# Patient Record
Sex: FEMALE | Race: WHITE | NOT HISPANIC OR LATINO | Employment: OTHER | URBAN - METROPOLITAN AREA
[De-identification: names, ages, dates, MRNs, and addresses within clinical notes are randomized per-mention and may not be internally consistent; named-entity substitution may affect disease eponyms.]

---

## 2017-05-31 ENCOUNTER — HOSPITAL ENCOUNTER (OUTPATIENT)
Dept: FAMILY MEDICINE CLINIC | Facility: CLINIC | Age: 72
Setting detail: SPECIMEN
Discharge: HOME | End: 2017-05-31
Attending: NURSE PRACTITIONER | Admitting: NURSE PRACTITIONER

## 2017-05-31 LAB
ALBUMIN SERPL-MCNC: 3.7 G/DL (ref 3.5–4.8)
ALBUMIN/GLOB SERPL: 0.9 {RATIO} (ref 1–1.7)
ALP SERPL-CCNC: 122 IU/L (ref 32–91)
ALT SERPL-CCNC: 21 IU/L (ref 14–54)
ANION GAP SERPL CALC-SCNC: 16.7 MMOL/L (ref 10–20)
AST SERPL-CCNC: 23 IU/L (ref 15–41)
BASOPHILS # BLD AUTO: 0 10*3/UL (ref 0–0.2)
BASOPHILS NFR BLD AUTO: 1 % (ref 0–2)
BILIRUB SERPL-MCNC: 0.5 MG/DL (ref 0.3–1.2)
BUN SERPL-MCNC: 21 MG/DL (ref 8–20)
BUN/CREAT SERPL: 23.3 (ref 5.4–26.2)
CALCIUM SERPL-MCNC: 9.9 MG/DL (ref 8.9–10.3)
CHLORIDE SERPL-SCNC: 104 MMOL/L (ref 101–111)
CHOLEST SERPL-MCNC: 156 MG/DL
CHOLEST/HDLC SERPL: 1.9 {RATIO}
CONV CO2: 27 MMOL/L (ref 22–32)
CONV LDL CHOLESTEROL DIRECT: 59 MG/DL (ref 0–100)
CONV TOTAL PROTEIN: 7.7 G/DL (ref 6.1–7.9)
CREAT UR-MCNC: 0.9 MG/DL (ref 0.4–1)
DIFFERENTIAL METHOD BLD: (no result)
EOSINOPHIL # BLD AUTO: 0.1 10*3/UL (ref 0–0.3)
EOSINOPHIL # BLD AUTO: 1 % (ref 0–3)
ERYTHROCYTE [DISTWIDTH] IN BLOOD BY AUTOMATED COUNT: 14.4 % (ref 11.5–14.5)
GLOBULIN UR ELPH-MCNC: 4 G/DL (ref 2.5–3.8)
GLUCOSE SERPL-MCNC: 120 MG/DL (ref 65–99)
HCT VFR BLD AUTO: 44.5 % (ref 35–49)
HDLC SERPL-MCNC: 84 MG/DL
HGB BLD-MCNC: 14.6 G/DL (ref 12–15)
LDLC/HDLC SERPL: 0.7 {RATIO}
LIPID INTERPRETATION: NORMAL
LYMPHOCYTES # BLD AUTO: 1 10*3/UL (ref 0.8–4.8)
LYMPHOCYTES NFR BLD AUTO: 11 % (ref 18–42)
MCH RBC QN AUTO: 30.5 PG (ref 26–32)
MCHC RBC AUTO-ENTMCNC: 32.8 G/DL (ref 32–36)
MCV RBC AUTO: 93 FL (ref 80–94)
MONOCYTES # BLD AUTO: 0.4 10*3/UL (ref 0.1–1.3)
MONOCYTES NFR BLD AUTO: 5 % (ref 2–11)
NEUTROPHILS # BLD AUTO: 7.2 10*3/UL (ref 2.3–8.6)
NEUTROPHILS NFR BLD AUTO: 82 % (ref 50–75)
NRBC BLD AUTO-RTO: 0 /100{WBCS}
NRBC/RBC NFR BLD MANUAL: 0 10*3/UL
PLATELET # BLD AUTO: 231 10*3/UL (ref 150–450)
PMV BLD AUTO: 8.3 FL (ref 7.4–10.4)
POTASSIUM SERPL-SCNC: 4.7 MMOL/L (ref 3.6–5.1)
RBC # BLD AUTO: 4.78 10*6/UL (ref 4–5.4)
SODIUM SERPL-SCNC: 143 MMOL/L (ref 136–144)
TRIGL SERPL-MCNC: 40 MG/DL
VLDLC SERPL CALC-MCNC: 13.6 MG/DL
WBC # BLD AUTO: 8.7 10*3/UL (ref 4.5–11.5)

## 2017-06-09 ENCOUNTER — HOSPITAL ENCOUNTER (OUTPATIENT)
Dept: WOUND CARE | Facility: HOSPITAL | Age: 72
Discharge: HOME | End: 2017-06-09
Attending: INTERNAL MEDICINE | Admitting: INTERNAL MEDICINE

## 2017-06-12 ENCOUNTER — HOSPITAL ENCOUNTER (OUTPATIENT)
Dept: WOUND CARE | Facility: HOSPITAL | Age: 72
Discharge: HOME | End: 2017-06-12
Attending: INTERNAL MEDICINE | Admitting: INTERNAL MEDICINE

## 2017-06-16 ENCOUNTER — HOSPITAL ENCOUNTER (OUTPATIENT)
Dept: WOUND CARE | Facility: HOSPITAL | Age: 72
Discharge: HOME | End: 2017-06-16
Attending: NURSE PRACTITIONER | Admitting: NURSE PRACTITIONER

## 2017-06-16 LAB
AMPICILLIN SUSC ISLT: NORMAL
AMPICILLIN SUSC ISLT: NORMAL
AZTREONAM SUSC ISLT: NORMAL
AZTREONAM SUSC ISLT: NORMAL
BACTERIA ISLT: NORMAL
BACTERIA SPEC AEROBE CULT: NORMAL
CEFAZOLIN SUSC ISLT: NORMAL
CEFEPIME SUSC ISLT: NORMAL
CEFEPIME SUSC ISLT: NORMAL
CEFTAZIDIME SUSC ISLT: NORMAL
CEFTRIAXONE SUSC ISLT: NORMAL
CIPROFLOXACIN SUSC ISLT: NORMAL
CIPROFLOXACIN SUSC ISLT: NORMAL
GRAM STN SPEC: NORMAL
LEVOFLOXACIN SUSC ISLT: NORMAL
LEVOFLOXACIN SUSC ISLT: NORMAL
Lab: NORMAL
MEROPENEM SUSC ISLT: NORMAL
MEROPENEM SUSC ISLT: NORMAL
MICRO REPORT STATUS: NORMAL
PIP+TAZO SUSC ISLT: NORMAL
PIP+TAZO SUSC ISLT: NORMAL
SPECIMEN SOURCE: NORMAL
SUSC METH SPEC: NORMAL
TOBRAMYCIN SUSC ISLT: NORMAL
TOBRAMYCIN SUSC ISLT: NORMAL
TRIMETHOPRIM/SULFA: NORMAL
VANCOMYCIN SUSC ISLT: NORMAL

## 2017-06-19 ENCOUNTER — HOSPITAL ENCOUNTER (OUTPATIENT)
Dept: WOUND CARE | Facility: HOSPITAL | Age: 72
Discharge: HOME | End: 2017-06-19
Attending: NURSE PRACTITIONER | Admitting: NURSE PRACTITIONER

## 2017-06-23 ENCOUNTER — HOSPITAL ENCOUNTER (OUTPATIENT)
Dept: WOUND CARE | Facility: HOSPITAL | Age: 72
Discharge: HOME | End: 2017-06-23
Attending: NURSE PRACTITIONER | Admitting: NURSE PRACTITIONER

## 2017-06-26 ENCOUNTER — HOSPITAL ENCOUNTER (OUTPATIENT)
Dept: WOUND CARE | Facility: HOSPITAL | Age: 72
Discharge: HOME | End: 2017-06-26
Attending: NURSE PRACTITIONER | Admitting: NURSE PRACTITIONER

## 2017-06-30 ENCOUNTER — HOSPITAL ENCOUNTER (OUTPATIENT)
Dept: WOUND CARE | Facility: HOSPITAL | Age: 72
Discharge: HOME | End: 2017-06-30
Attending: NURSE PRACTITIONER | Admitting: NURSE PRACTITIONER

## 2017-07-03 ENCOUNTER — HOSPITAL ENCOUNTER (OUTPATIENT)
Dept: WOUND CARE | Facility: HOSPITAL | Age: 72
Discharge: HOME | End: 2017-07-03
Attending: NURSE PRACTITIONER | Admitting: NURSE PRACTITIONER

## 2017-07-07 ENCOUNTER — HOSPITAL ENCOUNTER (OUTPATIENT)
Dept: WOUND CARE | Facility: HOSPITAL | Age: 72
Discharge: HOME | End: 2017-07-07
Attending: NURSE PRACTITIONER | Admitting: NURSE PRACTITIONER

## 2017-07-13 ENCOUNTER — HOSPITAL ENCOUNTER (OUTPATIENT)
Dept: WOUND CARE | Facility: HOSPITAL | Age: 72
Discharge: HOME | End: 2017-07-13
Attending: NURSE PRACTITIONER | Admitting: NURSE PRACTITIONER

## 2017-07-18 ENCOUNTER — HOSPITAL ENCOUNTER (OUTPATIENT)
Dept: WOUND CARE | Facility: HOSPITAL | Age: 72
Discharge: HOME | End: 2017-07-18
Attending: PHYSICIAN ASSISTANT | Admitting: PHYSICIAN ASSISTANT

## 2017-07-21 ENCOUNTER — HOSPITAL ENCOUNTER (OUTPATIENT)
Dept: WOUND CARE | Facility: HOSPITAL | Age: 72
Discharge: HOME | End: 2017-07-21
Attending: NURSE PRACTITIONER | Admitting: NURSE PRACTITIONER

## 2017-07-28 ENCOUNTER — HOSPITAL ENCOUNTER (OUTPATIENT)
Dept: WOUND CARE | Facility: HOSPITAL | Age: 72
Discharge: HOME | End: 2017-07-28
Attending: NURSE PRACTITIONER | Admitting: NURSE PRACTITIONER

## 2017-08-04 ENCOUNTER — HOSPITAL ENCOUNTER (OUTPATIENT)
Dept: WOUND CARE | Facility: HOSPITAL | Age: 72
Discharge: HOME | End: 2017-08-04
Attending: NURSE PRACTITIONER | Admitting: NURSE PRACTITIONER

## 2017-08-11 ENCOUNTER — HOSPITAL ENCOUNTER (OUTPATIENT)
Dept: WOUND CARE | Facility: HOSPITAL | Age: 72
Discharge: HOME | End: 2017-08-11
Attending: NURSE PRACTITIONER | Admitting: NURSE PRACTITIONER

## 2017-08-18 ENCOUNTER — HOSPITAL ENCOUNTER (OUTPATIENT)
Dept: WOUND CARE | Facility: HOSPITAL | Age: 72
Discharge: HOME | End: 2017-08-18
Attending: NURSE PRACTITIONER | Admitting: NURSE PRACTITIONER

## 2017-08-24 ENCOUNTER — HOSPITAL ENCOUNTER (OUTPATIENT)
Dept: WOUND CARE | Facility: HOSPITAL | Age: 72
Discharge: HOME | End: 2017-08-24
Attending: NURSE PRACTITIONER | Admitting: NURSE PRACTITIONER

## 2017-09-01 ENCOUNTER — HOSPITAL ENCOUNTER (OUTPATIENT)
Dept: PHYSICAL THERAPY | Facility: HOSPITAL | Age: 72
Setting detail: RECURRING SERIES
Discharge: HOME | End: 2017-09-12
Attending: NURSE PRACTITIONER | Admitting: NURSE PRACTITIONER

## 2017-09-01 ENCOUNTER — HOSPITAL ENCOUNTER (OUTPATIENT)
Dept: WOUND CARE | Facility: HOSPITAL | Age: 72
Discharge: HOME | End: 2017-09-01
Attending: NURSE PRACTITIONER | Admitting: NURSE PRACTITIONER

## 2017-09-08 ENCOUNTER — HOSPITAL ENCOUNTER (OUTPATIENT)
Dept: WOUND CARE | Facility: HOSPITAL | Age: 72
Discharge: HOME | End: 2017-09-08
Attending: NURSE PRACTITIONER | Admitting: NURSE PRACTITIONER

## 2017-09-15 ENCOUNTER — HOSPITAL ENCOUNTER (OUTPATIENT)
Dept: WOUND CARE | Facility: HOSPITAL | Age: 72
Discharge: HOME | End: 2017-09-15
Attending: NURSE PRACTITIONER | Admitting: NURSE PRACTITIONER

## 2017-09-22 ENCOUNTER — HOSPITAL ENCOUNTER (OUTPATIENT)
Dept: WOUND CARE | Facility: HOSPITAL | Age: 72
Discharge: HOME | End: 2017-09-22
Attending: NURSE PRACTITIONER | Admitting: NURSE PRACTITIONER

## 2017-09-29 ENCOUNTER — HOSPITAL ENCOUNTER (OUTPATIENT)
Dept: WOUND CARE | Facility: HOSPITAL | Age: 72
Discharge: HOME | End: 2017-09-29
Attending: NURSE PRACTITIONER | Admitting: NURSE PRACTITIONER

## 2017-09-29 LAB
AZTREONAM SUSC ISLT: ABNORMAL
AZTREONAM SUSC ISLT: ABNORMAL
BACTERIA ISLT: ABNORMAL
BACTERIA SPEC AEROBE CULT: ABNORMAL
CEFEPIME SUSC ISLT: ABNORMAL
CEFEPIME SUSC ISLT: ABNORMAL
CEFTAZIDIME SUSC ISLT: ABNORMAL
CEFTRIAXONE SUSC ISLT: ABNORMAL
CIPROFLOXACIN SUSC ISLT: ABNORMAL
CIPROFLOXACIN SUSC ISLT: ABNORMAL
CLINDAMYCIN SUSC ISLT: ABNORMAL
ERYTHROMYCIN SUSC ISLT: ABNORMAL
GRAM STN SPEC: ABNORMAL
LEVOFLOXACIN SUSC ISLT: ABNORMAL
LEVOFLOXACIN SUSC ISLT: ABNORMAL
Lab: ABNORMAL
MEROPENEM SUSC ISLT: ABNORMAL
MEROPENEM SUSC ISLT: ABNORMAL
MICRO REPORT STATUS: ABNORMAL
OXACILLIN SUSC ISLT: ABNORMAL
PIP+TAZO SUSC ISLT: ABNORMAL
PIP+TAZO SUSC ISLT: ABNORMAL
SPECIMEN SOURCE: ABNORMAL
SUSC METH SPEC: ABNORMAL
TETRACYCLINE SUSC ISLT: ABNORMAL
TETRACYCLINE SUSC ISLT: ABNORMAL
TOBRAMYCIN SUSC ISLT: ABNORMAL
TOBRAMYCIN SUSC ISLT: ABNORMAL
TRIMETHOPRIM/SULFA: ABNORMAL
TRIMETHOPRIM/SULFA: ABNORMAL
VANCOMYCIN SUSC ISLT: ABNORMAL

## 2017-10-06 ENCOUNTER — HOSPITAL ENCOUNTER (OUTPATIENT)
Dept: WOUND CARE | Facility: HOSPITAL | Age: 72
Discharge: HOME | End: 2017-10-06
Attending: NURSE PRACTITIONER | Admitting: NURSE PRACTITIONER

## 2017-10-11 ENCOUNTER — HOSPITAL ENCOUNTER (OUTPATIENT)
Dept: WOUND CARE | Facility: HOSPITAL | Age: 72
Discharge: HOME | End: 2017-10-11
Attending: INTERNAL MEDICINE | Admitting: INTERNAL MEDICINE

## 2017-10-18 ENCOUNTER — HOSPITAL ENCOUNTER (OUTPATIENT)
Dept: WOUND CARE | Facility: HOSPITAL | Age: 72
Discharge: HOME | End: 2017-10-18
Attending: NURSE PRACTITIONER | Admitting: NURSE PRACTITIONER

## 2017-10-23 ENCOUNTER — HOSPITAL ENCOUNTER (OUTPATIENT)
Dept: WOUND CARE | Facility: HOSPITAL | Age: 72
Discharge: HOME | End: 2017-10-23
Attending: NURSE PRACTITIONER | Admitting: NURSE PRACTITIONER

## 2017-10-27 ENCOUNTER — HOSPITAL ENCOUNTER (OUTPATIENT)
Dept: WOUND CARE | Facility: HOSPITAL | Age: 72
Discharge: HOME | End: 2017-10-27
Attending: NURSE PRACTITIONER | Admitting: NURSE PRACTITIONER

## 2017-10-30 ENCOUNTER — HOSPITAL ENCOUNTER (OUTPATIENT)
Dept: WOUND CARE | Facility: HOSPITAL | Age: 72
Discharge: HOME | End: 2017-10-30
Attending: NURSE PRACTITIONER | Admitting: NURSE PRACTITIONER

## 2017-11-03 ENCOUNTER — HOSPITAL ENCOUNTER (OUTPATIENT)
Dept: WOUND CARE | Facility: HOSPITAL | Age: 72
Discharge: HOME | End: 2017-11-03
Attending: NURSE PRACTITIONER | Admitting: NURSE PRACTITIONER

## 2017-11-17 ENCOUNTER — HOSPITAL ENCOUNTER (OUTPATIENT)
Dept: WOUND CARE | Facility: HOSPITAL | Age: 72
Discharge: HOME | End: 2017-11-17
Attending: NURSE PRACTITIONER | Admitting: NURSE PRACTITIONER

## 2017-12-01 ENCOUNTER — HOSPITAL ENCOUNTER (OUTPATIENT)
Dept: WOUND CARE | Facility: HOSPITAL | Age: 72
Discharge: HOME | End: 2017-12-01
Attending: NURSE PRACTITIONER | Admitting: NURSE PRACTITIONER

## 2017-12-08 ENCOUNTER — HOSPITAL ENCOUNTER (OUTPATIENT)
Dept: WOUND CARE | Facility: HOSPITAL | Age: 72
Discharge: HOME | End: 2017-12-08
Attending: NURSE PRACTITIONER | Admitting: NURSE PRACTITIONER

## 2017-12-15 ENCOUNTER — HOSPITAL ENCOUNTER (OUTPATIENT)
Dept: WOUND CARE | Facility: HOSPITAL | Age: 72
Discharge: HOME | End: 2017-12-15
Attending: NURSE PRACTITIONER | Admitting: NURSE PRACTITIONER

## 2017-12-29 ENCOUNTER — HOSPITAL ENCOUNTER (OUTPATIENT)
Dept: WOUND CARE | Facility: HOSPITAL | Age: 72
Discharge: HOME | End: 2017-12-29
Attending: NURSE PRACTITIONER | Admitting: NURSE PRACTITIONER

## 2018-01-05 ENCOUNTER — HOSPITAL ENCOUNTER (OUTPATIENT)
Dept: WOUND CARE | Facility: HOSPITAL | Age: 73
Discharge: HOME | End: 2018-01-05
Attending: NURSE PRACTITIONER | Admitting: NURSE PRACTITIONER

## 2018-01-12 ENCOUNTER — HOSPITAL ENCOUNTER (OUTPATIENT)
Dept: WOUND CARE | Facility: HOSPITAL | Age: 73
Discharge: HOME | End: 2018-01-12
Attending: NURSE PRACTITIONER | Admitting: NURSE PRACTITIONER

## 2018-01-19 ENCOUNTER — HOSPITAL ENCOUNTER (OUTPATIENT)
Dept: WOUND CARE | Facility: HOSPITAL | Age: 73
Discharge: HOME | End: 2018-01-19
Attending: NURSE PRACTITIONER | Admitting: NURSE PRACTITIONER

## 2018-01-26 ENCOUNTER — HOSPITAL ENCOUNTER (OUTPATIENT)
Dept: PHYSICAL THERAPY | Facility: HOSPITAL | Age: 73
Setting detail: RECURRING SERIES
Discharge: HOME | End: 2018-04-30
Attending: NURSE PRACTITIONER | Admitting: NURSE PRACTITIONER

## 2018-01-26 ENCOUNTER — HOSPITAL ENCOUNTER (OUTPATIENT)
Dept: WOUND CARE | Facility: HOSPITAL | Age: 73
Discharge: HOME | End: 2018-01-26
Attending: NURSE PRACTITIONER | Admitting: NURSE PRACTITIONER

## 2018-02-02 ENCOUNTER — HOSPITAL ENCOUNTER (OUTPATIENT)
Dept: WOUND CARE | Facility: HOSPITAL | Age: 73
Discharge: HOME | End: 2018-02-02
Attending: NURSE PRACTITIONER | Admitting: NURSE PRACTITIONER

## 2018-02-09 ENCOUNTER — HOSPITAL ENCOUNTER (OUTPATIENT)
Dept: WOUND CARE | Facility: HOSPITAL | Age: 73
Discharge: HOME | End: 2018-02-09
Attending: NURSE PRACTITIONER | Admitting: NURSE PRACTITIONER

## 2018-02-16 ENCOUNTER — HOSPITAL ENCOUNTER (OUTPATIENT)
Dept: WOUND CARE | Facility: HOSPITAL | Age: 73
Discharge: HOME | End: 2018-02-16
Attending: NURSE PRACTITIONER | Admitting: NURSE PRACTITIONER

## 2018-02-28 ENCOUNTER — HOSPITAL ENCOUNTER (OUTPATIENT)
Dept: WOUND CARE | Facility: HOSPITAL | Age: 73
Discharge: HOME | End: 2018-02-28
Attending: NURSE PRACTITIONER | Admitting: NURSE PRACTITIONER

## 2018-04-04 ENCOUNTER — HOSPITAL ENCOUNTER (OUTPATIENT)
Dept: WOUND CARE | Facility: HOSPITAL | Age: 73
Discharge: HOME | End: 2018-04-04
Attending: NURSE PRACTITIONER | Admitting: NURSE PRACTITIONER

## 2018-04-09 ENCOUNTER — HOSPITAL ENCOUNTER (OUTPATIENT)
Dept: CARDIOLOGY | Facility: HOSPITAL | Age: 73
Discharge: HOME | End: 2018-04-09
Attending: NURSE PRACTITIONER | Admitting: NURSE PRACTITIONER

## 2018-04-11 ENCOUNTER — HOSPITAL ENCOUNTER (OUTPATIENT)
Dept: WOUND CARE | Facility: HOSPITAL | Age: 73
Discharge: HOME | End: 2018-04-11
Attending: NURSE PRACTITIONER | Admitting: NURSE PRACTITIONER

## 2018-04-18 ENCOUNTER — HOSPITAL ENCOUNTER (OUTPATIENT)
Dept: WOUND CARE | Facility: HOSPITAL | Age: 73
Discharge: HOME | End: 2018-04-18
Attending: NURSE PRACTITIONER | Admitting: NURSE PRACTITIONER

## 2018-04-27 ENCOUNTER — HOSPITAL ENCOUNTER (OUTPATIENT)
Dept: WOUND CARE | Facility: HOSPITAL | Age: 73
Discharge: HOME | End: 2018-04-27
Attending: NURSE PRACTITIONER | Admitting: NURSE PRACTITIONER

## 2018-04-30 ENCOUNTER — HOSPITAL ENCOUNTER (OUTPATIENT)
Dept: PHYSICAL THERAPY | Facility: HOSPITAL | Age: 73
Setting detail: RECURRING SERIES
Discharge: HOME | End: 2018-05-29
Attending: NURSE PRACTITIONER | Admitting: NURSE PRACTITIONER

## 2018-05-04 ENCOUNTER — HOSPITAL ENCOUNTER (OUTPATIENT)
Dept: WOUND CARE | Facility: HOSPITAL | Age: 73
Discharge: HOME | End: 2018-05-04
Attending: NURSE PRACTITIONER | Admitting: NURSE PRACTITIONER

## 2018-05-11 ENCOUNTER — HOSPITAL ENCOUNTER (OUTPATIENT)
Dept: WOUND CARE | Facility: HOSPITAL | Age: 73
Discharge: HOME | End: 2018-05-11
Attending: NURSE PRACTITIONER | Admitting: NURSE PRACTITIONER

## 2018-05-18 ENCOUNTER — HOSPITAL ENCOUNTER (OUTPATIENT)
Dept: WOUND CARE | Facility: HOSPITAL | Age: 73
Discharge: HOME | End: 2018-05-18
Attending: NURSE PRACTITIONER | Admitting: NURSE PRACTITIONER

## 2018-05-25 ENCOUNTER — HOSPITAL ENCOUNTER (OUTPATIENT)
Dept: WOUND CARE | Facility: HOSPITAL | Age: 73
Discharge: HOME | End: 2018-05-25
Attending: NURSE PRACTITIONER | Admitting: NURSE PRACTITIONER

## 2018-06-01 ENCOUNTER — HOSPITAL ENCOUNTER (OUTPATIENT)
Dept: WOUND CARE | Facility: HOSPITAL | Age: 73
Discharge: HOME | End: 2018-06-01
Attending: NURSE PRACTITIONER | Admitting: NURSE PRACTITIONER

## 2018-06-08 ENCOUNTER — HOSPITAL ENCOUNTER (OUTPATIENT)
Dept: WOUND CARE | Facility: HOSPITAL | Age: 73
Discharge: HOME | End: 2018-06-08
Attending: NURSE PRACTITIONER | Admitting: NURSE PRACTITIONER

## 2018-06-12 ENCOUNTER — HOSPITAL ENCOUNTER (OUTPATIENT)
Dept: WOUND CARE | Facility: HOSPITAL | Age: 73
Discharge: HOME | End: 2018-06-12
Attending: NURSE PRACTITIONER | Admitting: NURSE PRACTITIONER

## 2018-06-15 ENCOUNTER — HOSPITAL ENCOUNTER (OUTPATIENT)
Dept: WOUND CARE | Facility: HOSPITAL | Age: 73
Discharge: HOME | End: 2018-06-15
Attending: NURSE PRACTITIONER | Admitting: NURSE PRACTITIONER

## 2018-06-19 ENCOUNTER — HOSPITAL ENCOUNTER (OUTPATIENT)
Dept: WOUND CARE | Facility: HOSPITAL | Age: 73
Discharge: HOME | End: 2018-06-19
Attending: SURGERY | Admitting: SURGERY

## 2018-06-22 ENCOUNTER — HOSPITAL ENCOUNTER (OUTPATIENT)
Dept: WOUND CARE | Facility: HOSPITAL | Age: 73
Discharge: HOME | End: 2018-06-22
Attending: NURSE PRACTITIONER | Admitting: NURSE PRACTITIONER

## 2018-06-26 ENCOUNTER — HOSPITAL ENCOUNTER (OUTPATIENT)
Dept: WOUND CARE | Facility: HOSPITAL | Age: 73
Discharge: HOME | End: 2018-06-26
Attending: SURGERY | Admitting: SURGERY

## 2018-06-29 ENCOUNTER — HOSPITAL ENCOUNTER (OUTPATIENT)
Dept: WOUND CARE | Facility: HOSPITAL | Age: 73
Discharge: HOME | End: 2018-06-29
Attending: NURSE PRACTITIONER | Admitting: NURSE PRACTITIONER

## 2018-07-05 ENCOUNTER — HOSPITAL ENCOUNTER (OUTPATIENT)
Dept: WOUND CARE | Facility: HOSPITAL | Age: 73
Discharge: HOME | End: 2018-07-05
Attending: NURSE PRACTITIONER | Admitting: NURSE PRACTITIONER

## 2018-07-13 ENCOUNTER — HOSPITAL ENCOUNTER (OUTPATIENT)
Dept: WOUND CARE | Facility: HOSPITAL | Age: 73
Discharge: HOME | End: 2018-07-13
Attending: NURSE PRACTITIONER | Admitting: NURSE PRACTITIONER

## 2018-07-27 ENCOUNTER — HOSPITAL ENCOUNTER (OUTPATIENT)
Dept: WOUND CARE | Facility: HOSPITAL | Age: 73
Discharge: HOME | End: 2018-07-27
Attending: NURSE PRACTITIONER | Admitting: NURSE PRACTITIONER

## 2018-07-31 ENCOUNTER — HOSPITAL ENCOUNTER (OUTPATIENT)
Dept: FAMILY MEDICINE CLINIC | Facility: CLINIC | Age: 73
Setting detail: SPECIMEN
Discharge: HOME | End: 2018-07-31
Attending: NURSE PRACTITIONER | Admitting: NURSE PRACTITIONER

## 2018-07-31 LAB
ALBUMIN SERPL-MCNC: 4 G/DL (ref 3.5–4.8)
ALBUMIN/GLOB SERPL: 1.1 {RATIO} (ref 1–1.7)
ALP SERPL-CCNC: 116 IU/L (ref 32–91)
ALT SERPL-CCNC: 20 IU/L (ref 14–54)
ANION GAP SERPL CALC-SCNC: 13.7 MMOL/L (ref 10–20)
AST SERPL-CCNC: 25 IU/L (ref 15–41)
BILIRUB SERPL-MCNC: 0.5 MG/DL (ref 0.3–1.2)
BUN SERPL-MCNC: 25 MG/DL (ref 8–20)
BUN/CREAT SERPL: 27.8 (ref 5.4–26.2)
CALCIUM SERPL-MCNC: 9.8 MG/DL (ref 8.9–10.3)
CHLORIDE SERPL-SCNC: 102 MMOL/L (ref 101–111)
CHOLEST SERPL-MCNC: 169 MG/DL
CHOLEST/HDLC SERPL: 2.2 {RATIO}
CONV CO2: 28 MMOL/L (ref 22–32)
CONV LDL CHOLESTEROL DIRECT: 74 MG/DL (ref 0–100)
CONV TOTAL PROTEIN: 7.5 G/DL (ref 6.1–7.9)
CREAT UR-MCNC: 0.9 MG/DL (ref 0.4–1)
GLOBULIN UR ELPH-MCNC: 3.5 G/DL (ref 2.5–3.8)
GLUCOSE SERPL-MCNC: 115 MG/DL (ref 65–99)
HDLC SERPL-MCNC: 78 MG/DL
LDLC/HDLC SERPL: 1 {RATIO}
LIPID INTERPRETATION: NORMAL
POTASSIUM SERPL-SCNC: 4.7 MMOL/L (ref 3.6–5.1)
SODIUM SERPL-SCNC: 139 MMOL/L (ref 136–144)
TRIGL SERPL-MCNC: 63 MG/DL
VLDLC SERPL CALC-MCNC: 16.7 MG/DL

## 2018-08-03 ENCOUNTER — HOSPITAL ENCOUNTER (OUTPATIENT)
Dept: WOUND CARE | Facility: HOSPITAL | Age: 73
Discharge: HOME | End: 2018-08-03
Attending: NURSE PRACTITIONER | Admitting: NURSE PRACTITIONER

## 2018-10-05 ENCOUNTER — HOSPITAL ENCOUNTER (OUTPATIENT)
Dept: WOUND CARE | Facility: HOSPITAL | Age: 73
Discharge: HOME | End: 2018-10-05
Attending: NURSE PRACTITIONER | Admitting: NURSE PRACTITIONER

## 2018-10-09 ENCOUNTER — HOSPITAL ENCOUNTER (OUTPATIENT)
Dept: WOUND CARE | Facility: HOSPITAL | Age: 73
Discharge: HOME | End: 2018-10-09
Attending: SURGERY | Admitting: SURGERY

## 2018-10-12 ENCOUNTER — HOSPITAL ENCOUNTER (OUTPATIENT)
Dept: WOUND CARE | Facility: HOSPITAL | Age: 73
Discharge: HOME | End: 2018-10-12
Attending: NURSE PRACTITIONER | Admitting: NURSE PRACTITIONER

## 2018-10-16 ENCOUNTER — HOSPITAL ENCOUNTER (OUTPATIENT)
Dept: WOUND CARE | Facility: HOSPITAL | Age: 73
Discharge: HOME | End: 2018-10-16
Attending: SURGERY | Admitting: SURGERY

## 2018-10-19 ENCOUNTER — HOSPITAL ENCOUNTER (OUTPATIENT)
Dept: WOUND CARE | Facility: HOSPITAL | Age: 73
Discharge: HOME | End: 2018-10-19
Attending: SURGERY | Admitting: SURGERY

## 2018-10-26 ENCOUNTER — HOSPITAL ENCOUNTER (OUTPATIENT)
Dept: WOUND CARE | Facility: HOSPITAL | Age: 73
Discharge: HOME | End: 2018-10-26
Attending: NURSE PRACTITIONER | Admitting: NURSE PRACTITIONER

## 2019-01-04 ENCOUNTER — HOSPITAL ENCOUNTER (OUTPATIENT)
Dept: WOUND CARE | Facility: HOSPITAL | Age: 74
Discharge: HOME | End: 2019-01-04
Attending: NURSE PRACTITIONER | Admitting: NURSE PRACTITIONER

## 2019-01-04 LAB
GRAM STN SPEC: NORMAL
Lab: NORMAL
MICRO REPORT STATUS: NORMAL
SPECIMEN SOURCE: NORMAL
SPECIMEN SOURCE: NORMAL

## 2019-01-10 ENCOUNTER — HOSPITAL ENCOUNTER (OUTPATIENT)
Dept: CARDIOLOGY | Facility: HOSPITAL | Age: 74
Discharge: HOME | End: 2019-01-10
Attending: NURSE PRACTITIONER | Admitting: NURSE PRACTITIONER

## 2019-01-18 ENCOUNTER — HOSPITAL ENCOUNTER (OUTPATIENT)
Dept: WOUND CARE | Facility: HOSPITAL | Age: 74
Discharge: HOME | End: 2019-01-18
Attending: INTERNAL MEDICINE | Admitting: INTERNAL MEDICINE

## 2019-01-21 ENCOUNTER — HOSPITAL ENCOUNTER (OUTPATIENT)
Dept: RESPIRATORY THERAPY | Facility: HOSPITAL | Age: 74
Discharge: HOME | End: 2019-01-21
Attending: NURSE PRACTITIONER | Admitting: NURSE PRACTITIONER

## 2019-01-25 ENCOUNTER — HOSPITAL ENCOUNTER (OUTPATIENT)
Dept: WOUND CARE | Facility: HOSPITAL | Age: 74
Discharge: HOME | End: 2019-01-25
Attending: NURSE PRACTITIONER | Admitting: NURSE PRACTITIONER

## 2019-02-01 ENCOUNTER — HOSPITAL ENCOUNTER (OUTPATIENT)
Dept: WOUND CARE | Facility: HOSPITAL | Age: 74
Discharge: HOME | End: 2019-02-01
Attending: NURSE PRACTITIONER | Admitting: NURSE PRACTITIONER

## 2019-02-08 ENCOUNTER — HOSPITAL ENCOUNTER (OUTPATIENT)
Dept: WOUND CARE | Facility: HOSPITAL | Age: 74
Discharge: HOME | End: 2019-02-08
Attending: NURSE PRACTITIONER | Admitting: NURSE PRACTITIONER

## 2019-02-15 ENCOUNTER — HOSPITAL ENCOUNTER (OUTPATIENT)
Dept: WOUND CARE | Facility: HOSPITAL | Age: 74
Discharge: HOME | End: 2019-02-15
Attending: NURSE PRACTITIONER | Admitting: NURSE PRACTITIONER

## 2019-02-15 LAB
BACTERIA ISLT: NORMAL
BACTERIA SPEC AEROBE CULT: NORMAL
CEFEPIME SUSC ISLT: NORMAL
CIPROFLOXACIN SUSC ISLT: NORMAL
GRAM STN SPEC: NORMAL
LEVOFLOXACIN SUSC ISLT: NORMAL
Lab: NORMAL
MICRO REPORT STATUS: NORMAL
PIP+TAZO SUSC ISLT: NORMAL
SPECIMEN SOURCE: NORMAL
SUSC METH SPEC: NORMAL
TOBRAMYCIN SUSC ISLT: NORMAL

## 2019-02-16 ENCOUNTER — HOSPITAL ENCOUNTER (OUTPATIENT)
Dept: OTHER | Facility: HOSPITAL | Age: 74
Discharge: HOME | End: 2019-02-16
Attending: NURSE PRACTITIONER | Admitting: NURSE PRACTITIONER

## 2019-02-16 LAB
ALBUMIN SERPL-MCNC: 3.4 G/DL (ref 3.5–4.8)
ALBUMIN/GLOB SERPL: 1 {RATIO} (ref 1–1.7)
ALP SERPL-CCNC: 103 IU/L (ref 32–91)
ALT SERPL-CCNC: 18 IU/L (ref 14–54)
ANION GAP SERPL CALC-SCNC: 13.6 MMOL/L (ref 10–20)
AST SERPL-CCNC: 27 IU/L (ref 15–41)
BILIRUB SERPL-MCNC: 0.6 MG/DL (ref 0.3–1.2)
BUN SERPL-MCNC: 23 MG/DL (ref 8–20)
BUN/CREAT SERPL: 25.6 (ref 5.4–26.2)
CALCIUM SERPL-MCNC: 9.3 MG/DL (ref 8.9–10.3)
CHLORIDE SERPL-SCNC: 105 MMOL/L (ref 101–111)
CONV CO2: 24 MMOL/L (ref 22–32)
CONV TOTAL PROTEIN: 6.9 G/DL (ref 6.1–7.9)
CREAT UR-MCNC: 0.9 MG/DL (ref 0.4–1)
GLOBULIN UR ELPH-MCNC: 3.5 G/DL (ref 2.5–3.8)
GLUCOSE SERPL-MCNC: 113 MG/DL (ref 65–99)
POTASSIUM SERPL-SCNC: 4.6 MMOL/L (ref 3.6–5.1)
SODIUM SERPL-SCNC: 138 MMOL/L (ref 136–144)

## 2019-02-21 ENCOUNTER — HOSPITAL ENCOUNTER (OUTPATIENT)
Dept: WOUND CARE | Facility: HOSPITAL | Age: 74
Discharge: HOME | End: 2019-02-21
Attending: INTERNAL MEDICINE | Admitting: INTERNAL MEDICINE

## 2019-03-01 ENCOUNTER — HOSPITAL ENCOUNTER (OUTPATIENT)
Dept: WOUND CARE | Facility: HOSPITAL | Age: 74
Discharge: HOME | End: 2019-03-01
Attending: NURSE PRACTITIONER | Admitting: NURSE PRACTITIONER

## 2019-03-08 ENCOUNTER — HOSPITAL ENCOUNTER (OUTPATIENT)
Dept: WOUND CARE | Facility: HOSPITAL | Age: 74
Discharge: HOME | End: 2019-03-08
Attending: NURSE PRACTITIONER | Admitting: NURSE PRACTITIONER

## 2019-03-15 ENCOUNTER — HOSPITAL ENCOUNTER (OUTPATIENT)
Dept: WOUND CARE | Facility: HOSPITAL | Age: 74
Discharge: HOME | End: 2019-03-15
Attending: NURSE PRACTITIONER | Admitting: NURSE PRACTITIONER

## 2019-03-18 ENCOUNTER — HOSPITAL ENCOUNTER (OUTPATIENT)
Dept: FAMILY MEDICINE CLINIC | Facility: CLINIC | Age: 74
Setting detail: SPECIMEN
Discharge: HOME | End: 2019-03-18
Attending: FAMILY MEDICINE | Admitting: FAMILY MEDICINE

## 2019-03-18 LAB
BASOPHILS # BLD AUTO: 0 10*3/UL (ref 0–0.2)
BASOPHILS NFR BLD AUTO: 0 % (ref 0–2)
DIFFERENTIAL METHOD BLD: (no result)
EOSINOPHIL # BLD AUTO: 0.1 10*3/UL (ref 0–0.3)
EOSINOPHIL # BLD AUTO: 2 % (ref 0–3)
ERYTHROCYTE [DISTWIDTH] IN BLOOD BY AUTOMATED COUNT: 14.4 % (ref 11.5–14.5)
HCT VFR BLD AUTO: 43.9 % (ref 35–49)
HGB BLD-MCNC: 14.6 G/DL (ref 12–15)
LYMPHOCYTES # BLD AUTO: 1.2 10*3/UL (ref 0.8–4.8)
LYMPHOCYTES NFR BLD AUTO: 18 % (ref 18–42)
MCH RBC QN AUTO: 31.6 PG (ref 26–32)
MCHC RBC AUTO-ENTMCNC: 33.3 G/DL (ref 32–36)
MCV RBC AUTO: 94.9 FL (ref 80–94)
MONOCYTES # BLD AUTO: 0.6 10*3/UL (ref 0.1–1.3)
MONOCYTES NFR BLD AUTO: 8 % (ref 2–11)
NEUTROPHILS # BLD AUTO: 4.9 10*3/UL (ref 2.3–8.6)
NEUTROPHILS NFR BLD AUTO: 72 % (ref 50–75)
NRBC BLD AUTO-RTO: 0 /100{WBCS}
NRBC/RBC NFR BLD MANUAL: 0 10*3/UL
PLATELET # BLD AUTO: 255 10*3/UL (ref 150–450)
PMV BLD AUTO: 7.9 FL (ref 7.4–10.4)
RBC # BLD AUTO: 4.62 10*6/UL (ref 4–5.4)
WBC # BLD AUTO: 6.9 10*3/UL (ref 4.5–11.5)

## 2019-03-22 ENCOUNTER — HOSPITAL ENCOUNTER (OUTPATIENT)
Dept: WOUND CARE | Facility: HOSPITAL | Age: 74
Discharge: HOME | End: 2019-03-22
Attending: NURSE PRACTITIONER | Admitting: NURSE PRACTITIONER

## 2019-04-05 ENCOUNTER — HOSPITAL ENCOUNTER (OUTPATIENT)
Dept: WOUND CARE | Facility: HOSPITAL | Age: 74
Discharge: HOME | End: 2019-04-05
Attending: NURSE PRACTITIONER | Admitting: NURSE PRACTITIONER

## 2019-04-12 ENCOUNTER — HOSPITAL ENCOUNTER (OUTPATIENT)
Dept: WOUND CARE | Facility: HOSPITAL | Age: 74
Discharge: HOME | End: 2019-04-12
Attending: NURSE PRACTITIONER | Admitting: NURSE PRACTITIONER

## 2019-04-12 LAB
BACTERIA ISLT: ABNORMAL
BACTERIA ISLT: ABNORMAL
BACTERIA SPEC AEROBE CULT: ABNORMAL
CEFEPIME SUSC ISLT: ABNORMAL
CIPROFLOXACIN SUSC ISLT: ABNORMAL
CLINDAMYCIN SUSC ISLT: ABNORMAL
GRAM STN SPEC: ABNORMAL
LEVOFLOXACIN SUSC ISLT: ABNORMAL
Lab: ABNORMAL
MICRO REPORT STATUS: ABNORMAL
PIP+TAZO SUSC ISLT: ABNORMAL
SPECIMEN SOURCE: ABNORMAL
SUSC METH SPEC: ABNORMAL
SUSC METH SPEC: ABNORMAL
TETRACYCLINE SUSC ISLT: ABNORMAL
TOBRAMYCIN SUSC ISLT: ABNORMAL
TRIMETHOPRIM/SULFA: ABNORMAL
VANCOMYCIN SUSC ISLT: ABNORMAL

## 2019-04-19 ENCOUNTER — HOSPITAL ENCOUNTER (OUTPATIENT)
Dept: WOUND CARE | Facility: HOSPITAL | Age: 74
Discharge: HOME | End: 2019-04-19
Attending: NURSE PRACTITIONER | Admitting: NURSE PRACTITIONER

## 2019-05-03 ENCOUNTER — HOSPITAL ENCOUNTER (OUTPATIENT)
Dept: WOUND CARE | Facility: HOSPITAL | Age: 74
Discharge: HOME | End: 2019-05-03
Attending: NURSE PRACTITIONER | Admitting: NURSE PRACTITIONER

## 2019-05-17 ENCOUNTER — HOSPITAL ENCOUNTER (OUTPATIENT)
Dept: WOUND CARE | Facility: HOSPITAL | Age: 74
Discharge: HOME | End: 2019-05-17
Attending: NURSE PRACTITIONER | Admitting: NURSE PRACTITIONER

## 2019-05-29 ENCOUNTER — HOSPITAL ENCOUNTER (OUTPATIENT)
Dept: RESPIRATORY THERAPY | Facility: HOSPITAL | Age: 74
Discharge: HOME | End: 2019-05-29
Attending: INTERNAL MEDICINE | Admitting: INTERNAL MEDICINE

## 2019-05-29 ENCOUNTER — HOSPITAL ENCOUNTER (OUTPATIENT)
Dept: CARDIOLOGY | Facility: HOSPITAL | Age: 74
Discharge: HOME | End: 2019-05-29
Attending: INTERNAL MEDICINE | Admitting: INTERNAL MEDICINE

## 2019-06-07 ENCOUNTER — HOSPITAL ENCOUNTER (OUTPATIENT)
Dept: WOUND CARE | Facility: HOSPITAL | Age: 74
Discharge: HOME | End: 2019-06-07
Attending: NURSE PRACTITIONER | Admitting: NURSE PRACTITIONER

## 2019-06-21 ENCOUNTER — OFFICE VISIT (OUTPATIENT)
Dept: WOUND CARE | Facility: HOSPITAL | Age: 74
End: 2019-06-21

## 2019-06-28 ENCOUNTER — OFFICE VISIT (OUTPATIENT)
Dept: WOUND CARE | Facility: HOSPITAL | Age: 74
End: 2019-06-28

## 2019-06-28 DIAGNOSIS — E66.01 MORBIDLY OBESE (HCC): ICD-10-CM

## 2019-06-28 DIAGNOSIS — L97.511 RIGHT FOOT ULCER, LIMITED TO BREAKDOWN OF SKIN (HCC): ICD-10-CM

## 2019-06-28 PROCEDURE — 99212 OFFICE O/P EST SF 10 MIN: CPT | Performed by: NURSE PRACTITIONER

## 2019-07-01 PROBLEM — E66.01 MORBIDLY OBESE (HCC): Status: ACTIVE | Noted: 2019-07-01

## 2019-07-12 ENCOUNTER — OFFICE VISIT (OUTPATIENT)
Dept: WOUND CARE | Facility: HOSPITAL | Age: 74
End: 2019-07-12

## 2019-07-12 DIAGNOSIS — L97.522 ULCER OF LEFT FOOT, WITH FAT LAYER EXPOSED (HCC): ICD-10-CM

## 2019-07-12 DIAGNOSIS — L97.511 RIGHT FOOT ULCER, LIMITED TO BREAKDOWN OF SKIN (HCC): ICD-10-CM

## 2019-07-12 DIAGNOSIS — E66.01 MORBIDLY OBESE (HCC): ICD-10-CM

## 2019-07-12 DIAGNOSIS — I89.0 CHRONIC ACQUIRED LYMPHEDEMA: ICD-10-CM

## 2019-07-12 PROCEDURE — 99212 OFFICE O/P EST SF 10 MIN: CPT | Performed by: NURSE PRACTITIONER

## 2019-07-12 PROCEDURE — G0463 HOSPITAL OUTPT CLINIC VISIT: HCPCS

## 2019-07-24 ENCOUNTER — LAB REQUISITION (OUTPATIENT)
Dept: LAB | Facility: HOSPITAL | Age: 74
End: 2019-07-24

## 2019-07-24 ENCOUNTER — OFFICE VISIT (OUTPATIENT)
Dept: WOUND CARE | Facility: HOSPITAL | Age: 74
End: 2019-07-24

## 2019-07-24 DIAGNOSIS — S91.109A OPEN WOUND OF TOE WITHOUT DAMAGE TO NAIL: ICD-10-CM

## 2019-07-24 PROCEDURE — 87205 SMEAR GRAM STAIN: CPT | Performed by: SURGERY

## 2019-07-24 PROCEDURE — 87077 CULTURE AEROBIC IDENTIFY: CPT | Performed by: SURGERY

## 2019-07-24 PROCEDURE — 87070 CULTURE OTHR SPECIMN AEROBIC: CPT | Performed by: SURGERY

## 2019-07-24 PROCEDURE — 87186 SC STD MICRODIL/AGAR DIL: CPT | Performed by: SURGERY

## 2019-07-26 LAB
BACTERIA SPEC AEROBE CULT: ABNORMAL
GRAM STN SPEC: ABNORMAL
GRAM STN SPEC: ABNORMAL

## 2019-07-31 ENCOUNTER — APPOINTMENT (OUTPATIENT)
Dept: WOUND CARE | Facility: HOSPITAL | Age: 74
End: 2019-07-31

## 2019-08-02 ENCOUNTER — TRANSCRIBE ORDERS (OUTPATIENT)
Dept: ADMINISTRATIVE | Facility: HOSPITAL | Age: 74
End: 2019-08-02

## 2019-08-02 ENCOUNTER — HOSPITAL ENCOUNTER (OUTPATIENT)
Dept: GENERAL RADIOLOGY | Facility: HOSPITAL | Age: 74
Discharge: HOME | End: 2019-08-02
Admitting: SURGERY

## 2019-08-02 DIAGNOSIS — S81.809S NON-HEALING WOUND OF LOWER EXTREMITY, UNSPECIFIED LATERALITY, SEQUELA: ICD-10-CM

## 2019-08-02 DIAGNOSIS — S81.809S NON-HEALING WOUND OF LOWER EXTREMITY, UNSPECIFIED LATERALITY, SEQUELA: Primary | ICD-10-CM

## 2019-08-02 PROCEDURE — 73630 X-RAY EXAM OF FOOT: CPT

## 2019-08-07 RX ORDER — METOPROLOL SUCCINATE 100 MG/1
TABLET, EXTENDED RELEASE ORAL
Qty: 90 TABLET | Refills: 1 | Status: SHIPPED | OUTPATIENT
Start: 2019-08-07 | End: 2020-06-10 | Stop reason: DRUGHIGH

## 2019-08-09 ENCOUNTER — OFFICE VISIT (OUTPATIENT)
Dept: WOUND CARE | Facility: HOSPITAL | Age: 74
End: 2019-08-09

## 2019-08-09 DIAGNOSIS — L97.522 ULCER OF LEFT FOOT, WITH FAT LAYER EXPOSED (HCC): ICD-10-CM

## 2019-08-09 DIAGNOSIS — E66.01 MORBIDLY OBESE (HCC): ICD-10-CM

## 2019-08-09 DIAGNOSIS — I89.0 OBLITERATION OF LYMPHATIC VESSEL: ICD-10-CM

## 2019-08-09 DIAGNOSIS — L97.211 NON-PRESSURE CHRONIC ULCER OF RIGHT CALF, LIMITED TO BREAKDOWN OF SKIN (HCC): ICD-10-CM

## 2019-08-09 PROCEDURE — 99212 OFFICE O/P EST SF 10 MIN: CPT | Performed by: NURSE PRACTITIONER

## 2019-08-23 ENCOUNTER — OFFICE VISIT (OUTPATIENT)
Dept: WOUND CARE | Facility: HOSPITAL | Age: 74
End: 2019-08-23

## 2019-08-23 DIAGNOSIS — E66.01 MORBID OBESITY (HCC): ICD-10-CM

## 2019-08-23 DIAGNOSIS — I89.0 LYMPHEDEMA OF LIMB: ICD-10-CM

## 2019-08-23 DIAGNOSIS — L97.511 RIGHT FOOT ULCER, LIMITED TO BREAKDOWN OF SKIN (HCC): ICD-10-CM

## 2019-08-23 DIAGNOSIS — L97.522 ULCER OF LEFT FOOT, WITH FAT LAYER EXPOSED (HCC): ICD-10-CM

## 2019-08-23 PROCEDURE — 99212 OFFICE O/P EST SF 10 MIN: CPT | Performed by: NURSE PRACTITIONER

## 2019-09-13 ENCOUNTER — OFFICE VISIT (OUTPATIENT)
Dept: WOUND CARE | Facility: HOSPITAL | Age: 74
End: 2019-09-13

## 2019-09-13 DIAGNOSIS — E66.01 MORBIDLY OBESE (HCC): ICD-10-CM

## 2019-09-13 DIAGNOSIS — L97.211 NON-PRESSURE CHRONIC ULCER OF RIGHT CALF, LIMITED TO BREAKDOWN OF SKIN (HCC): ICD-10-CM

## 2019-09-13 DIAGNOSIS — I89.0 LYMPHEDEMA: ICD-10-CM

## 2019-09-13 DIAGNOSIS — L97.522 ULCER OF LEFT FOOT, WITH FAT LAYER EXPOSED (HCC): ICD-10-CM

## 2019-09-13 DIAGNOSIS — L97.511 RIGHT FOOT ULCER, LIMITED TO BREAKDOWN OF SKIN (HCC): ICD-10-CM

## 2019-09-13 PROCEDURE — G0463 HOSPITAL OUTPT CLINIC VISIT: HCPCS

## 2019-09-13 PROCEDURE — 99212 OFFICE O/P EST SF 10 MIN: CPT | Performed by: NURSE PRACTITIONER

## 2019-09-20 ENCOUNTER — OFFICE VISIT (OUTPATIENT)
Dept: WOUND CARE | Facility: HOSPITAL | Age: 74
End: 2019-09-20

## 2019-09-20 DIAGNOSIS — E66.01 MORBID OBESITY (HCC): ICD-10-CM

## 2019-09-20 DIAGNOSIS — I89.0 LYMPHEDEMA: ICD-10-CM

## 2019-09-20 DIAGNOSIS — L97.919 IDIOPATHIC CHRONIC VENOUS HYPERTENSION OF RIGHT LOWER EXTREMITY WITH ULCER (HCC): ICD-10-CM

## 2019-09-20 DIAGNOSIS — L97.511 RIGHT FOOT ULCER, LIMITED TO BREAKDOWN OF SKIN (HCC): ICD-10-CM

## 2019-09-20 DIAGNOSIS — I87.311 IDIOPATHIC CHRONIC VENOUS HYPERTENSION OF RIGHT LOWER EXTREMITY WITH ULCER (HCC): ICD-10-CM

## 2019-09-20 PROCEDURE — 99212 OFFICE O/P EST SF 10 MIN: CPT | Performed by: NURSE PRACTITIONER

## 2019-09-27 ENCOUNTER — APPOINTMENT (OUTPATIENT)
Dept: WOUND CARE | Facility: HOSPITAL | Age: 74
End: 2019-09-27

## 2019-09-30 ENCOUNTER — OFFICE VISIT (OUTPATIENT)
Dept: WOUND CARE | Facility: HOSPITAL | Age: 74
End: 2019-09-30

## 2019-10-07 ENCOUNTER — OFFICE VISIT (OUTPATIENT)
Dept: WOUND CARE | Facility: HOSPITAL | Age: 74
End: 2019-10-07

## 2019-10-08 RX ORDER — BENAZEPRIL HYDROCHLORIDE 40 MG/1
TABLET, FILM COATED ORAL
Qty: 90 TABLET | Refills: 1 | Status: SHIPPED | OUTPATIENT
Start: 2019-10-08 | End: 2019-12-03

## 2019-10-11 ENCOUNTER — TELEPHONE (OUTPATIENT)
Dept: MEDSURG UNIT | Facility: HOSPITAL | Age: 74
End: 2019-10-11

## 2019-10-11 DIAGNOSIS — M16.12 PRIMARY OSTEOARTHRITIS OF LEFT HIP: Primary | ICD-10-CM

## 2019-10-13 ENCOUNTER — APPOINTMENT (OUTPATIENT)
Dept: CT IMAGING | Facility: HOSPITAL | Age: 74
End: 2019-10-13

## 2019-10-13 ENCOUNTER — APPOINTMENT (OUTPATIENT)
Dept: GENERAL RADIOLOGY | Facility: HOSPITAL | Age: 74
End: 2019-10-13

## 2019-10-13 ENCOUNTER — HOSPITAL ENCOUNTER (INPATIENT)
Facility: HOSPITAL | Age: 74
LOS: 3 days | Discharge: NURSING, REHAB OR LONG-TERM CARE | End: 2019-10-18
Attending: EMERGENCY MEDICINE | Admitting: INTERNAL MEDICINE

## 2019-10-13 DIAGNOSIS — E03.8 HYPOTHYROIDISM, SECONDARY: ICD-10-CM

## 2019-10-13 DIAGNOSIS — R53.1 WEAKNESS: ICD-10-CM

## 2019-10-13 DIAGNOSIS — R15.9 FREQUENT FECAL INCONTINENCE: ICD-10-CM

## 2019-10-13 DIAGNOSIS — N30.01 ACUTE CYSTITIS WITH HEMATURIA: Primary | ICD-10-CM

## 2019-10-13 PROBLEM — I82.409 DEEP VEIN THROMBOSIS (DVT): Status: ACTIVE | Noted: 2018-12-07

## 2019-10-13 PROBLEM — L03.115 CELLULITIS OF BOTH FEET: Status: ACTIVE | Noted: 2019-10-13

## 2019-10-13 PROBLEM — M16.10 COXITIS: Status: ACTIVE | Noted: 2019-06-04

## 2019-10-13 PROBLEM — M16.10 COXITIS: Status: RESOLVED | Noted: 2019-06-04 | Resolved: 2019-10-13

## 2019-10-13 PROBLEM — R60.0 EDEMA OF LOWER EXTREMITY: Status: ACTIVE | Noted: 2017-05-31

## 2019-10-13 PROBLEM — M16.12 ARTHRITIS OF LEFT HIP: Status: ACTIVE | Noted: 2019-06-04

## 2019-10-13 PROBLEM — R53.81 OTHER MALAISE: Status: ACTIVE | Noted: 2017-05-31

## 2019-10-13 PROBLEM — R53.81 OTHER MALAISE: Status: RESOLVED | Noted: 2017-05-31 | Resolved: 2019-10-13

## 2019-10-13 PROBLEM — M62.3 IMMOBILITY SYNDROME: Status: ACTIVE | Noted: 2019-10-13

## 2019-10-13 PROBLEM — I26.99 PULMONARY EMBOLISM (HCC): Status: ACTIVE | Noted: 2018-12-07

## 2019-10-13 PROBLEM — L03.116 CELLULITIS OF BOTH FEET: Status: ACTIVE | Noted: 2019-10-13

## 2019-10-13 LAB
ALBUMIN SERPL-MCNC: 3.2 G/DL (ref 3.5–4.8)
ALBUMIN/GLOB SERPL: 1 G/DL (ref 1–1.7)
ALP SERPL-CCNC: 94 U/L (ref 32–91)
ALT SERPL W P-5'-P-CCNC: 17 U/L (ref 14–54)
AMORPH URATE CRY URNS QL MICRO: ABNORMAL /HPF
ANION GAP SERPL CALCULATED.3IONS-SCNC: 12.1 MMOL/L (ref 5–15)
APTT PPP: 27.6 SECONDS (ref 24–31)
AST SERPL-CCNC: 23 U/L (ref 15–41)
BACTERIA UR QL AUTO: ABNORMAL /HPF
BASOPHILS # BLD AUTO: 0 10*3/MM3 (ref 0–0.2)
BASOPHILS NFR BLD AUTO: 0.6 % (ref 0–1.5)
BILIRUB SERPL-MCNC: 0.3 MG/DL (ref 0.3–1.2)
BILIRUB UR QL STRIP: NEGATIVE
BUN BLD-MCNC: 25 MG/DL (ref 8–20)
BUN/CREAT SERPL: 31.3 (ref 5.4–26.2)
CALCIUM SPEC-SCNC: 9.2 MG/DL (ref 8.9–10.3)
CHLORIDE SERPL-SCNC: 108 MMOL/L (ref 101–111)
CK SERPL-CCNC: 35 U/L (ref 38–234)
CLARITY UR: ABNORMAL
CO2 SERPL-SCNC: 26 MMOL/L (ref 22–32)
COLOR UR: YELLOW
CREAT BLD-MCNC: 0.8 MG/DL (ref 0.4–1)
DEPRECATED RDW RBC AUTO: 45.5 FL (ref 37–54)
EOSINOPHIL # BLD AUTO: 0.2 10*3/MM3 (ref 0–0.4)
EOSINOPHIL NFR BLD AUTO: 2.5 % (ref 0.3–6.2)
ERYTHROCYTE [DISTWIDTH] IN BLOOD BY AUTOMATED COUNT: 14 % (ref 12.3–15.4)
GFR SERPL CREATININE-BSD FRML MDRD: 70 ML/MIN/1.73
GLOBULIN UR ELPH-MCNC: 3.2 GM/DL (ref 2.5–3.8)
GLUCOSE BLD-MCNC: 103 MG/DL (ref 65–99)
GLUCOSE UR STRIP-MCNC: NEGATIVE MG/DL
GRAN CASTS URNS QL MICRO: ABNORMAL /LPF
HCT VFR BLD AUTO: 38 % (ref 34–46.6)
HGB BLD-MCNC: 13 G/DL (ref 12–15.9)
HGB UR QL STRIP.AUTO: NEGATIVE
HYALINE CASTS UR QL AUTO: ABNORMAL /LPF
INR PPP: 1.03 (ref 0.9–1.1)
KETONES UR QL STRIP: NEGATIVE
LEUKOCYTE ESTERASE UR QL STRIP.AUTO: ABNORMAL
LYMPHOCYTES # BLD AUTO: 0.9 10*3/MM3 (ref 0.7–3.1)
LYMPHOCYTES NFR BLD AUTO: 14.1 % (ref 19.6–45.3)
MAGNESIUM SERPL-MCNC: 2 MG/DL (ref 1.8–2.5)
MCH RBC QN AUTO: 31.9 PG (ref 26.6–33)
MCHC RBC AUTO-ENTMCNC: 34.3 G/DL (ref 31.5–35.7)
MCV RBC AUTO: 92.9 FL (ref 79–97)
MONOCYTES # BLD AUTO: 0.6 10*3/MM3 (ref 0.1–0.9)
MONOCYTES NFR BLD AUTO: 8.9 % (ref 5–12)
MUCOUS THREADS URNS QL MICRO: ABNORMAL /HPF
NEUTROPHILS # BLD AUTO: 4.6 10*3/MM3 (ref 1.7–7)
NEUTROPHILS NFR BLD AUTO: 73.9 % (ref 42.7–76)
NITRITE UR QL STRIP: NEGATIVE
NRBC BLD AUTO-RTO: 0 /100 WBC (ref 0–0.2)
PH UR STRIP.AUTO: 5.5 [PH] (ref 5–8)
PHOSPHATE SERPL-MCNC: 3.5 MG/DL (ref 2.4–4.7)
PLATELET # BLD AUTO: 206 10*3/MM3 (ref 140–450)
PMV BLD AUTO: 6.8 FL (ref 6–12)
POTASSIUM BLD-SCNC: 4.1 MMOL/L (ref 3.6–5.1)
PROT SERPL-MCNC: 6.4 G/DL (ref 6.1–7.9)
PROT UR QL STRIP: NEGATIVE
PROTHROMBIN TIME: 10.7 SECONDS (ref 9.6–11.7)
RBC # BLD AUTO: 4.08 10*6/MM3 (ref 3.77–5.28)
RBC # UR: ABNORMAL /HPF
REF LAB TEST METHOD: ABNORMAL
SODIUM BLD-SCNC: 142 MMOL/L (ref 136–144)
SP GR UR STRIP: 1.03 (ref 1–1.03)
SQUAMOUS #/AREA URNS HPF: ABNORMAL /HPF
T4 FREE SERPL-MCNC: 0.84 NG/DL (ref 0.58–1.64)
TRANS CELLS #/AREA URNS HPF: ABNORMAL /HPF
TROPONIN I SERPL-MCNC: <0.03 NG/ML (ref 0–0.03)
TSH SERPL DL<=0.05 MIU/L-ACNC: 9.17 UIU/ML (ref 0.34–5.6)
UROBILINOGEN UR QL STRIP: ABNORMAL
WBC NRBC COR # BLD: 6.2 10*3/MM3 (ref 3.4–10.8)
WBC UR QL AUTO: ABNORMAL /HPF

## 2019-10-13 PROCEDURE — 83735 ASSAY OF MAGNESIUM: CPT | Performed by: EMERGENCY MEDICINE

## 2019-10-13 PROCEDURE — 82550 ASSAY OF CK (CPK): CPT | Performed by: EMERGENCY MEDICINE

## 2019-10-13 PROCEDURE — 84100 ASSAY OF PHOSPHORUS: CPT | Performed by: EMERGENCY MEDICINE

## 2019-10-13 PROCEDURE — 84443 ASSAY THYROID STIM HORMONE: CPT | Performed by: EMERGENCY MEDICINE

## 2019-10-13 PROCEDURE — 87040 BLOOD CULTURE FOR BACTERIA: CPT | Performed by: EMERGENCY MEDICINE

## 2019-10-13 PROCEDURE — 25010000002 CEFTRIAXONE PER 250 MG: Performed by: EMERGENCY MEDICINE

## 2019-10-13 PROCEDURE — 99284 EMERGENCY DEPT VISIT MOD MDM: CPT

## 2019-10-13 PROCEDURE — 84439 ASSAY OF FREE THYROXINE: CPT | Performed by: EMERGENCY MEDICINE

## 2019-10-13 PROCEDURE — G0378 HOSPITAL OBSERVATION PER HR: HCPCS

## 2019-10-13 PROCEDURE — 81001 URINALYSIS AUTO W/SCOPE: CPT | Performed by: EMERGENCY MEDICINE

## 2019-10-13 PROCEDURE — 99222 1ST HOSP IP/OBS MODERATE 55: CPT | Performed by: FAMILY MEDICINE

## 2019-10-13 PROCEDURE — 87086 URINE CULTURE/COLONY COUNT: CPT | Performed by: EMERGENCY MEDICINE

## 2019-10-13 PROCEDURE — 87088 URINE BACTERIA CULTURE: CPT | Performed by: EMERGENCY MEDICINE

## 2019-10-13 PROCEDURE — 84484 ASSAY OF TROPONIN QUANT: CPT | Performed by: EMERGENCY MEDICINE

## 2019-10-13 PROCEDURE — 72192 CT PELVIS W/O DYE: CPT

## 2019-10-13 PROCEDURE — 71045 X-RAY EXAM CHEST 1 VIEW: CPT

## 2019-10-13 PROCEDURE — 87186 SC STD MICRODIL/AGAR DIL: CPT | Performed by: EMERGENCY MEDICINE

## 2019-10-13 PROCEDURE — 85610 PROTHROMBIN TIME: CPT | Performed by: EMERGENCY MEDICINE

## 2019-10-13 PROCEDURE — 80053 COMPREHEN METABOLIC PANEL: CPT | Performed by: EMERGENCY MEDICINE

## 2019-10-13 PROCEDURE — 85730 THROMBOPLASTIN TIME PARTIAL: CPT | Performed by: EMERGENCY MEDICINE

## 2019-10-13 PROCEDURE — 85025 COMPLETE CBC W/AUTO DIFF WBC: CPT | Performed by: EMERGENCY MEDICINE

## 2019-10-13 PROCEDURE — P9612 CATHETERIZE FOR URINE SPEC: HCPCS

## 2019-10-13 RX ORDER — ACETAMINOPHEN 325 MG/1
650 TABLET ORAL EVERY 4 HOURS PRN
Status: DISCONTINUED | OUTPATIENT
Start: 2019-10-13 | End: 2019-10-18 | Stop reason: HOSPADM

## 2019-10-13 RX ORDER — CHOLECALCIFEROL (VITAMIN D3) 125 MCG
5 CAPSULE ORAL NIGHTLY PRN
Status: DISCONTINUED | OUTPATIENT
Start: 2019-10-13 | End: 2019-10-18 | Stop reason: HOSPADM

## 2019-10-13 RX ORDER — AMMONIUM LACTATE 12 G/100G
CREAM TOPICAL 2 TIMES DAILY
Status: DISCONTINUED | OUTPATIENT
Start: 2019-10-13 | End: 2019-10-18 | Stop reason: HOSPADM

## 2019-10-13 RX ORDER — AMMONIUM LACTATE 120 MG/G
CREAM TOPICAL EVERY 12 HOURS PRN
Status: DISCONTINUED | OUTPATIENT
Start: 2019-10-13 | End: 2019-10-13

## 2019-10-13 RX ORDER — ACETAMINOPHEN 650 MG/1
650 SUPPOSITORY RECTAL EVERY 4 HOURS PRN
Status: DISCONTINUED | OUTPATIENT
Start: 2019-10-13 | End: 2019-10-18 | Stop reason: HOSPADM

## 2019-10-13 RX ORDER — CALCIUM CARBONATE 200(500)MG
2 TABLET,CHEWABLE ORAL 2 TIMES DAILY PRN
Status: DISCONTINUED | OUTPATIENT
Start: 2019-10-13 | End: 2019-10-18 | Stop reason: HOSPADM

## 2019-10-13 RX ORDER — ONDANSETRON 2 MG/ML
4 INJECTION INTRAMUSCULAR; INTRAVENOUS EVERY 6 HOURS PRN
Status: DISCONTINUED | OUTPATIENT
Start: 2019-10-13 | End: 2019-10-18 | Stop reason: HOSPADM

## 2019-10-13 RX ORDER — ACETAMINOPHEN 160 MG/5ML
650 SOLUTION ORAL EVERY 4 HOURS PRN
Status: DISCONTINUED | OUTPATIENT
Start: 2019-10-13 | End: 2019-10-18 | Stop reason: HOSPADM

## 2019-10-13 RX ORDER — METOPROLOL SUCCINATE 50 MG/1
100 TABLET, EXTENDED RELEASE ORAL DAILY
Status: DISCONTINUED | OUTPATIENT
Start: 2019-10-14 | End: 2019-10-18 | Stop reason: HOSPADM

## 2019-10-13 RX ORDER — SODIUM CHLORIDE 0.9 % (FLUSH) 0.9 %
10 SYRINGE (ML) INJECTION EVERY 12 HOURS SCHEDULED
Status: DISCONTINUED | OUTPATIENT
Start: 2019-10-13 | End: 2019-10-18 | Stop reason: HOSPADM

## 2019-10-13 RX ORDER — SODIUM CHLORIDE 0.9 % (FLUSH) 0.9 %
10 SYRINGE (ML) INJECTION AS NEEDED
Status: DISCONTINUED | OUTPATIENT
Start: 2019-10-13 | End: 2019-10-18 | Stop reason: HOSPADM

## 2019-10-13 RX ORDER — ACETAMINOPHEN 325 MG/1
650 TABLET ORAL EVERY 6 HOURS PRN
Status: DISCONTINUED | OUTPATIENT
Start: 2019-10-13 | End: 2019-10-18 | Stop reason: HOSPADM

## 2019-10-13 RX ORDER — LISINOPRIL 20 MG/1
40 TABLET ORAL
Status: DISCONTINUED | OUTPATIENT
Start: 2019-10-14 | End: 2019-10-18 | Stop reason: HOSPADM

## 2019-10-13 RX ORDER — FAMOTIDINE 20 MG/1
40 TABLET, FILM COATED ORAL DAILY
Status: DISCONTINUED | OUTPATIENT
Start: 2019-10-14 | End: 2019-10-18 | Stop reason: HOSPADM

## 2019-10-13 RX ADMIN — ACETAMINOPHEN 650 MG: 325 TABLET ORAL at 20:34

## 2019-10-13 RX ADMIN — Medication 10 ML: at 23:32

## 2019-10-13 RX ADMIN — CEFTRIAXONE SODIUM 1 G: 10 INJECTION, POWDER, FOR SOLUTION INTRAVENOUS at 17:06

## 2019-10-13 NOTE — PROGRESS NOTES
Discharge Planning Assessment  [facility]     Patient Name: Camelia Gray  MRN: [id number]  Today's Date: 10/13/2019    Admit Date: 10/13/2019    Discharge Needs Assessment     Row Name 10/13/19 1626       Living Environment    Lives With  other relative(s) adult nephew    Current Living Arrangements  home/apartment/condo    Primary Care Provided by  self;other (see comments) nephew    Provides Primary Care For  no one, unable/limited ability to care for self    Caregiving Concerns  Pt is unable to get to a shower/ bathtub. There is a half bath on the first floor of the house but the pt is unable to climb stairs for bathing.     Family Caregiver if Needed  other (see comments) nephew    Quality of Family Relationships  involved;supportive;helpful    Able to Return to Prior Arrangements  other (see comments) pt on waitlist for AL at [facility].        Resource/Environmental Concerns    Resource/Environmental Concerns  home accessibility    Home Accessibility Concerns  stairs to access bedroom or bathroom;bathroom not accessible    Transportation Concerns  car, none       Transition Planning    Patient/Family Anticipates Transition to  home with help/services    Patient/Family Anticipated Services at Transition  -- provided paid caregiver list. Pt current with [facility] for wound care and attends wound clinic weekly.    Transportation Anticipated  family or friend will provide       Discharge Needs Assessment    Concerns to be Addressed  discharge planning    Equipment Currently Used at Home  wheelchair;walker, rolling;cane, straight    Equipment Needed After Discharge  -- possible need for BSC. Offered to arrange. Pt wants to think about it.     Outpatient/Agency/Support Group Needs  assisted living facility Pt states that she has paid deposit for AL at [facility]. Facility called twice to offer a room and she has declined both times. Pt contacted facility on Friday to check bed status but has not receieved return  call. Acknowledges need now.    Discharge Facility/Level of Care Needs  home with home health current with [facility] and waitlist for [facility]. Pt has not fallen and does not feel unsafe at home.     Offered/Gave Vendor List  yes Paid Caregiver list provided.     Patient's Choice of Community Agency(s)  DC plan: Current with [facility] for wound care and weekly outpt visitis to [facility] wound clinic. Currently on waitlist for [facility]. Provided list of private paid caregivers, [facility] and [facility]. Pt agreeable.            Toshia Petit RN

## 2019-10-13 NOTE — ED NOTES
Pt reports left hip pain, reports she was seen by Dr. Vickers on Friday and is supposed to call surgeon on Monday but reports the pain has increased so she called EMS to come here.      Rosa Hernandez, DELIAN  10/13/19 1506

## 2019-10-13 NOTE — ED NOTES
Tech transport requested to CT 2     Yenni Del Valle  10/13/19 8506       Yenni Del Valle  10/13/19 1171

## 2019-10-13 NOTE — ED PROVIDER NOTES
Subjective   74-year-old female complaining of decreased mobility and increased incontinence over the last several days.  The patient is generally chair bound and walks up the roth to the bathroom.  She is had increasing incontinence of stool and urine over the last several days.  The patient had subjective fever as well as chills.  She is complained of global weakness without focal neurologic deficit.  She is had no headache or neck stiffness.  The patient has had frequency and burning with urination.  There is been no reports of chest pain or shortness of breath.  Patient has not had a syncopal episode.  The patient's family state that they are unable to continue to care for her in the current environment.  The patient has apparently been set up for assisted living in April and July and has canceled placement at the last moment.  The patient is oriented person place situation day and date            Review of Systems   Constitutional: Positive for chills, fatigue and fever. Negative for diaphoresis and unexpected weight change.   HENT: Negative for sore throat.    Eyes: Negative for visual disturbance.   Respiratory: Negative for chest tightness and shortness of breath.    Cardiovascular: Positive for leg swelling. Negative for chest pain and palpitations.        Has history of lymphedema and is followed by wound management.  She is able to make her outpatient appointments at wound management   Gastrointestinal: Negative for abdominal pain and nausea.   Genitourinary: Positive for dysuria, frequency, hematuria and urgency.   Musculoskeletal: Positive for back pain, gait problem and joint swelling.   Hematological: Does not bruise/bleed easily.   All other systems reviewed and are negative.      History reviewed. No pertinent past medical history.    No Known Allergies    History reviewed. No pertinent surgical history.    History reviewed. No pertinent family history.    Social History     Socioeconomic History    • Marital status: Single     Spouse name: Not on file   • Number of children: Not on file   • Years of education: Not on file   • Highest education level: Not on file   Tobacco Use   • Smoking status: Never Smoker   • Smokeless tobacco: Never Used   Substance and Sexual Activity   • Alcohol use: No     Frequency: Never   • Drug use: No   • Sexual activity: Defer           Objective   Physical Exam  Alert Jose Coma Scale 15 morbidly obese oriented to person place and situation 3 of 3 objects at 3 minutes   HEENT: Pupils equal and reactive to light. Conjunctivae are not injected. normal tympanic membranes. Oropharynx and nares are normal.   Neck: Supple. Midline trachea. No JVD. No goiter.   Chest: Clear and equal breath sounds bilaterally regular rate and rhythm without murmur or rub.   Abdomen: Positive bowel sounds the patient has suprapubic discomfort in her lower pannus.  The abdomen is morbidly obese but nondistended. No rebound or peritoneal signs. No CVA tenderness.   Extremities no clubbing cyanosis or edema motor sensory exam is normal the full range of motion is intact no focal neurologic extremity deficits   skin: Warm and dry, no rashes or petechia.   Lymphatic: No regional lymphadenopathy. No calf pain, swelling or Denisa's sign    Procedures           ED Course  ED Course as of Oct 13 1722   Sun Oct 13, 2019   1650 Patient nephew who assist in her care reports that she has difficulty ambulating at home he states that she frequently is incontinent of urine and stool while attempting to get to a half bath on the full level that she lives on.  She states she cannot climb the stairs to a full bathroom and has not bathed in several months  [TH]      ED Course User Index  [TH] Isaias Bynum MD                  [facility]  Number of Diagnoses or Management Options     Amount and/or Complexity of Data Reviewed  Clinical lab tests: reviewed  Tests in the radiology section of CPT®: reviewed  Obtain history from  someone other than the patient: yes  Review and summarize past medical records: yes  Discuss the patient with other providers: yes  Independent visualization of images, tracings, or specimens: yes    Risk of Complications, Morbidity, and/or Mortality  Presenting problems: high  Diagnostic procedures: high  General comments: The case was discussed with case management.  The patient is not housebound and is able to make outpatient appointments.  She already has home health assistance once per week.  The patient states that she has not been able to climb the stairs to bathe or attend to other activities of daily living due to the immobility and swelling.  The patient will be admitted for treatment of urinary tract infection and hypothyroidism.  The patient and family were agreeable this plan of treatment will involve  and case management for follow-up care    Patient Progress  Patient progress: improved      Final diagnoses:   Acute cystitis with hematuria   Hypothyroidism, secondary   Frequent fecal incontinence   Weakness              Isaias Bynum MD  10/13/19 4578

## 2019-10-13 NOTE — NURSING NOTE
Report given to Edel LOBO, call placed to DR Vickers for orders, recommended to LPN a specialty bed/yazan bed, need to get photo of wounds for doc.

## 2019-10-14 ENCOUNTER — APPOINTMENT (OUTPATIENT)
Dept: GENERAL RADIOLOGY | Facility: HOSPITAL | Age: 74
End: 2019-10-14

## 2019-10-14 ENCOUNTER — APPOINTMENT (OUTPATIENT)
Dept: WOUND CARE | Facility: HOSPITAL | Age: 74
End: 2019-10-14

## 2019-10-14 PROBLEM — L89.302 DECUBITUS ULCER OF BUTTOCK, STAGE 2 (HCC): Status: ACTIVE | Noted: 2019-10-14

## 2019-10-14 PROBLEM — L97.512 NON-PRESSURE CHRONIC ULCER OF OTHER PART OF RIGHT FOOT WITH FAT LAYER EXPOSED (HCC): Status: ACTIVE | Noted: 2019-10-14

## 2019-10-14 PROBLEM — L97.522 NON-PRESSURE CHRONIC ULCER OF OTHER PART OF LEFT FOOT WITH FAT LAYER EXPOSED: Status: ACTIVE | Noted: 2019-10-14

## 2019-10-14 PROBLEM — L30.8 DERMATITIS ASSOCIATED WITH MOISTURE: Status: ACTIVE | Noted: 2019-10-14

## 2019-10-14 PROBLEM — I87.8 VENOUS STASIS: Status: ACTIVE | Noted: 2019-10-14

## 2019-10-14 PROBLEM — I89.0 LYMPHEDEMA: Status: ACTIVE | Noted: 2019-10-14

## 2019-10-14 LAB
ALBUMIN SERPL-MCNC: 3 G/DL (ref 3.5–4.8)
ALBUMIN/GLOB SERPL: 1 G/DL (ref 1–1.7)
ALP SERPL-CCNC: 90 U/L (ref 32–91)
ALT SERPL W P-5'-P-CCNC: 14 U/L (ref 14–54)
ANION GAP SERPL CALCULATED.3IONS-SCNC: 12.1 MMOL/L (ref 5–15)
AST SERPL-CCNC: 16 U/L (ref 15–41)
BASOPHILS # BLD AUTO: 0 10*3/MM3 (ref 0–0.2)
BASOPHILS NFR BLD AUTO: 0.6 % (ref 0–1.5)
BILIRUB SERPL-MCNC: 0.6 MG/DL (ref 0.3–1.2)
BUN BLD-MCNC: 22 MG/DL (ref 8–20)
BUN/CREAT SERPL: 27.5 (ref 5.4–26.2)
CALCIUM SPEC-SCNC: 8.7 MG/DL (ref 8.9–10.3)
CHLORIDE SERPL-SCNC: 107 MMOL/L (ref 101–111)
CO2 SERPL-SCNC: 24 MMOL/L (ref 22–32)
CREAT BLD-MCNC: 0.8 MG/DL (ref 0.4–1)
DEPRECATED RDW RBC AUTO: 47.3 FL (ref 37–54)
EOSINOPHIL # BLD AUTO: 0.1 10*3/MM3 (ref 0–0.4)
EOSINOPHIL NFR BLD AUTO: 1.9 % (ref 0.3–6.2)
ERYTHROCYTE [DISTWIDTH] IN BLOOD BY AUTOMATED COUNT: 14.3 % (ref 12.3–15.4)
GFR SERPL CREATININE-BSD FRML MDRD: 70 ML/MIN/1.73
GLOBULIN UR ELPH-MCNC: 2.9 GM/DL (ref 2.5–3.8)
GLUCOSE BLD-MCNC: 102 MG/DL (ref 65–99)
HCT VFR BLD AUTO: 37.3 % (ref 34–46.6)
HGB BLD-MCNC: 12.3 G/DL (ref 12–15.9)
LYMPHOCYTES # BLD AUTO: 1 10*3/MM3 (ref 0.7–3.1)
LYMPHOCYTES NFR BLD AUTO: 13.1 % (ref 19.6–45.3)
MCH RBC QN AUTO: 30.8 PG (ref 26.6–33)
MCHC RBC AUTO-ENTMCNC: 33 G/DL (ref 31.5–35.7)
MCV RBC AUTO: 93.4 FL (ref 79–97)
MONOCYTES # BLD AUTO: 0.7 10*3/MM3 (ref 0.1–0.9)
MONOCYTES NFR BLD AUTO: 8.8 % (ref 5–12)
NEUTROPHILS # BLD AUTO: 5.9 10*3/MM3 (ref 1.7–7)
NEUTROPHILS NFR BLD AUTO: 75.6 % (ref 42.7–76)
NRBC BLD AUTO-RTO: 0 /100 WBC (ref 0–0.2)
PLATELET # BLD AUTO: 197 10*3/MM3 (ref 140–450)
PMV BLD AUTO: 7.7 FL (ref 6–12)
POTASSIUM BLD-SCNC: 4.1 MMOL/L (ref 3.6–5.1)
PROT SERPL-MCNC: 5.9 G/DL (ref 6.1–7.9)
RBC # BLD AUTO: 4 10*6/MM3 (ref 3.77–5.28)
SODIUM BLD-SCNC: 139 MMOL/L (ref 136–144)
WBC NRBC COR # BLD: 7.8 10*3/MM3 (ref 3.4–10.8)

## 2019-10-14 PROCEDURE — 85732 THROMBOPLASTIN TIME PARTIAL: CPT | Performed by: INTERNAL MEDICINE

## 2019-10-14 PROCEDURE — 83520 IMMUNOASSAY QUANT NOS NONAB: CPT | Performed by: INTERNAL MEDICINE

## 2019-10-14 PROCEDURE — 85613 RUSSELL VIPER VENOM DILUTED: CPT | Performed by: INTERNAL MEDICINE

## 2019-10-14 PROCEDURE — 97166 OT EVAL MOD COMPLEX 45 MIN: CPT

## 2019-10-14 PROCEDURE — 97162 PT EVAL MOD COMPLEX 30 MIN: CPT

## 2019-10-14 PROCEDURE — 85303 CLOT INHIBIT PROT C ACTIVITY: CPT | Performed by: INTERNAL MEDICINE

## 2019-10-14 PROCEDURE — 99232 SBSQ HOSP IP/OBS MODERATE 35: CPT | Performed by: NURSE PRACTITIONER

## 2019-10-14 PROCEDURE — 85705 THROMBOPLASTIN INHIBITION: CPT | Performed by: INTERNAL MEDICINE

## 2019-10-14 PROCEDURE — 85306 CLOT INHIBIT PROT S FREE: CPT | Performed by: INTERNAL MEDICINE

## 2019-10-14 PROCEDURE — 81240 F2 GENE: CPT | Performed by: INTERNAL MEDICINE

## 2019-10-14 PROCEDURE — 80053 COMPREHEN METABOLIC PANEL: CPT | Performed by: FAMILY MEDICINE

## 2019-10-14 PROCEDURE — 25010000002 CEFTRIAXONE PER 250 MG: Performed by: FAMILY MEDICINE

## 2019-10-14 PROCEDURE — 97530 THERAPEUTIC ACTIVITIES: CPT

## 2019-10-14 PROCEDURE — 72110 X-RAY EXAM L-2 SPINE 4/>VWS: CPT

## 2019-10-14 PROCEDURE — 99232 SBSQ HOSP IP/OBS MODERATE 35: CPT | Performed by: FAMILY MEDICINE

## 2019-10-14 PROCEDURE — 85025 COMPLETE CBC W/AUTO DIFF WBC: CPT | Performed by: FAMILY MEDICINE

## 2019-10-14 PROCEDURE — 85220 BLOOC CLOT FACTOR V TEST: CPT | Performed by: INTERNAL MEDICINE

## 2019-10-14 PROCEDURE — 86038 ANTINUCLEAR ANTIBODIES: CPT | Performed by: INTERNAL MEDICINE

## 2019-10-14 PROCEDURE — 85670 THROMBIN TIME PLASMA: CPT | Performed by: INTERNAL MEDICINE

## 2019-10-14 PROCEDURE — G0378 HOSPITAL OBSERVATION PER HR: HCPCS

## 2019-10-14 PROCEDURE — 81241 F5 GENE: CPT | Performed by: INTERNAL MEDICINE

## 2019-10-14 PROCEDURE — 85300 ANTITHROMBIN III ACTIVITY: CPT | Performed by: INTERNAL MEDICINE

## 2019-10-14 RX ORDER — ACETAMINOPHEN 650 MG
TABLET, EXTENDED RELEASE ORAL DAILY
Status: DISCONTINUED | OUTPATIENT
Start: 2019-10-14 | End: 2019-10-18 | Stop reason: HOSPADM

## 2019-10-14 RX ADMIN — MICONAZOLE NITRATE: 20 POWDER TOPICAL at 20:18

## 2019-10-14 RX ADMIN — ACETAMINOPHEN 650 MG: 325 TABLET ORAL at 02:53

## 2019-10-14 RX ADMIN — METOPROLOL SUCCINATE 100 MG: 50 TABLET, EXTENDED RELEASE ORAL at 10:15

## 2019-10-14 RX ADMIN — Medication: at 20:18

## 2019-10-14 RX ADMIN — CEFTRIAXONE SODIUM 1 G: 1 INJECTION, POWDER, FOR SOLUTION INTRAMUSCULAR; INTRAVENOUS at 17:25

## 2019-10-14 RX ADMIN — ACETAMINOPHEN 650 MG: 325 TABLET ORAL at 15:02

## 2019-10-14 RX ADMIN — ZINC OXIDE: 200 OINTMENT TOPICAL at 20:18

## 2019-10-14 RX ADMIN — APIXABAN 2.5 MG: 2.5 TABLET, FILM COATED ORAL at 10:15

## 2019-10-14 RX ADMIN — Medication 10 ML: at 20:18

## 2019-10-14 RX ADMIN — Medication 10 ML: at 10:16

## 2019-10-14 RX ADMIN — POVIDONE-IODINE: 10 SOLUTION TOPICAL at 15:16

## 2019-10-14 RX ADMIN — LISINOPRIL 40 MG: 20 TABLET ORAL at 10:15

## 2019-10-14 RX ADMIN — APIXABAN 2.5 MG: 2.5 TABLET, FILM COATED ORAL at 20:18

## 2019-10-14 RX ADMIN — ACETAMINOPHEN 650 MG: 325 TABLET ORAL at 10:15

## 2019-10-14 NOTE — PLAN OF CARE
Problem: Patient Care Overview  Goal: Plan of Care Review   10/14/19 0355   Coping/Psychosocial   Plan of Care Reviewed With patient   OTHER   Outcome Summary Patient has been up most of the night, uses call light frequently. She appears anxious and hard to please. She requires assist of 2. Santos cath placed per Dr. Vickers order.

## 2019-10-14 NOTE — CONSULTS
Group: [facility]         CONSULT NOTE    Patient Identification:  Camelia Gray  74 y.o.  female  1945  [id number]            Requesting physician: Dr. Vickers    Reason for Consultation: Follow post PE in January        History of Present Illness:  74-year-old white female was admitted to hospital with hip pain urinary incontinence and cellulitis of both feet.    Hx of large PE in 11/2018,   Echo in  5/29/19 , no PAH     Patient also has severe arthritis involving left hip.  Patient complains of severe pain in the left hip whenever she walks or moves the hip in any direction.  Patient had x-rays of the hip several weeks ago which revealed arthritis.  CT scan of the pelvis today revealed severe arthritis in the hip. urinary incontinence.    Assessment:  Hx of large PE in 11/2018, currently on Eliquis  Echo in  5/29/19 , no PAH  Features of VIKRAM      Recommendations:  Hypercoaguble work  to determine duration of anticoagulation  doppler of bilateral lower extremities  Sleep study as outpt        Review of Sytems:  Review of Systems   Constitutional: Positive for activity change and fatigue.   Cardiovascular: Positive for leg swelling.       Past Medical History:  Past Medical History:   Diagnosis Date   • Arthritis    • DVT, lower extremity (CMS/HCC)    • Hypertension    • Obesity    • Pulmonary embolism (CMS/HCC)        Past Surgical History:  Past Surgical History:   Procedure Laterality Date   • JOINT REPLACEMENT      Bilateral knees        Home Meds:  Medications Prior to Admission   Medication Sig Dispense Refill Last Dose   • apixaban (ELIQUIS) 5 MG tablet tablet Take 2.5 mg by mouth 2 (Two) Times a Day.      • benazepril (LOTENSIN) 40 MG tablet TAKE 1 TABLET BY MOUTH DAILY 90 tablet 1    • metoprolol succinate XL (TOPROL-XL) 100 MG 24 hr tablet TAKE ONE TABLET BY MOUTH EVERY DAY 90 tablet 1        Allergies:  No Known Allergies    Social History:   Social History     Socioeconomic History   •  "Marital status: Single     Spouse name: Not on file   • Number of children: Not on file   • Years of education: Not on file   • Highest education level: Not on file   Tobacco Use   • Smoking status: Never Smoker   • Smokeless tobacco: Never Used   Substance and Sexual Activity   • Alcohol use: No     Frequency: Never   • Drug use: No   • Sexual activity: Defer       Family History:  Family History   Problem Relation Age of Onset   • Heart disease Mother    • Hypertension Mother    • Heart disease Father    • Hypertension Father    • Kidney disease Father    • COPD Father        Physical Exam:  /60 (BP Location: Left leg, Patient Position: Lying)   Pulse 62   Temp 98.9 °F (37.2 °C) (Oral)   Resp 17   Ht 182.9 cm (72\")   Wt (!) 164 kg (361 lb 1.8 oz)   SpO2 94%   BMI 48.98 kg/m²  Body mass index is 48.98 kg/m². 94% (!) 164 kg (361 lb 1.8 oz)  Physical Exam   Cardiovascular: Normal rate.   Pulmonary/Chest: Breath sounds normal. No respiratory distress. She has no wheezes. She has no rales. She exhibits no tenderness.   Musculoskeletal: She exhibits edema.   Skin: Skin is warm and dry.       LABS:  Lab Results   Component Value Date    CALCIUM 8.7 (L) 10/14/2019    PHOS 3.5 10/13/2019     Results from last 7 days   Lab Units 10/14/19  0408 10/14/19  0407 10/13/19  1544 10/13/19  1540   MAGNESIUM mg/dL  --   --   --  2.0   SODIUM mmol/L  --  139  --  142   POTASSIUM mmol/L  --  4.1  --  4.1   CHLORIDE mmol/L  --  107  --  108   CO2 mmol/L  --  24.0  --  26.0   BUN mg/dL  --  22*  --  25*   CREATININE mg/dL  --  0.80  --  0.80   GLUCOSE mg/dL  --  102*  --  103*   CALCIUM mg/dL  --  8.7*  --  9.2   WBC 10*3/mm3 7.80  --  6.20  --    HEMOGLOBIN g/dL 12.3  --  13.0  --    PLATELETS 10*3/mm3 197  --  206  --    ALT (SGPT) U/L  --  14  --  17   AST (SGOT) U/L  --  16  --  23     Lab Results   Component Value Date    CKTOTAL 35 (L) 10/13/2019    TROPONINI <0.030 10/13/2019     Results from last 7 days   Lab Units " 10/13/19  1540   CK TOTAL U/L 35*   TROPONIN I ng/mL <0.030                     Results from last 7 days   Lab Units 10/13/19  1540   INR  1.03         Lab Results   Component Value Date    TSH 9.170 (H) 10/13/2019     Estimated Creatinine Clearance: 107.8 mL/min (by C-G formula based on SCr of 0.8 mg/dL).  Results from last 7 days   Lab Units 10/13/19  1540   NITRITE UA  Negative   WBC UA /HPF 13-20*   BACTERIA UA /HPF 2+*   SQUAM EPITHEL UA /HPF 7-12*        Imaging:  Imaging Results (last 24 hours)     Procedure Component Value Units Date/Time    CT Pelvis Without Contrast [[id number]] Collected:  10/13/19 1715     Updated:  10/13/19 1724    Narrative:          DATE OF EXAM:  10/13/2019 4:44 PM     PROCEDURE:  CT PELVIS WO CONTRAST-     INDICATIONS:   History of increasing left hip pain, history of DJD     COMPARISON:   Pelvis and left hip radiographs 5/29/2019. CT chest 11/10/2018.     TECHNIQUE:  Routine transaxial slices were obtained through the pelvis without the  administration of intravenous contrast. Reconstructed coronal and  sagittal images were also obtained. Automated exposure control and  iterative construction methods were used.     FINDINGS:  No acute fracture. No concerning osseous lesion.     Severe left hip joint DJD with complete superior joint space loss,  chronic deformity of the femoral head and acetabulum, and subchondral  cystic changes and sclerotic changes in the femoral head and acetabulum.  Moderate to advanced right hip joint DJD. No significant joint effusion.  The SI joint spaces are fairly well-maintained. Partially imaged  moderate lumbosacral spondylosis.     Partially imaged porcelain gallbladder. Moderate visualized colonic and  rectal stool burden. Colonic diverticula, without CT evidence of  diverticulitis. The appendix is normal. Nonspecific small amount of free  fluid in the pelvis. No fringe peritoneal air. No pelvic lymphadenopathy  is identified. Tiny fat-containing  umbilical hernia. No deep or  superficial soft tissue mass. Severe fatty atrophy of the diabetes  mellitus muscles in the left gluteal medius muscle.        Impression:          1. Severe left hip joint DJD.  2. Moderate to advanced right hip joint DJD.  3. Partially imaged porcelain gallbladder.  4. Colonic diverticula, without CT evidence of diverticulitis.  5. Nonspecific small amount of free fluid in the pelvis.     Electronically Signed ByCornel Stone On:10/13/2019 5:22 PM  This report was finalized on [id number] by  Yg Stone, .    XR Chest 1 View [[id number]] Collected:  10/13/19 1645     Updated:  10/13/19 1649    Narrative:       DATE OF EXAM:  10/13/2019 4:10 PM     PROCEDURE:  XR CHEST 1 VW-     INDICATIONS:  Dyspnea     COMPARISON:  Radiographs and CT 11/10/2018.     TECHNIQUE:   Single radiographic AP view of the chest was obtained.     FINDINGS:  The study is limited by lordotic patient positioning and motion  artifact. Overlying artifacts. Calcified remnants of prior granulomatous  disease. Subsegmental scarring in the base of the lingula. The lungs are  otherwise clear. No pneumothorax. Enlargement of the cardiac silhouette,  likely accentuated by technique. Severe chronic changes in both  shoulders. No acute osseous abnormality is identified.        Impression:          1. Limited study demonstrating subsegmental scarring in the left lung  base.  2. Enlargement of the cardiac silhouette, likely accentuated by  technique.     Electronically Signed ByCornel Stone On:10/13/2019 4:47 PM  This report was finalized on [id number] by  Yg Stone, .            Current Meds:   SCHEDULE    ammonium lactate  Topical BID   apixaban 2.5 mg Oral Q12H   ceftriaxone 1 g Intravenous Q24H   famotidine 40 mg Oral Daily   hydrocortisone 1 application Topical 4x Daily   lisinopril 40 mg Oral Q24H   metoprolol succinate  mg Oral Daily   mupirocin  Topical Q12H   sodium chloride 10 mL Intravenous Q12H      Infusions     PRNs  •  acetaminophen **OR** acetaminophen **OR** acetaminophen  •  acetaminophen  •  calcium carbonate  •  influenza vaccine  •  magnesium hydroxide  •  melatonin  •  ondansetron  •  sodium chloride        DEE Harris  10/14/2019  11:55 AM      Total time spent with patient: 1 hour

## 2019-10-14 NOTE — PROGRESS NOTES
Wound Initial Evaluation   AJ     Patient Name: Camelia Gray  : 1945  MRN: [id number]  Today's Date: 10/14/2019 Room Number: 304/1      Admit Date: 10/13/2019  Attending: Eric Vickers Jr., MD    Consult Requested By: Dr. Vickers    Reason For Consult: Multiple ulcerations to the buttocks posterior thighs, moisture associated dermatitis ulcerations to the toes on the right and left foot.    Chief Complaint: Patient resting in bed staff reports that she has declined dressing changes thus far wanting to wait for me to see her.  Patient is well-known to me from the wound care center.  Patient has been physically declining over the last year.  Patient has a history of being very noncompliant with her treatment regimen.  She has compression pumps to help with her lymphedema but does not typically use them.  Most of her time spent it in her chair with her legs dependent.  Patient has been ordered a [employer] chair cushion for her chair had a waffle chair cushion but stated she did not like it and was not using it.  Patient presented to the ED with acute cystitis.  Patient is currently on SAT surface.    Visit Dx:    ICD-10-CM ICD-9-CM   1. Acute cystitis with hematuria N30.01 595.0   2. Hypothyroidism, secondary E03.8 244.8   3. Frequent fecal incontinence R15.9 787.60   4. Weakness R53.1 780.79     Patient Active Problem List   Diagnosis   • Morbidly obese (CMS/[facility])   • Acute cystitis with hematuria   • Arthritis of left hip   • Deep vein thrombosis (DVT) (CMS/[facility])   • Diabetes mellitus type II, controlled (CMS/HCC)   • Edema of lower extremity   • Hypertension   • Pulmonary embolism (CMS/HCC)   • Stasis dermatitis   • Cellulitis of both feet   • Immobility syndrome   • Venous stasis   • Lymphedema   • Non-pressure chronic ulcer of other part of right foot with fat layer exposed (CMS/HCC)   • Non-pressure chronic ulcer of other part of left foot with fat layer exposed (CMS/HCC)       History:   Past Medical  History:   Diagnosis Date   • Arthritis    • DVT, lower extremity (CMS/HCC)    • Hypertension    • Obesity    • Pulmonary embolism (CMS/HCC)      Past Surgical History:   Procedure Laterality Date   • JOINT REPLACEMENT      Bilateral knees     Social History     Socioeconomic History   • Marital status: Single     Spouse name: Not on file   • Number of children: Not on file   • Years of education: Not on file   • Highest education level: Not on file   Tobacco Use   • Smoking status: Never Smoker   • Smokeless tobacco: Never Used   Substance and Sexual Activity   • Alcohol use: No     Frequency: Never   • Drug use: No   • Sexual activity: Defer       Allergies:  No Known Allergies    Medications:    Current Facility-Administered Medications:   •  acetaminophen (TYLENOL) tablet 650 mg, 650 mg, Oral, Q4H PRN **OR** acetaminophen (TYLENOL) 160 MG/5ML solution 650 mg, 650 mg, Oral, Q4H PRN **OR** acetaminophen (TYLENOL) suppository 650 mg, 650 mg, Rectal, Q4H PRN, Eric Vickers Jr., MD  •  acetaminophen (TYLENOL) tablet 650 mg, 650 mg, Oral, Q6H PRN, Eric Vickers Jr., MD, 650 mg at 10/14/19 1015  •  ammonium lactate (AMLACTIN) 12 % cream, , Topical, BID, Eric Vickers Jr., MD  •  apixaban (ELIQUIS) tablet 2.5 mg, 2.5 mg, Oral, Q12H, Eric Vickers Jr., MD, 2.5 mg at 10/14/19 1015  •  calcium carbonate (TUMS) chewable tablet 500 mg (200 mg elemental), 2 tablet, Oral, BID PRN, Eric Vickers Jr., MD  •  cefTRIAXone (ROCEPHIN) 1 g in sodium chloride 0.9 % 100 mL IVPB, 1 g, Intravenous, Q24H, Eric Vickers Jr., MD  •  famotidine (PEPCID) tablet 40 mg, 40 mg, Oral, Daily, Eric Vickers Jr., MD, Stopped at 10/14/19 1005  •  influenza vac split quad (FLUZONE,FLUARIX,AFLURIA) injection 0.5 mL, 0.5 mL, Intramuscular, During Hospitalization, Eric Vickers Jr., MD  •  lisinopril (PRINIVIL,ZESTRIL) tablet 40 mg, 40 mg, Oral, Q24H, Eric Vickers Jr., MD, 40 mg at 10/14/19 1015  •  magic butt paste,  , Topical, BID, Suma Patel APRN  •  magnesium hydroxide (MILK OF MAGNESIA) suspension 2400 mg/10mL 10 mL, 10 mL, Oral, Daily PRN, Eric Vickers Jr., MD  •  melatonin tablet 5 mg, 5 mg, Oral, Nightly PRN, Eric Vickers Jr., MD  •  metoprolol succinate XL (TOPROL-XL) 24 hr tablet 100 mg, 100 mg, Oral, Daily, Eric Vickers Jr., MD, 100 mg at 10/14/19 1015  •  miconazole (MICOTIN) 2 % powder, , Topical, Q12H, Suma Patel APRN  •  ondansetron (ZOFRAN) injection 4 mg, 4 mg, Intravenous, Q6H PRN, Eric Vickers Jr., MD  •  povidone-iodine (BETADINE) external solution, , Topical, Daily, Suma Patel APRN  •  sodium chloride 0.9 % flush 10 mL, 10 mL, Intravenous, Q12H, Eric Vickers Jr., MD, 10 mL at 10/14/19 1016  •  sodium chloride 0.9 % flush 10 mL, 10 mL, Intravenous, PRN, Eric Vickers Jr., MD    Results Review:  Lab Results (last 48 hours)     Procedure Component Value Units Date/Time    Comprehensive Metabolic Panel [[id number]]  (Abnormal) Collected:  10/14/19 0407    Specimen:  Blood Updated:  10/14/19 0426     Glucose 102 mg/dL      BUN 22 mg/dL      Creatinine 0.80 mg/dL      Sodium 139 mmol/L      Potassium 4.1 mmol/L      Chloride 107 mmol/L      CO2 24.0 mmol/L      Calcium 8.7 mg/dL      Total Protein 5.9 g/dL      Albumin 3.00 g/dL      ALT (SGPT) 14 U/L      AST (SGOT) 16 U/L      Alkaline Phosphatase 90 U/L      Total Bilirubin 0.6 mg/dL      eGFR Non African Amer 70 mL/min/1.73      Globulin 2.9 gm/dL      A/G Ratio 1.0 g/dL      BUN/Creatinine Ratio 27.5     Anion Gap 12.1 mmol/L     Narrative:       The MDRD GFR formula is only valid for adults with stable renal function between ages 18 and 70.    CBC Auto Differential [[id number]]  (Abnormal) Collected:  10/14/19 0408    Specimen:  Blood Updated:  10/14/19 0412     WBC 7.80 10*3/mm3      RBC 4.00 10*6/mm3      Hemoglobin 12.3 g/dL      Hematocrit 37.3 %      MCV 93.4 fL      MCH 30.8 pg      MCHC 33.0 g/dL      RDW 14.3 %       RDW-SD 47.3 fl      MPV 7.7 fL      Platelets 197 10*3/mm3      Neutrophil % 75.6 %      Lymphocyte % 13.1 %      Monocyte % 8.8 %      Eosinophil % 1.9 %      Basophil % 0.6 %      Neutrophils, Absolute 5.90 10*3/mm3      Lymphocytes, Absolute 1.00 10*3/mm3      Monocytes, Absolute 0.70 10*3/mm3      Eosinophils, Absolute 0.10 10*3/mm3      Basophils, Absolute 0.00 10*3/mm3      nRBC 0.0 /100 WBC     T4, Free [[id number]]  (Normal) Collected:  10/13/19 1540    Specimen:  Blood Updated:  10/13/19 1738     Free T4 0.84 ng/dL      Comment: Results may be falsely increased if patient taking Biotin.       TSH [[id number]]  (Abnormal) Collected:  10/13/19 1540    Specimen:  Blood Updated:  10/13/19 1635     TSH 9.170 uIU/mL      Comment: Results may be falsely decreased if patient taking Biotin.       Comprehensive Metabolic Panel [[id number]]  (Abnormal) Collected:  10/13/19 1540    Specimen:  Blood Updated:  10/13/19 1624     Glucose 103 mg/dL      BUN 25 mg/dL      Creatinine 0.80 mg/dL      Sodium 142 mmol/L      Potassium 4.1 mmol/L      Chloride 108 mmol/L      CO2 26.0 mmol/L      Calcium 9.2 mg/dL      Total Protein 6.4 g/dL      Albumin 3.20 g/dL      ALT (SGPT) 17 U/L      AST (SGOT) 23 U/L      Alkaline Phosphatase 94 U/L      Total Bilirubin 0.3 mg/dL      eGFR Non African Amer 70 mL/min/1.73      Globulin 3.2 gm/dL      A/G Ratio 1.0 g/dL      BUN/Creatinine Ratio 31.3     Anion Gap 12.1 mmol/L     Narrative:       The MDRD GFR formula is only valid for adults with stable renal function between ages 18 and 70.    CK [[id number]]  (Abnormal) Collected:  10/13/19 1540    Specimen:  Blood Updated:  10/13/19 1624     Creatine Kinase 35 U/L     Magnesium [[id number]]  (Normal) Collected:  10/13/19 1540    Specimen:  Blood Updated:  10/13/19 1624     Magnesium 2.0 mg/dL     Phosphorus [[id number]]  (Normal) Collected:  10/13/19 1540    Specimen:  Blood Updated:  10/13/19 1624     Phosphorus 3.5 mg/dL     Urinalysis  With Culture If Indicated - Urine, Catheter [[id number]]  (Abnormal) Collected:  10/13/19 1540    Specimen:  Urine, Catheter Updated:  10/13/19 1621     Color, UA Yellow     Appearance, UA Cloudy     Comment: Result checked         pH, UA 5.5     Specific Gravity, UA 1.028     Glucose, UA Negative     Ketones, UA Negative     Bilirubin, UA Negative     Blood, UA Negative     Protein, UA Negative     Leuk Esterase, UA Small (1+)     Nitrite, UA Negative     Urobilinogen, UA 0.2 E.U./dL    Urinalysis, Microscopic Only - Urine, Catheter [[id number]]  (Abnormal) Collected:  10/13/19 1540    Specimen:  Urine, Catheter Updated:  10/13/19 1621     RBC, UA 3-5 /HPF      WBC, UA 13-20 /HPF      Bacteria, UA 2+ /HPF      Squamous Epithelial Cells, UA 7-12 /HPF      Transitional Epithelial Cells, UA 0-2 /HPF      Hyaline Casts, UA 7-12 /LPF      Granular Casts, UA 0-2 /LPF      Amorphous Crystals, UA Small/1+ /HPF      Mucus, UA Small/1+ /HPF      Methodology Manual Light Microscopy    Urine Culture - Urine, Urine, Catheter [[id number]] Collected:  10/13/19 1540    Specimen:  Urine, Catheter Updated:  10/13/19 1620    Troponin [[id number]]  (Normal) Collected:  10/13/19 1540    Specimen:  Blood Updated:  10/13/19 1620     Troponin I <0.030 ng/mL     Narrative:       Troponin I Reference Range:    0.00-0.03  Negative.  Repeat testing in 4-6 hours if clinically indicated.    0.04-0.29  Suspicious for myocardial injury. Serial measurements and clinical  correlation may be necessary to confirm or exclude diagnosis of acute  coronary syndrome.  Repeat testing in 4-6 hours if indicated.     >0.29 Consistent with myocardial injury.  Recommend clinical and laboratory correlation.     Results my be falsely decreased if patient taking Biotin.     Protime-INR [[id number]]  (Normal) Collected:  10/13/19 1540    Specimen:  Blood Updated:  10/13/19 1613     Protime 10.7 Seconds      INR 1.03    aPTT [[id number]]  (Normal) Collected:  10/13/19  1540    Specimen:  Blood Updated:  10/13/19 1613     PTT 27.6 seconds     CBC & Differential [[id number]] Collected:  10/13/19 1544    Specimen:  Blood Updated:  10/13/19 1554    Narrative:       The following orders were created for panel order CBC & Differential.  Procedure                               Abnormality         Status                     ---------                               -----------         ------                     CBC Auto Differential[[id number]]        Abnormal            Final result                 Please view results for these tests on the individual orders.    CBC Auto Differential [[id number]]  (Abnormal) Collected:  10/13/19 1544    Specimen:  Blood Updated:  10/13/19 1554     WBC 6.20 10*3/mm3      RBC 4.08 10*6/mm3      Hemoglobin 13.0 g/dL      Hematocrit 38.0 %      MCV 92.9 fL      MCH 31.9 pg      MCHC 34.3 g/dL      RDW 14.0 %      RDW-SD 45.5 fl      MPV 6.8 fL      Platelets 206 10*3/mm3      Neutrophil % 73.9 %      Lymphocyte % 14.1 %      Monocyte % 8.9 %      Eosinophil % 2.5 %      Basophil % 0.6 %      Neutrophils, Absolute 4.60 10*3/mm3      Lymphocytes, Absolute 0.90 10*3/mm3      Monocytes, Absolute 0.60 10*3/mm3      Eosinophils, Absolute 0.20 10*3/mm3      Basophils, Absolute 0.00 10*3/mm3      nRBC 0.0 /100 WBC     Blood Culture - Blood, Blood, Venous Line [[id number]] Collected:  10/13/19 1540    Specimen:  Blood, Venous Line Updated:  10/13/19 1549        Imaging Results (last 72 hours)     Procedure Component Value Units Date/Time    CT Pelvis Without Contrast [[id number]] Collected:  10/13/19 1715     Updated:  10/13/19 1724    Narrative:          DATE OF EXAM:  10/13/2019 4:44 PM     PROCEDURE:  CT PELVIS WO CONTRAST-     INDICATIONS:   History of increasing left hip pain, history of DJD     COMPARISON:   Pelvis and left hip radiographs 5/29/2019. CT chest 11/10/2018.     TECHNIQUE:  Routine transaxial slices were obtained through the pelvis without  the  administration of intravenous contrast. Reconstructed coronal and  sagittal images were also obtained. Automated exposure control and  iterative construction methods were used.     FINDINGS:  No acute fracture. No concerning osseous lesion.     Severe left hip joint DJD with complete superior joint space loss,  chronic deformity of the femoral head and acetabulum, and subchondral  cystic changes and sclerotic changes in the femoral head and acetabulum.  Moderate to advanced right hip joint DJD. No significant joint effusion.  The SI joint spaces are fairly well-maintained. Partially imaged  moderate lumbosacral spondylosis.     Partially imaged porcelain gallbladder. Moderate visualized colonic and  rectal stool burden. Colonic diverticula, without CT evidence of  diverticulitis. The appendix is normal. Nonspecific small amount of free  fluid in the pelvis. No fringe peritoneal air. No pelvic lymphadenopathy  is identified. Tiny fat-containing umbilical hernia. No deep or  superficial soft tissue mass. Severe fatty atrophy of the diabetes  mellitus muscles in the left gluteal medius muscle.        Impression:          1. Severe left hip joint DJD.  2. Moderate to advanced right hip joint DJD.  3. Partially imaged porcelain gallbladder.  4. Colonic diverticula, without CT evidence of diverticulitis.  5. Nonspecific small amount of free fluid in the pelvis.     Electronically Signed By-Yg Stone On:10/13/2019 5:22 PM  This report was finalized on [id number] by  Yg Stone, .    XR Chest 1 View [[id number]] Collected:  10/13/19 1645     Updated:  10/13/19 1649    Narrative:       DATE OF EXAM:  10/13/2019 4:10 PM     PROCEDURE:  XR CHEST 1 VW-     INDICATIONS:  Dyspnea     COMPARISON:  Radiographs and CT 11/10/2018.     TECHNIQUE:   Single radiographic AP view of the chest was obtained.     FINDINGS:  The study is limited by lordotic patient positioning and motion  artifact. Overlying artifacts. Calcified  remnants of prior granulomatous  disease. Subsegmental scarring in the base of the lingula. The lungs are  otherwise clear. No pneumothorax. Enlargement of the cardiac silhouette,  likely accentuated by technique. Severe chronic changes in both  shoulders. No acute osseous abnormality is identified.        Impression:          1. Limited study demonstrating subsegmental scarring in the left lung  base.  2. Enlargement of the cardiac silhouette, likely accentuated by  technique.     Electronically Signed By-Yg Stone On:10/13/2019 4:47 PM  This report was finalized on [id number] by  Yg Stone, .          Review of Systems:  Review of Systems    Physical Assessment:  Wound 10/13/19 1900 perineum Pressure Injury (Active)   Wound Image   10/13/2019 10:50 PM            Wound 10/13/19 1900 Right lower gluteal Pressure Injury (Active)   Wound Image   10/13/2019 10:49 PM            Wound 10/13/19 1900 Bilateral gluteal (Active)   Wound Image   10/13/2019 10:52 PM     1       Wound 10/13/19 1900 Right upper toe Other (comment) (Active)   Wound Image   10/13/2019 11:06 PM            Wound 10/13/19 1900 Left upper toe Other (comment) (Active)   Wound Image   10/13/2019 11:07 PM   Buttocks and posterior thighs: Multiple denuded areas are open which are all superficial and partial-thickness the area is quite excoriated presents with a red papular rash consistent with yeast.  The rash is throughout the buttocks the perineum groin and abdominal folds.  There are large areas of ecchymosis primary all she is she will and posterior thighs.  She also has a stage II pressure injury to the right posterior thigh most likely the injury is from from her chair is linear in nature with a chair of the band seat would be.  The area is pink and clean there are no obvious signs of infection.  The size is approximately 0.5 x 2 cm.    Nonpressure ulcers to the right second toe: Currently are her edema is very much down from her baseline.   Patient has been in a bed since she presented to the ED and within 24 hours there is a marked difference from her baseline edema to the toes as well as to the right lower extremity.  The second toe is approximately 4 x 3-1/2 cm in size it is currently quite dry and crusted.  Her baseline the wounds are typically very edematous and moist.    Nonpressure ulcer to the right third toe: Approximate size is 3 x 2 cm the ulcer is dry and crusted no exudate noted no symptoms of infection noted.  Even the dorsal side of her foot which the wounds have been progressively enlarging is currently intact.    Right first toe nonpressure injury: The ulcer again is dry and crusted it is 3 x 2-1/2 there is no exudate noted.    Left second toe pressure injury: Approximate size is 3 x 2 cm the injury is dry and crusted no exudate noted at the moment no symptoms of infection noted.  Typically this injury much like the right digits is typically quite moist and very edematous.  Edema is down from her baseline.    Left first toe nonpressure injury: Approximate size 4 x 3cm toe is crusted and dry with no exudate noted.  No symptoms of infection that are obvious noted.    The right lower extremity ulcer that has been present at the wound center is currently closed and there is no exudate.  Bilateral lower extremity edema is down from baseline.    Reviewed the EMR from the wound care center and her measurements are all down from her last visit on October 7.    Recommendation and Plan  Sacrum buttocks and posterior thighs, and placing a bariatric XL surface with low-air-loss mattress and dry flow pads.  Both will help control moisture to the area patient is incontinent and self-care is been an issue.  Recommend treating the entire area with [employer]'s Magic Butt cream.    Right and left toes, the exudate has significantly went down as is her edema with just being able to elevate her lower extremities.  I would recommend painting the toes with  Betadine and leaving open to air.  We can place a dry ABD Pad to protect the area and then wrap her with Setopress compression daily.  Lower extremities should be elevated at all times.  Patient can return to the outpatient wound care center upon discharge    DEE Madera   10/14/2019   1:05 PM

## 2019-10-14 NOTE — PROGRESS NOTES
LOS: 0 days   Patient Care Team:  Eric Vickers Jr., MD as PCP - General  Suma Patel APRN as PCP - Claims Attributed  Eric Vickers Jr., MD as PCP - Family Medicine    Chief Complaint:    Immobility    Subjective     Interval History:      The patient complains of pain in her lower back.    Patient denied fever chills.   She is requesting a bed that would allow her to help position herself    Review of Systems   Constitutional: Positive for fatigue. Negative for chills and fever.   Respiratory: Negative for cough and shortness of breath.    Cardiovascular: Negative for chest pain and leg swelling.   Gastrointestinal: Negative for diarrhea and nausea.   Musculoskeletal: Positive for back pain.   Skin: Negative for pallor and rash.         Objective     Vital Signs  Temp:  [98 °F (36.7 °C)-98.9 °F (37.2 °C)] 98.9 °F (37.2 °C)  Heart Rate:  [58-67] 62  Resp:  [17-18] 17  BP: (100-131)/(34-94) 122/67    Physical Exam   Constitutional:   Morbidly obese, poor muscle mass   Cardiovascular: Normal rate, regular rhythm, normal heart sounds and intact distal pulses.   Pulmonary/Chest: Effort normal and breath sounds normal.   Abdominal: Soft. Bowel sounds are normal.   Musculoskeletal:   Marked decreased range of motion left hip.  Pes planus.   Skin: Skin is warm and dry.   Patient has crusting of the skin on the toes of the right and left foot.  Has the appearance of impetigo.  The feet are dry   Nursing note and vitals reviewed.        Results Review:    Lab Results (last 24 hours)     Procedure Component Value Units Date/Time    Comprehensive Metabolic Panel [[id number]]  (Abnormal) Collected:  10/14/19 0407    Specimen:  Blood Updated:  10/14/19 0426     Glucose 102 mg/dL      BUN 22 mg/dL      Creatinine 0.80 mg/dL      Sodium 139 mmol/L      Potassium 4.1 mmol/L      Chloride 107 mmol/L      CO2 24.0 mmol/L      Calcium 8.7 mg/dL      Total Protein 5.9 g/dL      Albumin 3.00 g/dL      ALT (SGPT) 14 U/L       AST (SGOT) 16 U/L      Alkaline Phosphatase 90 U/L      Total Bilirubin 0.6 mg/dL      eGFR Non African Amer 70 mL/min/1.73      Globulin 2.9 gm/dL      A/G Ratio 1.0 g/dL      BUN/Creatinine Ratio 27.5     Anion Gap 12.1 mmol/L     Narrative:       The MDRD GFR formula is only valid for adults with stable renal function between ages 18 and 70.    CBC Auto Differential [[id number]]  (Abnormal) Collected:  10/14/19 0408    Specimen:  Blood Updated:  10/14/19 0412     WBC 7.80 10*3/mm3      RBC 4.00 10*6/mm3      Hemoglobin 12.3 g/dL      Hematocrit 37.3 %      MCV 93.4 fL      MCH 30.8 pg      MCHC 33.0 g/dL      RDW 14.3 %      RDW-SD 47.3 fl      MPV 7.7 fL      Platelets 197 10*3/mm3      Neutrophil % 75.6 %      Lymphocyte % 13.1 %      Monocyte % 8.8 %      Eosinophil % 1.9 %      Basophil % 0.6 %      Neutrophils, Absolute 5.90 10*3/mm3      Lymphocytes, Absolute 1.00 10*3/mm3      Monocytes, Absolute 0.70 10*3/mm3      Eosinophils, Absolute 0.10 10*3/mm3      Basophils, Absolute 0.00 10*3/mm3      nRBC 0.0 /100 WBC     T4, Free [[id number]]  (Normal) Collected:  10/13/19 1540    Specimen:  Blood Updated:  10/13/19 1738     Free T4 0.84 ng/dL      Comment: Results may be falsely increased if patient taking Biotin.       TSH [[id number]]  (Abnormal) Collected:  10/13/19 1540    Specimen:  Blood Updated:  10/13/19 1635     TSH 9.170 uIU/mL      Comment: Results may be falsely decreased if patient taking Biotin.       Comprehensive Metabolic Panel [[id number]]  (Abnormal) Collected:  10/13/19 1540    Specimen:  Blood Updated:  10/13/19 1624     Glucose 103 mg/dL      BUN 25 mg/dL      Creatinine 0.80 mg/dL      Sodium 142 mmol/L      Potassium 4.1 mmol/L      Chloride 108 mmol/L      CO2 26.0 mmol/L      Calcium 9.2 mg/dL      Total Protein 6.4 g/dL      Albumin 3.20 g/dL      ALT (SGPT) 17 U/L      AST (SGOT) 23 U/L      Alkaline Phosphatase 94 U/L      Total Bilirubin 0.3 mg/dL      eGFR Non African Amer 70  mL/min/1.73      Globulin 3.2 gm/dL      A/G Ratio 1.0 g/dL      BUN/Creatinine Ratio 31.3     Anion Gap 12.1 mmol/L     Narrative:       The MDRD GFR formula is only valid for adults with stable renal function between ages 18 and 70.    CK [[id number]]  (Abnormal) Collected:  10/13/19 1540    Specimen:  Blood Updated:  10/13/19 1624     Creatine Kinase 35 U/L     Magnesium [[id number]]  (Normal) Collected:  10/13/19 1540    Specimen:  Blood Updated:  10/13/19 1624     Magnesium 2.0 mg/dL     Phosphorus [[id number]]  (Normal) Collected:  10/13/19 1540    Specimen:  Blood Updated:  10/13/19 1624     Phosphorus 3.5 mg/dL     Urinalysis With Culture If Indicated - Urine, Catheter [[id number]]  (Abnormal) Collected:  10/13/19 1540    Specimen:  Urine, Catheter Updated:  10/13/19 1621     Color, UA Yellow     Appearance, UA Cloudy     Comment: Result checked         pH, UA 5.5     Specific Gravity, UA 1.028     Glucose, UA Negative     Ketones, UA Negative     Bilirubin, UA Negative     Blood, UA Negative     Protein, UA Negative     Leuk Esterase, UA Small (1+)     Nitrite, UA Negative     Urobilinogen, UA 0.2 E.U./dL    Urinalysis, Microscopic Only - Urine, Catheter [[id number]]  (Abnormal) Collected:  10/13/19 1540    Specimen:  Urine, Catheter Updated:  10/13/19 1621     RBC, UA 3-5 /HPF      WBC, UA 13-20 /HPF      Bacteria, UA 2+ /HPF      Squamous Epithelial Cells, UA 7-12 /HPF      Transitional Epithelial Cells, UA 0-2 /HPF      Hyaline Casts, UA 7-12 /LPF      Granular Casts, UA 0-2 /LPF      Amorphous Crystals, UA Small/1+ /HPF      Mucus, UA Small/1+ /HPF      Methodology Manual Light Microscopy    Urine Culture - Urine, Urine, Catheter [[id number]] Collected:  10/13/19 1540    Specimen:  Urine, Catheter Updated:  10/13/19 1620    Troponin [[id number]]  (Normal) Collected:  10/13/19 1540    Specimen:  Blood Updated:  10/13/19 1620     Troponin I <0.030 ng/mL     Narrative:       Troponin I Reference  Range:    0.00-0.03  Negative.  Repeat testing in 4-6 hours if clinically indicated.    0.04-0.29  Suspicious for myocardial injury. Serial measurements and clinical  correlation may be necessary to confirm or exclude diagnosis of acute  coronary syndrome.  Repeat testing in 4-6 hours if indicated.     >0.29 Consistent with myocardial injury.  Recommend clinical and laboratory correlation.     Results my be falsely decreased if patient taking Biotin.     Protime-INR [[id number]]  (Normal) Collected:  10/13/19 1540    Specimen:  Blood Updated:  10/13/19 1613     Protime 10.7 Seconds      INR 1.03    aPTT [[id number]]  (Normal) Collected:  10/13/19 1540    Specimen:  Blood Updated:  10/13/19 1613     PTT 27.6 seconds     CBC & Differential [[id number]] Collected:  10/13/19 1544    Specimen:  Blood Updated:  10/13/19 1554    Narrative:       The following orders were created for panel order CBC & Differential.  Procedure                               Abnormality         Status                     ---------                               -----------         ------                     CBC Auto Differential[[id number]]        Abnormal            Final result                 Please view results for these tests on the individual orders.    CBC Auto Differential [[id number]]  (Abnormal) Collected:  10/13/19 1544    Specimen:  Blood Updated:  10/13/19 1554     WBC 6.20 10*3/mm3      RBC 4.08 10*6/mm3      Hemoglobin 13.0 g/dL      Hematocrit 38.0 %      MCV 92.9 fL      MCH 31.9 pg      MCHC 34.3 g/dL      RDW 14.0 %      RDW-SD 45.5 fl      MPV 6.8 fL      Platelets 206 10*3/mm3      Neutrophil % 73.9 %      Lymphocyte % 14.1 %      Monocyte % 8.9 %      Eosinophil % 2.5 %      Basophil % 0.6 %      Neutrophils, Absolute 4.60 10*3/mm3      Lymphocytes, Absolute 0.90 10*3/mm3      Monocytes, Absolute 0.60 10*3/mm3      Eosinophils, Absolute 0.20 10*3/mm3      Basophils, Absolute 0.00 10*3/mm3      nRBC 0.0 /100 WBC     Blood  Culture - Blood, Blood, Venous Line [[id number]] Collected:  10/13/19 1540    Specimen:  Blood, Venous Line Updated:  10/13/19 1549         Imaging Results (last 24 hours)     Procedure Component Value Units Date/Time    CT Pelvis Without Contrast [[id number]] Collected:  10/13/19 1715     Updated:  10/13/19 1724    Narrative:          DATE OF EXAM:  10/13/2019 4:44 PM     PROCEDURE:  CT PELVIS WO CONTRAST-     INDICATIONS:   History of increasing left hip pain, history of DJD     COMPARISON:   Pelvis and left hip radiographs 5/29/2019. CT chest 11/10/2018.     TECHNIQUE:  Routine transaxial slices were obtained through the pelvis without the  administration of intravenous contrast. Reconstructed coronal and  sagittal images were also obtained. Automated exposure control and  iterative construction methods were used.     FINDINGS:  No acute fracture. No concerning osseous lesion.     Severe left hip joint DJD with complete superior joint space loss,  chronic deformity of the femoral head and acetabulum, and subchondral  cystic changes and sclerotic changes in the femoral head and acetabulum.  Moderate to advanced right hip joint DJD. No significant joint effusion.  The SI joint spaces are fairly well-maintained. Partially imaged  moderate lumbosacral spondylosis.     Partially imaged porcelain gallbladder. Moderate visualized colonic and  rectal stool burden. Colonic diverticula, without CT evidence of  diverticulitis. The appendix is normal. Nonspecific small amount of free  fluid in the pelvis. No fringe peritoneal air. No pelvic lymphadenopathy  is identified. Tiny fat-containing umbilical hernia. No deep or  superficial soft tissue mass. Severe fatty atrophy of the diabetes  mellitus muscles in the left gluteal medius muscle.        Impression:          1. Severe left hip joint DJD.  2. Moderate to advanced right hip joint DJD.  3. Partially imaged porcelain gallbladder.  4. Colonic diverticula, without CT  evidence of diverticulitis.  5. Nonspecific small amount of free fluid in the pelvis.     Electronically Signed ByCornel Stone On:10/13/2019 5:22 PM  This report was finalized on [id number] by  Yg Stone, .    XR Chest 1 View [[id number]] Collected:  10/13/19 1645     Updated:  10/13/19 1649    Narrative:       DATE OF EXAM:  10/13/2019 4:10 PM     PROCEDURE:  XR CHEST 1 VW-     INDICATIONS:  Dyspnea     COMPARISON:  Radiographs and CT 11/10/2018.     TECHNIQUE:   Single radiographic AP view of the chest was obtained.     FINDINGS:  The study is limited by lordotic patient positioning and motion  artifact. Overlying artifacts. Calcified remnants of prior granulomatous  disease. Subsegmental scarring in the base of the lingula. The lungs are  otherwise clear. No pneumothorax. Enlargement of the cardiac silhouette,  likely accentuated by technique. Severe chronic changes in both  shoulders. No acute osseous abnormality is identified.        Impression:          1. Limited study demonstrating subsegmental scarring in the left lung  base.  2. Enlargement of the cardiac silhouette, likely accentuated by  technique.     Electronically Signed By-Yg Stone On:10/13/2019 4:47 PM  This report was finalized on [id number] by  Yg Stone, .         ECG/EMG Results (last 24 hours)     ** No results found for the last 24 hours. **           I reviewed the patient's new clinical results.    Medication Review: I have reviewed the medications    Current Facility-Administered Medications:   •  acetaminophen (TYLENOL) tablet 650 mg, 650 mg, Oral, Q4H PRN **OR** acetaminophen (TYLENOL) 160 MG/5ML solution 650 mg, 650 mg, Oral, Q4H PRN **OR** acetaminophen (TYLENOL) suppository 650 mg, 650 mg, Rectal, Q4H PRN, Eric Vickers Jr., MD  •  acetaminophen (TYLENOL) tablet 650 mg, 650 mg, Oral, Q6H PRN, Eric Vickers Jr., MD, 650 mg at 10/14/19 0253  •  ammonium lactate (AMLACTIN) 12 % cream, , Topical, BID, Eric Vickers  JOVI Smith MD  •  apixaban (ELIQUIS) tablet 2.5 mg, 2.5 mg, Oral, Q12H, Eric Vickers Jr., MD  •  calcium carbonate (TUMS) chewable tablet 500 mg (200 mg elemental), 2 tablet, Oral, BID PRN, Eric Vickers Jr., MD  •  cefTRIAXone (ROCEPHIN) 1 g in sodium chloride 0.9 % 100 mL IVPB, 1 g, Intravenous, Q24H, Eric Vickers Jr., MD  •  famotidine (PEPCID) tablet 40 mg, 40 mg, Oral, Daily, Eric Vickers Jr., MD  •  hydrocortisone 2.5 % cream 1 application, 1 application, Topical, 4x Daily, Eric Vickers Jr., MD  •  influenza vac split quad (FLUZONE,FLUARIX,AFLURIA) injection 0.5 mL, 0.5 mL, Intramuscular, During Hospitalization, Eric Vickers Jr., MD  •  lisinopril (PRINIVIL,ZESTRIL) tablet 40 mg, 40 mg, Oral, Q24H, Eric Vickers Jr., MD  •  magnesium hydroxide (MILK OF MAGNESIA) suspension 2400 mg/10mL 10 mL, 10 mL, Oral, Daily PRN, Eric Vickers Jr., MD  •  melatonin tablet 5 mg, 5 mg, Oral, Nightly PRN, Eric Vickers Jr., MD  •  metoprolol succinate XL (TOPROL-XL) 24 hr tablet 100 mg, 100 mg, Oral, Daily, Eric Vickers Jr., MD  •  mupirocin (BACTROBAN) 2 % ointment, , Topical, Q12H, Eric Vickers Jr., MD  •  ondansetron (ZOFRAN) injection 4 mg, 4 mg, Intravenous, Q6H PRN, Eric Vickers Jr., MD  •  sodium chloride 0.9 % flush 10 mL, 10 mL, Intravenous, Q12H, Eric Vickers Jr., MD, 10 mL at 10/13/19 2332  •  sodium chloride 0.9 % flush 10 mL, 10 mL, Intravenous, PRN, Eric Vickers Jr., MD       Active Problems:    Acute cystitis with hematuria I Stable-await results of urine culture.      Arthritis of left hip  -Stable      Cellulitis of both feet - stable-continue with current treatment      Immobility syndrome -unchanged- patient is not able to ambulate due to her obesity poor muscle mass and arthritis in her hip       Back pain -  -new -will get xrays of her back    Assessment & Plan     Plan for disposition:Where: to be determined and When:  to be  determined    Eric Vickers Jr., MD  10/14/19  7:50 AM

## 2019-10-14 NOTE — PLAN OF CARE
Problem: Patient Care Overview  Goal: Plan of Care Review  Outcome: Ongoing (interventions implemented as appropriate)   10/14/19 1051   Coping/Psychosocial   Plan of Care Reviewed With patient   OTHER   Outcome Summary Pt w/ severe limitations to mobility and self care, presents w/ impaired bed mob, tfs, strength, and ROM. Pt req. max Ax2 for bed mob & to tf to sitting EOB. Pt perseverating on pain, very hesitant to move. Reported she stood yesterday to tf to ambulance but unable to even clear hips from EOB in attempt to stand w/ PT/OT w/ max Ax2. Not safe to return home. Needs [facility] followed by [facility] placement as pt is unable to care for self.

## 2019-10-14 NOTE — H&P
Patient Care Team:  Eric Vickers Jr., MD as PCP - General  Suma Patel APRN as PCP - Claims Attributed  Eric Vickers Jr., MD as PCP - Family Medicine    Chief complaint left hip pain, urinary incontinence, cellulitis of both feet    Subjective     The patient is a 74-year-old white female was admitted to hospital with hip pain urinary incontinence and cellulitis of both feet.    Patient currently lives at home with her nephew.  She currently cares for herself at home.  Patient has been going to wound therapy for the past several weeks because of cellulitis and breakdown involving the distal portions of both feet.  Patient is immobile.  Patient has stasis edema.  Patient had a difficult time getting the wounds to heal in both feet.    Patient also has severe arthritis involving left hip.  Patient complains of severe pain in the left hip whenever she walks or moves the hip in any direction.  Patient had x-rays of the hip several weeks ago which revealed arthritis.  CT scan of the pelvis today revealed severe arthritis in the hip.    Had problems with urinary incontinence.  Patient because of her obesity and hip pain has mobility problems.  She states she has not been able to make it to the bathroom on time.    Because of this she came to the emergency room.  She was found to have severe arthritis of her hip urinary tract infection and infection of the distal portion of the feet bilaterally.  Patient was admitted for further care.    Review of Systems   Constitutional: Positive for fatigue. Negative for chills and fever.        Patient has difficulty ambulating.   HENT: Negative for sinus pain, sore throat and trouble swallowing.    Eyes: Negative for pain and discharge.   Respiratory: Negative for cough and shortness of breath.    Cardiovascular: Negative for chest pain and leg swelling.   Gastrointestinal: Negative for abdominal pain, diarrhea, nausea and vomiting.   Endocrine: Negative for cold  intolerance, heat intolerance, polydipsia, polyphagia and polyuria.   Genitourinary: Negative for dysuria and frequency.   Musculoskeletal: Negative for arthralgias and myalgias.   Skin: Negative for color change and rash.   Neurological: Negative for dizziness, syncope and weakness.   Psychiatric/Behavioral: Negative for agitation, confusion and decreased concentration.          History  Past Medical History:   Diagnosis Date   • Arthritis    • DVT, lower extremity (CMS/HCC)    • Hypertension    • Obesity    • Pulmonary embolism (CMS/HCC)      Past Surgical History:   Procedure Laterality Date   • JOINT REPLACEMENT      Bilateral knees     Family History   Problem Relation Age of Onset   • Heart disease Mother    • Hypertension Mother    • Heart disease Father    • Hypertension Father    • Kidney disease Father    • COPD Father      Social History     Tobacco Use   • Smoking status: Never Smoker   • Smokeless tobacco: Never Used   Substance Use Topics   • Alcohol use: No     Frequency: Never   • Drug use: No     Medications Prior to Admission   Medication Sig Dispense Refill Last Dose   • apixaban (ELIQUIS) 5 MG tablet tablet Take 2.5 mg by mouth 2 (Two) Times a Day.      • benazepril (LOTENSIN) 40 MG tablet TAKE 1 TABLET BY MOUTH DAILY 90 tablet 1    • metoprolol succinate XL (TOPROL-XL) 100 MG 24 hr tablet TAKE ONE TABLET BY MOUTH EVERY DAY 90 tablet 1      Allergies:  Patient has no known allergies.    Objective     Vital Signs  Temp:  [98 °F (36.7 °C)-98.4 °F (36.9 °C)] 98.4 °F (36.9 °C)  Heart Rate:  [58-67] 67  Resp:  [17-18] 17  BP: (100-131)/(34-94) 106/67    Physical Exam   Constitutional: She is oriented to person, place, and time.   Orbitally obese   HENT:   Head: Normocephalic and atraumatic.   Eyes: EOM are normal. Pupils are equal, round, and reactive to light.   Neck: Neck supple.   Cardiovascular: Normal rate, regular rhythm, normal heart sounds and intact distal pulses.   Pulmonary/Chest: Effort  normal and breath sounds normal.   Abdominal: Soft. Bowel sounds are normal.   Musculoskeletal: Normal range of motion.   Severe pain in the left hip with movement in any direction.  Patient has marked decreased range of motion.   Neurological: She is oriented to person, place, and time.   Skin: Skin is warm and dry.   Patient has scaling impetiginous looking lesions on the toes of both feet.   Psychiatric: She has a normal mood and affect. Her behavior is normal. Judgment and thought content normal.   Nursing note and vitals reviewed.       Results Review:   Lab Results (last 24 hours)     Procedure Component Value Units Date/Time    T4, Free [[id number]]  (Normal) Collected:  10/13/19 1540    Specimen:  Blood Updated:  10/13/19 1738     Free T4 0.84 ng/dL      Comment: Results may be falsely increased if patient taking Biotin.       TSH [[id number]]  (Abnormal) Collected:  10/13/19 1540    Specimen:  Blood Updated:  10/13/19 1635     TSH 9.170 uIU/mL      Comment: Results may be falsely decreased if patient taking Biotin.       Comprehensive Metabolic Panel [[id number]]  (Abnormal) Collected:  10/13/19 1540    Specimen:  Blood Updated:  10/13/19 1624     Glucose 103 mg/dL      BUN 25 mg/dL      Creatinine 0.80 mg/dL      Sodium 142 mmol/L      Potassium 4.1 mmol/L      Chloride 108 mmol/L      CO2 26.0 mmol/L      Calcium 9.2 mg/dL      Total Protein 6.4 g/dL      Albumin 3.20 g/dL      ALT (SGPT) 17 U/L      AST (SGOT) 23 U/L      Alkaline Phosphatase 94 U/L      Total Bilirubin 0.3 mg/dL      eGFR Non African Amer 70 mL/min/1.73      Globulin 3.2 gm/dL      A/G Ratio 1.0 g/dL      BUN/Creatinine Ratio 31.3     Anion Gap 12.1 mmol/L     Narrative:       The MDRD GFR formula is only valid for adults with stable renal function between ages 18 and 70.    CK [[id number]]  (Abnormal) Collected:  10/13/19 1540    Specimen:  Blood Updated:  10/13/19 1624     Creatine Kinase 35 U/L     Magnesium [[id number]]  (Normal)  Collected:  10/13/19 1540    Specimen:  Blood Updated:  10/13/19 1624     Magnesium 2.0 mg/dL     Phosphorus [[id number]]  (Normal) Collected:  10/13/19 1540    Specimen:  Blood Updated:  10/13/19 1624     Phosphorus 3.5 mg/dL     Urinalysis With Culture If Indicated - Urine, Catheter [[id number]]  (Abnormal) Collected:  10/13/19 1540    Specimen:  Urine, Catheter Updated:  10/13/19 1621     Color, UA Yellow     Appearance, UA Cloudy     Comment: Result checked         pH, UA 5.5     Specific Gravity, UA 1.028     Glucose, UA Negative     Ketones, UA Negative     Bilirubin, UA Negative     Blood, UA Negative     Protein, UA Negative     Leuk Esterase, UA Small (1+)     Nitrite, UA Negative     Urobilinogen, UA 0.2 E.U./dL    Urinalysis, Microscopic Only - Urine, Catheter [[id number]]  (Abnormal) Collected:  10/13/19 1540    Specimen:  Urine, Catheter Updated:  10/13/19 1621     RBC, UA 3-5 /HPF      WBC, UA 13-20 /HPF      Bacteria, UA 2+ /HPF      Squamous Epithelial Cells, UA 7-12 /HPF      Transitional Epithelial Cells, UA 0-2 /HPF      Hyaline Casts, UA 7-12 /LPF      Granular Casts, UA 0-2 /LPF      Amorphous Crystals, UA Small/1+ /HPF      Mucus, UA Small/1+ /HPF      Methodology Manual Light Microscopy    Urine Culture - Urine, Urine, Catheter [[id number]] Collected:  10/13/19 1540    Specimen:  Urine, Catheter Updated:  10/13/19 1620    Troponin [[id number]]  (Normal) Collected:  10/13/19 1540    Specimen:  Blood Updated:  10/13/19 1620     Troponin I <0.030 ng/mL     Narrative:       Troponin I Reference Range:    0.00-0.03  Negative.  Repeat testing in 4-6 hours if clinically indicated.    0.04-0.29  Suspicious for myocardial injury. Serial measurements and clinical  correlation may be necessary to confirm or exclude diagnosis of acute  coronary syndrome.  Repeat testing in 4-6 hours if indicated.     >0.29 Consistent with myocardial injury.  Recommend clinical and laboratory correlation.     Results my be  falsely decreased if patient taking Biotin.     Protime-INR [[id number]]  (Normal) Collected:  10/13/19 1540    Specimen:  Blood Updated:  10/13/19 1613     Protime 10.7 Seconds      INR 1.03    aPTT [[id number]]  (Normal) Collected:  10/13/19 1540    Specimen:  Blood Updated:  10/13/19 1613     PTT 27.6 seconds     CBC & Differential [[id number]] Collected:  10/13/19 1544    Specimen:  Blood Updated:  10/13/19 1554    Narrative:       The following orders were created for panel order CBC & Differential.  Procedure                               Abnormality         Status                     ---------                               -----------         ------                     CBC Auto Differential[[id number]]        Abnormal            Final result                 Please view results for these tests on the individual orders.    CBC Auto Differential [[id number]]  (Abnormal) Collected:  10/13/19 1544    Specimen:  Blood Updated:  10/13/19 1554     WBC 6.20 10*3/mm3      RBC 4.08 10*6/mm3      Hemoglobin 13.0 g/dL      Hematocrit 38.0 %      MCV 92.9 fL      MCH 31.9 pg      MCHC 34.3 g/dL      RDW 14.0 %      RDW-SD 45.5 fl      MPV 6.8 fL      Platelets 206 10*3/mm3      Neutrophil % 73.9 %      Lymphocyte % 14.1 %      Monocyte % 8.9 %      Eosinophil % 2.5 %      Basophil % 0.6 %      Neutrophils, Absolute 4.60 10*3/mm3      Lymphocytes, Absolute 0.90 10*3/mm3      Monocytes, Absolute 0.60 10*3/mm3      Eosinophils, Absolute 0.20 10*3/mm3      Basophils, Absolute 0.00 10*3/mm3      nRBC 0.0 /100 WBC     Blood Culture - Blood, Blood, Venous Line [[id number]] Collected:  10/13/19 1540    Specimen:  Blood, Venous Line Updated:  10/13/19 1549        Imaging Results (last 24 hours)     Procedure Component Value Units Date/Time    CT Pelvis Without Contrast [[id number]] Collected:  10/13/19 1715     Updated:  10/13/19 1724    Narrative:          DATE OF EXAM:  10/13/2019 4:44 PM     PROCEDURE:  CT PELVIS WO CONTRAST-      INDICATIONS:   History of increasing left hip pain, history of DJD     COMPARISON:   Pelvis and left hip radiographs 5/29/2019. CT chest 11/10/2018.     TECHNIQUE:  Routine transaxial slices were obtained through the pelvis without the  administration of intravenous contrast. Reconstructed coronal and  sagittal images were also obtained. Automated exposure control and  iterative construction methods were used.     FINDINGS:  No acute fracture. No concerning osseous lesion.     Severe left hip joint DJD with complete superior joint space loss,  chronic deformity of the femoral head and acetabulum, and subchondral  cystic changes and sclerotic changes in the femoral head and acetabulum.  Moderate to advanced right hip joint DJD. No significant joint effusion.  The SI joint spaces are fairly well-maintained. Partially imaged  moderate lumbosacral spondylosis.     Partially imaged porcelain gallbladder. Moderate visualized colonic and  rectal stool burden. Colonic diverticula, without CT evidence of  diverticulitis. The appendix is normal. Nonspecific small amount of free  fluid in the pelvis. No fringe peritoneal air. No pelvic lymphadenopathy  is identified. Tiny fat-containing umbilical hernia. No deep or  superficial soft tissue mass. Severe fatty atrophy of the diabetes  mellitus muscles in the left gluteal medius muscle.        Impression:          1. Severe left hip joint DJD.  2. Moderate to advanced right hip joint DJD.  3. Partially imaged porcelain gallbladder.  4. Colonic diverticula, without CT evidence of diverticulitis.  5. Nonspecific small amount of free fluid in the pelvis.     Electronically Signed By-Yg Stone On:10/13/2019 5:22 PM  This report was finalized on [id number] by  Yg Stone, .    XR Chest 1 View [[id number]] Collected:  10/13/19 1645     Updated:  10/13/19 1649    Narrative:       DATE OF EXAM:  10/13/2019 4:10 PM     PROCEDURE:  XR CHEST 1 VW-     INDICATIONS:  Dyspnea      COMPARISON:  Radiographs and CT 11/10/2018.     TECHNIQUE:   Single radiographic AP view of the chest was obtained.     FINDINGS:  The study is limited by lordotic patient positioning and motion  artifact. Overlying artifacts. Calcified remnants of prior granulomatous  disease. Subsegmental scarring in the base of the lingula. The lungs are  otherwise clear. No pneumothorax. Enlargement of the cardiac silhouette,  likely accentuated by technique. Severe chronic changes in both  shoulders. No acute osseous abnormality is identified.        Impression:          1. Limited study demonstrating subsegmental scarring in the left lung  base.  2. Enlargement of the cardiac silhouette, likely accentuated by  technique.     Electronically Signed By-Yg Stone On:10/13/2019 4:47 PM  This report was finalized on [id number] by  Yg Stone, .         ECG/EMG Results (last 24 hours)     ** No results found for the last 24 hours. **           I reviewed the patient's new clinical results.    Active Problems:    Acute cystitis with hematuria    Arthritis of left hip    Cellulitis of both feet    Immobility syndrome     Will be admitted to hospital.  Evaluation will be performed.  Patient be treated with antibiotics Hydrin lotion to the feet and moisturizing ointment to the lower extremity  Patient likely needs long-term placement in a skilled nursing facility.    Assessment & Plan    I discussed the patients findings and my recommendations with patient.     Eric Vickers Jr., MD  10/13/19  10:08 PM

## 2019-10-14 NOTE — PROGRESS NOTES
Continued Stay Note   Chris     Patient Name: Camelia Gray  MRN: [id number]  Today's Date: 10/14/2019    Admit Date: 10/13/2019    Discharge Plan     Row Name 10/14/19 1402       Plan    Plan  Home to resume [facility] vs. Inpatient rehab (first choice [facility], second choice [facility]) NELY Cowan aware.     Patient/Family in Agreement with Plan  yes    Plan Comments  Met with patient at bedside who is agreeable to PT/OT recommendation for inpatient rehab. Patient's first choice is [facility], second choice [facility]. Notified NELY Cowan, during rounds of patient's choices and that patient is currently on [facility] waitlist with [facility]. Patient is current with [facility], iveth Baker, is aware, will need ESPINOZA order placed if patient flips. Plan is to await culture results and x-ray of the back.         Viki Epstein  [phone]

## 2019-10-14 NOTE — THERAPY EVALUATION
Patient Name: Camelia Gray  : 1945    MRN: [id number]                              Today's Date: 10/14/2019       Admit Date: 10/13/2019    Visit Dx:     ICD-10-CM ICD-9-CM   1. Acute cystitis with hematuria N30.01 595.0   2. Hypothyroidism, secondary E03.8 244.8   3. Frequent fecal incontinence R15.9 787.60   4. Weakness R53.1 780.79     Patient Active Problem List   Diagnosis   • Morbidly obese (CMS/[facility])   • Acute cystitis with hematuria   • Arthritis of left hip   • Deep vein thrombosis (DVT) (CMS/[facility])   • Diabetes mellitus type II, controlled (CMS/[facility])   • Edema of lower extremity   • Hypertension   • Pulmonary embolism (CMS/HCC)   • Stasis dermatitis   • Cellulitis of both feet   • Immobility syndrome     Past Medical History:   Diagnosis Date   • Arthritis    • DVT, lower extremity (CMS/HCC)    • Hypertension    • Obesity    • Pulmonary embolism (CMS/[facility])      Past Surgical History:   Procedure Laterality Date   • JOINT REPLACEMENT      Bilateral knees     General Information     Row Name 10/14/19 1031          PT Evaluation Time/Intention    Document Type  evaluation  -AB     Mode of Treatment  co-treatment;physical therapy  -AB     Row Name 10/14/19 1031          General Information    Patient Profile Reviewed?  yes  -AB     Prior Level of Function  dependent:;max assist: Pt is a 75 yo morbidly obese female adm w/ acute cystitis w/ hematuria. Pt also w/ severe L hip arthritis and cellulitis of B feet. Pt reported she was walking in her home w/ HHPT in July but now unable to amb or climb stairs to access bathroom.   -AB     Existing Precautions/Restrictions  no known precautions/restrictions  -AB     Row Name 10/14/19 1031          Relationship/Environment    Lives With  other relative(s)  -AB     Row Name 10/14/19 1031          Resource/Environmental Concerns    Current Living Arrangements  home/apartment/condo  -AB     Row Name 10/14/19 1031          Home Main Entrance    Number of Stairs, Main  Entrance  none  -AB     Row Name 10/14/19 1031          Stairs Within Home, Primary    Stairs, Within Home, Primary  -- full flight to bathroom  -AB     Row Name 10/14/19 1031          Cognitive Assessment/Intervention- PT/OT    Orientation Status (Cognition)  oriented x 3  -AB     Row Name 10/14/19 1031          Safety Issues, Functional Mobility    Impairments Affecting Function (Mobility)  endurance/activity tolerance;motor control;pain;range of motion (ROM);strength  -AB       User Key  (r) = Recorded By, (t) = Taken By, (c) = Cosigned By    Initials Name Provider Type    AB Rosa Peng, PT Physical Therapist        Mobility     Row Name 10/14/19 1036          Bed Mobility Assessment/Treatment    Bed Mobility Assessment/Treatment  bed mobility (all) activities  -AB     [place] Level (Bed Mobility)  maximum assist (25% patient effort);2 person assist  -AB     Assistive Device (Bed Mobility)  bed rails;draw sheet;head of bed elevated  -AB     Comment (Bed Mobility)  -- very slow w/ all bed mobility, >5x rolling L/R w/ max A x2 to achieve comfortable position at end of session, limited secondary to severe pain w/ movement  -AB     Row Name 10/14/19 1036          Transfer Assessment/Treatment    Comment (Transfers)  -- Supine<>sit tf at EOB w/ Max A x2 & draw sheet, bed rails. Selena sitting EOB x ~5 minutes w/ supervision. Max A x2 to return to supine. Dep to lift to HOB.   -AB     Row Name 10/14/19 1036          Sit-Stand Transfer    Sit-Stand [place] (Transfers)  -- Attempted sit<>stand at EOB, unable to even clear hips from EOB w/ bed elevated.   -AB     Assistive Device (Sit-Stand Transfers)  walker, front-wheeled  -AB       User Key  (r) = Recorded By, (t) = Taken By, (c) = Cosigned By    Initials Name Provider Type    Rosa Medina, PT Physical Therapist        Obj/Interventions     Row Name 10/14/19 1041          General ROM    GENERAL ROM COMMENTS  see OT roula for UE ROM, B AC PROCTOR  limited 75% secondary to pain on LLE, limited 50% on R LE  -AB     Row Name 10/14/19 1041          MMT (Manual Muscle Testing)    General MMT Comments  LLE NT 2 to pain, R LE hip 2/5, knee extn from EOB 3-/5, B ankle DF/PF 3/5  -AB     Row Name 10/14/19 1041          Therapeutic Exercise    Lower Extremity Range of Motion (Therapeutic Exercise)  knee flexion/extension, bilateral;ankle dorsiflexion/plantar flexion, bilateral unable to perform PROM on hips into flexion/ext or abd/add secondary to severe pain and guarding  -AB     Exercise Type (Therapeutic Exercise)  AAROM (active assistive range of motion)  -AB     Position (Therapeutic Exercise)  seated  -AB     Row Name 10/14/19 1041          Static Sitting Balance    Level of [place] (Unsupported Sitting, Static Balance)  contact guard assist;2 person assist  -AB     Sitting Position (Unsupported Sitting, Static Balance)  sitting on edge of bed  -AB     Time Able to Maintain Position (Unsupported Sitting, Static Balance)  4 to 5 minutes  -AB     Row Name 10/14/19 1041          Sensory Assessment/Intervention    Sensory General Assessment  -- pt reported N/T L hand  -AB       User Key  (r) = Recorded By, (t) = Taken By, (c) = Cosigned By    Initials Name Provider Type    AB Rosa Peng, PT Physical Therapist        Goals/Plan     Row Name 10/14/19 1049          Bed Mobility Goal 1 (PT)    Activity/Assistive Device (Bed Mobility Goal 1, PT)  bed mobility activities, all  -AB     [place] Level/Cues Needed (Bed Mobility Goal 1, PT)  moderate assist (50-74% patient effort);2 person assist  -AB     Time Frame (Bed Mobility Goal 1, PT)  2 weeks  -AB     Row Name 10/14/19 1049          Transfer Goal 1 (PT)    Activity/Assistive Device (Transfer Goal 1, PT)  transfers, all  -AB     [place] Level/Cues Needed (Transfer Goal 1, PT)  moderate assist (50-74% patient effort);2 person assist  -AB     Time Frame (Transfer Goal 1, PT)  2 weeks  -AB       User  Key  (r) = Recorded By, (t) = Taken By, (c) = Cosigned By    Initials Name Provider Type    Rosa Medina, PT Physical Therapist        Clinical Impression     Row Name 10/14/19 1044          Pain Assessment    Additional Documentation  Pain Scale: Numbers Pre/Post-Treatment (Group)  -AB     Row Name 10/14/19 1044          Pain Scale: Numbers Pre/Post-Treatment    Pain Scale: Numbers, Pretreatment  8/10  -AB     Pain Scale: Numbers, Post-Treatment  10/10  -AB     Pain Location - Orientation  lower  -AB     Pain Location  back Left hip, skin on backside of legs  -AB     Pain Intervention(s)  -- informed RN  -AB     Row Name 10/14/19 1044          Plan of Care Review    Plan of Care Reviewed With  patient  -AB     Row Name 10/14/19 1044          Physical Therapy Clinical Impression    Patient/Family Goals Statement (PT Clinical Impression)  Pt w/ severe limitations to mobility and self care, presents w/ impaired bed mob, tfs, strength, and ROM. Pt req. max Ax2 for bed mob & to tf to sitting EOB. Pt perseverating on pain, very hesitant to move. Reported she stood yesterday to tf to ambulance but unable to even clear hips from EOB in attempt to stand w/ PT/OT w/ max Ax2. Not safe to return home. Needs [facility] followed by NH placement as pt is unable to care for self.   -AB     Criteria for Skilled Interventions Met (PT Clinical Impression)  yes  -AB     Rehab Potential (PT Clinical Summary)  fair, will monitor progress closely  -AB     Predicted Duration of Therapy (PT)  2 weeks  -AB     Row Name 10/14/19 1044          Positioning and Restraints    Pre-Treatment Position  in bed  -AB     Post Treatment Position  bed  -AB     In Bed  notified nsg;supine;call light within reach;side lying left;side rails up x2;pillow between legs;L heel elevated;exit alarm on  -AB       User Key  (r) = Recorded By, (t) = Taken By, (c) = Cosigned By    Initials Name Provider Type    Rosa Medina, PT Physical Therapist         Outcome Measures    No documentation.       Physical Therapy Education     Title: PT OT SLP Therapies (In Progress)     Topic: Physical Therapy (In Progress)     Point: Mobility training (Done)     Learning Progress Summary           Patient Acceptance, E,TB, VU by AB at 10/14/2019 10:51 AM                               User Key     Initials Effective Dates Name Provider Type [facility] 03/01/19 -  Rosa Peng, PT Physical Therapist PT              PT Recommendation and Plan  Planned Therapy Interventions (PT Eval): bed mobility training, gait training, home exercise program, neuromuscular re-education, patient/family education, ROM (range of motion), strengthening  Outcome Summary/Treatment Plan (PT)  Anticipated Discharge Disposition (PT): skilled nursing facility  Plan of Care Reviewed With: patient  Outcome Summary: Pt w/ severe limitations to mobility and self care, presents w/ impaired bed mob, tfs, strength, and ROM. Pt req. max Ax2 for bed mob & to tf to sitting EOB. Pt perseverating on pain, very hesitant to move. Reported she stood yesterday to tf to ambulance but unable to even clear hips from EOB in attempt to stand w/ PT/OT w/ max Ax2. Not safe to return home. Needs [facility] followed by NH placement as pt is unable to care for self.     Time Calculation:   PT Charges     Row Name 10/14/19 1053             Time Calculation    Start Time  0905  -AB      Stop Time  0940  -AB      Time Calculation (min)  35 min  -AB      PT Received On  10/14/19  -AB      PT - Next Appointment  10/16/19  -AB      PT Goal Re-Cert Due Date  10/28/19  -AB         Time Calculation- PT    Total Timed Code Minutes- PT  23 minute(s)  -AB        User Key  (r) = Recorded By, (t) = Taken By, (c) = Cosigned By    Initials Name Provider Type    AB Rosa Peng, PT Physical Therapist        Therapy Charges for Today     Code Description Service Date Service Provider Modifiers Qty    82016703159 HC PT EVAL MOD  COMPLEXITY 3 10/14/2019 Rosa Peng, PT GP 1    [id number]  PT THERAPEUTIC ACT EA 15 MIN 10/14/2019 Rosa Peng, PT GP 2               Rosa Peng, PT  10/14/2019

## 2019-10-14 NOTE — THERAPY EVALUATION
Acute Care - Occupational Therapy Initial Evaluation   Chris     Patient Name: Camelia Gray  : 1945  MRN: [id number]  Today's Date: 10/14/2019             Admit Date: 10/13/2019       ICD-10-CM ICD-9-CM   1. Acute cystitis with hematuria N30.01 595.0   2. Hypothyroidism, secondary E03.8 244.8   3. Frequent fecal incontinence R15.9 787.60   4. Weakness R53.1 780.79     Patient Active Problem List   Diagnosis   • Morbidly obese (CMS/HCC)   • Acute cystitis with hematuria   • Arthritis of left hip   • Deep vein thrombosis (DVT) (CMS/[facility])   • Diabetes mellitus type II, controlled (CMS/[facility])   • Edema of lower extremity   • Hypertension   • Pulmonary embolism (CMS/HCC)   • Stasis dermatitis   • Cellulitis of both feet   • Immobility syndrome     Past Medical History:   Diagnosis Date   • Arthritis    • DVT, lower extremity (CMS/HCC)    • Hypertension    • Obesity    • Pulmonary embolism (CMS/HCC)      Past Surgical History:   Procedure Laterality Date   • JOINT REPLACEMENT      Bilateral knees          OT ASSESSMENT FLOWSHEET (last 12 hours)      Occupational Therapy Evaluation     Row Name 10/14/19 1114                   OT Evaluation Time/Intention    Subjective Information  complains of;weakness  -JORDI        Document Type  evaluation  -JORDI        Mode of Treatment  co-treatment;occupational therapy  -JORDI           General Information    Prior Level of Function  -- reports she was transferring in home and adl (I)'ingly PTA  -JORDI        Pertinent History of Current Functional Problem  adm (L) hip pain, urinary incontinence, cellulitis B feet  -JORDI        Existing Precautions/Restrictions  no known precautions/restrictions  -JORDI           Bed Mobility Assessment/Treatment    Bed Mobility Assessment/Treatment  bed mobility (all) activities  -JORDI        [place] Level (Bed Mobility)  maximum assist (25% patient effort);2 person assist  -JORDI        Assistive Device (Bed Mobility)  bed rails;draw sheet;head of bed  elevated  -JORDI           Sit-Stand Transfer    Sit-Stand [employer] (Transfers)  -- multiple failed attempts EOB, unable to clear bottom from be  -JORDI        Assistive Device (Sit-Stand Transfers)  walker, front-wheeled  -JORDI           ADL Assessment/Intervention    BADL Assessment/Intervention  lower body dressing  -JORDI           Lower Body Dressing Assessment/Training    Lower Body Dressing [employer] Level  dependent (less than 25% patient effort)  -JORDI        Lower Body Dressing Position  edge of bed sitting  -           General ROM    GENERAL ROM COMMENTS  B shldr flexion limited by 75% 2/2 pain R<L  -JORDI           MMT (Manual Muscle Testing)    General MMT Comments  defer resistance 2/2 c/o pain in multiple UE joints  -JORDI           Static Sitting Balance    Level of [employer] (Unsupported Sitting, Static Balance)  standby assist  -JORDI        Sitting Position (Unsupported Sitting, Static Balance)  sitting on edge of bed  -JORDI        Time Able to Maintain Position (Unsupported Sitting, Static Balance)  4 to 5 minutes  -JORDI           Positioning and Restraints    Pre-Treatment Position  in bed  -JORDI        Post Treatment Position  bed  -JORDI        In Bed  notified nsg;call light within reach;encouraged to call for assist;exit alarm on  -JORDI           Pain Scale: Numbers Pre/Post-Treatment    Pain Scale: Numbers, Pretreatment  8/10  -JORDI        Pain Scale: Numbers, Post-Treatment  10/10  -JORDI        Pain Location - Orientation  lower  -JORDI        Pain Location  back  -JORDI           Wound 10/13/19 1900 perineum Pressure Injury    Wound - Properties Group Date first assessed: 10/13/19  -MITRA Time first assessed: 1900  -JA Present on Hospital Admission: Y  -JA Location: perineum  -JA Primary Wound Type: Pressure inj  -JA Stage, Pressure Injury: Stage 2  -JA       Wound 10/13/19 1900 Right lower gluteal Pressure Injury    Wound - Properties Group Date first assessed: 10/13/19  -MITRA Time first assessed: 1900  -JA Present on  Hospital Admission: Y  -JA Side: Right  -JA Orientation: lower  -JA Location: gluteal  -JA Primary Wound Type: Pressure inj  -JA Stage, Pressure Injury: Stage 2  -JA       Wound 10/13/19 1900 Bilateral gluteal    Wound - Properties Group Date first assessed: 10/13/19  -JA Time first assessed: 1900  -JA Present on Hospital Admission: Y  -JA Side: Bilateral  -JA Location: gluteal  -JA Stage, Pressure Injury: unstageable  -JA       Wound 10/13/19 1900 Right upper toe Other (comment)    Wound - Properties Group Date first assessed: 10/13/19  -JA Time first assessed: 1900  -JA Present on Hospital Admission: Y  -JA Side: Right  -JA Orientation: upper  -JA Location: toe  -JA Primary Wound Type: Other  -JA Additional Comments: wounds from lymphedema  -JA       Wound 10/13/19 1900 Left upper toe Other (comment)    Wound - Properties Group Date first assessed: 10/13/19  -JA Time first assessed: 1900  -JA Present on Hospital Admission: Y  -JA Side: Left  -JA Orientation: upper  -JA Location: toe  -JA Primary Wound Type: Other  -JA Additional Comments: wound from lymphedema  -JA       Clinical Impression (OT)    Criteria for Skilled Therapeutic Interventions Met (OT Eval)  yes  -JORDI        Rehab Potential (OT Eval)  fair, will monitor progress closely  -JORDI        Therapy Frequency (OT Eval)  3 times/wk  -JORDI        Anticipated Discharge Disposition (OT)  skilled nursing facility  -           OT Goals    Transfer Goal Selection (OT)  transfer, OT goal 1  -JORDI        Dressing Goal Selection (OT)  dressing, OT goal 1  -JORDI        Toileting Goal Selection (OT)  toileting, OT goal 1  -JORDI           Transfer Goal 1 (OT)    Activity/Assistive Device (Transfer Goal 1, OT)  transfers, all  -JORDI        [place] Level/Cues Needed (Transfer Goal 1, OT)  moderate assist (50-74% patient effort)  -JORDI        Time Frame (Transfer Goal 1, OT)  2 weeks  -JORDI           Dressing Goal 1 (OT)    Activity/Assistive Device (Dressing Goal 1, OT)   dressing skills, all  -JORDI        [place]/Cues Needed (Dressing Goal 1, OT)  moderate assist (50-74% patient effort)  -JORDI        Time Frame (Dressing Goal 1, OT)  2 weeks  -JORDI           Toileting Goal 1 (OT)    Activity/Device (Toileting Goal 1, OT)  toileting skills, all  -JORDI        [place] Level/Cues Needed (Toileting Goal 1, OT)  moderate assist (50-74% patient effort)  -JORDI        Time Frame (Toileting Goal 1, OT)  2 weeks  -JORDI          User Key  (r) = Recorded By, (t) = Taken By, (c) = Cosigned By    Initials Name Effective Dates    JA Edel Mendez, LPN 06/24/19 -     Patricia Trejo OT 07/25/19 -          Occupational Therapy Education     Title: PT OT SLP Therapies (In Progress)     Topic: Occupational Therapy (Done)     Point: ADL training (Done)     Description: Instruct learner(s) on proper safety adaptation and remediation techniques during self care or transfers.   Instruct in proper use of assistive devices.    Learning Progress Summary           Patient Acceptance, E,TB, VU by  at 10/14/2019 11:20 AM                   Point: Home exercise program (Done)     Description: Instruct learner(s) on appropriate technique for monitoring, assisting and/or progressing therapeutic exercises/activities.    Learning Progress Summary           Patient Acceptance, E,TB, VU by  at 10/14/2019 11:20 AM                   Point: Precautions (Done)     Description: Instruct learner(s) on prescribed precautions during self-care and functional transfers.    Learning Progress Summary           Patient Acceptance, E,TB, VU by  at 10/14/2019 11:20 AM                   Point: Body mechanics (Done)     Description: Instruct learner(s) on proper positioning and spine alignment during self-care, functional mobility activities and/or exercises.    Learning Progress Summary           Patient Acceptance, E,TB, VU by  at 10/14/2019 11:20 AM                               User Key     Initials Effective Dates  Name Provider Type Discipline     07/25/19 -  Patricia Lundberg OT Occupational Therapist OT                  OT Recommendation and Plan  Outcome Summary/Treatment Plan (OT)  Anticipated Discharge Disposition (OT): skilled nursing facility  Therapy Frequency (OT Eval): 3 times/wk  Plan of Care Review  Plan of Care Reviewed With: patient  Plan of Care Reviewed With: patient  Outcome Summary: 74 y.o female adm with L hip pain, urinary incontinence and cellulitis in B feet. Pt limited by c/o pain in multiple joints & body hiatus. Pt report she lives with nephew, however he doesn't provide much assistance. Pt reports PT/OT dc'ed pt in July & she stayed in her w/c throughout the day, but was able to stand pivot to toilet and car. Pt is unsafe to retrn home and will require a SNF stay, with possible transition to ECF. Pt requires x2 assist for all mobility at this time.         Time Calculation:   Time Calculation- OT     Row Name 10/14/19 1124             Time Calculation- OT    OT Start Time  0849  -      OT Stop Time  0940  -      OT Time Calculation (min)  51 min  -      Total Timed Code Minutes- OT  30 minute(s)  -      OT Received On  10/14/19  -JORDI      OT - Next Appointment  10/16/19  -      OT Goal Re-Cert Due Date  10/28/19  -        User Key  (r) = Recorded By, (t) = Taken By, (c) = Cosigned By    Initials Name Provider Type     Patricia Lundberg OT Occupational Therapist        Therapy Charges for Today     Code Description Service Date Service Provider Modifiers Qty    [id number]  OT EVAL MOD COMPLEXITY 4 10/14/2019 Patricia Lundberg OT GO 1    [id number]  OT THERAPEUTIC ACT EA 15 MIN 10/14/2019 Patricia Lundberg OT GO 2               Patricia Lundberg OT  10/14/2019

## 2019-10-14 NOTE — PLAN OF CARE
Problem: Patient Care Overview  Goal: Plan of Care Review   10/14/19 1120   Coping/Psychosocial   Plan of Care Reviewed With patient   OTHER   Outcome Summary 74 y.o female adm with L hip pain, urinary incontinence and cellulitis in B feet. Pt limited by c/o pain in multiple joints & body hiatus. Pt report she lives with nephew, however he doesn't provide much assistance. Pt reports PT/OT dc'ed pt in July & she stayed in her w/c throughout the day, but was able to stand pivot to toilet and car. Pt is unsafe to return home and will require a SNF stay, with possible transition to ECF. Pt requires x2 assist for all mobility at this time.

## 2019-10-15 ENCOUNTER — HOSPITAL ENCOUNTER (OUTPATIENT)
Dept: CARDIOLOGY | Facility: HOSPITAL | Age: 74
Setting detail: OBSERVATION
Discharge: HOME | End: 2019-10-15

## 2019-10-15 LAB
BACTERIA SPEC AEROBE CULT: ABNORMAL
BH CV LOWER VASCULAR LEFT COMMON FEMORAL AUGMENT: NORMAL
BH CV LOWER VASCULAR LEFT COMMON FEMORAL COMPETENT: NORMAL
BH CV LOWER VASCULAR LEFT COMMON FEMORAL COMPRESS: NORMAL
BH CV LOWER VASCULAR LEFT COMMON FEMORAL PHASIC: NORMAL
BH CV LOWER VASCULAR LEFT COMMON FEMORAL SPONT: NORMAL
BH CV LOWER VASCULAR LEFT DISTAL FEMORAL COMPRESS: NORMAL
BH CV LOWER VASCULAR LEFT GASTRONEMIUS COMPRESS: NORMAL
BH CV LOWER VASCULAR LEFT GREATER SAPH AK COMPRESS: NORMAL
BH CV LOWER VASCULAR LEFT GREATER SAPH BK COMPRESS: NORMAL
BH CV LOWER VASCULAR LEFT LESSER SAPH COMPRESS: NORMAL
BH CV LOWER VASCULAR LEFT MID FEMORAL AUGMENT: NORMAL
BH CV LOWER VASCULAR LEFT MID FEMORAL COMPETENT: NORMAL
BH CV LOWER VASCULAR LEFT MID FEMORAL COMPRESS: NORMAL
BH CV LOWER VASCULAR LEFT MID FEMORAL PHASIC: NORMAL
BH CV LOWER VASCULAR LEFT MID FEMORAL SPONT: NORMAL
BH CV LOWER VASCULAR LEFT POPLITEAL AUGMENT: NORMAL
BH CV LOWER VASCULAR LEFT POPLITEAL COMPETENT: NORMAL
BH CV LOWER VASCULAR LEFT POPLITEAL COMPRESS: NORMAL
BH CV LOWER VASCULAR LEFT POPLITEAL PHASIC: NORMAL
BH CV LOWER VASCULAR LEFT POPLITEAL SPONT: NORMAL
BH CV LOWER VASCULAR LEFT POSTERIOR TIBIAL COMPRESS: NORMAL
BH CV LOWER VASCULAR LEFT PROXIMAL FEMORAL COMPRESS: NORMAL
BH CV LOWER VASCULAR LEFT SAPHENOFEMORAL JUNCTION AUGMENT: NORMAL
BH CV LOWER VASCULAR LEFT SAPHENOFEMORAL JUNCTION COMPETENT: NORMAL
BH CV LOWER VASCULAR LEFT SAPHENOFEMORAL JUNCTION COMPRESS: NORMAL
BH CV LOWER VASCULAR LEFT SAPHENOFEMORAL JUNCTION PHASIC: NORMAL
BH CV LOWER VASCULAR LEFT SAPHENOFEMORAL JUNCTION SPONT: NORMAL
BH CV LOWER VASCULAR RIGHT COMMON FEMORAL AUGMENT: NORMAL
BH CV LOWER VASCULAR RIGHT COMMON FEMORAL COMPETENT: NORMAL
BH CV LOWER VASCULAR RIGHT COMMON FEMORAL COMPRESS: NORMAL
BH CV LOWER VASCULAR RIGHT COMMON FEMORAL PHASIC: NORMAL
BH CV LOWER VASCULAR RIGHT COMMON FEMORAL SPONT: NORMAL
BH CV LOWER VASCULAR RIGHT DISTAL FEMORAL COMPRESS: NORMAL
BH CV LOWER VASCULAR RIGHT GASTRONEMIUS COMPRESS: NORMAL
BH CV LOWER VASCULAR RIGHT GREATER SAPH AK COMPRESS: NORMAL
BH CV LOWER VASCULAR RIGHT GREATER SAPH BK COMPRESS: NORMAL
BH CV LOWER VASCULAR RIGHT LESSER SAPH COMPRESS: NORMAL
BH CV LOWER VASCULAR RIGHT MID FEMORAL AUGMENT: NORMAL
BH CV LOWER VASCULAR RIGHT MID FEMORAL COMPETENT: NORMAL
BH CV LOWER VASCULAR RIGHT MID FEMORAL COMPRESS: NORMAL
BH CV LOWER VASCULAR RIGHT MID FEMORAL PHASIC: NORMAL
BH CV LOWER VASCULAR RIGHT MID FEMORAL SPONT: NORMAL
BH CV LOWER VASCULAR RIGHT POPLITEAL AUGMENT: NORMAL
BH CV LOWER VASCULAR RIGHT POPLITEAL COMPETENT: NORMAL
BH CV LOWER VASCULAR RIGHT POPLITEAL COMPRESS: NORMAL
BH CV LOWER VASCULAR RIGHT POPLITEAL PHASIC: NORMAL
BH CV LOWER VASCULAR RIGHT POPLITEAL SPONT: NORMAL
BH CV LOWER VASCULAR RIGHT POSTERIOR TIBIAL COMPRESS: NORMAL
BH CV LOWER VASCULAR RIGHT PROXIMAL FEMORAL COMPRESS: NORMAL
BH CV LOWER VASCULAR RIGHT SAPHENOFEMORAL JUNCTION AUGMENT: NORMAL
BH CV LOWER VASCULAR RIGHT SAPHENOFEMORAL JUNCTION COMPETENT: NORMAL
BH CV LOWER VASCULAR RIGHT SAPHENOFEMORAL JUNCTION COMPRESS: NORMAL
BH CV LOWER VASCULAR RIGHT SAPHENOFEMORAL JUNCTION PHASIC: NORMAL
BH CV LOWER VASCULAR RIGHT SAPHENOFEMORAL JUNCTION SPONT: NORMAL
F5 GENE MUT ANL BLD/T: NORMAL
FACTOR II, DNA ANALYSIS: NORMAL

## 2019-10-15 PROCEDURE — 99232 SBSQ HOSP IP/OBS MODERATE 35: CPT | Performed by: FAMILY MEDICINE

## 2019-10-15 PROCEDURE — 25010000002 CEFTRIAXONE PER 250 MG: Performed by: FAMILY MEDICINE

## 2019-10-15 PROCEDURE — 93970 EXTREMITY STUDY: CPT

## 2019-10-15 RX ADMIN — ZINC OXIDE: 200 OINTMENT TOPICAL at 08:44

## 2019-10-15 RX ADMIN — METOPROLOL SUCCINATE 100 MG: 50 TABLET, EXTENDED RELEASE ORAL at 08:43

## 2019-10-15 RX ADMIN — ACETAMINOPHEN 650 MG: 325 TABLET ORAL at 08:50

## 2019-10-15 RX ADMIN — APIXABAN 2.5 MG: 2.5 TABLET, FILM COATED ORAL at 21:28

## 2019-10-15 RX ADMIN — MICONAZOLE NITRATE: 20 POWDER TOPICAL at 08:44

## 2019-10-15 RX ADMIN — APIXABAN 2.5 MG: 2.5 TABLET, FILM COATED ORAL at 08:37

## 2019-10-15 RX ADMIN — LISINOPRIL 40 MG: 20 TABLET ORAL at 08:38

## 2019-10-15 RX ADMIN — ACETAMINOPHEN 650 MG: 325 TABLET ORAL at 19:50

## 2019-10-15 RX ADMIN — MICONAZOLE NITRATE: 20 POWDER TOPICAL at 21:29

## 2019-10-15 RX ADMIN — ZINC OXIDE: 200 OINTMENT TOPICAL at 21:29

## 2019-10-15 RX ADMIN — CEFTRIAXONE SODIUM 1 G: 1 INJECTION, POWDER, FOR SOLUTION INTRAMUSCULAR; INTRAVENOUS at 19:49

## 2019-10-15 RX ADMIN — Medication 10 ML: at 21:29

## 2019-10-15 RX ADMIN — Medication: at 21:29

## 2019-10-15 RX ADMIN — Medication: at 08:48

## 2019-10-15 RX ADMIN — Medication 10 ML: at 08:48

## 2019-10-15 RX ADMIN — POVIDONE-IODINE: 10 SOLUTION TOPICAL at 08:48

## 2019-10-15 NOTE — PROGRESS NOTES
LOS: 0 days   Patient Care Team:  Eric Vickers Jr., MD as PCP - General  Suma Patel APRN as PCP - Claims Attributed  Eric Vickers Jr., MD as PCP - Family Medicine    Chief Complaint: Immobility, left hip pain      Subjective     Interval History:     Patient complains of persistent pain in her lower back and left hip.  The patient states however she feels better    Review of Systems   Constitutional: Negative for chills, fatigue and fever.   Respiratory: Negative for cough and shortness of breath.    Cardiovascular: Negative for chest pain and leg swelling.   Gastrointestinal: Negative for abdominal pain, diarrhea and nausea.   Skin: Negative for pallor and rash.         Objective     Vital Signs  Temp:  [98.5 °F (36.9 °C)-98.7 °F (37.1 °C)] 98.5 °F (36.9 °C)  Heart Rate:  [60-67] 67  Resp:  [14-15] 15  BP: (100-112)/(54-60) 112/60    Physical Exam   Constitutional:   Obese, poor muscle mass   Cardiovascular: Normal rate, regular rhythm, normal heart sounds and intact distal pulses.   Pulmonary/Chest: Effort normal and breath sounds normal.   Abdominal: Soft. Bowel sounds are normal.   Musculoskeletal: Normal range of motion.   Both feet are wrapped in dressings.  Marked decreased range of motion of the left hip.   Skin: Skin is warm and dry.   Nursing note and vitals reviewed.        Results Review:    Lab Results (last 24 hours)     Procedure Component Value Units Date/Time    Urine Culture - Urine, Urine, Catheter [[id number]]  (Abnormal)  (Susceptibility) Collected:  10/13/19 1540    Specimen:  Urine, Catheter Updated:  10/15/19 1218     Urine Culture >100,000 CFU/mL Escherichia coli    Susceptibility      Escherichia coli     LUCIA     Ampicillin Susceptible     Ampicillin + Sulbactam Susceptible     Cefazolin Susceptible     Cefepime Susceptible     Ceftazidime Susceptible     Ceftriaxone Susceptible     Gentamicin Susceptible     Levofloxacin Susceptible     Nitrofurantoin Susceptible      Piperacillin + Tazobactam Susceptible     Tetracycline Susceptible     Trimethoprim + Sulfamethoxazole Susceptible                    Factor 5 Leiden [[id number]]  (Normal) Collected:  10/14/19 1753    Specimen:  Blood Updated:  10/15/19 0038     Factor V Leiden Normal    Narrative:       Factor V Leiden is a specific mutation (R506Q) in the factor V gene that is associated with an increased risk of venous thrombosis. Factor V Leiden is more resistant to inactivation by activated protein C.  As a result, factor V persists in the circulation leading to a mild hyper-   coagulable state.  The Leiden mutation accounts for 90% - 95% of APC resistance.  Factor V Leiden has been reported in patients with deep vein thrombosis, pulmonary embolus,   central retinal vein occlusion, cerebral sinus thrombosis and hepatic vein thrombosis. Other risk factors to be considered in the workup for venous thrombosis include the [id number] mutation in the factor II (prothrombin) gene, protein S and C deficiency, and antithrombin deficiencies.   Anticardiolipin antibody and lupus anticoagulant analysis may be appropriate for certain patients, as well as homocysteine levels.    The expression of Factor V Leiden thrombophilia is impacted by coexisting genetic thrombophilic disorders, acquired thrombophilic disorders (malignancy, hyperhomocysteinemia, high factor VIII levels), and circumstances including: pregnancy, oral contraceptive use, hormone replacement therapy, selective estrogen receptor modulators, travel, central venous catheters, surgery, transplantation and advanced age.    Factor II, DNA Analysis [[id number]]  (Normal) Collected:  10/14/19 1753    Specimen:  Blood Updated:  10/15/19 0038     Factor II, DNA Analysis Normal    Narrative:       A point mutation ([id number]) in the factor II (prothrombin) gene is the second most common cause of inherited thrombophilia. The incidence of this mutation in the [place]  population is about  2% and in the  population it is approximately 0.5%. This mutation is rare in the  and  population. Being heterozygous for a prothrombin mutation increases the risk for developing venous thrombosis about 2 to 3 times above the general population risk. Being homozygous for the prothrombin gene mutation increases the relative risk for venous thrombosis further, although it is not yet known how   much further the risk is increased. In women heterozygous for the prothrombin gene mutation, the use of estrogen containing oral contraceptives increases the relative risk of venous thrombosis about 16 times and the risk of developing cerebral thrombosis is also significantly increased. In pregnancy, the prothrombin gene mutation increases risk for venous thrombosis and may increase risk for stillbirth, placental abruption, pre-eclampsia and fetal growth restriction.     The expression of Factor II thrombophilia is impacted by coexisting genetic thrombophilic disorders, acquired thrombophilic disorders (eg, malignancy, hyperhomocysteinemia, high factor VIII levels), and circumstances including: pregnancy, oral contraceptive use, hormone replacement therapy, selective estrogen receptor modulators, travel, central venous catheters, surgery, and organ transplantation.    Antiphosphotidyl Antibodies Panel II [[id number]] Collected:  10/14/19 1753    Specimen:  Blood Updated:  10/14/19 1824    Antithrombin III [[id number]] Collected:  10/14/19 1753    Specimen:  Blood Updated:  10/14/19 1824    Protein S Functional [[id number]] Collected:  10/14/19 1753    Specimen:  Blood Updated:  10/14/19 1824    EDUIN [[id number]] Collected:  10/14/19 1753    Specimen:  Blood Updated:  10/14/19 1824    Protein C Activity [[id number]] Collected:  10/14/19 1753    Specimen:  Blood Updated:  10/14/19 1823    Factor 5 Activity [[id number]] Collected:  10/14/19 1753    Specimen:  Blood Updated:  10/14/19 1823    Lupus  Anticoagulant [[id number]] Collected:  10/14/19 1753    Specimen:  Blood Updated:  10/14/19 1823    Blood Culture - Blood, Blood, Venous Line [[id number]] Collected:  10/13/19 1540    Specimen:  Blood, Venous Line Updated:  10/14/19 1600     Blood Culture No growth at 24 hours         Imaging Results (last 24 hours)     Procedure Component Value Units Date/Time    XR Spine Lumbar 4+ View [[id number]] Collected:  10/14/19 1511     Updated:  10/14/19 1517    Narrative:       DATE OF EXAM:  10/14/2019 2:51 PM     PROCEDURE:  XR SPINE LUMBAR 4+ VW-     INDICATIONS:  Back pain; N30.01-Acute cystitis with hematuria; E03.8-Other specified  hypothyroidism; R15.9-Full incontinence of feces; R53.1-Weakness     COMPARISON:  No Comparisons Available     TECHNIQUE:   A complete lumbar spine with a minimum of four radiologic views were  obtained.           FINDINGS:  The study is significantly limited. The patient refused a true lateral  image secondary to pain.  Suspected diminished disc height at each lumbar level with multilevel  marginal osteophyte formation. Advanced facet arthropathy thought to be  present bilaterally at L5-S1 and on the left at L4-5. No definite acute  lumbar spine fracture is seen. Fixation cannot be determined without  benefit of lateral image. Mild sclerosis and spurring at the inferior  margins of the bilateral SI joints, left greater than right.     4.0 x 4.8 cm round calcification in the right upper quadrant, could  represent large gallstone or gallbladder wall calcification (porcelain  gallbladder).          Impression:       Limited examination secondary to patient's inability to tolerate true  lateral image. Multilevel diminished of lumbar disc height with advanced  L4-5 and L5-S1 facet arthropathy.     No definite acute lumbar spine fracture is seen. If patient's symptoms  persist consider either repeat plain film imaging or dedicated CT lumbar  spine.     Round rim calcified lesion in the right  upper quadrant of the abdomen  could represent gallbladder wall calcification (porcelain gallbladder),  or large calcified gallstone.     Electronically Signed By-Dr. Marie Toledo MD On:10/14/2019 3:15 PM  This report was finalized on [id number] by Dr. Marie Toledo MD.         ECG/EMG Results (last 24 hours)     ** No results found for the last 24 hours. **           I reviewed the patient's new clinical results.    Medication Review: I have reviewed the medications    Current Facility-Administered Medications:   •  acetaminophen (TYLENOL) tablet 650 mg, 650 mg, Oral, Q4H PRN, 650 mg at 10/15/19 0850 **OR** acetaminophen (TYLENOL) 160 MG/5ML solution 650 mg, 650 mg, Oral, Q4H PRN **OR** acetaminophen (TYLENOL) suppository 650 mg, 650 mg, Rectal, Q4H PRN, Eric Vickers Jr., MD  •  acetaminophen (TYLENOL) tablet 650 mg, 650 mg, Oral, Q6H PRN, Eric Vickers Jr., MD, Stopped at 10/14/19 1503  •  ammonium lactate (AMLACTIN) 12 % cream, , Topical, BID, Eric Vickers Jr., MD  •  apixaban (ELIQUIS) tablet 2.5 mg, 2.5 mg, Oral, Q12H, Eric Vickers Jr., MD, 2.5 mg at 10/15/19 0837  •  calcium carbonate (TUMS) chewable tablet 500 mg (200 mg elemental), 2 tablet, Oral, BID PRN, Eric Vickers Jr., MD  •  cefTRIAXone (ROCEPHIN) 1 g in sodium chloride 0.9 % 100 mL IVPB, 1 g, Intravenous, Q24H, Eric Vickers Jr., MD, Last Rate: 200 mL/hr at 10/14/19 1725, 1 g at 10/14/19 1725  •  famotidine (PEPCID) tablet 40 mg, 40 mg, Oral, Daily, Eric Vickers Jr., MD, Stopped at 10/14/19 1005  •  influenza vac split quad (FLUZONE,FLUARIX,AFLURIA) injection 0.5 mL, 0.5 mL, Intramuscular, During Hospitalization, Eric Vickers Jr., MD  •  lisinopril (PRINIVIL,ZESTRIL) tablet 40 mg, 40 mg, Oral, Q24H, Eric Vickers Jr., MD, 40 mg at 10/15/19 0838  •  magic butt paste, , Topical, BID, Suma Patel APRN  •  magnesium hydroxide (MILK OF MAGNESIA) suspension 2400 mg/10mL 10 mL, 10 mL, Oral, Daily PRN,  Eric Vickers Jr., MD  •  melatonin tablet 5 mg, 5 mg, Oral, Nightly PRN, Eric Vickers Jr., MD  •  metoprolol succinate XL (TOPROL-XL) 24 hr tablet 100 mg, 100 mg, Oral, Daily, Eric Vickers Jr., MD, 100 mg at 10/15/19 0843  •  miconazole (MICOTIN) 2 % powder, , Topical, Q12H, Suma Patel APRN  •  ondansetron (ZOFRAN) injection 4 mg, 4 mg, Intravenous, Q6H PRN, Eric Vickers Jr., MD  •  povidone-iodine (BETADINE) external solution, , Topical, Daily, Suma Patel APRN  •  sodium chloride 0.9 % flush 10 mL, 10 mL, Intravenous, Q12H, Eric Vickers Jr., MD, 10 mL at 10/15/19 0848  •  sodium chloride 0.9 % flush 10 mL, 10 mL, Intravenous, PRN, Eric Vickers Jr., MD       Active Problems:    Acute cystitis with hematuria - stable - will switch to oral antibiotics      arthritis of left hip - stable      Cellulitis of both feet- stable- will continue dressings and ABX      Immobility syndrome - unchanged - awaiting approval to go to rehabilotation      Venous stasis - stable      Lymphedema - stable      Non-pressure chronic ulcer of other part of right foot with fat layer exposed (CMS/HCC) - stable      Non-pressure chronic ulcer of other part of left foot with fat layer exposed (CMS/HCC) - stable      Dermatitis associated with moisture - stable      Decubitus ulcer of buttock, stage 2 (CMS/HCC) - stable - continue wound therrapy    Continue current medications and treatment...  Switch to oral antbiotics  SNU when arrangements made    Assessment & Plan     Plan for disposition:Where: rehab and When:  when arrangements made    Eric Vickers Jr., MD  10/15/19  1:35 PM

## 2019-10-15 NOTE — PROGRESS NOTES
Discharge Planning Assessment   Chris     Patient Name: Camelia Gray  MRN: [id number]  Today's Date: 10/15/2019    Admit Date: 10/13/2019        Discharge Plan     Row Name 10/15/19 1411       Plan    Plan  [facility],PASRR approved,no precert needed    Patient/Family in Agreement with Plan  yes        Destination - Selection Complete      Service Provider Request Status Selected Services Address Phone Number Fax Number    [facility] [address], [place] [address] [phone]    [facility] Declined  no available bed N/A [address] [place] [address] [phone]      Camryn Solares, [facility], [facility]  [phone]

## 2019-10-15 NOTE — PLAN OF CARE
Problem: Patient Care Overview  Goal: Plan of Care Review   10/15/19 0537   OTHER   Outcome Summary Patient resting abed, appears to have rested well. Patient repositioned throughout shift and tolereated well.

## 2019-10-15 NOTE — DISCHARGE PLACEMENT REQUEST
"Camelia rGay (74 y.o. Female)     Date of Birth Social Security Number Address Home Phone MRN    1945  [address] [place] [address] [phone] [id number]    Hinduism Marital Status          Lutheran Single       Admission Date Admission Type Admitting Provider Attending Provider Department, Room/Bed    10/13/19 Emergency Eric Vickers Jr., MD Hocker, Clifton M Jr., MD [facility] 3A MEDICAL INPATIENT, 304/1    Discharge Date Discharge Disposition Discharge Destination                       Attending Provider:  Eric Vickers Jr., MD    Allergies:  No Known Allergies    Isolation:  None   Infection:  None   Code Status:  CPR    Ht:  182.9 cm (72\")   Wt:  164 kg (361 lb 8.9 oz)    Admission Cmt:  None   Principal Problem:  None                Active Insurance as of 10/13/2019     Primary Coverage     Payor Plan Insurance Group Employer/Plan Group    MEDICARE MEDICARE A & B      Payor Plan Address Payor Plan Phone Number Payor Plan Fax Number Effective Dates    [address] [phone]  7/1/2011 - None Entered    [facility] [id number]       Subscriber Name Subscriber Birth Date Member ID       CAMELIA GRAY 1945 [id number]           Secondary Coverage     Payor Plan Insurance Group Employer/Plan Group    [facility] SUPP [id number]     Payor Plan Address Payor Plan Phone Number Payor Plan Fax Number Effective Dates    [address]   12/1/2016 - None Entered    [facility] [id number]       Subscriber Name Subscriber Birth Date Member ID       CAMELIA GRYA 1945 [id number]                 Emergency Contacts      (Rel.) Home Phone Work Phone Mobile Phone    KYUNG LEAH (Other) -- -- [phone]              "

## 2019-10-15 NOTE — DISCHARGE PLACEMENT REQUEST
"Camelia Gray (74 y.o. Female)     Date of Birth Social Security Number Address Home Phone MRN    1945  [address] [facility] [place] [address] [phone] [id number]    Lutheran Marital Status          Jew Single       Admission Date Admission Type Admitting Provider Attending Provider Department, Room/Bed    10/13/19 Emergency Eric Vickers Jr., MD Hocker, Clifton M Jr., MD [facility] 3A MEDICAL INPATIENT, 304/1    Discharge Date Discharge Disposition Discharge Destination                       Attending Provider:  Eric Vickers Jr., MD    Allergies:  No Known Allergies    Isolation:  None   Infection:  None   Code Status:  CPR    Ht:  182.9 cm (72\")   Wt:  164 kg (361 lb 8.9 oz)    Admission Cmt:  None   Principal Problem:  None                Active Insurance as of 10/13/2019     Primary Coverage     Payor Plan Insurance Group Employer/Plan Group    MEDICARE MEDICARE A & B      Payor Plan Address Payor Plan Phone Number Payor Plan Fax Number Effective Dates    [address] [phone]  7/1/2011 - None Entered    [facility] [id number]       Subscriber Name Subscriber Birth Date Member ID       CAMELIA GRAY 1945 [id number]           Secondary Coverage     Payor Plan Insurance Group Employer/Plan Group    [facility] SUPP [id number]     Payor Plan Address Payor Plan Phone Number Payor Plan Fax Number Effective Dates    [address]   12/1/2016 - None Entered    [facility] [id number]       Subscriber Name Subscriber Birth Date Member ID       CAMELIA GRAY 1945 [id number]                 Emergency Contacts      (Rel.) Home Phone Work Phone Mobile Phone    KYUNG LEAH (Other) -- -- [phone]              "

## 2019-10-15 NOTE — PROGRESS NOTES
Discharge Planning Assessment  [facility]     Patient Name: Camelia Gray  MRN: [id number]  Today's Date: 10/15/2019    Admit Date: 10/13/2019        Discharge Plan     Row Name 10/15/19 1421       Plan    Plan  [facility] accepted,PASRR approved,no precert needed    Patient/Family in Agreement with Plan  yes    Row Name 10/15/19 1411       Plan    Plan  [facility] accepted,PASRR approved,no precert needed    Patient/Family in Agreement with Plan  yes        Destination      Service Provider Request Status Selected Services Address Phone Number Fax Number    [facility] Accept[facility] N/A [address] [place] [address] [phone]    [facility] Declined  does not have pvt room N/A [address] [place] [address] [phone]      Camryn Solares, [facility], [facility]  [phone]

## 2019-10-15 NOTE — PROGRESS NOTES
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)    Dermatitis associated with moisture    Decubitus ulcer of buttock, stage 2 (CMS/HCC)      Assessment    Hx of large PE in 11/2018, currently on Eliquis  Echo in  5/29/19 , no PAH  Features of VIKRAM    Plan    Wait on results from hypercoaguble work to determine duration of anticoagulation  Wait on results from doppler of bilateral lower extremities done this am  Sleep study as outpt       LOS: 0 days     Subjective       Objective     Vital signs for last 24 hours:  Vitals:    10/14/19 1100 10/14/19 2023 10/15/19 0421 10/15/19 0837   BP: 107/67 100/54 108/58 112/60   BP Location: Left arm Right arm Right arm    Patient Position: Lying Sitting Lying    Pulse: 66 60 62 67   Resp: 16 14 15    Temp: 98.7 °F (37.1 °C) 98.7 °F (37.1 °C) 98.5 °F (36.9 °C)    TempSrc: Oral Oral Oral    SpO2: 92% 93% 94%    Weight:   (!) 164 kg (361 lb 8.9 oz)    Height:           Intake/Output last 3 shifts:  I/O last 3 completed shifts:  In: 1200 [P.O.:1200]  Out: 1400 [Urine:1400]  Intake/Output this shift:  No intake/output data recorded.      Radiology  Imaging Results (last 24 hours)     Procedure Component Value Units Date/Time    XR Spine Lumbar 4+ View [[id number]] Collected:  10/14/19 1511     Updated:  10/14/19 1517    Narrative:       DATE OF EXAM:  10/14/2019 2:51 PM     PROCEDURE:  XR SPINE LUMBAR 4+ VW-     INDICATIONS:  Back pain; N30.01-Acute cystitis with hematuria; E03.8-Other specified  hypothyroidism; R15.9-Full incontinence of feces; R53.1-Weakness     COMPARISON:  No Comparisons Available     TECHNIQUE:   A complete lumbar spine with a minimum of four radiologic views  were  obtained.           FINDINGS:  The study is significantly limited. The patient refused a true lateral  image secondary to pain.  Suspected diminished disc height at each lumbar level with multilevel  marginal osteophyte formation. Advanced facet arthropathy thought to be  present bilaterally at L5-S1 and on the left at L4-5. No definite acute  lumbar spine fracture is seen. Fixation cannot be determined without  benefit of lateral image. Mild sclerosis and spurring at the inferior  margins of the bilateral SI joints, left greater than right.     4.0 x 4.8 cm round calcification in the right upper quadrant, could  represent large gallstone or gallbladder wall calcification (porcelain  gallbladder).          Impression:       Limited examination secondary to patient's inability to tolerate true  lateral image. Multilevel diminished of lumbar disc height with advanced  L4-5 and L5-S1 facet arthropathy.     No definite acute lumbar spine fracture is seen. If patient's symptoms  persist consider either repeat plain film imaging or dedicated CT lumbar  spine.     Round rim calcified lesion in the right upper quadrant of the abdomen  could represent gallbladder wall calcification (porcelain gallbladder),  or large calcified gallstone.     Electronically Signed By-Dr. Marie Toledo MD On:10/14/2019 3:15 PM  This report was finalized on [id number] by Dr. Marie Toledo MD.          Labs:  Results from last 7 days   Lab Units 10/14/19  0408   WBC 10*3/mm3 7.80   HEMOGLOBIN g/dL 12.3   HEMATOCRIT % 37.3   PLATELETS 10*3/mm3 197     Results from last 7 days   Lab Units 10/14/19  0407   SODIUM mmol/L 139   POTASSIUM mmol/L 4.1   CHLORIDE mmol/L 107   CO2 mmol/L 24.0   BUN mg/dL 22*   CREATININE mg/dL 0.80   CALCIUM mg/dL 8.7*   BILIRUBIN mg/dL 0.6   ALK PHOS U/L 90   ALT (SGPT) U/L 14   AST (SGOT) U/L 16   GLUCOSE mg/dL 102*         Results from last 7 days   Lab Units 10/14/19  0407 10/13/19  1540   ALBUMIN g/dL  3.00* 3.20*     Results from last 7 days   Lab Units 10/13/19  1540   CK TOTAL U/L 35*   TROPONIN I ng/mL <0.030         Results from last 7 days   Lab Units 10/13/19  1540   MAGNESIUM mg/dL 2.0     Results from last 7 days   Lab Units 10/13/19  1540   INR  1.03   APTT seconds 27.6     Results from last 7 days   Lab Units 10/13/19  1540   TSH uIU/mL 9.170*   FREE T4 ng/dL 0.84           Meds:   SCHEDULE    ammonium lactate  Topical BID   apixaban 2.5 mg Oral Q12H   ceftriaxone 1 g Intravenous Q24H   famotidine 40 mg Oral Daily   lisinopril 40 mg Oral Q24H   magic butt paste  Topical BID   metoprolol succinate  mg Oral Daily   miconazole  Topical Q12H   povidone-iodine  Topical Daily   sodium chloride 10 mL Intravenous Q12H     Infusions     PRNs  •  acetaminophen **OR** acetaminophen **OR** acetaminophen  •  acetaminophen  •  calcium carbonate  •  influenza vaccine  •  magnesium hydroxide  •  melatonin  •  ondansetron  •  sodium chloride    Physical Exam:  Physical Exam   Constitutional: She is oriented to person, place, and time. She appears well-developed.   Cardiovascular: Normal rate.   Pulmonary/Chest: Effort normal and breath sounds normal.   Musculoskeletal: She exhibits edema.   Neurological: She is alert and oriented to person, place, and time.   Skin: Skin is warm and dry.       ROS  Review of Systems   Constitutional: Positive for activity change.   Cardiovascular: Positive for leg swelling.             Total time spent with patient: 1 hour

## 2019-10-15 NOTE — PLAN OF CARE
Problem: Patient Care Overview  Goal: Plan of Care Review   10/15/19 1923   Coping/Psychosocial   Plan of Care Reviewed With patient   OTHER   Outcome Summary Patient repositioned throughout the day. Complains of compression dressing being to tight. No new changes of complaints   Plan of Care Review   Progress no change     Goal: Individualization and Mutuality  Outcome: Ongoing (interventions implemented as appropriate)    Goal: Discharge Needs Assessment  Outcome: Ongoing (interventions implemented as appropriate)    Goal: Interprofessional Rounds/Family Conf  Outcome: Ongoing (interventions implemented as appropriate)      Problem: Fall Risk (Adult)  Goal: Identify Related Risk Factors and Signs and Symptoms  Outcome: Outcome(s) achieved Date Met: 10/15/19    Goal: Absence of Fall  Outcome: Ongoing (interventions implemented as appropriate)      Problem: Skin Injury Risk (Adult)  Goal: Identify Related Risk Factors and Signs and Symptoms  Outcome: Outcome(s) achieved Date Met: 10/15/19    Goal: Skin Health and Integrity  Outcome: Ongoing (interventions implemented as appropriate)

## 2019-10-16 LAB
ANA SER QL: NEGATIVE
AT III PPP CHRO-ACNC: 75 % (ref 75–120)
PROT C ACT/NOR PPP: 98 % (ref 70–130)

## 2019-10-16 PROCEDURE — G0515 COGNITIVE SKILLS DEVELOPMENT: HCPCS

## 2019-10-16 PROCEDURE — 97110 THERAPEUTIC EXERCISES: CPT

## 2019-10-16 PROCEDURE — 99232 SBSQ HOSP IP/OBS MODERATE 35: CPT | Performed by: INTERNAL MEDICINE

## 2019-10-16 PROCEDURE — 25010000002 CEFTRIAXONE PER 250 MG: Performed by: FAMILY MEDICINE

## 2019-10-16 RX ORDER — CEFDINIR 300 MG/1
300 CAPSULE ORAL 2 TIMES DAILY
Qty: 10 CAPSULE | Refills: 0
Start: 2019-10-16 | End: 2019-10-18 | Stop reason: SDUPTHER

## 2019-10-16 RX ORDER — ACETAMINOPHEN 650 MG
TABLET, EXTENDED RELEASE ORAL DAILY
Start: 2019-10-17 | End: 2020-02-04

## 2019-10-16 RX ORDER — FAMOTIDINE 40 MG/1
40 TABLET, FILM COATED ORAL DAILY
Start: 2019-10-17 | End: 2019-12-03

## 2019-10-16 RX ORDER — AMMONIUM LACTATE 12 G/100G
CREAM TOPICAL 2 TIMES DAILY
Start: 2019-10-16 | End: 2020-02-04

## 2019-10-16 RX ADMIN — ZINC OXIDE: 200 OINTMENT TOPICAL at 22:38

## 2019-10-16 RX ADMIN — Medication 10 ML: at 09:05

## 2019-10-16 RX ADMIN — APIXABAN 2.5 MG: 2.5 TABLET, FILM COATED ORAL at 09:05

## 2019-10-16 RX ADMIN — MICONAZOLE NITRATE: 20 POWDER TOPICAL at 22:38

## 2019-10-16 RX ADMIN — POVIDONE-IODINE: 10 SOLUTION TOPICAL at 09:10

## 2019-10-16 RX ADMIN — MICONAZOLE NITRATE: 20 POWDER TOPICAL at 09:09

## 2019-10-16 RX ADMIN — ZINC OXIDE: 200 OINTMENT TOPICAL at 09:08

## 2019-10-16 RX ADMIN — ACETAMINOPHEN 650 MG: 325 TABLET ORAL at 15:54

## 2019-10-16 RX ADMIN — Medication 10 ML: at 22:39

## 2019-10-16 RX ADMIN — ACETAMINOPHEN 650 MG: 325 TABLET ORAL at 10:59

## 2019-10-16 RX ADMIN — LISINOPRIL 40 MG: 20 TABLET ORAL at 09:05

## 2019-10-16 RX ADMIN — Medication: at 22:38

## 2019-10-16 RX ADMIN — APIXABAN 2.5 MG: 2.5 TABLET, FILM COATED ORAL at 22:38

## 2019-10-16 RX ADMIN — Medication: at 09:10

## 2019-10-16 RX ADMIN — CEFTRIAXONE SODIUM 1 G: 1 INJECTION, POWDER, FOR SOLUTION INTRAMUSCULAR; INTRAVENOUS at 15:51

## 2019-10-16 NOTE — PLAN OF CARE
Problem: Patient Care Overview  Goal: Plan of Care Review   10/16/19 045   OTHER   Outcome Summary Patient resting abed, currently appears to be asleep. SHe has been refusing her compression dressing to BLE.

## 2019-10-16 NOTE — PROGRESS NOTES
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 coli more than 100,000 colonies susceptible to all antibiotics   Echo in  5/29/19 , no PAH  Features of VIKRAM    Plan    Wait on results from hypercoaguble work to determine duration of anticoagulation  Continue Rocephin  Sleep study as outpt       LOS: 1 day     Subjective       Objective     Vital signs for last 24 hours:  Vitals:    10/15/19 1345 10/15/19 2005 10/16/19 0500 10/16/19 0905   BP: 113/62 114/63 115/66 119/65   BP Location:  Right arm     Patient Position:  Lying     Pulse: 64 64 56 59   Resp: 16 18 28    Temp: 99.8 °F (37.7 °C) 99.2 °F (37.3 °C) 98.5 °F (36.9 °C)    TempSrc:  Oral     SpO2: 90% 92% 90%    Weight:   (!) 164 kg (361 lb 8.9 oz)    Height:           Intake/Output last 3 shifts:  I/O last 3 completed shifts:  In: 240 [P.O.:240]  Out: -   Intake/Output this shift:  I/O this shift:  In: 360 [P.O.:360]  Out: -       Radiology  Imaging Results (last 24 hours)     ** No results found for the last 24 hours. **          Labs:  Results from last 7 days   Lab Units 10/14/19  0408   WBC 10*3/mm3 7.80   HEMOGLOBIN g/dL 12.3   HEMATOCRIT % 37.3   PLATELETS 10*3/mm3 197     Results from last 7 days   Lab Units 10/14/19  0407   SODIUM mmol/L 139   POTASSIUM mmol/L 4.1   CHLORIDE mmol/L 107   CO2 mmol/L 24.0   BUN mg/dL 22*   CREATININE mg/dL 0.80   CALCIUM mg/dL 8.7*   BILIRUBIN  mg/dL 0.6   ALK PHOS U/L 90   ALT (SGPT) U/L 14   AST (SGOT) U/L 16   GLUCOSE mg/dL 102*         Results from last 7 days   Lab Units 10/14/19  0407 10/13/19  1540   ALBUMIN g/dL 3.00* 3.20*     Results from last 7 days   Lab Units 10/13/19  1540   CK TOTAL U/L 35*   TROPONIN I ng/mL <0.030         Results from last 7 days   Lab Units 10/13/19  1540   MAGNESIUM mg/dL 2.0     Results from last 7 days   Lab Units 10/13/19  1540   INR  1.03   APTT seconds 27.6     Results from last 7 days   Lab Units 10/13/19  1540   TSH uIU/mL 9.170*   FREE T4 ng/dL 0.84           Meds:   SCHEDULE    ammonium lactate  Topical BID   apixaban 2.5 mg Oral Q12H   ceftriaxone 1 g Intravenous Q24H   famotidine 40 mg Oral Daily   lisinopril 40 mg Oral Q24H   magic butt paste  Topical BID   metoprolol succinate  mg Oral Daily   miconazole  Topical Q12H   povidone-iodine  Topical Daily   sodium chloride 10 mL Intravenous Q12H     Infusions     PRNs  •  acetaminophen **OR** acetaminophen **OR** acetaminophen  •  acetaminophen  •  calcium carbonate  •  influenza vaccine  •  magnesium hydroxide  •  melatonin  •  ondansetron  •  sodium chloride    Physical Exam:  Physical Exam   Constitutional: She is oriented to person, place, and time. She appears well-developed.   Cardiovascular: Normal rate.   Pulmonary/Chest: Effort normal and breath sounds normal.   Musculoskeletal: She exhibits edema.   Neurological: She is alert and oriented to person, place, and time.   Skin: Skin is warm and dry.       ROS  Review of Systems   Constitutional: Positive for activity change.   Cardiovascular: Positive for leg swelling.             Total time spent with patient: 1 hour

## 2019-10-16 NOTE — DISCHARGE SUMMARY
[facility] Medicine Services  DISCHARGE SUMMARY        Prepared For PCP:  Eric Vickers Jr., MD        Date of Admission:   10/13/2019    Date of Discharge:  10/16/2019    Length of stay:  LOS: 1 day     Reason For Admission:  Acute cystitis with hematuria  Cellulitis of both feet        Principal and Active Diagnosis During Hospital Stay:    Acute cystitis with hematuria      Arthritis of left hip    Cellulitis of both feet     Immobility syndrome -discharging to inpatient rehab     Venous stasis       Lymphedema      Non-pressure chronic ulcer of other part of right foot with fat layer exposed (CMS/HCC) - stable      Non-pressure chronic ulcer of other part of left foot with fat layer exposed (CMS/HCC) - stable      Dermatitis associated with moisture - stable       Decubitus ulcer of buttock, stage 2 (CMS/HCC) - stable - continue wound therrapy           Hospital Course:  74-year-old female with medical history significant for hypertension and previous PE.  Was admitted directly to the hospital by PCP on 10/13/2019 with multiple complaints-left hip pain, urinary incontinence and cellulitis of both feet  Patient is immobile with stasis edema and has had wound in feet for which she goes to wound care clinic.  Wound usual heals and breaks down .    CT of the pelvis done showed severe arthritis in the hips.    While inpatient, she is being treated for acute cystitis with hematuria and cellulitis of both feet.  Was initially on Rocephin will be discharged on 5 days course of Omnicef  .  Patient was evaluated by physical therapist and inpatient rehab recommended    She is hemodynamically stable and will be discharged to rehab today  .  Patient will be discharged with Santos catheter which could be removed when patient is stronger.          Recommendation for Outpatient Providers:             Reasons For Change In Medications and Indications for New Medications:          Discharge  Disposition      Rehab Facility or Unit (DC - External)       Discharge Medications      New Medications      Instructions Start Date   ammonium lactate 12 % cream  Commonly known as:  AMLACTIN   Topical, 2 Times Daily      cefdinir 300 MG capsule  Commonly known as:  OMNICEF   300 mg, Oral, 2 Times Daily      famotidine 40 MG tablet  Commonly known as:  PEPCID   40 mg, Oral, Daily   Start Date:  10/17/2019     miconazole 2 % powder  Commonly known as:  MICOTIN   Topical, Every 12 Hours Scheduled      povidone-iodine 10 % external solution  Commonly known as:  BETADINE   Topical, Daily   Start Date:  10/17/2019        Continue These Medications      Instructions Start Date   apixaban 5 MG tablet tablet  Commonly known as:  ELIQUIS   2.5 mg, Oral, 2 Times Daily      benazepril 40 MG tablet  Commonly known as:  LOTENSIN   TAKE 1 TABLET BY MOUTH DAILY      metoprolol succinate  MG 24 hr tablet  Commonly known as:  TOPROL-XL   TAKE ONE TABLET BY MOUTH EVERY DAY                   Test Results Pending at Discharge   Order Current Status    Antiphosphotidyl Antibodies Panel II In process    Factor 5 Activity In process    Lupus Anticoagulant In process    Protein S Functional In process    Blood Culture - Blood, Blood, Venous Line Preliminary result                Procedures Performed         Vital Signs    Temp:  [98.5 °F (36.9 °C)-99.2 °F (37.3 °C)] 99.1 °F (37.3 °C)  Heart Rate:  [55-64] 55  Resp:  [18-28] 20  BP: (114-119)/(63-74) 119/74    Physical Exam:    Physical Exam   Constitutional: No distress.   Morbidly obese   HENT:   Head: Normocephalic and atraumatic.   Eyes: EOM are normal. Pupils are equal, round, and reactive to light.   Neck: Neck supple.   Cardiovascular: Normal rate and regular rhythm.   Pulmonary/Chest:   Decreased air entry at the bases   Abdominal: Soft. Bowel sounds are normal.   Musculoskeletal:   Degenerative changes  Dressing to both feet intact   Skin: Skin is warm and dry.   Stasis  dermatitis   Psychiatric: She has a normal mood and affect.         Consults:   Consults     Date and Time Order Name Status Description    10/13/2019 2204 Inpatient Pulmonology Consult Completed           Pertinent Test Results:     Results from last 7 days   Lab Units 10/14/19  0408 10/13/19  1544   WBC 10*3/mm3 7.80 6.20   HEMOGLOBIN g/dL 12.3 13.0   HEMATOCRIT % 37.3 38.0   PLATELETS 10*3/mm3 197 206     Results from last 7 days   Lab Units 10/14/19  0407 10/13/19  1540   SODIUM mmol/L 139 142   POTASSIUM mmol/L 4.1 4.1   CHLORIDE mmol/L 107 108   CO2 mmol/L 24.0 26.0   BUN mg/dL 22* 25*   CREATININE mg/dL 0.80 0.80   GLUCOSE mg/dL 102* 103*   CALCIUM mg/dL 8.7* 9.2     Results from last 7 days   Lab Units 10/14/19  0407 10/13/19  1540   SODIUM mmol/L 139 142   POTASSIUM mmol/L 4.1 4.1   CHLORIDE mmol/L 107 108   CO2 mmol/L 24.0 26.0   BUN mg/dL 22* 25*   CREATININE mg/dL 0.80 0.80   CALCIUM mg/dL 8.7* 9.2   BILIRUBIN mg/dL 0.6 0.3   ALK PHOS U/L 90 94*   ALT (SGPT) U/L 14 17   AST (SGOT) U/L 16 23   GLUCOSE mg/dL 102* 103*     Results from last 7 days   Lab Units 10/13/19  1540   MAGNESIUM mg/dL 2.0     Lab Results   Component Value Date    CALCIUM 8.7 (L) 10/14/2019    PHOS 3.5 10/13/2019     No results found for: HGBA1C          Microbiology Results (last 10 days)     Procedure Component Value - Date/Time    Blood Culture - Blood, Blood, Venous Line [[id number]] Collected:  10/13/19 1540    Lab Status:  Preliminary result Specimen:  Blood, Venous Line Updated:  10/16/19 1600     Blood Culture No growth at 3 days    Urine Culture - Urine, Urine, Catheter [[id number]]  (Abnormal)  (Susceptibility) Collected:  10/13/19 1540    Lab Status:  Final result Specimen:  Urine, Catheter Updated:  10/15/19 1218     Urine Culture >100,000 CFU/mL Escherichia coli    Susceptibility      Escherichia coli     LUCIA     Ampicillin Susceptible     Ampicillin + Sulbactam Susceptible     Cefazolin Susceptible     Cefepime Susceptible      Ceftazidime Susceptible     Ceftriaxone Susceptible     Gentamicin Susceptible     Levofloxacin Susceptible     Nitrofurantoin Susceptible     Piperacillin + Tazobactam Susceptible     Tetracycline Susceptible     Trimethoprim + Sulfamethoxazole Susceptible                          ECG/EMG Results (most recent)     None          Results for orders placed during the hospital encounter of 10/13/19   Duplex Venous Lower Extremity - Bilateral CAR    Narrative · Normal bilateral lower extremity venous duplex scan.          Results for orders placed during the hospital encounter of 19   Adult Transthoracic Echo Complete W/ Cont if Necessary Per Protocol    Narrative                           Adult Echocardiogram Report          [facility]  Cardiology Department  [address] [place], [place] [id number]      Name: KAYLIE TRACEY       Study Date: 2019 05:03 PM  MRN: [id number]             Patient Location:   : 1945            Gender: Female                       Height: 71 in  Age: 73 yrs                Account#: [id number]                Weight: 325 lb  Reason For Study: PULMONARY CLINIC CONSULTATION                 BSA: 2.6 m2  Ordering Physician: FRANCISCO SERRANO  Referring Physician:  NILESH DESAI  Performed By: TARA      M-Mode/2-D Measurements:  LVIDd: 4.2 cm       (3.7-5.7) LVPWd: 0.96 cm       (0.8-1.2)  LVIDs: 2.7 cm       (2.3-3.9)  ACS: 1.5 cm         (1.6-3.7) IVSd: 0.97 cm        (0.7-1.2)  LA dimension: 3.8 cm(1.9-4.0) RVDd: 2.5 cm         (0.7-2.4)  FS: 34.9 %          (21-40%)  Ao root diam: 3.0 cm (2.0-3.7)    Comments  Comments: Technically difficult with limited acoustical windows though grossly  satisfactory for interpretation.    Findings: A trileaflet aortic valve is not well visualized though cusp  separation fears mildly decreased on M-mode with adequate coaptation.  The mitral tricuspid valves are structure normal in both demonstrate  satisfactory diastolic leaflet  excursion.  Pulmonic level not well visualized.  RV dimension systolic function within normal limits.  LV chamber size wall thickness and global contractility satisfactory with no  segmental wall motion abnormalities; LVEF 60+ percent  Normal aortic root left atrial chamber dimensions.  There is no pericardial effusion intracardiac thrombus vegetation or mass  identified.    Supplementary Doppler exam showed normal aortic mitral valve for velocity with  no significant regurgitant jets. RV systolic pressures quantitated at 19 mm  Hg.    IMPRESSIONS  SOFY WITH LIMITED ACOUSTICAL WINDOWS SHOWS NORMAL CHAMBER DIMENSIONS WITH GOOD  GLOBAL CONTRACTILITY; LVEF 60+ PERCENT.  AORTIC VALVE NOT WELL VISUALIZED THOUGH MITRAL AND TRICUSPID VALVES ARE  STRUCTURE NORMAL.  PULMONIC VALVE NOT WELL VISUALIZED.  DOPPLER EXAM SHOWS NO SIGNIFICANT ABNORMALITIES; RV SP QUANTITATED 19 MM HG.      Interpretation    MMode/2D Measurements & Calculations  ESV(Teich): 27.9 ml                     % IVS thick: 33.2 %  EF(Teich): 64.5 %                       % LVPW thick: 21.5 %                                          LVOT diam: 1.8 cm  Ao root area: 7.2 cm2  EDV(MOD-sp4): 64.1 ml  ESV(MOD-sp4): 24.7 ml  EF(MOD-sp4): 61.4 %    Doppler Measurements & Calculations  MV E max tyler: 42.3 cm/sec                 MV max P.2 mmHg  MV A max tyler: 63.2 cm/sec                 MV mean P.5 mmHg  MV E/A: 0.67                                            Ao V2 max: 111.1 cm/sec  MV dec slope: 453.9 cm/sec2               Ao max P.9 mmHg  MV dec time: 0.19 sec                     Ao V2 mean: 89.5 cm/sec                                            Ao mean PG: 3.4 mmHg                                            Ao V2 VTI: 25.4 cm                                              FEDERICO(I,D): 2.1 cm2                                            FEDERICO(V,D): 2.3 cm2  LV V1 max P.3 mmHg                    PA max P.1 mmHg  LV V1 mean P.4 mmHg  LV V1 max: 103.4  cm/sec  LV V1 mean: 73.3 cm/sec  LV V1 VTI: 22.1 cm  TR max tyler: 200.0 cm/sec  TR max P.0 mmHg  RVSP(TR): 19.0 mmHg      _______________________________________________________________________________    Electronically signed by: Cash Hardy MD  on 2019 11:48 PM         Ct Pelvis Without Contrast    Result Date: 10/13/2019   1. Severe left hip joint DJD. 2. Moderate to advanced right hip joint DJD. 3. Partially imaged porcelain gallbladder. 4. Colonic diverticula, without CT evidence of diverticulitis. 5. Nonspecific small amount of free fluid in the pelvis.  Electronically Signed By-Yg Stone On:10/13/2019 5:22 PM This report was finalized on [id number] by  Yg Stone, .    Xr Chest 1 View    Result Date: 10/13/2019   1. Limited study demonstrating subsegmental scarring in the left lung base. 2. Enlargement of the cardiac silhouette, likely accentuated by technique.  Electronically Signed By-Yg Stone On:10/13/2019 4:47 PM This report was finalized on [id number] by  Yg Stone, .    Xr Spine Lumbar 4+ View    Result Date: 10/14/2019  Limited examination secondary to patient's inability to tolerate true lateral image. Multilevel diminished of lumbar disc height with advanced L4-5 and L5-S1 facet arthropathy.  No definite acute lumbar spine fracture is seen. If patient's symptoms persist consider either repeat plain film imaging or dedicated CT lumbar spine.  Round rim calcified lesion in the right upper quadrant of the abdomen could represent gallbladder wall calcification (porcelain gallbladder), or large calcified gallstone.  Electronically Signed By-Dr. Marie Toledo MD On:10/14/2019 3:15 PM This report was finalized on [id number] by Dr. Marie Toledo MD.          Condition on Discharge:      Stable    Discharge Diet:   Diet Instructions     Diet: Cardiac      Discharge Diet:  Cardiac          Activity at Discharge:   Activity Instructions     Activity as Tolerated             Follow-up Appointments  No future appointments.  Additional Instructions for the Follow-ups that You Need to Schedule     Discharge Follow-up with PCP   As directed       Currently Documented PCP:    Eric Vickers Jr., MD    PCP Phone Number:    [phone]     Follow Up Details:  With PCP in 2 weeks                 Sukhdev Tamez MD  10/16/19  4:12 PM    Time: I spent  32 minutes on this discharge activity which included face-to-face encounter with the patient/reviewing the data in the system/coordination of the care with the nursing staff as well as consultants/documentation/entering orders.

## 2019-10-16 NOTE — THERAPY TREATMENT NOTE
"Acute Care - Occupational Therapy Treatment Note  DEAN Perez     Patient Name: Camelia Gray  : 1945  MRN: [id number]  Today's Date: 10/16/2019             Admit Date: 10/13/2019       ICD-10-CM ICD-9-CM   1. Acute cystitis with hematuria N30.01 595.0   2. Hypothyroidism, secondary E03.8 244.8   3. Frequent fecal incontinence R15.9 787.60   4. Weakness R53.1 780.79     Patient Active Problem List   Diagnosis   • Morbidly obese (CMS/HCC)   • Acute cystitis with hematuria   • Arthritis of left hip   • Deep vein thrombosis (DVT) (CMS/HCC)   • Diabetes mellitus type II, controlled (CMS/HCC)   • Edema of lower extremity   • Hypertension   • Pulmonary embolism (CMS/HCC)   • Stasis dermatitis   • Cellulitis of both feet   • Immobility syndrome   • Venous stasis   • Lymphedema   • Non-pressure chronic ulcer of other part of right foot with fat layer exposed (CMS/HCC)   • Non-pressure chronic ulcer of other part of left foot with fat layer exposed (CMS/HCC)   • Dermatitis associated with moisture   • Decubitus ulcer of buttock, stage 2 (CMS/HCC)     Past Medical History:   Diagnosis Date   • Arthritis    • DVT, lower extremity (CMS/HCC)    • Hypertension    • Obesity    • Pulmonary embolism (CMS/HCC)      Past Surgical History:   Procedure Laterality Date   • JOINT REPLACEMENT      Bilateral knees       Therapy Treatment    Rehabilitation Treatment Summary     Row Name 10/16/19 1400             Treatment Time/Intention    Discipline  occupational therapist  -      Document Type  therapy note (daily note)  -      Subjective Information  complains of;weakness;pain \"I really messed up.\"  -      Patient/Family Observations  PT is supine & tearful as she describes a kind of giving up on mobilizing herself in the face of her weight & fatigue. She states it was at this point that her body really started to deteriorate. She has good friends in the community who she is sad have moved on without her. Pt was a very active " member of the local choral/arts/theater community. She is now very immobile. Writer is unable to make a medical diagnosis but her symptoms present like an immobility syndrome. Pt was educated regarding pain, arthritis, and movement. Pt states she refuses to take pain medication more then Tylenol but she is crying out in pain with ROM in the bed. Pt has good strength & function in the RUE but otherwise is very limited. OT stretches her Lt shoulder & had her move her arms legs & hips in the bed for about 10 minutes w/ multiple rest breaks. Pt was teearful but participated well. She is currently unable to tolerate sitting up EOB. She was recently walking and participating in many more aspects of life. She will need IP rehab at d/c.   -      Patient Effort  good  -MH      Recorded by [] Katlyn Kevin OT 10/16/19 1509      Row Name 10/16/19 1400             Cognitive Assessment/Intervention- PT/OT    Orientation Status (Cognition)  oriented x 4  -MH      Recorded by [] Kaltyn Kevin OT 10/16/19 1509      Row Name 10/16/19 1400             Cognitive Assessment Intervention- SLP    Reasoning (Cognitive)  mod-complex;mild impairment  -MH      Recorded by [] Katlyn Kevin OT 10/16/19 1509      Row Name 10/16/19 1400             Safety Issues, Functional Mobility    Impairments Affecting Function (Mobility)  endurance/activity tolerance;pain;range of motion (ROM);strength cellulitis & edema as well as obesity  -MH      Recorded by [] Katlyn Kevin OT 10/16/19 1509      Row Name 10/16/19 1400             Bed Mobility Assessment/Treatment    Bed Mobility Assessment/Treatment  rolling left;rolling right  -      [place] Level (Bed Mobility)  maximum assist (25% patient effort);2 person assist  -MH      Recorded by [] Katlyn Kevin OT 10/16/19 1509      Row Name 10/16/19 1400             General ROM    GENERAL ROM COMMENTS  extreeme sensitivity in lateral capsule on Lt during shld ABD & flexion.  -       Recorded by [] Katlyn Kevin, OT 10/16/19 1509      Row Name 10/16/19 1400             Positioning and Restraints    Pre-Treatment Position  in bed  -      Post Treatment Position  bed  -      In Bed  notified nsg;call light within reach;encouraged to call for assist;exit alarm on  -      Recorded by [] Katlyn eKvin, OT 10/16/19 1509      Row Name 10/16/19 1400             Pain Scale: Numbers Pre/Post-Treatment    Pain Scale: Numbers, Pretreatment  3/10  -      Pain Scale: Numbers, Post-Treatment  8/10  -      Pain Location - Orientation  -- Lt shld, Lt hip  -MH      Recorded by [] Katlyn Kevin, OT 10/16/19 1509      Row Name                Wound 10/13/19 1900 perineum Pressure Injury    Wound - Properties Group Date first assessed: 10/13/19 [JA] Time first assessed: 1900 [JA] Present on Hospital Admission: Y [JA] Location: perineum [JA] Primary Wound Type: Pressure inj [JA] Stage, Pressure Injury: Stage 2 [JA] Recorded by:  [JA] Edel Mendez LPN 10/13/19 2244    Row Name                Wound 10/13/19 1900 Right lower gluteal Pressure Injury    Wound - Properties Group Date first assessed: 10/13/19 [JA] Time first assessed: 1900 [JA] Present on Hospital Admission: Y [JA2] Side: Right [JA] Orientation: lower [JA] Location: gluteal [JA] Primary Wound Type: Pressure inj [JA] Stage, Pressure Injury: Stage 2 [JA] Recorded by:  [JA] Edel Mendez LPN 10/13/19 2246 [JA2] Edel Mendez LPN 10/13/19 2308    Row Name                Wound 10/13/19 1900 Bilateral gluteal    Wound - Properties Group Date first assessed: 10/13/19 [JA] Time first assessed: 1900 [JA] Present on Hospital Admission: Y [JA2] Side: Bilateral [JA] Location: gluteal [JA] Stage, Pressure Injury: unstageable [JA] Recorded by:  [JA] Edel Mendez LPN 10/13/19 2247 [JA2] Edel Mendez LPN 10/13/19 2308    Row Name                Wound 10/13/19 1900 Right upper toe Other (comment)    Wound - Properties Group Date first  assessed: 10/13/19 [JA] Time first assessed: 1900 [JA] Present on Hospital Admission: Y [JA2] Side: Right [JA] Orientation: upper [JA3] Location: toe [JA] Primary Wound Type: Other [JA] Additional Comments: wounds from lymphedema [JA] Recorded by:  [JA] Edel Mendez LPN 10/13/19 2303 [JA2] Edel Mendez LPN 10/13/19 2309 [JA3] Edel Mendez LPN 10/13/19 2304    Row Name                Wound 10/13/19 1900 Left upper toe Other (comment)    Wound - Properties Group Date first assessed: 10/13/19 [JA] Time first assessed: 1900 [JA] Present on Hospital Admission: Y [JA2] Side: Left [JA] Orientation: upper [JA] Location: toe [JA] Primary Wound Type: Other [JA] Additional Comments: wound from lymphedema [JA] Recorded by:  [JA] Edel Mendez LPN 10/13/19 2305 [JA2] Edel Mendez LPN 10/13/19 2309    Row Name                Wound 10/14/19 1512 Right anterior toe Venous Ulcer    Wound - Properties Group Date first assessed: 10/14/19 [AS] Time first assessed: 1512 [AS] Side: Right [AS] Orientation: anterior [AS] Location: toe [AS] Primary Wound Type: Venous ulcer [AS] Recorded by:  [AS] Dion Reed LPN 10/14/19 1512    Row Name                Wound 10/15/19 2026 Right anterior hip Other (comment)    Wound - Properties Group Date first assessed: 10/15/19 [JA] Time first assessed: 2026 [JA] Side: Right [JA] Orientation: anterior [JA] Location: hip [JA] Primary Wound Type: Other [JA] Additional Comments: open area in skin fold above right hip [JA] Recorded by:  [JA] Edel Mendez LPN 10/15/19 2327    Row Name 10/16/19 1400             Coping    Observed Emotional State  pleasant;sad;tearful/crying;cooperative  -      Verbalized Emotional State  grief  -      Recorded by [MH] Katlyn Kevin OT 10/16/19 1509      Row Name 10/16/19 1400             Plan of Care Review    Plan of Care Reviewed With  patient  -MH      Recorded by [MH] Katlyn Kevin, OT 10/16/19 1509      Row Name 10/16/19 1400              Outcome Summary/Treatment Plan (OT)    Anticipated Discharge Disposition (OT)  inpatient rehabilitation facility  -      Recorded by [] Katlyn Kevin, OT 10/16/19 1509        User Key  (r) = Recorded By, (t) = Taken By, (c) = Cosigned By    Initials Name Effective Dates Discipline     Katlyn Keivn, OT 03/01/19 -  OT    Edel Mckenna LPN 06/24/19 -  Nurse    AS Dion Reed LPN 06/24/19 -  Nurse        Wound 10/13/19 1900 perineum Pressure Injury (Active)   Dressing Appearance open to air 10/16/2019  7:30 AM   Dressing Care, Wound dressing changed;elastic bandage 10/16/2019  2:00 PM       Wound 10/13/19 1900 Right lower gluteal Pressure Injury (Active)   Dressing Appearance open to air 10/16/2019  7:30 AM       Wound 10/13/19 1900 Bilateral gluteal (Active)   Dressing Appearance open to air 10/16/2019  7:30 AM       Wound 10/13/19 1900 Right upper toe Other (comment) (Active)   Dressing Appearance open to air 10/16/2019  7:30 AM       Wound 10/13/19 1900 Left upper toe Other (comment) (Active)   Dressing Appearance open to air 10/16/2019  7:30 AM       Wound 10/14/19 1512 Right anterior toe Venous Ulcer (Active)   Dressing Appearance open to air 10/16/2019  7:30 AM       Wound 10/15/19 2026 Right anterior hip Other (comment) (Active)   Dressing Appearance open to air 10/16/2019  7:30 AM       Occupational Therapy Education     Title: PT OT SLP Therapies (In Progress)     Topic: Occupational Therapy (Done)     Point: ADL training (Done)     Description: Instruct learner(s) on proper safety adaptation and remediation techniques during self care or transfers.   Instruct in proper use of assistive devices.    Learning Progress Summary           Patient Acceptance, E,TB, VU by JORDI at 10/14/2019 11:20 AM                   Point: Home exercise program (Done)     Description: Instruct learner(s) on appropriate technique for monitoring, assisting and/or progressing therapeutic exercises/activities.     Learning Progress Summary           Patient Acceptance, E,TB, VU by  at 10/14/2019 11:20 AM                   Point: Precautions (Done)     Description: Instruct learner(s) on prescribed precautions during self-care and functional transfers.    Learning Progress Summary           Patient Acceptance, E,TB, VU by  at 10/14/2019 11:20 AM                   Point: Body mechanics (Done)     Description: Instruct learner(s) on proper positioning and spine alignment during self-care, functional mobility activities and/or exercises.    Learning Progress Summary           Patient Acceptance, E,TB, VU by  at 10/14/2019 11:20 AM                               User Key     Initials Effective Dates Name Provider Type Discipline     07/25/19 -  Patricia Lundberg, ROSENDO Occupational Therapist OT                OT Recommendation and Plan  Outcome Summary/Treatment Plan (OT)  Anticipated Discharge Disposition (OT): inpatient rehabilitation facility  Plan of Care Review  Plan of Care Reviewed With: patient  Plan of Care Reviewed With: patient  Outcome Summary: Pt presents with profound immobility & pain during movement. She may benefit from pain medication to help her mobilize, but she refuses this. She is bed-bound though until very recently was (I) though with a premorbid low activity tolerance & obesity which limited her overall function. She will benefit from IP rehab and if her pain can be controlled she has a good chance of regaining her mobility & function. Would recommend Psych eval for possible major depressive episode and immobility syndrome and acute level IP rehab.        Time Calculation:   Time Calculation- OT     Row Name 10/16/19 1512             Time Calculation-     OT Start Time  1412  -      OT Stop Time  1438  -      OT Time Calculation (min)  26 min  -      Total Timed Code Minutes- OT  26 minute(s)  -      OT Received On  10/16/19  -      OT - Next Appointment  10/18/19  -        User Key   (r) = Recorded By, (t) = Taken By, (c) = Cosigned By    Initials Name Provider Type     Katlyn Kevin OT Occupational Therapist        Therapy Charges for Today     Code Description Service Date Service Provider Modifiers Qty    [id number]  OT DEV OF COGN SKILLS EACH 15 MIN 10/16/2019 Katlyn Kevin OT  1    [id number]  OT THER PROC EA 15 MIN 10/16/2019 Katlyn Kevin OT GO 1               Katlyn Kevin OT  10/16/2019

## 2019-10-16 NOTE — PLAN OF CARE
Problem: Patient Care Overview  Goal: Plan of Care Review  Outcome: Ongoing (interventions implemented as appropriate)   10/16/19 3415   Coping/Psychosocial   Plan of Care Reviewed With patient   OTHER   Outcome Summary Pt presents with profound immobility & pain during movement. She may benefit from pain medication to help her mobilize, but she refuses this. She is bed-bound though until very recently was (I) though with a premorbid low activity tolerance & obesity which also somewhat limited her overall function. She will benefit from IP rehab and if her pain can be controlled she has a good chance of regaining her mobility & function. Would recommend Psych eval for possible major depressive episode and immobility syndrome and acute level IP rehab.    Plan of Care Review   Progress no change

## 2019-10-17 LAB
DRVVT IMM 1:2 NP PPP: 40 SEC (ref 0–47)
FACT V ACT/NOR PPP: 123 % (ref 70–150)
LA NT DPL PPP: 49.3 SEC (ref 0–55)
LA NT DPL/LA NT HPL PPP-RTO: 1.09 RATIO (ref 0–1.4)
LA NT PLATELET PPP: 43 SEC (ref 0–51.9)
LUPUS ANTICOAGULANT REFLEX: ABNORMAL
PROTEIN S-FUNCTIONAL: 85 % (ref 63–140)
SCREEN DRVVT: 49.3 SEC (ref 0–47)
THROMBIN TIME: 18 SEC (ref 0–23)

## 2019-10-17 PROCEDURE — 99232 SBSQ HOSP IP/OBS MODERATE 35: CPT | Performed by: INTERNAL MEDICINE

## 2019-10-17 PROCEDURE — 97110 THERAPEUTIC EXERCISES: CPT

## 2019-10-17 PROCEDURE — 25010000002 CEFTRIAXONE PER 250 MG: Performed by: FAMILY MEDICINE

## 2019-10-17 RX ADMIN — Medication 10 ML: at 20:49

## 2019-10-17 RX ADMIN — APIXABAN 2.5 MG: 2.5 TABLET, FILM COATED ORAL at 20:48

## 2019-10-17 RX ADMIN — LISINOPRIL 40 MG: 20 TABLET ORAL at 08:11

## 2019-10-17 RX ADMIN — Medication: at 08:12

## 2019-10-17 RX ADMIN — ZINC OXIDE: 200 OINTMENT TOPICAL at 08:12

## 2019-10-17 RX ADMIN — POVIDONE-IODINE: 10 SOLUTION TOPICAL at 08:15

## 2019-10-17 RX ADMIN — Medication: at 20:49

## 2019-10-17 RX ADMIN — ACETAMINOPHEN 650 MG: 325 TABLET ORAL at 14:44

## 2019-10-17 RX ADMIN — MICONAZOLE NITRATE: 20 POWDER TOPICAL at 08:13

## 2019-10-17 RX ADMIN — Medication 10 ML: at 08:12

## 2019-10-17 RX ADMIN — MICONAZOLE NITRATE: 20 POWDER TOPICAL at 20:49

## 2019-10-17 RX ADMIN — ACETAMINOPHEN 650 MG: 325 TABLET ORAL at 05:34

## 2019-10-17 RX ADMIN — ZINC OXIDE: 200 OINTMENT TOPICAL at 20:49

## 2019-10-17 RX ADMIN — CEFTRIAXONE SODIUM 1 G: 1 INJECTION, POWDER, FOR SOLUTION INTRAMUSCULAR; INTRAVENOUS at 17:44

## 2019-10-17 RX ADMIN — APIXABAN 2.5 MG: 2.5 TABLET, FILM COATED ORAL at 08:11

## 2019-10-17 RX ADMIN — METOPROLOL SUCCINATE 100 MG: 50 TABLET, EXTENDED RELEASE ORAL at 08:11

## 2019-10-17 NOTE — PROGRESS NOTES
[facility] Medicine Services Daily Progress Note      Hospitalist Team  LOS 2 days      Patient Care Team:  Eric Vickers Jr., MD as PCP - General  Suma Patel APRN as PCP - Claims Attributed  Eric Vickers Jr., MD as PCP - Family Medicine        Chief Complaint / Subjective  Chief Complaint   Patient presents with   • Hip Pain       Brief Synopsis of Hospital Course/HPI    74-year-old female with medical history significant for hypertension and previous PE.  Was admitted directly to the hospital by PCP on 10/13/2019 with multiple complaints-left hip pain, urinary incontinence and cellulitis of both feet  Patient is immobile with stasis edema and has had wound in feet for which she goes to wound care clinic.  Wound usual heals and breaks down .     CT of the pelvis done showed severe arthritis in the hips.     While inpatient, she is being treated for acute cystitis with hematuria and cellulitis of both feet.  Was initially on 5 days course of Omnicef to complete antibiotics course.  Patient was evaluated by physical therapist and inpatient rehab recommended               Review of Systems   Constitution: Positive for malaise/fatigue. Negative for chills and fever.   HENT: Negative for congestion.    Eyes: Negative for blurred vision.   Cardiovascular: Negative for chest pain.   Musculoskeletal: Positive for arthritis.   Gastrointestinal: Negative for nausea and vomiting.   Neurological: Negative for focal weakness.   All other systems reviewed and are negative.      Family History   Problem Relation Age of Onset   • Heart disease Mother    • Hypertension Mother    • Heart disease Father    • Hypertension Father    • Kidney disease Father    • COPD Father        Past Medical History:   Diagnosis Date   • Arthritis    • DVT, lower extremity (CMS/HCC)    • Hypertension    • Obesity    • Pulmonary embolism (CMS/HCC)        Social History     Socioeconomic History   • Marital status:  "Single     Spouse name: Not on file   • Number of children: Not on file   • Years of education: Not on file   • Highest education level: Not on file   Tobacco Use   • Smoking status: Never Smoker   • Smokeless tobacco: Never Used   Substance and Sexual Activity   • Alcohol use: No     Frequency: Never   • Drug use: No   • Sexual activity: Defer           Objective      Vital Signs  Temp:  [99 °F (37.2 °C)-99.1 °F (37.3 °C)] 99 °F (37.2 °C)  Heart Rate:  [55-63] 63  Resp:  [20-21] 21  BP: (117-119)/(74) 117/74  Oxygen Therapy  SpO2: 90 %  Pulse Oximetry Type: Intermittent  Device (Oxygen Therapy): room air  Flowsheet Rows      First Filed Value   Admission Height  182.9 cm (72\") Documented at 10/13/2019 1504   Admission Weight  151 kg (332 lb 14.3 oz)  (Abnormal)  Documented at 10/13/2019 1505        Intake & Output (last 3 days)       10/14 0701 - 10/15 0700 10/15 0701 - 10/16 0700 10/16 0701 - 10/17 0700 10/17 0701 - 10/18 0700    P.O. 480 240 360     Total Intake(mL/kg) 480 (2.9) 240 (1.5) 360 (2.2)     Urine (mL/kg/hr) 750 (0.2)  1450 (0.4)     Total Output 750  1450     Net -270 +240 -1090                 Lines, Drains & Airways    Active LDAs     Name:   Placement date:   Placement time:   Site:   Days:    Peripheral IV 10/13/19 1744 Right Antecubital   10/13/19    1744    Antecubital   3    Urethral Catheter   10/13/19    2310     3                  Physical Exam:   Physical Exam      Constitutional: No distress.   Morbidly obese   HENT:   Head: Normocephalic and atraumatic.   Eyes: EOM are normal. Pupils are equal, round, and reactive to light.   Neck: Neck supple.   Cardiovascular: Normal rate and regular rhythm.   Pulmonary/Chest:   Decreased air entry at the bases   Abdominal: Soft. Bowel sounds are normal.   Musculoskeletal:   Degenerative changes  Dressing to both feet intact   Skin: Skin is warm and dry.   Stasis dermatitis   Psychiatric: She has a normal mood and affect. "     Procedures:              Results Review:     I reviewed the patient's new clinical results.      Results from last 7 days   Lab Units 10/14/19  0408 10/13/19  1544   WBC 10*3/mm3 7.80 6.20   HEMOGLOBIN g/dL 12.3 13.0   HEMATOCRIT % 37.3 38.0   PLATELETS 10*3/mm3 197 206     Results from last 7 days   Lab Units 10/14/19  0407 10/13/19  1540   SODIUM mmol/L 139 142   POTASSIUM mmol/L 4.1 4.1   CHLORIDE mmol/L 107 108   CO2 mmol/L 24.0 26.0   BUN mg/dL 22* 25*   CREATININE mg/dL 0.80 0.80   CALCIUM mg/dL 8.7* 9.2   BILIRUBIN mg/dL 0.6 0.3   ALK PHOS U/L 90 94*   ALT (SGPT) U/L 14 17   AST (SGOT) U/L 16 23   GLUCOSE mg/dL 102* 103*     Results from last 7 days   Lab Units 10/13/19  1540   MAGNESIUM mg/dL 2.0     Lab Results   Component Value Date    CALCIUM 8.7 (L) 10/14/2019    PHOS 3.5 10/13/2019     No results found for: HGBA1C  Results from last 7 days   Lab Units 10/13/19  1540   INR  1.03               Microbiology Results (last 10 days)     Procedure Component Value - Date/Time    Blood Culture - Blood, Blood, Venous Line [[id number]] Collected:  10/13/19 1540    Lab Status:  Preliminary result Specimen:  Blood, Venous Line Updated:  10/16/19 1600     Blood Culture No growth at 3 days    Urine Culture - Urine, Urine, Catheter [[id number]]  (Abnormal)  (Susceptibility) Collected:  10/13/19 1540    Lab Status:  Final result Specimen:  Urine, Catheter Updated:  10/15/19 1218     Urine Culture >100,000 CFU/mL Escherichia coli    Susceptibility      Escherichia coli     LUCIA     Ampicillin Susceptible     Ampicillin + Sulbactam Susceptible     Cefazolin Susceptible     Cefepime Susceptible     Ceftazidime Susceptible     Ceftriaxone Susceptible     Gentamicin Susceptible     Levofloxacin Susceptible     Nitrofurantoin Susceptible     Piperacillin + Tazobactam Susceptible     Tetracycline Susceptible     Trimethoprim + Sulfamethoxazole Susceptible                          ECG/EMG Results (most recent)     None           Results for orders placed during the hospital encounter of 10/13/19   Duplex Venous Lower Extremity - Bilateral CAR    Narrative · Normal bilateral lower extremity venous duplex scan.          Results for orders placed during the hospital encounter of 19   Adult Transthoracic Echo Complete W/ Cont if Necessary Per Protocol    Narrative                           Adult Echocardiogram Report          [facility]  Cardiology Department  [address] [place] [address]  [id number]      Name: KAYLIE TRACEY       Study Date: 2019 05:03 PM  MRN: [id number]             Patient Location:   : 1945            Gender: Female                       Height: 71 in  Age: 73 yrs                Account#: [id number]                Weight: 325 lb  Reason For Study: PULMONARY CLINIC CONSULTATION                 BSA: 2.6 m2  Ordering Physician: FRANCISCO SERRANO  Referring Physician:  NILESH DESAI  Performed By: TARA      M-Mode/2-D Measurements:  LVIDd: 4.2 cm       (3.7-5.7) LVPWd: 0.96 cm       (0.8-1.2)  LVIDs: 2.7 cm       (2.3-3.9)  ACS: 1.5 cm         (1.6-3.7) IVSd: 0.97 cm        (0.7-1.2)  LA dimension: 3.8 cm(1.9-4.0) RVDd: 2.5 cm         (0.7-2.4)  FS: 34.9 %          (21-40%)  Ao root diam: 3.0 cm (2.0-3.7)    Comments  Comments: Technically difficult with limited acoustical windows though grossly  satisfactory for interpretation.    Findings: A trileaflet aortic valve is not well visualized though cusp  separation fears mildly decreased on M-mode with adequate coaptation.  The mitral tricuspid valves are structure normal in both demonstrate  satisfactory diastolic leaflet excursion.  Pulmonic level not well visualized.  RV dimension systolic function within normal limits.  LV chamber size wall thickness and global contractility satisfactory with no  segmental wall motion abnormalities; LVEF 60+ percent  Normal aortic root left atrial chamber dimensions.  There is no pericardial effusion intracardiac  thrombus vegetation or mass  identified.    Supplementary Doppler exam showed normal aortic mitral valve for velocity with  no significant regurgitant jets. RV systolic pressures quantitated at 19 mm  Hg.    IMPRESSIONS  SOFY WITH LIMITED ACOUSTICAL WINDOWS SHOWS NORMAL CHAMBER DIMENSIONS WITH GOOD  GLOBAL CONTRACTILITY; LVEF 60+ PERCENT.  AORTIC VALVE NOT WELL VISUALIZED THOUGH MITRAL AND TRICUSPID VALVES ARE  STRUCTURE NORMAL.  PULMONIC VALVE NOT WELL VISUALIZED.  DOPPLER EXAM SHOWS NO SIGNIFICANT ABNORMALITIES; RV SP QUANTITATED 19 MM HG.      Interpretation    MMode/2D Measurements & Calculations  ESV(Teich): 27.9 ml                     % IVS thick: 33.2 %  EF(Teich): 64.5 %                       % LVPW thick: 21.5 %                                          LVOT diam: 1.8 cm  Ao root area: 7.2 cm2  EDV(MOD-sp4): 64.1 ml  ESV(MOD-sp4): 24.7 ml  EF(MOD-sp4): 61.4 %    Doppler Measurements & Calculations  MV E max tyler: 42.3 cm/sec                 MV max P.2 mmHg  MV A max tyler: 63.2 cm/sec                 MV mean P.5 mmHg  MV E/A: 0.67                                            Ao V2 max: 111.1 cm/sec  MV dec slope: 453.9 cm/sec2               Ao max P.9 mmHg  MV dec time: 0.19 sec                     Ao V2 mean: 89.5 cm/sec                                            Ao mean PG: 3.4 mmHg                                            Ao V2 VTI: 25.4 cm                                              FEDERICO(I,D): 2.1 cm2                                            FEDERICO(V,D): 2.3 cm2  LV V1 max P.3 mmHg                    PA max P.1 mmHg  LV V1 mean P.4 mmHg  LV V1 max: 103.4 cm/sec  LV V1 mean: 73.3 cm/sec  LV V1 VTI: 22.1 cm  TR max tyler: 200.0 cm/sec  TR max P.0 mmHg  RVSP(TR): 19.0 mmHg      _______________________________________________________________________________    Electronically signed by: Cash Haryd MD  on 2019 11:48 PM         Ct Pelvis Without Contrast    Result Date:  10/13/2019   1. Severe left hip joint DJD. 2. Moderate to advanced right hip joint DJD. 3. Partially imaged porcelain gallbladder. 4. Colonic diverticula, without CT evidence of diverticulitis. 5. Nonspecific small amount of free fluid in the pelvis.  Electronically Signed By-Yg Stone On:10/13/2019 5:22 PM This report was finalized on [id number] by  Yg Stone, .    Xr Chest 1 View    Result Date: 10/13/2019   1. Limited study demonstrating subsegmental scarring in the left lung base. 2. Enlargement of the cardiac silhouette, likely accentuated by technique.  Electronically Signed By-Yg Stone On:10/13/2019 4:47 PM This report was finalized on [id number] by  Yg Stone, .    Xr Spine Lumbar 4+ View    Result Date: 10/14/2019  Limited examination secondary to patient's inability to tolerate true lateral image. Multilevel diminished of lumbar disc height with advanced L4-5 and L5-S1 facet arthropathy.  No definite acute lumbar spine fracture is seen. If patient's symptoms persist consider either repeat plain film imaging or dedicated CT lumbar spine.  Round rim calcified lesion in the right upper quadrant of the abdomen could represent gallbladder wall calcification (porcelain gallbladder), or large calcified gallstone.  Electronically Signed By-Dr. Marie Toledo MD On:10/14/2019 3:15 PM This report was finalized on [id number] by Dr. Marie Toledo MD.      Xrays, labs reviewed personally by physician.    Medication Review:   I have reviewed the patient's current medication list      Scheduled Meds    ammonium lactate  Topical BID   apixaban 2.5 mg Oral Q12H   ceftriaxone 1 g Intravenous Q24H   famotidine 40 mg Oral Daily   lisinopril 40 mg Oral Q24H   magic butt paste  Topical BID   metoprolol succinate  mg Oral Daily   miconazole  Topical Q12H   povidone-iodine  Topical Daily   sodium chloride 10 mL Intravenous Q12H       Meds Infusions       Meds PRN  •  acetaminophen **OR** acetaminophen  **OR** acetaminophen  •  acetaminophen  •  calcium carbonate  •  influenza vaccine  •  magnesium hydroxide  •  melatonin  •  ondansetron  •  sodium chloride        Assessment / Plan    Acute cystitis with hematuria      Arthritis of left hip     Cellulitis of both feet     Immobility syndrome -discharging to inpatient rehab      Venous stasis       Lymphedema      Non-pressure chronic ulcer of other part of right foot with fat layer exposed (CMS/HCC) - stable      Non-pressure chronic ulcer of other part of left foot with fat layer exposed (CMS/HCC) - stable      Dermatitis associated with moisture - stable       Decubitus ulcer of buttock, stage 2 (CMS/HCC) - stable - continue wound therrapy    Plan    Continue on antibiotics  PT following  Awaiting rehab placement    Active Hospital Problems    Diagnosis  POA   • Venous stasis [I87.8]  Yes   • Lymphedema [I89.0]  Unknown   • Non-pressure chronic 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           Sukhdev Tamez MD  10/17/19  9:42 AM

## 2019-10-17 NOTE — PROGRESS NOTES
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 coli more than 100,000 colonies susceptible to all antibiotics   Echo in  5/29/19 , no PAH  Features of VIKRAM    Plan    Although normal hypercoaguble work up, however would recommend anticoagulation for life  Continue Rocephin  Sleep study as outpt       LOS: 2 days     Subjective       Objective     Vital signs for last 24 hours:  Vitals:    10/16/19 0905 10/16/19 1444 10/16/19 2106 10/17/19 1535   BP: 119/65 119/74 117/74 129/70   BP Location:  Right arm Right arm Left arm   Patient Position:  Lying Lying Lying   Pulse: 59 55 63 56   Resp:  20 21 18   Temp:  99.1 °F (37.3 °C) 99 °F (37.2 °C) 98.6 °F (37 °C)   TempSrc:  Oral Oral Oral   SpO2:  93% 90% 92%   Weight:       Height:           Intake/Output last 3 shifts:  I/O last 3 completed shifts:  In: 360 [P.O.:360]  Out: 1450 [Urine:1450]  Intake/Output this shift:  I/O this shift:  In: 480 [P.O.:480]  Out: 1650 [Urine:1650]      Radiology  Imaging Results (last 24 hours)     ** No results found for the last 24 hours. **          Labs:  Results from last 7 days   Lab Units 10/14/19  0408   WBC 10*3/mm3 7.80   HEMOGLOBIN g/dL 12.3   HEMATOCRIT % 37.3   PLATELETS 10*3/mm3 197     Results from last 7 days   Lab Units 10/14/19  0407   SODIUM mmol/L 139   POTASSIUM mmol/L 4.1   CHLORIDE mmol/L 107   CO2 mmol/L 24.0   BUN mg/dL 22*   CREATININE mg/dL  0.80   CALCIUM mg/dL 8.7*   BILIRUBIN mg/dL 0.6   ALK PHOS U/L 90   ALT (SGPT) U/L 14   AST (SGOT) U/L 16   GLUCOSE mg/dL 102*         Results from last 7 days   Lab Units 10/14/19  0407 10/13/19  1540   ALBUMIN g/dL 3.00* 3.20*     Results from last 7 days   Lab Units 10/13/19  1540   CK TOTAL U/L 35*   TROPONIN I ng/mL <0.030     Results from last 7 days   Lab Units 10/14/19  1753   EDUIN  Negative     Results from last 7 days   Lab Units 10/13/19  1540   MAGNESIUM mg/dL 2.0     Results from last 7 days   Lab Units 10/13/19  1540   INR  1.03   APTT seconds 27.6     Results from last 7 days   Lab Units 10/13/19  1540   TSH uIU/mL 9.170*   FREE T4 ng/dL 0.84           Meds:   SCHEDULE    ammonium lactate  Topical BID   apixaban 2.5 mg Oral Q12H   ceftriaxone 1 g Intravenous Q24H   famotidine 40 mg Oral Daily   lisinopril 40 mg Oral Q24H   magic butt paste  Topical BID   metoprolol succinate  mg Oral Daily   miconazole  Topical Q12H   povidone-iodine  Topical Daily   sodium chloride 10 mL Intravenous Q12H     Infusions     PRNs  •  acetaminophen **OR** acetaminophen **OR** acetaminophen  •  acetaminophen  •  calcium carbonate  •  influenza vaccine  •  magnesium hydroxide  •  melatonin  •  ondansetron  •  sodium chloride    Physical Exam:  Physical Exam   Constitutional: She is oriented to person, place, and time. She appears well-developed.   Cardiovascular: Normal rate.   Pulmonary/Chest: Effort normal and breath sounds normal.   Musculoskeletal: She exhibits edema.   Neurological: She is alert and oriented to person, place, and time.   Skin: Skin is warm and dry.       ROS  Review of Systems   Constitutional: Positive for activity change.   Cardiovascular: Positive for leg swelling.             Total time spent with patient: 1 hour

## 2019-10-17 NOTE — PROGRESS NOTES
Continued Stay Note   Chris     Patient Name: Camelia Gray  MRN: [id number]  Today's Date: 10/17/2019    Admit Date: 10/13/2019    Discharge Plan     Row Name 10/17/19 1437       Plan    Plan  [facility] accepted, PASRR approved, no precert needed.     Patient/Family in Agreement with Plan  yes    Plan Comments  Notified Dr. Tamez  that patient is accepted to [facility] as of tomorrow, 10/18, when medically ready for inpatient. Patient is current with [facility], liaison Olivia notified that ESPINOZA orders placed. Patient currently on IV antibiotics.             Expected Discharge Date and Time     Expected Discharge Date Expected Discharge Time    Oct 18, 2019           Viki Epstein  [phone]

## 2019-10-17 NOTE — PLAN OF CARE
Problem: Patient Care Overview  Goal: Plan of Care Review   10/17/19 4845   OTHER   Outcome Summary Patient resting abed, complaints of pain with movement and does not want anyone to touch her legs. At this time she has not wanted any tylenol.

## 2019-10-17 NOTE — THERAPY TREATMENT NOTE
Patient Name: Camelia Gray  : 1945    MRN: [id number]                              Today's Date: 10/17/2019       Admit Date: 10/13/2019    Visit Dx:     ICD-10-CM ICD-9-CM   1. Acute cystitis with hematuria N30.01 595.0   2. Hypothyroidism, secondary E03.8 244.8   3. Frequent fecal incontinence R15.9 787.60   4. 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 layer exposed (CMS/HCC)   • Non-pressure chronic ulcer of other part of left foot with fat layer exposed (CMS/[facility])   • Dermatitis associated with moisture   • Decubitus ulcer of buttock, stage 2 (CMS/[facility])     Past Medical History:   Diagnosis Date   • Arthritis    • DVT, lower extremity (CMS/[facility])    • Hypertension    • Obesity    • Pulmonary embolism (CMS/[facility])      Past Surgical History:   Procedure Laterality Date   • JOINT REPLACEMENT      Bilateral knees     General Information     Row Name 10/17/19 1726          PT Evaluation Time/Intention    Document Type  therapy note (daily note)  -SC     Mode of Treatment  individual therapy;physical therapy  -SC     Row Name 10/17/19 1726          General Information    Existing Precautions/Restrictions  other (see comments) wounds  -SC     Row Name 10/17/19 1726          Cognitive Assessment/Intervention- PT/OT    Orientation Status (Cognition)  oriented x 4  -SC     Row Name 10/17/19 1726          Safety Issues, Functional Mobility    Impairments Affecting Function (Mobility)  endurance/activity tolerance;pain;range of motion (ROM);strength  -SC       User Key   (r) = Recorded By, (t) = Taken By, (c) = Cosigned By    Initials Name Provider Type    Jessika Pérez PTA Physical Therapy Assistant        Mobility    No documentation.       Obj/Interventions     Row Name 10/17/19 1727          Therapeutic Exercise    Lower Extremity (Therapeutic Exercise)  quad sets, bilateral;heel slides, bilateral;SAQ (short arc quad), right  -SC     Lower Extremity Range of Motion (Therapeutic Exercise)  hip abduction/adduction, bilateral;ankle dorsiflexion/plantar flexion, bilateral  -SC     Exercise Type (Therapeutic Exercise)  AAROM (active assistive range of motion)  -SC     Position (Therapeutic Exercise)  supine  -SC     Sets/Reps (Therapeutic Exercise)  1/10,  slow assisted exercises, very painful rom to lle  forest left hip  -SC       User Key  (r) = Recorded By, (t) = Taken By, (c) = Cosigned By    Initials Name Provider Type    Jessika Pérez PTA Physical Therapy Assistant        Goals/Plan    No documentation.       Clinical Impression     Row Name 10/17/19 1729          Pain Scale: Numbers Pre/Post-Treatment    Pain Scale: Numbers, Pretreatment  2/10  -SC     Pain Scale: Numbers, Post-Treatment  5/10  -SC     Pain Location - Side  Left  -SC     Pain Location  hip  -SC     Pain Intervention(s)  Repositioned;Rest  -SC     Row Name 10/17/19 1729          Vital Signs    O2 Delivery Pre Treatment  room air  -[facility] Name 10/17/19 1729          Positioning and Restraints    Pre-Treatment Position  in bed  -SC     Post Treatment Position  bed  -SC     In Bed  supine;LLE elevated;call light within reach;RUE elevated;LUE elevated  -SC       User Key  (r) = Recorded By, (t) = Taken By, (c) = Cosigned By    Initials Name Provider Type    Jessika Pérez PTA Physical Therapy Assistant        Outcome Measures    No documentation.       Physical Therapy Education     Title: PT OT SLP Therapies (Done)     Topic: Physical Therapy (Done)     Point: Mobility training (Done)      Learning Progress Summary           Patient Acceptance, E,TB, VU,NR by SC at 10/17/2019  5:30 PM    Acceptance, E, VU by  at 10/17/2019 11:21 AM    Acceptance, E,TB, VU by AB at 10/14/2019 10:51 AM                   Point: Home exercise program (Done)     Learning Progress Summary           Patient Acceptance, E,TB, VU,NR by SC at 10/17/2019  5:30 PM    Acceptance, E, VU by  at 10/17/2019 11:21 AM                   Point: Body mechanics (Done)     Learning Progress Summary           Patient Acceptance, E,TB, VU,NR by SC at 10/17/2019  5:30 PM    Acceptance, E, VU by  at 10/17/2019 11:21 AM                   Point: Precautions (Done)     Learning Progress Summary           Patient Acceptance, E,TB, VU,NR by SC at 10/17/2019  5:30 PM    Acceptance, E, VU by  at 10/17/2019 11:21 AM                               User Key     Initials Effective Dates Name Provider Type Discipline    AB 03/01/19 -  Rosa Peng, PT Physical Therapist PT    SC 03/01/19 -  Jessika Buchanan PTA Physical Therapy Assistant PT     03/04/19 -  Juanita Solis RN Registered Nurse Nurse              PT Recommendation and Plan     Outcome Summary/Treatment Plan (PT)  Anticipated Discharge Disposition (PT): inpatient rehabilitation facility  Plan of Care Reviewed With: patient  Progress: no change  Outcome Summary: pt has severe left hip pain and pt needs alot of encourament to cont mobilize.  pt will need extensive IP rehab at d/c to make progress with her mobility     Time Calculation:   PT Charges     Row Name 10/17/19 0569             Time Calculation    Start Time  1645  -SC      Stop Time  1655  -SC      Time Calculation (min)  10 min  -SC      PT Received On  10/17/19  -SC      PT - Next Appointment  10/18/19  -SC         Timed Charges    78839 - PT Therapeutic Exercise Minutes  10  -SC        User Key  (r) = Recorded By, (t) = Taken By, (c) = Cosigned By    Initials Name Provider Type    SC Jessika Buchanan PTA Physical  Therapy Assistant        Therapy Charges for Today     Code Description Service Date Service Provider Modifiers Qty    47656370262 HC PT THER PROC EA 15 MIN 10/17/2019 Jessika Buchanan, PTA GP 1               Jessika Buchanan, SHAKIRA  10/17/2019

## 2019-10-17 NOTE — PLAN OF CARE
Problem: Patient Care Overview  Goal: Plan of Care Review   10/17/19 1739   OTHER   Outcome Summary pt has severe left hip pain and pt needs alot of encourament to cont mobilize. pt will need extensive IP rehab at d/c to make progress with her mobility

## 2019-10-18 VITALS
HEIGHT: 72 IN | HEART RATE: 91 BPM | DIASTOLIC BLOOD PRESSURE: 71 MMHG | BODY MASS INDEX: 39.68 KG/M2 | OXYGEN SATURATION: 93 % | TEMPERATURE: 98.9 F | WEIGHT: 293 LBS | SYSTOLIC BLOOD PRESSURE: 104 MMHG | RESPIRATION RATE: 20 BRPM

## 2019-10-18 LAB — BACTERIA SPEC AEROBE CULT: NORMAL

## 2019-10-18 PROCEDURE — G0008 ADMIN INFLUENZA VIRUS VAC: HCPCS | Performed by: FAMILY MEDICINE

## 2019-10-18 PROCEDURE — 25010000002 INFLUENZA VAC SPLIT QUAD 0.5 ML SUSPENSION PREFILLED SYRINGE: Performed by: FAMILY MEDICINE

## 2019-10-18 PROCEDURE — 97110 THERAPEUTIC EXERCISES: CPT

## 2019-10-18 PROCEDURE — 99239 HOSP IP/OBS DSCHRG MGMT >30: CPT | Performed by: INTERNAL MEDICINE

## 2019-10-18 PROCEDURE — 90686 IIV4 VACC NO PRSV 0.5 ML IM: CPT | Performed by: FAMILY MEDICINE

## 2019-10-18 PROCEDURE — G0515 COGNITIVE SKILLS DEVELOPMENT: HCPCS

## 2019-10-18 RX ORDER — CEFDINIR 300 MG/1
300 CAPSULE ORAL 2 TIMES DAILY
Qty: 10 CAPSULE | Refills: 0
Start: 2019-10-18 | End: 2019-10-21

## 2019-10-18 RX ORDER — MULTIVITAMIN,THERAPEUTIC
1 TABLET ORAL DAILY
Status: DISCONTINUED | OUTPATIENT
Start: 2019-10-18 | End: 2019-10-18 | Stop reason: HOSPADM

## 2019-10-18 RX ORDER — MULTIVITAMIN,THERAPEUTIC
1 TABLET ORAL DAILY
Start: 2019-10-19 | End: 2020-07-21 | Stop reason: HOSPADM

## 2019-10-18 RX ADMIN — THERA TABS 1 TABLET: TAB at 09:53

## 2019-10-18 RX ADMIN — APIXABAN 2.5 MG: 2.5 TABLET, FILM COATED ORAL at 09:32

## 2019-10-18 RX ADMIN — Medication 10 ML: at 09:31

## 2019-10-18 RX ADMIN — MICONAZOLE NITRATE: 20 POWDER TOPICAL at 09:30

## 2019-10-18 RX ADMIN — ZINC OXIDE: 200 OINTMENT TOPICAL at 09:30

## 2019-10-18 RX ADMIN — ACETAMINOPHEN 650 MG: 325 TABLET ORAL at 01:33

## 2019-10-18 RX ADMIN — METOPROLOL SUCCINATE 100 MG: 50 TABLET, EXTENDED RELEASE ORAL at 09:32

## 2019-10-18 RX ADMIN — POVIDONE-IODINE: 10 SOLUTION TOPICAL at 09:31

## 2019-10-18 RX ADMIN — INFLUENZA A VIRUS A/BRISBANE/02/2018 IVR-190 (H1N1) ANTIGEN (PROPIOLACTONE INACTIVATED), INFLUENZA A VIRUS A/KANSAS/14/2017 X-327 (H3N2) ANTIGEN (PROPIOLACTONE INACTIVATED), INFLUENZA B VIRUS B/MARYLAND/15/2016 ANTIGEN (PROPIOLACTONE INACTIVATED), INFLUENZA B VIRUS B/PHUKET/3073/2013 BVR-1B ANTIGEN (PROPIOLACTONE INACTIVATED) 0.5 ML: 15; 15; 15; 15 INJECTION, SUSPENSION INTRAMUSCULAR at 09:53

## 2019-10-18 RX ADMIN — Medication: at 09:31

## 2019-10-18 RX ADMIN — ACETAMINOPHEN 650 MG: 325 TABLET ORAL at 09:53

## 2019-10-18 RX ADMIN — LISINOPRIL 40 MG: 20 TABLET ORAL at 09:32

## 2019-10-18 NOTE — PLAN OF CARE
Problem: Patient Care Overview  Goal: Plan of Care Review  Outcome: Ongoing (interventions implemented as appropriate)   10/18/19 0433   OTHER   Outcome Summary pt has had no complaints though the night, appears to have rested well. pt hoping to discharge to [facility] for rehab. will continue to monitor pt.      Goal: Individualization and Mutuality  Outcome: Ongoing (interventions implemented as appropriate)    Goal: Discharge Needs Assessment  Outcome: Ongoing (interventions implemented as appropriate)    Goal: Interprofessional Rounds/Family Conf  Outcome: Ongoing (interventions implemented as appropriate)      Problem: Fall Risk (Adult)  Goal: Absence of Fall  Outcome: Ongoing (interventions implemented as appropriate)   10/18/19 0433   Fall Risk (Adult)   Absence of Fall making progress toward outcome       Problem: Skin Injury Risk (Adult)  Goal: Skin Health and Integrity  Outcome: Ongoing (interventions implemented as appropriate)   10/18/19 0433   Skin Injury Risk (Adult)   Skin Health and Integrity making progress toward outcome

## 2019-10-18 NOTE — PROGRESS NOTES
Continued Stay Note  DEAN Perez     Patient Name: Camelia Gray  MRN: [id number]  Today's Date: 10/18/2019    Admit Date: 10/13/2019    Discharge Plan     Row Name 10/18/19 1036       Plan    Plan  [facility] accepted, DOUG approved, no precert needed.     Patient/Family in Agreement with Plan  yes    Plan Comments  Met with patient at bedside, delivered IM letterr. Plan pending MD rounds.             Expected Discharge Date and Time     Expected Discharge Date Expected Discharge Time    Oct 18, 2019           Viki Epstein  [phone]

## 2019-10-18 NOTE — PROGRESS NOTES
Case Management Discharge Note                      Final Discharge Disposition Code: 03 - skilled nursing facility (SNF)

## 2019-10-18 NOTE — DISCHARGE SUMMARY
[facility] Medicine Services  DISCHARGE SUMMARY        Prepared For PCP:  Eric Vickers Jr., MD        Date of Admission:   10/13/2019    Date of Discharge:  10/18/2019    Length of stay:  LOS: 3 days     Reason For Admission:  Acute cystitis with hematuria  Cellulitis of both feet        Principal and Active Diagnosis During Hospital Stay:    Acute cystitis with hematuria      Arthritis of left hip    Cellulitis of both feet     Immobility syndrome -discharging to inpatient rehab     Venous stasis       Lymphedema      Non-pressure chronic ulcer of other part of right foot with fat layer exposed (CMS/HCC) - stable      Non-pressure chronic ulcer of other part of left foot with fat layer exposed (CMS/HCC) - stable      Dermatitis associated with moisture - stable       Decubitus ulcer of buttock, stage 2 (CMS/HCC) - stable - continue wound therrapy           Hospital Course:  74-year-old female with medical history significant for hypertension and previous PE.  Was admitted directly to the hospital by PCP on 10/13/2019 with multiple complaints-left hip pain, urinary incontinence and cellulitis of both feet  Patient is immobile with stasis edema and has had wound in feet for which she goes to wound care clinic.  Wound usual heals and breaks down .    CT of the pelvis done showed severe arthritis in the hips.    While inpatient, she is being treated for acute cystitis with hematuria and cellulitis of both feet.  Was initially on Rocephin will be discharged on Omnicef to complete antibiotics course.    Patient was evaluated by physical therapist and inpatient rehab recommended    She is hemodynamically stable and will be discharged to rehab today  .  Patient will be discharged with Santos catheter which could be removed when patient is stronger.          Recommendation for Outpatient Providers:             Reasons For Change In Medications and Indications for New  Medications:          Discharge Disposition      Rehab Facility or Unit (DC - External)       Discharge Medications      New Medications      Instructions Start Date   ammonium lactate 12 % cream  Commonly known as:  AMLACTIN   Topical, 2 Times Daily      cefdinir 300 MG capsule  Commonly known as:  OMNICEF   300 mg, Oral, 2 Times Daily      famotidine 40 MG tablet  Commonly known as:  PEPCID   40 mg, Oral, Daily      miconazole 2 % powder  Commonly known as:  MICOTIN   Topical, Every 12 Hours Scheduled      povidone-iodine 10 % external solution  Commonly known as:  BETADINE   Topical, Daily      THERA tablet tablet   1 tablet, Oral, Daily   Start Date:  10/19/2019        Continue These Medications      Instructions Start Date   apixaban 5 MG tablet tablet  Commonly known as:  ELIQUIS   2.5 mg, Oral, 2 Times Daily      benazepril 40 MG tablet  Commonly known as:  LOTENSIN   TAKE 1 TABLET BY MOUTH DAILY      metoprolol succinate  MG 24 hr tablet  Commonly known as:  TOPROL-XL   TAKE ONE TABLET BY MOUTH EVERY DAY                   Test Results Pending at Discharge   Order Current Status    Antiphosphotidyl Antibodies Panel II In process    Blood Culture - Blood, Blood, Venous Line Preliminary result                Procedures Performed         Vital Signs    Temp:  [98.4 °F (36.9 °C)-98.9 °F (37.2 °C)] 98.9 °F (37.2 °C)  Heart Rate:  [] 91  Resp:  [18-22] 20  BP: (104-129)/(70-76) 104/71    Physical Exam:    Physical Exam   Constitutional: No distress.   Morbidly obese   HENT:   Head: Normocephalic and atraumatic.   Eyes: EOM are normal. Pupils are equal, round, and reactive to light.   Neck: Neck supple.   Cardiovascular: Normal rate and regular rhythm.   Pulmonary/Chest:   Decreased air entry at the bases   Abdominal: Soft. Bowel sounds are normal.   Musculoskeletal:   Degenerative changes  Dressing to both feet intact   Skin: Skin is warm and dry.   Stasis dermatitis   Psychiatric: She has a normal mood  and affect.         Consults:   Consults     Date and Time Order Name Status Description    10/13/2019 2204 Inpatient Pulmonology Consult Completed           Pertinent Test Results:     Results from last 7 days   Lab Units 10/14/19  0408 10/13/19  1544   WBC 10*3/mm3 7.80 6.20   HEMOGLOBIN g/dL 12.3 13.0   HEMATOCRIT % 37.3 38.0   PLATELETS 10*3/mm3 197 206     Results from last 7 days   Lab Units 10/14/19  0407 10/13/19  1540   SODIUM mmol/L 139 142   POTASSIUM mmol/L 4.1 4.1   CHLORIDE mmol/L 107 108   CO2 mmol/L 24.0 26.0   BUN mg/dL 22* 25*   CREATININE mg/dL 0.80 0.80   GLUCOSE mg/dL 102* 103*   CALCIUM mg/dL 8.7* 9.2     Results from last 7 days   Lab Units 10/14/19  0407 10/13/19  1540   SODIUM mmol/L 139 142   POTASSIUM mmol/L 4.1 4.1   CHLORIDE mmol/L 107 108   CO2 mmol/L 24.0 26.0   BUN mg/dL 22* 25*   CREATININE mg/dL 0.80 0.80   CALCIUM mg/dL 8.7* 9.2   BILIRUBIN mg/dL 0.6 0.3   ALK PHOS U/L 90 94*   ALT (SGPT) U/L 14 17   AST (SGOT) U/L 16 23   GLUCOSE mg/dL 102* 103*     Results from last 7 days   Lab Units 10/13/19  1540   MAGNESIUM mg/dL 2.0     Lab Results   Component Value Date    CALCIUM 8.7 (L) 10/14/2019    PHOS 3.5 10/13/2019     No results found for: HGBA1C          Microbiology Results (last 10 days)     Procedure Component Value - Date/Time    Blood Culture - Blood, Blood, Venous Line [[id number]] Collected:  10/13/19 1540    Lab Status:  Preliminary result Specimen:  Blood, Venous Line Updated:  10/17/19 1601     Blood Culture No growth at 4 days    Urine Culture - Urine, Urine, Catheter [[id number]]  (Abnormal)  (Susceptibility) Collected:  10/13/19 1540    Lab Status:  Final result Specimen:  Urine, Catheter Updated:  10/15/19 1218     Urine Culture >100,000 CFU/mL Escherichia coli    Susceptibility      Escherichia coli     LUCIA     Ampicillin Susceptible     Ampicillin + Sulbactam Susceptible     Cefazolin Susceptible     Cefepime Susceptible     Ceftazidime Susceptible     Ceftriaxone  Susceptible     Gentamicin Susceptible     Levofloxacin Susceptible     Nitrofurantoin Susceptible     Piperacillin + Tazobactam Susceptible     Tetracycline Susceptible     Trimethoprim + Sulfamethoxazole Susceptible                          ECG/EMG Results (most recent)     None          Results for orders placed during the hospital encounter of 10/13/19   Duplex Venous Lower Extremity - Bilateral CAR    Narrative · Normal bilateral lower extremity venous duplex scan.          Results for orders placed during the hospital encounter of 19   Adult Transthoracic Echo Complete W/ Cont if Necessary Per Protocol    Narrative                           Adult Echocardiogram Report          [facility]  Cardiology Department  [address] [place] [address]  [address]      Name: KAYLIE TRACEY       Study Date: 2019 05:03 PM  MRN: [id number]             Patient Location:   : 1945            Gender: Female                       Height: 71 in  Age: 73 yrs                Account#: [id number]                Weight: 325 lb  Reason For Study: PULMONARY CLINIC CONSULTATION                 BSA: 2.6 m2  Ordering Physician: FRANCISCO SERRANO  Referring Physician:  NILESH DESAI  Performed By: TARA      M-Mode/2-D Measurements:  LVIDd: 4.2 cm       (3.7-5.7) LVPWd: 0.96 cm       (0.8-1.2)  LVIDs: 2.7 cm       (2.3-3.9)  ACS: 1.5 cm         (1.6-3.7) IVSd: 0.97 cm        (0.7-1.2)  LA dimension: 3.8 cm(1.9-4.0) RVDd: 2.5 cm         (0.7-2.4)  FS: 34.9 %          (21-40%)  Ao root diam: 3.0 cm (2.0-3.7)    Comments  Comments: Technically difficult with limited acoustical windows though grossly  satisfactory for interpretation.    Findings: A trileaflet aortic valve is not well visualized though cusp  separation fears mildly decreased on M-mode with adequate coaptation.  The mitral tricuspid valves are structure normal in both demonstrate  satisfactory diastolic leaflet excursion.  Pulmonic level not well  visualized.  RV dimension systolic function within normal limits.  LV chamber size wall thickness and global contractility satisfactory with no  segmental wall motion abnormalities; LVEF 60+ percent  Normal aortic root left atrial chamber dimensions.  There is no pericardial effusion intracardiac thrombus vegetation or mass  identified.    Supplementary Doppler exam showed normal aortic mitral valve for velocity with  no significant regurgitant jets. RV systolic pressures quantitated at 19 mm  Hg.    IMPRESSIONS  SOFY WITH LIMITED ACOUSTICAL WINDOWS SHOWS NORMAL CHAMBER DIMENSIONS WITH GOOD  GLOBAL CONTRACTILITY; LVEF 60+ PERCENT.  AORTIC VALVE NOT WELL VISUALIZED THOUGH MITRAL AND TRICUSPID VALVES ARE  STRUCTURE NORMAL.  PULMONIC VALVE NOT WELL VISUALIZED.  DOPPLER EXAM SHOWS NO SIGNIFICANT ABNORMALITIES; RV SP QUANTITATED 19 MM HG.      Interpretation    MMode/2D Measurements & Calculations  ESV(Teich): 27.9 ml                     % IVS thick: 33.2 %  EF(Teich): 64.5 %                       % LVPW thick: 21.5 %                                          LVOT diam: 1.8 cm  Ao root area: 7.2 cm2  EDV(MOD-sp4): 64.1 ml  ESV(MOD-sp4): 24.7 ml  EF(MOD-sp4): 61.4 %    Doppler Measurements & Calculations  MV E max tyler: 42.3 cm/sec                 MV max P.2 mmHg  MV A max tyler: 63.2 cm/sec                 MV mean P.5 mmHg  MV E/A: 0.67                                            Ao V2 max: 111.1 cm/sec  MV dec slope: 453.9 cm/sec2               Ao max P.9 mmHg  MV dec time: 0.19 sec                     Ao V2 mean: 89.5 cm/sec                                            Ao mean PG: 3.4 mmHg                                            Ao V2 VTI: 25.4 cm                                              FEDERICO(I,D): 2.1 cm2                                            FEDERICO(V,D): 2.3 cm2  LV V1 max P.3 mmHg                    PA max P.1 mmHg  LV V1 mean P.4 mmHg  LV V1 max: 103.4 cm/sec  LV V1 mean: 73.3 cm/sec  LV V1  VTI: 22.1 cm  TR max tyler: 200.0 cm/sec  TR max P.0 mmHg  RVSP(TR): 19.0 mmHg      _______________________________________________________________________________    Electronically signed by: Cash Hardy MD  on 2019 11:48 PM         Ct Pelvis Without Contrast    Result Date: 10/13/2019   1. Severe left hip joint DJD. 2. Moderate to advanced right hip joint DJD. 3. Partially imaged porcelain gallbladder. 4. Colonic diverticula, without CT evidence of diverticulitis. 5. Nonspecific small amount of free fluid in the pelvis.  Electronically Signed By-Yg Stone On:10/13/2019 5:22 PM This report was finalized on [id number] by  Yg Stone, .    Xr Chest 1 View    Result Date: 10/13/2019   1. Limited study demonstrating subsegmental scarring in the left lung base. 2. Enlargement of the cardiac silhouette, likely accentuated by technique.  Electronically Signed By-Yg Stone On:10/13/2019 4:47 PM This report was finalized on [id number] by  Yg Stone, .    Xr Spine Lumbar 4+ View    Result Date: 10/14/2019  Limited examination secondary to patient's inability to tolerate true lateral image. Multilevel diminished of lumbar disc height with advanced L4-5 and L5-S1 facet arthropathy.  No definite acute lumbar spine fracture is seen. If patient's symptoms persist consider either repeat plain film imaging or dedicated CT lumbar spine.  Round rim calcified lesion in the right upper quadrant of the abdomen could represent gallbladder wall calcification (porcelain gallbladder), or large calcified gallstone.  Electronically Signed By-Dr. Marie Toledo MD On:10/14/2019 3:15 PM This report was finalized on [id number] by Dr. Marie Toledo MD.          Condition on Discharge:      Stable    Discharge Diet:   Diet Instructions     Diet: Cardiac      Discharge Diet:  Cardiac    Diet: Cardiac      Discharge Diet:  Cardiac          Activity at Discharge:   Activity Instructions     Activity as Tolerated       Activity as Tolerated            Follow-up Appointments  No future appointments.  Additional Instructions for the Follow-ups that You Need to Schedule     Ambulatory Referral to Home Health   As directed      Face to Face Visit Date:  10/17/2019    Follow-up provider for Plan of Care?:  I treated the patient in an acute care facility and will not continue treatment after discharge.    Follow-up provider:  ERIC VICKERS JR. [[id number]]    Reason/Clinical Findings:  weakness    Describe mobility limitations that make leaving home difficult:  weakness    Nursing/Therapeutic Services Requested:  Other (ESPINOZA order)    Frequency:  1 Week 1         Discharge Follow-up with PCP   As directed       Currently Documented PCP:    Eric Vickers Jr., MD    PCP Phone Number:    [phone]     Follow Up Details:  With PCP in 2 weeks         Discharge Follow-up with PCP   As directed       Currently Documented PCP:    Eric Vickers Jr., MD    PCP Phone Number:    [phone]     Follow Up Details:  Follow up with PCP next scheduled appointment                 Sukhdev Tamez MD  10/18/19  1:02 PM    Time: I spent  32 minutes on this discharge activity which included face-to-face encounter with the patient/reviewing the data in the system/coordination of the care with the nursing staff as well as consultants/documentation/entering orders.

## 2019-10-18 NOTE — PLAN OF CARE
Problem: Patient Care Overview  Goal: Plan of Care Review   10/18/19 1421   Coping/Psychosocial   Plan of Care Reviewed With patient   OTHER   Outcome Summary less edema. Willingness to attempt wheelchair transfer for transport to rehab. Requested RN to notify an MD of Pt possible willingness to take a pain medication to enable increased movemet. Pt will require lengthy IP rehab to regain her mobility.   Plan of Care Review   Progress improving

## 2019-10-22 ENCOUNTER — HOSPITAL ENCOUNTER (EMERGENCY)
Facility: HOSPITAL | Age: 74
Discharge: NURSING, REHAB OR LONG-TERM CARE | End: 2019-10-22
Admitting: EMERGENCY MEDICINE

## 2019-10-22 VITALS
RESPIRATION RATE: 18 BRPM | DIASTOLIC BLOOD PRESSURE: 70 MMHG | HEART RATE: 63 BPM | OXYGEN SATURATION: 97 % | BODY MASS INDEX: 39.68 KG/M2 | TEMPERATURE: 98.3 F | SYSTOLIC BLOOD PRESSURE: 138 MMHG | HEIGHT: 72 IN | WEIGHT: 293 LBS

## 2019-10-22 DIAGNOSIS — M79.671 BILATERAL FOOT PAIN: ICD-10-CM

## 2019-10-22 DIAGNOSIS — L97.529 SKIN ULCERS OF FOOT, BILATERAL (HCC): Primary | ICD-10-CM

## 2019-10-22 DIAGNOSIS — M79.672 BILATERAL FOOT PAIN: ICD-10-CM

## 2019-10-22 DIAGNOSIS — L97.519 SKIN ULCERS OF FOOT, BILATERAL (HCC): Primary | ICD-10-CM

## 2019-10-22 LAB
ALBUMIN SERPL-MCNC: 3.5 G/DL (ref 3.5–5.2)
ALBUMIN/GLOB SERPL: 0.9 G/DL
ALP SERPL-CCNC: 104 U/L (ref 39–117)
ALT SERPL W P-5'-P-CCNC: 16 U/L (ref 1–33)
ANION GAP SERPL CALCULATED.3IONS-SCNC: 11 MMOL/L (ref 5–15)
AST SERPL-CCNC: 17 U/L (ref 1–32)
BASOPHILS # BLD AUTO: 0.1 10*3/MM3 (ref 0–0.2)
BASOPHILS NFR BLD AUTO: 0.7 % (ref 0–1.5)
BILIRUB SERPL-MCNC: 0.3 MG/DL (ref 0.2–1.2)
BUN BLD-MCNC: 22 MG/DL (ref 8–23)
BUN/CREAT SERPL: 27.5 (ref 7–25)
CALCIUM SPEC-SCNC: 9.2 MG/DL (ref 8.6–10.5)
CHLORIDE SERPL-SCNC: 101 MMOL/L (ref 98–107)
CO2 SERPL-SCNC: 29 MMOL/L (ref 22–29)
CREAT BLD-MCNC: 0.8 MG/DL (ref 0.57–1)
DEPRECATED RDW RBC AUTO: 45.1 FL (ref 37–54)
EOSINOPHIL # BLD AUTO: 0.3 10*3/MM3 (ref 0–0.4)
EOSINOPHIL NFR BLD AUTO: 4.2 % (ref 0.3–6.2)
ERYTHROCYTE [DISTWIDTH] IN BLOOD BY AUTOMATED COUNT: 13.9 % (ref 12.3–15.4)
GFR SERPL CREATININE-BSD FRML MDRD: 70 ML/MIN/1.73
GLOBULIN UR ELPH-MCNC: 3.8 GM/DL
GLUCOSE BLD-MCNC: 96 MG/DL (ref 65–99)
HCT VFR BLD AUTO: 44.3 % (ref 34–46.6)
HGB BLD-MCNC: 15.1 G/DL (ref 12–15.9)
HOLD SPECIMEN: NORMAL
LYMPHOCYTES # BLD AUTO: 1.3 10*3/MM3 (ref 0.7–3.1)
LYMPHOCYTES NFR BLD AUTO: 17 % (ref 19.6–45.3)
MCH RBC QN AUTO: 31.8 PG (ref 26.6–33)
MCHC RBC AUTO-ENTMCNC: 34 G/DL (ref 31.5–35.7)
MCV RBC AUTO: 93.6 FL (ref 79–97)
MONOCYTES # BLD AUTO: 0.7 10*3/MM3 (ref 0.1–0.9)
MONOCYTES NFR BLD AUTO: 8.8 % (ref 5–12)
NEUTROPHILS # BLD AUTO: 5.2 10*3/MM3 (ref 1.7–7)
NEUTROPHILS NFR BLD AUTO: 69.3 % (ref 42.7–76)
NRBC BLD AUTO-RTO: 0 /100 WBC (ref 0–0.2)
PLATELET # BLD AUTO: 256 10*3/MM3 (ref 140–450)
PMV BLD AUTO: 6.6 FL (ref 6–12)
POTASSIUM BLD-SCNC: 4.4 MMOL/L (ref 3.5–5.2)
PROT SERPL-MCNC: 7.3 G/DL (ref 6–8.5)
RBC # BLD AUTO: 4.73 10*6/MM3 (ref 3.77–5.28)
SODIUM BLD-SCNC: 141 MMOL/L (ref 136–145)
WBC NRBC COR # BLD: 7.5 10*3/MM3 (ref 3.4–10.8)
WHOLE BLOOD HOLD SPECIMEN: NORMAL

## 2019-10-22 PROCEDURE — 36415 COLL VENOUS BLD VENIPUNCTURE: CPT

## 2019-10-22 PROCEDURE — 99284 EMERGENCY DEPT VISIT MOD MDM: CPT

## 2019-10-22 PROCEDURE — 80053 COMPREHEN METABOLIC PANEL: CPT | Performed by: NURSE PRACTITIONER

## 2019-10-22 PROCEDURE — 87040 BLOOD CULTURE FOR BACTERIA: CPT | Performed by: NURSE PRACTITIONER

## 2019-10-22 PROCEDURE — 85025 COMPLETE CBC W/AUTO DIFF WBC: CPT | Performed by: NURSE PRACTITIONER

## 2019-10-22 RX ORDER — SODIUM CHLORIDE 0.9 % (FLUSH) 0.9 %
10 SYRINGE (ML) INJECTION AS NEEDED
Status: DISCONTINUED | OUTPATIENT
Start: 2019-10-22 | End: 2019-10-22 | Stop reason: HOSPADM

## 2019-10-22 NOTE — DISCHARGE INSTRUCTIONS
The patient has an order to have outpatient ABIs performed in the morning at 8 Am.    These should be followed by outpatient wound care-continue Betadine cleansing of bilateral foot ulcers and have the patient followed by the wound care doctor for chronic ulceration of the feet-    Patient has a normal white blood cell count and is afebrile-the time of discharge

## 2019-10-22 NOTE — ED NOTES
Pt came from [facility], Pt comes to wound center here at [facility] r/t bilateral feet infection. Pt came to ED for infection of chronic ulcer bilateral feel.     Olivia Godinez, RN  10/22/19 1804       Olivia Godinez, RN  10/22/19 1801

## 2019-10-22 NOTE — ED PROVIDER NOTES
Subjective   Patient is a morbidly obese female who has chronic wounds of the bilateral feet and dorsum of the toes.  She was hospitalized about a week ago and then is now in [facility] rehabilitation.  I spoke with the nurse who is taking care of her this evening who states that they director of nursing is the wound care nurse and sent her to the emergency room because he felt they looked worse than yesterday and felt she needed an arterial evaluation of her feet.-    Patient is having Betadine cleansings and dressing changes and is being followed by the wound care center here at [place].  Patient states that her feet only hurts if you touch them at that time she rates it a 7/10.  She is had no fever no chills no cough or congestion.            Review of Systems   Constitutional: Negative for chills, fatigue and fever.   HENT: Negative for congestion, tinnitus and trouble swallowing.    Eyes: Negative for photophobia, discharge and redness.   Respiratory: Negative for cough and shortness of breath.    Cardiovascular: Negative for chest pain and palpitations.   Gastrointestinal: Negative for abdominal pain, diarrhea, nausea and vomiting.   Genitourinary: Negative for dysuria, frequency and urgency.   Musculoskeletal: Negative for back pain, joint swelling and myalgias.   Skin: Positive for wound. Negative for rash.        Bilateral feet   Neurological: Negative for dizziness and headaches.   Psychiatric/Behavioral: Negative for confusion.   All other systems reviewed and are negative.      Past Medical History:   Diagnosis Date   • Arthritis    • DVT, lower extremity (CMS/HCC)    • Hypertension    • Obesity    • Pulmonary embolism (CMS/HCC)        No Known Allergies    Past Surgical History:   Procedure Laterality Date   • JOINT REPLACEMENT      Bilateral knees       Family History   Problem Relation Age of Onset   • Heart disease Mother    • Hypertension Mother    • Heart disease Father    • Hypertension Father     • Kidney disease Father    • COPD Father        Social History     Socioeconomic History   • Marital status: Single     Spouse name: Not on file   • Number of children: Not on file   • Years of education: Not on file   • Highest education level: Not on file   Tobacco Use   • Smoking status: Never Smoker   • Smokeless tobacco: Never Used   Substance and Sexual Activity   • Alcohol use: No     Frequency: Never   • Drug use: No   • Sexual activity: Defer           Objective   Physical Exam   Constitutional: She is oriented to person, place, and time. She appears well-developed and well-nourished.   HENT:   Head: Normocephalic and atraumatic.   Eyes: Conjunctivae and EOM are normal. Pupils are equal, round, and reactive to light.   Neck: Normal range of motion. Neck supple.   Cardiovascular: Normal rate, regular rhythm, normal heart sounds and intact distal pulses.   Pulmonary/Chest: Effort normal and breath sounds normal. No respiratory distress. She has no wheezes.   Abdominal: She exhibits no distension and no mass. There is no tenderness. There is no rebound and no guarding.   Musculoskeletal: Normal range of motion. She exhibits no deformity.   Neurological: She is alert and oriented to person, place, and time. She displays normal reflexes. No cranial nerve deficit or sensory deficit. GCS eye subscore is 4. GCS verbal subscore is 5. GCS motor subscore is 6.   Skin: Skin is warm and dry. Capillary refill takes 2 to 3 seconds.        Psychiatric: She has a normal mood and affect. Her behavior is normal. Judgment and thought content normal.   Vitals reviewed.      Procedures           ED Course  ED Course as of Oct 22 1945   Tue Oct 22, 2019   1940 The patient was found to not have a white count and she is afebrile.  I do not find any acute changes in her feet after reviewing the chart and visualizing pictures that were taken yesterday.  I do feel that it would be beneficial for the patient to have an ARNULFO which is  "not available at this time due to vascular not being available at this time of day.  I did place an order for the patient to have an ARNULFO done on an outpatient basis tomorrow morning.  This will be verbalized to the patient as well as the nursing home and the patient will be returned to the nursing home for outpatient care of chronic wounds.  [KW]      ED Course User Index  [KW] ZionMichelle JABARI, APRN        /80 (BP Location: Right arm, Patient Position: Lying)   Pulse 59   Temp 98 °F (36.7 °C) (Oral)   Resp 18   Ht 182.9 cm (72\")   Wt (!) 166 kg (366 lb)   SpO2 98%   BMI 49.64 kg/m²   Labs Reviewed   COMPREHENSIVE METABOLIC PANEL - Abnormal; Notable for the following components:       Result Value    BUN/Creatinine Ratio 27.5 (*)     All other components within normal limits    Narrative:     GFR Normal >60  Chronic Kidney Disease <60  Kidney Failure <15   CBC WITH AUTO DIFFERENTIAL - Abnormal; Notable for the following components:    Lymphocyte % 17.0 (*)     All other components within normal limits   BLOOD CULTURE   BLOOD CULTURE   CBC AND DIFFERENTIAL    Narrative:     The following orders were created for panel order CBC & Differential.  Procedure                               Abnormality         Status                     ---------                               -----------         ------                     CBC Auto Differential[[id number]]        Abnormal            Final result                 Please view results for these tests on the individual orders.   EXTRA TUBES    Narrative:     The following orders were created for panel order Extra Tubes.  Procedure                               Abnormality         Status                     ---------                               -----------         ------                     Light Blue Top[[id number]]                                   Final result               Gold Top - [facility][[id number]]                                   Final result             "     Please view results for these tests on the individual orders.   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     While inpatient, she is being treated for acute cystitis with hematuria and cellulitis of both feet.  Was initially on Rocephin will be discharged on 5 days course of Omnicef  .  Patient was evaluated by physical therapist and inpatient rehab recommended     She is hemodynamically stable and will be discharged to rehab today  .  Patient will be discharged with Santos catheter which could be removed when patient is stronger.        Final diagnoses:   Skin ulcers of foot, bilateral (CMS/HCC)   Bilateral foot pain              Michelle Donovan, APRN  10/22/19 1945

## 2019-10-23 ENCOUNTER — EPISODE CHANGES (OUTPATIENT)
Dept: CASE MANAGEMENT | Facility: OTHER | Age: 74
End: 2019-10-23

## 2019-10-23 ENCOUNTER — PATIENT OUTREACH (OUTPATIENT)
Dept: CASE MANAGEMENT | Facility: OTHER | Age: 74
End: 2019-10-23

## 2019-10-23 LAB
ANTI-PHOSPHATIDIC ACID: NORMAL
ANTI-PHOSPHATIDIC,IGA: 10.6 U/ML
ANTI-PHOSPHATIDIC,IGG: 3.9 U/ML
ANTI-PHOSPHATIDIC,IGM: 4.8 U/ML
ANTI-PHOSPHATIDYL GLYCEROL, IGA: 4.9 U/ML
ANTI-PHOSPHATIDYL GLYCEROL, IGG: 4 U/ML
ANTI-PHOSPHATIDYL GLYCEROL, IGM: 2.6 U/ML
ANTI-PHOSPHATIDYL GLYCEROL: NORMAL
ANTI-PHOSPHATIDYL INOSITOL: NORMAL
ANTI-PHOSPHATIDYL,IGA: 5.1 U/ML
ANTI-PHOSPHATIDYL,IGG: 2.9 U/ML
ANTI-PHOSPHATIDYL,IGM: 2.8 U/ML
ANTI-PHOSPHATIDYLETHANOLAMINE, IGA: 3 U/ML
ANTI-PHOSPHATIDYLETHANOLAMINE, IGG: 2.9 U/ML
ANTI-PHOSPHATIDYLETHANOLAMINE, IGM: 6.2 U/ML
ANTI-PHOSPHATIDYLETHANOLAMINE: NORMAL

## 2019-10-23 NOTE — OUTREACH NOTE
SNF Follow-up Note      Responses   Acute Facility Discharged From  [facility]   Acute Discharge Date  10/18/19   Name of the Skilled Nursing Facility?  [facility]   Purpose of SNF Admission  PT, OT, SN   Estimated length of stay for the patient?  Undetermined   Who is the insurance provider or payor of patient stay?  Medicare   Progression of Patient?  Talked with Xin/ Staff at [facility] [phone]. She states patient is receiving SN, PT and OT services. No discharge date at this time.           Graciela Coy RN  Community Care Coordinator    10/23/2019, 1:34 PM

## 2019-10-27 LAB
BACTERIA SPEC AEROBE CULT: NORMAL
BACTERIA SPEC AEROBE CULT: NORMAL

## 2019-10-28 ENCOUNTER — OFFICE VISIT (OUTPATIENT)
Dept: WOUND CARE | Facility: HOSPITAL | Age: 74
End: 2019-10-28

## 2019-10-28 PROCEDURE — G0463 HOSPITAL OUTPT CLINIC VISIT: HCPCS

## 2019-11-04 ENCOUNTER — OFFICE VISIT (OUTPATIENT)
Dept: WOUND CARE | Facility: HOSPITAL | Age: 74
End: 2019-11-04

## 2019-11-04 PROCEDURE — G0463 HOSPITAL OUTPT CLINIC VISIT: HCPCS

## 2019-11-12 ENCOUNTER — APPOINTMENT (OUTPATIENT)
Dept: VASCULAR SURGERY | Facility: HOSPITAL | Age: 74
End: 2019-11-12

## 2019-11-12 PROCEDURE — G0463 HOSPITAL OUTPT CLINIC VISIT: HCPCS

## 2019-11-14 ENCOUNTER — PATIENT OUTREACH (OUTPATIENT)
Dept: CASE MANAGEMENT | Facility: OTHER | Age: 74
End: 2019-11-14

## 2019-11-14 NOTE — OUTREACH NOTE
SNF Follow-up Note      Responses   Acute Facility Discharged From  [facility]   Acute Discharge Date  10/18/19   Name of the Skilled Nursing Facility?  [facility]   Purpose of SNF Admission  PT, OT, SN   Estimated length of stay for the patient?  Undetermined   Who is the insurance provider or payor of patient stay?  Medicare   Progression of Patient?  Verified residency and Medicare as payor with Xin this call.          Krystal Lopez RN  Community Care Coordinator    11/14/2019, 11:34 AM

## 2019-12-03 ENCOUNTER — OFFICE VISIT (OUTPATIENT)
Dept: ORTHOPEDIC SURGERY | Facility: CLINIC | Age: 74
End: 2019-12-03

## 2019-12-03 VITALS
SYSTOLIC BLOOD PRESSURE: 117 MMHG | BODY MASS INDEX: 39.68 KG/M2 | HEIGHT: 72 IN | HEART RATE: 54 BPM | DIASTOLIC BLOOD PRESSURE: 80 MMHG | WEIGHT: 293 LBS

## 2019-12-03 DIAGNOSIS — M16.12 PRIMARY OSTEOARTHRITIS OF LEFT HIP: Primary | ICD-10-CM

## 2019-12-03 DIAGNOSIS — E66.01 MORBID OBESITY (HCC): ICD-10-CM

## 2019-12-03 PROCEDURE — 99204 OFFICE O/P NEW MOD 45 MIN: CPT | Performed by: PHYSICIAN ASSISTANT

## 2019-12-03 RX ORDER — LEVOTHYROXINE SODIUM 0.03 MG/1
25 TABLET ORAL DAILY
COMMUNITY
End: 2020-02-07 | Stop reason: SDUPTHER

## 2019-12-03 NOTE — PATIENT INSTRUCTIONS
"Osteoarthritis    Osteoarthritis is a type of arthritis that affects tissue that covers the ends of bones in joints (cartilage). Cartilage acts as a cushion between the bones and helps them move smoothly. Osteoarthritis results when cartilage in the joints gets worn down. Osteoarthritis is sometimes called \"wear and tear\" arthritis.  Osteoarthritis is the most common form of arthritis. It often occurs in older people. It is a condition that gets worse over time (a progressive condition). Joints that are most often affected by this condition are in:  · Fingers.  · Toes.  · Hips.  · Knees.  · Spine, including neck and lower back.  What are the causes?  This condition is caused by age-related wearing down of cartilage that covers the ends of bones.  What increases the risk?  The following factors may make you more likely to develop this condition:  · Older age.  · Being overweight or obese.  · Overuse of joints, such as in athletes.  · Past injury of a joint.  · Past surgery on a joint.  · Family history of osteoarthritis.  What are the signs or symptoms?  The main symptoms of this condition are pain, swelling, and stiffness in the joint. The joint may lose its shape over time. Small pieces of bone or cartilage may break off and float inside of the joint, which may cause more pain and damage to the joint. Small deposits of bone (osteophytes) may grow on the edges of the joint. Other symptoms may include:  · A grating or scraping feeling inside the joint when you move it.  · Popping or creaking sounds when you move.  Symptoms may affect one or more joints. Osteoarthritis in a major joint, such as your knee or hip, can make it painful to walk or exercise. If you have osteoarthritis in your hands, you might not be able to  items, twist your hand, or control small movements of your hands and fingers (fine motor skills).  How is this diagnosed?  This condition may be diagnosed based on:  · Your medical history.  · A " physical exam.  · Your symptoms.  · X-rays of the affected joint(s).  · Blood tests to rule out other types of arthritis.  How is this treated?  There is no cure for this condition, but treatment can help to control pain and improve joint function. Treatment plans may include:  · A prescribed exercise program that allows for rest and joint relief. You may work with a physical therapist.  · A weight control plan.  · Pain relief techniques, such as:  ? Applying heat and cold to the joint.  ? Electric pulses delivered to nerve endings under the skin (transcutaneous electrical nerve stimulation, or TENS).  ? Massage.  ? Certain nutritional supplements.  · NSAIDs or prescription medicines to help relieve pain.  · Medicine to help relieve pain and inflammation (corticosteroids). This can be given by mouth (orally) or as an injection.  · Assistive devices, such as a brace, wrap, splint, specialized glove, or cane.  · Surgery, such as:  ? An osteotomy. This is done to reposition the bones and relieve pain or to remove loose pieces of bone and cartilage.  ? Joint replacement surgery. You may need this surgery if you have very bad (advanced) osteoarthritis.  Follow these instructions at home:  Activity  · Rest your affected joints as directed by your health care provider.  · Do not drive or use heavy machinery while taking prescription pain medicine.  · Exercise as directed. Your health care provider or physical therapist may recommend specific types of exercise, such as:  ? Strengthening exercises. These are done to strengthen the muscles that support joints that are affected by arthritis. They can be performed with weights or with exercise bands to add resistance.  ? Aerobic activities. These are exercises, such as brisk walking or water aerobics, that get your heart pumping.  ? Range-of-motion activities. These keep your joints easy to move.  ? Balance and agility exercises.  Managing pain, stiffness, and swelling          · If directed, apply heat to the affected area as often as told by your health care provider. Use the heat source that your health care provider recommends, such as a moist heat pack or a heating pad.  ? If you have a removable assistive device, remove it as told by your health care provider.  ? Place a towel between your skin and the heat source. If your health care provider tells you to keep the assistive device on while you apply heat, place a towel between the assistive device and the heat source.  ? Leave the heat on for 20-30 minutes.  ? Remove the heat if your skin turns bright red. This is especially important if you are unable to feel pain, heat, or cold. You may have a greater risk of getting burned.  · If directed, put ice on the affected joint:  ? If you have a removable assistive device, remove it as told by your health care provider.  ? Put ice in a plastic bag.  ? Place a towel between your skin and the bag. If your health care provider tells you to keep the assistive device on during icing, place a towel between the assistive device and the bag.  ? Leave the ice on for 20 minutes, 2-3 times a day.  General instructions  · Take over-the-counter and prescription medicines only as told by your health care provider.  · Maintain a healthy weight. Follow instructions from your health care provider for weight control. These may include dietary restrictions.  · Do not use any products that contain nicotine or tobacco, such as cigarettes and e-cigarettes. These can delay bone healing. If you need help quitting, ask your health care provider.  · Use assistive devices as directed by your health care provider.  · Keep all follow-up visits as told by your health care provider. This is important.  Where to find more information  · National Simpson of Arthritis and Musculoskeletal and Skin Diseases: www.niams.nih.gov  · National Simpson on Aging: www.luciana.nih.gov  · American College of Rheumatology:  www.rheumatology.org  Contact a health care provider if:  · Your skin turns red.  · You develop a rash.  · You have pain that gets worse.  · You have a fever along with joint or muscle aches.  Get help right away if:  · You lose a lot of weight.  · You suddenly lose your appetite.  · You have night sweats.  Summary  · Osteoarthritis is a type of arthritis that affects tissue covering the ends of bones in joints (cartilage).  · This condition is caused by age-related wearing down of cartilage that covers the ends of bones.  · The main symptom of this condition is pain, swelling, and stiffness in the joint.  · There is no cure for this condition, but treatment can help to control pain and improve joint function.  This information is not intended to replace advice given to you by your health care provider. Make sure you discuss any questions you have with your health care provider.  Document Released: 12/18/2006 Document Revised: 09/24/2018 Document Reviewed: 08/21/2017  [employer] Interactive Patient Education © 2019 [employer]      Preventing Health Risks of Being Overweight  Maintaining a healthy body weight is an important part of your overall health. Your healthy body weight depends on your age, gender, and height. Being overweight puts you at risk for many health problems, including:  · Heart disease.  · Diabetes.  · Problems sleeping.  · Joint problems.  You can make changes to your diet and lifestyle to prevent these risks. Consider working with a health care provider or a dietitian to make these changes.  What nutrition changes can be made?    · Eat only as much as your body needs. In most cases, this is about 2,000 calories a day, but the amount varies depending on your height, gender, and activity level. Ask your health care provider how many calories you should have each day. Eating more than your body needs on a regular basis can cause you to become overweight or obese.  · Eat slowly, and stop eating when  you feel full.  · Choose healthy foods, including:  ? Fruits and vegetables.  ? Lean meats.  ? Low-fat dairy products.  ? High-fiber foods, such as whole grains and beans.  ? Healthy snacks like vegetable sticks, a piece of fruit, or a small amount of yogurt or cheese.  · Avoid foods and drinks that are high in sugar, salt (sodium), saturated fat, or trans fat. This includes:  ? Many desserts such as candy, cookies, and ice cream.  ? Soda.  ? Fried foods.  ? Processed meats such as hot dogs or lunch meats.  ? Prepackaged snack foods.  What lifestyle changes can be made?    · Exercise for at least 150 minutes a week to prevent weight gain, or as often as recommended by your health care provider. Do moderate-intensity exercise, such as brisk walking.  ? Spread it out by exercising for 30 minutes 5 days a week, or in short 10-minute bursts several times a day.  · Find other ways to stay active and burn calories, such as yard work or a hobby that involves physical activity.  · Get at least 8 hours of sleep each night. When you are well-rested, you are more likely to be active and make healthy choices during the day. To sleep better:  ? Try to go to bed and wake up at about the same time every day.  ? Keep your bedroom dark, quiet, and cool.  ? Make sure that your bed is comfortable.  ? Avoid stimulating activities, such as watching television or exercising, for at least one hour before bedtime.  Why are these changes important?  Eating healthy and being active helps you lose weight and prevent health problems caused by being overweight. Making these changes can also help you manage stress, feel better mentally, and connect with friends and family.  What can happen if changes are not made?  Being overweight can affect you for your entire life. You may develop joint or bone problems that make it painful or difficult for you to play sports or do activities you enjoy. Being overweight puts stress on your heart and lungs and  can lead to medical problems like diabetes, heart disease, and sleeping problems.  Where to find support  You can get support for preventing health risks of being overweight from:  · Your health care provider or a dietitian. They can provide guidance about healthy eating and healthy lifestyle choices.  · Weight loss support groups, online or in-person.  Where to find more information  · MyPlate: www.choosemyplate.gov  ? This an online tool that provides personalized recommendations about foods to eat each day.  · The Centers for Disease Control and Prevention: www.cdc.gov/healthyweight  ? This resource gives tips for managing weight and having an active lifestyle.  Summary  · To prevent unhealthy weight gain, it is important to maintain a healthy diet high in vegetables and whole grains, exercise regularly, and get at least 8 hours of sleep each night.  · Making these changes helps prevent many long-term (chronic) health conditions that can shorten your life, such as diabetes, heart disease, and stroke.  This information is not intended to replace advice given to you by your health care provider. Make sure you discuss any questions you have with your health care provider.  Document Released: 11/14/2018 Document Revised: 11/14/2018 Document Reviewed: 11/14/2018  [employer] Interactive Patient Education © 2019 [employer]

## 2019-12-11 NOTE — PROGRESS NOTES
ORTHOPEDIC VISIT    Referring Provider: Dr. Vickers  Primary Care Provider: Ariel Fritz MD         Subjective:  Chief Complaint:  Chief Complaint   Patient presents with   • Left Hip - Pain, Initial Evaluation       HPI:  Camelia Gray is a 74 y.o. female who presents for her initial visit for left hip pain ongoing for the last 6 months.  She denies any specific injury.  She reports dull, achy pain, mainly located along the outer thigh with no radiation, numbness, or tingling.  She reports pain with movement and feelings of instability.  She denies any groin pain.  She states that she has been at [facility] since the end of October and has not been very mobile since her pulmonary embolism last year.  She is unable to take anti-inflammatories due to being on Eliquis, however she does use Tylenol which helps some with the pain.  She reports bilateral lymphedema with chronic wounds on her ankles and feet that have recently healed.  She states that recently the pain has gotten so bad she has been confined to a wheelchair.  Referred for consultation by Dr. Vickers.    Past Medical History:   Diagnosis Date   • Arthritis    • Cellulitis of both feet 10/13/2019   • Decubitus ulcer of buttock, stage 2 (CMS/[facility]) 10/14/2019   • Dermatitis associated with moisture 10/14/2019   • Diabetes mellitus type II, controlled (CMS/HCC) 10/7/2013   • DVT, lower extremity (CMS/[facility])    • Edema of lower extremity 5/31/2017   • Hypertension    • Immobility syndrome 10/13/2019   • Lymphedema 10/14/2019   • Non-pressure chronic ulcer of other part of left foot with fat layer exposed (CMS/[facility]) 10/14/2019   • Non-pressure chronic ulcer of other part of right foot with fat layer exposed (CMS/[facility]) 10/14/2019   • Obesity    • Pulmonary embolism (CMS/[facility])    • Stasis dermatitis 7/8/2013   • Venous stasis 10/14/2019       Past Surgical History:   Procedure Laterality Date   • CATARACT EXTRACTION     • JOINT REPLACEMENT      Bilateral knees    • TOE SURGERY         Family History   Problem Relation Age of Onset   • Heart disease Mother    • Hypertension Mother    • Heart disease Father    • Hypertension Father    • Kidney disease Father    • COPD Father        Social History     Occupational History   • Not on file   Tobacco Use   • Smoking status: Never Smoker   • Smokeless tobacco: Never Used   Substance and Sexual Activity   • Alcohol use: No     Frequency: Never   • Drug use: No   • Sexual activity: Defer        Medications:    Current Outpatient Medications:   •  ammonium lactate (AMLACTIN) 12 % cream, Apply  topically to the appropriate area as directed 2 (Two) Times a Day., Disp: , Rfl:   •  apixaban (ELIQUIS) 5 MG tablet tablet, Take 2.5 mg by mouth 2 (Two) Times a Day., Disp: , Rfl:   •  levothyroxine (SYNTHROID, LEVOTHROID) 25 MCG tablet, Take 25 mcg by mouth Daily., Disp: , Rfl:   •  metoprolol succinate XL (TOPROL-XL) 100 MG 24 hr tablet, TAKE ONE TABLET BY MOUTH EVERY DAY, Disp: 90 tablet, Rfl: 1  •  miconazole (MICOTIN) 2 % powder, Apply  topically to the appropriate area as directed Every 12 (Twelve) Hours., Disp: , Rfl:   •  Multiple Vitamin (THERA) tablet tablet, Take 1 tablet by mouth Daily., Disp: , Rfl:   •  povidone-iodine (BETADINE) 10 % external solution, Apply  topically to the appropriate area as directed Daily., Disp: , Rfl:     Allergies:  No Known Allergies      Review of Systems:  Gen -no fever, chills , sweats, headache   Eyes - no irritation or discharge   ENT -  no ear pain , runny nose , sore throat , difficulty swallowing   Resp - no cough , congestion , excessive expectoration   CVS - no chest pain , palpitations.   Abd - no pain , nausea , vomiting , diarrhea   Skin - no rash , lesions.   Neuro - no dizziness    Please see HPI for any other pertinent positives.  All other systems were reviewed and are negative.       Objective   Objective:    /80 (BP Location: Left arm, Patient Position: Sitting, Cuff Size:  "Large Adult)   Pulse 54   Ht 182.9 cm (72\")   Wt (!) 138 kg (304 lb)   BMI 41.23 kg/m²     Physical Examination:  Morbidly obese individual in no acute distress, patient is alert and cooperative with the exam, appears to have normal mood and affect with a normal attention span and concentration, currently in a wheelchair  normocephalic, atraumatic, extraocular movements intact, conjunctiva and sclera are clear without nystagmus, normal canals with grossly normal hearing, no nasal deformity, discharge, inflammation, or lesions, no mouth deformity or lesions  no lymphadenopathy or thyromegaly  normal respiratory effort  abdomen is nontender, without guarding or rebound and nondistended  Left lower extremity shows shows a very large overhanging pannus.  There is mild to moderate swelling/lymphedema.  There is venous stasis dermatitis present on the lower leg.  It is grossly well aligned, and the patient is neurovascularly intact distally. Plantar and dorsiflexion is 5/5. There is mild tenderness to palpation in the general hip area and moderate to severe pain with range of motion, which is significantly decreased due to pain.  pulses normal in all 4 extremities, no clubbing, cyanosis noted  cranial nerves II-XII grossly intact with no focal defects  skin intact without lesions or rashes visible             Imaging:  Recent x-rays were reviewed and show advanced end-stage left hip DJD with bone-on-bone articulation.  No fractures or dislocations are appreciated.            Assessment:  1. Primary osteoarthritis of left hip    2. Morbid obesity (CMS/[facility])                 Plan:  Dr. Torres was consulted for this case and also spoke with the patient today.  The patient is currently not a surgical candidate due to her weight, very large overhanging pannus, and multiple comorbid conditions, including non-or slow healing wounds, history of DVT and PE, and multiple other conditions.  We are recommending a referral to " bariatric surgery at this time we will also try an intra-articular fluoroscopy guided left hip steroid injection.  Had a lengthy conversation with the patient today spending at least 45 minutes face-to-face with the patient with at least 50% of that time spent on counseling and education concerning her disease process, surgical considerations, and future plans.  In the end she was quite upset but voiced understanding.  We will plan to see her back in 3 months for follow-up.  Natural history and expected course discussed. Questions answered.  Educational materials distributed.  OTC analgesics as needed.  cortisone injections  weight loss  activtiy modification  assistive devices             CINDI Obrien  12/11/19  9:21 AM

## 2020-01-02 ENCOUNTER — PATIENT OUTREACH (OUTPATIENT)
Dept: CASE MANAGEMENT | Facility: OTHER | Age: 75
End: 2020-01-02

## 2020-01-02 NOTE — OUTREACH NOTE
Care Coordination Note    RN-ACM outreach to [facility].  Per staff, patient remains within facility with Medicare as payor.  No d/c plans noted.    Krystal Lopez RN  Ambulatory     1/2/2020, 3:37 PM

## 2020-01-27 ENCOUNTER — TELEPHONE (OUTPATIENT)
Dept: FAMILY MEDICINE CLINIC | Facility: CLINIC | Age: 75
End: 2020-01-27

## 2020-01-28 ENCOUNTER — PATIENT OUTREACH (OUTPATIENT)
Dept: CASE MANAGEMENT | Facility: OTHER | Age: 75
End: 2020-01-28

## 2020-01-28 NOTE — OUTREACH NOTE
Care Coordination Note    RN-ACM outreach to [facility].  Confirmed patient d/c to home on 01/27/2020.  Home Health orders issued by provider.  RN-TITOM will follow with patient/home health.     Krystal Lopez RN  Ambulatory     1/28/2020, 2:25 PM

## 2020-01-28 NOTE — OUTREACH NOTE
SNF Follow-up Note      Responses   Acute Facility Discharged From  [facility]   Acute Discharge Date  10/18/19   Name of the Skilled Nursing Facility?  [facility]   Purpose of SNF Admission  PT, OT, SN   Estimated length of stay for the patient?  Discharged to home   Progression of Patient?  Patient discharged to home with home health.  Agency to be determined.    Skilled Nursing Discharge Date?  01/27/20   Where was the patient discharged to?  Home   Home Health Agency at discharge?  Yes   Name of Home Health Agency?  Information not provided.           Krystal Lopez RN  Ambulatory

## 2020-01-28 NOTE — OUTREACH NOTE
SNF Follow-up Note      Responses   Acute Facility Discharged From  [facility]   Acute Discharge Date  10/18/19   Name of the Skilled Nursing Facility?  [facility]   Purpose of SNF Admission  PT, OT, SN   Estimated length of stay for the patient?  Discharged to home   Progression of Patient?  Patient discharged to home with home health.  Agency to be determined.    Skilled Nursing Discharge Date?  01/27/20   Where was the patient discharged to?  Home   Home Health Agency at discharge?  Yes   Name of Home Health Agency?  Information not provided.           Krystal Lopez RN  Ambulatory     1/28/2020, 2:20 PM

## 2020-01-29 ENCOUNTER — OFFICE VISIT (OUTPATIENT)
Dept: WOUND CARE | Facility: HOSPITAL | Age: 75
End: 2020-01-29

## 2020-01-29 ENCOUNTER — LAB REQUISITION (OUTPATIENT)
Dept: LAB | Facility: HOSPITAL | Age: 75
End: 2020-01-29

## 2020-01-29 DIAGNOSIS — L97.521 NON-PRESSURE CHRONIC ULCER OF OTHER PART OF LEFT FOOT LIMITED TO BREAKDOWN OF SKIN (HCC): ICD-10-CM

## 2020-01-29 PROCEDURE — 87186 SC STD MICRODIL/AGAR DIL: CPT | Performed by: SURGERY

## 2020-01-29 PROCEDURE — 87205 SMEAR GRAM STAIN: CPT | Performed by: SURGERY

## 2020-01-29 PROCEDURE — G0463 HOSPITAL OUTPT CLINIC VISIT: HCPCS

## 2020-01-29 PROCEDURE — 87147 CULTURE TYPE IMMUNOLOGIC: CPT | Performed by: SURGERY

## 2020-01-29 PROCEDURE — 87070 CULTURE OTHR SPECIMN AEROBIC: CPT | Performed by: SURGERY

## 2020-01-30 ENCOUNTER — PATIENT OUTREACH (OUTPATIENT)
Dept: CASE MANAGEMENT | Facility: OTHER | Age: 75
End: 2020-01-30

## 2020-01-30 NOTE — OUTREACH NOTE
Care Coordination Assessment    Documented/Reviewed By:  Krystal Lopez RN Date/time:  1/30/2020 11:49 AM   Assessment completed with:  patient  Enrolled in care management program:  Yes  Living arrangement:  alone  Support system:  home care staff, family  Type of residence:  private residence  Home care services:  Yes (Comment: [facility])  Equipment used at home:  walker, wheelchair  Bed or wheelchair confined:  No  Inadequate nutrition:  No  Medication adherence problem:  No  Experiencing side effects from current medications:  No  Difficulty keeping appointments:  No

## 2020-01-30 NOTE — OUTREACH NOTE
Care Plan Note      Responses   Lifestyle Goals  Decrease falls risk, Eat a healthy diet, Medication management, Routine follow-up with doctor(s), Increase physical activity, Self monitor blood sugar, Routine eye exam, Routine foot care   Barriers  Other (See Comment) [Recent extended SNF stay, wound care needs]   Self Management  Get flu/pneumonia shot, Medication Adherence, Increase Physical Activities   Suggested Appointments  Other (See Comment) [Follow with providers as scheduled.]   Annual Wellness Visit:   Patient Refuses at This Time   AWV Materials  Send Materials   Care Gaps Addressed  Diabetic A1C   HbA1c Status  Up to Date-within defined limits   HbA1c Completion at [facility] or Other  [facility]   Specific Disease Process Teaching  Diabetes [Wound care, falls]   Other Patient Education/Resources   24/7 [facility] Nurse Call Line   24/7 Nurse Call Line Education Method  Verbal   Does patient have depression diagnosis?  No   Advanced Directives:  Patient Has   Discharged From:  [facility]   Discharged to:  Home with home health and [facility]   Discharge destination:  Home Health   Agency:  [facility]   Medication Adherence  Other (see comment) [Patient reports adherence with prescribed medications. ]   Health Literacy  Good        The main concerns and/or symptoms the patient would like to address are: Patient discharged to home earlier this week from [facility].  Patient reported a three month stay within the facility.  Patient reports having d/c to  home with 24/7 sitter and personal care from [facility]. She indicated a SOC visit from [facility] and plan for SN for wound care on Monday and Fridays.  Patient stated she is seen at [facility] each Wednesday.  Patient reports hx of skin ulcers of both feet/lower extremities. Patient has dx of DMII with most recent A1c in March of 5.6.  Patient denied unaddressed needs.  Upcoming appointments  reviewed.  Patient has d/c f/u appointment scheduled 02/04/2020.    Education/instruction provided by Care Coordinator: Assessment and care plan initiated this call. Refer to each for additional notations. Patient deferred conversation relative to care gaps until she has had opportunity to recover from recent hospitalization.  ACP in place, AWV overdue (materials sent), patient declined MyChart.    Follow Up Outreach Due:  Contact information provided, home health in home, case review scheduled 3-5 weeks.    Krystal Lopez RN  Ambulatory   1/30/2020, 12:18 PM

## 2020-01-31 LAB
BACTERIA SPEC AEROBE CULT: ABNORMAL
BACTERIA SPEC AEROBE CULT: ABNORMAL
GRAM STN SPEC: ABNORMAL
GRAM STN SPEC: ABNORMAL

## 2020-02-04 ENCOUNTER — OFFICE VISIT (OUTPATIENT)
Dept: FAMILY MEDICINE CLINIC | Facility: CLINIC | Age: 75
End: 2020-02-04

## 2020-02-04 VITALS
RESPIRATION RATE: 20 BRPM | SYSTOLIC BLOOD PRESSURE: 117 MMHG | HEART RATE: 70 BPM | TEMPERATURE: 98.2 F | OXYGEN SATURATION: 96 % | DIASTOLIC BLOOD PRESSURE: 67 MMHG

## 2020-02-04 DIAGNOSIS — E03.9 HYPOTHYROIDISM, UNSPECIFIED TYPE: ICD-10-CM

## 2020-02-04 DIAGNOSIS — R32 URINARY INCONTINENCE IN FEMALE: ICD-10-CM

## 2020-02-04 DIAGNOSIS — Z12.31 BREAST CANCER SCREENING BY MAMMOGRAM: ICD-10-CM

## 2020-02-04 DIAGNOSIS — I10 HYPERTENSION, UNSPECIFIED TYPE: ICD-10-CM

## 2020-02-04 DIAGNOSIS — I89.0 LYMPHEDEMA: ICD-10-CM

## 2020-02-04 DIAGNOSIS — I26.99 PULMONARY EMBOLISM, UNSPECIFIED CHRONICITY, UNSPECIFIED PULMONARY EMBOLISM TYPE, UNSPECIFIED WHETHER ACUTE COR PULMONALE PRESENT (HCC): Primary | ICD-10-CM

## 2020-02-04 PROBLEM — L89.302 DECUBITUS ULCER OF BUTTOCK, STAGE 2: Status: RESOLVED | Noted: 2019-10-14 | Resolved: 2020-02-04

## 2020-02-04 PROCEDURE — 99214 OFFICE O/P EST MOD 30 MIN: CPT | Performed by: NURSE PRACTITIONER

## 2020-02-04 RX ORDER — LINEZOLID 600 MG/1
1 TABLET, FILM COATED ORAL 2 TIMES DAILY
COMMUNITY
Start: 2020-01-31 | End: 2020-06-10 | Stop reason: DRUGHIGH

## 2020-02-04 RX ORDER — LISINOPRIL 20 MG/1
20 TABLET ORAL DAILY
COMMUNITY
Start: 2020-01-27 | End: 2020-09-18 | Stop reason: SDUPTHER

## 2020-02-04 RX ORDER — MIRABEGRON 50 MG/1
50 TABLET, FILM COATED, EXTENDED RELEASE ORAL DAILY
COMMUNITY
Start: 2020-01-27 | End: 2020-02-14

## 2020-02-04 NOTE — PATIENT INSTRUCTIONS
Follow up on labs.   Follow up 6 months.   Fill out papers for advanced directive.   Schedule mammogram and   Do the cologuard screening.

## 2020-02-04 NOTE — PROGRESS NOTES
Subjective   Camelia Gray is a 74 y.o. female.     Chief Complaint   Patient presents with   • Foot Ulcer     rehab follow up       HPI  She is out of [facility] she was there 13 weeks.  She was admitted to the hospital 10/13/2019  for acute cystitis with hematuria and cellulitis of both feet. She was transferred to [facility] for continuation of her care as she was unable to ambulate and care for herself.   She is here today for management of her chronic medical problems: hypertension, immobility syndrome , arthritis of left hip , lymphedema, hypothyroidism,.     Hypertension: well controlled lisinopril 40 mg once day . No chest pain. Has lymphedema lower ext chronic. No cough     Hypothyroidism: she is taking levothyroxine 25 mcg once day. Denies any more fatigue than her normal.     PE ; she is taking eliquis 2.5 mg. Bid. She is not ambulatory unless she has walker. She is currently in wheel chair. She has open wounds to her feet. She is followed by Dr Gil and has had labs while in hospital for any blood disorder.reviewed her labs from 5/2019. No findings.      Open wound both feet; she is followed by home health nurse and wound care once week. She has lower ext lymph edema with wraps from wound care. She is taking linezolid 60 mg bid for her cellulitits to both feet.     Urinary incontinence: she said at night she is followed by urologist. She is on myrbetriq 50 mg once day.     Left osteoarthritis ; chronic problem she would like to pursue hip replacement but has cellulitis lower ext and is being treated .         The following portions of the patient's history were reviewed and updated as appropriate: allergies, current medications, past family history, past medical history, past social history, past surgical history and problem list.      Current Outpatient Medications:   •  apixaban (ELIQUIS) 5 MG tablet tablet, Take 2.5 mg by mouth 2 (Two) Times a Day., Disp: , Rfl:   •  levothyroxine (SYNTHROID,  LEVOTHROID) 25 MCG tablet, Take 25 mcg by mouth Daily., Disp: , Rfl:   •  linezolid (ZYVOX) 600 MG tablet, 1 tablet 2 (Two) Times a Day., Disp: , Rfl:   •  lisinopril (PRINIVIL,ZESTRIL) 40 MG tablet, 1 tablet Daily., Disp: , Rfl:   •  metoprolol succinate XL (TOPROL-XL) 100 MG 24 hr tablet, TAKE ONE TABLET BY MOUTH EVERY DAY, Disp: 90 tablet, Rfl: 1  •  Multiple Vitamin (THERA) tablet tablet, Take 1 tablet by mouth Daily., Disp: , Rfl:   •  MYRBETRIQ 50 MG tablet sustained-release 24 hour 24 hr tablet, Take 50 mg by mouth Daily., Disp: , Rfl:     Recent Results (from the past 4032 hour(s))   Comprehensive Metabolic Panel    Collection Time: 10/13/19  3:40 PM   Result Value Ref Range    Glucose 103 (H) 65 - 99 mg/dL    BUN 25 (H) 8 - 20 mg/dL    Creatinine 0.80 0.40 - 1.00 mg/dL    Sodium 142 136 - 144 mmol/L    Potassium 4.1 3.6 - 5.1 mmol/L    Chloride 108 101 - 111 mmol/L    CO2 26.0 22.0 - 32.0 mmol/L    Calcium 9.2 8.9 - 10.3 mg/dL    Total Protein 6.4 6.1 - 7.9 g/dL    Albumin 3.20 (L) 3.50 - 4.80 g/dL    ALT (SGPT) 17 14 - 54 U/L    AST (SGOT) 23 15 - 41 U/L    Alkaline Phosphatase 94 (H) 32 - 91 U/L    Total Bilirubin 0.3 0.3 - 1.2 mg/dL    eGFR Non African Amer 70 >60 mL/min/1.73    Globulin 3.2 2.5 - 3.8 gm/dL    A/G Ratio 1.0 1.0 - 1.7 g/dL    BUN/Creatinine Ratio 31.3 (H) 5.4 - 26.2    Anion Gap 12.1 5.0 - 15.0 mmol/L   Protime-INR    Collection Time: 10/13/19  3:40 PM   Result Value Ref Range    Protime 10.7 9.6 - 11.7 Seconds    INR 1.03 0.90 - 1.10   aPTT    Collection Time: 10/13/19  3:40 PM   Result Value Ref Range    PTT 27.6 24.0 - 31.0 seconds   Urinalysis With Culture If Indicated - Urine, Catheter    Collection Time: 10/13/19  3:40 PM   Result Value Ref Range    Color, UA Yellow Yellow, Straw    Appearance, UA Cloudy (A) Clear    pH, UA 5.5 5.0 - 8.0    Specific Gravity, UA 1.028 1.005 - 1.030    Glucose, UA Negative Negative    Ketones, UA Negative Negative    Bilirubin, UA Negative Negative     Blood, UA Negative Negative    Protein, UA Negative Negative    Leuk Esterase, UA Small (1+) (A) Negative    Nitrite, UA Negative Negative    Urobilinogen, UA 0.2 E.U./dL 0.2 - 1.0 E.U./dL   Troponin    Collection Time: 10/13/19  3:40 PM   Result Value Ref Range    Troponin I <0.030 0.000 - 0.030 ng/mL   Blood Culture - Blood, Blood, Venous Line    Collection Time: 10/13/19  3:40 PM   Result Value Ref Range    Blood Culture No growth at 5 days    CK    Collection Time: 10/13/19  3:40 PM   Result Value Ref Range    Creatine Kinase 35 (L) 38 - 234 U/L   Magnesium    Collection Time: 10/13/19  3:40 PM   Result Value Ref Range    Magnesium 2.0 1.8 - 2.5 mg/dL   Phosphorus    Collection Time: 10/13/19  3:40 PM   Result Value Ref Range    Phosphorus 3.5 2.4 - 4.7 mg/dL   TSH    Collection Time: 10/13/19  3:40 PM   Result Value Ref Range    TSH 9.170 (H) 0.340 - 5.600 uIU/mL   Urinalysis, Microscopic Only - Urine, Catheter    Collection Time: 10/13/19  3:40 PM   Result Value Ref Range    RBC, UA 3-5 (A) None Seen /HPF    WBC, UA 13-20 (A) None Seen /HPF    Bacteria, UA 2+ (A) None Seen /HPF    Squamous Epithelial Cells, UA 7-12 (A) None Seen, 0-2 /HPF    Transitional Epithelial Cells, UA 0-2 0 - 2 /HPF    Hyaline Casts, UA 7-12 None Seen /LPF    Granular Casts, UA 0-2 None Seen /LPF    Amorphous Crystals, UA Small/1+ None Seen /HPF    Mucus, UA Small/1+ (A) None Seen, Trace /HPF    Methodology Manual Light Microscopy    Urine Culture - Urine, Urine, Catheter    Collection Time: 10/13/19  3:40 PM   Result Value Ref Range    Urine Culture >100,000 CFU/mL Escherichia coli (A)        Susceptibility    Escherichia coli - LUCIA     Ampicillin 8 Susceptible ug/ml     Ampicillin + Sulbactam 4 Susceptible ug/ml     Cefazolin <=4 Susceptible ug/ml     Cefepime <=1 Susceptible ug/ml     Ceftazidime <=1 Susceptible ug/ml     Ceftriaxone <=1 Susceptible ug/ml     Gentamicin <=1 Susceptible ug/ml     Levofloxacin <=0.12 Susceptible ug/ml      Nitrofurantoin <=16 Susceptible ug/ml     Piperacillin + Tazobactam <=4 Susceptible ug/ml     Tetracycline <=1 Susceptible ug/ml     Trimethoprim + Sulfamethoxazole <=20 Susceptible ug/ml   T4, Free    Collection Time: 10/13/19  3:40 PM   Result Value Ref Range    Free T4 0.84 0.58 - 1.64 ng/dL   CBC Auto Differential    Collection Time: 10/13/19  3:44 PM   Result Value Ref Range    WBC 6.20 3.40 - 10.80 10*3/mm3    RBC 4.08 3.77 - 5.28 10*6/mm3    Hemoglobin 13.0 12.0 - 15.9 g/dL    Hematocrit 38.0 34.0 - 46.6 %    MCV 92.9 79.0 - 97.0 fL    MCH 31.9 26.6 - 33.0 pg    MCHC 34.3 31.5 - 35.7 g/dL    RDW 14.0 12.3 - 15.4 %    RDW-SD 45.5 37.0 - 54.0 fl    MPV 6.8 6.0 - 12.0 fL    Platelets 206 140 - 450 10*3/mm3    Neutrophil % 73.9 42.7 - 76.0 %    Lymphocyte % 14.1 (L) 19.6 - 45.3 %    Monocyte % 8.9 5.0 - 12.0 %    Eosinophil % 2.5 0.3 - 6.2 %    Basophil % 0.6 0.0 - 1.5 %    Neutrophils, Absolute 4.60 1.70 - 7.00 10*3/mm3    Lymphocytes, Absolute 0.90 0.70 - 3.10 10*3/mm3    Monocytes, Absolute 0.60 0.10 - 0.90 10*3/mm3    Eosinophils, Absolute 0.20 0.00 - 0.40 10*3/mm3    Basophils, Absolute 0.00 0.00 - 0.20 10*3/mm3    nRBC 0.0 0.0 - 0.2 /100 WBC   Comprehensive Metabolic Panel    Collection Time: 10/14/19  4:07 AM   Result Value Ref Range    Glucose 102 (H) 65 - 99 mg/dL    BUN 22 (H) 8 - 20 mg/dL    Creatinine 0.80 0.40 - 1.00 mg/dL    Sodium 139 136 - 144 mmol/L    Potassium 4.1 3.6 - 5.1 mmol/L    Chloride 107 101 - 111 mmol/L    CO2 24.0 22.0 - 32.0 mmol/L    Calcium 8.7 (L) 8.9 - 10.3 mg/dL    Total Protein 5.9 (L) 6.1 - 7.9 g/dL    Albumin 3.00 (L) 3.50 - 4.80 g/dL    ALT (SGPT) 14 14 - 54 U/L    AST (SGOT) 16 15 - 41 U/L    Alkaline Phosphatase 90 32 - 91 U/L    Total Bilirubin 0.6 0.3 - 1.2 mg/dL    eGFR Non African Amer 70 >60 mL/min/1.73    Globulin 2.9 2.5 - 3.8 gm/dL    A/G Ratio 1.0 1.0 - 1.7 g/dL    BUN/Creatinine Ratio 27.5 (H) 5.4 - 26.2    Anion Gap 12.1 5.0 - 15.0 mmol/L   CBC Auto  Differential    Collection Time: 10/14/19  4:08 AM   Result Value Ref Range    WBC 7.80 3.40 - 10.80 10*3/mm3    RBC 4.00 3.77 - 5.28 10*6/mm3    Hemoglobin 12.3 12.0 - 15.9 g/dL    Hematocrit 37.3 34.0 - 46.6 %    MCV 93.4 79.0 - 97.0 fL    MCH 30.8 26.6 - 33.0 pg    MCHC 33.0 31.5 - 35.7 g/dL    RDW 14.3 12.3 - 15.4 %    RDW-SD 47.3 37.0 - 54.0 fl    MPV 7.7 6.0 - 12.0 fL    Platelets 197 140 - 450 10*3/mm3    Neutrophil % 75.6 42.7 - 76.0 %    Lymphocyte % 13.1 (L) 19.6 - 45.3 %    Monocyte % 8.8 5.0 - 12.0 %    Eosinophil % 1.9 0.3 - 6.2 %    Basophil % 0.6 0.0 - 1.5 %    Neutrophils, Absolute 5.90 1.70 - 7.00 10*3/mm3    Lymphocytes, Absolute 1.00 0.70 - 3.10 10*3/mm3    Monocytes, Absolute 0.70 0.10 - 0.90 10*3/mm3    Eosinophils, Absolute 0.10 0.00 - 0.40 10*3/mm3    Basophils, Absolute 0.00 0.00 - 0.20 10*3/mm3    nRBC 0.0 0.0 - 0.2 /100 WBC   EDUIN    Collection Time: 10/14/19  5:53 PM   Result Value Ref Range    Antinuclear Antibodies (EDUIN) Negative Negative   Antiphosphotidyl Antibodies Panel II    Collection Time: 10/14/19  5:53 PM   Result Value Ref Range    Anti-Phosphatidyl Inositol Comment     Anti-Phosphatidyl,IgA 5.1 <12.0 U/mL    Anti-Phosphatidyl,IgG 2.9 <12.0 U/mL    Anti-Phosphatidyl,IgM 2.8 <12.0 U/mL    Anti-Phosphatidic Acid Comment     Anti-Phosphatidic,IgA 10.6 <12.0 U/mL    Anti-Phosphatidic,IgG 3.9 <12.0 U/mL    Anti-Phosphatidic,IgM 4.8 <12.0 U/mL    Anti-Phosphatidylethanolamine Comment     Anti-Phosphatidylethanolamine, IgA 3.0 <12.0 U/mL    Anti-Phosphatidylethanolamine, IgG 2.9 <12.0 U/mL    Anti-Phosphatidylethanolamine, IgM 6.2 <12.0 U/mL    Anti-Phosphatidyl Glycerol Comment     Anti-Phosphatidyl Glycerol, IgA 4.9 <12.0 U/mL    Anti-Phosphatidyl Glycerol, IgG 4.0 <12.0 U/mL    Anti-Phosphatidyl Glycerol, IgM 2.6 <12.0 U/mL   Antithrombin III    Collection Time: 10/14/19  5:53 PM   Result Value Ref Range    Antithrombin Activity 75 75 - 120 %   Factor 5 Activity    Collection Time:  10/14/19  5:53 PM   Result Value Ref Range    Factor V Activity 123 70 - 150 %   Factor 5 Leiden    Collection Time: 10/14/19  5:53 PM   Result Value Ref Range    Factor V Leiden Normal Normal   Lupus Anticoagulant    Collection Time: 10/14/19  5:53 PM   Result Value Ref Range    Dilute Prothrombin Time(dPT) 49.3 0.0 - 55.0 sec    dPT Confirm Ratio 1.09 0.00 - 1.40 Ratio    Thrombin Time 18.0 0.0 - 23.0 sec    PTT Lupus Anticoagulant 43.0 0.0 - 51.9 sec    Dilute Viper Venom Time 49.3 (H) 0.0 - 47.0 sec    Lupus Anticoagulant Reflex Comment:    Protein C Activity    Collection Time: 10/14/19  5:53 PM   Result Value Ref Range    Protein C Activity 98 70 - 130 %   Protein S Functional    Collection Time: 10/14/19  5:53 PM   Result Value Ref Range    Protein S-Functional 85 63 - 140 %   Factor II, DNA Analysis    Collection Time: 10/14/19  5:53 PM   Result Value Ref Range    Factor II, DNA Analysis Normal Normal   Reflexed DRVVT Mix    Collection Time: 10/14/19  5:53 PM   Result Value Ref Range    Dilute Viper Venom 1:1 Mix 40.0 0.0 - 47.0 sec   Duplex Venous Lower Extremity - Bilateral CAR    Collection Time: 10/15/19  7:32 AM   Result Value Ref Range    Right Common Femoral Spont Y     Right Common Femoral Phasic Y     Right Common Femoral Augment Y     Right Common Femoral Competent Y     Right Common Femoral Compress C     Right Saphenofemoral Junction Spont Y     Right Saphenofemoral Junction Phasic Y     Right Saphenofemoral Junction Augment Y     Right Saphenofemoral Junction Competent Y     Right Saphenofemoral Junction Compress C     Right Proximal Femoral Compress C     Right Mid Femoral Spont Y     Right Mid Femoral Phasic Y     Right Mid Femoral Augment Y     Right Mid Femoral Competent Y     Right Mid Femoral Compress C     Right Distal Femoral Compress C     Right Popliteal Spont Y     Right Popliteal Phasic Y     Right Popliteal Augment Y     Right Popliteal Competent Y     Right Popliteal Compress C      Right Posterior Tibial Compress C     Right GastronemiusSoleal Compress C     Right Greater Saph AK Compress C     Right Greater Saph BK Compress C     Right Lesser Saph Compress C     Left Common Femoral Spont Y     Left Common Femoral Phasic Y     Left Common Femoral Augment Y     Left Common Femoral Competent Y     Left Common Femoral Compress C     Left Saphenofemoral Junction Spont Y     Left Saphenofemoral Junction Phasic Y     Left Saphenofemoral Junction Augment Y     Left Saphenofemoral Junction Competent Y     Left Saphenofemoral Junction Compress C     Left Proximal Femoral Compress C     Left Mid Femoral Spont Y     Left Mid Femoral Phasic Y     Left Mid Femoral Augment Y     Left Mid Femoral Competent Y     Left Mid Femoral Compress C     Left Distal Femoral Compress C     Left Popliteal Spont Y     Left Popliteal Phasic Y     Left Popliteal Augment Y     Left Popliteal Competent Y     Left Popliteal Compress C     Left Posterior Tibial Compress C     Left GastronemiusSoleal Compress C     Left Greater Saph AK Compress C     Left Greater Saph BK Compress C     Left Lesser Saph Compress C    Blood Culture - Blood, Arm, Right    Collection Time: 10/22/19  6:08 PM   Result Value Ref Range    Blood Culture No growth at 5 days    Comprehensive Metabolic Panel    Collection Time: 10/22/19  6:12 PM   Result Value Ref Range    Glucose 96 65 - 99 mg/dL    BUN 22 8 - 23 mg/dL    Creatinine 0.80 0.57 - 1.00 mg/dL    Sodium 141 136 - 145 mmol/L    Potassium 4.4 3.5 - 5.2 mmol/L    Chloride 101 98 - 107 mmol/L    CO2 29.0 22.0 - 29.0 mmol/L    Calcium 9.2 8.6 - 10.5 mg/dL    Total Protein 7.3 6.0 - 8.5 g/dL    Albumin 3.50 3.50 - 5.20 g/dL    ALT (SGPT) 16 1 - 33 U/L    AST (SGOT) 17 1 - 32 U/L    Alkaline Phosphatase 104 39 - 117 U/L    Total Bilirubin 0.3 0.2 - 1.2 mg/dL    eGFR Non African Amer 70 >60 mL/min/1.73    Globulin 3.8 gm/dL    A/G Ratio 0.9 g/dL    BUN/Creatinine Ratio 27.5 (H) 7.0 - 25.0    Anion Gap  11.0 5.0 - 15.0 mmol/L   Blood Culture - Blood, Arm, Left    Collection Time: 10/22/19  6:12 PM   Result Value Ref Range    Blood Culture No growth at 5 days    CBC Auto Differential    Collection Time: 10/22/19  6:12 PM   Result Value Ref Range    WBC 7.50 3.40 - 10.80 10*3/mm3    RBC 4.73 3.77 - 5.28 10*6/mm3    Hemoglobin 15.1 12.0 - 15.9 g/dL    Hematocrit 44.3 34.0 - 46.6 %    MCV 93.6 79.0 - 97.0 fL    MCH 31.8 26.6 - 33.0 pg    MCHC 34.0 31.5 - 35.7 g/dL    RDW 13.9 12.3 - 15.4 %    RDW-SD 45.1 37.0 - 54.0 fl    MPV 6.6 6.0 - 12.0 fL    Platelets 256 140 - 450 10*3/mm3    Neutrophil % 69.3 42.7 - 76.0 %    Lymphocyte % 17.0 (L) 19.6 - 45.3 %    Monocyte % 8.8 5.0 - 12.0 %    Eosinophil % 4.2 0.3 - 6.2 %    Basophil % 0.7 0.0 - 1.5 %    Neutrophils, Absolute 5.20 1.70 - 7.00 10*3/mm3    Lymphocytes, Absolute 1.30 0.70 - 3.10 10*3/mm3    Monocytes, Absolute 0.70 0.10 - 0.90 10*3/mm3    Eosinophils, Absolute 0.30 0.00 - 0.40 10*3/mm3    Basophils, Absolute 0.10 0.00 - 0.20 10*3/mm3    nRBC 0.0 0.0 - 0.2 /100 WBC   Light Blue Top    Collection Time: 10/22/19  6:12 PM   Result Value Ref Range    Extra Tube hold for add-on    Gold Top - SST    Collection Time: 10/22/19  6:12 PM   Result Value Ref Range    Extra Tube Hold for add-ons.    Wound Culture - Wound, Foot, Right    Collection Time: 01/29/20 11:30 AM   Result Value Ref Range    Wound Culture Scant growth (1+) Staphylococcus aureus, MRSA (A)     Wound Culture Scant growth (1+) Normal Skin Shanae     Gram Stain Rare (1+) WBCs seen     Gram Stain No organisms seen        Susceptibility    Staphylococcus aureus, MRSA - LUCIA*     Clindamycin 0.5 Susceptible ug/ml     Inducible Clindamycin Resistance NEG Negative ug/ml     Erythromycin <=0.25 Susceptible ug/ml     Oxacillin >=4 Resistant ug/ml     Penicillin G >=0.5 Resistant ug/ml     Rifampin <=0.5 Susceptible ug/ml     Tetracycline >=16 Resistant ug/ml     Trimethoprim + Sulfamethoxazole >=320 Resistant ug/ml      Vancomycin 1 Susceptible ug/ml     * This isolate does not demonstrate inducible clindamycin resistance in vitro.           Review of Systems   Constitutional: Negative for chills and fever.   HENT: Negative for congestion, sinus pressure and swollen glands.    Eyes: Negative for blurred vision and pain.   Respiratory: Negative for cough and shortness of breath.    Cardiovascular: Negative for chest pain and leg swelling.   Gastrointestinal: Negative for abdominal pain, nausea and indigestion.   Endocrine: Negative for cold intolerance, heat intolerance, polydipsia, polyphagia and polyuria.   Skin: Negative for dry skin, rash and bruise.   Psychiatric/Behavioral: Negative for dysphoric mood and stress.       Objective     /67 (BP Location: Left arm, Patient Position: Sitting, Cuff Size: Large Adult)   Pulse 70   Temp 98.2 °F (36.8 °C) (Oral)   Resp 20   SpO2 96%     Physical Exam   Constitutional: She is oriented to person, place, and time. She appears well-developed and well-nourished.   HENT:   Head: Normocephalic and atraumatic.   Right Ear: External ear normal.   Left Ear: External ear normal.   Nose: Nose normal.   Mouth/Throat: Oropharynx is clear and moist. No oropharyngeal exudate.   Eyes: Pupils are equal, round, and reactive to light. Conjunctivae are normal.   Neck: Normal range of motion. Neck supple.   Cardiovascular: Normal rate, regular rhythm and normal heart sounds.   Pulmonary/Chest: Effort normal and breath sounds normal.   Abdominal: Soft. Bowel sounds are normal.   Musculoskeletal: Normal range of motion.   Neurological: She is alert and oriented to person, place, and time.   Skin: Skin is warm and dry. Turgor is normal. No rash noted. No cyanosis. Nails show no clubbing.              Assessment/Plan   Camelia was seen today for foot ulcer.    Diagnoses and all orders for this visit:    Pulmonary embolism, unspecified chronicity, unspecified pulmonary embolism type, unspecified whether  acute cor pulmonale present (CMS/HCC)    Lymphedema    Hypothyroidism, unspecified type  -     TSH  -     T4, free  -     Basic Metabolic Panel    Hypertension, unspecified type  -     Lipid Panel    Urinary incontinence in female  Comments:  followed by urolgy. has to have pads for her bed and wear incontinent protection at night.       Patient Instructions   Follow up on labs.   Follow up 6 months.   Fill out papers for advanced directive.   Schedule mammogram and   Do the cologuard screening.       Brinda Tony, APRN    02/04/20

## 2020-02-05 ENCOUNTER — OFFICE VISIT (OUTPATIENT)
Dept: WOUND CARE | Facility: HOSPITAL | Age: 75
End: 2020-02-05

## 2020-02-05 ENCOUNTER — LAB (OUTPATIENT)
Dept: LAB | Facility: HOSPITAL | Age: 75
End: 2020-02-05

## 2020-02-05 ENCOUNTER — TRANSCRIBE ORDERS (OUTPATIENT)
Dept: LAB | Facility: HOSPITAL | Age: 75
End: 2020-02-05

## 2020-02-05 DIAGNOSIS — L97.521 ULCER OF TOE OF LEFT FOOT, LIMITED TO BREAKDOWN OF SKIN (HCC): ICD-10-CM

## 2020-02-05 DIAGNOSIS — L97.521 ULCER OF TOE OF LEFT FOOT, LIMITED TO BREAKDOWN OF SKIN (HCC): Primary | ICD-10-CM

## 2020-02-05 LAB
ALBUMIN SERPL-MCNC: 3.9 G/DL (ref 3.5–5.2)
ALBUMIN/GLOB SERPL: 1.3 G/DL
ALP SERPL-CCNC: 105 U/L (ref 39–117)
ALT SERPL W P-5'-P-CCNC: 19 U/L (ref 1–33)
ANION GAP SERPL CALCULATED.3IONS-SCNC: 14.7 MMOL/L (ref 5–15)
AST SERPL-CCNC: 24 U/L (ref 1–32)
BASOPHILS # BLD AUTO: 0.03 10*3/MM3 (ref 0–0.2)
BASOPHILS NFR BLD AUTO: 0.5 % (ref 0–1.5)
BILIRUB SERPL-MCNC: 0.4 MG/DL (ref 0.2–1.2)
BUN BLD-MCNC: 29 MG/DL (ref 8–23)
BUN/CREAT SERPL: 27.4 (ref 7–25)
CALCIUM SPEC-SCNC: 9.2 MG/DL (ref 8.6–10.5)
CHLORIDE SERPL-SCNC: 102 MMOL/L (ref 98–107)
CHOLEST SERPL-MCNC: 131 MG/DL (ref 0–200)
CO2 SERPL-SCNC: 24.3 MMOL/L (ref 22–29)
CREAT BLD-MCNC: 1.06 MG/DL (ref 0.57–1)
DEPRECATED RDW RBC AUTO: 48.7 FL (ref 37–54)
EOSINOPHIL # BLD AUTO: 0.22 10*3/MM3 (ref 0–0.4)
EOSINOPHIL NFR BLD AUTO: 3.5 % (ref 0.3–6.2)
ERYTHROCYTE [DISTWIDTH] IN BLOOD BY AUTOMATED COUNT: 13.8 % (ref 12.3–15.4)
GFR SERPL CREATININE-BSD FRML MDRD: 51 ML/MIN/1.73
GLOBULIN UR ELPH-MCNC: 3.1 GM/DL
GLUCOSE BLD-MCNC: 107 MG/DL (ref 65–99)
HCT VFR BLD AUTO: 39.3 % (ref 34–46.6)
HDLC SERPL-MCNC: 63 MG/DL (ref 40–60)
HGB BLD-MCNC: 13 G/DL (ref 12–15.9)
IMM GRANULOCYTES # BLD AUTO: 0.03 10*3/MM3 (ref 0–0.05)
IMM GRANULOCYTES NFR BLD AUTO: 0.5 % (ref 0–0.5)
LDLC SERPL CALC-MCNC: 48 MG/DL (ref 0–100)
LDLC/HDLC SERPL: 0.76 {RATIO}
LYMPHOCYTES # BLD AUTO: 1.26 10*3/MM3 (ref 0.7–3.1)
LYMPHOCYTES NFR BLD AUTO: 20.3 % (ref 19.6–45.3)
MCH RBC QN AUTO: 31.5 PG (ref 26.6–33)
MCHC RBC AUTO-ENTMCNC: 33.1 G/DL (ref 31.5–35.7)
MCV RBC AUTO: 95.2 FL (ref 79–97)
MONOCYTES # BLD AUTO: 0.39 10*3/MM3 (ref 0.1–0.9)
MONOCYTES NFR BLD AUTO: 6.3 % (ref 5–12)
NEUTROPHILS # BLD AUTO: 4.28 10*3/MM3 (ref 1.7–7)
NEUTROPHILS NFR BLD AUTO: 68.9 % (ref 42.7–76)
NRBC BLD AUTO-RTO: 0 /100 WBC (ref 0–0.2)
PLATELET # BLD AUTO: 264 10*3/MM3 (ref 140–450)
PMV BLD AUTO: 9.7 FL (ref 6–12)
POTASSIUM BLD-SCNC: 4.5 MMOL/L (ref 3.5–5.2)
PREALB SERPL-MCNC: 23.5 MG/DL (ref 20–40)
PROT SERPL-MCNC: 7 G/DL (ref 6–8.5)
RBC # BLD AUTO: 4.13 10*6/MM3 (ref 3.77–5.28)
SODIUM BLD-SCNC: 141 MMOL/L (ref 136–145)
T4 FREE SERPL-MCNC: 1.25 NG/DL (ref 0.93–1.7)
TRIGL SERPL-MCNC: 102 MG/DL (ref 0–150)
TSH SERPL DL<=0.05 MIU/L-ACNC: 5.38 UIU/ML (ref 0.27–4.2)
VLDLC SERPL-MCNC: 20.4 MG/DL (ref 5–40)
WBC NRBC COR # BLD: 6.21 10*3/MM3 (ref 3.4–10.8)

## 2020-02-05 PROCEDURE — G0463 HOSPITAL OUTPT CLINIC VISIT: HCPCS

## 2020-02-05 PROCEDURE — 84439 ASSAY OF FREE THYROXINE: CPT | Performed by: NURSE PRACTITIONER

## 2020-02-05 PROCEDURE — 80053 COMPREHEN METABOLIC PANEL: CPT

## 2020-02-05 PROCEDURE — 85025 COMPLETE CBC W/AUTO DIFF WBC: CPT

## 2020-02-05 PROCEDURE — 84134 ASSAY OF PREALBUMIN: CPT

## 2020-02-05 PROCEDURE — 80061 LIPID PANEL: CPT | Performed by: NURSE PRACTITIONER

## 2020-02-05 PROCEDURE — 36415 COLL VENOUS BLD VENIPUNCTURE: CPT

## 2020-02-05 PROCEDURE — 84443 ASSAY THYROID STIM HORMONE: CPT | Performed by: NURSE PRACTITIONER

## 2020-02-07 RX ORDER — LEVOTHYROXINE SODIUM 0.05 MG/1
25 TABLET ORAL DAILY
Qty: 90 TABLET | Refills: 1 | Status: SHIPPED | OUTPATIENT
Start: 2020-02-07 | End: 2020-09-14

## 2020-02-12 ENCOUNTER — OFFICE VISIT (OUTPATIENT)
Dept: WOUND CARE | Facility: HOSPITAL | Age: 75
End: 2020-02-12

## 2020-02-12 PROCEDURE — G0463 HOSPITAL OUTPT CLINIC VISIT: HCPCS

## 2020-02-14 DIAGNOSIS — M16.12 PRIMARY OSTEOARTHRITIS OF LEFT HIP: Primary | ICD-10-CM

## 2020-02-14 RX ORDER — MIRABEGRON 50 MG/1
TABLET, FILM COATED, EXTENDED RELEASE ORAL
Qty: 30 TABLET | Refills: 0 | Status: SHIPPED | OUTPATIENT
Start: 2020-02-14 | End: 2020-04-21

## 2020-02-14 RX ORDER — TRAMADOL HYDROCHLORIDE 50 MG/1
TABLET ORAL
Qty: 28 TABLET | Refills: 0 | Status: SHIPPED | OUTPATIENT
Start: 2020-02-14 | End: 2020-04-21

## 2020-02-19 ENCOUNTER — OFFICE VISIT (OUTPATIENT)
Dept: WOUND CARE | Facility: HOSPITAL | Age: 75
End: 2020-02-19

## 2020-02-19 PROCEDURE — G0463 HOSPITAL OUTPT CLINIC VISIT: HCPCS

## 2020-02-26 ENCOUNTER — OFFICE VISIT (OUTPATIENT)
Dept: WOUND CARE | Facility: HOSPITAL | Age: 75
End: 2020-02-26

## 2020-02-26 PROCEDURE — G0463 HOSPITAL OUTPT CLINIC VISIT: HCPCS

## 2020-03-11 ENCOUNTER — OFFICE VISIT (OUTPATIENT)
Dept: WOUND CARE | Facility: HOSPITAL | Age: 75
End: 2020-03-11

## 2020-03-11 PROCEDURE — G0463 HOSPITAL OUTPT CLINIC VISIT: HCPCS

## 2020-03-25 ENCOUNTER — APPOINTMENT (OUTPATIENT)
Dept: WOUND CARE | Facility: HOSPITAL | Age: 75
End: 2020-03-25

## 2020-04-21 DIAGNOSIS — M16.12 PRIMARY OSTEOARTHRITIS OF LEFT HIP: ICD-10-CM

## 2020-04-21 RX ORDER — MIRABEGRON 50 MG/1
TABLET, FILM COATED, EXTENDED RELEASE ORAL
Qty: 90 TABLET | Refills: 1 | Status: SHIPPED | OUTPATIENT
Start: 2020-04-21 | End: 2020-07-21 | Stop reason: HOSPADM

## 2020-04-21 RX ORDER — TRAMADOL HYDROCHLORIDE 50 MG/1
TABLET ORAL
Qty: 28 TABLET | Refills: 0 | Status: SHIPPED | OUTPATIENT
Start: 2020-04-21 | End: 2020-05-28 | Stop reason: SDUPTHER

## 2020-04-23 ENCOUNTER — TELEPHONE (OUTPATIENT)
Dept: FAMILY MEDICINE CLINIC | Facility: CLINIC | Age: 75
End: 2020-04-23

## 2020-04-23 RX ORDER — NYSTATIN 100000 [USP'U]/G
POWDER TOPICAL 2 TIMES DAILY
Qty: 60 G | Refills: 3 | Status: ON HOLD | OUTPATIENT
Start: 2020-04-23 | End: 2020-09-13

## 2020-05-20 DIAGNOSIS — M16.12 PRIMARY OSTEOARTHRITIS OF LEFT HIP: ICD-10-CM

## 2020-05-20 RX ORDER — TRAMADOL HYDROCHLORIDE 50 MG/1
TABLET ORAL
Qty: 28 TABLET | Refills: 0 | OUTPATIENT
Start: 2020-05-20

## 2020-05-21 DIAGNOSIS — M16.12 PRIMARY OSTEOARTHRITIS OF LEFT HIP: ICD-10-CM

## 2020-05-24 RX ORDER — TRAMADOL HYDROCHLORIDE 50 MG/1
TABLET ORAL
Qty: 28 TABLET | Refills: 0 | OUTPATIENT
Start: 2020-05-24

## 2020-05-26 ENCOUNTER — TELEPHONE (OUTPATIENT)
Dept: FAMILY MEDICINE CLINIC | Facility: CLINIC | Age: 75
End: 2020-05-26

## 2020-05-26 NOTE — TELEPHONE ENCOUNTER
Alicia Dodge,  Ms Gray has not been seen by me for almost a year.  Please let her know she needs to be seen.  Thanks, EULOGIO

## 2020-05-28 DIAGNOSIS — M16.12 PRIMARY OSTEOARTHRITIS OF LEFT HIP: ICD-10-CM

## 2020-05-28 RX ORDER — TRAMADOL HYDROCHLORIDE 50 MG/1
50 TABLET ORAL EVERY 6 HOURS PRN
Qty: 28 TABLET | Refills: 0 | Status: ON HOLD | OUTPATIENT
Start: 2020-05-28 | End: 2020-06-14 | Stop reason: SDUPTHER

## 2020-06-03 ENCOUNTER — OFFICE VISIT (OUTPATIENT)
Dept: WOUND CARE | Facility: HOSPITAL | Age: 75
End: 2020-06-03

## 2020-06-03 PROCEDURE — G0463 HOSPITAL OUTPT CLINIC VISIT: HCPCS

## 2020-06-05 ENCOUNTER — TELEPHONE (OUTPATIENT)
Dept: FAMILY MEDICINE CLINIC | Facility: CLINIC | Age: 75
End: 2020-06-05

## 2020-06-05 NOTE — TELEPHONE ENCOUNTER
She will need clearance from Cardiologist.   Does she have anyone she sees we can refer her to the [facility]

## 2020-06-05 NOTE — TELEPHONE ENCOUNTER
She is having hip replacement surgery next Friday. She is scheduled with you on Tuesday to get clearance, she needs to know if she can stop Elliquis, please call

## 2020-06-09 ENCOUNTER — OFFICE VISIT (OUTPATIENT)
Dept: FAMILY MEDICINE CLINIC | Facility: CLINIC | Age: 75
End: 2020-06-09

## 2020-06-09 ENCOUNTER — APPOINTMENT (OUTPATIENT)
Dept: LAB | Facility: HOSPITAL | Age: 75
End: 2020-06-09

## 2020-06-09 VITALS
HEART RATE: 80 BPM | DIASTOLIC BLOOD PRESSURE: 74 MMHG | SYSTOLIC BLOOD PRESSURE: 112 MMHG | RESPIRATION RATE: 24 BRPM | OXYGEN SATURATION: 93 %

## 2020-06-09 DIAGNOSIS — Z41.9 SURGERY, ELECTIVE: Primary | ICD-10-CM

## 2020-06-09 PROCEDURE — 99212 OFFICE O/P EST SF 10 MIN: CPT | Performed by: NURSE PRACTITIONER

## 2020-06-09 NOTE — PROGRESS NOTES
Subjective   Camelia Gray is a 74 y.o. female.     Chief Complaint   Patient presents with   • Surgical Clearance     Hip replacement       HPI  Patient is to have left hip replacement this week. Here for surgical clearance.  She has been taking eliquis since 11/2018.   She has history of pulmonary embolism and DVT.         The following portions of the patient's history were reviewed and updated as appropriate: allergies, current medications, past family history, past medical history, past social history, past surgical history and problem list.      Current Outpatient Medications:   •  apixaban (ELIQUIS) 5 MG tablet tablet, Take 2.5 mg by mouth 2 (Two) Times a Day. Held per Dr Vickers as pt to see 6/9, Disp: , Rfl:   •  levothyroxine (SYNTHROID, LEVOTHROID) 50 MCG tablet, Take 0.5 tablets by mouth Daily., Disp: 90 tablet, Rfl: 1  •  linezolid (ZYVOX) 600 MG tablet, 1 tablet 2 (Two) Times a Day., Disp: , Rfl:   •  lisinopril (PRINIVIL,ZESTRIL) 40 MG tablet, 1 tablet Daily. LD 6/10 for surg 6/12, Disp: , Rfl:   •  metoprolol succinate XL (TOPROL-XL) 100 MG 24 hr tablet, TAKE ONE TABLET BY MOUTH EVERY DAY (Patient taking differently: Take 100 mg by mouth Daily. Take DOS), Disp: 90 tablet, Rfl: 1  •  Multiple Vitamin (THERA) tablet tablet, Take 1 tablet by mouth Daily. (Patient taking differently: Take 1 tablet by mouth Daily. LD 6/9), Disp: , Rfl:   •  MYRBETRIQ 50 MG tablet sustained-release 24 hour 24 hr tablet, TAKE ONE TABLET BY MOUTH EVERY DAY (Patient taking differently: Take 50 mg by mouth.), Disp: 90 tablet, Rfl: 1  •  nystatin (MYCOSTATIN) 736384 UNIT/GM powder, Apply  topically to the appropriate area as directed 2 (Two) Times a Day., Disp: 60 g, Rfl: 3  •  traMADol (ULTRAM) 50 MG tablet, Take 1 tablet by mouth Every 6 (Six) Hours As Needed for Moderate Pain . for pain, Disp: 28 tablet, Rfl: 0    Recent Results (from the past 4032 hour(s))   Wound Culture - Wound, Foot, Right    Collection Time: 01/29/20  11:30 AM   Result Value Ref Range    Wound Culture Scant growth (1+) Staphylococcus aureus, MRSA (A)     Wound Culture Scant growth (1+) Normal Skin Shanae     Gram Stain Rare (1+) WBCs seen     Gram Stain No organisms seen        Susceptibility    Staphylococcus aureus, MRSA - LUCIA*     Clindamycin 0.5 Susceptible ug/ml     Inducible Clindamycin Resistance NEG Negative ug/ml     Erythromycin <=0.25 Susceptible ug/ml     Oxacillin >=4 Resistant ug/ml     Penicillin G >=0.5 Resistant ug/ml     Rifampin <=0.5 Susceptible ug/ml     Tetracycline >=16 Resistant ug/ml     Trimethoprim + Sulfamethoxazole >=320 Resistant ug/ml     Vancomycin 1 Susceptible ug/ml     * This isolate does not demonstrate inducible clindamycin resistance in vitro.     TSH    Collection Time: 02/05/20  9:17 AM   Result Value Ref Range    TSH 5.380 (H) 0.270 - 4.200 uIU/mL   T4, free    Collection Time: 02/05/20  9:17 AM   Result Value Ref Range    Free T4 1.25 0.93 - 1.70 ng/dL   Lipid Panel    Collection Time: 02/05/20  9:17 AM   Result Value Ref Range    Total Cholesterol 131 0 - 200 mg/dL    Triglycerides 102 0 - 150 mg/dL    HDL Cholesterol 63 (H) 40 - 60 mg/dL    LDL Cholesterol  48 0 - 100 mg/dL    VLDL Cholesterol 20.4 5 - 40 mg/dL    LDL/HDL Ratio 0.76    Prealbumin    Collection Time: 02/05/20  9:17 AM   Result Value Ref Range    Prealbumin 23.5 20.0 - 40.0 mg/dL   Comprehensive Metabolic Panel    Collection Time: 02/05/20  9:17 AM   Result Value Ref Range    Glucose 107 (H) 65 - 99 mg/dL    BUN 29 (H) 8 - 23 mg/dL    Creatinine 1.06 (H) 0.57 - 1.00 mg/dL    Sodium 141 136 - 145 mmol/L    Potassium 4.5 3.5 - 5.2 mmol/L    Chloride 102 98 - 107 mmol/L    CO2 24.3 22.0 - 29.0 mmol/L    Calcium 9.2 8.6 - 10.5 mg/dL    Total Protein 7.0 6.0 - 8.5 g/dL    Albumin 3.90 3.50 - 5.20 g/dL    ALT (SGPT) 19 1 - 33 U/L    AST (SGOT) 24 1 - 32 U/L    Alkaline Phosphatase 105 39 - 117 U/L    Total Bilirubin 0.4 0.2 - 1.2 mg/dL    eGFR Non African Amer  51 (L) >60 mL/min/1.73    Globulin 3.1 gm/dL    A/G Ratio 1.3 g/dL    BUN/Creatinine Ratio 27.4 (H) 7.0 - 25.0    Anion Gap 14.7 5.0 - 15.0 mmol/L   CBC Auto Differential    Collection Time: 02/05/20  9:17 AM   Result Value Ref Range    WBC 6.21 3.40 - 10.80 10*3/mm3    RBC 4.13 3.77 - 5.28 10*6/mm3    Hemoglobin 13.0 12.0 - 15.9 g/dL    Hematocrit 39.3 34.0 - 46.6 %    MCV 95.2 79.0 - 97.0 fL    MCH 31.5 26.6 - 33.0 pg    MCHC 33.1 31.5 - 35.7 g/dL    RDW 13.8 12.3 - 15.4 %    RDW-SD 48.7 37.0 - 54.0 fl    MPV 9.7 6.0 - 12.0 fL    Platelets 264 140 - 450 10*3/mm3    Neutrophil % 68.9 42.7 - 76.0 %    Lymphocyte % 20.3 19.6 - 45.3 %    Monocyte % 6.3 5.0 - 12.0 %    Eosinophil % 3.5 0.3 - 6.2 %    Basophil % 0.5 0.0 - 1.5 %    Immature Grans % 0.5 0.0 - 0.5 %    Neutrophils, Absolute 4.28 1.70 - 7.00 10*3/mm3    Lymphocytes, Absolute 1.26 0.70 - 3.10 10*3/mm3    Monocytes, Absolute 0.39 0.10 - 0.90 10*3/mm3    Eosinophils, Absolute 0.22 0.00 - 0.40 10*3/mm3    Basophils, Absolute 0.03 0.00 - 0.20 10*3/mm3    Immature Grans, Absolute 0.03 0.00 - 0.05 10*3/mm3    nRBC 0.0 0.0 - 0.2 /100 WBC         Review of Systems   Constitutional: Negative for fever.   Respiratory: Negative for cough.    Cardiovascular: Negative for chest pain, palpitations and leg swelling.   Gastrointestinal: Negative for diarrhea, GERD and indigestion.   Musculoskeletal: Positive for arthralgias and myalgias.       Objective     /74 (BP Location: Left arm, Patient Position: Sitting, Cuff Size: Large Adult)   Pulse 80   Resp 24   SpO2 93%     Physical Exam   Constitutional: She is oriented to person, place, and time. She appears well-developed and well-nourished.   HENT:   Head: Normocephalic and atraumatic.   Eyes: Pupils are equal, round, and reactive to light.   Abdominal: Soft.   Neurological: She is alert and oriented to person, place, and time.   Skin: Skin is warm and dry.   Psychiatric: She has a normal mood and affect. Her  behavior is normal.   Nursing note and vitals reviewed.        Assessment/Plan   Camelia was seen today for surgical clearance.    Diagnoses and all orders for this visit:    Surgery, elective  Comments:  referred to cardiology for evalaution she is in need of surgical clearance for hip replacement surgery. Dr Hardy will see patient tomorrow.       Patient Instructions   Follow up with Dr Hardy tomorrow for surgical clearance.       Brinda Tony, DEE    06/09/20

## 2020-06-10 ENCOUNTER — CONSULT (OUTPATIENT)
Dept: CARDIOLOGY | Facility: CLINIC | Age: 75
End: 2020-06-10

## 2020-06-10 ENCOUNTER — HOSPITAL ENCOUNTER (OUTPATIENT)
Dept: GENERAL RADIOLOGY | Facility: HOSPITAL | Age: 75
Discharge: HOME | End: 2020-06-10
Admitting: ORTHOPAEDIC SURGERY

## 2020-06-10 ENCOUNTER — APPOINTMENT (OUTPATIENT)
Dept: CARDIOLOGY | Facility: HOSPITAL | Age: 75
End: 2020-06-10

## 2020-06-10 VITALS
HEART RATE: 66 BPM | BODY MASS INDEX: 39.68 KG/M2 | DIASTOLIC BLOOD PRESSURE: 64 MMHG | SYSTOLIC BLOOD PRESSURE: 122 MMHG | HEIGHT: 72 IN | OXYGEN SATURATION: 97 % | WEIGHT: 293 LBS

## 2020-06-10 DIAGNOSIS — I10 ESSENTIAL HYPERTENSION: ICD-10-CM

## 2020-06-10 DIAGNOSIS — I26.99 PULMONARY EMBOLISM, UNSPECIFIED CHRONICITY, UNSPECIFIED PULMONARY EMBOLISM TYPE, UNSPECIFIED WHETHER ACUTE COR PULMONALE PRESENT (HCC): ICD-10-CM

## 2020-06-10 DIAGNOSIS — M16.12 PRIMARY OSTEOARTHRITIS OF LEFT HIP: ICD-10-CM

## 2020-06-10 DIAGNOSIS — Z01.810 PREOP CARDIOVASCULAR EXAM: Primary | ICD-10-CM

## 2020-06-10 PROCEDURE — 71046 X-RAY EXAM CHEST 2 VIEWS: CPT

## 2020-06-10 PROCEDURE — 86901 BLOOD TYPING SEROLOGIC RH(D): CPT | Performed by: ORTHOPAEDIC SURGERY

## 2020-06-10 PROCEDURE — 86900 BLOOD TYPING SEROLOGIC ABO: CPT | Performed by: ORTHOPAEDIC SURGERY

## 2020-06-10 PROCEDURE — 99214 OFFICE O/P EST MOD 30 MIN: CPT | Performed by: NURSE PRACTITIONER

## 2020-06-10 PROCEDURE — 93000 ELECTROCARDIOGRAM COMPLETE: CPT | Performed by: NURSE PRACTITIONER

## 2020-06-10 PROCEDURE — 86850 RBC ANTIBODY SCREEN: CPT | Performed by: ORTHOPAEDIC SURGERY

## 2020-06-10 RX ORDER — METOPROLOL SUCCINATE 25 MG/1
12.5 TABLET, EXTENDED RELEASE ORAL DAILY
COMMUNITY
Start: 2020-05-20 | End: 2020-10-26

## 2020-06-11 ENCOUNTER — ANESTHESIA EVENT (OUTPATIENT)
Dept: PERIOP | Facility: HOSPITAL | Age: 75
End: 2020-06-11

## 2020-06-11 NOTE — PAT
Call out to Dr. Hardy for copy of EKG and cardiac clearance.  Faxed positive MRSA to Dr. Quevedo office.

## 2020-06-12 ENCOUNTER — ANESTHESIA (OUTPATIENT)
Dept: PERIOP | Facility: HOSPITAL | Age: 75
End: 2020-06-12

## 2020-06-12 ENCOUNTER — HOSPITAL ENCOUNTER (INPATIENT)
Facility: HOSPITAL | Age: 75
LOS: 4 days | Discharge: HOME HEALTH | End: 2020-06-16
Attending: ORTHOPAEDIC SURGERY | Admitting: ORTHOPAEDIC SURGERY

## 2020-06-12 ENCOUNTER — APPOINTMENT (OUTPATIENT)
Dept: GENERAL RADIOLOGY | Facility: HOSPITAL | Age: 75
End: 2020-06-12

## 2020-06-12 DIAGNOSIS — M16.12 PRIMARY OSTEOARTHRITIS OF LEFT HIP: ICD-10-CM

## 2020-06-12 DIAGNOSIS — Z96.649 STATUS POST TOTAL REPLACEMENT OF HIP, UNSPECIFIED LATERALITY: Primary | ICD-10-CM

## 2020-06-12 PROBLEM — Z01.810 PREOP CARDIOVASCULAR EXAM: Status: ACTIVE | Noted: 2020-06-09

## 2020-06-12 LAB
BACTERIA UR QL AUTO: ABNORMAL /HPF
BILIRUB UR QL STRIP: NEGATIVE
CLARITY UR: CLEAR
COLOR UR: YELLOW
GLUCOSE UR STRIP-MCNC: NEGATIVE MG/DL
HCT VFR BLD AUTO: 40.2 % (ref 34–46.6)
HGB BLD-MCNC: 14 G/DL (ref 12–15.9)
HGB UR QL STRIP.AUTO: ABNORMAL
HOLD SPECIMEN: NORMAL
HYALINE CASTS UR QL AUTO: ABNORMAL /LPF
KETONES UR QL STRIP: NEGATIVE
LEUKOCYTE ESTERASE UR QL STRIP.AUTO: ABNORMAL
NITRITE UR QL STRIP: POSITIVE
PH UR STRIP.AUTO: 5.5 [PH] (ref 5–8)
PROT UR QL STRIP: NEGATIVE
RBC # UR: ABNORMAL /HPF
REF LAB TEST METHOD: ABNORMAL
SP GR UR STRIP: 1.01 (ref 1–1.03)
SQUAMOUS #/AREA URNS HPF: ABNORMAL /HPF
UROBILINOGEN UR QL STRIP: ABNORMAL
WBC UR QL AUTO: ABNORMAL /HPF

## 2020-06-12 PROCEDURE — 73501 X-RAY EXAM HIP UNI 1 VIEW: CPT

## 2020-06-12 PROCEDURE — 85014 HEMATOCRIT: CPT | Performed by: ORTHOPAEDIC SURGERY

## 2020-06-12 PROCEDURE — G0378 HOSPITAL OBSERVATION PER HR: HCPCS

## 2020-06-12 PROCEDURE — 81001 URINALYSIS AUTO W/SCOPE: CPT | Performed by: ORTHOPAEDIC SURGERY

## 2020-06-12 PROCEDURE — 25010000002 VANCOMYCIN 1 G RECONSTITUTED SOLUTION 1 EACH VIAL: Performed by: NURSE ANESTHETIST, CERTIFIED REGISTERED

## 2020-06-12 PROCEDURE — C1713 ANCHOR/SCREW BN/BN,TIS/BN: HCPCS | Performed by: ORTHOPAEDIC SURGERY

## 2020-06-12 PROCEDURE — 25010000002 FENTANYL CITRATE (PF) 100 MCG/2ML SOLUTION: Performed by: ANESTHESIOLOGY

## 2020-06-12 PROCEDURE — 72170 X-RAY EXAM OF PELVIS: CPT

## 2020-06-12 PROCEDURE — 25010000002 DEXAMETHASONE PER 1 MG: Performed by: NURSE ANESTHETIST, CERTIFIED REGISTERED

## 2020-06-12 PROCEDURE — C1776 JOINT DEVICE (IMPLANTABLE): HCPCS | Performed by: ORTHOPAEDIC SURGERY

## 2020-06-12 PROCEDURE — 76000 FLUOROSCOPY <1 HR PHYS/QHP: CPT

## 2020-06-12 PROCEDURE — 25010000002 PROPOFOL 10 MG/ML EMULSION: Performed by: ANESTHESIOLOGY

## 2020-06-12 PROCEDURE — 0SRB06A REPLACEMENT OF LEFT HIP JOINT WITH OXIDIZED ZIRCONIUM ON POLYETHYLENE SYNTHETIC SUBSTITUTE, UNCEMENTED, OPEN APPROACH: ICD-10-PCS | Performed by: ORTHOPAEDIC SURGERY

## 2020-06-12 PROCEDURE — 25010000002 ONDANSETRON PER 1 MG: Performed by: ANESTHESIOLOGY

## 2020-06-12 PROCEDURE — 25010000002 MORPHINE PER 10 MG: Performed by: ANESTHESIOLOGY

## 2020-06-12 PROCEDURE — 85018 HEMOGLOBIN: CPT | Performed by: ORTHOPAEDIC SURGERY

## 2020-06-12 DEVICE — POLARSTEM STANDARD NON-CEMENTED                                    WITH TI/HA 4
Type: IMPLANTABLE DEVICE | Site: HIP | Status: FUNCTIONAL
Brand: POLARSTEM

## 2020-06-12 DEVICE — DEV CONTRL TISS STRATAFIX SPIRAL PDS PLS CT1 2-0 1/2 30CM: Type: IMPLANTABLE DEVICE | Site: HIP | Status: FUNCTIONAL

## 2020-06-12 DEVICE — OXINIUM FEMORAL HEAD 12/14 TAPER                                    22 MM +0
Type: IMPLANTABLE DEVICE | Site: HIP | Status: FUNCTIONAL
Brand: OXINIUM

## 2020-06-12 DEVICE — OR3O DUAL MOBILITY XLPE INSERT 22/40
Type: IMPLANTABLE DEVICE | Site: HIP | Status: FUNCTIONAL
Brand: OR3O DUAL MOBILITY

## 2020-06-12 DEVICE — R3 3 HOLE ACETABULAR SHELL 52MM
Type: IMPLANTABLE DEVICE | Site: HIP | Status: FUNCTIONAL
Brand: R3 ACETABULAR

## 2020-06-12 DEVICE — REFLECTION SPHERICAL HEAD SCREW 35MM
Type: IMPLANTABLE DEVICE | Site: HIP | Status: FUNCTIONAL
Brand: REFLECTION

## 2020-06-12 DEVICE — REFLECTION SPHERICAL HEAD SCREW 25MM
Type: IMPLANTABLE DEVICE | Site: HIP | Status: FUNCTIONAL
Brand: REFLECTION

## 2020-06-12 DEVICE — OR3O DUAL MOBILITY LINER 40/52
Type: IMPLANTABLE DEVICE | Site: HIP | Status: FUNCTIONAL
Brand: OR3O DUAL MOBILITY

## 2020-06-12 DEVICE — IMPLANTABLE DEVICE: Type: IMPLANTABLE DEVICE | Site: HIP | Status: FUNCTIONAL

## 2020-06-12 RX ORDER — METOPROLOL SUCCINATE 25 MG/1
12.5 TABLET, EXTENDED RELEASE ORAL
Status: DISCONTINUED | OUTPATIENT
Start: 2020-06-13 | End: 2020-06-13

## 2020-06-12 RX ORDER — SODIUM CHLORIDE 9 MG/ML
100 INJECTION, SOLUTION INTRAVENOUS CONTINUOUS
Status: DISCONTINUED | OUTPATIENT
Start: 2020-06-12 | End: 2020-06-16 | Stop reason: HOSPADM

## 2020-06-12 RX ORDER — LEVOTHYROXINE SODIUM 0.03 MG/1
25 TABLET ORAL
Status: DISCONTINUED | OUTPATIENT
Start: 2020-06-13 | End: 2020-06-16 | Stop reason: HOSPADM

## 2020-06-12 RX ORDER — NEOSTIGMINE METHYLSULFATE 5 MG/5 ML
SYRINGE (ML) INTRAVENOUS AS NEEDED
Status: DISCONTINUED | OUTPATIENT
Start: 2020-06-12 | End: 2020-06-12 | Stop reason: SURG

## 2020-06-12 RX ORDER — ACETAMINOPHEN 650 MG/1
650 SUPPOSITORY RECTAL EVERY 4 HOURS PRN
Status: DISCONTINUED | OUTPATIENT
Start: 2020-06-12 | End: 2020-06-16 | Stop reason: HOSPADM

## 2020-06-12 RX ORDER — GLYCOPYRROLATE 1 MG/5 ML
SYRINGE (ML) INTRAVENOUS AS NEEDED
Status: DISCONTINUED | OUTPATIENT
Start: 2020-06-12 | End: 2020-06-12 | Stop reason: SURG

## 2020-06-12 RX ORDER — OXYCODONE HYDROCHLORIDE 5 MG/1
5 TABLET ORAL EVERY 4 HOURS PRN
Status: DISCONTINUED | OUTPATIENT
Start: 2020-06-12 | End: 2020-06-14

## 2020-06-12 RX ORDER — SODIUM CHLORIDE 0.9 % (FLUSH) 0.9 %
10 SYRINGE (ML) INJECTION EVERY 12 HOURS SCHEDULED
Status: DISCONTINUED | OUTPATIENT
Start: 2020-06-12 | End: 2020-06-12 | Stop reason: HOSPADM

## 2020-06-12 RX ORDER — SODIUM CHLORIDE 0.9 % (FLUSH) 0.9 %
10 SYRINGE (ML) INJECTION AS NEEDED
Status: DISCONTINUED | OUTPATIENT
Start: 2020-06-12 | End: 2020-06-12 | Stop reason: HOSPADM

## 2020-06-12 RX ORDER — BUPIVACAINE HYDROCHLORIDE AND EPINEPHRINE 2.5; 5 MG/ML; UG/ML
INJECTION, SOLUTION EPIDURAL; INFILTRATION; INTRACAUDAL; PERINEURAL AS NEEDED
Status: DISCONTINUED | OUTPATIENT
Start: 2020-06-12 | End: 2020-06-12 | Stop reason: HOSPADM

## 2020-06-12 RX ORDER — NALOXONE HCL 0.4 MG/ML
0.1 VIAL (ML) INJECTION
Status: DISCONTINUED | OUTPATIENT
Start: 2020-06-12 | End: 2020-06-16 | Stop reason: HOSPADM

## 2020-06-12 RX ORDER — ONDANSETRON 2 MG/ML
4 INJECTION INTRAMUSCULAR; INTRAVENOUS ONCE AS NEEDED
Status: DISCONTINUED | OUTPATIENT
Start: 2020-06-12 | End: 2020-06-12 | Stop reason: HOSPADM

## 2020-06-12 RX ORDER — MAGNESIUM HYDROXIDE 1200 MG/15ML
LIQUID ORAL AS NEEDED
Status: DISCONTINUED | OUTPATIENT
Start: 2020-06-12 | End: 2020-06-12 | Stop reason: HOSPADM

## 2020-06-12 RX ORDER — ROCURONIUM BROMIDE 10 MG/ML
INJECTION, SOLUTION INTRAVENOUS AS NEEDED
Status: DISCONTINUED | OUTPATIENT
Start: 2020-06-12 | End: 2020-06-12 | Stop reason: SURG

## 2020-06-12 RX ORDER — FENTANYL CITRATE 50 UG/ML
INJECTION, SOLUTION INTRAMUSCULAR; INTRAVENOUS AS NEEDED
Status: DISCONTINUED | OUTPATIENT
Start: 2020-06-12 | End: 2020-06-12 | Stop reason: SURG

## 2020-06-12 RX ORDER — ACETAMINOPHEN 500 MG
1000 TABLET ORAL ONCE
Status: COMPLETED | OUTPATIENT
Start: 2020-06-12 | End: 2020-06-12

## 2020-06-12 RX ORDER — LISINOPRIL 20 MG/1
40 TABLET ORAL
Status: DISCONTINUED | OUTPATIENT
Start: 2020-06-13 | End: 2020-06-13

## 2020-06-12 RX ORDER — HYDROMORPHONE HCL 110MG/55ML
1 PATIENT CONTROLLED ANALGESIA SYRINGE INTRAVENOUS EVERY 4 HOURS PRN
Status: DISCONTINUED | OUTPATIENT
Start: 2020-06-12 | End: 2020-06-16 | Stop reason: HOSPADM

## 2020-06-12 RX ORDER — PHENYLEPHRINE HCL IN 0.9% NACL 0.5 MG/5ML
SYRINGE (ML) INTRAVENOUS AS NEEDED
Status: DISCONTINUED | OUTPATIENT
Start: 2020-06-12 | End: 2020-06-12 | Stop reason: SURG

## 2020-06-12 RX ORDER — FENTANYL CITRATE 50 UG/ML
50 INJECTION, SOLUTION INTRAMUSCULAR; INTRAVENOUS
Status: DISCONTINUED | OUTPATIENT
Start: 2020-06-12 | End: 2020-06-12 | Stop reason: HOSPADM

## 2020-06-12 RX ORDER — ONDANSETRON 2 MG/ML
4 INJECTION INTRAMUSCULAR; INTRAVENOUS EVERY 6 HOURS PRN
Status: DISCONTINUED | OUTPATIENT
Start: 2020-06-12 | End: 2020-06-16 | Stop reason: HOSPADM

## 2020-06-12 RX ORDER — ONDANSETRON 2 MG/ML
INJECTION INTRAMUSCULAR; INTRAVENOUS AS NEEDED
Status: DISCONTINUED | OUTPATIENT
Start: 2020-06-12 | End: 2020-06-12 | Stop reason: SURG

## 2020-06-12 RX ORDER — PROPOFOL 10 MG/ML
VIAL (ML) INTRAVENOUS AS NEEDED
Status: DISCONTINUED | OUTPATIENT
Start: 2020-06-12 | End: 2020-06-12 | Stop reason: SURG

## 2020-06-12 RX ORDER — ACETAMINOPHEN 650 MG
TABLET, EXTENDED RELEASE ORAL AS NEEDED
Status: DISCONTINUED | OUTPATIENT
Start: 2020-06-12 | End: 2020-06-12 | Stop reason: HOSPADM

## 2020-06-12 RX ORDER — VANCOMYCIN HYDROCHLORIDE
1500 ONCE
Status: DISCONTINUED | OUTPATIENT
Start: 2020-06-12 | End: 2020-06-16 | Stop reason: HOSPADM

## 2020-06-12 RX ORDER — CEFAZOLIN SODIUM IN 0.9 % NACL 3 G/100 ML
3 INTRAVENOUS SOLUTION, PIGGYBACK (ML) INTRAVENOUS ONCE
Status: DISCONTINUED | OUTPATIENT
Start: 2020-06-12 | End: 2020-06-12

## 2020-06-12 RX ORDER — OXYCODONE HYDROCHLORIDE 5 MG/1
10 TABLET ORAL EVERY 4 HOURS PRN
Status: DISCONTINUED | OUTPATIENT
Start: 2020-06-12 | End: 2020-06-14

## 2020-06-12 RX ORDER — DEXAMETHASONE SODIUM PHOSPHATE 4 MG/ML
INJECTION, SOLUTION INTRA-ARTICULAR; INTRALESIONAL; INTRAMUSCULAR; INTRAVENOUS; SOFT TISSUE AS NEEDED
Status: DISCONTINUED | OUTPATIENT
Start: 2020-06-12 | End: 2020-06-12 | Stop reason: SURG

## 2020-06-12 RX ORDER — OXYCODONE HCL 10 MG/1
10 TABLET, FILM COATED, EXTENDED RELEASE ORAL ONCE
Status: COMPLETED | OUTPATIENT
Start: 2020-06-12 | End: 2020-06-12

## 2020-06-12 RX ORDER — ONDANSETRON 4 MG/1
4 TABLET, FILM COATED ORAL EVERY 6 HOURS PRN
Status: DISCONTINUED | OUTPATIENT
Start: 2020-06-12 | End: 2020-06-16 | Stop reason: HOSPADM

## 2020-06-12 RX ORDER — HYDROMORPHONE HCL 110MG/55ML
0.25 PATIENT CONTROLLED ANALGESIA SYRINGE INTRAVENOUS
Status: DISCONTINUED | OUTPATIENT
Start: 2020-06-12 | End: 2020-06-12 | Stop reason: HOSPADM

## 2020-06-12 RX ORDER — SODIUM CHLORIDE, SODIUM LACTATE, POTASSIUM CHLORIDE, CALCIUM CHLORIDE 600; 310; 30; 20 MG/100ML; MG/100ML; MG/100ML; MG/100ML
9 INJECTION, SOLUTION INTRAVENOUS CONTINUOUS PRN
Status: DISCONTINUED | OUTPATIENT
Start: 2020-06-12 | End: 2020-06-12 | Stop reason: HOSPADM

## 2020-06-12 RX ORDER — OXYBUTYNIN CHLORIDE 10 MG/1
10 TABLET, EXTENDED RELEASE ORAL DAILY
Status: DISCONTINUED | OUTPATIENT
Start: 2020-06-13 | End: 2020-06-16 | Stop reason: HOSPADM

## 2020-06-12 RX ORDER — ACETAMINOPHEN 325 MG/1
650 TABLET ORAL EVERY 4 HOURS PRN
Status: DISCONTINUED | OUTPATIENT
Start: 2020-06-12 | End: 2020-06-16 | Stop reason: HOSPADM

## 2020-06-12 RX ORDER — EPHEDRINE SULFATE/0.9% NACL/PF 25 MG/5 ML
SYRINGE (ML) INTRAVENOUS AS NEEDED
Status: DISCONTINUED | OUTPATIENT
Start: 2020-06-12 | End: 2020-06-12 | Stop reason: SURG

## 2020-06-12 RX ORDER — MORPHINE SULFATE 4 MG/ML
4 INJECTION, SOLUTION INTRAMUSCULAR; INTRAVENOUS ONCE
Status: COMPLETED | OUTPATIENT
Start: 2020-06-12 | End: 2020-06-12

## 2020-06-12 RX ADMIN — ACETAMINOPHEN 1000 MG: 500 TABLET, FILM COATED ORAL at 14:03

## 2020-06-12 RX ADMIN — SODIUM CHLORIDE, SODIUM LACTATE, POTASSIUM CHLORIDE, AND CALCIUM CHLORIDE 9 ML/HR: 600; 310; 30; 20 INJECTION, SOLUTION INTRAVENOUS at 14:04

## 2020-06-12 RX ADMIN — FENTANYL CITRATE 50 MCG: 50 INJECTION INTRAMUSCULAR; INTRAVENOUS at 17:11

## 2020-06-12 RX ADMIN — CEFAZOLIN SODIUM 2 G: 1 INJECTION, POWDER, FOR SOLUTION INTRAMUSCULAR; INTRAVENOUS at 14:23

## 2020-06-12 RX ADMIN — CEFAZOLIN SODIUM 2 G: 1 INJECTION, POWDER, FOR SOLUTION INTRAMUSCULAR; INTRAVENOUS at 22:14

## 2020-06-12 RX ADMIN — ONDANSETRON 4 MG: 2 INJECTION INTRAMUSCULAR; INTRAVENOUS at 16:03

## 2020-06-12 RX ADMIN — Medication 0.6 MG: at 16:03

## 2020-06-12 RX ADMIN — Medication 10 MG: at 15:28

## 2020-06-12 RX ADMIN — FENTANYL CITRATE 50 MCG: 50 INJECTION, SOLUTION INTRAMUSCULAR; INTRAVENOUS at 14:18

## 2020-06-12 RX ADMIN — TRANEXAMIC ACID 1000 MG: 100 INJECTION, SOLUTION INTRAVENOUS at 14:21

## 2020-06-12 RX ADMIN — SODIUM CHLORIDE 100 ML/HR: 0.9 INJECTION, SOLUTION INTRAVENOUS at 18:10

## 2020-06-12 RX ADMIN — DEXAMETHASONE SODIUM PHOSPHATE 4 MG: 4 INJECTION, SOLUTION INTRAMUSCULAR; INTRAVENOUS at 14:46

## 2020-06-12 RX ADMIN — PROPOFOL 200 MG: 10 INJECTION, EMULSION INTRAVENOUS at 14:20

## 2020-06-12 RX ADMIN — PHENYLEPHRINE HYDROCHLORIDE 100 MCG: 10 INJECTION INTRAVENOUS at 15:26

## 2020-06-12 RX ADMIN — VANCOMYCIN HYDROCHLORIDE: 1 INJECTION, POWDER, LYOPHILIZED, FOR SOLUTION INTRAVENOUS at 14:24

## 2020-06-12 RX ADMIN — ROCURONIUM BROMIDE 20 MG: 10 INJECTION, SOLUTION INTRAVENOUS at 15:19

## 2020-06-12 RX ADMIN — ROCURONIUM BROMIDE 50 MG: 10 INJECTION, SOLUTION INTRAVENOUS at 14:21

## 2020-06-12 RX ADMIN — Medication 3 MG: at 16:03

## 2020-06-12 RX ADMIN — MORPHINE SULFATE 4 MG: 4 INJECTION INTRAVENOUS at 16:37

## 2020-06-12 RX ADMIN — OXYCODONE HYDROCHLORIDE 10 MG: 10 TABLET, FILM COATED, EXTENDED RELEASE ORAL at 14:03

## 2020-06-12 RX ADMIN — FENTANYL CITRATE 50 MCG: 50 INJECTION, SOLUTION INTRAMUSCULAR; INTRAVENOUS at 14:45

## 2020-06-12 RX ADMIN — TRANEXAMIC ACID 1000 MG: 100 INJECTION, SOLUTION INTRAVENOUS at 15:53

## 2020-06-12 RX ADMIN — PROPOFOL 100 MCG/KG/MIN: 10 INJECTION, EMULSION INTRAVENOUS at 14:25

## 2020-06-12 RX ADMIN — PROPOFOL: 10 INJECTION, EMULSION INTRAVENOUS at 15:19

## 2020-06-12 NOTE — ANESTHESIA PROCEDURE NOTES
Airway  Urgency: elective    Date/Time: 6/12/2020 2:21 PM  Airway not difficult    General Information and Staff    Patient location during procedure: OR  Anesthesiologist: Ariel Carter MD    Indications and Patient Condition  Indications for airway management: airway protection    Preoxygenated: yes  Mask difficulty assessment: 0 - not attempted    Final Airway Details  Final airway type: endotracheal airway      Successful airway: ETT  Cuffed: yes   Successful intubation technique: direct laryngoscopy  Endotracheal tube insertion site: oral  Blade: Manpreet  Blade size: 3  ETT size (mm): 7.0  Cormack-Lehane Classification: grade I - full view of glottis  Placement verified by: chest auscultation   Inital cuff pressure (cm H2O): 18  Number of attempts at approach: 1  Assessment: lips, teeth, and gum same as pre-op and atraumatic intubation

## 2020-06-12 NOTE — PROGRESS NOTES
Cardiology Office New Patient Visit      Primary Care Provider:  Eric Vickers Jr., MD    Reason for Visit:     Preop cardiovascular risk assessment requested  Degenerative joint disease and planned left total hip replacement    Subjective     CC: Denies chest pain or dyspnea    History of Present Illness       Camelia Gray is a 74 y.o. female.  She is a very pleasant retired .  She presents today for evaluation after her primary care provider suggested she come here for preoperative cardiac evaluation.  Specifically there was questions regarding her Eliquis and upcoming surgery.    The patient denies any prior known history of CAD MI diabetes or heart failure.    She has chronic lymphedema of the lower extremities.    She denies any current or recent chest pain, worsening dyspnea, PND, orthopnea, palpitations, near syncope, worsening lower extremity edema or feelings of her heart racing.  She has been compliant with medical therapy.    Previous echocardiogram showed preserved LV function with no significant valvular flow abnormalities.    The patient was started on anticoagulation in November 2018 when she was diagnosed with having a pulmonary embolism and DVT.    The patient is scheduled for upcoming left total hip replacement by Dr. Hutchins on Friday, June 12, 2020.        Past Medical History:   Diagnosis Date   • Arthritis    • Cellulitis of both feet 10/13/2019   • Decubitus ulcer of buttock, stage 2 (CMS/HCC) 10/14/2019   • Dermatitis associated with moisture 10/14/2019   • DVT, lower extremity (CMS/HCC)    • Edema of lower extremity 5/31/2017   • Hypertension    • Immobility syndrome 10/13/2019   • Lymphedema 10/14/2019   • Non-pressure chronic ulcer of other part of left foot with fat layer exposed (CMS/HCC) 10/14/2019   • Non-pressure chronic ulcer of other part of right foot with fat layer exposed (CMS/HCC) 10/14/2019   • Obesity    • Pulmonary embolism (CMS/HCC)    • Stasis dermatitis  7/8/2013   • Venous stasis 10/14/2019       Past Surgical History:   Procedure Laterality Date   • CATARACT EXTRACTION     • DENTAL EXAMINATION UNDER ANESTHESIA     • JOINT REPLACEMENT      Bilateral knees   • TOE SURGERY         No current facility-administered medications for this visit.   No current outpatient medications on file.    Facility-Administered Medications Ordered in Other Visits:   •  ceFAZolin (ANCEF) in SWFI 2 g/20ml IV PUSH syringe, , Intravenous, PRN, Ariel Carter MD, 2 g at 06/12/20 1423  •  fentaNYL citrate (PF) (SUBLIMAZE) injection, , Intravenous, PRN, Ariel Carter MD, 50 mcg at 06/12/20 1418  •  lactated ringers infusion, 9 mL/hr, Intravenous, Continuous PRN, Cam Phan AA, Last Rate: 9 mL/hr at 06/12/20 1404  •  propofol (DIPRIVAN) infusion 10 mg/mL 100 mL, , Intravenous, Continuous PRN, Ariel Carter MD, Last Rate: 70.6 mL/hr at 06/12/20 1425, 100 mcg/kg/min at 06/12/20 1425  •  Propofol (DIPRIVAN) injection, , Intravenous, PRN, Ariel Carter MD, 200 mg at 06/12/20 1420  •  sodium chloride 0.9 % flush 10 mL, 10 mL, Intravenous, Q12H, Cam Phan AA  •  sodium chloride 0.9 % flush 10 mL, 10 mL, Intravenous, PRN, Cam Phan AA  •  Tranexamic Acid 1,000 mg in sodium chloride 0.9 % 100 mL, 1,000 mg, Intravenous, Once, Baljit Hutchins II, MD  •  Tranexamic Acid 1,000 mg in sodium chloride 0.9 % 100 mL, 1,000 mg, Intravenous, Once, Baljit Hutchins II, MD  •  vancomycin IVPB 1500 mg in 0.9% NaCl (Premix) 250 mL, 1,500 mg, Intravenous, Once, Baljit Hutchins II, MD    Social History     Socioeconomic History   • Marital status: Single     Spouse name: Not on file   • Number of children: Not on file   • Years of education: Not on file   • Highest education level: Not on file   Tobacco Use   • Smoking status: Never Smoker   • Smokeless tobacco: Never Used   Substance and Sexual Activity   • Alcohol use: No     Frequency: Never   •  "Drug use: No   • Sexual activity: Defer       Family History   Problem Relation Age of Onset   • Heart disease Mother    • Hypertension Mother    • Heart disease Father    • Hypertension Father    • Kidney disease Father    • COPD Father        The following portions of the patient's history were reviewed and updated as appropriate: allergies, current medications, past family history, past medical history, past social history, past surgical history and problem list.    Review of Systems   Constitution: Negative for decreased appetite and diaphoresis.   HENT: Negative for congestion, hearing loss and nosebleeds.    Cardiovascular: Positive for leg swelling. Negative for chest pain, claudication, dyspnea on exertion, irregular heartbeat, near-syncope, orthopnea, palpitations, paroxysmal nocturnal dyspnea and syncope.   Respiratory: Negative for cough, shortness of breath and sleep disturbances due to breathing.    Endocrine: Negative for polyuria.   Hematologic/Lymphatic: Does not bruise/bleed easily.   Skin: Negative for itching and rash.   Musculoskeletal: Positive for arthritis and joint pain. Negative for back pain, muscle weakness and myalgias.   Gastrointestinal: Negative for abdominal pain, change in bowel habit and nausea.   Genitourinary: Negative for dysuria, flank pain, frequency and hesitancy.   Neurological: Negative for dizziness, tremors and weakness.   Psychiatric/Behavioral: Negative for altered mental status. The patient does not have insomnia.        /64   Pulse 66   Ht 182.9 cm (72.01\")   Wt 134 kg (295 lb) Comment: per patient unable to stand  SpO2 97%   BMI 40.00 kg/m² .    Objective     Physical Exam   Constitutional: She is oriented to person, place, and time. She appears well-developed and well-nourished. No distress.   HENT:   Head: Normocephalic and atraumatic.   Eyes: Pupils are equal, round, and reactive to light.   Neck: Normal range of motion. Neck supple. No JVD present. "   Cardiovascular: Normal rate, regular rhythm, S1 normal, S2 normal, normal heart sounds and intact distal pulses.   No murmur heard.  Pulmonary/Chest: Effort normal and breath sounds normal.   Abdominal: Soft. Normal appearance. She exhibits no distension. There is no tenderness.   Musculoskeletal: Normal range of motion. She exhibits no edema.   Neurological: She is alert and oriented to person, place, and time.   Skin: Skin is warm and dry.   Psychiatric: She has a normal mood and affect.           ECG 12 Lead  Date/Time: 6/10/2020 2:27 PM  Performed by: Jacqueline Banks APRN  Authorized by: Jacqueline Banks APRN   Comparison: not compared with previous ECG   Previous ECG: no previous ECG available  Rhythm: sinus rhythm  BPM: 66    Clinical impression: non-specific ECG  Comments: Low QRS voltage in precordial leads.                     Diagnoses and all orders for this visit:    1. Preop cardiovascular exam (Primary)  Comments:  Preop cardiovascular risk assessment requested prior to upcoming total hip replacement    2. Primary osteoarthritis of left hip    3. Essential hypertension  Comments:  Stable on current medical therapy  Orders:  -     ECG 12 Lead    4. Pulmonary embolism, unspecified chronicity, unspecified pulmonary embolism type, unspecified whether acute cor pulmonale present (CMS/HCC)  Comments:  Diagnosed in November 2018 for which she has been on Eliquis        Plan:  Patient is stable from a cardiovascular perspective with no acute signs or symptoms of cardiovascular decompensation.  There is no contraindications for proceeding with surgery.    She is on anticoagulation for noncardiac indication with a history of PE and DVT diagnosed in November 2018.    She has been advised to hold her Eliquis for the next 2 days prior to surgery on Friday.  Eliquis will be resumed postoperatively per Dr. Hutchins's instructions.    We be happy to see the patient at any point in future if she should develop any  new or worsening problems.

## 2020-06-12 NOTE — ANESTHESIA POSTPROCEDURE EVALUATION
Patient: Camelia Gray    Procedure Summary     Date:  06/12/20 Room / Location:  [facility] OR 13 / [facility] MAIN OR    Anesthesia Start:  1415 Anesthesia Stop:  1620    Procedure:  TOTAL HIP ARTHROPLASTY (Left Hip) Diagnosis:       Hip osteoarthritis      (LEFT HIP)    Surgeon:  Baljit Hutchins II, MD Provider:  Patricia Jimenez MD    Anesthesia Type:  general ASA Status:  3          Anesthesia Type: general    Vitals  No vitals data found for the desired time range.          Post Anesthesia Care and Evaluation    Patient location during evaluation: PACU  Patient participation: complete - patient participated  Level of consciousness: awake  Pain score: 0 (See nurse's notes for pain score)  Pain management: adequate  Airway patency: patent  Anesthetic complications: No anesthetic complications  PONV Status: none  Cardiovascular status: acceptable  Respiratory status: acceptable  Hydration status: acceptable    Comments: Patient seen and examined postoperatively; vital signs stable; SpO2 greater than or equal to 90%; cardiopulmonary status stable; nausea/vomiting adequately controlled; pain adequately controlled; no apparent anesthesia complications; patient discharged from anesthesia care when discharge criteria were met

## 2020-06-12 NOTE — ANESTHESIA PREPROCEDURE EVALUATION
Anesthesia Evaluation     Patient summary reviewed and Nursing notes reviewed   NPO Solid Status: > 8 hours  NPO Liquid Status: > 8 hours           Airway   Mallampati: II  TM distance: >3 FB  Neck ROM: full  No difficulty expected  Dental - normal exam     Pulmonary - normal exam   (+) pulmonary embolism,   Cardiovascular - normal exam    (+) hypertension, DVT,       Neuro/Psych- negative ROS  GI/Hepatic/Renal/Endo    (+) morbid obesity,      Musculoskeletal     Abdominal  - normal exam    Bowel sounds: normal.   Substance History - negative use     OB/GYN negative ob/gyn ROS         Other   arthritis,                      Anesthesia Plan    ASA 3     general   ( NO BLOCK PER SURGEON)  intravenous induction     Anesthetic plan, all risks, benefits, and alternatives have been provided, discussed and informed consent has been obtained with: patient.

## 2020-06-12 NOTE — OP NOTE
Anterior Total Hip Operative Note  Dr. CHRIS Hutchins II  [phone]    PATIENT NAME: Camelia Gray  MRN: [id number]  : 1945 AGE: 74 y.o. GENDER: female  DATE OF OPERATION: 2020  PREOPERATIVE DIAGNOSIS: End stage Osteoarthritis  POSTOPERATIVE DIAGNOSIS: Same  OPERATION PERFORMED: Left Anterior Total Hip Arthroplasty  SURGEON: Baljit Hutchins MD  Circulator: Eulalia Grimes RN; Tonie Yates RN  Radiology Technologist: Deborah Dobbins  Scrub Person: Dalia Mathew; Migdalia George  Documenter: Yovana Levy RN  Assistant: Vibha Muse  ANESTHESIA: General  ASSISTANT: Vibha Moore. This case would not have been possible without another set of skilled surgical hands for retraction, use of instrumentation, and general assistance.  This assistance was vital to the success of the case.   ESTIMATED BLOOD LOSS: 500cc  SPONGE AND NEEDLE COUNT: Correct  INDICATIONS:  Arthritis: This patient was noted to have severe arthritis affecting the operative hip. They failed conservative management and elected to undergo total hip replacement. A discussion of operative versus nonoperative treatment was had. They elected to undergo anterior total hip arthroplasty. The risks of surgery were discussed and included the risk of anesthesia, infection, damage to neurovascular structures, implant loosening/failure, fracture, hardware prominence, dislocation, the need for further procedures, medical complications, and others. No guarantees were made. The patient wished to proceed with surgery and a surgical consent was signed.  COMPONENTS:   · Acetabular Cup: Smith [employer] Nephew R3 acetabular cup: 52 Outer Diameter  · Cup Screws: Smith [employer] Nephew 6.5 mm screws: 25mm and 35mmmm length  · Smith [employer] Nephew Neutral Acetabular Liner: Neutral  · Smith [employer] Nephew Polar Femoral Stem: Size 4  · Smith [employer] Nephew Oxinium Head: Dual Mobilitymm +0    PERTINENT FINDINGS: Arthritis: Endstage degenerative arthritis of the femoral  head and acetabulum.    DETAILS OF PROCEDURE:   The patient was met in the preoperative area. The site was marked. The consent and H&P were reviewed. The patient was then wheeled back to the operative suite underwent anesthesia. The [place] table boots were secured to the patients’ feet. The patient was moved onto the [place] table and secured in the supine position. The perineal post was inserted and the boots were secured into the leg holders. Surgical alcohol was used to thoroughly clean the operative area.     The hip and leg was then prepped in the normal sterile fashion, which included ChloraPrep, multiple layers of sterile drapes, and surgical space suits for the entire operative team. New outer gloves were used by all sterile surgical team members after final draping. The surgical incision was marked. A surgical timeout was performed in which administration of preoperative antibiotics, tranexamic acid (if not contraindicated), and the surgical site were confirmed.    A Modified Hernandez-Epstein anterior approach was used. Dissection was carried down to the fascia. The fascia was incised and the tensor fascia antoni muscle was retracted laterally and the sartorius medially. The lateral femoral circumflex vessels were identified and cauterized using bovie. The rectus femoris was retracted medially. A capsulotomy was then performed. The capsule was tagged with Ethibond for later repair. Retractors were placed on either side of the femoral neck and dissection was further carried down so that the lesser trochanter could be palpated and the superior rim of the acetabulum could be visualized.    The femoral neck cut was then made from  just proximal to the lesser trochanter medially headed towards the saddle laterally. Care was taken not to extend the cut into the lesser or greater trochanters. The head and neck segment were removed with a corkscrew. The acetabulum was then exposed. The labrum was removed using a kocher and  scalpel and excess osteophytes were removed using an osteotome.    The acetabulum was progressively reamed, beginning with medialization and then finalizing the position of the reamer to approximate the final cup position. Fluoroscopy was used to ensure proper placement of the reamer, including adequate medialization as well as appropriate abduction and anteversion. The real cup was then opened and inserted using fluoroscopy, ensuring good position in terms of abduction and anteversion.     Screws: Although there was some initial press-fit, the bone seemed of poor quality and a decision was then made to supplement the initial cup press fit fixation with screws. Holes were drilled through the cup and measured with a depth gauge to ensure safe placement.    After thorough irrigation and ensuring that no soft tissue was entrapped within the cup, the real liner was snapped into place.    The hook was placed just distal to the greater trochanter. The femur was then externally rotated, extended and adducted under the well leg. Soft tissue releases were performed to gain exposure to the proximal femur. Capsule was released from the saddle and the lateral femur. Care was taken to preserve the short external rotator tendons. The capsule was also released along the medial femur. The hydraulic femoral lift was then used to better expose the femur. Further soft tissue releases were then performed, again ensuring preservation of the short external rotators.    The femur was machined with a cookie cutter osteotome and then a rasp was used to further lateralize the starting point. The sclerotic bone on the lateral shoulder of the femur was removed with a rongeour and curette as needed to protect the greater trochanter. Progressive broaches were inserted until adequate fill had been achieved. Using fluoroscopy, the femoral stem was visualized after trial reduction of the hip. The length and offset were compared to the non-operative  "hip. Trials of stem size and neck length were trialed until equal leg length and offset were obtained. Additionally hip stability was tested with internal and external rotation of the leg. The leg was stable with at least 90° of external rotation and there was no impingement at 60° of internal rotation.  Upon exposing the femur after the last trial, it was noted that the tip of the greater trochanter had fractured and was a free piece, but it was such a small piece I elected not to fix it.  Certainly the hip was quite stable.  After implantation of the final stem and ball, the leg was once again brought into normal anatomic position and relocated. Final x-rays were taken with final implants noting good position of the stem and cup, and no visualized fracture.. The hip was stable upon reduction.    No Prophylactic Cable: Before final reduction of the hip, the proximal femur and femoral calcar was thoroughly visualized to ensure that there was no fracture/crack. The bone quality was thought to be sufficient enough that no prophylactic cable was needed for this case.    An analgesic cocktail was then injected about the hip as well as the surgical dissection area. The capsule was closed with Ethibond. The fascia was closed with 0 Vicryl.  The skin was closed in layers.  Finally, a SONIA wound vac was applied to the surgical site.     The patient was moved from the [place] table to the rBrea where the boots were removed. The patient was taken to the recovery room in stable condition. There were no complications and the patient tolerated the procedure well.    R \"Dion\" Cuong BAIG MD  Orthopaedic Surgery  [facility]  [phone]              "

## 2020-06-13 LAB
ANION GAP SERPL CALCULATED.3IONS-SCNC: 7 MMOL/L (ref 5–15)
BUN BLD-MCNC: 26 MG/DL (ref 8–23)
BUN BLD-MCNC: ABNORMAL MG/DL
BUN/CREAT SERPL: ABNORMAL
CALCIUM SPEC-SCNC: 8.7 MG/DL (ref 8.6–10.5)
CHLORIDE SERPL-SCNC: 103 MMOL/L (ref 98–107)
CO2 SERPL-SCNC: 24 MMOL/L (ref 22–29)
CREAT BLD-MCNC: 0.88 MG/DL (ref 0.57–1)
GFR SERPL CREATININE-BSD FRML MDRD: 63 ML/MIN/1.73
GLUCOSE BLD-MCNC: 140 MG/DL (ref 65–99)
HCT VFR BLD AUTO: 37.3 % (ref 34–46.6)
HGB BLD-MCNC: 12.5 G/DL (ref 12–15.9)
POTASSIUM BLD-SCNC: 4.9 MMOL/L (ref 3.5–5.2)
SODIUM BLD-SCNC: 134 MMOL/L (ref 136–145)

## 2020-06-13 PROCEDURE — 85014 HEMATOCRIT: CPT | Performed by: ORTHOPAEDIC SURGERY

## 2020-06-13 PROCEDURE — 25010000002 CEFAZOLIN PER 500 MG: Performed by: ORTHOPAEDIC SURGERY

## 2020-06-13 PROCEDURE — 97162 PT EVAL MOD COMPLEX 30 MIN: CPT

## 2020-06-13 PROCEDURE — 80048 BASIC METABOLIC PNL TOTAL CA: CPT | Performed by: ORTHOPAEDIC SURGERY

## 2020-06-13 PROCEDURE — 85018 HEMOGLOBIN: CPT | Performed by: ORTHOPAEDIC SURGERY

## 2020-06-13 PROCEDURE — 97530 THERAPEUTIC ACTIVITIES: CPT

## 2020-06-13 PROCEDURE — 25010000002 ONDANSETRON PER 1 MG: Performed by: ORTHOPAEDIC SURGERY

## 2020-06-13 RX ADMIN — LISINOPRIL 40 MG: 20 TABLET ORAL at 08:42

## 2020-06-13 RX ADMIN — ACETAMINOPHEN 650 MG: 325 TABLET ORAL at 21:06

## 2020-06-13 RX ADMIN — APIXABAN 2.5 MG: 2.5 TABLET, FILM COATED ORAL at 08:42

## 2020-06-13 RX ADMIN — METOPROLOL SUCCINATE 12.5 MG: 25 TABLET, EXTENDED RELEASE ORAL at 08:42

## 2020-06-13 RX ADMIN — OXYBUTYNIN CHLORIDE 10 MG: 10 TABLET, EXTENDED RELEASE ORAL at 08:42

## 2020-06-13 RX ADMIN — LEVOTHYROXINE SODIUM 25 MCG: 25 TABLET ORAL at 05:58

## 2020-06-13 RX ADMIN — ACETAMINOPHEN 650 MG: 325 TABLET ORAL at 03:40

## 2020-06-13 RX ADMIN — OXYCODONE HYDROCHLORIDE 10 MG: 5 TABLET ORAL at 20:17

## 2020-06-13 RX ADMIN — CEFAZOLIN SODIUM 2 G: 1 INJECTION, POWDER, FOR SOLUTION INTRAMUSCULAR; INTRAVENOUS at 05:58

## 2020-06-13 RX ADMIN — ACETAMINOPHEN 650 MG: 325 TABLET ORAL at 16:59

## 2020-06-13 RX ADMIN — ACETAMINOPHEN 650 MG: 325 TABLET ORAL at 08:42

## 2020-06-13 RX ADMIN — APIXABAN 2.5 MG: 2.5 TABLET, FILM COATED ORAL at 20:17

## 2020-06-13 RX ADMIN — SODIUM CHLORIDE 1000 ML: 0.9 INJECTION, SOLUTION INTRAVENOUS at 13:00

## 2020-06-13 RX ADMIN — ONDANSETRON 4 MG: 2 INJECTION INTRAMUSCULAR; INTRAVENOUS at 20:58

## 2020-06-13 NOTE — NURSING NOTE
Patient ambulated next to bedside on 6/12/2020 @ 2230. PT made multiple steps around her bed and stated that there was no pain. Patient eventually stood by the bedside for approximately 3-5 minutes. Santos catheter is in place and will be removed in the AM. Will continue to monitor.

## 2020-06-13 NOTE — PLAN OF CARE
Problem: Patient Care Overview  Goal: Plan of Care Review  Outcome: Ongoing (interventions implemented as appropriate)  Flowsheets (Taken 6/13/2020 0229)  Progress: improving  Plan of Care Reviewed With: patient  Outcome Summary: Pt is resting in bed. She was able to stand with a 3 assist at the bedside. She was able to take a few steps. Pt stated that it felt funny because there was no pain. Santos catheter is still in place until the morning. Will continue to monitor.

## 2020-06-13 NOTE — NURSING NOTE
Redness observed upon assessment near surgical site on left thigh. Dr. Hutchins notified during pt rounding of redness and warmth on pts L thigh. Dr. Hutchins states he believes area is bruising and orders nothing new at this time.

## 2020-06-13 NOTE — THERAPY EVALUATION
Patient Name: Camelia Gray  : 1945    MRN: [id number]                              Today's Date: 2020       Admit Date: 2020    Visit Dx: No diagnosis found.  Patient Active Problem List   Diagnosis   • Morbid obesity (CMS/HCC)   • Acute cystitis with hematuria   • Arthritis of left hip   • Deep vein thrombosis (DVT) (CMS/HCC)   • Edema of lower extremity   • Hypertension   • Pulmonary embolism (CMS/HCC)   • Stasis dermatitis   • Cellulitis of both feet   • Immobility syndrome   • Venous stasis   • Lymphedema   • Non-pressure chronic ulcer of other part of right foot with fat layer exposed (CMS/HCC)   • Non-pressure chronic ulcer of other part of left foot with fat layer exposed (CMS/HCC)   • Dermatitis associated with moisture   • Primary osteoarthritis of left hip   • Hypothyroidism   • Urinary incontinence in female   • Preop cardiovascular exam   • Status post total replacement of hip     Past Medical History:   Diagnosis Date   • Arthritis    • Cellulitis of both feet 10/13/2019   • Decubitus ulcer of buttock, stage 2 (CMS/HCC) 10/14/2019   • Dermatitis associated with moisture 10/14/2019   • DVT, lower extremity (CMS/HCC)    • Edema of lower extremity 2017   • Hypertension    • Immobility syndrome 10/13/2019   • Lymphedema 10/14/2019   • Non-pressure chronic ulcer of other part of left foot with fat layer exposed (CMS/HCC) 10/14/2019   • Non-pressure chronic 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  yes  -     Prior Level of Function  -- short distance ambuatlion at home, w/c for majority of mobility, has 24 hr caregivers, A her with bathing.  -     Existing Precautions/Restrictions  fall;hip, anterior  -     Barriers to Rehab  previous functional deficit  -     Row Name 06/13/20 1104          Relationship/Environment    Lives With  alone has 24 hr caregivers  -[facility] Name 06/13/20 1104          Resource/Environmental Concerns    Current Living Arrangements  home/apartment/condo  -[facility] Name 06/13/20 1104          Home Main Entrance    Number of Stairs, Main Entrance  none  -[facility] Name 06/13/20 1104          Stairs Within Home, Primary    Number of Stairs, Within Home, Primary  none  -[facility] Name 06/13/20 1104          Cognitive Assessment/Intervention- PT/OT    Orientation Status (Cognition)  oriented x 4  -[facility] Name 06/13/20 1104          Safety Issues, Functional Mobility    Impairments Affecting Function (Mobility)  pain;strength;range of motion (ROM);endurance/activity tolerance  -       User Key  (r) = Recorded By, (t) = Taken By, (c) = Cosigned By    Initials Name Provider Type     Gabrielle Cole, PT Physical Therapist        Mobility     Row Name 06/13/20 1106          Bed Mobility Assessment/Treatment    Bed Mobility Assessment/Treatment  supine-sit  -     Supine-Sit [place] (Bed Mobility)  moderate assist (50% patient effort)  -     Assistive Device (Bed Mobility)  head of bed elevated;bed rails;draw sheet  -[facility] Name 06/13/20 1106          Bed-Chair Transfer    Bed-Chair [place] (Transfers)  minimum assist (75% patient effort);2 person assist  -     Assistive Device (Bed-Chair Transfers)  walker, front-wheeled  -     Row Name 06/13/20 1106          Sit-Stand Transfer    Sit-Stand [place] (Transfers)  minimum assist (75% patient effort);2 person assist  bed height elevated, pt does have hospital bed at home with option to elevate bed for sit to stand, also has a lift chair  -     Assistive Device (Sit-Stand Transfers)  walker, front-wheeled  -[facility] Name 06/13/20 1106          Mobility Assessment/Intervention    Extremity Weight-bearing Status  left lower extremity  -     Left Lower Extremity (Weight-bearing Status)  weight-bearing as tolerated (WBAT)  [facility]       User Key  (r) = Recorded By, (t) = Taken By, (c) = Cosigned By    Initials Name Provider Type    Gabrielle Del Toro, PT Physical Therapist        Obj/Interventions     [facility] Name 06/13/20 1107          General ROM    GENERAL ROM COMMENTS  h/o B TKR, functional ROM.  AAROM L WFLs  -[facility] Name 06/13/20 1107          MMT (Manual Muscle Testing)    General MMT Comments  RLE 5/5.  LLE hip 3-/5, knee 4/5.  ankle 5/5  -[facility] Name 06/13/20 1107          Therapeutic Exercise    Comment (Therapeutic Exercise)  pt has been doing HEP for BLEs.  reviewed seated  hip flexion, LAQs, ankle pumps and reclined abduction.  -[facility] Name 06/13/20 1107          Static Sitting Balance    Level of [place] (Unsupported Sitting, Static Balance)  supervision  -     Sitting Position (Unsupported Sitting, Static Balance)  sitting on edge of bed  -JH     Row Name 06/13/20 1107          Dynamic Sitting Balance    Level of [place], Reaches Outside Midline (Sitting, Dynamic Balance)  supervision  -     Sitting Position, Reaches Outside Midline (Sitting, Dynamic Balance)  sitting on edge of bed  -JH     Row Name 06/13/20 1107          Static Standing Balance    Level of [place] (Supported Standing, Static Balance)  minimal assist, 75% patient effort;1 person assist  -     Assistive Device Utilized (Supported Standing, Static Balance)  walker, rolling  -       User Key  (r) = Recorded By, (t) = Taken By, (c) = Cosigned By    Initials Name Provider Type    Gabrielle Del Toro, DIANA Physical Therapist         Goals/Plan     [facility] Name 06/13/20 1111          Bed Mobility Goal 1 (PT)    Activity/Assistive Device (Bed Mobility Goal 1, PT)  sit to supine/supine to sit  -JH     [place] Level/Cues Needed (Bed Mobility Goal 1, PT)  minimum assist (75% or more patient effort);1 person assist  -JH     Time Frame (Bed Mobility Goal 1, PT)  1 week  -JH     Row Name 06/13/20 1111          Transfer Goal 1 (PT)    Activity/Assistive Device (Transfer Goal 1, PT)  sit-to-stand/stand-to-sit;bed-to-chair/chair-to-bed  -JH     [place] Level/Cues Needed (Transfer Goal 1, PT)  minimum assist (75% or more patient effort);1 person assist  -JH     Time Frame (Transfer Goal 1, PT)  1 week  -JH     Row Name 06/13/20 1111          Gait Training Goal 1 (PT)    Activity/Assistive Device (Gait Training Goal 1, PT)  gait (walking locomotion);assistive device use;walker, rolling  -JH     [place] Level (Gait Training Goal 1, PT)  minimum assist (75% or more patient effort);1 person assist  -JH     Distance (Gait Goal 1, PT)  30'  -JH     Time Frame (Gait Training Goal 1, PT)  1 week  -JH     Row Name 06/13/20 1111          Patient Education Goal (PT)    Activity (Patient Education Goal, PT)  HEP  -JH     [place]/Cues/Accuracy (Memory Goal 2, PT)  demonstrates adequately  -JH     Time Frame (Patient Education Goal, PT)  1 week  -       User Key  (r) = Recorded By, (t) = Taken By, (c) = Cosigned By    Initials Name Provider Type    Gabrielle Del Toro, PT Physical Therapist        Clinical Impression     Row Name 06/13/20 1108          Pain Assessment    Additional Documentation  Pain Scale: Numbers Pre/Post-Treatment (Group)  -JH     Row Name 06/13/20 1108          Pain Scale: Numbers Pre/Post-Treatment    Pain Scale: Numbers, Pretreatment  2/10  -     Pain Scale: Numbers, Post-Treatment  2/10  -     Pain Location - Side  Left  -     Pain Location  hip  -[facility] 06/13/20 1108          Plan of Care Review    Plan of Care  Reviewed With  patient  -     Outcome Summary  73 yo female s/p elective L anterior RADHA.  Pt with limited mobiltiy at home, uses w/c and walker for short distance mobiltiy.  Pt moves slow, needs assist to transfer OOB and relies on bed height elevated to come to standing.  Only able to take few steps to bedside chair.  Pt with slow progress.  Reviewed LE exercises.  Pt eager to go home at d/c with  therapy, has 24 hr caregivers.  WIll follow 2x/day.   -     Row Name 06/13/20 1108          Physical Therapy Clinical Impression    Criteria for Skilled Interventions Met (PT Clinical Impression)  yes;treatment indicated  -     Rehab Potential (PT Clinical Summary)  good, to achieve stated therapy goals  -     Row Name 06/13/20 1108          Positioning and Restraints    Pre-Treatment Position  in bed  -     Post Treatment Position  chair  -     In Chair  notified nsg;sitting;call light within reach;exit alarm on  -       User Key  (r) = Recorded By, (t) = Taken By, (c) = Cosigned By    Initials Name Provider Type    Gabrielle Del Toro PT Physical Therapist        Outcome Measures    No documentation.       Physical Therapy Education                 Title: PT OT SLP Therapies (In Progress)     Topic: Physical Therapy (In Progress)     Point: Mobility training (Done)     Description:   Instruct learner(s) on safety and technique for assisting patient out of bed, chair or wheelchair.  Instruct in the proper use of assistive devices, such as walker, crutches, cane or brace.              Patient Friendly Description:   It's important to get you on your feet again, but we need to do so in a way that is safe for you. Falling has serious consequences, and your personal safety is the most important thing of all.        When it's time to get out of bed, one of us or a family member will sit next to you on the bed to give you support.     If your doctor or nurse tells you to use a walker, crutches, a cane, or a  brace, be sure you use it every time you get out of bed, even if you think you don't need it.    Learning Progress Summary           Patient Acceptance, E,TB, VU by  at 6/13/2020 1115                   Point: Home exercise program (Not Started)     Description:   Instruct learner(s) on appropriate technique for monitoring, assisting and/or progressing patient with therapeutic exercises and activities.              Learner Progress:   Not documented in this visit.          Point: Body mechanics (Not Started)     Description:   Instruct learner(s) on proper positioning and spine alignment for patient and/or caregiver during mobility tasks and/or exercises.              Learner Progress:   Not documented in this visit.          Point: Precautions (Done)     Description:   Instruct learner(s) on prescribed precautions during mobility and gait tasks              Learning Progress Summary           Patient Acceptance, E,TB, VU by  at 6/13/2020 1115                               User Key     Initials Effective Dates Name Provider Type Discipline     03/01/19 -  Gabrielle Cole, PT Physical Therapist PT              PT Recommendation and Plan  Planned Therapy Interventions (PT Eval): bed mobility training, gait training, home exercise program, transfer training, strengthening, ROM (range of motion), patient/family education  Outcome Summary/Treatment Plan (PT)  Anticipated Discharge Disposition (PT): home with assist, home with home health, home with 24/7 care  Plan of Care Reviewed With: patient  Outcome Summary: 75 yo female s/p elective L anterior RADHA.  Pt with limited mobiltiy at home, uses w/c and walker for short distance mobiltiy.  Pt moves slow, needs assist to transfer OOB and relies on bed height elevated to come to standing.  Only able to take few steps to bedside chair.  Pt with slow progress.  Reviewed LE exercises.  Pt eager to go home at d/c with HH therapy, has 24 hr caregivers.  WIll follow 2x/day.       Time Calculation:   PT Charges     Row Name 06/13/20 1116             Time Calculation    Start Time  0800  -      Stop Time  0835  -      Time Calculation (min)  35 min  -      PT Received On  06/13/20  -      PT - Next Appointment  06/13/20  -      PT Goal Re-Cert Due Date  06/27/20  -         Time Calculation- PT    Total Timed Code Minutes- PT  25 minute(s)  -        User Key  (r) = Recorded By, (t) = Taken By, (c) = Cosigned By    Initials Name Provider Type     Gabrielle Cole, PT Physical Therapist        Therapy Charges for Today     Code Description Service Date Service Provider Modifiers Qty    [id number]  PT EVAL MOD COMPLEXITY 3 6/13/2020 Gabrielle Cole, PT GP 1    [id number]  PT THERAPEUTIC ACT EA 15 MIN 6/13/2020 Gabrielle Cole, PT GP 2               Gabrielle Cole PT  6/13/2020

## 2020-06-13 NOTE — PLAN OF CARE
Problem: Patient Care Overview  Goal: Plan of Care Review  Outcome: Ongoing (interventions implemented as appropriate)  Flowsheets (Taken 6/13/2020 1108)  Plan of Care Reviewed With: patient  Outcome Summary: 73 yo female s/p elective L anterior RADHA.  Pt with limited mobiltiy at home, uses w/c and walker for short distance mobiltiy.  Pt moves slow, needs assist to transfer OOB and relies on bed height elevated to come to standing.  Only able to take few steps to bedside chair.  Pt with slow progress.  Reviewed LE exercises.  Pt eager to go home at d/c with HH therapy, has 24 hr caregivers.  WIll follow 2x/day. PPE worn:  gloves and mask with face shield.

## 2020-06-13 NOTE — PROGRESS NOTES
Orthopaedic Surgery   Daily Progress Note  Dr. CHRIS Hutchins II  [phone]  DEMOGRAPHICS:   · Camelia Gray   · Age:74 y.o.   · MRN:[id number]  · Admitted: 6/12/2020    PROCEDURE: 1 Day Post-Op s/p Procedure(s):  TOTAL HIP ARTHROPLASTY     HOSPITAL PROGRESS  · Patient Issues: No major issues, patient claims no pain  · Ambulation/Activity: Able to take a few steps yesterday.  Says her hip feels weird because there was no pain     VITALS:  Vitals:    06/12/20 1731 06/12/20 1805 06/12/20 2120 06/13/20 0500   BP: 146/54 146/80 174/74 136/71   BP Location:  Right arm Right arm    Patient Position:  Lying Lying    Pulse: 54 54 57 63   Resp: 14 18 18 17   Temp: 97 °F (36.1 °C) 97.3 °F (36.3 °C) 97.8 °F (36.6 °C) 98.4 °F (36.9 °C)   TempSrc:  Axillary Oral    SpO2: 93% 100% 97% 93%   Weight:       Height:           PHYSICAL EXAM:  · LUNGS: Equal chest rise, no shortness of air  · CARDIOVASCULAR: brisk capillary refill intact  · WOUND: SONIA Wound Vac System working in good condition, minimal drainage  · EXTREMITY: Operative Leg  · Pulses: brisk capillary refill intact  · Sensation: Sensation intact to light touch to the saphenous/sural/tibial/deep peroneal/superficial peroneal nerves, and grossly throughout the extremity.  · Motor: 5/5 EHL/FHL/TA/GS motor complexes    LABS:   Lab Results   Component Value Date    HGB 12.5 06/13/2020     Lab Results   Component Value Date    WBC 6.81 06/10/2020     Lab Results   Component Value Date    GLUCOSE 140 (H) 06/13/2020    CALCIUM 8.7 06/13/2020     (L) 06/13/2020    K 4.9 06/13/2020    CO2 24.0 06/13/2020     06/13/2020    BUN  06/13/2020      Comment:      Testing performed by alternate method    CREATININE 0.88 06/13/2020    EGFRIFNONA 63 06/13/2020    BCR  06/13/2020      Comment:      Testing not performed.    ANIONGAP 7.0 06/13/2020       ASSESSMENT: Patient is a 74 y.o. female who is 1 Day Post-Op s/p Procedure(s):  TOTAL HIP ARTHROPLASTY     PLAN:  "  · Weight Bearing: Weight Bearing As Tolerated  · Labs: Above lab values review. Plan: No intervention necessary at this time.   · PT/OT: To Mobilize  · DVT PPX: Resume Home DVT PPX  · Post-Op Xray: RADHA implants in good position.   · Follow-Up: In office at 3 weeks postop  · Dispo: Planning home Monday, this patient will need a little more time with physical therapy before being safe for discharge    R \"Dion\" uCong BAIG MD  Orthopaedic Surgery  [facility]  [phone] - [place] Office  [phone] - [place] Office      "

## 2020-06-14 LAB
HCT VFR BLD AUTO: 32.7 % (ref 34–46.6)
HGB BLD-MCNC: 11.4 G/DL (ref 12–15.9)

## 2020-06-14 PROCEDURE — 97112 NEUROMUSCULAR REEDUCATION: CPT

## 2020-06-14 PROCEDURE — 97530 THERAPEUTIC ACTIVITIES: CPT

## 2020-06-14 PROCEDURE — 97110 THERAPEUTIC EXERCISES: CPT

## 2020-06-14 PROCEDURE — 85014 HEMATOCRIT: CPT | Performed by: ORTHOPAEDIC SURGERY

## 2020-06-14 PROCEDURE — 85018 HEMOGLOBIN: CPT | Performed by: ORTHOPAEDIC SURGERY

## 2020-06-14 RX ORDER — TRAMADOL HYDROCHLORIDE 50 MG/1
50 TABLET ORAL EVERY 4 HOURS PRN
Qty: 40 TABLET | Refills: 0 | Status: SHIPPED | OUTPATIENT
Start: 2020-06-14 | End: 2020-10-26

## 2020-06-14 RX ORDER — TRAMADOL HYDROCHLORIDE 50 MG/1
100 TABLET ORAL EVERY 4 HOURS PRN
Status: DISCONTINUED | OUTPATIENT
Start: 2020-06-14 | End: 2020-06-16 | Stop reason: HOSPADM

## 2020-06-14 RX ORDER — ONDANSETRON 4 MG/1
4 TABLET, FILM COATED ORAL EVERY 6 HOURS PRN
Qty: 20 TABLET | Refills: 0 | Status: SHIPPED | OUTPATIENT
Start: 2020-06-14 | End: 2020-07-17

## 2020-06-14 RX ORDER — TRAMADOL HYDROCHLORIDE 50 MG/1
50 TABLET ORAL EVERY 4 HOURS PRN
Status: DISCONTINUED | OUTPATIENT
Start: 2020-06-14 | End: 2020-06-16 | Stop reason: HOSPADM

## 2020-06-14 RX ADMIN — ACETAMINOPHEN 650 MG: 325 TABLET ORAL at 20:50

## 2020-06-14 RX ADMIN — OXYBUTYNIN CHLORIDE 10 MG: 10 TABLET, EXTENDED RELEASE ORAL at 09:19

## 2020-06-14 RX ADMIN — LEVOTHYROXINE SODIUM 25 MCG: 25 TABLET ORAL at 06:08

## 2020-06-14 RX ADMIN — APIXABAN 2.5 MG: 2.5 TABLET, FILM COATED ORAL at 20:50

## 2020-06-14 RX ADMIN — ACETAMINOPHEN 650 MG: 325 TABLET ORAL at 01:00

## 2020-06-14 RX ADMIN — ACETAMINOPHEN 650 MG: 325 TABLET ORAL at 10:00

## 2020-06-14 RX ADMIN — APIXABAN 2.5 MG: 2.5 TABLET, FILM COATED ORAL at 09:19

## 2020-06-14 RX ADMIN — ACETAMINOPHEN 650 MG: 325 TABLET ORAL at 16:09

## 2020-06-14 NOTE — PLAN OF CARE
Problem: Patient Care Overview  Goal: Plan of Care Review  Outcome: Ongoing (interventions implemented as appropriate)  Flowsheets (Taken 6/14/2020 0323)  Progress: no change  Plan of Care Reviewed With: patient  Outcome Summary: Pt has been resting in bed. Pt is refusing turns and states that she can only lay on her back. Pt is incontinent and did not like the external catheter so then it was removed. A brief was placed on her and she states that she will let us know when she is wet. Pt became super nauseous but was relieved with the IV zofran. Pt did not tolerate PRN Oxy well. Tylenol has been given twice this shift for temp.

## 2020-06-14 NOTE — PLAN OF CARE
Problem: Patient Care Overview  Goal: Plan of Care Review  Outcome: Ongoing (interventions implemented as appropriate)  Flowsheets (Taken 6/14/2020 6113)  Progress: improving  Plan of Care Reviewed With: patient  Outcome Summary: pt req mod assist for bed mobility with HOB elevated and use of BR, does her own way, Transfers bed<>chair with min assist x 2 using rwx, decreased foot clearance and does more of a pivot on toes, scoots feet to complete transfer. Prefers to lean onto forearm to assist with offloading. Very slow moving and can't rush her. Still refusing rehab and states she has everything needed at home and 24 hr care, agreed to . Gloves, mask/shield worn for tx

## 2020-06-14 NOTE — PLAN OF CARE
Pt currently up in chair. CNA alerted this nurse that patient's blood pressure is reading low again. Pt states that she does feel light headed, and does not want to return to her bed at this time. Fluids increased, will recheck. Patient had a similar epsiode yesterday 6/13/2020 and states it was resolved with fluids.

## 2020-06-14 NOTE — PROGRESS NOTES
Orthopaedic Surgery   Daily Progress Note  Dr. CHRIS Hutchins II  [phone]  DEMOGRAPHICS:   · Camelia Gray   · Age:74 y.o.   · MRN:[id number]  · Admitted: 6/12/2020    PROCEDURE: 2 Days Post-Op s/p Procedure(s):  TOTAL HIP ARTHROPLASTY     HOSPITAL PROGRESS  · Patient Issues: Minimal complaints of pain but is having problems with asymptomatic hypotension  · Ambulation/Activity: Very slow mobilization, to be expected from severe preoperative deconditioning    VITALS:  Vitals:    06/14/20 0100 06/14/20 0500 06/14/20 1234 06/14/20 1437   BP:  117/66 (!) 88/56 91/49   BP Location:  Right arm Left arm    Patient Position:  Lying Lying    Pulse:  74 67    Resp:  15 18    Temp: 99.1 °F (37.3 °C) 98.1 °F (36.7 °C) 98.5 °F (36.9 °C)    TempSrc:  Oral Oral    SpO2:  93% 93%    Weight:  (!) 141 kg (310 lb 13.6 oz)     Height:           PHYSICAL EXAM:  · LUNGS: Equal chest rise, no shortness of air  · CARDIOVASCULAR: brisk capillary refill intact  · WOUND: SONIA Wound Vac System working in good condition, minimal drainage  · EXTREMITY: Operative Leg  · Pulses: brisk capillary refill intact  · Sensation: Sensation intact to light touch to the saphenous/sural/tibial/deep peroneal/superficial peroneal nerves, and grossly throughout the extremity.  · Motor: 5/5 EHL/FHL/TA/GS motor complexes    LABS:   Lab Results   Component Value Date    HGB 11.4 (L) 06/14/2020     Lab Results   Component Value Date    WBC 6.81 06/10/2020     Lab Results   Component Value Date    GLUCOSE 140 (H) 06/13/2020    CALCIUM 8.7 06/13/2020     (L) 06/13/2020    K 4.9 06/13/2020    CO2 24.0 06/13/2020     06/13/2020    BUN  06/13/2020      Comment:      Testing performed by alternate method    BUN 26 (H) 06/13/2020    CREATININE 0.88 06/13/2020    EGFRIFNONA 63 06/13/2020    BCR  06/13/2020      Comment:      Testing not performed.    ANIONGAP 7.0 06/13/2020       ASSESSMENT: Patient is a 74 y.o. female who is 2 Days Post-Op s/p  "Procedure(s):  TOTAL HIP ARTHROPLASTY     PLAN:   · Weight Bearing: Weight Bearing As Tolerated  · Labs: Above lab values review. Plan: No intervention necessary at this time.   · PT/OT: To Mobilize  · DVT PPX: Resume Home DVT PPX  · Post-Op Xray: RADHA implants in good position.   · Follow-Up: In office at 3 weeks postop  · Dispo: Patient still wanting to go home, not interested in SNF.  She does have 24/7 care.  She will also get home health and home PT.  Plan home discharge tomorrow    R \"Dion\" Cuong BAIG MD  Orthopaedic Surgery  [facility]  [phone] - [place] Office  [phone] - [place] Office      "

## 2020-06-14 NOTE — THERAPY TREATMENT NOTE
Patient Name: Camelia Gray  : 1945    MRN: [id number]                              Today's Date: 2020       Admit Date: 2020    Visit Dx:     ICD-10-CM ICD-9-CM   1. Primary osteoarthritis of left hip M16.12 715.15     Patient Active Problem List   Diagnosis   • Morbid obesity (CMS/HCC)   • Acute cystitis with hematuria   • Arthritis of left hip   • Deep vein thrombosis (DVT) (CMS/HCC)   • Edema of lower extremity   • Hypertension   • Pulmonary embolism (CMS/HCC)   • Stasis dermatitis   • Cellulitis of both feet   • Immobility syndrome   • Venous stasis   • Lymphedema   • Non-pressure chronic ulcer of other part of right foot with fat layer exposed (CMS/HCC)   • Non-pressure chronic ulcer of other part of left foot with fat layer exposed (CMS/HCC)   • Dermatitis associated with moisture   • Primary osteoarthritis of left hip   • Hypothyroidism   • Urinary incontinence in female   • Preop cardiovascular exam   • Status post total replacement of hip     Past Medical History:   Diagnosis Date   • Arthritis    • Cellulitis of both feet 10/13/2019   • Decubitus ulcer of buttock, stage 2 (CMS/HCC) 10/14/2019   • Dermatitis associated with moisture 10/14/2019   • DVT, lower extremity (CMS/HCC)    • Edema of lower extremity 2017   • Hypertension    • Immobility syndrome 10/13/2019   • Lymphedema 10/14/2019   • Non-pressure chronic ulcer of other part of left foot with fat layer exposed (CMS/HCC) 10/14/2019   • Non-pressure chronic ulcer of other part of right foot with fat layer exposed (CMS/HCC) 10/14/2019   • Obesity    • Pulmonary embolism (CMS/HCC)    • Stasis dermatitis 2013   • Venous stasis 10/14/2019     Past Surgical History:   Procedure Laterality Date   • CATARACT EXTRACTION     • DENTAL EXAMINATION UNDER ANESTHESIA     • JOINT REPLACEMENT      Bilateral knees   • TOE SURGERY       General Information     Row Name 20 6702          PT Evaluation Time/Intention    Document Type   therapy note (daily note)  -     Mode of Treatment  physical therapy  -     Row Name 06/14/20 1842          General Information    Patient Profile Reviewed?  yes  -     Existing Precautions/Restrictions  fall;hip, posterior;left  -     Row Name 06/14/20 1842          Safety Issues, Functional Mobility    Comment, Safety Issues/Impairments (Mobility)  pt particular on how things are done, doesn't want you to physically assist her much and will tell you how she wants you to assist. BP has been low and needs monitored  -       User Key  (r) = Recorded By, (t) = Taken By, (c) = Cosigned By    Initials Name Provider Type     Cindy Esparza PTA Physical Therapy Assistant        Mobility     Row Name 06/14/20 1843          Bed Mobility Assessment/Treatment    Bed Mobility Assessment/Treatment  bed mobility (all) activities  -     [place] Level (Bed Mobility)  moderate assist (50% patient effort)  -     Comment (Bed Mobility)  pt wants HOB elevated and use BR, has hosp bed at home  -     Row Name 06/14/20 1843          Bed-Chair Transfer    Bed-Chair [place] (Transfers)  minimum assist (75% patient effort);2 person assist with bed elevated and pillow in seat of chair  -     Assistive Device (Bed-Chair Transfers)  walker, front-wheeled  -     Row Name 06/14/20 1843          Mobility Assessment/Intervention    Left Lower Extremity (Weight-bearing Status)  weight-bearing as tolerated (WBAT)  -       User Key  (r) = Recorded By, (t) = Taken By, (c) = Cosigned By    Initials Name Provider Type     Cindy Esparza PTA Physical Therapy Assistant        Obj/Interventions     Row Name 06/14/20 1845          General ROM    GENERAL ROM COMMENTS  req AAROM LLE and prefers you to just assist at her heel, very sensitive to touch calf  -     Row Name 06/14/20 1845          Therapeutic Exercise    Comment (Therapeutic Exercise)  AROM and AAROM while supine and seated x 15  -[facility] Name  06/14/20 1845          Static Sitting Balance    Level of [place] (Unsupported Sitting, Static Balance)  supervision  -     Sitting Position (Unsupported Sitting, Static Balance)  sitting on edge of bed  -[facility] Name 06/14/20 1845          Static Standing Balance    Level of [place] (Supported Standing, Static Balance)  minimal assist, 75% patient effort;2 person assist  -     Assistive Device Utilized (Supported Standing, Static Balance)  walker, rolling  -     Comment (Supported Standing, Static Balance)  pt prefers to lean onto L forearm so she can advance L foot,   -       User Key  (r) = Recorded By, (t) = Taken By, (c) = Cosigned By    Initials Name Provider Type     Cindy Esparza PTA Physical Therapy Assistant        Goals/Plan     [facility] Name 06/14/20 1849          Bed Mobility Goal 1 (PT)    Progress/Outcomes (Bed Mobility Goal 1, PT)  goal ongoing  -[facility]     Row Name 06/14/20 1849          Transfer Goal 1 (PT)    Progress/Outcome (Transfer Goal 1, PT)  goal ongoing  [facility] Name 06/14/20 1849          Gait Training Goal 1 (PT)    Progress/Outcome (Gait Training Goal 1, PT)  goal ongoing  -[facility] Name 06/14/20 1849          ROM Goal 1 (PT)    Progress/Outcome (ROM Goal 1, PT)  goal ongoing  -       User Key  (r) = Recorded By, (t) = Taken By, (c) = Cosigned By    Initials Name Provider Type     Cindy Esparza PTA Physical Therapy Assistant        Clinical Impression     [facility] Name 06/14/20 1846          Pain Scale: Numbers Pre/Post-Treatment    Pre/Post Treatment Pain Comment  c/o L hip pain with movement and BLE to touch distally  -     Row Name 06/14/20 1846          Plan of Care Review    Plan of Care Reviewed With  patient  -     Progress  improving  -     Outcome Summary  pt req mod assist for bed mobility with HOB elevated and use of BR, does her own way, Transfers bed<>chair with min assist x 2 using rwx, decreased foot clearance and does more of a pivot on  toes, scoots feet to complete transfer. Prefers to lean onto forearm to assist with offloading. Very slow moving and can't rush her. Still refusing rehab and states she has everything needed at home and 24 hr care, agreed to . Gloves, mask/shield worn for tx  -     Row Name 06/14/20 1846          Physical Therapy Clinical Impression    Criteria for Skilled Interventions Met (PT Clinical Impression)  treatment indicated  -     Rehab Potential (PT Clinical Summary)  fair, will monitor progress closely  -     Row Name 06/14/20 1846          Vital Signs    Intratreatment Heart Rate (beats/min)  92  -     O2 Delivery Pre Treatment  room air  -     Intra SpO2 (%)  93  -     Row Name 06/14/20 1846          Positioning and Restraints    Pre-Treatment Position  in bed  -     Post Treatment Position  chair  -     In Chair  reclined;call light within reach;exit alarm on;legs elevated  -       User Key  (r) = Recorded By, (t) = Taken By, (c) = Cosigned By    Initials Name Provider Type    Cindy Lal, SHAKIRA Physical Therapy Assistant        Outcome Measures    No documentation.       Physical Therapy Education                 Title: PT OT SLP Therapies (Done)     Topic: Physical Therapy (Done)     Point: Mobility training (Done)     Description:   Instruct learner(s) on safety and technique for assisting patient out of bed, chair or wheelchair.  Instruct in the proper use of assistive devices, such as walker, crutches, cane or brace.              Patient Friendly Description:   It's important to get you on your feet again, but we need to do so in a way that is safe for you. Falling has serious consequences, and your personal safety is the most important thing of all.        When it's time to get out of bed, one of us or a family member will sit next to you on the bed to give you support.     If your doctor or nurse tells you to use a walker, crutches, a cane, or a brace, be sure you use it every time  you get out of bed, even if you think you don't need it.    Learning Progress Summary           Patient Acceptance, E,TB, VU by  at 6/14/2020 1850    Acceptance, E,TB, VU by  at 6/13/2020 1115                   Point: Home exercise program (Done)     Description:   Instruct learner(s) on appropriate technique for monitoring, assisting and/or progressing patient with therapeutic exercises and activities.              Learning Progress Summary           Patient Acceptance, E,TB, VU by  at 6/14/2020 1850                   Point: Body mechanics (Done)     Description:   Instruct learner(s) on proper positioning and spine alignment for patient and/or caregiver during mobility tasks and/or exercises.              Learning Progress Summary           Patient Acceptance, E,TB, VU by  at 6/14/2020 1850                   Point: Precautions (Done)     Description:   Instruct learner(s) on prescribed precautions during mobility and gait tasks              Learning Progress Summary           Patient Acceptance, E,TB, VU by  at 6/14/2020 1850    Acceptance, E,TB, VU by  at 6/13/2020 1115                               User Key     Initials Effective Dates Name Provider Type Discipline     03/01/19 -  Gabrielle Cole, PT Physical Therapist PT     03/01/19 -  Cindy Esparza PTA Physical Therapy Assistant PT              PT Recommendation and Plan  Planned Therapy Interventions (PT Eval): bed mobility training, gait training, ROM (range of motion), strengthening, transfer training  Plan of Care Reviewed With: patient  Progress: improving  Outcome Summary: pt req mod assist for bed mobility with HOB elevated and use of BR, does her own way, Transfers bed<>chair with min assist x 2 using rwx, decreased foot clearance and does more of a pivot on toes, scoots feet to complete transfer. Prefers to lean onto forearm to assist with offloading. Very slow moving and can't rush her. Still refusing rehab and states she has  everything needed at home and 24 hr care, agreed to . Gloves, mask/shield worn for tx     Time Calculation:   PT Charges     Row Name 06/14/20 1851             Time Calculation    Start Time  0900  -      Stop Time  0930  -      Time Calculation (min)  30 min  -      PT Received On  06/14/20  -      PT - Next Appointment  06/15/20  -         Time Calculation- PT    Total Timed Code Minutes- PT  30 minute(s)  -        User Key  (r) = Recorded By, (t) = Taken By, (c) = Cosigned By    Initials Name Provider Type     Cindy Esparza PTA Physical Therapy Assistant        Therapy Charges for Today     Code Description Service Date Service Provider Modifiers Qty    07241365234 HC PT NEUROMUSC RE EDUCATION EA 15 MIN 6/14/2020 Cindy Esparza, SHAKIRA GP 1    83504723425 HC PT THERAPEUTIC ACT EA 15 MIN 6/14/2020 Cindy Esparza PTA GP 1    75926350841 HC PT THER PROC EA 15 MIN 6/14/2020 Cindy Esparza PTA GP 1               Cindy Esparza PTA  6/14/2020

## 2020-06-14 NOTE — THERAPY TREATMENT NOTE
Patient Name: Camelia Gray  : 1945    MRN: [id number]                              Today's Date: 2020       Admit Date: 2020    Visit Dx:     ICD-10-CM ICD-9-CM   1. Primary osteoarthritis of left hip M16.12 715.15     Patient Active Problem List   Diagnosis   • Morbid obesity (CMS/HCC)   • Acute cystitis with hematuria   • Arthritis of left hip   • Deep vein thrombosis (DVT) (CMS/HCC)   • Edema of lower extremity   • Hypertension   • Pulmonary embolism (CMS/HCC)   • Stasis dermatitis   • Cellulitis of both feet   • Immobility syndrome   • Venous stasis   • Lymphedema   • Non-pressure chronic ulcer of other part of right foot with fat layer exposed (CMS/HCC)   • Non-pressure chronic ulcer of other part of left foot with fat layer exposed (CMS/HCC)   • Dermatitis associated with moisture   • Primary osteoarthritis of left hip   • Hypothyroidism   • Urinary incontinence in female   • Preop cardiovascular exam   • Status post total replacement of hip     Past Medical History:   Diagnosis Date   • Arthritis    • Cellulitis of both feet 10/13/2019   • Decubitus ulcer of buttock, stage 2 (CMS/HCC) 10/14/2019   • Dermatitis associated with moisture 10/14/2019   • DVT, lower extremity (CMS/HCC)    • Edema of lower extremity 2017   • Hypertension    • Immobility syndrome 10/13/2019   • Lymphedema 10/14/2019   • Non-pressure chronic ulcer of other part of left foot with fat layer exposed (CMS/HCC) 10/14/2019   • Non-pressure chronic ulcer of other part of right foot with fat layer exposed (CMS/HCC) 10/14/2019   • Obesity    • Pulmonary embolism (CMS/HCC)    • Stasis dermatitis 2013   • Venous stasis 10/14/2019     Past Surgical History:   Procedure Laterality Date   • CATARACT EXTRACTION     • DENTAL EXAMINATION UNDER ANESTHESIA     • JOINT REPLACEMENT      Bilateral knees   • TOE SURGERY       General Information     Row Name 20 1851 20 1842       PT Evaluation Time/Intention     Document Type  therapy note (daily note)  -  therapy note (daily note)  -    Mode of Treatment  physical therapy  -  physical therapy  -    Row Name 06/14/20 1851 06/14/20 1842       General Information    Patient Profile Reviewed?  yes  -  yes  -    Existing Precautions/Restrictions  hip, anterior  -  fall;hip, posterior;left  -    Row Name 06/14/20 1842          Safety Issues, Functional Mobility    Comment, Safety Issues/Impairments (Mobility)  pt particular on how things are done, doesn't want you to physically assist her much and will tell you how she wants you to assist. BP has been low and needs monitored  -       User Key  (r) = Recorded By, (t) = Taken By, (c) = Cosigned By    Initials Name Provider Type     Cinyd Esparza PTA Physical Therapy Assistant        Mobility     Row Name 06/14/20 1852 06/14/20 1843       Bed Mobility Assessment/Treatment    Bed Mobility Assessment/Treatment  sit-supine  -  bed mobility (all) activities  -    Daniels Level (Bed Mobility)  --  moderate assist (50% patient effort)  -    Sit-Supine Daniels (Bed Mobility)  moderate assist (50% patient effort) assist with LE's  -  --    Comment (Bed Mobility)  pt dependent x 2 to pull up in bed using pads  -  pt wants HOB elevated and use BR, has hosp bed at home  -    Row Name 06/14/20 1852 06/14/20 1843       Bed-Chair Transfer    Bed-Chair Daniels (Transfers)  minimum assist (75% patient effort);2 person assist  -  minimum assist (75% patient effort);2 person assist with bed elevated and pillow in seat of chair  -    Assistive Device (Bed-Chair Transfers)  walker, front-wheeled  -  walker, front-wheeled  -    Row Name 06/14/20 1852 06/14/20 1843       Mobility Assessment/Intervention    Left Lower Extremity (Weight-bearing Status)  weight-bearing as tolerated (WBAT)  -  weight-bearing as tolerated (WBAT)  -      User Key  (r) = Recorded By, (t) = Taken By, (c) = Cosigned  By    Initials Name Provider Type    Cindy Lal PTA Physical Therapy Assistant        Obj/Interventions     Row Name 06/14/20 1845          General ROM    GENERAL ROM COMMENTS  req AAROM LLE and prefers you to just assist at her heel, very sensitive to touch calf  -     Row Name 06/14/20 1853 06/14/20 1845       Therapeutic Exercise    Comment (Therapeutic Exercise)  seated LE exs x 15 with AA on L for hip flex  -  AROM and AAROM while supine and seated x 15  -    Row Name 06/14/20 1853 06/14/20 1845       Static Sitting Balance    Level of [place] (Unsupported Sitting, Static Balance)  supervision  -  supervision  -    Sitting Position (Unsupported Sitting, Static Balance)  sitting in chair  -  sitting on edge of bed  -    Row Name 06/14/20 1853 06/14/20 1845       Static Standing Balance    Level of [place] (Supported Standing, Static Balance)  minimal assist, 75% patient effort  -  minimal assist, 75% patient effort;2 person assist  -    Time Able to Maintain Position (Supported Standing, Static Balance)  less than 15 seconds  -  --    Assistive Device Utilized (Supported Standing, Static Balance)  walker, rolling  -  walker, rolling  -    Comment (Supported Standing, Static Balance)  prefers to  lean onto forearm to offload L and advance   -  pt prefers to lean onto L forearm so she can advance L foot,   -      User Key  (r) = Recorded By, (t) = Taken By, (c) = Cosigned By    Initials Name Provider Type    Cindy Lal PTA Physical Therapy Assistant        Goals/Plan     Row Name 06/14/20 1849          Bed Mobility Goal 1 (PT)    Progress/Outcomes (Bed Mobility Goal 1, PT)  goal ongoing  -     Row Name 06/14/20 1849          Transfer Goal 1 (PT)    Progress/Outcome (Transfer Goal 1, PT)  goal ongoing  -[facility] Name 06/14/20 1849          Gait Training Goal 1 (PT)    Progress/Outcome (Gait Training Goal 1, PT)  goal ongoing  -[facility] Name 06/14/20  1849          ROM Goal 1 (PT)    Progress/Outcome (ROM Goal 1, PT)  goal ongoing  -       User Key  (r) = Recorded By, (t) = Taken By, (c) = Cosigned By    Initials Name Provider Type     Cindy Esparza PTA Physical Therapy Assistant        Clinical Impression     Row Name 06/14/20 1854 06/14/20 1846       Pain Scale: Numbers Pre/Post-Treatment    Pre/Post Treatment Pain Comment  pt still very sensitive to touch BLE forest calves, prefers to assist at heels  -  c/o L hip pain with movement and BLE to touch distally  -    Row Name 06/14/20 1854 06/14/20 1846       Plan of Care Review    Plan of Care Reviewed With  patient  -  patient  -    Progress  no change  -  improving  -    Outcome Summary  pt has her own way of doing things and will tell you how to assist. Has been having probs with low BP which is interferring some with progresssion. Still only will agree to  and has 24 hr care  -  pt req mod assist for bed mobility with HOB elevated and use of BR, does her own way, Transfers bed<>chair with min assist x 2 using rwx, decreased foot clearance and does more of a pivot on toes, scoots feet to complete transfer. Prefers to lean onto forearm to assist with offloading. Very slow moving and can't rush her. Still refusing rehab and states she has everything needed at home and 24 hr care, agreed to HH. Gloves, mask/shield worn for tx  -    Row Name 06/14/20 1854 06/14/20 1846       Physical Therapy Clinical Impression    Criteria for Skilled Interventions Met (PT Clinical Impression)  treatment indicated  -  treatment indicated  -    Rehab Potential (PT Clinical Summary)  fair, will monitor progress closely  -  fair, will monitor progress closely  -    Row Name 06/14/20 1854 06/14/20 1846       Vital Signs    Intra Systolic BP Rehab  85  -  --    Intra Treatment Diastolic BP  51  -  --    Intratreatment Heart Rate (beats/min)  --  92  -    O2 Delivery Pre Treatment  room air  -   room air  -    Intra SpO2 (%)  --  93  -    Row Name 06/14/20 1854 06/14/20 1846       Positioning and Restraints    Pre-Treatment Position  sitting in chair/recliner  -  in bed  -    Post Treatment Position  bed  -  chair  -    In Chair  call light within reach;exit alarm on;reclined  -  reclined;call light within reach;exit alarm on;legs elevated  -      User Key  (r) = Recorded By, (t) = Taken By, (c) = Cosigned By    Initials Name Provider Type    Cindy Lal, SHAKIRA Physical Therapy Assistant        Outcome Measures    No documentation.       Physical Therapy Education                 Title: PT OT SLP Therapies (Done)     Topic: Physical Therapy (Done)     Point: Mobility training (Done)     Description:   Instruct learner(s) on safety and technique for assisting patient out of bed, chair or wheelchair.  Instruct in the proper use of assistive devices, such as walker, crutches, cane or brace.              Patient Friendly Description:   It's important to get you on your feet again, but we need to do so in a way that is safe for you. Falling has serious consequences, and your personal safety is the most important thing of all.        When it's time to get out of bed, one of us or a family member will sit next to you on the bed to give you support.     If your doctor or nurse tells you to use a walker, crutches, a cane, or a brace, be sure you use it every time you get out of bed, even if you think you don't need it.    Learning Progress Summary           Patient Acceptance, E,TB, VU by  at 6/14/2020 1850    Acceptance, E,TB, VU by  at 6/13/2020 1115                   Point: Home exercise program (Done)     Description:   Instruct learner(s) on appropriate technique for monitoring, assisting and/or progressing patient with therapeutic exercises and activities.              Learning Progress Summary           Patient Acceptance, E,TB, VU by  at 6/14/2020 1850                   Point:  Body mechanics (Done)     Description:   Instruct learner(s) on proper positioning and spine alignment for patient and/or caregiver during mobility tasks and/or exercises.              Learning Progress Summary           Patient Acceptance, E,TB, VU by  at 6/14/2020 1850                   Point: Precautions (Done)     Description:   Instruct learner(s) on prescribed precautions during mobility and gait tasks              Learning Progress Summary           Patient Acceptance, E,TB, VU by  at 6/14/2020 1850    Acceptance, E,TB, VU by  at 6/13/2020 1115                               User Key     Initials Effective Dates Name Provider Type Discipline     03/01/19 -  Gabrielle Cole, PT Physical Therapist PT     03/01/19 -  Cindy Esparza PTA Physical Therapy Assistant PT              PT Recommendation and Plan  Planned Therapy Interventions (PT Eval): bed mobility training, gait training, ROM (range of motion), strengthening, transfer training  Plan of Care Reviewed With: patient  Progress: no change  Outcome Summary: pt has her own way of doing things and will tell you how to assist. Has been having probs with low BP which is interferring some with progresssion. Still only will agree to  and has 24 hr care     Time Calculation:   PT Charges     Row Name 06/14/20 1857 06/14/20 1851          Time Calculation    Start Time  1435  -  0900  -     Stop Time  1520  -  0930  -     Time Calculation (min)  45 min  -  30 min  -     PT Received On  06/14/20  -  06/14/20  -     PT - Next Appointment  06/15/20  -  06/15/20  -        Time Calculation- PT    Total Timed Code Minutes- PT  45 minute(s)  -  30 minute(s)  -       User Key  (r) = Recorded By, (t) = Taken By, (c) = Cosigned By    Initials Name Provider Type     Cindy Esparza PTA Physical Therapy Assistant        Therapy Charges for Today     Code Description Service Date Service Provider Modifiers Qty    10634116341  PT  NEUROMUSC RE EDUCATION EA 15 MIN 6/14/2020 Cindy Esparza, PTA GP 1    21095330451 HC PT THERAPEUTIC ACT EA 15 MIN 6/14/2020 Cindy Esparza, PTA GP 1    88409563642 HC PT THER PROC EA 15 MIN 6/14/2020 Cindy Esparza, PTA GP 1    35345573097 HC PT NEUROMUSC RE EDUCATION EA 15 MIN 6/14/2020 Cindy Esparza, PTA GP 1    39555263902 HC PT THERAPEUTIC ACT EA 15 MIN 6/14/2020 Cindy Esparza, PTA GP 1    99972534091 HC PT THER PROC EA 15 MIN 6/14/2020 Cindy Esparza, PTA GP 1               Cindy Esparza, PTA  6/14/2020

## 2020-06-14 NOTE — DISCHARGE SUMMARY
Orthopaedic Discharge Summary  Dr. CHRIS Becerra” [place]  [phone]    NAME: Camelia Gray PCP: Eric Vickers Jr., MD   :  MRN: [id number]  [id number] LOS:  ADMIT: 2 days  2020   AGE/SEX: 74 y.o. female DC:  tomorrow             · Admitting Diagnosis: Hip osteoarthritis [M16.9]  · Status post total replacement of hip [Z96.649]    · Surgery Performed: TOTAL HIP ARTHROPLASTY    · Discharge Medications:         Discharge Medications      New Medications      Instructions Start Date   ondansetron 4 MG tablet  Commonly known as:  ZOFRAN   4 mg, Oral, Every 6 Hours PRN         Changes to Medications      Instructions Start Date   Myrbetriq 50 MG tablet sustained-release 24 hour 24 hr tablet  Generic drug:  Mirabegron ER  What changed:    · how much to take  · when to take this   TAKE ONE TABLET BY MOUTH EVERY DAY      Thera tablet tablet  What changed:  additional instructions   1 tablet, Oral, Daily      traMADol 50 MG tablet  Commonly known as:  ULTRAM  What changed:  when to take this   50 mg, Oral, Every 4 Hours PRN, for pain         Continue These Medications      Instructions Start Date   apixaban 5 MG tablet tablet  Commonly known as:  ELIQUIS   2.5 mg, Oral, 2 Times Daily, Held per Dr Vickers as pt to see       levothyroxine 50 MCG tablet  Commonly known as:  SYNTHROID, LEVOTHROID   25 mcg, Oral, Daily      lisinopril 40 MG tablet  Commonly known as:  PRINIVIL,ZESTRIL   1 tablet, Daily, LD 6/10 for surg       metoprolol succinate XL 25 MG 24 hr tablet  Commonly known as:  TOPROL-XL   No dose, route, or frequency recorded.      nystatin [id number] UNIT/GM powder  Commonly known as:  MYCOSTATIN   Topical, 2 Times Daily             · Vitals:     Vitals:    20 0100 20 0500 20 1234 20 1437   BP:  117/66 (!) 88/56  Comment: told the RN about bp 91/49  Comment: rechecking per nurse   BP Location:  Right arm Left arm    Patient Position:  Lying Lying    Pulse:  74 67    Resp:  15 18     Temp: 99.1 °F (37.3 °C) 98.1 °F (36.7 °C) 98.5 °F (36.9 °C)    TempSrc:  Oral Oral    SpO2:  93% 93%    Weight:  (!) 141 kg (310 lb 13.6 oz)     Height:           · Labs:      Admission on 06/12/2020   Component Date Value Ref Range Status   • Color, UA 06/12/2020 Yellow  Yellow, Straw Final   • Appearance, UA 06/12/2020 Clear  Clear Final   • pH, UA 06/12/2020 5.5  5.0 - 8.0 Final   • Specific Gravity, UA 06/12/2020 1.011  1.005 - 1.030 Final   • Glucose, UA 06/12/2020 Negative  Negative Final   • Ketones, UA 06/12/2020 Negative  Negative Final   • Bilirubin, UA 06/12/2020 Negative  Negative Final   • Blood, UA 06/12/2020 Trace* Negative Final   • Protein, UA 06/12/2020 Negative  Negative Final   • Leuk Esterase, UA 06/12/2020 Moderate (2+)* Negative Final   • Nitrite, UA 06/12/2020 Positive* Negative Final   • Urobilinogen, UA 06/12/2020 0.2 E.U./dL  0.2 - 1.0 E.U./dL Final   • RBC, UA 06/12/2020 0-2* None Seen /HPF Final   • WBC, UA 06/12/2020 13-20* None Seen /HPF Final   • Bacteria, UA 06/12/2020 1+* None Seen /HPF Final   • Squamous Epithelial Cells, UA 06/12/2020 0-2  None Seen, 0-2 /HPF Final   • Hyaline Casts, UA 06/12/2020 3-6  None Seen /LPF Final   • Methodology 06/12/2020 Automated Microscopy   Final   • Hemoglobin 06/12/2020 14.0  12.0 - 15.9 g/dL Final   • Hematocrit 06/12/2020 40.2  34.0 - 46.6 % Final   • Extra Tube 06/12/2020 Hold for add-ons.   Final    Auto resulted.   • Glucose 06/13/2020 140* 65 - 99 mg/dL Final   • BUN 06/13/2020    Final    Testing performed by alternate method   • Creatinine 06/13/2020 0.88  0.57 - 1.00 mg/dL Final   • Sodium 06/13/2020 134* 136 - 145 mmol/L Final   • Potassium 06/13/2020 4.9  3.5 - 5.2 mmol/L Final   • Chloride 06/13/2020 103  98 - 107 mmol/L Final   • CO2 06/13/2020 24.0  22.0 - 29.0 mmol/L Final   • Calcium 06/13/2020 8.7  8.6 - 10.5 mg/dL Final   • eGFR Non African Amer 06/13/2020 63  >60 mL/min/1.73 Final   • BUN/Creatinine Ratio 06/13/2020    Final    "   Testing not performed.   • Anion Gap 06/13/2020 7.0  5.0 - 15.0 mmol/L Final   • Hemoglobin 06/13/2020 12.5  12.0 - 15.9 g/dL Final   • Hematocrit 06/13/2020 37.3  34.0 - 46.6 % Final   • BUN 06/13/2020 26* 8 - 23 mg/dL Final   • Hemoglobin 06/14/2020 11.4* 12.0 - 15.9 g/dL Final   • Hematocrit 06/14/2020 32.7* 34.0 - 46.6 % Final        No results found.    · Hospital Course:   74 y.o. female was admitted to [facility], [place], operated by [facility] to services of Baljit Hutchins II, MD with Hip osteoarthritis [M16.9]  Status post total replacement of hip [Z96.649] on 6/12/2020 and underwent TOTAL HIP ARTHROPLASTY. Post-operatively the patient transferred to the floor where the patient underwent mobilization therapy. Opioids were titrated to achieve appropriate pain management to allow for participation in mobilization exercises. Vital signs and laboratory values are now within safe parameters for discharge. The dressings and/or incision is intact without signs or symptoms of active infection. Operative extremity neurovascular status remains intact as compared to the preoperative exam. Appropriate education re: incision care, activity levels, medications, and follow up visits was completed and all questions were answered. The patient is now deemed stable for discharge.    HOME: The patient progressed well with physical therapy. There were cleared for discharge to home. The claudiaetn was sent home in good condition}.       R \"Dion\" Cuong BAIG MD  Orthopaedic Surgery  [facility]  [phone]                                               "

## 2020-06-14 NOTE — NURSING NOTE
Pt is resting in bed. Has had multiple occasions of nausea but no vomiting. Pt is incontinent but is aware when her brief is soiled. I asked her at 0052 if her brief was dry and she said yes. At 0220 I returned to check her temperature and I again asked her if she was dry and she stated that she is relatively dry. Pt did not want to be changed at this time and will let staff know when her brief is soiled. Pt was not tolerating the bedpan or external catheter so it leaves us with minimal options for her.

## 2020-06-14 NOTE — DISCHARGE INSTRUCTIONS
Total Hip  Discharge Instructions  Dr. CHRIS Becerra” [place]  [phone]    • INCISION CARE  o Wash your hands prior to dressing changes  o SONIA Wound VAC: Postoperatively you had a SONIA Wound Vac placed on the incision. This was placed under sterile conditions in the operating room. It remains in place for 7 days postoperatively. After 7 days, the entire dressing must be removed, including all of the sticky adhesive. The dressing and battery pack provide gentle suction to the incision and provide several benefits over a traditional dressing:  - It maintains the sterile environment of the OR and reduces the risk of infection  - The suction removes unwanted buildup of blood/hematoma under the skin to reduce swelling  - The suction also promotes fresh blood supply to the skin and soft tissue to speed up healing  - The postoperative scar is reduced in size  - Showering is permitted immediately after surgery, but the battery pack must be protected or removed during the shower.   o After 7 days the SONIA Wound Vac is removed. If there is no drainage, no dressing is required. If there is some scant drainage a dry bandage can be applied and changed daily until seen in the office or until the drainage stops.   o No creams or ointments to the incisions until 4 weeks post op.  o Do not touch or pick at the incision  o Check incision every day and notify surgeon immediately if any of the following signs or symptoms are seen:  - Increase in redness  - Increase in swelling around the incision and of the entire extremity  - Increase in pain  - NEW drainage or oozing from the incision  - Pulling apart of the edges of the incision  - Increase in overall body temperature (greater than 100.4 degrees)  o Zip-Line: your incision was closed with a state of the art device.   - Is a non-invasive and easy to use wound closure device that replaces sutures, staples and glue for surgical incisions  - It minimizes scarring and eliminating  “railroad” marks that come with staples or sutures  - It makes removal as atraumatic as peeling off a bandage  - Can be removed at home or by a physical therapist or nurse at 14 days postoperatively    • ACTIVITIES  o Exercises:  - Physical therapy will begin immediately while in the hospital. Patients going to a nursing home will get therapy as part of their care at the SNU/SNF facility. Patients going home may also have a therapist come to the house to help them mobilize until they can safely get to an outpatient therapy facility.  - Elevate the affected leg most of the day during the first week post operatively. Caution must be taken to avoid pillow placement directly under the heel of the leg, as this can cause pressure ulcers even with a soft pillow. All pillows and blankets should be placed underneath of the thigh and calf so that the heel is free-floating.  - Use cold packs for 20-30 minutes approximately 5 times per day.  - You should perform the daily stretching and strengthening exercises as taught by the therapist as often as possible. This can be done many times a day.  - Full weight bearing is allowed after surgery. It will be sore/painful to put weight on the leg, but this will help the bone to heal and prevent complications such as pneumonia, bed sores and blood clots. Mobilization is vital to the recovery process.  o Activities of Daily Living:  - No tub baths, hot tubs, or swimming pools for 4 weeks.  - May shower and let water run over the incision immediately after surgery. The battery pack of the SONIA Wound Vac must be protected or removed while in the shower. After the SONIA is removed 7 days after surgery showering is permitted as long as there is no drainage from the wound.     • Restrictions  o Weight: It is ok to allow full weight bearing after surgery. Weight on the leg actually quickens the recovery process. While it will be sore/painful to put weight on the leg, it is safe to do so. Hip  replacement after hip fracture has a much slower recover process. It can take months to heal fully from a hip fracture and patients even make some slow benefits up to a year afterwards.   o Driving: Many patients have questions about when it is safe to return to driving. The answer is that this is extremely variable. It depends on the extent of the surgery, as well as how quickly you heal. Certainly left leg surgeries make returning to driving easier while right leg surgeries require more extensive rehabilitation before driving can be safe. Until you can press down on the brake hard, and are off of all narcotics, driving is not permitted. Your surgeon cannot “clear” you to return to driving, only you can make the decision when you feel it is safe.    • Medications  o Anticoagulants: After upper extremity surgery most patients do not require an anticoagulant unless you have another injury that will be keeping you from mobilizing. Lower extremity surgery typically does require use of an anticoagulation medicine.   - IF YOU HAD LOWER EXTREMITY SURGERY AND ARE NOT DISCHARGED HOME WITH ANY ANTICOAGULANT MEDICINE YOU SHOULD TAKE ASPIRIN 325mg DAILY FOR 30 DAYS POSTOPERATIVELY.  - If you are discharged home with an anticoagulant such as Aspirin, Xarelto, Eliquis, Coumadin, or Lovenox, follow these simple instructions:   - Notify surgeon immediately if any donnie bleeding is noted in the urine, stool, emesis, or from the nose or the incision. Blood in the stool will often appear as black rather than red. Blood in urine may appear as pink. Blood in emesis may appear as brown/black like coffee grounds.  - You will need to apply pressure for longer periods of time to any cuts or abrasions to stop bleeding  - Avoid alcohol while taking anticoagulants  - Most anticoagulants are to be taken for 30 days postoperatively. After this time, you may stop using them unless instructed otherwise.   - If you were already taking an  anticoagulant (commonly Aspirin, Coumadin, or Plavix) you will likely be resuming your normal dose postoperatively and will be continuing that medication at the discretion of the prescribing physician.  o Stool Softeners: You will be at greater risk of constipation after surgery due to being less mobile and the pain medications.  - Take stool softeners as needed. Over the counter Colace 100 mg 1-2 capsules twice daily can be taken.  - If stools become too loose or too frequent, please decreases the dosage or stop the stool softener.  - If constipation occurs despite use of stool softeners, you are to continue the stool softeners and add a laxative (Milk of Magnesia 1 ounce daily as needed)  - Drink plenty of fluids, and eat fruits and vegetables during your recovery time. Getting up and mobilizing will help the bowels to recover their regular function, as will weaning off of all narcotics when the pain becomes tolerable.  o Pain Medications: Utilized after surgery are narcotics. This is some general information about these medications.  - CLASSIFICATION: Pain medications are called Opioids and are narcotics  - LEGALITIES: It is illegal to share narcotics with others  - DRIVING: it is illegal to drive while under the influence of narcotics. Doing so is a DUI.  - POTENTIAL SIDE EFFECTS: nausea, vomiting, itching, dizziness, drowsiness, dry mouth, constipation, and difficulty urinating.  - POTENTIAL ADVERSE EFFECTS:  - Opioid tolerance can develop with use of pain medications and this simply means that it requires more and more of the medication to control pain. However, this is seen more in patients that use opioids for longer periods of time.  - Opioid dependence can develop with use of Opioids. People with opioid dependence will experience withdrawal symptoms upon cessation of the medication.  - Opioid addiction can develop with use of Opioids. The incidence of this is very unlikely in patients who take the  medications as ordered and stop the medications as instructed.  - Opioid overdose can be dangerous, but is unlikely when the medication is taken as ordered and stopped when ordered. It is important not to mix opioids with alcohol as this can lead to over sedation and respiratory difficulty.  - DOSAGE:  - After the initial surgical pain begins to resolve, you may begin to decrease the pain medication. By the end of a few weeks, you should be off of pain medications.  - Refills will not be given by the office during evening hours, on weekends, or after 6 weeks post-op. You are responsible for weaning off of pain medication. You can increase the time between narcotic pills, taking one every 4 then 6 then 8 hours and so on.  - To seek refills on pain medications during the initial 6-week post-operative period, you must call the office to request the refill. The office will then notify you when to  the prescription. DO NOT wait until you are out of the medication to request a refill. Prescriptions will not be filled over the weekend and depending on the schedule, it may take a couple days for the prescription to be available. Someone will have to pick the prescription in person at the office.    • FOLLOW-UP VISITS  o You will need to follow up in the office with your surgeon in 3 weeks, or as instructed elsewhere in your discharge paperwork. Please call this number [phone] to schedule this appointment. If you are going to an SNF/SNU facility, they will arrange for you to follow up in the office.  o If you have any concerns or suspected complications prior to your follow up visit, please call the office. Do not wait until your appointment time if you suspect complications. These will need to be addressed in the office promptly.      Baljit Hutchins II, MD  Orthopaedic Surgery  [facility]

## 2020-06-14 NOTE — PLAN OF CARE
Problem: Patient Care Overview  Goal: Plan of Care Review  6/14/2020 1856 by Cindy Esparza, PTA  Outcome: Ongoing (interventions implemented as appropriate)  Flowsheets (Taken 6/14/2020 1854)  Progress: no change  Plan of Care Reviewed With: patient  Outcome Summary: pt has her own way of doing things and will tell you how to assist. Has been having probs with low BP which is interferring some with progresssion. Still only will agree to HH and has 24 hr care  6/14/2020 1850 by Cindy Esparza, PTA  Outcome: Ongoing (interventions implemented as appropriate)  Flowsheets (Taken 6/14/2020 1846)  Progress: improving  Plan of Care Reviewed With: patient  Outcome Summary: pt req mod assist for bed mobility with HOB elevated and use of BR, does her own way, Transfers bed<>chair with min assist x 2 using rwx, decreased foot clearance and does more of a pivot on toes, scoots feet to complete transfer. Prefers to lean onto forearm to assist with offloading. Very slow moving and can't rush her. Still refusing rehab and states she has everything needed at home and 24 hr care, agreed to HH. Gloves, mask/shield worn for tx

## 2020-06-15 LAB
HCT VFR BLD AUTO: 31.4 % (ref 34–46.6)
HGB BLD-MCNC: 10.8 G/DL (ref 12–15.9)

## 2020-06-15 PROCEDURE — 85018 HEMOGLOBIN: CPT | Performed by: ORTHOPAEDIC SURGERY

## 2020-06-15 PROCEDURE — 25010000002 ONDANSETRON PER 1 MG: Performed by: ORTHOPAEDIC SURGERY

## 2020-06-15 PROCEDURE — 85014 HEMATOCRIT: CPT | Performed by: ORTHOPAEDIC SURGERY

## 2020-06-15 PROCEDURE — 97110 THERAPEUTIC EXERCISES: CPT

## 2020-06-15 RX ADMIN — ACETAMINOPHEN 650 MG: 325 TABLET ORAL at 18:36

## 2020-06-15 RX ADMIN — OXYBUTYNIN CHLORIDE 10 MG: 10 TABLET, EXTENDED RELEASE ORAL at 09:26

## 2020-06-15 RX ADMIN — LEVOTHYROXINE SODIUM 25 MCG: 25 TABLET ORAL at 05:14

## 2020-06-15 RX ADMIN — TRAMADOL HYDROCHLORIDE 50 MG: 50 TABLET, FILM COATED ORAL at 21:15

## 2020-06-15 RX ADMIN — TRAMADOL HYDROCHLORIDE 50 MG: 50 TABLET, FILM COATED ORAL at 04:18

## 2020-06-15 RX ADMIN — ACETAMINOPHEN 650 MG: 325 TABLET ORAL at 09:27

## 2020-06-15 RX ADMIN — APIXABAN 2.5 MG: 2.5 TABLET, FILM COATED ORAL at 09:26

## 2020-06-15 RX ADMIN — APIXABAN 2.5 MG: 2.5 TABLET, FILM COATED ORAL at 21:15

## 2020-06-15 RX ADMIN — ONDANSETRON 4 MG: 2 INJECTION INTRAMUSCULAR; INTRAVENOUS at 01:47

## 2020-06-15 RX ADMIN — ONDANSETRON 4 MG: 2 INJECTION INTRAMUSCULAR; INTRAVENOUS at 09:26

## 2020-06-15 NOTE — THERAPY TREATMENT NOTE
Patient Name: Camelia Gray  : 1945    MRN: [id number]                              Today's Date: 6/15/2020       Admit Date: 2020    Visit Dx:     ICD-10-CM ICD-9-CM   1. Status post total replacement of hip, unspecified laterality Z96.649 V43.64   2. Primary osteoarthritis of left hip M16.12 715.15     Patient Active Problem List   Diagnosis   • Morbid obesity (CMS/HCC)   • Acute cystitis with hematuria   • Arthritis of left hip   • Deep vein thrombosis (DVT) (CMS/HCC)   • Edema of lower extremity   • Hypertension   • Pulmonary embolism (CMS/HCC)   • Stasis dermatitis   • Cellulitis of both feet   • Immobility syndrome   • Venous stasis   • Lymphedema   • Non-pressure chronic ulcer of other part of right foot with fat layer exposed (CMS/HCC)   • Non-pressure chronic ulcer of other part of left foot with fat layer exposed (CMS/HCC)   • Dermatitis associated with moisture   • Primary osteoarthritis of left hip   • Hypothyroidism   • Urinary incontinence in female   • Preop cardiovascular exam   • Status post total replacement of hip     Past Medical History:   Diagnosis Date   • Arthritis    • Cellulitis of both feet 10/13/2019   • Decubitus ulcer of buttock, stage 2 (CMS/HCC) 10/14/2019   • Dermatitis associated with moisture 10/14/2019   • DVT, lower extremity (CMS/HCC)    • Edema of lower extremity 2017   • Hypertension    • Immobility syndrome 10/13/2019   • Lymphedema 10/14/2019   • Non-pressure chronic ulcer of other part of left foot with fat layer exposed (CMS/HCC) 10/14/2019   • Non-pressure chronic ulcer of other part of right foot with fat layer exposed (CMS/HCC) 10/14/2019   • Obesity    • Pulmonary embolism (CMS/HCC)    • Stasis dermatitis 2013   • Venous stasis 10/14/2019     Past Surgical History:   Procedure Laterality Date   • CATARACT EXTRACTION     • DENTAL EXAMINATION UNDER ANESTHESIA     • JOINT REPLACEMENT      Bilateral knees   • TOE SURGERY     • TOTAL HIP ARTHROPLASTY  Left 6/12/2020    Procedure: TOTAL HIP ARTHROPLASTY;  Surgeon: Baljit Hutchins II, MD;  Location: [facility] MAIN OR;  Service: Orthopedics;  Laterality: Left;     General Information     Row Name 06/15/20 1554          PT Evaluation Time/Intention    Document Type  therapy note (daily note)  -CR     Mode of Treatment  physical therapy  -CR       User Key  (r) = Recorded By, (t) = Taken By, (c) = Cosigned By    Initials Name Provider Type    CR Reyes, Carmela, PT Physical Therapist        Mobility     Row Name 06/15/20 1554          Bed Mobility Assessment/Treatment    Comment (Bed Mobility)  pt only agreed to bed ex  -CR       User Key  (r) = Recorded By, (t) = Taken By, (c) = Cosigned By    Initials Name Provider Type    CR Reyes, Carmela, PT Physical Therapist        Obj/Interventions     Row Name 06/15/20 1555          Therapeutic Exercise    Exercise Type (Therapeutic Exercise)  AAROM (active assistive range of motion)  -CR     Position (Therapeutic Exercise)  supine  -CR     Sets/Reps (Therapeutic Exercise)  THR ex to include AP, heel slides, hip abd 1/10  -CR       User Key  (r) = Recorded By, (t) = Taken By, (c) = Cosigned By    Initials Name Provider Type    CR Reyes, Carmela, PT Physical Therapist        Goals/Plan     Row Name 06/15/20 1603          Bed Mobility Goal 1 (PT)    Progress/Outcomes (Bed Mobility Goal 1, PT)  continuing progress toward goal  -CR     Row Name 06/15/20 1603          Transfer Goal 1 (PT)    Progress/Outcome (Transfer Goal 1, PT)  continuing progress toward goal  -CR     Row Name 06/15/20 1603          Gait Training Goal 1 (PT)    Progress/Outcome (Gait Training Goal 1, PT)  continuing progress toward goal  -CR       User Key  (r) = Recorded By, (t) = Taken By, (c) = Cosigned By    Initials Name Provider Type    CR Reyes, Carmela, PT Physical Therapist        Clinical Impression     Row Name 06/15/20 1600          Pain Assessment    Additional Documentation  Pain Scale: FACES  Pre/Post-Treatment (Group)  -CR     Row Name 06/15/20 1600          Pain Scale: Numbers Pre/Post-Treatment    Pain Location - Side  Left  -CR     Pain Location  hip  -CR     Pain Intervention(s)  Cold pack  -CR     Row Name 06/15/20 1600          Pain Scale: FACES Pre/Post-Treatment    Pain: FACES Scale, Pretreatment  2-->hurts little bit  -CR     Pain: FACES Scale, Post-Treatment  4-->hurts little more  -CR     Row Name 06/15/20 1600          Plan of Care Review    Plan of Care Reviewed With  patient  -CR     Outcome Summary  Pt now with low grade fever and note bruising, swelling L hip area. Pt not tolerating much ex to LLE even in supine. Ice pack applied and educated pt on importance of being OOB. Pt verbalized understanding and reports she is getting up to use BSC. PPE used gloves, mask, face shield.   -CR     Row Name 06/15/20 1600          Positioning and Restraints    Pre-Treatment Position  in bed  -CR     Post Treatment Position  bed  -CR     In Bed  notified nsg;supine;call light within reach  -CR       User Key  (r) = Recorded By, (t) = Taken By, (c) = Cosigned By    Initials Name Provider Type    CR Reyes, Carmela, PT Physical Therapist        Outcome Measures     Row Name 06/15/20 1604          How much help from another person do you currently need...    Turning from your back to your side while in flat bed without using bedrails?  3  -CR     Moving from lying on back to sitting on the side of a flat bed without bedrails?  2  -CR     Moving to and from a bed to a chair (including a wheelchair)?  3  -CR     Standing up from a chair using your arms (e.g., wheelchair, bedside chair)?  2  -CR     Climbing 3-5 steps with a railing?  1  -CR     To walk in hospital room?  2  -CR     AM-PAC 6 Clicks Score (PT)  13  -CR     Row Name 06/15/20 1604          Functional Assessment    Outcome Measure Options  AM-PAC 6 Clicks Basic Mobility (PT)  -CR       User Key  (r) = Recorded By, (t) = Taken By, (c) = Cosigned  By    Initials Name Provider Type    CR Reyes, Carmela, PT Physical Therapist        Physical Therapy Education                 Title: PT OT SLP Therapies (In Progress)     Topic: Physical Therapy (In Progress)     Point: Mobility training (Done)     Description:   Instruct learner(s) on safety and technique for assisting patient out of bed, chair or wheelchair.  Instruct in the proper use of assistive devices, such as walker, crutches, cane or brace.              Patient Friendly Description:   It's important to get you on your feet again, but we need to do so in a way that is safe for you. Falling has serious consequences, and your personal safety is the most important thing of all.        When it's time to get out of bed, one of us or a family member will sit next to you on the bed to give you support.     If your doctor or nurse tells you to use a walker, crutches, a cane, or a brace, be sure you use it every time you get out of bed, even if you think you don't need it.    Learning Progress Summary           Patient Acceptance, E,TB, VU by  at 6/14/2020 1850    Acceptance, E,TB, VU by  at 6/13/2020 1115                   Point: Home exercise program (In Progress)     Description:   Instruct learner(s) on appropriate technique for monitoring, assisting and/or progressing patient with therapeutic exercises and activities.              Learning Progress Summary           Patient Acceptance, E, NR by LINDA at 6/15/2020 1606    Acceptance, E,TB, VU by  at 6/14/2020 1850                   Point: Body mechanics (Done)     Description:   Instruct learner(s) on proper positioning and spine alignment for patient and/or caregiver during mobility tasks and/or exercises.              Learning Progress Summary           Patient Acceptance, E,TB, VU by  at 6/14/2020 1850                   Point: Precautions (Done)     Description:   Instruct learner(s) on prescribed precautions during mobility and gait tasks               Learning Progress Summary           Patient Acceptance, E,TB, VU by  at 6/14/2020 1850    Acceptance, E,TB, VU by  at 6/13/2020 1115                               User Key     Initials Effective Dates Name Provider Type Discipline     03/01/19 -  Gabrielle Cole, PT Physical Therapist PT     03/01/19 -  Reyes, Carmela, PT Physical Therapist PT     03/01/19 -  Cindy Esparza PTA Physical Therapy Assistant PT              PT Recommendation and Plan     Plan of Care Reviewed With: patient  Outcome Summary: Pt now with low grade fever and note bruising, swelling L hip area. Pt not tolerating much ex to LLE even in supine. Ice pack applied and educated pt on importance of being OOB. Pt verbalized understanding and reports she is getting up to use BSC. PPE used gloves, mask, face shield.      Time Calculation:   PT Charges     Row Name 06/15/20 1606             Time Calculation    Start Time  1155  -CR      Stop Time  1208  -CR      Time Calculation (min)  13 min  -CR      PT Received On  06/15/20  -CR      PT - Next Appointment  06/16/20  -CR         Time Calculation- PT    Total Timed Code Minutes- PT  13 minute(s)  -CR        User Key  (r) = Recorded By, (t) = Taken By, (c) = Cosigned By    Initials Name Provider Type    CR Reyes, Carmela, PT Physical Therapist        Therapy Charges for Today     Code Description Service Date Service Provider Modifiers Qty    22943552868 HC PT THER PROC EA 15 MIN 6/15/2020 Reyes, Carmela, PT GP 1          PT G-Codes  Outcome Measure Options: AM-PAC 6 Clicks Basic Mobility (PT)  AM-PAC 6 Clicks Score (PT): 13    Carmela Reyes, PT  6/15/2020

## 2020-06-15 NOTE — PLAN OF CARE
Problem: Patient Care Overview  Goal: Plan of Care Review  Outcome: Ongoing (interventions implemented as appropriate)  Flowsheets (Taken 6/15/2020 1600)  Plan of Care Reviewed With: patient  Outcome Summary: Pt now with low grade fever and note bruising, swelling L hip area. Pt not tolerating much ex to LLE even in supine. Ice pack applied and educated pt on importance of being OOB. Pt verbalized understanding and reports she is getting up to use BSC. PPE used gloves, mask, face shield.

## 2020-06-15 NOTE — PROGRESS NOTES
Discharge Planning Assessment  [facility]     Patient Name: Camelia Gray  MRN: [id number]  Today's Date: 6/15/2020    Admit Date: 6/12/2020    Discharge Needs Assessment     Row Name 06/15/20 0855       Living Environment    Lives With  other (see comments) has 24/7 caregivers through [employer]    Current Living Arrangements  home/apartment/condo    Primary Care Provided by  homecare agency    Provides Primary Care For  no one, unable/limited ability to care for self    Family Caregiver if Needed  other (see comments) has 24/7 caregivers with [employer]    Able to Return to Prior Arrangements  yes       Resource/Environmental Concerns    Resource/Environmental Concerns  none    Transportation Concerns  car, none       Transition Planning    Patient/Family Anticipates Transition to  home with help/services    Patient/Family Anticipated Services at Transition  home health care    Transportation Anticipated  family or friend will provide       Discharge Needs Assessment    Readmission Within the Last 30 Days  no previous admission in last 30 days    Concerns to be Addressed  discharge planning    Equipment Currently Used at Home  wheelchair;walker, rolling;commode    Anticipated Changes Related to Illness  none    Equipment Needed After Discharge  none    Outpatient/Agency/Support Group Needs  homecare agency    Discharge Facility/Level of Care Needs  home with home health    Provided Post Acute Provider List?  Refused    Refused Provider List Comment  patient was set up with home health at her surgeons office per Ailyn the  there    Discharge Coordination/Progress  Spoke with patients emegency contact due to covid 19 restrictions and patient did not answer her phone in room. Patients contact is diandra Bailey. [place] patient has 24/7 caregivers and wants to go home with [facility] as she was set up with at Dr Quevedo office. Orders received and referral made to Eulalia at [facility]        Discharge Plan     Juan Jose  Name 06/15/20 0902       [facility]    [facility] [employer]    Provided Post Acute Provider Quality & Resource List?  Refused    Patient/Family in Agreement with Plan  yes    Plan Comments  see assessment notes              Home Medical Care      Service Provider Request Status Selected Services Address Phone Number Fax Number    [facility] Pending - No Request Sent N/A [address] [place] [address] [phone]       Dana Meza RN 6/15/2020 0904    See notes that were emailed for MD specific orders                   Expected Discharge Date and Time     Expected Discharge Date Expected Discharge Time    Jose 15, 2020         Demographic Summary     Row Name 06/15/20 0854       General Information    Admission Type  inpatient    Arrived From  operating room    Required Notices Provided  Important Message from Medicare    Referral Source  admission list    Reason for Consult  discharge planning    Preferred Language  English        Functional Status     Row Name 06/15/20 0855       Functional Status    Usual Activity Tolerance  fair    Current Activity Tolerance  fair       Functional Status, IADL    Medications  assistive person    Meal Preparation  completely dependent    Housekeeping  completely dependent    Laundry  completely dependent    Shopping  completely dependent       Mental Status Summary    Recent Changes in Mental Status/Cognitive Functioning  unable to assess       Employment/    Employment Status  retired            Dana Meza RN

## 2020-06-15 NOTE — NURSING NOTE
"Patient refusing turns.  Educated patient on the need to reposition to prevent bedsore. Patients buttocks are red/purple in color but do rin at this time.  States she is well aware of what may result from her not turning due to previous experiences. Pt. States she was tired of us offering to provide assistance with turning and she \"refuses that service\".    ER RN [phone]   "

## 2020-06-15 NOTE — DISCHARGE PLACEMENT REQUEST
"Camelia Gray (74 y.o. Female)     Date of Birth Social Security Number Address Home Phone MRN    1945  [address] [place] [facility] [phone] [id number]    Confucianist Marital Status          Yazidi Single       Admission Date Admission Type Admitting Provider Attending Provider Department, Room/Bed    6/12/20 Elective Baljit Hutchins II, MD Sweet, Richard Alexander II, MD [facility] SURGICAL INPATIENT, 4119/1    Discharge Date Discharge Disposition Discharge Destination         Home or Self Care              Attending Provider:  Baljit Hutchins II, MD    Allergies:  No Known Allergies    Isolation:  None   Infection:  MRSA/History Only (06/12/20)   Code Status:  CPR    Ht:  180.3 cm (71\")   Wt:  141 kg (310 lb 13.6 oz)    Admission Cmt:  None   Principal Problem:  None                Active Insurance as of 6/12/2020     Primary Coverage     Payor Plan Insurance Group Employer/Plan Group    MEDICARE MEDICARE A & B      Payor Plan Address Payor Plan Phone Number Payor Plan Fax Number Effective Dates    [address] [phone]  7/1/2011 - None Entered    [facility] [id number]       Subscriber Name Subscriber Birth Date Member ID       CAMELIA GRAY 1945 [id number]           Secondary Coverage     Payor Plan Insurance Group Employer/Plan Group    [facility] SUPP [id number]     Payor Plan Address Payor Plan Phone Number Payor Plan Fax Number Effective Dates    [address]   12/1/2016 - None Entered    [facility] [id number]       Subscriber Name Subscriber Birth Date Member ID       CAMELIA GRAY 1945 [id number]                 Emergency Contacts      (Rel.) Home Phone Work Phone Mobile Phone    LEAH GRAY (Other) -- -- [phone]              "

## 2020-06-15 NOTE — PLAN OF CARE
Pt has continued to refuse turn/ambulation during shift. Pt educated on importance of weight shifting to avoid pressure injuries. Temp this morning 99.6, pt educated regarding body response to surgery, etc. Dr. Hutchins notified, pt to [place] home with home health on 6/16.

## 2020-06-15 NOTE — PLAN OF CARE
Patient is very set in her ways with her care. Refuses to turn. Refuses ambulation. Uses brief but is able to tell you when she is and has to use the restroom. Refuses to go to bedside commode. Patient is anticipating 24 care at home and is able to begin 6/15. BP has been WNL. No hypotension at this time     Plan of care is ongoing.    Problem: Patient Care Overview  Goal: Plan of Care Review  Outcome: Ongoing (interventions implemented as appropriate)  Flowsheets (Taken 6/15/2020 0116)  Progress: no change  Plan of Care Reviewed With: patient  Goal: Individualization and Mutuality  Outcome: Ongoing (interventions implemented as appropriate)  Goal: Discharge Needs Assessment  Outcome: Ongoing (interventions implemented as appropriate)  Goal: Interprofessional Rounds/Family Conf  Outcome: Ongoing (interventions implemented as appropriate)     Problem: Skin Injury Risk (Adult)  Goal: Identify Related Risk Factors and Signs and Symptoms  Description  Related risk factors and signs and symptoms are identified upon initiation of Human Response Clinical Practice Guideline (CPG).  Outcome: Ongoing (interventions implemented as appropriate)  Goal: Skin Health and Integrity  Description  Patient will demonstrate the desired outcomes by discharge/transition of care.  Outcome: Ongoing (interventions implemented as appropriate)     Problem: Knee Arthroplasty (Total, Partial) (Adult)  Goal: Signs and Symptoms of Listed Potential Problems Will be Absent, Minimized or Managed (Knee Arthroplasty)  Description  Signs and symptoms of listed potential problems will be absent, minimized or managed by discharge/transition of care (reference Knee Arthroplasty (Total, Partial) (Adult) CPG).  Outcome: Ongoing (interventions implemented as appropriate)  Goal: Anesthesia/Sedation Recovery  Outcome: Ongoing (interventions implemented as appropriate)     Problem: Pain, Acute (Adult)  Goal: Identify Related Risk Factors and Signs and  Symptoms  Description  Related risk factors and signs and symptoms are identified upon initiation of Human Response Clinical Practice Guideline (CPG).  Outcome: Ongoing (interventions implemented as appropriate)  Goal: Acceptable Pain Control/Comfort Level  Description  Patient will demonstrate the desired outcomes by discharge/transition of care.  Outcome: Ongoing (interventions implemented as appropriate)     Problem: Fall Risk (Adult)  Goal: Identify Related Risk Factors and Signs and Symptoms  Description  Related risk factors and signs and symptoms are identified upon initiation of Human Response Clinical Practice Guideline (CPG).  Outcome: Ongoing (interventions implemented as appropriate)  Goal: Absence of Fall  Description  Patient will demonstrate the desired outcomes by discharge/transition of care.  Outcome: Ongoing (interventions implemented as appropriate)

## 2020-06-16 ENCOUNTER — READMISSION MANAGEMENT (OUTPATIENT)
Dept: CALL CENTER | Facility: HOSPITAL | Age: 75
End: 2020-06-16

## 2020-06-16 VITALS
HEIGHT: 71 IN | WEIGHT: 293 LBS | BODY MASS INDEX: 41.02 KG/M2 | SYSTOLIC BLOOD PRESSURE: 142 MMHG | HEART RATE: 66 BPM | RESPIRATION RATE: 18 BRPM | DIASTOLIC BLOOD PRESSURE: 85 MMHG | TEMPERATURE: 98.1 F | OXYGEN SATURATION: 95 %

## 2020-06-16 LAB
HCT VFR BLD AUTO: 30.7 % (ref 34–46.6)
HGB BLD-MCNC: 10.4 G/DL (ref 12–15.9)

## 2020-06-16 PROCEDURE — 97116 GAIT TRAINING THERAPY: CPT

## 2020-06-16 PROCEDURE — 85014 HEMATOCRIT: CPT | Performed by: ORTHOPAEDIC SURGERY

## 2020-06-16 PROCEDURE — 97110 THERAPEUTIC EXERCISES: CPT

## 2020-06-16 PROCEDURE — 63710000001 ONDANSETRON PER 8 MG: Performed by: ORTHOPAEDIC SURGERY

## 2020-06-16 PROCEDURE — 97530 THERAPEUTIC ACTIVITIES: CPT

## 2020-06-16 PROCEDURE — 85018 HEMOGLOBIN: CPT | Performed by: ORTHOPAEDIC SURGERY

## 2020-06-16 RX ADMIN — APIXABAN 2.5 MG: 2.5 TABLET, FILM COATED ORAL at 09:39

## 2020-06-16 RX ADMIN — ONDANSETRON HYDROCHLORIDE 4 MG: 4 TABLET, FILM COATED ORAL at 09:39

## 2020-06-16 RX ADMIN — OXYBUTYNIN CHLORIDE 10 MG: 10 TABLET, EXTENDED RELEASE ORAL at 09:38

## 2020-06-16 RX ADMIN — LEVOTHYROXINE SODIUM 25 MCG: 25 TABLET ORAL at 04:25

## 2020-06-16 RX ADMIN — ACETAMINOPHEN 650 MG: 325 TABLET ORAL at 04:25

## 2020-06-16 RX ADMIN — TRAMADOL HYDROCHLORIDE 100 MG: 50 TABLET, FILM COATED ORAL at 09:43

## 2020-06-16 NOTE — PLAN OF CARE
Pt to dc home today with Home Health. Pt educated again this shift on the importance of getting out of bed and gradually becoming more active. Vital Signs stable, pt without complaints.

## 2020-06-16 NOTE — PLAN OF CARE
Patient is to DC 6/16 with Home Health. Previously refusing turns but has allowed the last two. Refuses to ambulate. Servando dressing changed 6/15.  Plan of care is ongoing.     Problem: Patient Care Overview  Goal: Plan of Care Review  Outcome: Ongoing (interventions implemented as appropriate)  Flowsheets (Taken 6/16/2020 0019)  Progress: improving  Plan of Care Reviewed With: patient  Goal: Individualization and Mutuality  Outcome: Ongoing (interventions implemented as appropriate)  Goal: Discharge Needs Assessment  Outcome: Ongoing (interventions implemented as appropriate)  Goal: Interprofessional Rounds/Family Conf  Outcome: Ongoing (interventions implemented as appropriate)     Problem: Skin Injury Risk (Adult)  Goal: Identify Related Risk Factors and Signs and Symptoms  Description  Related risk factors and signs and symptoms are identified upon initiation of Human Response Clinical Practice Guideline (CPG).  Outcome: Ongoing (interventions implemented as appropriate)  Goal: Skin Health and Integrity  Description  Patient will demonstrate the desired outcomes by discharge/transition of care.  Outcome: Ongoing (interventions implemented as appropriate)     Problem: Knee Arthroplasty (Total, Partial) (Adult)  Goal: Signs and Symptoms of Listed Potential Problems Will be Absent, Minimized or Managed (Knee Arthroplasty)  Description  Signs and symptoms of listed potential problems will be absent, minimized or managed by discharge/transition of care (reference Knee Arthroplasty (Total, Partial) (Adult) CPG).  Outcome: Ongoing (interventions implemented as appropriate)  Goal: Anesthesia/Sedation Recovery  Outcome: Ongoing (interventions implemented as appropriate)     Problem: Pain, Acute (Adult)  Goal: Identify Related Risk Factors and Signs and Symptoms  Description  Related risk factors and signs and symptoms are identified upon initiation of Human Response Clinical Practice Guideline (CPG).  Outcome: Ongoing  (interventions implemented as appropriate)  Goal: Acceptable Pain Control/Comfort Level  Description  Patient will demonstrate the desired outcomes by discharge/transition of care.  Outcome: Ongoing (interventions implemented as appropriate)     Problem: Fall Risk (Adult)  Goal: Identify Related Risk Factors and Signs and Symptoms  Description  Related risk factors and signs and symptoms are identified upon initiation of Human Response Clinical Practice Guideline (CPG).  Outcome: Ongoing (interventions implemented as appropriate)  Goal: Absence of Fall  Description  Patient will demonstrate the desired outcomes by discharge/transition of care.  Outcome: Ongoing (interventions implemented as appropriate)

## 2020-06-16 NOTE — THERAPY TREATMENT NOTE
Patient Name: Camelia Gray  : 1945    MRN: [id number]                              Today's Date: 2020       Admit Date: 2020    Visit Dx:     ICD-10-CM ICD-9-CM   1. Status post total replacement of hip, unspecified laterality Z96.649 V43.64   2. Primary osteoarthritis of left hip M16.12 715.15     Patient Active Problem List   Diagnosis   • Morbid obesity (CMS/HCC)   • Acute cystitis with hematuria   • Arthritis of left hip   • Deep vein thrombosis (DVT) (CMS/HCC)   • Edema of lower extremity   • Hypertension   • Pulmonary embolism (CMS/HCC)   • Stasis dermatitis   • Cellulitis of both feet   • Immobility syndrome   • Venous stasis   • Lymphedema   • Non-pressure chronic ulcer of other part of right foot with fat layer exposed (CMS/HCC)   • Non-pressure chronic ulcer of other part of left foot with fat layer exposed (CMS/HCC)   • Dermatitis associated with moisture   • Primary osteoarthritis of left hip   • Hypothyroidism   • Urinary incontinence in female   • Preop cardiovascular exam   • Status post total replacement of hip     Past Medical History:   Diagnosis Date   • Arthritis    • Cellulitis of both feet 10/13/2019   • Decubitus ulcer of buttock, stage 2 (CMS/HCC) 10/14/2019   • Dermatitis associated with moisture 10/14/2019   • DVT, lower extremity (CMS/HCC)    • Edema of lower extremity 2017   • Hypertension    • Immobility syndrome 10/13/2019   • Lymphedema 10/14/2019   • Non-pressure chronic ulcer of other part of left foot with fat layer exposed (CMS/HCC) 10/14/2019   • Non-pressure chronic ulcer of other part of right foot with fat layer exposed (CMS/HCC) 10/14/2019   • Obesity    • Pulmonary embolism (CMS/HCC)    • Stasis dermatitis 2013   • Venous stasis 10/14/2019     Past Surgical History:   Procedure Laterality Date   • CATARACT EXTRACTION     • DENTAL EXAMINATION UNDER ANESTHESIA     • JOINT REPLACEMENT      Bilateral knees   • TOE SURGERY     • TOTAL HIP ARTHROPLASTY  Left 6/12/2020    Procedure: TOTAL HIP ARTHROPLASTY;  Surgeon: Baljit Hutchins II, MD;  Location: [facility] MAIN OR;  Service: Orthopedics;  Laterality: Left;     General Information     Row Name 06/16/20 1610          PT Evaluation Time/Intention    Document Type  therapy note (daily note)  -CR     Mode of Treatment  physical therapy  -CR     Row Name 06/16/20 1610          Cognitive Assessment/Intervention- PT/OT    Orientation Status (Cognition)  oriented x 4  -CR     Row Name 06/16/20 1610          Safety Issues, Functional Mobility    Impairments Affecting Function (Mobility)  pain;strength;postural/trunk control;endurance/activity tolerance  -CR       User Key  (r) = Recorded By, (t) = Taken By, (c) = Cosigned By    Initials Name Provider Type    CR Reyes, Carmela, PT Physical Therapist        Mobility     Row Name 06/16/20 1610          Bed Mobility Assessment/Treatment    Supine-Sit [place] (Bed Mobility)  minimum assist (75% patient effort) needs extra time  -CR     Sit-Supine [place] (Bed Mobility)  minimum assist (75% patient effort) mostly with LE  -CR     Assistive Device (Bed Mobility)  head of bed elevated;bed rails  -CR     Row Name 06/16/20 1610          Sit-Stand Transfer    Sit-Stand [place] (Transfers)  minimum assist (75% patient effort)  -CR     Assistive Device (Sit-Stand Transfers)  walker, front-wheeled  -CR     Row Name 06/16/20 1610          Gait/Stairs Assessment/Training    Gait/Stairs Assessment/Training  gait/ambulation assistive device  -CR     [place] Level (Gait)  contact guard  -CR     Assistive Device (Gait)  walker, front-wheeled  -CR     Distance in Feet (Gait)  10 ft  -CR     Pattern (Gait)  step-through  -CR     Deviations/Abnormal Patterns (Gait)  gait speed decreased;stride length decreased  -CR     Bilateral Gait Deviations  forward flexed posture pt leaning on rw on her elbows  -CR       User Key  (r) = Recorded By, (t) = Taken By, (c) = Cosigned  By    Initials Name Provider Type    CR Reyes, Carmela, PT Physical Therapist        Obj/Interventions     Row Name 06/16/20 1612          Therapeutic Exercise    Exercise Type (Therapeutic Exercise)  AROM (active range of motion)  -CR     Position (Therapeutic Exercise)  supine  -CR     Sets/Reps (Therapeutic Exercise)  AP, heel slides, glut sets, hip abd 1/10  -CR     Row Name 06/16/20 1612          Static Sitting Balance    Level of [place] (Unsupported Sitting, Static Balance)  conditional independence  -CR     Sitting Position (Unsupported Sitting, Static Balance)  sitting on edge of bed  -CR     Time Able to Maintain Position (Unsupported Sitting, Static Balance)  3 to 4 minutes  -CR     Row Name 06/16/20 1612          Dynamic Sitting Balance    Level of [place], Reaches Outside Midline (Sitting, Dynamic Balance)  supervision  -CR     Sitting Position, Reaches Outside Midline (Sitting, Dynamic Balance)  sitting on edge of bed  -CR     Row Name 06/16/20 1612          Static Standing Balance    Level of [place] (Supported Standing, Static Balance)  contact guard assist  -CR     Time Able to Maintain Position (Supported Standing, Static Balance)  45 to 60 seconds  -CR     Assistive Device Utilized (Supported Standing, Static Balance)  walker, rolling  -CR     Row Name 06/16/20 1612          Dynamic Standing Balance    Level of [place], Reaches Outside Midline (Standing, Dynamic Balance)  contact guard assist  -CR     Time Able to Maintain Position, Reaches Outside Midline (Standing, Dynamic Balance)  1 to 2 minutes  -CR     Assistive Device Utilized (Supported Standing, Dynamic Balance)  walker, rolling  -CR       User Key  (r) = Recorded By, (t) = Taken By, (c) = Cosigned By    Initials Name Provider Type    CR Reyes, Carmela, PT Physical Therapist        Goals/Plan     Row Name 06/16/20 1618          Bed Mobility Goal 1 (PT)    Progress/Outcomes (Bed Mobility Goal 1, PT)  goal partially met   "-CR     Row Name 06/16/20 1618          Transfer Goal 1 (PT)    Progress/Outcome (Transfer Goal 1, PT)  goal partially met  -CR     Row Name 06/16/20 1618          Gait Training Goal 1 (PT)    Progress/Outcome (Gait Training Goal 1, PT)  continuing progress toward goal  -CR       User Key  (r) = Recorded By, (t) = Taken By, (c) = Cosigned By    Initials Name Provider Type    CR Reyes, Carmela, PT Physical Therapist        Clinical Impression     Row Name 06/16/20 1613          Pain Assessment    Additional Documentation  Pain Scale: Numbers Pre/Post-Treatment (Group)  -CR     Row Name 06/16/20 1613          Pain Scale: Numbers Pre/Post-Treatment    Pain Scale: Numbers, Pretreatment  3/10  -CR     Pain Scale: Numbers, Post-Treatment  3/10  -CR     Pain Location - Side  Left  -CR     Pain Location  hip  -CR     Row Name 06/16/20 1613          Plan of Care Review    Plan of Care Reviewed With  patient  -CR     Progress  improving  -CR     Outcome Summary  Pt tolerated tx session well; pt still performing transfers very slowly but less assistance needed for supine to sit. Pt able to ambulate 10 ft using rw min/CGA however marked abnormal posture and slow alex that is not safe for home discharge. Pt remains adamant about returning home and having HH PT vs IP rehab. Pt issued cold pack and applied to L hip at end of session to promote reduction in swelling. Pt did not report of increased pain with standing activities,mostly limited by fear of pain and \"different\"sensation when wt bearing on LLE. Pt will need continued Physical Therapy services for strength, posture and mobility training. PPE used gloves, mask, face shield.   -CR     Row Name 06/16/20 1613          Positioning and Restraints    Pre-Treatment Position  in bed  -CR     Post Treatment Position  bed  -CR     In Bed  notified nsg;side lying right;call light within reach;pillow between legs  -CR       User Key  (r) = Recorded By, (t) = Taken By, (c) = " Cosigned By    Initials Name Provider Type    CR Reyes, Carmela, PT Physical Therapist        Outcome Measures     Row Name 06/16/20 1618          How much help from another person do you currently need...    Turning from your back to your side while in flat bed without using bedrails?  3  -CR     Moving from lying on back to sitting on the side of a flat bed without bedrails?  3  -CR     Moving to and from a bed to a chair (including a wheelchair)?  3  -CR     Standing up from a chair using your arms (e.g., wheelchair, bedside chair)?  3  -CR     Climbing 3-5 steps with a railing?  1  -CR     To walk in hospital room?  3  -CR     AM-PAC 6 Clicks Score (PT)  16  -CR       User Key  (r) = Recorded By, (t) = Taken By, (c) = Cosigned By    Initials Name Provider Type    CR Reyes, Carmela, PT Physical Therapist        Physical Therapy Education                 Title: PT OT SLP Therapies (Resolved)     Topic: Physical Therapy (Resolved)     Point: Mobility training (Resolved)     Description:   Instruct learner(s) on safety and technique for assisting patient out of bed, chair or wheelchair.  Instruct in the proper use of assistive devices, such as walker, crutches, cane or brace.              Patient Friendly Description:   It's important to get you on your feet again, but we need to do so in a way that is safe for you. Falling has serious consequences, and your personal safety is the most important thing of all.        When it's time to get out of bed, one of us or a family member will sit next to you on the bed to give you support.     If your doctor or nurse tells you to use a walker, crutches, a cane, or a brace, be sure you use it every time you get out of bed, even if you think you don't need it.    Learning Progress Summary           Patient Acceptance, JAMILA JASON, SOBEIDA by  at 6/14/2020 1850    Acceptance, EJAMILA, SOBEIDA by  at 6/13/2020 1115                   Point: Home exercise program (Resolved)     Description:  "  Instruct learner(s) on appropriate technique for monitoring, assisting and/or progressing patient with therapeutic exercises and activities.              Learning Progress Summary           Patient Acceptance, E, NR by  at 6/15/2020 1606    Acceptance, E,TB, VU by  at 6/14/2020 1850                   Point: Body mechanics (Resolved)     Description:   Instruct learner(s) on proper positioning and spine alignment for patient and/or caregiver during mobility tasks and/or exercises.              Learning Progress Summary           Patient Acceptance, E,TB, VU by  at 6/14/2020 1850                   Point: Precautions (Resolved)     Description:   Instruct learner(s) on prescribed precautions during mobility and gait tasks              Learning Progress Summary           Patient Acceptance, E,TB, VU by  at 6/14/2020 1850    Acceptance, E,TB, VU by  at 6/13/2020 1115                               User Key     Initials Effective Dates Name Provider Type Discipline     03/01/19 -  Gabrielle Cole, PT Physical Therapist PT     03/01/19 -  Reyes, Carmela, PT Physical Therapist PT     03/01/19 -  Cindy Esparza PTA Physical Therapy Assistant PT              PT Recommendation and Plan     Plan of Care Reviewed With: patient  Progress: improving  Outcome Summary: Pt tolerated tx session well; pt still performing transfers very slowly but less assistance needed for supine to sit. Pt able to ambulate 10 ft using rw min/CGA however marked abnormal posture and slow alex that is not safe for home discharge. Pt remains adamant about returning home and having HH PT vs IP rehab. Pt issued cold pack and applied to L hip at end of session to promote reduction in swelling. Pt did not report of increased pain with standing activities,mostly limited by fear of pain and \"different\"sensation when wt bearing on LLE. Pt will need continued Physical Therapy services for strength, posture and mobility training. PPE used " gloves, mask, face shield.      Time Calculation:   PT Charges     Row Name 06/16/20 1619             Time Calculation    Start Time  1105  -CR      Stop Time  1135  -CR      Time Calculation (min)  30 min  -CR      PT Received On  06/16/20  -CR      PT - Next Appointment  06/17/20  -CR         Time Calculation- PT    Total Timed Code Minutes- PT  30 minute(s)  -CR        User Key  (r) = Recorded By, (t) = Taken By, (c) = Cosigned By    Initials Name Provider Type    CR Reyes, Carmela, PT Physical Therapist        Therapy Charges for Today     Code Description Service Date Service Provider Modifiers Qty    70458557783 HC PT THER PROC EA 15 MIN 6/15/2020 Reyes, Carmela, PT GP 1    86655504411 HC GAIT TRAINING EA 15 MIN 6/16/2020 Reyes, Carmela, PT GP 1    55924479056 HC PT THERAPEUTIC ACT EA 15 MIN 6/16/2020 Reyes, Carmela, PT GP 1    45615901422 HC PT THER PROC EA 15 MIN 6/16/2020 Reyes, Carmela, PT GP 1          PT G-Codes  Outcome Measure Options: AM-PAC 6 Clicks Basic Mobility (PT)  AM-PAC 6 Clicks Score (PT): 16    Carmela Reyes, PT  6/16/2020

## 2020-06-16 NOTE — OUTREACH NOTE
Prep Survey      Responses   [facility] patient discharged from?  [facility]   Is LACE score < 7 ?  Yes   Eligibility  [facility]   Date of Admission  06/12/20   Date of Discharge  06/16/20   Discharge Disposition  Home-Health Care Svc   Discharge diagnosis  sp total hip replacement   COVID-19 Test Status  Negative   Does the patient have one of the following disease processes/diagnoses(primary or secondary)?  Total Joint Replacement   Does the patient have Home health ordered?  Yes   What is the Home health agency?   [facility] w[facility] 24/7 caregivers w/ eldercare   Is there a DME ordered?  No   Prep survey completed?  Yes          Sallie Hernandez RN

## 2020-06-17 ENCOUNTER — TRANSITIONAL CARE MANAGEMENT TELEPHONE ENCOUNTER (OUTPATIENT)
Dept: CALL CENTER | Facility: HOSPITAL | Age: 75
End: 2020-06-17

## 2020-06-17 NOTE — OUTREACH NOTE
Call Center TCM Note      Responses   [facility] patient discharged from?  [facility]   Does the patient have one of the following disease processes/diagnoses(primary or secondary)?  Total Joint Replacement   Joint surgery performed?  Hip   TCM attempt successful?  Yes   Call start time  1415   Call end time  1419   Discharge diagnosis  sp total hip replacement   Does the patient have all medications related to this admission filled (includes all antibiotics, pain medications, etc.)  Yes   Is the patient taking all medications as directed (includes completed medication regime)?  Yes   Is the patient able to teach back alternate methods of pain control?  Ice, Correct alignment, Short, frequent activity, Knee-elevation/no pillow under knee, Other, Reposition   Comments regarding appointments  Post op appt 07/01/2020   Does the patient have a follow up appointment with their surgeon?  Yes   Has the patient kept scheduled appointments due by today?  Yes   What is the Home health agency?   [facility] w/ 24/7 caregivers w/ eldercare   Has home health visited the patient within 72 hours of discharge?  Yes [HH seeing pt this afternoon]   If the patient has started attending therapy, what post op day did they begin to attend (either in home or as an out patient)?    HH P.T starts today   Does the patient have a wound vac in place?  N/A   Has the patient fallen since discharge?  No   Did the patient receive a copy of their discharge instructions?  Yes   Nursing interventions  Reviewed instructions with patient   What is the patient's perception of their functional status since discharge?  Improving   Is the patient able to teach back signs and symptoms of infection?  Temp >100.4 for 24h or longer, Blisters around incision, Severe discomfort or pain, Shortness of breath or chest pain, Changes in mobility, Increased swelling or redness around incision (not associated with surgical edema), Incisional drainage   Is the  "patient able to teach back how to prevent infection?  Check incision daily, Shower only as directed by surgeon, No lotion or creams, Monitor blood sugar if diabetic, Wash hands before and after touching incision, Keep incision covered if drainage, Eat well-balanced diet, No tub baths, hot tub or swimming, Keep incision covered if pets in house   Is the patient able to teach back signs and symptoms of DVT?  Redness in calf, Swelling in calf, Area hot to touch, Severe pain in calf, Shortness of breath or chest pain   Is the patient able to teach back home safety measures?  Ability to shower, Modifications with ADLs such as dressing, cooking, toileting, Accessibility to necessary areas in home, Modifications to reach items   Did the patient implement home safety suggestions from pre-surgery classes if attended?  Yes   Is the patient/caregiver able to teach back the hierarchy of who to call/visit for symptoms/problems? PCP, Specialist, Home health nurse, Urgent Care, ED, 911  Yes   TCM call completed?  Yes   Wrap up additional comments  Pt doing pretty well s/p THR. No true pain, hip feels \"achey\". Appetite is slow to return but pt is staying well hydrated, and drinking BOOST. [facility] P.T. starts today, pt has fwp with surgeon on 07/01/2020. Pt declines sooner appt with PCP than fwp sched 08/2020.          Yamini Randle MA    6/17/2020, 14:23      "

## 2020-06-17 NOTE — PROGRESS NOTES
Case Management Discharge Note      Final Note: [facility] home [facility]    Provided Post Acute Provider List?: Refused  Refused Provider List Comment: patient was set up with home health at her surgeons office per Ailyn the  there  Provided Post Acute Provider Quality & Resource List?: Refused       Home Medical Care - Selection Complete      Service Provider Request Status Selected Services Address Phone Number Fax Number    [facility] Selected Home Health Services [address] [address] [phone]       Dana Meza RN 6/15/2020 0904    See notes that were emailed for MD specific orders                Final Discharge Disposition Code: 06 - home with home health care

## 2020-06-22 ENCOUNTER — TELEPHONE (OUTPATIENT)
Dept: FAMILY MEDICINE CLINIC | Facility: CLINIC | Age: 75
End: 2020-06-22

## 2020-06-22 DIAGNOSIS — D50.0 BLOOD LOSS ANEMIA: Primary | ICD-10-CM

## 2020-06-22 DIAGNOSIS — Z96.642 STATUS POST TOTAL REPLACEMENT OF LEFT HIP: Primary | ICD-10-CM

## 2020-06-23 ENCOUNTER — LAB REQUISITION (OUTPATIENT)
Dept: LAB | Facility: HOSPITAL | Age: 75
End: 2020-06-23

## 2020-06-23 DIAGNOSIS — D64.9 ANEMIA, UNSPECIFIED: ICD-10-CM

## 2020-06-23 LAB
BASOPHILS # BLD AUTO: 0 10*3/MM3 (ref 0–0.2)
BASOPHILS NFR BLD AUTO: 0.5 % (ref 0–1.5)
DEPRECATED RDW RBC AUTO: 45.9 FL (ref 37–54)
EOSINOPHIL # BLD AUTO: 0.2 10*3/MM3 (ref 0–0.4)
EOSINOPHIL NFR BLD AUTO: 2.6 % (ref 0.3–6.2)
ERYTHROCYTE [DISTWIDTH] IN BLOOD BY AUTOMATED COUNT: 14.2 % (ref 12.3–15.4)
HCT VFR BLD AUTO: 31.3 % (ref 34–46.6)
HGB BLD-MCNC: 10.7 G/DL (ref 12–15.9)
LYMPHOCYTES # BLD AUTO: 1.2 10*3/MM3 (ref 0.7–3.1)
LYMPHOCYTES NFR BLD AUTO: 13.1 % (ref 19.6–45.3)
MCH RBC QN AUTO: 30.9 PG (ref 26.6–33)
MCHC RBC AUTO-ENTMCNC: 34.2 G/DL (ref 31.5–35.7)
MCV RBC AUTO: 90.3 FL (ref 79–97)
MONOCYTES # BLD AUTO: 0.7 10*3/MM3 (ref 0.1–0.9)
MONOCYTES NFR BLD AUTO: 7.3 % (ref 5–12)
NEUTROPHILS # BLD AUTO: 7 10*3/MM3 (ref 1.7–7)
NEUTROPHILS NFR BLD AUTO: 76.5 % (ref 42.7–76)
NRBC BLD AUTO-RTO: 0 /100 WBC (ref 0–0.2)
PLATELET # BLD AUTO: 432 10*3/MM3 (ref 140–450)
PMV BLD AUTO: 6.3 FL (ref 6–12)
RBC # BLD AUTO: 3.47 10*6/MM3 (ref 3.77–5.28)
WBC NRBC COR # BLD: 9.2 10*3/MM3 (ref 3.4–10.8)

## 2020-06-23 PROCEDURE — 85025 COMPLETE CBC W/AUTO DIFF WBC: CPT | Performed by: FAMILY MEDICINE

## 2020-06-24 ENCOUNTER — TELEPHONE (OUTPATIENT)
Dept: FAMILY MEDICINE CLINIC | Facility: CLINIC | Age: 75
End: 2020-06-24

## 2020-06-29 ASSESSMENT — HOOS JR
HOOS JR SCORE: 70.426
HOOS JR SCORE: 6

## 2020-07-15 ENCOUNTER — TELEPHONE (OUTPATIENT)
Dept: FAMILY MEDICINE CLINIC | Facility: CLINIC | Age: 75
End: 2020-07-15

## 2020-07-15 DIAGNOSIS — M16.12 ARTHRITIS OF LEFT HIP: Primary | ICD-10-CM

## 2020-07-15 DIAGNOSIS — R50.9 FEVER, UNSPECIFIED FEVER CAUSE: ICD-10-CM

## 2020-07-15 NOTE — TELEPHONE ENCOUNTER
I spoke with the patient.  She has had fever up to 102F for the past 12 days.  She does not want to go to the ER.  She requests home health.  I wrote a new referral.

## 2020-07-16 ENCOUNTER — TELEPHONE (OUTPATIENT)
Dept: FAMILY MEDICINE CLINIC | Facility: CLINIC | Age: 75
End: 2020-07-16

## 2020-07-16 ENCOUNTER — HOSPITAL ENCOUNTER (INPATIENT)
Facility: HOSPITAL | Age: 75
LOS: 2 days | Discharge: HOME HEALTH | End: 2020-07-21
Attending: INTERNAL MEDICINE | Admitting: INTERNAL MEDICINE

## 2020-07-16 ENCOUNTER — APPOINTMENT (OUTPATIENT)
Dept: GENERAL RADIOLOGY | Facility: HOSPITAL | Age: 75
End: 2020-07-16

## 2020-07-16 DIAGNOSIS — I10 ESSENTIAL HYPERTENSION: Primary | ICD-10-CM

## 2020-07-16 DIAGNOSIS — R50.9 FEVER, UNSPECIFIED FEVER CAUSE: Primary | ICD-10-CM

## 2020-07-16 DIAGNOSIS — N39.0 URINARY TRACT INFECTION WITHOUT HEMATURIA, SITE UNSPECIFIED: ICD-10-CM

## 2020-07-16 DIAGNOSIS — Z79.2 LONG TERM CURRENT USE OF ANTIBIOTICS: ICD-10-CM

## 2020-07-16 DIAGNOSIS — A41.9 SEPSIS, DUE TO UNSPECIFIED ORGANISM, UNSPECIFIED WHETHER ACUTE ORGAN DYSFUNCTION PRESENT (HCC): ICD-10-CM

## 2020-07-16 DIAGNOSIS — E03.9 HYPOTHYROIDISM, UNSPECIFIED TYPE: ICD-10-CM

## 2020-07-16 LAB
ALBUMIN SERPL-MCNC: 3.4 G/DL (ref 3.5–5.2)
ALBUMIN/GLOB SERPL: 1 G/DL
ALP SERPL-CCNC: 145 U/L (ref 39–117)
ALT SERPL W P-5'-P-CCNC: 9 U/L (ref 1–33)
ANION GAP SERPL CALCULATED.3IONS-SCNC: 19 MMOL/L (ref 5–15)
AST SERPL-CCNC: 14 U/L (ref 1–32)
BACTERIA UR QL AUTO: ABNORMAL /HPF
BASOPHILS # BLD AUTO: 0 10*3/MM3 (ref 0–0.2)
BASOPHILS NFR BLD AUTO: 0.3 % (ref 0–1.5)
BILIRUB SERPL-MCNC: 0.5 MG/DL (ref 0–1.2)
BILIRUB UR QL STRIP: NEGATIVE
BUN SERPL-MCNC: 24 MG/DL (ref 8–23)
BUN SERPL-MCNC: ABNORMAL MG/DL
BUN/CREAT SERPL: ABNORMAL
CALCIUM SPEC-SCNC: 9.3 MG/DL (ref 8.6–10.5)
CHLORIDE SERPL-SCNC: 97 MMOL/L (ref 98–107)
CLARITY UR: ABNORMAL
CO2 SERPL-SCNC: 20 MMOL/L (ref 22–29)
COLOR UR: YELLOW
CREAT SERPL-MCNC: 0.98 MG/DL (ref 0.57–1)
D-LACTATE SERPL-SCNC: 1 MMOL/L (ref 0.5–2)
DEPRECATED RDW RBC AUTO: 48.1 FL (ref 37–54)
EOSINOPHIL # BLD AUTO: 0 10*3/MM3 (ref 0–0.4)
EOSINOPHIL NFR BLD AUTO: 0.4 % (ref 0.3–6.2)
ERYTHROCYTE [DISTWIDTH] IN BLOOD BY AUTOMATED COUNT: 15 % (ref 12.3–15.4)
ERYTHROCYTE [SEDIMENTATION RATE] IN BLOOD: 95 MM/HR (ref 0–30)
FERRITIN SERPL-MCNC: 407.4 NG/ML (ref 13–150)
GFR SERPL CREATININE-BSD FRML MDRD: 56 ML/MIN/1.73
GLOBULIN UR ELPH-MCNC: 3.5 GM/DL
GLUCOSE SERPL-MCNC: 99 MG/DL (ref 65–99)
GLUCOSE UR STRIP-MCNC: NEGATIVE MG/DL
GRAN CASTS URNS QL MICRO: ABNORMAL /LPF
HCT VFR BLD AUTO: 37.9 % (ref 34–46.6)
HGB BLD-MCNC: 12.6 G/DL (ref 12–15.9)
HGB UR QL STRIP.AUTO: ABNORMAL
HOLD SPECIMEN: NORMAL
HOLD SPECIMEN: NORMAL
HYALINE CASTS UR QL AUTO: ABNORMAL /LPF
KETONES UR QL STRIP: ABNORMAL
LEUKOCYTE ESTERASE UR QL STRIP.AUTO: ABNORMAL
LYMPHOCYTES # BLD AUTO: 0.8 10*3/MM3 (ref 0.7–3.1)
LYMPHOCYTES NFR BLD AUTO: 6.4 % (ref 19.6–45.3)
MCH RBC QN AUTO: 30.3 PG (ref 26.6–33)
MCHC RBC AUTO-ENTMCNC: 33.2 G/DL (ref 31.5–35.7)
MCV RBC AUTO: 91.5 FL (ref 79–97)
MONOCYTES # BLD AUTO: 0.9 10*3/MM3 (ref 0.1–0.9)
MONOCYTES NFR BLD AUTO: 7.6 % (ref 5–12)
NEUTROPHILS NFR BLD AUTO: 10.4 10*3/MM3 (ref 1.7–7)
NEUTROPHILS NFR BLD AUTO: 85.3 % (ref 42.7–76)
NITRITE UR QL STRIP: POSITIVE
NRBC BLD AUTO-RTO: 0 /100 WBC (ref 0–0.2)
PH UR STRIP.AUTO: 5.5 [PH] (ref 5–8)
PLATELET # BLD AUTO: 264 10*3/MM3 (ref 140–450)
PMV BLD AUTO: 7.2 FL (ref 6–12)
POTASSIUM SERPL-SCNC: 3.9 MMOL/L (ref 3.5–5.2)
PROCALCITONIN SERPL-MCNC: 0.88 NG/ML (ref 0–0.25)
PROT SERPL-MCNC: 6.9 G/DL (ref 6–8.5)
PROT UR QL STRIP: ABNORMAL
RBC # BLD AUTO: 4.14 10*6/MM3 (ref 3.77–5.28)
RBC # UR: ABNORMAL /HPF
REF LAB TEST METHOD: ABNORMAL
SARS-COV-2 RNA PNL SPEC NAA+PROBE: NOT DETECTED
SODIUM SERPL-SCNC: 136 MMOL/L (ref 136–145)
SP GR UR STRIP: 1.01 (ref 1–1.03)
SQUAMOUS #/AREA URNS HPF: ABNORMAL /HPF
UROBILINOGEN UR QL STRIP: ABNORMAL
WBC # BLD AUTO: 12.1 10*3/MM3 (ref 3.4–10.8)
WBC UR QL AUTO: ABNORMAL /HPF
WHOLE BLOOD HOLD SPECIMEN: NORMAL
WHOLE BLOOD HOLD SPECIMEN: NORMAL

## 2020-07-16 PROCEDURE — P9612 CATHETERIZE FOR URINE SPEC: HCPCS

## 2020-07-16 PROCEDURE — 80053 COMPREHEN METABOLIC PANEL: CPT | Performed by: NURSE PRACTITIONER

## 2020-07-16 PROCEDURE — 99285 EMERGENCY DEPT VISIT HI MDM: CPT

## 2020-07-16 PROCEDURE — 83615 LACTATE (LD) (LDH) ENZYME: CPT | Performed by: NURSE PRACTITIONER

## 2020-07-16 PROCEDURE — 85025 COMPLETE CBC W/AUTO DIFF WBC: CPT | Performed by: NURSE PRACTITIONER

## 2020-07-16 PROCEDURE — 87186 SC STD MICRODIL/AGAR DIL: CPT | Performed by: NURSE PRACTITIONER

## 2020-07-16 PROCEDURE — 87040 BLOOD CULTURE FOR BACTERIA: CPT | Performed by: NURSE PRACTITIONER

## 2020-07-16 PROCEDURE — 87088 URINE BACTERIA CULTURE: CPT | Performed by: NURSE PRACTITIONER

## 2020-07-16 PROCEDURE — 87635 SARS-COV-2 COVID-19 AMP PRB: CPT | Performed by: NURSE PRACTITIONER

## 2020-07-16 PROCEDURE — 87086 URINE CULTURE/COLONY COUNT: CPT | Performed by: NURSE PRACTITIONER

## 2020-07-16 PROCEDURE — 87150 DNA/RNA AMPLIFIED PROBE: CPT | Performed by: NURSE PRACTITIONER

## 2020-07-16 PROCEDURE — 71045 X-RAY EXAM CHEST 1 VIEW: CPT

## 2020-07-16 PROCEDURE — 87077 CULTURE AEROBIC IDENTIFY: CPT | Performed by: NURSE PRACTITIONER

## 2020-07-16 PROCEDURE — 81001 URINALYSIS AUTO W/SCOPE: CPT | Performed by: NURSE PRACTITIONER

## 2020-07-16 PROCEDURE — 25010000002 CEFTRIAXONE PER 250 MG: Performed by: NURSE PRACTITIONER

## 2020-07-16 PROCEDURE — 84145 PROCALCITONIN (PCT): CPT | Performed by: NURSE PRACTITIONER

## 2020-07-16 PROCEDURE — 82728 ASSAY OF FERRITIN: CPT | Performed by: NURSE PRACTITIONER

## 2020-07-16 PROCEDURE — 83605 ASSAY OF LACTIC ACID: CPT

## 2020-07-16 PROCEDURE — 86140 C-REACTIVE PROTEIN: CPT | Performed by: NURSE PRACTITIONER

## 2020-07-16 PROCEDURE — 85652 RBC SED RATE AUTOMATED: CPT | Performed by: NURSE PRACTITIONER

## 2020-07-16 RX ORDER — SODIUM CHLORIDE 0.9 % (FLUSH) 0.9 %
10 SYRINGE (ML) INJECTION AS NEEDED
Status: DISCONTINUED | OUTPATIENT
Start: 2020-07-16 | End: 2020-07-21 | Stop reason: HOSPADM

## 2020-07-16 RX ORDER — ACETAMINOPHEN 500 MG
1000 TABLET ORAL ONCE
Status: COMPLETED | OUTPATIENT
Start: 2020-07-16 | End: 2020-07-16

## 2020-07-16 RX ADMIN — WATER 2 G: 1000 INJECTION, SOLUTION INTRAVENOUS at 22:49

## 2020-07-16 RX ADMIN — SODIUM CHLORIDE 1000 ML: 900 INJECTION, SOLUTION INTRAVENOUS at 22:49

## 2020-07-16 RX ADMIN — ACETAMINOPHEN 1000 MG: 500 TABLET, FILM COATED ORAL at 23:59

## 2020-07-17 ENCOUNTER — APPOINTMENT (OUTPATIENT)
Dept: CT IMAGING | Facility: HOSPITAL | Age: 75
End: 2020-07-17

## 2020-07-17 PROBLEM — Z91.89 AT RISK FOR SEPSIS DUE TO URINARY TRACT INFECTION: Status: ACTIVE | Noted: 2020-07-17

## 2020-07-17 PROBLEM — N39.0 AT RISK FOR SEPSIS DUE TO URINARY TRACT INFECTION: Status: ACTIVE | Noted: 2020-07-17

## 2020-07-17 LAB
ALBUMIN SERPL-MCNC: 2.9 G/DL (ref 3.5–5.2)
ALBUMIN/GLOB SERPL: 0.9 G/DL
ALP SERPL-CCNC: 124 U/L (ref 39–117)
ALT SERPL W P-5'-P-CCNC: 7 U/L (ref 1–33)
ANION GAP SERPL CALCULATED.3IONS-SCNC: 14 MMOL/L (ref 5–15)
AST SERPL-CCNC: 21 U/L (ref 1–32)
BACTERIA BLD CULT: ABNORMAL
BASOPHILS # BLD AUTO: 0 10*3/MM3 (ref 0–0.2)
BASOPHILS NFR BLD AUTO: 0.4 % (ref 0–1.5)
BILIRUB SERPL-MCNC: 0.3 MG/DL (ref 0–1.2)
BOTTLE TYPE: ABNORMAL
BUN SERPL-MCNC: 22 MG/DL (ref 8–23)
BUN SERPL-MCNC: ABNORMAL MG/DL
BUN/CREAT SERPL: ABNORMAL
CALCIUM SPEC-SCNC: 8.7 MG/DL (ref 8.6–10.5)
CHLORIDE SERPL-SCNC: 102 MMOL/L (ref 98–107)
CO2 SERPL-SCNC: 24 MMOL/L (ref 22–29)
CREAT SERPL-MCNC: 1.03 MG/DL (ref 0.57–1)
CRP SERPL-MCNC: 36.26 MG/DL (ref 0–0.5)
D-LACTATE SERPL-SCNC: 0.7 MMOL/L (ref 0.5–2)
DEPRECATED RDW RBC AUTO: 47.7 FL (ref 37–54)
EOSINOPHIL # BLD AUTO: 0.1 10*3/MM3 (ref 0–0.4)
EOSINOPHIL NFR BLD AUTO: 1.3 % (ref 0.3–6.2)
ERYTHROCYTE [DISTWIDTH] IN BLOOD BY AUTOMATED COUNT: 14.8 % (ref 12.3–15.4)
GFR SERPL CREATININE-BSD FRML MDRD: 53 ML/MIN/1.73
GLOBULIN UR ELPH-MCNC: 3.1 GM/DL
GLUCOSE SERPL-MCNC: 99 MG/DL (ref 65–99)
HCT VFR BLD AUTO: 34.1 % (ref 34–46.6)
HGB BLD-MCNC: 11.1 G/DL (ref 12–15.9)
LDH SERPL-CCNC: 156 U/L (ref 135–214)
LYMPHOCYTES # BLD AUTO: 1 10*3/MM3 (ref 0.7–3.1)
LYMPHOCYTES NFR BLD AUTO: 11.2 % (ref 19.6–45.3)
MCH RBC QN AUTO: 30 PG (ref 26.6–33)
MCHC RBC AUTO-ENTMCNC: 32.6 G/DL (ref 31.5–35.7)
MCV RBC AUTO: 92.1 FL (ref 79–97)
MONOCYTES # BLD AUTO: 0.9 10*3/MM3 (ref 0.1–0.9)
MONOCYTES NFR BLD AUTO: 10.1 % (ref 5–12)
NEUTROPHILS NFR BLD AUTO: 6.6 10*3/MM3 (ref 1.7–7)
NEUTROPHILS NFR BLD AUTO: 77 % (ref 42.7–76)
NRBC BLD AUTO-RTO: 0 /100 WBC (ref 0–0.2)
PLATELET # BLD AUTO: 227 10*3/MM3 (ref 140–450)
PMV BLD AUTO: 6.9 FL (ref 6–12)
POTASSIUM SERPL-SCNC: 3.6 MMOL/L (ref 3.5–5.2)
PROT SERPL-MCNC: 6 G/DL (ref 6–8.5)
RBC # BLD AUTO: 3.7 10*6/MM3 (ref 3.77–5.28)
SODIUM SERPL-SCNC: 140 MMOL/L (ref 136–145)
WBC # BLD AUTO: 8.6 10*3/MM3 (ref 3.4–10.8)

## 2020-07-17 PROCEDURE — 25010000002 ENOXAPARIN PER 10 MG: Performed by: INTERNAL MEDICINE

## 2020-07-17 PROCEDURE — 99223 1ST HOSP IP/OBS HIGH 75: CPT | Performed by: INTERNAL MEDICINE

## 2020-07-17 PROCEDURE — 74176 CT ABD & PELVIS W/O CONTRAST: CPT

## 2020-07-17 PROCEDURE — 25010000002 CEFTRIAXONE PER 250 MG: Performed by: INTERNAL MEDICINE

## 2020-07-17 PROCEDURE — 83605 ASSAY OF LACTIC ACID: CPT | Performed by: NURSE PRACTITIONER

## 2020-07-17 PROCEDURE — 85025 COMPLETE CBC W/AUTO DIFF WBC: CPT | Performed by: NURSE PRACTITIONER

## 2020-07-17 PROCEDURE — G0378 HOSPITAL OBSERVATION PER HR: HCPCS

## 2020-07-17 PROCEDURE — 80053 COMPREHEN METABOLIC PANEL: CPT | Performed by: NURSE PRACTITIONER

## 2020-07-17 RX ORDER — LEVOTHYROXINE SODIUM 0.03 MG/1
25 TABLET ORAL
Status: DISCONTINUED | OUTPATIENT
Start: 2020-07-17 | End: 2020-07-21 | Stop reason: HOSPADM

## 2020-07-17 RX ORDER — SODIUM CHLORIDE 9 MG/ML
50 INJECTION, SOLUTION INTRAVENOUS CONTINUOUS
Status: DISCONTINUED | OUTPATIENT
Start: 2020-07-17 | End: 2020-07-21 | Stop reason: HOSPADM

## 2020-07-17 RX ORDER — VITAMIN B COMPLEX
1 CAPSULE ORAL DAILY
Status: ON HOLD | COMMUNITY
End: 2020-09-13

## 2020-07-17 RX ORDER — OXYBUTYNIN CHLORIDE 10 MG/1
10 TABLET, EXTENDED RELEASE ORAL DAILY
Status: DISCONTINUED | OUTPATIENT
Start: 2020-07-17 | End: 2020-07-21

## 2020-07-17 RX ORDER — TRAMADOL HYDROCHLORIDE 50 MG/1
50 TABLET ORAL EVERY 4 HOURS PRN
Status: DISCONTINUED | OUTPATIENT
Start: 2020-07-17 | End: 2020-07-21 | Stop reason: HOSPADM

## 2020-07-17 RX ORDER — NYSTATIN 100000 [USP'U]/G
POWDER TOPICAL 2 TIMES DAILY
Status: DISCONTINUED | OUTPATIENT
Start: 2020-07-17 | End: 2020-07-21 | Stop reason: HOSPADM

## 2020-07-17 RX ORDER — MULTIVITAMIN,THERAPEUTIC
1 TABLET ORAL DAILY
Status: DISCONTINUED | OUTPATIENT
Start: 2020-07-17 | End: 2020-07-21

## 2020-07-17 RX ORDER — LISINOPRIL 20 MG/1
40 TABLET ORAL
Status: DISCONTINUED | OUTPATIENT
Start: 2020-07-17 | End: 2020-07-21 | Stop reason: HOSPADM

## 2020-07-17 RX ORDER — METOPROLOL SUCCINATE 25 MG/1
25 TABLET, EXTENDED RELEASE ORAL
Status: DISCONTINUED | OUTPATIENT
Start: 2020-07-17 | End: 2020-07-21 | Stop reason: HOSPADM

## 2020-07-17 RX ADMIN — OXYBUTYNIN CHLORIDE 10 MG: 10 TABLET, EXTENDED RELEASE ORAL at 08:58

## 2020-07-17 RX ADMIN — METOPROLOL SUCCINATE 25 MG: 25 TABLET, EXTENDED RELEASE ORAL at 08:58

## 2020-07-17 RX ADMIN — NYSTATIN: 100000 POWDER TOPICAL at 09:50

## 2020-07-17 RX ADMIN — LEVOTHYROXINE SODIUM 25 MCG: 25 TABLET ORAL at 06:11

## 2020-07-17 RX ADMIN — APIXABAN 2.5 MG: 2.5 TABLET, FILM COATED ORAL at 08:58

## 2020-07-17 RX ADMIN — SODIUM CHLORIDE 100 ML/HR: 900 INJECTION, SOLUTION INTRAVENOUS at 01:56

## 2020-07-17 RX ADMIN — CEFTRIAXONE SODIUM 2 G: 2 INJECTION, POWDER, FOR SOLUTION INTRAMUSCULAR; INTRAVENOUS at 15:35

## 2020-07-17 RX ADMIN — NYSTATIN: 100000 POWDER TOPICAL at 20:44

## 2020-07-17 RX ADMIN — LISINOPRIL 40 MG: 20 TABLET ORAL at 08:58

## 2020-07-17 RX ADMIN — ENOXAPARIN SODIUM 130 MG: 150 INJECTION SUBCUTANEOUS at 20:44

## 2020-07-17 RX ADMIN — THERA TABS 1 TABLET: TAB at 08:58

## 2020-07-18 LAB — BACTERIA SPEC AEROBE CULT: ABNORMAL

## 2020-07-18 PROCEDURE — 25010000002 HYDRALAZINE PER 20 MG: Performed by: NURSE PRACTITIONER

## 2020-07-18 PROCEDURE — G0378 HOSPITAL OBSERVATION PER HR: HCPCS

## 2020-07-18 PROCEDURE — 51798 US URINE CAPACITY MEASURE: CPT

## 2020-07-18 PROCEDURE — 25010000002 ENOXAPARIN PER 10 MG: Performed by: INTERNAL MEDICINE

## 2020-07-18 PROCEDURE — 25010000002 CEFTRIAXONE PER 250 MG: Performed by: INTERNAL MEDICINE

## 2020-07-18 PROCEDURE — 99233 SBSQ HOSP IP/OBS HIGH 50: CPT | Performed by: INTERNAL MEDICINE

## 2020-07-18 PROCEDURE — 99203 OFFICE O/P NEW LOW 30 MIN: CPT | Performed by: SURGERY

## 2020-07-18 RX ORDER — ACETAMINOPHEN 325 MG/1
650 TABLET ORAL ONCE
Status: COMPLETED | OUTPATIENT
Start: 2020-07-18 | End: 2020-07-18

## 2020-07-18 RX ORDER — TAMSULOSIN HYDROCHLORIDE 0.4 MG/1
0.4 CAPSULE ORAL DAILY
Status: DISCONTINUED | OUTPATIENT
Start: 2020-07-18 | End: 2020-07-20

## 2020-07-18 RX ORDER — HYDRALAZINE HYDROCHLORIDE 20 MG/ML
10 INJECTION INTRAMUSCULAR; INTRAVENOUS EVERY 6 HOURS PRN
Status: DISCONTINUED | OUTPATIENT
Start: 2020-07-18 | End: 2020-07-20

## 2020-07-18 RX ADMIN — SODIUM CHLORIDE 100 ML/HR: 900 INJECTION, SOLUTION INTRAVENOUS at 10:51

## 2020-07-18 RX ADMIN — METOPROLOL SUCCINATE 25 MG: 25 TABLET, EXTENDED RELEASE ORAL at 10:05

## 2020-07-18 RX ADMIN — NYSTATIN: 100000 POWDER TOPICAL at 10:08

## 2020-07-18 RX ADMIN — SODIUM CHLORIDE 100 ML/HR: 900 INJECTION, SOLUTION INTRAVENOUS at 05:44

## 2020-07-18 RX ADMIN — SODIUM CHLORIDE 100 ML/HR: 900 INJECTION, SOLUTION INTRAVENOUS at 03:42

## 2020-07-18 RX ADMIN — LEVOTHYROXINE SODIUM 25 MCG: 25 TABLET ORAL at 05:34

## 2020-07-18 RX ADMIN — LISINOPRIL 40 MG: 20 TABLET ORAL at 10:05

## 2020-07-18 RX ADMIN — ACETAMINOPHEN 650 MG: 325 TABLET, FILM COATED ORAL at 05:44

## 2020-07-18 RX ADMIN — HYDRALAZINE HYDROCHLORIDE 10 MG: 20 INJECTION INTRAMUSCULAR; INTRAVENOUS at 22:04

## 2020-07-18 RX ADMIN — CEFTRIAXONE SODIUM 2 G: 2 INJECTION, POWDER, FOR SOLUTION INTRAMUSCULAR; INTRAVENOUS at 14:35

## 2020-07-18 RX ADMIN — NYSTATIN: 100000 POWDER TOPICAL at 21:25

## 2020-07-18 RX ADMIN — Medication 10 ML: at 10:06

## 2020-07-18 RX ADMIN — ENOXAPARIN SODIUM 130 MG: 150 INJECTION SUBCUTANEOUS at 12:46

## 2020-07-18 RX ADMIN — OXYBUTYNIN CHLORIDE 10 MG: 10 TABLET, EXTENDED RELEASE ORAL at 10:05

## 2020-07-18 RX ADMIN — THERA TABS 1 TABLET: TAB at 10:05

## 2020-07-18 RX ADMIN — ENOXAPARIN SODIUM 130 MG: 150 INJECTION SUBCUTANEOUS at 21:25

## 2020-07-19 LAB
ANION GAP SERPL CALCULATED.3IONS-SCNC: 12 MMOL/L (ref 5–15)
BACTERIA SPEC AEROBE CULT: ABNORMAL
BACTERIA SPEC AEROBE CULT: ABNORMAL
BASOPHILS # BLD AUTO: 0.1 10*3/MM3 (ref 0–0.2)
BASOPHILS NFR BLD AUTO: 0.9 % (ref 0–1.5)
BUN SERPL-MCNC: 14 MG/DL (ref 8–23)
BUN SERPL-MCNC: ABNORMAL MG/DL
BUN/CREAT SERPL: ABNORMAL
CALCIUM SPEC-SCNC: 8.5 MG/DL (ref 8.6–10.5)
CHLORIDE SERPL-SCNC: 108 MMOL/L (ref 98–107)
CO2 SERPL-SCNC: 24 MMOL/L (ref 22–29)
CREAT SERPL-MCNC: 0.9 MG/DL (ref 0.57–1)
DEPRECATED RDW RBC AUTO: 50.8 FL (ref 37–54)
EOSINOPHIL # BLD AUTO: 0.2 10*3/MM3 (ref 0–0.4)
EOSINOPHIL NFR BLD AUTO: 2.1 % (ref 0.3–6.2)
ERYTHROCYTE [DISTWIDTH] IN BLOOD BY AUTOMATED COUNT: 15.6 % (ref 12.3–15.4)
GFR SERPL CREATININE-BSD FRML MDRD: 61 ML/MIN/1.73
GLUCOSE SERPL-MCNC: 120 MG/DL (ref 65–99)
GRAM STN SPEC: ABNORMAL
HCT VFR BLD AUTO: 32.9 % (ref 34–46.6)
HGB BLD-MCNC: 10.7 G/DL (ref 12–15.9)
ISOLATED FROM: ABNORMAL
ISOLATED FROM: ABNORMAL
LYMPHOCYTES # BLD AUTO: 0.8 10*3/MM3 (ref 0.7–3.1)
LYMPHOCYTES NFR BLD AUTO: 9.9 % (ref 19.6–45.3)
MCH RBC QN AUTO: 29.8 PG (ref 26.6–33)
MCHC RBC AUTO-ENTMCNC: 32.7 G/DL (ref 31.5–35.7)
MCV RBC AUTO: 91 FL (ref 79–97)
MONOCYTES # BLD AUTO: 0.6 10*3/MM3 (ref 0.1–0.9)
MONOCYTES NFR BLD AUTO: 7.5 % (ref 5–12)
NEUTROPHILS NFR BLD AUTO: 6.4 10*3/MM3 (ref 1.7–7)
NEUTROPHILS NFR BLD AUTO: 79.6 % (ref 42.7–76)
NRBC BLD AUTO-RTO: 0.1 /100 WBC (ref 0–0.2)
PLATELET # BLD AUTO: 229 10*3/MM3 (ref 140–450)
PMV BLD AUTO: 7 FL (ref 6–12)
POTASSIUM SERPL-SCNC: 3.4 MMOL/L (ref 3.5–5.2)
RBC # BLD AUTO: 3.61 10*6/MM3 (ref 3.77–5.28)
SODIUM SERPL-SCNC: 144 MMOL/L (ref 136–145)
WBC # BLD AUTO: 8 10*3/MM3 (ref 3.4–10.8)

## 2020-07-19 PROCEDURE — 87040 BLOOD CULTURE FOR BACTERIA: CPT | Performed by: INTERNAL MEDICINE

## 2020-07-19 PROCEDURE — 25010000002 ENOXAPARIN PER 10 MG: Performed by: INTERNAL MEDICINE

## 2020-07-19 PROCEDURE — 85025 COMPLETE CBC W/AUTO DIFF WBC: CPT | Performed by: INTERNAL MEDICINE

## 2020-07-19 PROCEDURE — 51798 US URINE CAPACITY MEASURE: CPT

## 2020-07-19 PROCEDURE — 99233 SBSQ HOSP IP/OBS HIGH 50: CPT | Performed by: INTERNAL MEDICINE

## 2020-07-19 PROCEDURE — 25010000002 CEFTRIAXONE PER 250 MG: Performed by: INTERNAL MEDICINE

## 2020-07-19 PROCEDURE — 80048 BASIC METABOLIC PNL TOTAL CA: CPT | Performed by: INTERNAL MEDICINE

## 2020-07-19 RX ORDER — HYDRALAZINE HYDROCHLORIDE 25 MG/1
25 TABLET, FILM COATED ORAL EVERY 12 HOURS SCHEDULED
Status: DISCONTINUED | OUTPATIENT
Start: 2020-07-19 | End: 2020-07-21 | Stop reason: HOSPADM

## 2020-07-19 RX ORDER — ACETAMINOPHEN 325 MG/1
650 TABLET ORAL EVERY 6 HOURS PRN
Status: DISCONTINUED | OUTPATIENT
Start: 2020-07-19 | End: 2020-07-21 | Stop reason: HOSPADM

## 2020-07-19 RX ADMIN — SODIUM CHLORIDE 50 ML/HR: 900 INJECTION, SOLUTION INTRAVENOUS at 20:18

## 2020-07-19 RX ADMIN — CEFTRIAXONE SODIUM 2 G: 2 INJECTION, POWDER, FOR SOLUTION INTRAMUSCULAR; INTRAVENOUS at 13:43

## 2020-07-19 RX ADMIN — NYSTATIN: 100000 POWDER TOPICAL at 21:52

## 2020-07-19 RX ADMIN — APIXABAN 2.5 MG: 2.5 TABLET, FILM COATED ORAL at 13:43

## 2020-07-19 RX ADMIN — HYDRALAZINE HYDROCHLORIDE 25 MG: 25 TABLET, FILM COATED ORAL at 11:27

## 2020-07-19 RX ADMIN — LISINOPRIL 40 MG: 20 TABLET ORAL at 10:10

## 2020-07-19 RX ADMIN — METOPROLOL SUCCINATE 25 MG: 25 TABLET, EXTENDED RELEASE ORAL at 10:10

## 2020-07-19 RX ADMIN — OXYBUTYNIN CHLORIDE 10 MG: 10 TABLET, EXTENDED RELEASE ORAL at 10:10

## 2020-07-19 RX ADMIN — HYDRALAZINE HYDROCHLORIDE 25 MG: 25 TABLET, FILM COATED ORAL at 21:52

## 2020-07-19 RX ADMIN — APIXABAN 2.5 MG: 2.5 TABLET, FILM COATED ORAL at 21:52

## 2020-07-19 RX ADMIN — TAMSULOSIN HYDROCHLORIDE 0.4 MG: 0.4 CAPSULE ORAL at 10:13

## 2020-07-19 RX ADMIN — NYSTATIN: 100000 POWDER TOPICAL at 10:13

## 2020-07-19 RX ADMIN — THERA TABS 1 TABLET: TAB at 10:09

## 2020-07-19 RX ADMIN — SODIUM CHLORIDE 50 ML/HR: 900 INJECTION, SOLUTION INTRAVENOUS at 00:39

## 2020-07-19 RX ADMIN — LEVOTHYROXINE SODIUM 25 MCG: 25 TABLET ORAL at 05:18

## 2020-07-19 RX ADMIN — ACETAMINOPHEN 650 MG: 325 TABLET, FILM COATED ORAL at 00:50

## 2020-07-20 ENCOUNTER — APPOINTMENT (OUTPATIENT)
Dept: ULTRASOUND IMAGING | Facility: HOSPITAL | Age: 75
End: 2020-07-20

## 2020-07-20 LAB
ALBUMIN SERPL-MCNC: 3 G/DL (ref 3.5–5.2)
ALBUMIN/GLOB SERPL: 0.9 G/DL
ALP SERPL-CCNC: 122 U/L (ref 39–117)
ALT SERPL W P-5'-P-CCNC: 9 U/L (ref 1–33)
ANION GAP SERPL CALCULATED.3IONS-SCNC: 13 MMOL/L (ref 5–15)
AST SERPL-CCNC: 14 U/L (ref 1–32)
BASOPHILS # BLD AUTO: 0 10*3/MM3 (ref 0–0.2)
BASOPHILS NFR BLD AUTO: 0.5 % (ref 0–1.5)
BILIRUB SERPL-MCNC: 0.2 MG/DL (ref 0–1.2)
BUN SERPL-MCNC: 13 MG/DL (ref 8–23)
BUN SERPL-MCNC: ABNORMAL MG/DL
BUN/CREAT SERPL: ABNORMAL
CALCIUM SPEC-SCNC: 8.4 MG/DL (ref 8.6–10.5)
CHLORIDE SERPL-SCNC: 105 MMOL/L (ref 98–107)
CO2 SERPL-SCNC: 23 MMOL/L (ref 22–29)
CREAT SERPL-MCNC: 0.88 MG/DL (ref 0.57–1)
DEPRECATED RDW RBC AUTO: 49.9 FL (ref 37–54)
EOSINOPHIL # BLD AUTO: 0.2 10*3/MM3 (ref 0–0.4)
EOSINOPHIL NFR BLD AUTO: 2.4 % (ref 0.3–6.2)
ERYTHROCYTE [DISTWIDTH] IN BLOOD BY AUTOMATED COUNT: 15.4 % (ref 12.3–15.4)
GFR SERPL CREATININE-BSD FRML MDRD: 63 ML/MIN/1.73
GLOBULIN UR ELPH-MCNC: 3.5 GM/DL
GLUCOSE SERPL-MCNC: 106 MG/DL (ref 65–99)
HCT VFR BLD AUTO: 32.6 % (ref 34–46.6)
HGB BLD-MCNC: 10.8 G/DL (ref 12–15.9)
LYMPHOCYTES # BLD AUTO: 0.9 10*3/MM3 (ref 0.7–3.1)
LYMPHOCYTES NFR BLD AUTO: 10.4 % (ref 19.6–45.3)
MCH RBC QN AUTO: 30.2 PG (ref 26.6–33)
MCHC RBC AUTO-ENTMCNC: 33.2 G/DL (ref 31.5–35.7)
MCV RBC AUTO: 90.7 FL (ref 79–97)
MONOCYTES # BLD AUTO: 0.7 10*3/MM3 (ref 0.1–0.9)
MONOCYTES NFR BLD AUTO: 8.3 % (ref 5–12)
NEUTROPHILS NFR BLD AUTO: 6.5 10*3/MM3 (ref 1.7–7)
NEUTROPHILS NFR BLD AUTO: 78.4 % (ref 42.7–76)
NRBC BLD AUTO-RTO: 0.1 /100 WBC (ref 0–0.2)
PLATELET # BLD AUTO: 234 10*3/MM3 (ref 140–450)
PMV BLD AUTO: 6.8 FL (ref 6–12)
POTASSIUM SERPL-SCNC: 3.2 MMOL/L (ref 3.5–5.2)
POTASSIUM SERPL-SCNC: 3.3 MMOL/L (ref 3.5–5.2)
POTASSIUM SERPL-SCNC: 3.6 MMOL/L (ref 3.5–5.2)
PROT SERPL-MCNC: 6.5 G/DL (ref 6–8.5)
RBC # BLD AUTO: 3.59 10*6/MM3 (ref 3.77–5.28)
SODIUM SERPL-SCNC: 141 MMOL/L (ref 136–145)
WBC # BLD AUTO: 8.3 10*3/MM3 (ref 3.4–10.8)

## 2020-07-20 PROCEDURE — 76775 US EXAM ABDO BACK WALL LIM: CPT

## 2020-07-20 PROCEDURE — 25010000002 CEFTRIAXONE PER 250 MG: Performed by: INTERNAL MEDICINE

## 2020-07-20 PROCEDURE — 51701 INSERT BLADDER CATHETER: CPT

## 2020-07-20 PROCEDURE — 99233 SBSQ HOSP IP/OBS HIGH 50: CPT | Performed by: INTERNAL MEDICINE

## 2020-07-20 PROCEDURE — 84132 ASSAY OF SERUM POTASSIUM: CPT | Performed by: INTERNAL MEDICINE

## 2020-07-20 PROCEDURE — 51798 US URINE CAPACITY MEASURE: CPT

## 2020-07-20 PROCEDURE — 80053 COMPREHEN METABOLIC PANEL: CPT | Performed by: INTERNAL MEDICINE

## 2020-07-20 PROCEDURE — 0T9B70Z DRAINAGE OF BLADDER WITH DRAINAGE DEVICE, VIA NATURAL OR ARTIFICIAL OPENING: ICD-10-PCS | Performed by: INTERNAL MEDICINE

## 2020-07-20 PROCEDURE — 85025 COMPLETE CBC W/AUTO DIFF WBC: CPT | Performed by: INTERNAL MEDICINE

## 2020-07-20 RX ORDER — POTASSIUM CHLORIDE 20 MEQ/1
40 TABLET, EXTENDED RELEASE ORAL AS NEEDED
Status: DISCONTINUED | OUTPATIENT
Start: 2020-07-20 | End: 2020-07-21 | Stop reason: HOSPADM

## 2020-07-20 RX ORDER — POTASSIUM CHLORIDE 7.45 MG/ML
10 INJECTION INTRAVENOUS
Status: DISCONTINUED | OUTPATIENT
Start: 2020-07-20 | End: 2020-07-21 | Stop reason: HOSPADM

## 2020-07-20 RX ORDER — POTASSIUM CHLORIDE 1.5 G/1.77G
40 POWDER, FOR SOLUTION ORAL AS NEEDED
Status: DISCONTINUED | OUTPATIENT
Start: 2020-07-20 | End: 2020-07-21 | Stop reason: HOSPADM

## 2020-07-20 RX ADMIN — NYSTATIN: 100000 POWDER TOPICAL at 09:18

## 2020-07-20 RX ADMIN — APIXABAN 2.5 MG: 2.5 TABLET, FILM COATED ORAL at 20:27

## 2020-07-20 RX ADMIN — LISINOPRIL 40 MG: 20 TABLET ORAL at 09:16

## 2020-07-20 RX ADMIN — TAMSULOSIN HYDROCHLORIDE 0.4 MG: 0.4 CAPSULE ORAL at 09:16

## 2020-07-20 RX ADMIN — Medication 10 ML: at 09:16

## 2020-07-20 RX ADMIN — POTASSIUM CHLORIDE 40 MEQ: 1500 TABLET, EXTENDED RELEASE ORAL at 09:17

## 2020-07-20 RX ADMIN — TRAMADOL HYDROCHLORIDE 50 MG: 50 TABLET, FILM COATED ORAL at 02:40

## 2020-07-20 RX ADMIN — APIXABAN 2.5 MG: 2.5 TABLET, FILM COATED ORAL at 09:16

## 2020-07-20 RX ADMIN — HYDRALAZINE HYDROCHLORIDE 25 MG: 25 TABLET, FILM COATED ORAL at 09:17

## 2020-07-20 RX ADMIN — CEFTRIAXONE SODIUM 2 G: 2 INJECTION, POWDER, FOR SOLUTION INTRAMUSCULAR; INTRAVENOUS at 13:55

## 2020-07-20 RX ADMIN — HYDRALAZINE HYDROCHLORIDE 25 MG: 25 TABLET, FILM COATED ORAL at 20:27

## 2020-07-20 RX ADMIN — LEVOTHYROXINE SODIUM 25 MCG: 25 TABLET ORAL at 05:38

## 2020-07-20 RX ADMIN — THERA TABS 1 TABLET: TAB at 09:16

## 2020-07-20 RX ADMIN — OXYBUTYNIN CHLORIDE 10 MG: 10 TABLET, EXTENDED RELEASE ORAL at 09:16

## 2020-07-20 RX ADMIN — METOPROLOL SUCCINATE 25 MG: 25 TABLET, EXTENDED RELEASE ORAL at 09:17

## 2020-07-20 RX ADMIN — POTASSIUM CHLORIDE 40 MEQ: 1500 TABLET, EXTENDED RELEASE ORAL at 16:40

## 2020-07-20 RX ADMIN — NYSTATIN: 100000 POWDER TOPICAL at 20:27

## 2020-07-21 ENCOUNTER — READMISSION MANAGEMENT (OUTPATIENT)
Dept: CALL CENTER | Facility: HOSPITAL | Age: 75
End: 2020-07-21

## 2020-07-21 VITALS
BODY MASS INDEX: 43.33 KG/M2 | OXYGEN SATURATION: 93 % | WEIGHT: 292.55 LBS | HEIGHT: 69 IN | SYSTOLIC BLOOD PRESSURE: 157 MMHG | DIASTOLIC BLOOD PRESSURE: 86 MMHG | TEMPERATURE: 98.2 F | HEART RATE: 60 BPM | RESPIRATION RATE: 24 BRPM

## 2020-07-21 LAB
ANION GAP SERPL CALCULATED.3IONS-SCNC: 11 MMOL/L (ref 5–15)
BUN SERPL-MCNC: 12 MG/DL (ref 8–23)
BUN SERPL-MCNC: ABNORMAL MG/DL
BUN/CREAT SERPL: ABNORMAL
CALCIUM SPEC-SCNC: 8.5 MG/DL (ref 8.6–10.5)
CHLORIDE SERPL-SCNC: 110 MMOL/L (ref 98–107)
CO2 SERPL-SCNC: 23 MMOL/L (ref 22–29)
CREAT SERPL-MCNC: 0.71 MG/DL (ref 0.57–1)
DEPRECATED RDW RBC AUTO: 51.2 FL (ref 37–54)
EOSINOPHIL # BLD MANUAL: 0.28 10*3/MM3 (ref 0–0.4)
EOSINOPHIL NFR BLD MANUAL: 4 % (ref 0.3–6.2)
ERYTHROCYTE [DISTWIDTH] IN BLOOD BY AUTOMATED COUNT: 15.8 % (ref 12.3–15.4)
GFR SERPL CREATININE-BSD FRML MDRD: 81 ML/MIN/1.73
GLUCOSE SERPL-MCNC: 106 MG/DL (ref 65–99)
HCT VFR BLD AUTO: 31 % (ref 34–46.6)
HGB BLD-MCNC: 10.3 G/DL (ref 12–15.9)
LYMPHOCYTES # BLD MANUAL: 1.35 10*3/MM3 (ref 0.7–3.1)
LYMPHOCYTES NFR BLD MANUAL: 19 % (ref 19.6–45.3)
LYMPHOCYTES NFR BLD MANUAL: 6 % (ref 5–12)
MCH RBC QN AUTO: 30.3 PG (ref 26.6–33)
MCHC RBC AUTO-ENTMCNC: 33.1 G/DL (ref 31.5–35.7)
MCV RBC AUTO: 91.6 FL (ref 79–97)
MONOCYTES # BLD AUTO: 0.43 10*3/MM3 (ref 0.1–0.9)
NEUTROPHILS # BLD AUTO: 4.97 10*3/MM3 (ref 1.7–7)
NEUTROPHILS NFR BLD MANUAL: 68 % (ref 42.7–76)
NEUTS BAND NFR BLD MANUAL: 2 % (ref 0–5)
PLATELET # BLD AUTO: 253 10*3/MM3 (ref 140–450)
PMV BLD AUTO: 6.8 FL (ref 6–12)
POTASSIUM SERPL-SCNC: 3.9 MMOL/L (ref 3.5–5.2)
RBC # BLD AUTO: 3.39 10*6/MM3 (ref 3.77–5.28)
RBC MORPH BLD: NORMAL
SCAN SLIDE: NORMAL
SMALL PLATELETS BLD QL SMEAR: ADEQUATE
SODIUM SERPL-SCNC: 144 MMOL/L (ref 136–145)
VARIANT LYMPHS NFR BLD MANUAL: 1 % (ref 0–5)
WBC # BLD AUTO: 7.1 10*3/MM3 (ref 3.4–10.8)
WBC MORPH BLD: NORMAL

## 2020-07-21 PROCEDURE — 80048 BASIC METABOLIC PNL TOTAL CA: CPT | Performed by: INTERNAL MEDICINE

## 2020-07-21 PROCEDURE — 85007 BL SMEAR W/DIFF WBC COUNT: CPT | Performed by: INTERNAL MEDICINE

## 2020-07-21 PROCEDURE — 85025 COMPLETE CBC W/AUTO DIFF WBC: CPT | Performed by: INTERNAL MEDICINE

## 2020-07-21 PROCEDURE — 99239 HOSP IP/OBS DSCHRG MGMT >30: CPT | Performed by: INTERNAL MEDICINE

## 2020-07-21 RX ORDER — HYDRALAZINE HYDROCHLORIDE 25 MG/1
25 TABLET, FILM COATED ORAL EVERY 12 HOURS SCHEDULED
Qty: 60 TABLET | Refills: 0 | Status: SHIPPED | OUTPATIENT
Start: 2020-07-21 | End: 2020-08-11

## 2020-07-21 RX ORDER — LEVOFLOXACIN 750 MG/1
750 TABLET ORAL EVERY 24 HOURS
Status: DISCONTINUED | OUTPATIENT
Start: 2020-07-21 | End: 2020-07-21 | Stop reason: HOSPADM

## 2020-07-21 RX ORDER — LEVOFLOXACIN 750 MG/1
750 TABLET ORAL EVERY 24 HOURS
Qty: 9 TABLET | Refills: 0 | Status: SHIPPED | OUTPATIENT
Start: 2020-07-22 | End: 2020-07-31

## 2020-07-21 RX ADMIN — NYSTATIN: 100000 POWDER TOPICAL at 08:49

## 2020-07-21 RX ADMIN — LISINOPRIL 40 MG: 20 TABLET ORAL at 08:46

## 2020-07-21 RX ADMIN — THERA TABS 1 TABLET: TAB at 08:46

## 2020-07-21 RX ADMIN — HYDRALAZINE HYDROCHLORIDE 25 MG: 25 TABLET, FILM COATED ORAL at 08:46

## 2020-07-21 RX ADMIN — LEVOTHYROXINE SODIUM 25 MCG: 25 TABLET ORAL at 06:04

## 2020-07-21 RX ADMIN — METOPROLOL SUCCINATE 25 MG: 25 TABLET, EXTENDED RELEASE ORAL at 08:46

## 2020-07-21 RX ADMIN — LEVOFLOXACIN 750 MG: 750 TABLET, FILM COATED ORAL at 10:44

## 2020-07-21 RX ADMIN — APIXABAN 2.5 MG: 2.5 TABLET, FILM COATED ORAL at 08:46

## 2020-07-21 NOTE — OUTREACH NOTE
Prep Survey      Responses   [facility]?  [facility]   Is LACE score < 7 ?  No   Eligibility  [facility]   Date of Admission  07/16/20   Date of Discharge  07/21/20   Discharge Disposition  Home or Self Care   Discharge diagnosis  Fever/UTI   COVID-19 Test Status  Negative   Does the patient have one of the following disease processes/diagnoses(primary or secondary)?  Other   Does the patient have Home health ordered?  No   Is there a DME ordered?  No   Comments regarding appointments  Outpt  PT   Prep survey completed?  Yes          Freya Ledesma RN

## 2020-07-22 ENCOUNTER — NURSE TRIAGE (OUTPATIENT)
Dept: CALL CENTER | Facility: HOSPITAL | Age: 75
End: 2020-07-22

## 2020-07-22 ENCOUNTER — TRANSITIONAL CARE MANAGEMENT TELEPHONE ENCOUNTER (OUTPATIENT)
Dept: CALL CENTER | Facility: HOSPITAL | Age: 75
End: 2020-07-22

## 2020-07-22 NOTE — TELEPHONE ENCOUNTER
"    Reason for Disposition  • General information question, no triage required and triager able to answer question    Additional Information  • Negative: [1] Caller is not with the adult (patient) AND [2] reporting urgent symptoms  • Negative: Lab result questions  • Negative: Medication questions  • Negative: Caller can't be reached by phone  • Negative: Caller has already spoken to PCP or another triager  • Negative: RN needs further essential information from caller in order to complete triage  • Negative: Requesting regular office appointment  • Negative: [1] Caller requesting NON-URGENT health information AND [2] PCP's office is the best resource  • Negative: Health Information question, no triage required and triager able to answer question    Answer Assessment - Initial Assessment Questions  1. REASON FOR CALL or QUESTION: \"What is your reason for calling today?\" or \"How can I best help you?\" or \"What question do you have that I can help answer?\"      Caller asking about AVS instructions and having blood drawn. AVS reviewed. Home Health referral was sent Advised Home health should be able to draw her blood. Questions answered.    Protocols used: INFORMATION ONLY CALL - NO TRIAGE-ADULT-      "

## 2020-07-22 NOTE — OUTREACH NOTE
Call Center TCM Note      Responses   [facility] patient discharged from?  [facility]   COVID-19 Test Status  Negative   Does the patient have one of the following disease processes/diagnoses(primary or secondary)?  Other   TCM attempt successful?  Yes   Call start time  1146   Call end time  1155   Discharge diagnosis  Fever/UTI   Meds reviewed with patient/caregiver?  Yes   Is the patient having any side effects they believe may be caused by any medication additions or changes?  No   Does the patient have all medications ordered at discharge?  Yes   Is the patient taking all medications as directed (includes completed medication regime)?  Yes   Does the patient have a primary care provider?   Yes   Does the patient have an appointment with their PCP within 7 days of discharge?  Greater than 7 days   Comments regarding PCP  Dr Vickers Aug 3 @2:45   What is preventing the patient from scheduling follow up appointments within 7 days of discharge?  Earlier appointment not available   Nursing Interventions  Verified appointment date/time/provider   Has the patient kept scheduled appointments due by today?  N/A   Comments  She has a call out to Dr. Hernandez's office regarding appt   What is the Home health agency?   [facility] w/ 24/7 caregivers w/ eldercare   Has home health visited the patient within 72 hours of discharge?  Call prior to 72 hours   Pulse Ox monitoring  None   Did the patient receive a copy of their discharge instructions?  Yes   Nursing interventions  Reviewed instructions with patient   What is the patient's perception of their health status since discharge?  Improving   Is the patient/caregiver able to teach back signs and symptoms related to disease process for when to call PCP?  Yes   Is the patient/caregiver able to teach back signs and symptoms related to disease process for when to call 911?  Yes   Is the patient/caregiver able to teach back the hierarchy of who to call/visit for  symptoms/problems? PCP, Specialist, Home health nurse, Urgent Care, ED, 911  Yes   Additional teach back comments  Patient states she is doing well.  She has called and left a message with Dr. Hernandez's office regarding an appt.  She is waiting on a return call.  Denies any fever and currently has a catheter in place and to be removed at appt with Dr. Hernandez.  She needs password reset for [employer] and phone # given for help with this.     TCM call completed?  Yes   Wrap up additional comments  Phone # given for password reset for [employer].            Christa Bettencourt LPN    7/22/2020, 11:59

## 2020-07-23 DIAGNOSIS — M16.12 PRIMARY OSTEOARTHRITIS OF LEFT HIP: ICD-10-CM

## 2020-07-24 LAB — BACTERIA SPEC AEROBE CULT: NORMAL

## 2020-07-24 RX ORDER — TRAMADOL HYDROCHLORIDE 50 MG/1
50 TABLET ORAL EVERY 4 HOURS PRN
Qty: 40 TABLET | Refills: 0 | OUTPATIENT
Start: 2020-07-24

## 2020-07-29 ENCOUNTER — READMISSION MANAGEMENT (OUTPATIENT)
Dept: CALL CENTER | Facility: HOSPITAL | Age: 75
End: 2020-07-29

## 2020-07-29 DIAGNOSIS — M16.12 PRIMARY OSTEOARTHRITIS OF LEFT HIP: ICD-10-CM

## 2020-07-29 RX ORDER — TRAMADOL HYDROCHLORIDE 50 MG/1
50 TABLET ORAL EVERY 4 HOURS PRN
Qty: 40 TABLET | Refills: 0 | OUTPATIENT
Start: 2020-07-29

## 2020-07-29 NOTE — OUTREACH NOTE
"Medical Week 2 Survey      Responses   [facility] patient discharged from?  [facility]   COVID-19 Test Status  Negative   Does the patient have one of the following disease processes/diagnoses(primary or secondary)?  Other   Week 2 attempt successful?  Yes   Call start time  1424   Call end time  1429   Meds reviewed with patient/caregiver?  Yes   Is the patient having any side effects they believe may be caused by any medication additions or changes?  No   Does the patient have all medications ordered at discharge?  Yes   Is the patient taking all medications as directed (includes completed medication regime)?  Yes   Does the patient have a primary care provider?   Yes   Does the patient have an appointment with their PCP within 7 days of discharge?  Greater than 7 days   Comments regarding PCP  PATIENT HAS AN APPOINTMENT WITH DR. DESAI 8/3   What is preventing the patient from scheduling follow up appointments within 7 days of discharge?  Earlier appointment not available   Nursing Interventions  Verified appointment date/time/provider   Has the patient kept scheduled appointments due by today?  N/A   What is the Home health agency?   PATIENT STATES SHE HAD TO GO WITH [facility] FOR PT DUE TO [facility]ALTH UNABLE TO SERVICE HER \"IN A TIMELY MANNER.\"   Has home health visited the patient within 72 hours of discharge?  Yes   Pulse Ox monitoring  None   Did the patient receive a copy of their discharge instructions?  Yes   Nursing interventions  Reviewed instructions with patient   What is the patient's perception of their health status since discharge?  Improving   Is the patient/caregiver able to teach back signs and symptoms related to disease process for when to call PCP?  Yes   Is the patient/caregiver able to teach back signs and symptoms related to disease process for when to call 911?  Yes   Is the patient/caregiver able to teach back the hierarchy of who to call/visit for symptoms/problems? " PCP, Specialist, Home health nurse, Urgent Care, ED, 911  Yes   Additional teach back comments  PATIENT STATES SHE IS UNDER THE CARE OF [facility].    Week 2 Call Completed?  Yes          Penelope Rose LPN

## 2020-07-30 ENCOUNTER — TELEPHONE (OUTPATIENT)
Dept: FAMILY MEDICINE CLINIC | Facility: CLINIC | Age: 75
End: 2020-07-30

## 2020-08-03 ENCOUNTER — TELEMEDICINE (OUTPATIENT)
Dept: FAMILY MEDICINE CLINIC | Facility: CLINIC | Age: 75
End: 2020-08-03

## 2020-08-03 VITALS
OXYGEN SATURATION: 93 % | DIASTOLIC BLOOD PRESSURE: 78 MMHG | SYSTOLIC BLOOD PRESSURE: 136 MMHG | HEART RATE: 66 BPM | TEMPERATURE: 98.1 F

## 2020-08-03 DIAGNOSIS — E66.01 MORBID OBESITY (HCC): ICD-10-CM

## 2020-08-03 DIAGNOSIS — I82.4Y3 ACUTE DEEP VEIN THROMBOSIS (DVT) OF PROXIMAL VEIN OF BOTH LOWER EXTREMITIES (HCC): ICD-10-CM

## 2020-08-03 DIAGNOSIS — N30.01 ACUTE CYSTITIS WITH HEMATURIA: Primary | ICD-10-CM

## 2020-08-03 DIAGNOSIS — I10 ESSENTIAL HYPERTENSION: ICD-10-CM

## 2020-08-03 DIAGNOSIS — E03.9 HYPOTHYROIDISM, UNSPECIFIED TYPE: ICD-10-CM

## 2020-08-03 DIAGNOSIS — I26.99 PULMONARY EMBOLISM, UNSPECIFIED CHRONICITY, UNSPECIFIED PULMONARY EMBOLISM TYPE, UNSPECIFIED WHETHER ACUTE COR PULMONALE PRESENT (HCC): ICD-10-CM

## 2020-08-03 PROBLEM — L03.116 CELLULITIS OF BOTH FEET: Status: RESOLVED | Noted: 2019-10-13 | Resolved: 2020-08-03

## 2020-08-03 PROBLEM — L03.115 CELLULITIS OF BOTH FEET: Status: RESOLVED | Noted: 2019-10-13 | Resolved: 2020-08-03

## 2020-08-03 PROBLEM — Z01.810 PREOP CARDIOVASCULAR EXAM: Status: RESOLVED | Noted: 2020-06-09 | Resolved: 2020-08-03

## 2020-08-03 PROCEDURE — 99214 OFFICE O/P EST MOD 30 MIN: CPT | Performed by: FAMILY MEDICINE

## 2020-08-03 PROCEDURE — 93702 BIS XTRACELL FLUID ANALYSIS: CPT

## 2020-08-03 NOTE — PAT
Patient states cannot not come in Wednesday for testing call placed to monik at dr posadas's office  Has not been told when to stop eliquis, call to monik at dr posadas's office and dr devries.  Ekg to anesthesia

## 2020-08-03 NOTE — PROGRESS NOTES
Subjective   Camelia Gray is a 75 y.o. female.     Chief Complaint   Patient presents with   • Blood in Urine   • Hypertension       HPI  Chief complaint: Hematuria hypertension hypothyroidism DVT/PE    The patient is a 75-year-old white female who was seen by video visit for follow-up and maintenance of her current problems.  The patient gave permission to be seen by video visit.    Her current problems include    1.  Urinary tract infection with hematuria-new-patient was recently hospitalized because of urinary tract infection with hematuria.  Patient was febrile.  She was febrile for several days before she went to the emergency room.  In the emergency room she was found to have UTI.  Patient also had incontinence.  Patient was treated with IV antibiotics and then transferred over to oral antibiotics.  She is completed antibiotic therapy.  The patient had to have a Santos catheter placed which she has been placed now.  She is scheduled for cystoscopy in August 14.  She denied fever chills.  States that she no longer has hematuria.    2.  Hypertension-stable-patient on hydralazine as needed lisinopril 40 mg daily metoprolol 25 mg once a day.  She denied headache lightheadedness dizziness or chest pain.    3.  Hypothyroidism-stable-patient is currently on Synthroid 25 mg daily.  She denied heat or cold intolerance tremor weight gain or weight loss.    4.  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  She is currently on Eliquis.  She is to stop taking this 3 days before procedure.    5.  The patient has severe morbid obesity.  She has immobility secondary to her obesity.  She states at the present time she is not able to walk.  She was able to stand today.    The following portions of the patient's history were reviewed and updated as appropriate: allergies, current medications, past family history, past medical history, past social history, past surgical history and problem  list.    Review of Systems   Constitutional: Positive for fatigue. Negative for chills and fever.   HENT: Negative for congestion, sinus pressure and swollen glands.    Eyes: Negative for blurred vision and pain.   Respiratory: Negative for cough and shortness of breath.    Cardiovascular: Negative for chest pain and leg swelling.   Gastrointestinal: Negative for abdominal pain, nausea and indigestion.   Endocrine: Negative for cold intolerance, heat intolerance, polydipsia, polyphagia and polyuria.   Skin: Negative for dry skin, rash and bruise.   Neurological: Negative for dizziness, syncope and headache.   Psychiatric/Behavioral: Negative for dysphoric mood and stress.       Objective     /78   Pulse 66   Temp 98.1 °F (36.7 °C)   SpO2 93%     Physical Exam   Constitutional:   Obese   Pulmonary/Chest: Effort normal.   Neurological: She is alert.   Skin: Skin is warm and dry.   Vitals reviewed.         Assessment/Plan   Camelia was seen today for blood in urine and hypertension.    Diagnoses and all orders for this visit:    Acute cystitis with hematuria    Morbid obesity (CMS/HCC)    Essential hypertension  -     Cancel: CBC & Differential; Future  -     Cancel: Comprehensive Metabolic Panel; Future  -     CBC & Differential; Future  -     Comprehensive Metabolic Panel; Future    Hypothyroidism, unspecified type    Edema of lower extremity    Pulmonary embolism, unspecified chronicity, unspecified pulmonary embolism type, unspecified whether acute cor pulmonale present (CMS/HCC)    Acute deep vein thrombosis (DVT) of proximal vein of both lower extremities (CMS/HCC)      Patient Instructions   Continue your current medications.    Stop taking the Eliquis 3 days prior to the Cystoscopy.    Have the follow up labs done 1 week prior to the procdure.    Follow up in the office in 3 months.              Eric Vickers Jr., MD    08/03/20

## 2020-08-03 NOTE — PATIENT INSTRUCTIONS
Continue your current medications.    Stop taking the Eliquis 3 days prior to the Cystoscopy.    Have the follow up labs done 1 week prior to the procdure.    Follow up in the office in 3 months.

## 2020-08-05 ENCOUNTER — READMISSION MANAGEMENT (OUTPATIENT)
Dept: CALL CENTER | Facility: HOSPITAL | Age: 75
End: 2020-08-05

## 2020-08-05 NOTE — OUTREACH NOTE
Medical Week 3 Survey      Responses   [facility] patient discharged from?  [facility]   COVID-19 Test Status  Negative   Does the patient have one of the following disease processes/diagnoses(primary or secondary)?  Other   Week 3 attempt successful?  No   Unsuccessful attempts  Attempt 1          Christa Bettencourt LPN

## 2020-08-06 ENCOUNTER — READMISSION MANAGEMENT (OUTPATIENT)
Dept: CALL CENTER | Facility: HOSPITAL | Age: 75
End: 2020-08-06

## 2020-08-06 NOTE — OUTREACH NOTE
Medical Week 3 Survey      Responses   [facility] patient discharged from?  [facility]   COVID-19 Test Status  Negative   Does the patient have one of the following disease processes/diagnoses(primary or secondary)?  Other   Week 3 attempt successful?  No   Call start time  1242   Unsuccessful attempts  Attempt 2          Penelope Rose LPN

## 2020-08-10 NOTE — PAT
Per Luna Anna pt does not have transportation to hospital except by ambulance and no Covid-19 test required DOS per Allie Colón

## 2020-08-11 ENCOUNTER — TELEPHONE (OUTPATIENT)
Dept: FAMILY MEDICINE CLINIC | Facility: CLINIC | Age: 75
End: 2020-08-11

## 2020-08-11 NOTE — TELEPHONE ENCOUNTER
I spoke with the patient.  She was discharged from the hospital on Hydralazine.  She quit taking the Hydralazine.  Her BP is normal.  She is to continue to monitor her BP.

## 2020-08-12 ENCOUNTER — TELEPHONE (OUTPATIENT)
Dept: FAMILY MEDICINE CLINIC | Facility: CLINIC | Age: 75
End: 2020-08-12

## 2020-08-12 NOTE — TELEPHONE ENCOUNTER
Patient called right back, stating that Devin Espinoza is faxing a form over for you to fill out and fax back.

## 2020-08-12 NOTE — TELEPHONE ENCOUNTER
Patient states she needs an order faxed [facility] at fax number [phone].  will be at her house, [address]  [address] to Jade

## 2020-08-13 ENCOUNTER — TELEMEDICINE (OUTPATIENT)
Dept: FAMILY MEDICINE CLINIC | Facility: CLINIC | Age: 75
End: 2020-08-13

## 2020-08-13 VITALS — DIASTOLIC BLOOD PRESSURE: 78 MMHG | OXYGEN SATURATION: 96 % | SYSTOLIC BLOOD PRESSURE: 121 MMHG | HEART RATE: 61 BPM

## 2020-08-13 DIAGNOSIS — M62.3 IMMOBILITY SYNDROME: Primary | ICD-10-CM

## 2020-08-13 DIAGNOSIS — E66.01 MORBID OBESITY (HCC): ICD-10-CM

## 2020-08-13 DIAGNOSIS — E03.9 HYPOTHYROIDISM, UNSPECIFIED TYPE: ICD-10-CM

## 2020-08-13 DIAGNOSIS — I10 ESSENTIAL HYPERTENSION: ICD-10-CM

## 2020-08-13 DIAGNOSIS — N10 ACUTE PYELONEPHRITIS: ICD-10-CM

## 2020-08-13 PROBLEM — L30.8 DERMATITIS ASSOCIATED WITH MOISTURE: Status: RESOLVED | Noted: 2019-10-14 | Resolved: 2020-08-13

## 2020-08-13 PROBLEM — R50.9 FEVER: Status: RESOLVED | Noted: 2020-07-16 | Resolved: 2020-08-13

## 2020-08-13 PROBLEM — I87.8 VENOUS STASIS: Status: RESOLVED | Noted: 2019-10-14 | Resolved: 2020-08-13

## 2020-08-13 PROBLEM — R60.0 EDEMA OF LOWER EXTREMITY: Status: RESOLVED | Noted: 2017-05-31 | Resolved: 2020-08-13

## 2020-08-13 PROBLEM — D68.59 THROMBOPHILIA: Status: ACTIVE | Noted: 2020-08-13

## 2020-08-13 PROCEDURE — 99214 OFFICE O/P EST MOD 30 MIN: CPT | Performed by: FAMILY MEDICINE

## 2020-08-13 NOTE — PROGRESS NOTES
Subjective   Camelia Gray is a 75 y.o. female.     Chief Complaint   Patient presents with   • Hypothyroidism   • Immobility   • Urinary Tract Infection       HPI chief complaint: Immobility syndrome hypertension hypothyroidism morbid obesity urinary tract infection    The patient is a 75-year-old white female who was seen by video visit for follow-up and maintenance of her current problems.  The patient gave permission to be seen by video visit.    Her current problems include    1.  Urinary tract infection-new-patient was recently hospitalized urinary tract infection with sepsis.  Patient was treated with antibiotics.  She was seen in consultation by urology.  The patient has outlet obstruction.  She is scheduled to have a stent placed in her ureter.  The patient is on Eliquis for DVT/PE.  The patient has to come off of the Eliquis in order to have this done.  She is aware that this increases her risk of having recurrent pulmonary emboli.  She wished to proceed with this anyway.    2.  Hypertension-stable-patient on Prinivil 40 mg daily metoprolol 25 mg daily.  She denied headache lightheadedness dizziness or chest pain.    3.  Hypothyroidism-stable-patient on Synthroid 102 5 mg daily.  She denied heat or cold intolerance.  She denied tremor weight gain or weight loss.    4.  DVT/PE-stable-patient is on Eliquis 2.5 mg twice a day.  She is not had recurrent pulmonary emboli.    5.  Morbid obesity-stable-patient is currently at home by herself.  She has 24-hour caregivers.  She is significantly immobile.  She has difficulty walking or even getting up from her bed to get to a chair.  As result of this arrangements been made for patient to be seen patient treatment facility for strengthening and conditioning.  She would like to be transferred there today.    6.  Arthritis-stable the patient has had a recent total hip replacement.          The following portions of the patient's history were reviewed and updated as  appropriate: allergies, current medications, past family history, past medical history, past social history, past surgical history and problem list.    Review of Systems   Constitutional: Positive for fatigue. Negative for chills and fever.   HENT: Negative for congestion, sinus pressure and swollen glands.    Eyes: Negative for blurred vision and pain.   Respiratory: Negative for cough and shortness of breath.    Cardiovascular: Negative for chest pain and leg swelling.   Gastrointestinal: Negative for abdominal pain, nausea and indigestion.   Endocrine: Negative for cold intolerance, heat intolerance, polydipsia, polyphagia and polyuria.   Skin: Negative for dry skin, rash and bruise.   Neurological: Negative for dizziness, syncope and headache.   Psychiatric/Behavioral: Negative for dysphoric mood and stress.       Objective     /78   Pulse 61   SpO2 96%     Physical Exam   Constitutional: She appears well-developed and well-nourished.   Pulmonary/Chest: Effort normal.   Neurological: She is alert.   Skin: Skin is warm and dry.   Vitals reviewed.         Assessment/Plan   Camelia was seen today for hypothyroidism, immobility and urinary tract infection.    Diagnoses and all orders for this visit:    Immobility syndrome    Essential hypertension    Hypothyroidism, unspecified type    Morbid obesity (CMS/HCC)    Acute pyelonephritis      Patient Instructions   Continue your current medication and treatment.    Follow up in the office in 3 months.    Laboratory testing at that time.      Eric Vickers Jr., MD    08/13/20

## 2020-08-17 ENCOUNTER — ANESTHESIA EVENT (OUTPATIENT)
Dept: PERIOP | Facility: HOSPITAL | Age: 75
End: 2020-08-17

## 2020-08-18 ENCOUNTER — ANESTHESIA (OUTPATIENT)
Dept: PERIOP | Facility: HOSPITAL | Age: 75
End: 2020-08-18

## 2020-08-18 ENCOUNTER — HOSPITAL ENCOUNTER (OUTPATIENT)
Facility: HOSPITAL | Age: 75
Setting detail: HOSPITAL OUTPATIENT SURGERY
Discharge: NURSING, REHAB OR LONG-TERM CARE | End: 2020-08-18
Attending: UROLOGY | Admitting: UROLOGY

## 2020-08-18 VITALS
WEIGHT: 281 LBS | DIASTOLIC BLOOD PRESSURE: 50 MMHG | SYSTOLIC BLOOD PRESSURE: 94 MMHG | HEART RATE: 71 BPM | RESPIRATION RATE: 13 BRPM | OXYGEN SATURATION: 92 % | BODY MASS INDEX: 39.34 KG/M2 | TEMPERATURE: 96.8 F | HEIGHT: 71 IN

## 2020-08-18 DIAGNOSIS — N13.30 HYDRONEPHROSIS, UNSPECIFIED HYDRONEPHROSIS TYPE: Primary | ICD-10-CM

## 2020-08-18 PROCEDURE — 25010000002 FENTANYL CITRATE (PF) 100 MCG/2ML SOLUTION: Performed by: NURSE ANESTHETIST, CERTIFIED REGISTERED

## 2020-08-18 PROCEDURE — 25010000002 PROPOFOL 10 MG/ML EMULSION: Performed by: NURSE ANESTHETIST, CERTIFIED REGISTERED

## 2020-08-18 PROCEDURE — 0 IOHEXOL 300 MG/ML SOLUTION: Performed by: UROLOGY

## 2020-08-18 PROCEDURE — C1769 GUIDE WIRE: HCPCS | Performed by: UROLOGY

## 2020-08-18 PROCEDURE — C1758 CATHETER, URETERAL: HCPCS | Performed by: UROLOGY

## 2020-08-18 PROCEDURE — 25010000002 ONDANSETRON PER 1 MG: Performed by: NURSE ANESTHETIST, CERTIFIED REGISTERED

## 2020-08-18 PROCEDURE — 25010000002 MIDAZOLAM PER 1 MG: Performed by: NURSE ANESTHETIST, CERTIFIED REGISTERED

## 2020-08-18 RX ORDER — PROMETHAZINE HYDROCHLORIDE 25 MG/1
25 TABLET ORAL ONCE AS NEEDED
Status: DISCONTINUED | OUTPATIENT
Start: 2020-08-18 | End: 2020-08-18 | Stop reason: HOSPADM

## 2020-08-18 RX ORDER — OXYCODONE HYDROCHLORIDE 5 MG/1
5 TABLET ORAL ONCE AS NEEDED
Status: DISCONTINUED | OUTPATIENT
Start: 2020-08-18 | End: 2020-08-18 | Stop reason: HOSPADM

## 2020-08-18 RX ORDER — EPHEDRINE SULFATE/0.9% NACL/PF 25 MG/5 ML
SYRINGE (ML) INTRAVENOUS AS NEEDED
Status: DISCONTINUED | OUTPATIENT
Start: 2020-08-18 | End: 2020-08-18 | Stop reason: SURG

## 2020-08-18 RX ORDER — ONDANSETRON 2 MG/ML
4 INJECTION INTRAMUSCULAR; INTRAVENOUS ONCE AS NEEDED
Status: COMPLETED | OUTPATIENT
Start: 2020-08-18 | End: 2020-08-18

## 2020-08-18 RX ORDER — PROMETHAZINE HYDROCHLORIDE 25 MG/1
25 SUPPOSITORY RECTAL ONCE AS NEEDED
Status: DISCONTINUED | OUTPATIENT
Start: 2020-08-18 | End: 2020-08-18 | Stop reason: HOSPADM

## 2020-08-18 RX ORDER — FENTANYL CITRATE 50 UG/ML
25 INJECTION, SOLUTION INTRAMUSCULAR; INTRAVENOUS
Status: DISCONTINUED | OUTPATIENT
Start: 2020-08-18 | End: 2020-08-18 | Stop reason: HOSPADM

## 2020-08-18 RX ORDER — PHENYLEPHRINE HCL IN 0.9% NACL 0.5 MG/5ML
SYRINGE (ML) INTRAVENOUS AS NEEDED
Status: DISCONTINUED | OUTPATIENT
Start: 2020-08-18 | End: 2020-08-18 | Stop reason: SURG

## 2020-08-18 RX ORDER — ACETAMINOPHEN 650 MG/1
650 SUPPOSITORY RECTAL ONCE AS NEEDED
Status: DISCONTINUED | OUTPATIENT
Start: 2020-08-18 | End: 2020-08-18 | Stop reason: HOSPADM

## 2020-08-18 RX ORDER — PROMETHAZINE HYDROCHLORIDE 25 MG/ML
6.25 INJECTION, SOLUTION INTRAMUSCULAR; INTRAVENOUS ONCE AS NEEDED
Status: DISCONTINUED | OUTPATIENT
Start: 2020-08-18 | End: 2020-08-18 | Stop reason: HOSPADM

## 2020-08-18 RX ORDER — OXYCODONE HYDROCHLORIDE 5 MG/1
10 TABLET ORAL EVERY 4 HOURS PRN
Status: DISCONTINUED | OUTPATIENT
Start: 2020-08-18 | End: 2020-08-18 | Stop reason: HOSPADM

## 2020-08-18 RX ORDER — SODIUM CHLORIDE 9 MG/ML
9 INJECTION, SOLUTION INTRAVENOUS CONTINUOUS PRN
Status: DISCONTINUED | OUTPATIENT
Start: 2020-08-18 | End: 2020-08-18 | Stop reason: HOSPADM

## 2020-08-18 RX ORDER — FENTANYL CITRATE 50 UG/ML
INJECTION, SOLUTION INTRAMUSCULAR; INTRAVENOUS AS NEEDED
Status: DISCONTINUED | OUTPATIENT
Start: 2020-08-18 | End: 2020-08-18 | Stop reason: SURG

## 2020-08-18 RX ORDER — PROPOFOL 10 MG/ML
VIAL (ML) INTRAVENOUS AS NEEDED
Status: DISCONTINUED | OUTPATIENT
Start: 2020-08-18 | End: 2020-08-18 | Stop reason: SURG

## 2020-08-18 RX ORDER — ACETAMINOPHEN 325 MG/1
650 TABLET ORAL ONCE AS NEEDED
Status: DISCONTINUED | OUTPATIENT
Start: 2020-08-18 | End: 2020-08-18 | Stop reason: HOSPADM

## 2020-08-18 RX ORDER — CIPROFLOXACIN 500 MG/1
500 TABLET, FILM COATED ORAL 2 TIMES DAILY
Qty: 10 TABLET | Refills: 0 | Status: SHIPPED | OUTPATIENT
Start: 2020-08-18 | End: 2020-09-13

## 2020-08-18 RX ORDER — MIDAZOLAM HYDROCHLORIDE 1 MG/ML
INJECTION INTRAMUSCULAR; INTRAVENOUS AS NEEDED
Status: DISCONTINUED | OUTPATIENT
Start: 2020-08-18 | End: 2020-08-18 | Stop reason: SURG

## 2020-08-18 RX ORDER — FENTANYL CITRATE 50 UG/ML
50 INJECTION, SOLUTION INTRAMUSCULAR; INTRAVENOUS
Status: DISCONTINUED | OUTPATIENT
Start: 2020-08-18 | End: 2020-08-18 | Stop reason: HOSPADM

## 2020-08-18 RX ORDER — SODIUM CHLORIDE 0.9 % (FLUSH) 0.9 %
10 SYRINGE (ML) INJECTION EVERY 12 HOURS SCHEDULED
Status: DISCONTINUED | OUTPATIENT
Start: 2020-08-18 | End: 2020-08-18 | Stop reason: HOSPADM

## 2020-08-18 RX ORDER — SODIUM CHLORIDE 0.9 % (FLUSH) 0.9 %
10 SYRINGE (ML) INJECTION AS NEEDED
Status: DISCONTINUED | OUTPATIENT
Start: 2020-08-18 | End: 2020-08-18 | Stop reason: HOSPADM

## 2020-08-18 RX ORDER — LIDOCAINE HYDROCHLORIDE 20 MG/ML
INJECTION, SOLUTION EPIDURAL; INFILTRATION; INTRACAUDAL; PERINEURAL AS NEEDED
Status: DISCONTINUED | OUTPATIENT
Start: 2020-08-18 | End: 2020-08-18 | Stop reason: SURG

## 2020-08-18 RX ADMIN — PHENYLEPHRINE HYDROCHLORIDE 100 MCG: 10 INJECTION INTRAVENOUS at 11:43

## 2020-08-18 RX ADMIN — ONDANSETRON 8 MG: 2 INJECTION INTRAMUSCULAR; INTRAVENOUS at 11:45

## 2020-08-18 RX ADMIN — PHENYLEPHRINE HYDROCHLORIDE 100 MCG: 10 INJECTION INTRAVENOUS at 11:35

## 2020-08-18 RX ADMIN — LIDOCAINE HYDROCHLORIDE 80 MG: 20 INJECTION, SOLUTION EPIDURAL; INFILTRATION; INTRACAUDAL; PERINEURAL at 11:15

## 2020-08-18 RX ADMIN — FENTANYL CITRATE 50 MCG: 50 INJECTION, SOLUTION INTRAMUSCULAR; INTRAVENOUS at 11:09

## 2020-08-18 RX ADMIN — Medication 10 MG: at 11:19

## 2020-08-18 RX ADMIN — PHENYLEPHRINE HYDROCHLORIDE 100 MCG: 10 INJECTION INTRAVENOUS at 11:48

## 2020-08-18 RX ADMIN — Medication 15 MG: at 11:27

## 2020-08-18 RX ADMIN — GLYCOPYRROLATE 0.2 MG: 0.2 INJECTION, SOLUTION INTRAMUSCULAR; INTRAVITREAL at 11:35

## 2020-08-18 RX ADMIN — Medication 10 MG: at 11:21

## 2020-08-18 RX ADMIN — CEFAZOLIN SODIUM 2 G: 1 INJECTION, POWDER, FOR SOLUTION INTRAMUSCULAR; INTRAVENOUS at 11:18

## 2020-08-18 RX ADMIN — PROPOFOL 150 MG: 10 INJECTION, EMULSION INTRAVENOUS at 11:15

## 2020-08-18 RX ADMIN — MIDAZOLAM 2 MG: 1 INJECTION INTRAMUSCULAR; INTRAVENOUS at 11:05

## 2020-08-18 RX ADMIN — Medication 15 MG: at 11:30

## 2020-08-18 RX ADMIN — SODIUM CHLORIDE: 0.9 INJECTION, SOLUTION INTRAVENOUS at 11:05

## 2020-08-18 NOTE — ANESTHESIA PREPROCEDURE EVALUATION
Anesthesia Evaluation     NPO Solid Status: > 8 hours  NPO Liquid Status: > 8 hours           Airway   Mallampati: II  TM distance: >3 FB  No difficulty expected  Dental    (+) edentulous    Pulmonary    (+) pulmonary embolism,   Cardiovascular     PT is on anticoagulation therapy    (+) hypertension, DVT resolved,     ROS comment: Discontinued eliquis 8/14  LVEF 60%    Neuro/Psych  GI/Hepatic/Renal/Endo    (+) morbid obesity,      Musculoskeletal     Abdominal    Substance History      OB/GYN          Other   arthritis,                      Anesthesia Plan    ASA 3     general     intravenous induction     Anesthetic plan, all risks, benefits, and alternatives have been provided, discussed and informed consent has been obtained with: patient.    Plan discussed with CRNA.

## 2020-08-18 NOTE — H&P
Urology History and Physical    Patient:Camelia Gray  :1945   Room:[facility] MAIN OR/MAIN OR   Admit Date2020  Age:75 y.o.      SEX:female      DOS:2020      MR:[id number]      Visit:[id number]       Chief complaint bilateral hydronephrosis    Subjective     Patient is a 75 y.o. female presents with bilateral hydronephrosis.  Patient has a neurogenic bladder managed with a Santos catheter.  She is also having some back pain.  Patient now presents for further evaluation of her hydronephrosis and bladder.     Review of Systems  10 point review of systems were reviewed and are negative except for:  Constitution:  positive for See HPI    History  Past Medical History:   Diagnosis Date   • Arthritis    • Cellulitis of both feet 10/13/2019   • Decubitus ulcer of buttock, stage 2 (CMS/HCC) 10/14/2019    HEALED   • Dermatitis associated with moisture 10/14/2019   • Disease of thyroid gland     HYPOTHYROID   • Edema of lower extremity 2017   • Santos catheter in place    • Hypertension    • Immobility syndrome 10/13/2019   • Lymphedema 10/14/2019    LEGS   • Muscle weakness (generalized)    • Non-pressure chronic ulcer of other part of left foot with fat layer exposed (CMS/HCC) 10/14/2019   • Non-pressure chronic ulcer of other part of right foot with fat layer exposed (CMS/HCC) 10/14/2019   • Obesity    • Pulmonary embolism (CMS/HCC)    • Stasis dermatitis 2013   • Ureteral calculi    • Venous stasis 10/14/2019   • Yeast infection of the skin      Past Surgical History:   Procedure Laterality Date   • CATARACT EXTRACTION Bilateral    • CORRECTION HAMMER TOE Bilateral    • DENTAL EXAMINATION UNDER ANESTHESIA     • JOINT REPLACEMENT      Bilateral knees   • TOE SURGERY     • TOTAL HIP ARTHROPLASTY Left 2020    Procedure: TOTAL HIP ARTHROPLASTY;  Surgeon: Baljit Hutchins II, MD;  Location: [facility] MAIN OR;  Service: Orthopedics;  Laterality: Left;     Social History     Socioeconomic History    • Marital status: Single     Spouse name: Not on file   • Number of children: Not on file   • Years of education: Not on file   • Highest education level: Not on file   Tobacco Use   • Smoking status: Never Smoker   • Smokeless tobacco: Never Used   Substance and Sexual Activity   • Alcohol use: No     Frequency: Never   • Drug use: No   • Sexual activity: Defer     Family History   Problem Relation Age of Onset   • Heart disease Mother    • Hypertension Mother    • Heart disease Father    • Hypertension Father    • Kidney disease Father    • COPD Father      Allergy  No Known Allergies  Prior to Admission medications    Medication Sig Start Date End Date Taking? Authorizing Provider   apixaban (ELIQUIS) 5 MG tablet tablet Take 2.5 mg by mouth 2 (Two) Times a Day. Last dose 8/15 am per dr devries   Yes Provider, MD Karli   B Complex Vitamins (VITAMIN B COMPLEX) capsule capsule Take 1 capsule by mouth Daily. Last dose 8/11   Yes ProviderKarli MD   levothyroxine (SYNTHROID, LEVOTHROID) 50 MCG tablet Take 0.5 tablets by mouth Daily.  Patient taking differently: Take 25 mcg by mouth Daily. TAKE PREOP 2/7/20  Yes Brinda Tony APRN   lisinopril (PRINIVIL,ZESTRIL) 40 MG tablet Take 40 mg by mouth Daily. LAST DOSE 8/17 before 1030 1/27/20  Yes ProviderKarli MD   metoprolol succinate XL (TOPROL-XL) 25 MG 24 hr tablet Take 25 mg by mouth Daily. TAKE PREOP 5/20/20  Yes ProviderKarli MD   nystatin (MYCOSTATIN) 365466 UNIT/GM powder Apply  topically to the appropriate area as directed 2 (Two) Times a Day.  Patient taking differently: Apply 1 application topically to the appropriate area as directed 2 (Two) Times a Day As Needed. DONT USE PREOP 4/23/20  Yes Eric Devries Jr., MD   traMADol (ULTRAM) 50 MG tablet Take 1 tablet by mouth Every 4 (Four) Hours As Needed for Moderate Pain . for pain  Patient taking differently: Take 50 mg by mouth Every 4 (Four) Hours As Needed for Moderate Pain .  for pain  MAY TAKE PREOP IF NEEDED 20  Yes Baljit Hutchins II, MD         Objective     tMax 24 hours:  Temp (24hrs), Av °F (36.7 °C), Min:98 °F (36.7 °C), Max:98 °F (36.7 °C)    Vital Sign Ranges:  Temp:  [98 °F (36.7 °C)] 98 °F (36.7 °C)  Heart Rate:  [61] 61  Resp:  [13] 13  BP: (108)/(57) 108/57  Intake and Output Last 3 Shifts:  No intake/output data recorded.    Physical Exam:   General Appearance: alert, appears stated age and cooperative  Head: normocephalic, without obvious abnormality and atraumatic  Eyes: lids and lashes normal, conjunctivae and sclerae normal, no icterus, no pallor and corneas clear  Lungs: respirations regular, respirations even and respirations unlabored  Heart: regular rhythm & normal rate  Abdomen: soft non-tender, no guarding and no rebound tenderness  Extremities: moves extremities well, no edema, no cyanosis and no redness  Skin: no bleeding, bruising or rash  Neurologic: Mental Status orientated to person, place, time and situation    Results Review:     Lab Results (last 24 hours)     ** No results found for the last 24 hours. **         No results found for: URINECX     Imaging Results (Last 7 Days)     ** No results found for the last 168 hours. **          Inpatient Meds:   Scheduled Meds:  sodium chloride 10 mL Intravenous Q12H      Continuous Infusions:  sodium chloride 9 mL/hr      PRN Meds:.sodium chloride  •  sodium chloride      Assessment/Plan     Active Problems:    * No active hospital problems. *    B hydronephrosis    Cystoscopy, bilateral retrograde pyelogram, possible ureteroscopy, possible stent placement.  Risks, benefits, and alternatives discussed with the patient.      I discussed the patients findings and my recommendations with patient    Shawn Hernandez MD  20  10:26

## 2020-08-18 NOTE — ANESTHESIA POSTPROCEDURE EVALUATION
Patient: Camelia Gray    Procedure Summary     Date:  08/18/20 Room / Location:  [facility] OR 06 / [facility] MAIN OR    Anesthesia Start:  1105 Anesthesia Stop:  1204    Procedure:  CYSTOSCOPY BILATERAL RETROGRADE PYELOGRAM, (Bilateral ) Diagnosis:       Unspecified hydronephrosis      (Unspecified hydronephrosis [N13.30])    Surgeon:  Shawn Hernandez MD Provider:  Vinnie Sofia MD    Anesthesia Type:  general ASA Status:  3          Anesthesia Type: general    Vitals  Vitals Value Taken Time   BP 95/51 8/18/2020 12:38 PM   Temp 97.9 °F (36.6 °C) 8/18/2020 12:36 PM   Pulse 69 8/18/2020 12:39 PM   Resp 19 8/18/2020 12:36 PM   SpO2 96 % 8/18/2020 12:39 PM   Vitals shown include unvalidated device data.        Post Anesthesia Care and Evaluation    Patient location during evaluation: PACU  Patient participation: complete - patient participated  Level of consciousness: awake  Pain scale: See nurse's notes for pain score.  Pain management: adequate  Airway patency: patent  Anesthetic complications: No anesthetic complications  PONV Status: none  Cardiovascular status: acceptable  Respiratory status: acceptable  Hydration status: acceptable    Comments: Patient seen and examined postoperatively; vital signs stable; SpO2 greater than or equal to 90%; cardiopulmonary status stable; nausea/vomiting adequately controlled; pain adequately controlled; no apparent anesthesia complications; patient discharged from anesthesia care when discharge criteria were met

## 2020-08-18 NOTE — ANESTHESIA PROCEDURE NOTES
Airway  Date/Time: 8/18/2020 11:15 AM  Airway not difficult    General Information and Staff    Patient location during procedure: OR  Anesthesiologist: Vinnie Sofia MD  CRNA: Gale Mason CRNA    Indications and Patient Condition  Indications for airway management: airway protection    Preoxygenated: yes  MILS maintained throughout  Mask difficulty assessment: 0 - not attempted    Final Airway Details  Final airway type: supraglottic airway      Successful airway: LMA  Size 4    Number of attempts at approach: 1  Assessment: lips, teeth, and gum same as pre-op

## 2020-08-18 NOTE — OP NOTE
CYSTOSCOPY URETEROSCOPY RETROGRADE PYELOGRAM HOLMIUM LASER STENT INSERTION  Procedure Report    Patient Name:  Camelia Gray  YOB: 1945    Date of Surgery:  8/18/2020     Indications: 75 old woman with urinary retention secondary to weakness and decreased mobility.  She was found to have bilateral hydronephrosis.  She has now undergoing further evaluation.    Pre-op Diagnosis:   Bilateral unspecified hydronephrosis [N13.30]       Post-Op Diagnosis Codes:     *Bilateral unspecified hydronephrosis [N13.30]    Procedure/CPT® Codes:      Procedure(s):  CYSTOSCOPY BILATERAL RETROGRADE PYELOGRAM,    Staff:  Surgeon(s):  Shawn Hernandez MD            was responsible for performing the following activities: None and their skilled assistance was necessary for the success of this case.    Anesthesia: General    Estimated Blood Loss: none    Implants:    Nothing was implanted during the procedure    Specimen:          None      Findings: Fullness to the renal pelvises bilaterally.  Calyces however were fairly sharp.  Ureters drained very well and there was no evidence for significant ureteropelvic junction obstruction.  There was also no filling defects.    Complications: None    Description of Procedure: Patient induced with general anesthesia and placed in dorsolithotomy position.  Prepped and draped in sterile fashion.  22 Bengali cystoscope was introduced under direct vision.  Urethra is within normal limits.  Bladder shows some irritation from her indwelling Santos catheter but otherwise was normal.  Both ureteral orifice visualized were normal.  Dual-lumen catheter was used to intubate the right ureteral orifice and retrograde pyelogram was performed.  This revealed a fairly normal ureter throughout without any filling defects or strictures.  There was fullness of the right renal pelvis though the calyces were fairly sharp.  The dual-lumen catheter was removed and the ureter drained rapidly.  The kidney  also was draining without any evidence for any significant narrowing at the UPJ.  It was felt that there was no obstruction on the right side.  Attention was then turned to the left side and the same procedure was performed.  Retrograde pyelogram revealed a normal ureter throughout.  Again there was some fullness of the left renal pelvis but the calyces were sharp.  Again the ureter rapidly emptied out and there and drainage could be seen from the left kidney without any area of obstruction.  Again it was decided not to place a stent to the left side either.    The cystoscope was removed and a Santos catheter was replaced.  Patient was taken out of the dorsolithotomy position and awakened from general anesthesia.  She was transported to the postanesthesia care unit in stable condition having tolerated the procedure well without any complications.    We will continue the Santos catheter until she is more mobile.  We will plan on further evaluating the kidneys with a diuretic renal flow scan in approximately 3 months.      Shawn Hernandez MD     Date: 8/18/2020  Time: 11:43

## 2020-09-12 ENCOUNTER — HOSPITAL ENCOUNTER (OUTPATIENT)
Facility: HOSPITAL | Age: 75
Discharge: HOME | End: 2020-09-13
Attending: EMERGENCY MEDICINE | Admitting: INTERNAL MEDICINE

## 2020-09-12 DIAGNOSIS — T18.108A FOREIGN BODY IN ESOPHAGUS, INITIAL ENCOUNTER: Primary | ICD-10-CM

## 2020-09-12 PROCEDURE — 99284 EMERGENCY DEPT VISIT MOD MDM: CPT

## 2020-09-12 RX ORDER — SODIUM CHLORIDE 0.9 % (FLUSH) 0.9 %
10 SYRINGE (ML) INJECTION AS NEEDED
Status: DISCONTINUED | OUTPATIENT
Start: 2020-09-12 | End: 2020-09-13 | Stop reason: HOSPADM

## 2020-09-12 RX ORDER — ONDANSETRON 2 MG/ML
8 INJECTION INTRAMUSCULAR; INTRAVENOUS ONCE
Status: COMPLETED | OUTPATIENT
Start: 2020-09-12 | End: 2020-09-13

## 2020-09-13 ENCOUNTER — ANESTHESIA EVENT (OUTPATIENT)
Dept: GASTROENTEROLOGY | Facility: HOSPITAL | Age: 75
End: 2020-09-13

## 2020-09-13 ENCOUNTER — READMISSION MANAGEMENT (OUTPATIENT)
Dept: CALL CENTER | Facility: HOSPITAL | Age: 75
End: 2020-09-13

## 2020-09-13 ENCOUNTER — ON CAMPUS - OUTPATIENT (AMBULATORY)
Dept: URBAN - METROPOLITAN AREA HOSPITAL 85 | Facility: HOSPITAL | Age: 75
End: 2020-09-13

## 2020-09-13 ENCOUNTER — ANESTHESIA (OUTPATIENT)
Dept: GASTROENTEROLOGY | Facility: HOSPITAL | Age: 75
End: 2020-09-13

## 2020-09-13 VITALS
HEIGHT: 72 IN | DIASTOLIC BLOOD PRESSURE: 63 MMHG | SYSTOLIC BLOOD PRESSURE: 100 MMHG | HEART RATE: 67 BPM | BODY MASS INDEX: 37.52 KG/M2 | WEIGHT: 277 LBS | OXYGEN SATURATION: 95 % | RESPIRATION RATE: 18 BRPM | TEMPERATURE: 99.3 F

## 2020-09-13 DIAGNOSIS — K22.2 ESOPHAGEAL OBSTRUCTION: ICD-10-CM

## 2020-09-13 DIAGNOSIS — K44.9 DIAPHRAGMATIC HERNIA WITHOUT OBSTRUCTION OR GANGRENE: ICD-10-CM

## 2020-09-13 DIAGNOSIS — K20.8 OTHER ESOPHAGITIS: ICD-10-CM

## 2020-09-13 PROBLEM — T18.108A FOREIGN BODY IN ESOPHAGUS: Status: ACTIVE | Noted: 2020-09-13

## 2020-09-13 PROCEDURE — A9270 NON-COVERED ITEM OR SERVICE: HCPCS | Performed by: INTERNAL MEDICINE

## 2020-09-13 PROCEDURE — 63710000001 METOPROLOL SUCCINATE XL 25 MG TABLET SUSTAINED-RELEASE 24 HOUR: Performed by: INTERNAL MEDICINE

## 2020-09-13 PROCEDURE — G0378 HOSPITAL OBSERVATION PER HR: HCPCS

## 2020-09-13 PROCEDURE — 96374 THER/PROPH/DIAG INJ IV PUSH: CPT

## 2020-09-13 PROCEDURE — 25010000002 GLUCAGON (HUMAN RECOMBINANT) 1 MG RECONSTITUTED SOLUTION: Performed by: EMERGENCY MEDICINE

## 2020-09-13 PROCEDURE — 25010000002 PROPOFOL 10 MG/ML EMULSION: Performed by: ANESTHESIOLOGY

## 2020-09-13 PROCEDURE — 96375 TX/PRO/DX INJ NEW DRUG ADDON: CPT

## 2020-09-13 PROCEDURE — 43450 DILATE ESOPHAGUS 1/MULT PASS: CPT | Mod: 53 | Performed by: INTERNAL MEDICINE

## 2020-09-13 PROCEDURE — 43235 EGD DIAGNOSTIC BRUSH WASH: CPT | Performed by: INTERNAL MEDICINE

## 2020-09-13 PROCEDURE — 25010000002 ONDANSETRON PER 1 MG: Performed by: EMERGENCY MEDICINE

## 2020-09-13 RX ORDER — ONDANSETRON 4 MG/1
4 TABLET, FILM COATED ORAL EVERY 6 HOURS PRN
Status: DISCONTINUED | OUTPATIENT
Start: 2020-09-13 | End: 2020-09-13 | Stop reason: HOSPADM

## 2020-09-13 RX ORDER — ONDANSETRON 2 MG/ML
4 INJECTION INTRAMUSCULAR; INTRAVENOUS EVERY 6 HOURS PRN
Status: DISCONTINUED | OUTPATIENT
Start: 2020-09-13 | End: 2020-09-13 | Stop reason: HOSPADM

## 2020-09-13 RX ORDER — SODIUM CHLORIDE 0.9 % (FLUSH) 0.9 %
10 SYRINGE (ML) INJECTION AS NEEDED
Status: DISCONTINUED | OUTPATIENT
Start: 2020-09-13 | End: 2020-09-13 | Stop reason: HOSPADM

## 2020-09-13 RX ORDER — PROPOFOL 10 MG/ML
VIAL (ML) INTRAVENOUS AS NEEDED
Status: DISCONTINUED | OUTPATIENT
Start: 2020-09-13 | End: 2020-09-13 | Stop reason: SURG

## 2020-09-13 RX ORDER — LISINOPRIL 20 MG/1
20 TABLET ORAL DAILY
Status: DISCONTINUED | OUTPATIENT
Start: 2020-09-14 | End: 2020-09-13 | Stop reason: HOSPADM

## 2020-09-13 RX ORDER — MULTIPLE VITAMINS W/ MINERALS TAB 9MG-400MCG
1 TAB ORAL DAILY
Status: ON HOLD | COMMUNITY
End: 2020-09-13

## 2020-09-13 RX ORDER — SODIUM CHLORIDE 0.9 % (FLUSH) 0.9 %
3 SYRINGE (ML) INJECTION EVERY 12 HOURS SCHEDULED
Status: DISCONTINUED | OUTPATIENT
Start: 2020-09-13 | End: 2020-09-13 | Stop reason: HOSPADM

## 2020-09-13 RX ORDER — METOPROLOL SUCCINATE 25 MG/1
12.5 TABLET, EXTENDED RELEASE ORAL DAILY
Status: DISCONTINUED | OUTPATIENT
Start: 2020-09-13 | End: 2020-09-13 | Stop reason: HOSPADM

## 2020-09-13 RX ORDER — TRAMADOL HYDROCHLORIDE 50 MG/1
50 TABLET ORAL EVERY 4 HOURS PRN
Status: DISCONTINUED | OUTPATIENT
Start: 2020-09-13 | End: 2020-09-13 | Stop reason: HOSPADM

## 2020-09-13 RX ORDER — LEVOTHYROXINE SODIUM 0.03 MG/1
25 TABLET ORAL
Status: DISCONTINUED | OUTPATIENT
Start: 2020-09-14 | End: 2020-09-13 | Stop reason: HOSPADM

## 2020-09-13 RX ORDER — LIDOCAINE HYDROCHLORIDE 20 MG/ML
INJECTION, SOLUTION EPIDURAL; INFILTRATION; INTRACAUDAL; PERINEURAL AS NEEDED
Status: DISCONTINUED | OUTPATIENT
Start: 2020-09-13 | End: 2020-09-13 | Stop reason: SURG

## 2020-09-13 RX ORDER — NYSTATIN 100000 U/G
CREAM TOPICAL 2 TIMES DAILY PRN
Status: ON HOLD | COMMUNITY
End: 2020-09-13

## 2020-09-13 RX ADMIN — PROPOFOL 100 MG: 10 INJECTION, EMULSION INTRAVENOUS at 11:24

## 2020-09-13 RX ADMIN — METOPROLOL SUCCINATE 12.5 MG: 25 TABLET, EXTENDED RELEASE ORAL at 13:28

## 2020-09-13 RX ADMIN — Medication 3 ML: at 13:29

## 2020-09-13 RX ADMIN — GLUCAGON HYDROCHLORIDE 1 MG: 1 INJECTION, POWDER, FOR SOLUTION INTRAMUSCULAR; INTRAVENOUS; SUBCUTANEOUS at 00:18

## 2020-09-13 RX ADMIN — PROPOFOL 150 MG: 10 INJECTION, EMULSION INTRAVENOUS at 11:28

## 2020-09-13 RX ADMIN — LIDOCAINE HYDROCHLORIDE 40 MG: 20 INJECTION, SOLUTION EPIDURAL; INFILTRATION; INTRACAUDAL; PERINEURAL at 11:24

## 2020-09-13 RX ADMIN — ONDANSETRON 8 MG: 2 INJECTION INTRAMUSCULAR; INTRAVENOUS at 00:18

## 2020-09-13 NOTE — ANESTHESIA POSTPROCEDURE EVALUATION
Patient: Camelia Gray    Procedure Summary     Date: 09/13/20 Room / Location: [facility] ENDOSCOPY 2 / [facility] ENDOSCOPY    Anesthesia Start: 1121 Anesthesia Stop: 1135    Procedure: ESOPHAGOGASTRODUODENOSCOPY (N/A ) Diagnosis:       Foreign body in esophagus, initial encounter      (Foreign body in esophagus, initial encounter [T18.108A])    Surgeon: Torsten Johnson MD Provider: Patricia Jimenez MD    Anesthesia Type: MAC ASA Status: 3          Anesthesia Type: MAC    Vitals  No vitals data found for the desired time range.          Post Anesthesia Care and Evaluation    Patient location during evaluation: PACU  Patient participation: complete - patient participated  Level of consciousness: awake  Pain score: 0 (See nurse's notes for pain score)  Pain management: adequate  Airway patency: patent  Anesthetic complications: No anesthetic complications  PONV Status: none  Cardiovascular status: acceptable  Respiratory status: acceptable  Hydration status: acceptable    Comments: Patient seen and examined postoperatively; vital signs stable; SpO2 greater than or equal to 90%; cardiopulmonary status stable; nausea/vomiting adequately controlled; pain adequately controlled; no apparent anesthesia complications; patient discharged from anesthesia care when discharge criteria were met

## 2020-09-13 NOTE — DISCHARGE SUMMARY
Date of Discharge:  9/13/2020    Discharge Diagnosis: 1.  Esophageal stricture 2.  Erosive esophagitis 3.  Hiatal hernia    Presenting Problem/History of Present Illness  Active Hospital Problems    Diagnosis  POA   • Foreign body in esophagus [T18.108A]  Yes      Resolved Hospital Problems   No resolved problems to display.          Hospital Course  Patient is a 75 y.o. female presented with food impaction of the esophagus after eating chicken.  She was admitted to the hospital.  Overnight she felt that the food had passed.  EGD was performed and revealed no evidence of food impaction but the patient did have significant erosive esophagitis with a peptic stricture at the GE junction and a large hiatal hernia.  The esophagus was not dilated because of inability to pass a Ye dilator.  This was thought to be related to tortuosity of the esophagus.  I decided that the patient should follow-up as an outpatient with wire-guided savory dilation.    Procedures Performed    Procedure(s):  ESOPHAGOGASTRODUODENOSCOPY  -------------------       Consults:   Consults     Date and Time Order Name Status Description    9/12/2020 6852 Gastroenterology (on-call MD unless specified) Completed           Pertinent Test Results: EGD  Condition on Discharge: Stable    Vital Signs  Temp:  [98 °F (36.7 °C)-98.4 °F (36.9 °C)] 98.3 °F (36.8 °C)  Heart Rate:  [63-84] 73  Resp:  [15-23] 23  BP: ()/(55-81) 140/60    Physical Exam:  General Appearance:    Awake and alert, in no acute distress   Head:    Normocephalic, without obvious abnormality, atraumatic   Eyes:            Conjunctivae normal, anicteric sclera   Ears:    Ears appear intact with no abnormalities noted   Throat:   No oral lesions, no thrush, oral mucosa moist   Neck:   No adenopathy, supple, no thyromegaly, no JVD   Lungs:     Clear to auscultation bilaterally, respirations regular, even and unlabored    Heart:    Regular rhythm and normal rate, normal S1 and S2, no             murmur, no gallop, no rub   Chest Wall:    No abnormalities observed   Abdomen:     Normal bowel sounds, soft, non-tender, no rebound or guarding, non-distended, no hepatosplenomegaly   Rectal:     Deferred   Extremities:   Moves all extremities well, no edema, no cyanosis, no             redness   Pulses:   Pulses palpable and equal bilaterally   Skin:   No bleeding, bruising or rash, no jaundice   Lymph nodes:   No palpable adenopathy   Neurologic:   Cranial nerves 2 - 12 grossly intact, no asterixis, sensation intact           Discharge Disposition  Discharge to previous residence    Discharge Medications     Discharge Medications      ASK your doctor about these medications      Instructions Start Date   apixaban 2.5 MG tablet tablet  Commonly known as: ELIQUIS   2.5 mg, Oral, 2 Times Daily      levothyroxine 50 MCG tablet  Commonly known as: SYNTHROID, LEVOTHROID   25 mcg, Oral, Daily      lisinopril 20 MG tablet  Commonly known as: PRINIVIL,ZESTRIL   20 mg, Oral, Daily, LAST DOSE 8/17 before 1030      metoprolol succinate XL 25 MG 24 hr tablet  Commonly known as: TOPROL-XL   12.5 mg, Oral, Daily      traMADol 50 MG tablet  Commonly known as: ULTRAM   50 mg, Oral, Every 4 Hours PRN, for pain             Discharge Diet:     Activity at Discharge:     Follow-up Appointments  Future Appointments   Date Time Provider Department Center   9/14/2020  1:00 PM Eric Vickers Jr., MD [facility] None   Resume routine activities      Test Results Pending at Discharge       Torsten Johnson MD  09/13/20  12:21 EDT

## 2020-09-13 NOTE — H&P
GI CONSULT  NOTE:      Chief complaint: Dysphagia    Subjective .     History of present illness: Camelia Gray is a 75 y.o. female with past medical history including hypertension, hypothyroidism, and pulmonary embolism on Eliquis who presented to the hospital after choking on chicken yesterday.  The patient reports that she was eating [employer] around 3 PM yesterday and felt like it got stuck about midway through her esophagus.  She attempted to help the food go down but could not swallow liquids or solids for several hours.  She reports that she feels like the food has now moved through and she no longer has a sensation of food stuck in her esophagus.  She reports mild dysphagia about once a month for about the past 6 or 8 months. No prior EGD or colonoscopy.  Denies heartburn or reflux.  Denies nausea, vomiting, or abdominal pain.  Reports that she moves her bowels about every other day and denies melena or rectal bleeding.  No unintentional weight loss.  Denies alcohol or tobacco use.    Endo History:  No prior EGD or colonoscopy    Past Medical History:  Past Medical History:   Diagnosis Date   • Arthritis    • Cellulitis of both feet 10/13/2019   • Decubitus ulcer of buttock, stage 2 (CMS/HCC) 10/14/2019    HEALED   • Dermatitis associated with moisture 10/14/2019   • Disease of thyroid gland     HYPOTHYROID   • Edema of lower extremity 5/31/2017   • Santos catheter in place    • Hypertension    • Immobility syndrome 10/13/2019   • Lymphedema 10/14/2019    LEGS   • Muscle weakness (generalized)    • Non-pressure chronic ulcer of other part of left foot with fat layer exposed (CMS/HCC) 10/14/2019   • Non-pressure chronic ulcer of other part of right foot with fat layer exposed (CMS/HCC) 10/14/2019   • Obesity    • Pulmonary embolism (CMS/HCC)    • Stasis dermatitis 7/8/2013   • Ureteral calculi    • Venous stasis 10/14/2019   • Yeast infection of the skin        Past Surgical History:  Past  Surgical History:   Procedure Laterality Date   • CATARACT EXTRACTION Bilateral    • CORRECTION HAMMER TOE Bilateral    • CYSTOSCOPY, URETEROSCOPY, RETROGRADE PYELOGRAM, STENT INSERTION Bilateral 8/18/2020    Procedure: CYSTOSCOPY BILATERAL RETROGRADE PYELOGRAM,;  Surgeon: Shawn Hernandez MD;  Location: [facility] MAIN OR;  Service: Urology;  Laterality: Bilateral;   • DENTAL EXAMINATION UNDER ANESTHESIA     • JOINT REPLACEMENT      Bilateral knees   • TOE SURGERY     • TOTAL HIP ARTHROPLASTY Left 6/12/2020    Procedure: TOTAL HIP ARTHROPLASTY;  Surgeon: Baljit Hutchins II, MD;  Location: [facility] MAIN OR;  Service: Orthopedics;  Laterality: Left;       Social History:  Social History     Tobacco Use   • Smoking status: Never Smoker   • Smokeless tobacco: Never Used   Substance Use Topics   • Alcohol use: No     Frequency: Never   • Drug use: No       Family History:  Family History   Problem Relation Age of Onset   • Heart disease Mother    • Hypertension Mother    • Heart disease Father    • Hypertension Father    • Kidney disease Father    • COPD Father        Medications:  Medications Prior to Admission   Medication Sig Dispense Refill Last Dose   • apixaban (ELIQUIS) 2.5 MG tablet tablet Take 2.5 mg by mouth 2 (Two) Times a Day.   9/12/2020 at Unknown time   • B Complex Vitamins (VITAMIN B COMPLEX) capsule capsule Take 1 capsule by mouth Daily.   9/12/2020 at Unknown time   • levothyroxine (SYNTHROID, LEVOTHROID) 50 MCG tablet Take 0.5 tablets by mouth Daily. (Patient taking differently: Take 50 mcg by mouth Daily.) 90 tablet 1 9/12/2020 at Unknown time   • lisinopril (PRINIVIL,ZESTRIL) 20 MG tablet Take 20 mg by mouth Daily. LAST DOSE 8/17 before 1030   9/12/2020 at Unknown time   • metoprolol succinate XL (TOPROL-XL) 25 MG 24 hr tablet Take 12.5 mg by mouth Daily.   9/11/2020   • Multiple Vitamins-Minerals (multivitamin with minerals) tablet tablet Take 1 tablet by mouth Daily.   9/12/2020 at Unknown  time   • nystatin (MYCOSTATIN) 225753 UNIT/GM cream Apply  topically to the appropriate area as directed 2 (Two) Times a Day As Needed. Grain   9/10/2020   • nystatin (MYCOSTATIN) 533085 UNIT/GM powder Apply  topically to the appropriate area as directed 2 (Two) Times a Day. (Patient taking differently: Apply 1 application topically to the appropriate area as directed 2 (Two) Times a Day As Needed. Groin) 60 g 3    • traMADol (ULTRAM) 50 MG tablet Take 1 tablet by mouth Every 4 (Four) Hours As Needed for Moderate Pain . for pain (Patient taking differently: Take 50 mg by mouth 5 (Five) Times a Day As Needed for Moderate Pain .) 40 tablet 0 9/12/2020       Scheduled Meds:   Continuous Infusions:   PRN Meds:.•  [COMPLETED] Insert peripheral IV **AND** sodium chloride    ALLERGIES:  Patient has no known allergies.    ROS:  The following systems were reviewed  Constitution:  No fevers, chills, no unintentional weight loss  Skin: no rash, no jaundice  Eyes:  No blurry vision, no eye pain  HENT:  No change in hearing or smell  Resp:  No dyspnea or cough  CV:  No chest pain or palpitations  :   dysuria, hematuria  Musculoskeletal:  No leg cramps or arthralgias  Neuro:  No tremor, no numbness  Psych:  No depression or confusion    Objective  Resting in bed, NAD, no family present    Vital Signs:   Vitals:    09/13/20 0027 09/13/20 0230 09/13/20 0550 09/13/20 0857   BP: 153/78 149/79 94/55 122/71   BP Location:  Left arm Right arm Right arm   Patient Position:  Lying Lying Lying   Pulse:  66 74 63   Resp:  16 15 15   Temp:  98 °F (36.7 °C) 98.1 °F (36.7 °C) 98.3 °F (36.8 °C)   TempSrc:  Oral Oral Oral   SpO2: 95% 97% 97% 95%   Weight:       Height:           Physical Exam:     General Appearance:    Awake and alert, in no acute distress, obese   Head:    Normocephalic, without obvious abnormality, atraumatic   Throat:   No oral lesions, no thrush, oral mucosa moist   Lungs:     Respirations regular, even and unlabored    Chest Wall:    No abnormalities observed   Abdomen:     Soft, non-tender, no rebound or guarding, non-distended, no hepatosplenomegaly   Rectal:     Deferred   Extremities:   Moves all extremities, no edema, no cyanosis   Pulses:   Pulses palpable and equal bilaterally   Skin:   No rash, no jaundice, normal palpation       Neurologic:   Cranial nerves 2 - 12 grossly intact       Results Review:   I reviewed the patient's labs and imaging.  CBC      CMP     Cr Clearance CrCl cannot be calculated (Patient's most recent lab result is older than the maximum 30 days allowed.).  Coag     HbA1C No results found for: HGBA1C  Blood Glucose No results found for: POCGLU  Infection     UA      Radiology(recent) No radiology results for the last day       ASSESSMENT    -Dysphagia/foreign body in esophagus  -Bilateral hydronephrosis  -History of pulmonary embolism on Eliquis  -HTN  -Hypothyroidism      PLAN  Patient reports that she was eating [employer] yesterday around 3 PM and felt like a piece of chicken got stuck.  Reports last dose of Eliquis was yesterday morning.  We will proceed with EGD today  Further recommendations to follow endoscopy findings  Urology following and planning for cystoscopy with possible stent placement  Supportive care      I discussed the patients findings and my recommendations with the patient.    We appreciate the referral    Migdalia Mares, APRN  09/13/20  10:56 EDT

## 2020-09-13 NOTE — PLAN OF CARE
Goal Outcome Evaluation:         Patient is a new admit from Emergency room for impaired swallowing. Dr Johnson will be doing an EGD today. Denied any shortness of air or pain during this documentation.

## 2020-09-13 NOTE — ED PROVIDER NOTES
Subjective   Chief complaint chicken stuck in my throat    History of present illness 75-year-old female states she got choked on some chicken about 3:00 330 this afternoon and she states that it will not go down and she is not able to swallow any liquids.  She states every time drinks water just comes back up there is no shortness of breath she feels like it stuck right in middle of her chest she has had issues with things getting stuck before but they usually passed through she is never had endoscopy or some esophageal stretching.  Denies any vomiting denies any cough congestion or recent illness.  She states that she had been in rehab she had some hip surgery she been in rehab she is had a pulmonary embolism she is currently on Eliquis and she has been tested for COVID-19 3 times the last 1 week ago was negative.          Review of Systems   Constitutional: Negative for chills and fever.   HENT: Negative for congestion and sinus pressure.    Respiratory: Negative for cough, chest tightness and shortness of breath.    Cardiovascular: Negative for chest pain and leg swelling.   Gastrointestinal: Positive for nausea. Negative for abdominal pain.   Musculoskeletal: Positive for arthralgias. Negative for back pain.   Skin: Negative for color change and pallor.   Neurological: Negative for dizziness and light-headedness.   Psychiatric/Behavioral: Negative for agitation and behavioral problems.       Past Medical History:   Diagnosis Date   • Arthritis    • Cellulitis of both feet 10/13/2019   • Decubitus ulcer of buttock, stage 2 (CMS/HCC) 10/14/2019    HEALED   • Dermatitis associated with moisture 10/14/2019   • Disease of thyroid gland     HYPOTHYROID   • Edema of lower extremity 5/31/2017   • Santos catheter in place    • Hypertension    • Immobility syndrome 10/13/2019   • Lymphedema 10/14/2019    LEGS   • Muscle weakness (generalized)    • Non-pressure chronic ulcer of other part of left foot with fat layer  exposed (CMS/[facility]) 10/14/2019   • Non-pressure chronic ulcer of other part of right foot with fat layer exposed (CMS/[facility]) 10/14/2019   • Obesity    • Pulmonary embolism (CMS/[facility])    • Stasis dermatitis 7/8/2013   • Ureteral calculi    • Venous stasis 10/14/2019   • Yeast infection of the skin        No Known Allergies    Past Surgical History:   Procedure Laterality Date   • CATARACT EXTRACTION Bilateral    • CORRECTION HAMMER TOE Bilateral    • CYSTOSCOPY, URETEROSCOPY, RETROGRADE PYELOGRAM, STENT INSERTION Bilateral 8/18/2020    Procedure: CYSTOSCOPY BILATERAL RETROGRADE PYELOGRAM,;  Surgeon: Shawn Hernandez MD;  Location: [facility] MAIN OR;  Service: Urology;  Laterality: Bilateral;   • DENTAL EXAMINATION UNDER ANESTHESIA     • JOINT REPLACEMENT      Bilateral knees   • TOE SURGERY     • TOTAL HIP ARTHROPLASTY Left 6/12/2020    Procedure: TOTAL HIP ARTHROPLASTY;  Surgeon: Baljit Hutchins II, MD;  Location: [facility] MAIN OR;  Service: Orthopedics;  Laterality: Left;       Family History   Problem Relation Age of Onset   • Heart disease Mother    • Hypertension Mother    • Heart disease Father    • Hypertension Father    • Kidney disease Father    • COPD Father        Social History     Socioeconomic History   • Marital status: Single     Spouse name: Not on file   • Number of children: Not on file   • Years of education: Not on file   • Highest education level: Not on file   Tobacco Use   • Smoking status: Never Smoker   • Smokeless tobacco: Never Used   Substance and Sexual Activity   • Alcohol use: No     Frequency: Never   • Drug use: No   • Sexual activity: Defer     Prior to Admission medications    Medication Sig Start Date End Date Taking? Authorizing Provider   apixaban (ELIQUIS) 5 MG tablet tablet Take 2.5 mg by mouth 2 (Two) Times a Day. Last dose 8/15 am per Karli Cox MD   B Complex Vitamins (VITAMIN B COMPLEX) capsule capsule Take 1 capsule by mouth Daily. Last dose 8/11     Karli Cheney MD   ciprofloxacin (Cipro) 500 MG tablet Take 1 tablet by mouth 2 (Two) Times a Day. 8/18/20   Shawn Hernandez MD   levothyroxine (SYNTHROID, LEVOTHROID) 50 MCG tablet Take 0.5 tablets by mouth Daily.  Patient taking differently: Take 25 mcg by mouth Daily. TAKE PREOP 2/7/20   Brinda Tony APRN   lisinopril (PRINIVIL,ZESTRIL) 40 MG tablet Take 40 mg by mouth Daily. LAST DOSE 8/17 before 1030 1/27/20   Karli Cheney MD   metoprolol succinate XL (TOPROL-XL) 25 MG 24 hr tablet Take 25 mg by mouth Daily. TAKE PREOP 5/20/20   Karli Cheney MD   nystatin (MYCOSTATIN) 032679 UNIT/GM powder Apply  topically to the appropriate area as directed 2 (Two) Times a Day.  Patient taking differently: Apply 1 application topically to the appropriate area as directed 2 (Two) Times a Day As Needed. DONT USE PREOP 4/23/20   Eric Vickers Jr., MD   traMADol (ULTRAM) 50 MG tablet Take 1 tablet by mouth Every 4 (Four) Hours As Needed for Moderate Pain . for pain  Patient taking differently: Take 50 mg by mouth Every 4 (Four) Hours As Needed for Moderate Pain . for pain  MAY TAKE PREOP IF NEEDED 6/14/20   Baljit Hutchins II, MD           Objective   Physical Exam  75-year-old awake alert no acute distress HEENT extraocular muscles intact pupils equal round reactive mouth is clear no stridor no drooling lungs clear no retractions heart regular without murmur abdomen soft nontender no mass extremities no edema cords or Homans sign she is awake alert follows commands motor strength and without focal weakness there is no airway compromise the patient is unable to swallow water it comes right back up is not drooling currently.  Procedures           ED Course        No radiology results for the last day  Medications   sodium chloride 0.9 % flush 10 mL (has no administration in time range)   glucagon (human recombinant) (GLUCAGEN DIAGNOSTIC) injection 1 mg (1 mg Intravenous Given 9/13/20  0018)   ondansetron (ZOFRAN) injection 8 mg (8 mg Intravenous Given 9/13/20 0018)                                            MDM  Number of Diagnoses or Management Options  Foreign body in esophagus, initial encounter:   Diagnosis management comments: Medical decision making.  Patient IV established placed on the monitor and had the above exam evaluation given Zofran 8 mg IV and glucagon 1 mg IV but really no significant change patient's not drooling into the bag which she is unable to swallow water she is stable there is no airway compromise there is no airway obstruction.  The patient looks well she is stable she was made aware of the findings I talked to Dr. Johnson who will scope her in the morning and should be placed in observation stable otherwise unremarkable ER course      Final diagnoses:   Foreign body in esophagus, initial encounter            George Botello MD  09/13/20 0026

## 2020-09-13 NOTE — ANESTHESIA PREPROCEDURE EVALUATION
Anesthesia Evaluation     no history of anesthetic complications:  NPO Solid Status: > 8 hours  NPO Liquid Status: > 8 hours           Airway   Mallampati: II  TM distance: >3 FB  Neck ROM: full  No difficulty expected  Dental    (+) edentulous    Pulmonary - normal exam   (+) pulmonary embolism,   Cardiovascular - normal exam    PT is on anticoagulation therapy    (+) hypertension, DVT resolved,     ROS comment: Discontinued eliquis 8/14  LVEF 60%    Neuro/Psych  GI/Hepatic/Renal/Endo    (+) morbid obesity,      Musculoskeletal     Abdominal   (+) obese,    Substance History      OB/GYN          Other   arthritis,                        Anesthesia Plan    ASA 3     MAC     intravenous induction     Anesthetic plan, all risks, benefits, and alternatives have been provided, discussed and informed consent has been obtained with: patient.

## 2020-09-13 NOTE — OUTREACH NOTE
Prep Survey      Responses   [facility]?  [facility]   Is LACE score < 7 ?  Yes   Eligibility  [facility]  [facility]   Date of Admission  09/12/20   Date of Discharge  09/13/20   Discharge Disposition  Home or Self Care   Discharge diagnosis    Esophageal stricture, Erosive esophagitis    COVID-19 Test Status  Not tested   Does the patient have one of the following disease processes/diagnoses(primary or secondary)?  Other   Does the patient have Home health ordered?  No   Is there a DME ordered?  No   Prep survey completed?  Yes          Maxine Hernandez RN

## 2020-09-13 NOTE — OP NOTE
ESOPHAGOGASTRODUODENOSCOPY Procedure Report    Patient Name:  Camelia Gray  YOB: 1945    Date of Surgery:  9/13/2020     Pre-Op Diagnosis:  Foreign body in esophagus, initial encounter [T18.108A]    Post-Op Diagnosis:  1.  Esophageal stricture  2.  Erosive esophagitis  3.  Large hiatal hernia       Procedure/CPT® Codes:      Procedure(s):  ESOPHAGOGASTRODUODENOSCOPY    Staff:  Surgeon(s):  Torsten Johnson MD      Anesthesia: General    Description of Procedure:  A description of the procedure as well as risks, benefits and alternative methods were explained to the patient who voiced understanding and signed the corresponding consent form. A physical exam was performed and vital signs were monitored throughout the procedure.    After informed consent was obtained, the patient was placed in the left lateral decubitus position and sedated as above.  The Olympus video gastroscope was inserted into the oropharynx and the esophagus was intubated without difficulty.  Examination of the esophagus, stomach, pylorus, duodenal bulb, and second portion of the duodenum was performed without difficulty. The scope was then retroflexed and the fundus was visualized. The procedure was not difficult and there were no immediate complications.  There was no blood loss.    Findings:  Esophagus: No food noted in the esophagus.  Stricture noted at the GE junction which was peptic in appearance.  There was circumferential ulceration consistent with erosive esophagitis.  There was a large hiatal hernia.  I attempted to pass a 42 [place]inese Ye dilator without success.  I felt that this was related to tortuosity of the esophagus.  I then attempted to pass a 36 [place]inese Ye dilator but was again unsuccessful.  Therefore attempts to dilate the esophagus were aborted.  Stomach: Normal  Duodenum: Normal    Impression:  Esophageal stricture.  Erosive esophagitis.  Hiatal hernia.    Recommendations:  Proton pump inhibitor  daily.  Soft diet.  EGD in 1 to 2 months with savory wire-guided dilation.      Torsten Johnson MD     Date: 9/13/2020    Time: 11:48 EDT      .

## 2020-09-14 ENCOUNTER — TELEMEDICINE (OUTPATIENT)
Dept: FAMILY MEDICINE CLINIC | Facility: CLINIC | Age: 75
End: 2020-09-14

## 2020-09-14 ENCOUNTER — TRANSITIONAL CARE MANAGEMENT TELEPHONE ENCOUNTER (OUTPATIENT)
Dept: CALL CENTER | Facility: HOSPITAL | Age: 75
End: 2020-09-14

## 2020-09-14 VITALS
DIASTOLIC BLOOD PRESSURE: 81 MMHG | HEART RATE: 79 BPM | OXYGEN SATURATION: 92 % | SYSTOLIC BLOOD PRESSURE: 132 MMHG | WEIGHT: 277 LBS | BODY MASS INDEX: 37.57 KG/M2

## 2020-09-14 DIAGNOSIS — D68.59 THROMBOPHILIA (HCC): ICD-10-CM

## 2020-09-14 DIAGNOSIS — L97.512 NON-PRESSURE CHRONIC ULCER OF OTHER PART OF RIGHT FOOT WITH FAT LAYER EXPOSED (HCC): ICD-10-CM

## 2020-09-14 DIAGNOSIS — L97.522 NON-PRESSURE CHRONIC ULCER OF OTHER PART OF LEFT FOOT WITH FAT LAYER EXPOSED (HCC): ICD-10-CM

## 2020-09-14 DIAGNOSIS — M19.91 PRIMARY OSTEOARTHRITIS, UNSPECIFIED SITE: ICD-10-CM

## 2020-09-14 DIAGNOSIS — I10 ESSENTIAL HYPERTENSION: Primary | ICD-10-CM

## 2020-09-14 DIAGNOSIS — N30.01 ACUTE CYSTITIS WITH HEMATURIA: ICD-10-CM

## 2020-09-14 DIAGNOSIS — K22.2 ESOPHAGEAL STRICTURE: ICD-10-CM

## 2020-09-14 DIAGNOSIS — E66.01 MORBID OBESITY (HCC): ICD-10-CM

## 2020-09-14 PROBLEM — Z91.89 AT RISK FOR SEPSIS DUE TO URINARY TRACT INFECTION: Status: RESOLVED | Noted: 2020-07-17 | Resolved: 2020-09-14

## 2020-09-14 PROBLEM — T18.108A FOREIGN BODY IN ESOPHAGUS: Status: RESOLVED | Noted: 2020-09-13 | Resolved: 2020-09-14

## 2020-09-14 PROBLEM — N39.0 AT RISK FOR SEPSIS DUE TO URINARY TRACT INFECTION: Status: RESOLVED | Noted: 2020-07-17 | Resolved: 2020-09-14

## 2020-09-14 PROBLEM — M19.90 OSTEOARTHRITIS: Status: ACTIVE | Noted: 2020-09-14

## 2020-09-14 PROCEDURE — 99214 OFFICE O/P EST MOD 30 MIN: CPT | Performed by: FAMILY MEDICINE

## 2020-09-14 RX ORDER — LEVOTHYROXINE SODIUM 0.05 MG/1
50 TABLET ORAL DAILY
Status: SHIPPED
Start: 2020-09-14 | End: 2020-10-30

## 2020-09-14 RX ORDER — PANTOPRAZOLE SODIUM 40 MG/1
40 TABLET, DELAYED RELEASE ORAL DAILY
Qty: 90 TABLET | Refills: 1 | Status: SHIPPED | OUTPATIENT
Start: 2020-09-14

## 2020-09-14 NOTE — PROGRESS NOTES
Subjective   Camelia Gray is a 75 y.o. female.     Chief Complaint   Patient presents with   • Hypertension   • Hypothyroidism   • Osteoarthritis   • Immobility       HPI  Chief complaint : Hypertension, UTI, Immobility syndrome, Arthritis    The patient is a 75-year-old white female seen by video visit for follow-up and maintenance of her current problems.  The patient gave permission to be seen by video visit.    Her current problems include    1.  Hypertension-stable-patient on lisinopril 20 mg daily and metoprolol 12.5 mg daily.  She denied headache lightheadedness dizziness or chest    2.  Esophageal stricture-new-the patient was in the hospital over the weekend because of dysphagia and foreign body stuck in the food tube.  The patient underwent upper GI endoscopy.  She was found to have an esophageal stricture.  The patient was not found to have a foreign body.  She is not on a proton pump inhibitor.  I recommended that she start taking Protonix.    3.  Osteoarthritis-stable-patient on tramadol 50 mg up to 4 times a day as needed.  She has arthritis of the hips and knees.  She is recovering well.    4.  Immobility syndrome-stable-patient is currently going to physical therapy.  She is spending time in rehab for strengthening conditioning.  She is now back at home.  She is getting along better.    5.  Hypothyroidism-stable-patient on Synthroid 0.05 mg daily up from 1.25 mg daily.  She denied heat or cold intolerance tremor or weight gain or weight loss.    6.  Recurrent urinary tract infections-stable-patient has had a Santos catheter up until the past couple of days.  She has had this removed.  She is being followed by urology.    7.  Thrombophilia-stable-patient on Eliquis 2.5 mg twice a day.  She denies chest pain shortness of breath orthopnea or PND.    8.  Pressure sores-stable-knees have healed.  She states her skin is with no breakdown at the present time.      The following portions of the patient's  history were reviewed and updated as appropriate: allergies, current medications, past family history, past medical history, past social history, past surgical history and problem list.    Review of Systems   Constitutional: Negative for chills and fever.   HENT: Negative for congestion, sinus pressure and swollen glands.    Eyes: Negative for blurred vision and pain.   Respiratory: Negative for cough and shortness of breath.    Cardiovascular: Negative for chest pain and leg swelling.   Gastrointestinal: Negative for abdominal pain, nausea and indigestion.   Endocrine: Negative for cold intolerance, heat intolerance, polydipsia, polyphagia and polyuria.   Skin: Negative for dry skin, rash and bruise.   Neurological: Negative for dizziness, syncope and headache.   Psychiatric/Behavioral: Negative for dysphoric mood and stress.       Objective     /81   Pulse 79   Wt 126 kg (277 lb)   SpO2 92%   BMI 37.57 kg/m²     Physical Exam   Constitutional:   obese   HENT:   Head: Normocephalic and atraumatic.   Neck: Neck normal appearance.  Pulmonary/Chest: Effort normal.   Neurological: She is alert.   Skin: Skin is warm and dry.   Vitals reviewed.         Assessment/Plan   Camelia was seen today for hypertension, hypothyroidism, osteoarthritis and immobility.    Diagnoses and all orders for this visit:    Essential hypertension    Morbid obesity (CMS/HCC)    Thrombophilia (CMS/HCC)    Non-pressure chronic ulcer of other part of right foot with fat layer exposed (CMS/HCC)    Primary osteoarthritis, unspecified site    Non-pressure chronic ulcer of other part of left foot with fat layer exposed (CMS/HCC)    Acute cystitis with hematuria    Esophageal stricture    Other orders  -     pantoprazole (Protonix) 40 MG EC tablet; Take 1 tablet by mouth Daily.  -     levothyroxine (SYNTHROID, LEVOTHROID) 50 MCG tablet; Take 1 tablet by mouth Daily.      Patient Instructions   Continue your current medications and  treatment.    Start taking the Protonix once a day.    Follow up in the offcie in 3 months.    Laboratory testing at that time.            Eric Vickers Jr., MD    09/14/20

## 2020-09-14 NOTE — PROGRESS NOTES
Case Management Discharge Note      Final Note: Routine discharge home.         Selected Continued Care - Discharged on 9/13/2020 Admission date: 9/12/2020 - Discharge disposition: Home or Self Care                       Final Discharge Disposition Code: 01 - home or self-care

## 2020-09-14 NOTE — OUTREACH NOTE
Call Center TCM Note      Responses   [facility] patient discharged from?  [facility]   COVID-19 Test Status  Not tested   Does the patient have one of the following disease processes/diagnoses(primary or secondary)?  Other   TCM attempt successful?  No   Revoked Reason  Other [Patient has a completed TCM appt with Dr. Vickers for today.]          Liana Oglesby RN    9/14/2020, 13:13 EDT

## 2020-09-14 NOTE — PATIENT INSTRUCTIONS
Continue your current medications and treatment.    Start taking the Protonix once a day.    Follow up in the offcie in 3 months.    Laboratory testing at that time.

## 2020-09-15 ENCOUNTER — TELEPHONE (OUTPATIENT)
Dept: FAMILY MEDICINE CLINIC | Facility: CLINIC | Age: 75
End: 2020-09-15

## 2020-09-15 DIAGNOSIS — Z13.9 ENCOUNTER FOR HEALTH-RELATED SCREENING: Primary | ICD-10-CM

## 2020-09-18 ENCOUNTER — DOCUMENTATION (OUTPATIENT)
Dept: FAMILY MEDICINE CLINIC | Facility: CLINIC | Age: 75
End: 2020-09-18

## 2020-09-18 ENCOUNTER — TELEPHONE (OUTPATIENT)
Dept: FAMILY MEDICINE CLINIC | Facility: CLINIC | Age: 75
End: 2020-09-18

## 2020-09-18 RX ORDER — LISINOPRIL 20 MG/1
20 TABLET ORAL DAILY
Qty: 90 TABLET | Refills: 1 | Status: SHIPPED | OUTPATIENT
Start: 2020-09-18 | End: 2020-09-29

## 2020-09-19 NOTE — PROGRESS NOTES
I spoke with the patient...  She requested a refill of Lisinopril...  I sent it to the pharmacy...

## 2020-09-28 RX ORDER — LEVOTHYROXINE SODIUM 0.03 MG/1
TABLET ORAL
Qty: 90 TABLET | Refills: 0 | Status: SHIPPED | OUTPATIENT
Start: 2020-09-28 | End: 2020-12-17

## 2020-09-29 ENCOUNTER — OFFICE VISIT (OUTPATIENT)
Dept: SURGERY | Facility: CLINIC | Age: 75
End: 2020-09-29

## 2020-09-29 ENCOUNTER — PREP FOR SURGERY (OUTPATIENT)
Dept: OTHER | Facility: HOSPITAL | Age: 75
End: 2020-09-29

## 2020-09-29 VITALS
DIASTOLIC BLOOD PRESSURE: 87 MMHG | SYSTOLIC BLOOD PRESSURE: 145 MMHG | WEIGHT: 275 LBS | BODY MASS INDEX: 37.25 KG/M2 | TEMPERATURE: 97.1 F | HEIGHT: 72 IN | OXYGEN SATURATION: 96 % | HEART RATE: 76 BPM

## 2020-09-29 DIAGNOSIS — K82.8 PORCELAIN GALLBLADDER: Primary | ICD-10-CM

## 2020-09-29 PROCEDURE — 99213 OFFICE O/P EST LOW 20 MIN: CPT | Performed by: SURGERY

## 2020-09-29 RX ORDER — SODIUM CHLORIDE 9 MG/ML
100 INJECTION, SOLUTION INTRAVENOUS CONTINUOUS
Status: CANCELLED | OUTPATIENT
Start: 2020-09-29

## 2020-09-29 RX ORDER — CEFAZOLIN SODIUM IN 0.9 % NACL 3 G/100 ML
3 INTRAVENOUS SOLUTION, PIGGYBACK (ML) INTRAVENOUS ONCE
Status: CANCELLED | OUTPATIENT
Start: 2020-09-29 | End: 2020-09-29

## 2020-09-29 RX ORDER — LISINOPRIL 40 MG/1
40 TABLET ORAL DAILY
COMMUNITY
Start: 2020-07-21 | End: 2020-10-30

## 2020-09-29 NOTE — H&P
Subjective   Camelia Gray is a 75 y.o. female.     History of present illness  Ms. Gray is seen in the office today follow-up from a visit in the hospital back in July when we were consulted for porcelain gallbladder.  At that time we did not feel her gallbladder was the cause of her problems and recommended that she consider having it removed electively at a later date.  She is here today to discuss that.  She still in rehab from a left hip replacement earlier this summer.  She also has recently had an EGD with dilatation by Dr. Johnson.  Has several other EGDs that are planned in the near future.  She would like to wait to have her gallbladder surgery until the second week of November if possible.    In the office today we discussed a cholecystectomy in detail.  We have gone over a booklet outlining the procedure and risks.  She understands those accepts those and wishes to proceed.    Past Medical History:   Diagnosis Date   • Arthritis    • Cellulitis of both feet 10/13/2019   • Decubitus ulcer of buttock, stage 2 (CMS/HCC) 10/14/2019    HEALED   • Dermatitis associated with moisture 10/14/2019   • Disease of thyroid gland     HYPOTHYROID   • Edema of lower extremity 5/31/2017   • Santos catheter in place    • Hypertension    • Immobility syndrome 10/13/2019   • Lymphedema 10/14/2019    LEGS   • Muscle weakness (generalized)    • Non-pressure chronic ulcer of other part of left foot with fat layer exposed (CMS/HCC) 10/14/2019   • Non-pressure chronic ulcer of other part of right foot with fat layer exposed (CMS/HCC) 10/14/2019   • Obesity    • Pulmonary embolism (CMS/HCC)    • Stasis dermatitis 7/8/2013   • Ureteral calculi    • Venous stasis 10/14/2019   • Yeast infection of the skin        Past Surgical History:   Procedure Laterality Date   • CATARACT EXTRACTION Bilateral    • CORRECTION HAMMER TOE Bilateral    • CYSTOSCOPY, URETEROSCOPY, RETROGRADE PYELOGRAM, STENT INSERTION Bilateral 8/18/2020     Procedure: CYSTOSCOPY BILATERAL RETROGRADE PYELOGRAM,;  Surgeon: Shawn Hernandez MD;  Location: [facility] MAIN OR;  Service: Urology;  Laterality: Bilateral;   • DENTAL EXAMINATION UNDER ANESTHESIA     • ENDOSCOPY N/A 9/13/2020    Procedure: ESOPHAGOGASTRODUODENOSCOPY;  Surgeon: Torsten Johnson MD;  Location: [facility] ENDOSCOPY;  Service: Gastroenterology;  Laterality: N/A;  post: esophageal stricture, reflux esophagitis, hiatal hernia   • JOINT REPLACEMENT      Bilateral knees   • TOE SURGERY     • TOTAL HIP ARTHROPLASTY Left 6/12/2020    Procedure: TOTAL HIP ARTHROPLASTY;  Surgeon: Baljit Hutchins II, MD;  Location: [facility] MAIN OR;  Service: Orthopedics;  Laterality: Left;       Outpatient Encounter Medications as of 9/29/2020   Medication Sig Dispense Refill   • apixaban (ELIQUIS) 2.5 MG tablet tablet Take 2.5 mg by mouth 2 (Two) Times a Day.     • levothyroxine (SYNTHROID, LEVOTHROID) 25 MCG tablet TAKE ONE TABLET BY MOUTH EVERY DAY 90 tablet 0   • levothyroxine (SYNTHROID, LEVOTHROID) 50 MCG tablet Take 1 tablet by mouth Daily.     • lisinopril (PRINIVIL,ZESTRIL) 40 MG tablet Take 40 mg by mouth.     • metoprolol succinate XL (TOPROL-XL) 25 MG 24 hr tablet Take 12.5 mg by mouth Daily.     • pantoprazole (Protonix) 40 MG EC tablet Take 1 tablet by mouth Daily. 90 tablet 1   • traMADol (ULTRAM) 50 MG tablet Take 1 tablet by mouth Every 4 (Four) Hours As Needed for Moderate Pain . for pain (Patient taking differently: Take 50 mg by mouth 5 (Five) Times a Day As Needed for Moderate Pain .) 40 tablet 0   • [DISCONTINUED] lisinopril (PRINIVIL,ZESTRIL) 20 MG tablet Take 1 tablet by mouth Daily. LAST DOSE 8/17 before 1030 90 tablet 1     No facility-administered encounter medications on file as of 9/29/2020.        No Known Allergies    Family History   Problem Relation Age of Onset   • Heart disease Mother    • Hypertension Mother    • Heart disease Father    • Hypertension Father    • Kidney disease  Father    • COPD Father        Social History     Socioeconomic History   • Marital status: Single     Spouse name: Not on file   • Number of children: Not on file   • Years of education: Not on file   • Highest education level: Not on file   Tobacco Use   • Smoking status: Never Smoker   • Smokeless tobacco: Never Used   Substance and Sexual Activity   • Alcohol use: No     Frequency: Never   • Drug use: No   • Sexual activity: Defer       The following portions of the patient's history were reviewed and updated as appropriate: allergies, current medications, past family history, past medical history, past social history, past surgical history and problem list.    Objective       Assessment/Plan   There are no diagnoses linked to this encounter.    All systems are reviewed and unremarkable with exception of the chief complaint and the above-noted specific exceptions.    Physical exam shows a pleasant obese 75-year-old female.  HEENT is negative.  Heart regular.  Lungs clear.  Abdomen soft nontender without mass.  Extremities with equal range of motion and usage.  She has a left hip prosthesis.  Neuro shows no obvious focal deficit.    Impression: Porcelain gallbladder    Recommendation: [facility].           George Crocekr DO  9/29/2020  10:18 EDT

## 2020-09-29 NOTE — PROGRESS NOTES
Subjective   Camelia Gray is a 75 y.o. female.     History of present illness  Ms. Grya is seen in the office today follow-up from a visit in the hospital back in July when we were consulted for porcelain gallbladder.  At that time we did not feel her gallbladder was the cause of her problems and recommended that she consider having it removed electively at a later date.  She is here today to discuss that.  She still in rehab from a left hip replacement earlier this summer.  She also has recently had an EGD with dilatation by Dr. Johnson.  Has several other EGDs that are planned in the near future.  She would like to wait to have her gallbladder surgery until the second week of November if possible.    In the office today we discussed a cholecystectomy in detail.  We have gone over a booklet outlining the procedure and risks.  She understands those accepts those and wishes to proceed.    Past Medical History:   Diagnosis Date   • Arthritis    • Cellulitis of both feet 10/13/2019   • Decubitus ulcer of buttock, stage 2 (CMS/HCC) 10/14/2019    HEALED   • Dermatitis associated with moisture 10/14/2019   • Disease of thyroid gland     HYPOTHYROID   • Edema of lower extremity 5/31/2017   • Santos catheter in place    • Hypertension    • Immobility syndrome 10/13/2019   • Lymphedema 10/14/2019    LEGS   • Muscle weakness (generalized)    • Non-pressure chronic ulcer of other part of left foot with fat layer exposed (CMS/HCC) 10/14/2019   • Non-pressure chronic ulcer of other part of right foot with fat layer exposed (CMS/HCC) 10/14/2019   • Obesity    • Pulmonary embolism (CMS/HCC)    • Stasis dermatitis 7/8/2013   • Ureteral calculi    • Venous stasis 10/14/2019   • Yeast infection of the skin        Past Surgical History:   Procedure Laterality Date   • CATARACT EXTRACTION Bilateral    • CORRECTION HAMMER TOE Bilateral    • CYSTOSCOPY, URETEROSCOPY, RETROGRADE PYELOGRAM, STENT INSERTION Bilateral 8/18/2020     Procedure: CYSTOSCOPY BILATERAL RETROGRADE PYELOGRAM,;  Surgeon: Shawn Hernandez MD;  Location: [facility] MAIN OR;  Service: Urology;  Laterality: Bilateral;   • DENTAL EXAMINATION UNDER ANESTHESIA     • ENDOSCOPY N/A 9/13/2020    Procedure: ESOPHAGOGASTRODUODENOSCOPY;  Surgeon: Torsten Johnson MD;  Location: [facility] ENDOSCOPY;  Service: Gastroenterology;  Laterality: N/A;  post: esophageal stricture, reflux esophagitis, hiatal hernia   • JOINT REPLACEMENT      Bilateral knees   • TOE SURGERY     • TOTAL HIP ARTHROPLASTY Left 6/12/2020    Procedure: TOTAL HIP ARTHROPLASTY;  Surgeon: Baljit Hutchins II, MD;  Location: [facility] MAIN OR;  Service: Orthopedics;  Laterality: Left;       Outpatient Encounter Medications as of 9/29/2020   Medication Sig Dispense Refill   • apixaban (ELIQUIS) 2.5 MG tablet tablet Take 2.5 mg by mouth 2 (Two) Times a Day.     • levothyroxine (SYNTHROID, LEVOTHROID) 25 MCG tablet TAKE ONE TABLET BY MOUTH EVERY DAY 90 tablet 0   • levothyroxine (SYNTHROID, LEVOTHROID) 50 MCG tablet Take 1 tablet by mouth Daily.     • lisinopril (PRINIVIL,ZESTRIL) 40 MG tablet Take 40 mg by mouth.     • metoprolol succinate XL (TOPROL-XL) 25 MG 24 hr tablet Take 12.5 mg by mouth Daily.     • pantoprazole (Protonix) 40 MG EC tablet Take 1 tablet by mouth Daily. 90 tablet 1   • traMADol (ULTRAM) 50 MG tablet Take 1 tablet by mouth Every 4 (Four) Hours As Needed for Moderate Pain . for pain (Patient taking differently: Take 50 mg by mouth 5 (Five) Times a Day As Needed for Moderate Pain .) 40 tablet 0   • [DISCONTINUED] lisinopril (PRINIVIL,ZESTRIL) 20 MG tablet Take 1 tablet by mouth Daily. LAST DOSE 8/17 before 1030 90 tablet 1     No facility-administered encounter medications on file as of 9/29/2020.        No Known Allergies    Family History   Problem Relation Age of Onset   • Heart disease Mother    • Hypertension Mother    • Heart disease Father    • Hypertension Father    • Kidney disease  Father    • COPD Father        Social History     Socioeconomic History   • Marital status: Single     Spouse name: Not on file   • Number of children: Not on file   • Years of education: Not on file   • Highest education level: Not on file   Tobacco Use   • Smoking status: Never Smoker   • Smokeless tobacco: Never Used   Substance and Sexual Activity   • Alcohol use: No     Frequency: Never   • Drug use: No   • Sexual activity: Defer       The following portions of the patient's history were reviewed and updated as appropriate: allergies, current medications, past family history, past medical history, past social history, past surgical history and problem list.    Objective       Assessment/Plan   There are no diagnoses linked to this encounter.    All systems are reviewed and unremarkable with exception of the chief complaint and the above-noted specific exceptions.    Physical exam shows a pleasant obese 75-year-old female.  HEENT is negative.  Heart regular.  Lungs clear.  Abdomen soft nontender without mass.  Extremities with equal range of motion and usage.  She has a left hip prosthesis.  Neuro shows no obvious focal deficit.    Impression: Porcelain gallbladder    Recommendation: [facility].           George Crocker DO  9/29/2020  10:16 EDT

## 2020-10-26 DIAGNOSIS — M16.12 PRIMARY OSTEOARTHRITIS OF LEFT HIP: ICD-10-CM

## 2020-10-26 RX ORDER — TRAMADOL HYDROCHLORIDE 50 MG/1
TABLET ORAL
Qty: 28 TABLET | Refills: 0 | Status: SHIPPED | OUTPATIENT
Start: 2020-10-26 | End: 2021-04-23

## 2020-10-26 RX ORDER — METOPROLOL SUCCINATE 25 MG/1
TABLET, EXTENDED RELEASE ORAL
Qty: 30 TABLET | Refills: 5 | Status: SHIPPED | OUTPATIENT
Start: 2020-10-26 | End: 2021-09-20

## 2020-10-30 RX ORDER — LISINOPRIL 20 MG/1
20 TABLET ORAL DAILY
COMMUNITY
End: 2021-03-19

## 2020-10-30 RX ORDER — VITAMIN B COMPLEX
1 CAPSULE ORAL DAILY
COMMUNITY

## 2020-10-30 RX ORDER — MULTIPLE VITAMINS W/ MINERALS TAB 9MG-400MCG
1 TAB ORAL DAILY
COMMUNITY

## 2020-11-09 ENCOUNTER — HOSPITAL ENCOUNTER (OUTPATIENT)
Dept: CARDIOLOGY | Facility: HOSPITAL | Age: 75
Discharge: HOME | End: 2020-11-09

## 2020-11-09 ENCOUNTER — LAB (OUTPATIENT)
Dept: LAB | Facility: HOSPITAL | Age: 75
End: 2020-11-09

## 2020-11-09 DIAGNOSIS — K82.8 PORCELAIN GALLBLADDER: ICD-10-CM

## 2020-11-09 LAB
ALBUMIN SERPL-MCNC: 4.1 G/DL (ref 3.5–5.2)
ALBUMIN/GLOB SERPL: 1.3 G/DL
ALP SERPL-CCNC: 153 U/L (ref 39–117)
ALT SERPL W P-5'-P-CCNC: 11 U/L (ref 1–33)
ANION GAP SERPL CALCULATED.3IONS-SCNC: 9.6 MMOL/L (ref 5–15)
AST SERPL-CCNC: 19 U/L (ref 1–32)
BASOPHILS # BLD AUTO: 0.04 10*3/MM3 (ref 0–0.2)
BASOPHILS NFR BLD AUTO: 0.7 % (ref 0–1.5)
BILIRUB SERPL-MCNC: 0.2 MG/DL (ref 0–1.2)
BUN SERPL-MCNC: 29 MG/DL (ref 8–23)
BUN/CREAT SERPL: 36.3 (ref 7–25)
CALCIUM SPEC-SCNC: 9.3 MG/DL (ref 8.6–10.5)
CHLORIDE SERPL-SCNC: 105 MMOL/L (ref 98–107)
CO2 SERPL-SCNC: 24.4 MMOL/L (ref 22–29)
CREAT SERPL-MCNC: 0.8 MG/DL (ref 0.57–1)
DEPRECATED RDW RBC AUTO: 40.7 FL (ref 37–54)
EOSINOPHIL # BLD AUTO: 0.12 10*3/MM3 (ref 0–0.4)
EOSINOPHIL NFR BLD AUTO: 2 % (ref 0.3–6.2)
ERYTHROCYTE [DISTWIDTH] IN BLOOD BY AUTOMATED COUNT: 12.6 % (ref 12.3–15.4)
GFR SERPL CREATININE-BSD FRML MDRD: 70 ML/MIN/1.73
GLOBULIN UR ELPH-MCNC: 3.2 GM/DL
GLUCOSE SERPL-MCNC: 106 MG/DL (ref 65–99)
HCT VFR BLD AUTO: 40.8 % (ref 34–46.6)
HGB BLD-MCNC: 13.5 G/DL (ref 12–15.9)
IMM GRANULOCYTES # BLD AUTO: 0.01 10*3/MM3 (ref 0–0.05)
IMM GRANULOCYTES NFR BLD AUTO: 0.2 % (ref 0–0.5)
LYMPHOCYTES # BLD AUTO: 1.34 10*3/MM3 (ref 0.7–3.1)
LYMPHOCYTES NFR BLD AUTO: 21.9 % (ref 19.6–45.3)
MCH RBC QN AUTO: 29.5 PG (ref 26.6–33)
MCHC RBC AUTO-ENTMCNC: 33.1 G/DL (ref 31.5–35.7)
MCV RBC AUTO: 89.3 FL (ref 79–97)
MONOCYTES # BLD AUTO: 0.43 10*3/MM3 (ref 0.1–0.9)
MONOCYTES NFR BLD AUTO: 7 % (ref 5–12)
NEUTROPHILS NFR BLD AUTO: 4.17 10*3/MM3 (ref 1.7–7)
NEUTROPHILS NFR BLD AUTO: 68.2 % (ref 42.7–76)
NRBC BLD AUTO-RTO: 0 /100 WBC (ref 0–0.2)
PLATELET # BLD AUTO: 248 10*3/MM3 (ref 140–450)
PMV BLD AUTO: 9.6 FL (ref 6–12)
POTASSIUM SERPL-SCNC: 4.7 MMOL/L (ref 3.5–5.2)
PROT SERPL-MCNC: 7.3 G/DL (ref 6–8.5)
RBC # BLD AUTO: 4.57 10*6/MM3 (ref 3.77–5.28)
SODIUM SERPL-SCNC: 139 MMOL/L (ref 136–145)
WBC # BLD AUTO: 6.11 10*3/MM3 (ref 3.4–10.8)

## 2020-11-09 PROCEDURE — 85025 COMPLETE CBC W/AUTO DIFF WBC: CPT

## 2020-11-09 PROCEDURE — 80053 COMPREHEN METABOLIC PANEL: CPT

## 2020-11-09 PROCEDURE — 93010 ELECTROCARDIOGRAM REPORT: CPT | Performed by: INTERNAL MEDICINE

## 2020-11-09 PROCEDURE — U0004 COV-19 TEST NON-CDC HGH THRU: HCPCS

## 2020-11-09 PROCEDURE — 93005 ELECTROCARDIOGRAM TRACING: CPT | Performed by: SURGERY

## 2020-11-09 PROCEDURE — 36415 COLL VENOUS BLD VENIPUNCTURE: CPT

## 2020-11-09 PROCEDURE — C9803 HOPD COVID-19 SPEC COLLECT: HCPCS

## 2020-11-10 ENCOUNTER — ANESTHESIA EVENT (OUTPATIENT)
Dept: PERIOP | Facility: HOSPITAL | Age: 75
End: 2020-11-10

## 2020-11-10 LAB — SARS-COV-2 RNA RESP QL NAA+PROBE: NOT DETECTED

## 2020-11-11 ENCOUNTER — HOSPITAL ENCOUNTER (OUTPATIENT)
Facility: HOSPITAL | Age: 75
Setting detail: HOSPITAL OUTPATIENT SURGERY
Discharge: HOME | End: 2020-11-11
Attending: SURGERY | Admitting: SURGERY

## 2020-11-11 ENCOUNTER — ANESTHESIA (OUTPATIENT)
Dept: PERIOP | Facility: HOSPITAL | Age: 75
End: 2020-11-11

## 2020-11-11 VITALS
SYSTOLIC BLOOD PRESSURE: 150 MMHG | WEIGHT: 293 LBS | HEIGHT: 72 IN | BODY MASS INDEX: 39.68 KG/M2 | DIASTOLIC BLOOD PRESSURE: 48 MMHG | OXYGEN SATURATION: 96 % | RESPIRATION RATE: 16 BRPM | TEMPERATURE: 98 F | HEART RATE: 52 BPM

## 2020-11-11 DIAGNOSIS — K82.8 PORCELAIN GALLBLADDER: ICD-10-CM

## 2020-11-11 LAB — QT INTERVAL: 361 MS

## 2020-11-11 PROCEDURE — 25010000002 ONDANSETRON PER 1 MG: Performed by: NURSE ANESTHETIST, CERTIFIED REGISTERED

## 2020-11-11 PROCEDURE — 47562 LAPAROSCOPIC CHOLECYSTECTOMY: CPT | Performed by: NURSE PRACTITIONER

## 2020-11-11 PROCEDURE — 25010000002 PROPOFOL 10 MG/ML EMULSION: Performed by: NURSE ANESTHETIST, CERTIFIED REGISTERED

## 2020-11-11 PROCEDURE — 47562 LAPAROSCOPIC CHOLECYSTECTOMY: CPT | Performed by: SURGERY

## 2020-11-11 PROCEDURE — 88304 TISSUE EXAM BY PATHOLOGIST: CPT | Performed by: SURGERY

## 2020-11-11 PROCEDURE — 25010000002 FENTANYL CITRATE (PF) 100 MCG/2ML SOLUTION: Performed by: NURSE ANESTHETIST, CERTIFIED REGISTERED

## 2020-11-11 PROCEDURE — 25010000002 HYDROMORPHONE PER 4 MG: Performed by: NURSE ANESTHETIST, CERTIFIED REGISTERED

## 2020-11-11 PROCEDURE — 25010000002 DEXAMETHASONE PER 1 MG: Performed by: NURSE ANESTHETIST, CERTIFIED REGISTERED

## 2020-11-11 PROCEDURE — 25010000002 HEPARIN (PORCINE) PER 1000 UNITS: Performed by: SURGERY

## 2020-11-11 DEVICE — LIGACLIP 10-M/L, 10MM ENDOSCOPIC ROTATING MULTIPLE CLIP APPLIERS
Type: IMPLANTABLE DEVICE | Site: ABDOMEN | Status: FUNCTIONAL
Brand: LIGACLIP

## 2020-11-11 RX ORDER — HYDROCODONE BITARTRATE AND ACETAMINOPHEN 5; 325 MG/1; MG/1
1 TABLET ORAL ONCE AS NEEDED
Status: DISCONTINUED | OUTPATIENT
Start: 2020-11-11 | End: 2020-11-11 | Stop reason: HOSPADM

## 2020-11-11 RX ORDER — SODIUM CHLORIDE 9 MG/ML
100 INJECTION, SOLUTION INTRAVENOUS CONTINUOUS
Status: DISCONTINUED | OUTPATIENT
Start: 2020-11-11 | End: 2020-11-11 | Stop reason: HOSPADM

## 2020-11-11 RX ORDER — ONDANSETRON 2 MG/ML
INJECTION INTRAMUSCULAR; INTRAVENOUS AS NEEDED
Status: DISCONTINUED | OUTPATIENT
Start: 2020-11-11 | End: 2020-11-11 | Stop reason: SURG

## 2020-11-11 RX ORDER — GLYCOPYRROLATE 1 MG/5 ML
SYRINGE (ML) INTRAVENOUS AS NEEDED
Status: DISCONTINUED | OUTPATIENT
Start: 2020-11-11 | End: 2020-11-11 | Stop reason: SURG

## 2020-11-11 RX ORDER — ROCURONIUM BROMIDE 10 MG/ML
INJECTION, SOLUTION INTRAVENOUS AS NEEDED
Status: DISCONTINUED | OUTPATIENT
Start: 2020-11-11 | End: 2020-11-11 | Stop reason: SURG

## 2020-11-11 RX ORDER — TRAMADOL HYDROCHLORIDE 50 MG/1
50 TABLET ORAL ONCE
Status: COMPLETED | OUTPATIENT
Start: 2020-11-11 | End: 2020-11-11

## 2020-11-11 RX ORDER — ACETAMINOPHEN 325 MG/1
650 TABLET ORAL ONCE AS NEEDED
Status: DISCONTINUED | OUTPATIENT
Start: 2020-11-11 | End: 2020-11-11 | Stop reason: HOSPADM

## 2020-11-11 RX ORDER — DEXAMETHASONE SODIUM PHOSPHATE 4 MG/ML
INJECTION, SOLUTION INTRA-ARTICULAR; INTRALESIONAL; INTRAMUSCULAR; INTRAVENOUS; SOFT TISSUE AS NEEDED
Status: DISCONTINUED | OUTPATIENT
Start: 2020-11-11 | End: 2020-11-11 | Stop reason: SURG

## 2020-11-11 RX ORDER — HYDROCODONE BITARTRATE AND ACETAMINOPHEN 5; 325 MG/1; MG/1
1 TABLET ORAL EVERY 6 HOURS PRN
Qty: 20 TABLET | Refills: 0 | Status: SHIPPED | OUTPATIENT
Start: 2020-11-11 | End: 2021-04-23

## 2020-11-11 RX ORDER — TRAMADOL HYDROCHLORIDE 50 MG/1
50 TABLET ORAL EVERY 6 HOURS PRN
Qty: 30 TABLET | Refills: 0 | Status: SHIPPED | OUTPATIENT
Start: 2020-11-11 | End: 2021-04-23

## 2020-11-11 RX ORDER — PROPOFOL 10 MG/ML
VIAL (ML) INTRAVENOUS AS NEEDED
Status: DISCONTINUED | OUTPATIENT
Start: 2020-11-11 | End: 2020-11-11 | Stop reason: SURG

## 2020-11-11 RX ORDER — BUPIVACAINE HYDROCHLORIDE 2.5 MG/ML
INJECTION, SOLUTION EPIDURAL; INFILTRATION; INTRACAUDAL AS NEEDED
Status: DISCONTINUED | OUTPATIENT
Start: 2020-11-11 | End: 2020-11-11 | Stop reason: HOSPADM

## 2020-11-11 RX ORDER — HYDROMORPHONE HCL 110MG/55ML
0.25 PATIENT CONTROLLED ANALGESIA SYRINGE INTRAVENOUS
Status: DISCONTINUED | OUTPATIENT
Start: 2020-11-11 | End: 2020-11-11 | Stop reason: HOSPADM

## 2020-11-11 RX ORDER — LIDOCAINE HYDROCHLORIDE 20 MG/ML
INJECTION, SOLUTION EPIDURAL; INFILTRATION; INTRACAUDAL; PERINEURAL AS NEEDED
Status: DISCONTINUED | OUTPATIENT
Start: 2020-11-11 | End: 2020-11-11 | Stop reason: SURG

## 2020-11-11 RX ORDER — CEFAZOLIN SODIUM IN 0.9 % NACL 3 G/100 ML
3 INTRAVENOUS SOLUTION, PIGGYBACK (ML) INTRAVENOUS ONCE
Status: COMPLETED | OUTPATIENT
Start: 2020-11-11 | End: 2020-11-11

## 2020-11-11 RX ORDER — ONDANSETRON 2 MG/ML
4 INJECTION INTRAMUSCULAR; INTRAVENOUS ONCE AS NEEDED
Status: COMPLETED | OUTPATIENT
Start: 2020-11-11 | End: 2020-11-11

## 2020-11-11 RX ORDER — FENTANYL CITRATE 50 UG/ML
INJECTION, SOLUTION INTRAMUSCULAR; INTRAVENOUS AS NEEDED
Status: DISCONTINUED | OUTPATIENT
Start: 2020-11-11 | End: 2020-11-11 | Stop reason: SURG

## 2020-11-11 RX ORDER — ACETAMINOPHEN 650 MG/1
650 SUPPOSITORY RECTAL ONCE AS NEEDED
Status: DISCONTINUED | OUTPATIENT
Start: 2020-11-11 | End: 2020-11-11 | Stop reason: HOSPADM

## 2020-11-11 RX ORDER — NEOSTIGMINE METHYLSULFATE 5 MG/5 ML
SYRINGE (ML) INTRAVENOUS AS NEEDED
Status: DISCONTINUED | OUTPATIENT
Start: 2020-11-11 | End: 2020-11-11 | Stop reason: SURG

## 2020-11-11 RX ADMIN — PROPOFOL 50 MG: 10 INJECTION, EMULSION INTRAVENOUS at 09:25

## 2020-11-11 RX ADMIN — HYDROMORPHONE HYDROCHLORIDE 0.25 MG: 2 INJECTION, SOLUTION INTRAMUSCULAR; INTRAVENOUS; SUBCUTANEOUS at 10:30

## 2020-11-11 RX ADMIN — FENTANYL CITRATE 50 MCG: 50 INJECTION, SOLUTION INTRAMUSCULAR; INTRAVENOUS at 08:38

## 2020-11-11 RX ADMIN — DEXAMETHASONE SODIUM PHOSPHATE 8 MG: 4 INJECTION, SOLUTION INTRAMUSCULAR; INTRAVENOUS at 08:47

## 2020-11-11 RX ADMIN — SODIUM CHLORIDE: 9 INJECTION, SOLUTION INTRAVENOUS at 08:29

## 2020-11-11 RX ADMIN — PROPOFOL 150 MG: 10 INJECTION, EMULSION INTRAVENOUS at 08:41

## 2020-11-11 RX ADMIN — ONDANSETRON 4 MG: 2 INJECTION INTRAMUSCULAR; INTRAVENOUS at 09:21

## 2020-11-11 RX ADMIN — ROCURONIUM BROMIDE 50 MG: 10 INJECTION, SOLUTION INTRAVENOUS at 08:41

## 2020-11-11 RX ADMIN — SUGAMMADEX 400 MG: 100 INJECTION, SOLUTION INTRAVENOUS at 09:55

## 2020-11-11 RX ADMIN — TRAMADOL HYDROCHLORIDE 50 MG: 50 TABLET, FILM COATED ORAL at 11:23

## 2020-11-11 RX ADMIN — SODIUM CHLORIDE 100 ML/HR: 9 INJECTION, SOLUTION INTRAVENOUS at 08:26

## 2020-11-11 RX ADMIN — LIDOCAINE HYDROCHLORIDE 100 MG: 20 INJECTION, SOLUTION EPIDURAL; INFILTRATION; INTRACAUDAL; PERINEURAL at 08:41

## 2020-11-11 RX ADMIN — Medication 0.8 MG: at 09:32

## 2020-11-11 RX ADMIN — FENTANYL CITRATE 50 MCG: 50 INJECTION, SOLUTION INTRAMUSCULAR; INTRAVENOUS at 09:21

## 2020-11-11 RX ADMIN — CEFAZOLIN 3 G: 1 INJECTION, POWDER, FOR SOLUTION INTRAMUSCULAR; INTRAVENOUS; PARENTERAL at 08:46

## 2020-11-11 RX ADMIN — Medication 0.2 MG: at 09:08

## 2020-11-11 RX ADMIN — ONDANSETRON 4 MG: 2 INJECTION INTRAMUSCULAR; INTRAVENOUS at 10:10

## 2020-11-11 RX ADMIN — HYDROMORPHONE HYDROCHLORIDE 0.25 MG: 2 INJECTION, SOLUTION INTRAMUSCULAR; INTRAVENOUS; SUBCUTANEOUS at 10:13

## 2020-11-11 RX ADMIN — Medication 4 MG: at 09:32

## 2020-11-11 NOTE — H&P
Subjective   Camelia Gray is a 75 y.o. female.     History of present illness  Ms. Gray is a pleasant 75-year-old female who was seen in both hospital and in the office sometime ago.  She has a porcelain gallbladder and when she was in the hospital we did not feel that she needed to be operated while admitted for other issues so we recommended that she be seen at a later date for elective cholecystectomy.  When we saw her back in September she was still in rehab from a left hip replacement earlier in the summer.  She has undergone some procedures in the meantime by Dr. Johnson and now is ready to proceed with definitive cholecystectomy.  In the office we discussed the procedure and risks in detail.  We have given her a booklet outlining all the above.    Past Medical History:   Diagnosis Date   • Arthritis    • Cellulitis of both feet 10/13/2019   • Decubitus ulcer of buttock, stage 2 (CMS/HCC) 10/14/2019    HEALED   • Dermatitis associated with moisture 10/14/2019   • Disease of thyroid gland     HYPOTHYROID   • Edema of lower extremity 5/31/2017   • Santos catheter in place    • GERD (gastroesophageal reflux disease)    • Hypertension    • Immobility syndrome 10/13/2019   • Lymphedema 10/14/2019    LEGS   • Muscle weakness (generalized)    • Non-pressure chronic ulcer of other part of left foot with fat layer exposed (CMS/HCC) 10/14/2019   • Non-pressure chronic ulcer of other part of right foot with fat layer exposed (CMS/HCC) 10/14/2019   • Obesity    • Pulmonary embolism (CMS/HCC)    • Stasis dermatitis 7/8/2013   • Ureteral calculi    • Venous stasis 10/14/2019   • Yeast infection of the skin        Past Surgical History:   Procedure Laterality Date   • CATARACT EXTRACTION Bilateral    • CORRECTION HAMMER TOE Bilateral    • CYSTOSCOPY, URETEROSCOPY, RETROGRADE PYELOGRAM, STENT INSERTION Bilateral 8/18/2020    Procedure: CYSTOSCOPY BILATERAL RETROGRADE PYELOGRAM,;  Surgeon: Shawn Hernandez MD;  Location:   [facility] MAIN OR;  Service: Urology;  Laterality: Bilateral;   • DENTAL EXAMINATION UNDER ANESTHESIA     • ENDOSCOPY N/A 9/13/2020    Procedure: ESOPHAGOGASTRODUODENOSCOPY;  Surgeon: Torsten Johnson MD;  Location: [facility] ENDOSCOPY;  Service: Gastroenterology;  Laterality: N/A;  post: esophageal stricture, reflux esophagitis, hiatal hernia   • JOINT REPLACEMENT      Bilateral knees   • TOE SURGERY     • TOTAL HIP ARTHROPLASTY Left 6/12/2020    Procedure: TOTAL HIP ARTHROPLASTY;  Surgeon: Baljit Hutchins II, MD;  Location: [facility] MAIN OR;  Service: Orthopedics;  Laterality: Left;       [unfilled]    No Known Allergies    Family History   Problem Relation Age of Onset   • Heart disease Mother    • Hypertension Mother    • Heart disease Father    • Hypertension Father    • Kidney disease Father    • COPD Father        Social History     Socioeconomic History   • Marital status: Single     Spouse name: Not on file   • Number of children: Not on file   • Years of education: Not on file   • Highest education level: Not on file   Tobacco Use   • Smoking status: Never Smoker   • Smokeless tobacco: Never Used   Substance and Sexual Activity   • Alcohol use: No     Frequency: Never   • Drug use: No   • Sexual activity: Defer       The following portions of the patient's history were reviewed and updated as appropriate: allergies, current medications, past family history, past medical history, past social history, past surgical history and problem list.    Objective       Assessment/Plan   Diagnoses and all orders for this visit:    1. Porcelain gallbladder  -     sodium chloride 0.9 % infusion  -     ceFAZolin in Sodium Chloride (ANCEF) IVPB solution 3 g    Other orders  -     Basic Metabolic Panel; Future  -     CBC (No Diff); Future  -     COVID PRE-OP / PRE-PROCEDURE SCREENING ORDER (NO ISOLATION) - Swab, Nasopharynx; Future  -     ECG 12 Lead; Future  -     Follow Anesthesia Guidelines / Standing Orders;  Standing  -     Notify Physician - Standard; Standing  -     Verify / Perform Chlorhexidine Skin Prep; Standing  -     Verify / Perform Chlorhexidine Skin Prep if Indicated (If Not Already Completed); Standing  -     Instructions on Coughing, Deep Breathing & Incentive Spirometry; Standing  -     Follow Anesthesia Guidelines / Standing Orders  -     Notify Physician - Standard  -     Verify / Perform Chlorhexidine Skin Prep  -     Verify / Perform Chlorhexidine Skin Prep if Indicated (If Not Already Completed)  -     Instructions on Coughing, Deep Breathing & Incentive Spirometry  -     Instructions on Coughing, Deep Breathing & Incentive Spirometry  -     Instructions on Coughing, Deep Breathing & Incentive Spirometry  -     Instructions on Coughing, Deep Breathing & Incentive Spirometry  -     Instructions on Coughing, Deep Breathing & Incentive Spirometry      Complete review of systems is done and unremarkable with exception of the chief complaint and the above-noted specific exceptions.    Physical exam shows a pleasant obese 75-year-old female.  HEENT is negative.  Heart is regular.  Lungs are clear.  Abdomen is soft.  No mass no tenderness.  Extremities show equal range of motion and usage.  She has a left hip prosthesis.  Neuro shows no obvious focal deficit.    Impression: 75-year-old obese female with porcelain gallbladder    Plan: Lenore Crocker,   11/11/2020  07:54 EST

## 2020-11-11 NOTE — ANESTHESIA POSTPROCEDURE EVALUATION
Patient: Camelia Gray    Procedure Summary     Date: 11/11/20 Room / Location: [facility] OR 06 / [facility] MAIN OR    Anesthesia Start: 0829 Anesthesia Stop: 1001    Procedure: CHOLECYSTECTOMY LAPAROSCOPIC (N/A Abdomen) Diagnosis:       Porcelain gallbladder      (Porcelain gallbladder [K82.8])    Surgeon: George Crocker DO Provider: Isaias Sifuentes MD    Anesthesia Type: general ASA Status: 3          Anesthesia Type: general    Vitals  Vitals Value Taken Time   /68 11/11/20 1058   Temp 98 °F (36.7 °C) 11/11/20 1058   Pulse 56 11/11/20 1058   Resp 21 11/11/20 1058   SpO2 95 % 11/11/20 1058           Post Anesthesia Care and Evaluation    Patient location during evaluation: PACU  Patient participation: complete - patient participated  Level of consciousness: awake  Pain scale: See nurse's notes for pain score.  Pain management: adequate  Airway patency: patent  Anesthetic complications: No anesthetic complications  PONV Status: none  Cardiovascular status: acceptable  Respiratory status: acceptable  Hydration status: acceptable    Comments: Patient seen and examined postoperatively; vital signs stable; SpO2 greater than or equal to 90%; cardiopulmonary status stable; nausea/vomiting adequately controlled; pain adequately controlled; no apparent anesthesia complications; patient discharged from anesthesia care when discharge criteria were met

## 2020-11-11 NOTE — ANESTHESIA PROCEDURE NOTES
Airway  Urgency: elective    Date/Time: 11/11/2020 8:44 AM  Airway not difficult    General Information and Staff    Patient location during procedure: OR  Anesthesiologist: Isaias Sifuentes MD  CRNA: Amelia Pelayo CRNA    Indications and Patient Condition  Indications for airway management: airway protection    Preoxygenated: yes  Mask difficulty assessment: 1 - vent by mask    Final Airway Details  Final airway type: endotracheal airway      Successful airway: ETT  Cuffed: yes   Successful intubation technique: direct laryngoscopy  Facilitating devices/methods: intubating stylet  Endotracheal tube insertion site: oral  Blade: Manpreet  Blade size: 3  ETT size (mm): 7.5  Cormack-Lehane Classification: grade I - full view of glottis  Placement verified by: chest auscultation   Measured from: lips  ETT/EBT  to lips (cm): 21  Number of attempts at approach: 1  Assessment: lips, teeth, and gum same as pre-op

## 2020-11-11 NOTE — ANESTHESIA PREPROCEDURE EVALUATION
Anesthesia Evaluation     Patient summary reviewed and Nursing notes reviewed   no history of anesthetic complications:  NPO Solid Status: > 8 hours  NPO Liquid Status: > 8 hours           Airway   Dental      Pulmonary    (+) pulmonary embolism,   Cardiovascular     ECG reviewed  PT is on anticoagulation therapy  Patient on routine beta blocker    (+) hypertension, DVT,       Neuro/Psych  (+) weakness,     GI/Hepatic/Renal/Endo    (+) obesity,   renal disease stones, thyroid problem hypothyroidism    Musculoskeletal     Abdominal    Substance History      OB/GYN          Other   arthritis,      ROS/Med Hx Other: Lymphedema, cellulitis, foot ulcer, venous status, immobility, stasis dermatitis, cystitis, hematuria, porcelain gallbladder, esophageal stricture, thrombophilia    Echo  Findings: A trileaflet aortic valve is not well visualized though cusp  separation fears mildly decreased on M-mode with adequate coaptation.  The mitral tricuspid valves are structure normal in both demonstrate  satisfactory diastolic leaflet excursion.  Pulmonic level not well visualized.  RV dimension systolic function within normal limits.  LV chamber size wall thickness and global contractility satisfactory with no  segmental wall motion abnormalities; LVEF 60+ percent  Normal aortic root left atrial chamber dimensions.  There is no pericardial effusion intracardiac thrombus vegetation or mass  identified.     Supplementary Doppler exam showed normal aortic mitral valve for velocity with  no significant regurgitant jets. RV systolic pressures quantitated at 19 mm  Hg.     IMPRESSIONS  SOFY WITH LIMITED ACOUSTICAL WINDOWS SHOWS NORMAL CHAMBER DIMENSIONS WITH GOOD  GLOBAL CONTRACTILITY; LVEF 60+ PERCENT.  AORTIC VALVE NOT WELL VISUALIZED THOUGH MITRAL AND TRICUSPID VALVES ARE  STRUCTURE NORMAL.  PULMONIC VALVE NOT WELL VISUALIZED.  DOPPLER EXAM SHOWS NO SIGNIFICANT ABNORMALITIES; RV SP QUANTITATED 19 MM HG.    PSH  TOE SURGERY DENTAL  EXAMINATION UNDER ANESTHESIA  TOTAL HIP ARTHROPLASTY CATARACT EXTRACTION  JOINT REPLACEMENT CORRECTION HAMMER TOE  CYSTOSCOPY, URETEROSCOPY, RETROGRADE PYELOGRAM, STENT INSERTION ENDOSCOPY                  Anesthesia Plan    ASA 3     general   (Patient identified; pre-operative vital signs, all relevant labs/studies, complete medical/surgical/anesthetic history, full medication list, full allergy list, and NPO status obtained/reviewed; physical assessment performed; anesthetic options, side effects, potential complications, risks, and benefits discussed; questions answered; written anesthesia consent obtained; patient cleared for procedure; anesthesia machine and equipment checked and functioning)  intravenous induction     Anesthetic plan, all risks, benefits, and alternatives have been provided, discussed and informed consent has been obtained with: patient.    Plan discussed with CRNA and CAA.

## 2020-11-11 NOTE — OP NOTE
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 Sifuentes    Estimated Blood Loss: minimal    Implants:    Implant Name Type Inv. Item Serial No.  Lot No. LRB No. Used Action   CLIPAPPLR M/ ENDO LIGACLIP ROT 10MM MD/LG - [id number] Implant CLIPAPPLR M/ ENDO LIGACLIP ROT 10MM MD/LG  [employer]  [employer] J [employer] J . N/A 1 Implanted       Specimen:          Specimens     ID Source Type Tests Collected By Collected At Frozen?      A Gallbladder Tissue · TISSUE PATHOLOGY EXAM   George Crocker DO 11/11/20 0917 No     Description: GALLBLADDER              Findings: Porcelain gallbladder    Complications: None    Description of Procedure: Ms. Gray is a 75-year-old obese female with history of a porcelain gallbladder.  We have recommended that she undergo a lap kasey.  We discussed the procedure and risks in detail.  She understands those accepts those and is for that reason presents.    Patient was taken operating room placed in the supine position.  General was done by Dr. Allen and helper.  Timeout done identity verified.  Abdomen prepped and draped after 3-minute dry time.  An infraumbilical incision is made and varies needle is passed through all layers.  Saline drop test is done is negative and the abdomen insufflated with CO2 to approximately 15 mmHg pressure.  Disposable 11 mm trocar and sheath is passed and the laparoscope introduced confirming intra-abdominal positioning with no injury.  Subxiphoid 2 lateral ports were then placed under direct vision.  The  gallbladder could not be grasped with normal graspers.  We swapped out one of the 5 ports to 11 port and then we used the large alligator a mother-in-law type grasper to be able to grasp the bottom half of the gallbladder and placed the cholecysto duodenal ligament on traction.  This was then explored and the cystic duct identified.  It was traced to its junction with the gallbladder.  2 clips were placed proximally to distally and the duct divided.  Artery was likewise identified clipped and divided and then the gallbladder removed from the liver bed using J-hook electrocautery.  It was placed in an Endo Catch bag and retrieved out the subxiphoid incision after enlarging the incision to be able to get it out.  Liver bed was inspected it was found to be hemostatic.  We were had some brisk bleeding from the enlarged incision and it required 3-0 Vicryl stitches to control it.  2 L of fluid were used to irrigate the right upper quadrant suctioned free and then place a 0 Vicryl stitches through each of the 11 port sites using the Kili suture passer under direct vision.  All ports were then removed allowing CO2 to escape the atmosphere proving no bleeding from the port sites the fascial stitches were tied down and skin closed with 4-0 Vicryl in a subcu manner throughout.  Sterile OpSite dressing was applied over Mastisol and Steri-Strips.  She tolerated the procedure well was awakened and transferred to recovery in satisfactory condition.  Final sponge instrument and needle counts were correct.    Assistant: Susanna Chapman APRN  was responsible for performing the following activities: Retraction, Irrigation, Suturing, Closing, Placing Dressing and Held/Positioned Camera and their skilled assistance was necessary for the success of this case.    George Crocker,      Date: 11/11/2020  Time: 09:38 EST

## 2020-11-12 LAB
LAB AP CASE REPORT: NORMAL
PATH REPORT.FINAL DX SPEC: NORMAL
PATH REPORT.GROSS SPEC: NORMAL

## 2020-11-25 ENCOUNTER — OFFICE VISIT (OUTPATIENT)
Dept: SURGERY | Facility: CLINIC | Age: 75
End: 2020-11-25

## 2020-11-25 VITALS
HEART RATE: 70 BPM | TEMPERATURE: 96.9 F | WEIGHT: 280 LBS | HEIGHT: 72 IN | DIASTOLIC BLOOD PRESSURE: 76 MMHG | SYSTOLIC BLOOD PRESSURE: 121 MMHG | BODY MASS INDEX: 37.93 KG/M2 | OXYGEN SATURATION: 98 %

## 2020-11-25 DIAGNOSIS — K82.8 PORCELAIN GALLBLADDER: Primary | ICD-10-CM

## 2020-11-25 PROCEDURE — 99024 POSTOP FOLLOW-UP VISIT: CPT | Performed by: SURGERY

## 2020-11-25 NOTE — PROGRESS NOTES
SUBJECTIVE:    This Isaac is seen in the office today follow-up from her lap kasey for porcelain gallbladder.  He is about 2 weeks out now.  She is doing well.  No nausea or vomiting.  No fevers or chills.    OBJECTIVE:    Incisions are healing appropriately without infection.    ASSESSMENT:    Satisfactory postop progress    PLAN:    Recheck in the office as needed

## 2020-12-03 ENCOUNTER — ON CAMPUS - OUTPATIENT (AMBULATORY)
Dept: URBAN - METROPOLITAN AREA HOSPITAL 2 | Facility: HOSPITAL | Age: 75
End: 2020-12-03

## 2020-12-03 VITALS
SYSTOLIC BLOOD PRESSURE: 163 MMHG | SYSTOLIC BLOOD PRESSURE: 142 MMHG | OXYGEN SATURATION: 96 % | SYSTOLIC BLOOD PRESSURE: 136 MMHG | TEMPERATURE: 97.7 F | OXYGEN SATURATION: 97 % | DIASTOLIC BLOOD PRESSURE: 83 MMHG | DIASTOLIC BLOOD PRESSURE: 71 MMHG | DIASTOLIC BLOOD PRESSURE: 84 MMHG | HEART RATE: 108 BPM | OXYGEN SATURATION: 99 % | OXYGEN SATURATION: 100 % | DIASTOLIC BLOOD PRESSURE: 50 MMHG | DIASTOLIC BLOOD PRESSURE: 72 MMHG | OXYGEN SATURATION: 98 % | SYSTOLIC BLOOD PRESSURE: 126 MMHG | DIASTOLIC BLOOD PRESSURE: 70 MMHG | DIASTOLIC BLOOD PRESSURE: 76 MMHG | HEART RATE: 74 BPM | SYSTOLIC BLOOD PRESSURE: 160 MMHG | HEART RATE: 65 BPM | RESPIRATION RATE: 18 BRPM | DIASTOLIC BLOOD PRESSURE: 77 MMHG | HEART RATE: 96 BPM | HEART RATE: 114 BPM | HEART RATE: 69 BPM | SYSTOLIC BLOOD PRESSURE: 149 MMHG | HEART RATE: 77 BPM | SYSTOLIC BLOOD PRESSURE: 200 MMHG | DIASTOLIC BLOOD PRESSURE: 100 MMHG | SYSTOLIC BLOOD PRESSURE: 131 MMHG | SYSTOLIC BLOOD PRESSURE: 123 MMHG | RESPIRATION RATE: 16 BRPM | WEIGHT: 275 LBS | HEART RATE: 66 BPM

## 2020-12-03 DIAGNOSIS — K22.2 ESOPHAGEAL OBSTRUCTION: ICD-10-CM

## 2020-12-03 DIAGNOSIS — K44.9 DIAPHRAGMATIC HERNIA WITHOUT OBSTRUCTION OR GANGRENE: ICD-10-CM

## 2020-12-03 DIAGNOSIS — R13.10 DYSPHAGIA, UNSPECIFIED: ICD-10-CM

## 2020-12-03 PROCEDURE — 43248 EGD GUIDE WIRE INSERTION: CPT | Performed by: INTERNAL MEDICINE

## 2020-12-03 RX ORDER — PANTOPRAZOLE SODIUM 40 MG/1
TABLET, DELAYED RELEASE ORAL
Qty: 90 | Refills: 1 | Status: ACTIVE

## 2020-12-17 RX ORDER — LEVOTHYROXINE SODIUM 0.03 MG/1
TABLET ORAL
Qty: 90 TABLET | Refills: 0 | Status: SHIPPED | OUTPATIENT
Start: 2020-12-17 | End: 2021-03-24

## 2021-03-19 RX ORDER — LISINOPRIL 20 MG/1
TABLET ORAL
Qty: 30 TABLET | Refills: 0 | Status: SHIPPED | OUTPATIENT
Start: 2021-03-19 | End: 2021-04-25

## 2021-03-24 RX ORDER — LEVOTHYROXINE SODIUM 0.03 MG/1
TABLET ORAL
Qty: 90 TABLET | Refills: 0 | Status: SHIPPED | OUTPATIENT
Start: 2021-03-24 | End: 2021-05-25

## 2021-03-24 RX ORDER — APIXABAN 2.5 MG/1
TABLET, FILM COATED ORAL
Qty: 180 TABLET | Refills: 0 | Status: SHIPPED | OUTPATIENT
Start: 2021-03-24 | End: 2021-06-24

## 2021-04-06 ENCOUNTER — OFFICE VISIT (OUTPATIENT)
Dept: WOUND CARE | Facility: HOSPITAL | Age: 76
End: 2021-04-06

## 2021-04-06 PROCEDURE — G0463 HOSPITAL OUTPT CLINIC VISIT: HCPCS

## 2021-04-19 RX ORDER — LISINOPRIL 20 MG/1
TABLET ORAL
Qty: 30 TABLET | Refills: 0 | OUTPATIENT
Start: 2021-04-19

## 2021-04-20 ENCOUNTER — OFFICE VISIT (OUTPATIENT)
Dept: WOUND CARE | Facility: HOSPITAL | Age: 76
End: 2021-04-20

## 2021-04-20 ENCOUNTER — LAB REQUISITION (OUTPATIENT)
Dept: LAB | Facility: HOSPITAL | Age: 76
End: 2021-04-20

## 2021-04-20 DIAGNOSIS — L97.512 NON-PRESSURE CHRONIC ULCER OF OTHER PART OF RIGHT FOOT WITH FAT LAYER EXPOSED (HCC): ICD-10-CM

## 2021-04-20 PROCEDURE — G0463 HOSPITAL OUTPT CLINIC VISIT: HCPCS

## 2021-04-20 PROCEDURE — 87070 CULTURE OTHR SPECIMN AEROBIC: CPT | Performed by: SURGERY

## 2021-04-20 PROCEDURE — 87186 SC STD MICRODIL/AGAR DIL: CPT | Performed by: SURGERY

## 2021-04-20 PROCEDURE — 87205 SMEAR GRAM STAIN: CPT | Performed by: SURGERY

## 2021-04-20 PROCEDURE — 87147 CULTURE TYPE IMMUNOLOGIC: CPT | Performed by: SURGERY

## 2021-04-23 ENCOUNTER — LAB (OUTPATIENT)
Dept: LAB | Facility: HOSPITAL | Age: 76
End: 2021-04-23

## 2021-04-23 ENCOUNTER — OFFICE VISIT (OUTPATIENT)
Dept: FAMILY MEDICINE CLINIC | Facility: CLINIC | Age: 76
End: 2021-04-23

## 2021-04-23 VITALS
OXYGEN SATURATION: 96 % | DIASTOLIC BLOOD PRESSURE: 82 MMHG | TEMPERATURE: 97.1 F | HEART RATE: 82 BPM | HEIGHT: 72 IN | SYSTOLIC BLOOD PRESSURE: 139 MMHG | BODY MASS INDEX: 37.93 KG/M2 | RESPIRATION RATE: 18 BRPM | WEIGHT: 280 LBS

## 2021-04-23 DIAGNOSIS — E03.9 HYPOTHYROIDISM, UNSPECIFIED TYPE: Chronic | ICD-10-CM

## 2021-04-23 DIAGNOSIS — I10 ESSENTIAL HYPERTENSION: ICD-10-CM

## 2021-04-23 DIAGNOSIS — K21.9 GASTROESOPHAGEAL REFLUX DISEASE WITHOUT ESOPHAGITIS: ICD-10-CM

## 2021-04-23 DIAGNOSIS — I10 ESSENTIAL HYPERTENSION: Primary | Chronic | ICD-10-CM

## 2021-04-23 DIAGNOSIS — L97.512 SKIN ULCER OF TOE OF RIGHT FOOT WITH FAT LAYER EXPOSED (HCC): ICD-10-CM

## 2021-04-23 DIAGNOSIS — N13.9 OBSTRUCTIVE UROPATHY: Chronic | ICD-10-CM

## 2021-04-23 DIAGNOSIS — M15.9 PRIMARY OSTEOARTHRITIS INVOLVING MULTIPLE JOINTS: ICD-10-CM

## 2021-04-23 DIAGNOSIS — D68.59 THROMBOPHILIA (HCC): Chronic | ICD-10-CM

## 2021-04-23 DIAGNOSIS — E03.9 HYPOTHYROIDISM, UNSPECIFIED TYPE: ICD-10-CM

## 2021-04-23 PROBLEM — M62.3 IMMOBILITY SYNDROME: Status: RESOLVED | Noted: 2020-08-13 | Resolved: 2021-04-23

## 2021-04-23 PROBLEM — I89.0 LYMPHEDEMA: Status: RESOLVED | Noted: 2019-10-14 | Resolved: 2021-04-23

## 2021-04-23 PROBLEM — M62.3 IMMOBILITY SYNDROME: Chronic | Status: ACTIVE | Noted: 2019-10-13

## 2021-04-23 PROBLEM — R32 URINARY INCONTINENCE IN FEMALE: Status: RESOLVED | Noted: 2020-02-04 | Resolved: 2021-04-23

## 2021-04-23 PROBLEM — L97.522 NON-PRESSURE CHRONIC ULCER OF OTHER PART OF LEFT FOOT WITH FAT LAYER EXPOSED: Status: RESOLVED | Noted: 2019-10-14 | Resolved: 2021-04-23

## 2021-04-23 PROBLEM — K82.8 PORCELAIN GALLBLADDER: Status: RESOLVED | Noted: 2020-09-29 | Resolved: 2021-04-23

## 2021-04-23 PROBLEM — M15.0 PRIMARY OSTEOARTHRITIS INVOLVING MULTIPLE JOINTS: Status: ACTIVE | Noted: 2020-09-14

## 2021-04-23 PROBLEM — N30.01 ACUTE CYSTITIS WITH HEMATURIA: Status: RESOLVED | Noted: 2019-10-13 | Resolved: 2021-04-23

## 2021-04-23 PROBLEM — M19.90 OSTEOARTHRITIS: Chronic | Status: ACTIVE | Noted: 2020-09-14

## 2021-04-23 LAB
BACTERIA SPEC AEROBE CULT: ABNORMAL
BASOPHILS # BLD AUTO: 0.04 10*3/MM3 (ref 0–0.2)
BASOPHILS NFR BLD AUTO: 0.7 % (ref 0–1.5)
DEPRECATED RDW RBC AUTO: 46.7 FL (ref 37–54)
EOSINOPHIL # BLD AUTO: 0.22 10*3/MM3 (ref 0–0.4)
EOSINOPHIL NFR BLD AUTO: 3.7 % (ref 0.3–6.2)
ERYTHROCYTE [DISTWIDTH] IN BLOOD BY AUTOMATED COUNT: 13.7 % (ref 12.3–15.4)
GRAM STN SPEC: ABNORMAL
GRAM STN SPEC: ABNORMAL
HCT VFR BLD AUTO: 46.3 % (ref 34–46.6)
HGB BLD-MCNC: 15 G/DL (ref 12–15.9)
IMM GRANULOCYTES # BLD AUTO: 0.02 10*3/MM3 (ref 0–0.05)
IMM GRANULOCYTES NFR BLD AUTO: 0.3 % (ref 0–0.5)
LYMPHOCYTES # BLD AUTO: 1.39 10*3/MM3 (ref 0.7–3.1)
LYMPHOCYTES NFR BLD AUTO: 23.4 % (ref 19.6–45.3)
MCH RBC QN AUTO: 29.9 PG (ref 26.6–33)
MCHC RBC AUTO-ENTMCNC: 32.4 G/DL (ref 31.5–35.7)
MCV RBC AUTO: 92.4 FL (ref 79–97)
MONOCYTES # BLD AUTO: 0.46 10*3/MM3 (ref 0.1–0.9)
MONOCYTES NFR BLD AUTO: 7.8 % (ref 5–12)
NEUTROPHILS NFR BLD AUTO: 3.8 10*3/MM3 (ref 1.7–7)
NEUTROPHILS NFR BLD AUTO: 64.1 % (ref 42.7–76)
NRBC BLD AUTO-RTO: 0 /100 WBC (ref 0–0.2)
PLATELET # BLD AUTO: 251 10*3/MM3 (ref 140–450)
PMV BLD AUTO: 10 FL (ref 6–12)
RBC # BLD AUTO: 5.01 10*6/MM3 (ref 3.77–5.28)
WBC # BLD AUTO: 5.93 10*3/MM3 (ref 3.4–10.8)

## 2021-04-23 PROCEDURE — 80061 LIPID PANEL: CPT

## 2021-04-23 PROCEDURE — 99214 OFFICE O/P EST MOD 30 MIN: CPT | Performed by: FAMILY MEDICINE

## 2021-04-23 PROCEDURE — 84443 ASSAY THYROID STIM HORMONE: CPT

## 2021-04-23 PROCEDURE — 80053 COMPREHEN METABOLIC PANEL: CPT

## 2021-04-23 PROCEDURE — 36415 COLL VENOUS BLD VENIPUNCTURE: CPT

## 2021-04-23 PROCEDURE — 85025 COMPLETE CBC W/AUTO DIFF WBC: CPT

## 2021-04-23 RX ORDER — DOXYCYCLINE HYCLATE 100 MG/1
100 CAPSULE ORAL 2 TIMES DAILY
COMMUNITY
End: 2021-10-25

## 2021-04-23 NOTE — PATIENT INSTRUCTIONS
Continue your current medications and treatment.    Follow up in the office 6 months.    Have the follow up labs done and call for results.

## 2021-04-23 NOTE — PROGRESS NOTES
Subjective   Camelia Gray is a 75 y.o. female.     Chief Complaint   Patient presents with   • Hypertension     followup medication refill   • Hypothyroidism       HPI  Chief complaint: Hypertension thrombophilia hypothyroidism gastroesophageal reflux disease toe ulcer obstructive uropathy    The patient is a 75-year-old white female comes in for follow-up and maintenance of her current problems which include    1.  Hypertension-stable-patient is on lisinopril 20 mg daily metoprolol 12.5 mg daily.  She denied headache lightheadedness dizziness or chest pain.    2.  Hypothyroidism-stable-patient on Synthroid 102 5 mg daily.  She denied heat or cold intolerance or gain or weight loss.    3.  Thrombophilia/DVT/pulmonary embolism-stable-patient on Eliquis 2.5 twice a day.  She states the swelling in her extremities is gone.  She denies chest pain or hemoptysis.    4.  Primary osteoarthritis involving multiple joints/status post total hip replacement-stable-patient is off pain medications.  Patient is doing physical therapy at home plan.  She is planning on going back to the  for physical therapy for strengthening.    5.  Gastroesophageal reflux disease-stable on patient on Protonix 40 mg daily.  She denies dysphagia or heartburn.    6.  Toe ulcer-new-patient's been going to wound therapy for treatment of the toe ulcer involving the right second toe.  She is on doxycycline 100 mg twice a day.  She states is gradually improving.    7.  Obstructive [id number] the patient was in the hospital for total hip replacement she was found to have obstructive uropathy.  Patient had to have Santos catheter in place.  Obstructive uropathy occurred as a result of chronic urinary retention.  Renal function is improved.  She no longer has catheter.      The following portions of the patient's history were reviewed and updated as appropriate: allergies, current medications, past family history, past medical history, past  "social history, past surgical history and problem list.    Review of Systems    Objective     /82 (BP Location: Right arm, Patient Position: Sitting, Cuff Size: Large Adult)   Pulse 82   Temp 97.1 °F (36.2 °C) (Infrared)   Resp 18   Ht 182.9 cm (72\")   Wt 127 kg (280 lb)   SpO2 96%   BMI 37.97 kg/m²     Physical Exam  Vitals and nursing note reviewed.   Constitutional:       Appearance: She is well-developed. She is obese.      Comments: Poor muscle mass   HENT:      Head: Normocephalic and atraumatic.   Eyes:      Pupils: Pupils are equal, round, and reactive to light.   Cardiovascular:      Rate and Rhythm: Normal rate and regular rhythm.      Pulses: Normal pulses.      Heart sounds: Normal heart sounds.   Pulmonary:      Effort: Pulmonary effort is normal.      Breath sounds: Normal breath sounds.   Abdominal:      General: Abdomen is flat. Bowel sounds are normal.      Palpations: Abdomen is soft.   Musculoskeletal:         General: Normal range of motion.      Cervical back: Neck supple.   Skin:     General: Skin is warm and dry.   Neurological:      Mental Status: She is alert and oriented to person, place, and time.   Psychiatric:         Behavior: Behavior normal.         Thought Content: Thought content normal.         Judgment: Judgment normal.       US Renal Bilateral (07/20/2020 13:43)  CT Abdomen Pelvis Stone Protocol (07/17/2020 16:07)  Comprehensive Metabolic Panel (11/09/2020 12:46)  CBC & Differential (11/09/2020 12:46)      Assessment/Plan   Diagnoses and all orders for this visit:    1. Essential hypertension (Primary)  -     CBC & Differential; Future  -     Comprehensive Metabolic Panel; Future  -     Lipid Panel; Future    2. Primary osteoarthritis involving multiple joints    3. Thrombophilia (CMS/HCC)    4. Hypothyroidism, unspecified type  -     TSH; Future    5. Gastroesophageal reflux disease without esophagitis    6. Skin ulcer of toe of right foot with fat layer exposed " (CMS/[facility])    7. Obstructive uropathy      Patient Instructions   Continue your current medications and treatment.    Follow up in the office 6 months.    Have the follow up labs done and call for results.          Eric Vickers Jr., MD    04/23/21

## 2021-04-23 NOTE — TELEPHONE ENCOUNTER
PATIENT NEEDS THIS FILLED BEFORE THE WEEKEND. SHE SAW DR. DESAI TODAY BUT FORGOT TO REMIND HIM THAT SHE NEEDED IT. SHE HAS 3 PILLS LEFT.

## 2021-04-24 LAB
ALBUMIN SERPL-MCNC: 4 G/DL (ref 3.5–5.2)
ALBUMIN/GLOB SERPL: 1.2 G/DL
ALP SERPL-CCNC: 156 U/L (ref 39–117)
ALT SERPL W P-5'-P-CCNC: 10 U/L (ref 1–33)
ANION GAP SERPL CALCULATED.3IONS-SCNC: 13.3 MMOL/L (ref 5–15)
AST SERPL-CCNC: 20 U/L (ref 1–32)
BILIRUB SERPL-MCNC: 0.3 MG/DL (ref 0–1.2)
BUN SERPL-MCNC: 26 MG/DL (ref 8–23)
BUN/CREAT SERPL: 28 (ref 7–25)
CALCIUM SPEC-SCNC: 9.6 MG/DL (ref 8.6–10.5)
CHLORIDE SERPL-SCNC: 102 MMOL/L (ref 98–107)
CHOLEST SERPL-MCNC: 186 MG/DL (ref 0–200)
CO2 SERPL-SCNC: 24.7 MMOL/L (ref 22–29)
CREAT SERPL-MCNC: 0.93 MG/DL (ref 0.57–1)
GFR SERPL CREATININE-BSD FRML MDRD: 59 ML/MIN/1.73
GLOBULIN UR ELPH-MCNC: 3.3 GM/DL
GLUCOSE SERPL-MCNC: 116 MG/DL (ref 65–99)
HDLC SERPL-MCNC: 76 MG/DL (ref 40–60)
LDLC SERPL CALC-MCNC: 89 MG/DL (ref 0–100)
LDLC/HDLC SERPL: 1.13 {RATIO}
POTASSIUM SERPL-SCNC: 4.8 MMOL/L (ref 3.5–5.2)
PROT SERPL-MCNC: 7.3 G/DL (ref 6–8.5)
SODIUM SERPL-SCNC: 140 MMOL/L (ref 136–145)
TRIGL SERPL-MCNC: 119 MG/DL (ref 0–150)
TSH SERPL DL<=0.05 MIU/L-ACNC: 5.38 UIU/ML (ref 0.27–4.2)
VLDLC SERPL-MCNC: 21 MG/DL (ref 5–40)

## 2021-04-25 RX ORDER — LISINOPRIL 20 MG/1
TABLET ORAL
Qty: 30 TABLET | Refills: 0 | Status: SHIPPED | OUTPATIENT
Start: 2021-04-25 | End: 2021-05-25

## 2021-04-27 ENCOUNTER — LAB (OUTPATIENT)
Dept: LAB | Facility: HOSPITAL | Age: 76
End: 2021-04-27

## 2021-04-27 ENCOUNTER — OFFICE VISIT (OUTPATIENT)
Dept: WOUND CARE | Facility: HOSPITAL | Age: 76
End: 2021-04-27

## 2021-04-27 ENCOUNTER — TRANSCRIBE ORDERS (OUTPATIENT)
Dept: ADMINISTRATIVE | Facility: HOSPITAL | Age: 76
End: 2021-04-27

## 2021-04-27 ENCOUNTER — HOSPITAL ENCOUNTER (OUTPATIENT)
Dept: GENERAL RADIOLOGY | Facility: HOSPITAL | Age: 76
Discharge: HOME | End: 2021-04-27

## 2021-04-27 DIAGNOSIS — L97.512 RIGHT FOOT ULCER, WITH FAT LAYER EXPOSED (HCC): ICD-10-CM

## 2021-04-27 DIAGNOSIS — L97.512 RIGHT FOOT ULCER, WITH FAT LAYER EXPOSED (HCC): Primary | ICD-10-CM

## 2021-04-27 LAB
BASOPHILS # BLD AUTO: 0.04 10*3/MM3 (ref 0–0.2)
BASOPHILS NFR BLD AUTO: 0.6 % (ref 0–1.5)
CRP SERPL-MCNC: 1.06 MG/DL (ref 0–0.5)
DEPRECATED RDW RBC AUTO: 46.3 FL (ref 37–54)
EOSINOPHIL # BLD AUTO: 0.17 10*3/MM3 (ref 0–0.4)
EOSINOPHIL NFR BLD AUTO: 2.6 % (ref 0.3–6.2)
ERYTHROCYTE [DISTWIDTH] IN BLOOD BY AUTOMATED COUNT: 13.7 % (ref 12.3–15.4)
ERYTHROCYTE [SEDIMENTATION RATE] IN BLOOD: 14 MM/HR (ref 0–30)
HCT VFR BLD AUTO: 45.4 % (ref 34–46.6)
HGB BLD-MCNC: 14.7 G/DL (ref 12–15.9)
IMM GRANULOCYTES # BLD AUTO: 0.01 10*3/MM3 (ref 0–0.05)
IMM GRANULOCYTES NFR BLD AUTO: 0.2 % (ref 0–0.5)
LYMPHOCYTES # BLD AUTO: 1.43 10*3/MM3 (ref 0.7–3.1)
LYMPHOCYTES NFR BLD AUTO: 21.6 % (ref 19.6–45.3)
MCH RBC QN AUTO: 29.6 PG (ref 26.6–33)
MCHC RBC AUTO-ENTMCNC: 32.4 G/DL (ref 31.5–35.7)
MCV RBC AUTO: 91.3 FL (ref 79–97)
MONOCYTES # BLD AUTO: 0.56 10*3/MM3 (ref 0.1–0.9)
MONOCYTES NFR BLD AUTO: 8.5 % (ref 5–12)
NEUTROPHILS NFR BLD AUTO: 4.4 10*3/MM3 (ref 1.7–7)
NEUTROPHILS NFR BLD AUTO: 66.5 % (ref 42.7–76)
NRBC BLD AUTO-RTO: 0 /100 WBC (ref 0–0.2)
PLATELET # BLD AUTO: 249 10*3/MM3 (ref 140–450)
PMV BLD AUTO: 9.9 FL (ref 6–12)
RBC # BLD AUTO: 4.97 10*6/MM3 (ref 3.77–5.28)
WBC # BLD AUTO: 6.61 10*3/MM3 (ref 3.4–10.8)

## 2021-04-27 PROCEDURE — 85652 RBC SED RATE AUTOMATED: CPT

## 2021-04-27 PROCEDURE — 85025 COMPLETE CBC W/AUTO DIFF WBC: CPT

## 2021-04-27 PROCEDURE — G0463 HOSPITAL OUTPT CLINIC VISIT: HCPCS

## 2021-04-27 PROCEDURE — 86140 C-REACTIVE PROTEIN: CPT

## 2021-04-27 PROCEDURE — 73630 X-RAY EXAM OF FOOT: CPT

## 2021-04-27 PROCEDURE — 36415 COLL VENOUS BLD VENIPUNCTURE: CPT

## 2021-05-04 ENCOUNTER — TRANSCRIBE ORDERS (OUTPATIENT)
Dept: ADMINISTRATIVE | Facility: HOSPITAL | Age: 76
End: 2021-05-04

## 2021-05-04 ENCOUNTER — OFFICE VISIT (OUTPATIENT)
Dept: WOUND CARE | Facility: HOSPITAL | Age: 76
End: 2021-05-04

## 2021-05-04 DIAGNOSIS — L97.512 RIGHT FOOT ULCER, WITH FAT LAYER EXPOSED (HCC): Primary | ICD-10-CM

## 2021-05-04 PROCEDURE — G0463 HOSPITAL OUTPT CLINIC VISIT: HCPCS

## 2021-05-11 ENCOUNTER — OFFICE VISIT (OUTPATIENT)
Dept: WOUND CARE | Facility: HOSPITAL | Age: 76
End: 2021-05-11

## 2021-05-11 PROCEDURE — G0463 HOSPITAL OUTPT CLINIC VISIT: HCPCS

## 2021-05-16 ENCOUNTER — HOSPITAL ENCOUNTER (OUTPATIENT)
Dept: MRI IMAGING | Facility: HOSPITAL | Age: 76
Discharge: HOME | End: 2021-05-16
Admitting: SURGERY

## 2021-05-16 DIAGNOSIS — L97.512 RIGHT FOOT ULCER, WITH FAT LAYER EXPOSED (HCC): ICD-10-CM

## 2021-05-16 PROCEDURE — 25010000002 GADOTERIDOL PER 1 ML: Performed by: SURGERY

## 2021-05-16 PROCEDURE — A9579 GAD-BASE MR CONTRAST NOS,1ML: HCPCS | Performed by: SURGERY

## 2021-05-16 PROCEDURE — 73720 MRI LWR EXTREMITY W/O&W/DYE: CPT

## 2021-05-16 RX ADMIN — GADOTERIDOL 20 ML: 279.3 INJECTION, SOLUTION INTRAVENOUS at 13:41

## 2021-05-18 ENCOUNTER — LAB (OUTPATIENT)
Dept: LAB | Facility: HOSPITAL | Age: 76
End: 2021-05-18

## 2021-05-18 ENCOUNTER — TRANSCRIBE ORDERS (OUTPATIENT)
Dept: LAB | Facility: HOSPITAL | Age: 76
End: 2021-05-18

## 2021-05-18 ENCOUNTER — OFFICE VISIT (OUTPATIENT)
Dept: WOUND CARE | Facility: HOSPITAL | Age: 76
End: 2021-05-18

## 2021-05-18 DIAGNOSIS — L97.512 RIGHT FOOT ULCER, WITH FAT LAYER EXPOSED (HCC): Primary | ICD-10-CM

## 2021-05-18 DIAGNOSIS — L97.512 RIGHT FOOT ULCER, WITH FAT LAYER EXPOSED (HCC): ICD-10-CM

## 2021-05-18 LAB
BASOPHILS # BLD AUTO: 0.05 10*3/MM3 (ref 0–0.2)
BASOPHILS NFR BLD AUTO: 0.8 % (ref 0–1.5)
CRP SERPL-MCNC: 1.56 MG/DL (ref 0–0.5)
DEPRECATED RDW RBC AUTO: 46.1 FL (ref 37–54)
EOSINOPHIL # BLD AUTO: 0.15 10*3/MM3 (ref 0–0.4)
EOSINOPHIL NFR BLD AUTO: 2.4 % (ref 0.3–6.2)
ERYTHROCYTE [DISTWIDTH] IN BLOOD BY AUTOMATED COUNT: 13.6 % (ref 12.3–15.4)
ERYTHROCYTE [SEDIMENTATION RATE] IN BLOOD: 33 MM/HR (ref 0–30)
HCT VFR BLD AUTO: 44.2 % (ref 34–46.6)
HGB BLD-MCNC: 14.3 G/DL (ref 12–15.9)
IMM GRANULOCYTES # BLD AUTO: 0.03 10*3/MM3 (ref 0–0.05)
IMM GRANULOCYTES NFR BLD AUTO: 0.5 % (ref 0–0.5)
LYMPHOCYTES # BLD AUTO: 1.37 10*3/MM3 (ref 0.7–3.1)
LYMPHOCYTES NFR BLD AUTO: 21.7 % (ref 19.6–45.3)
MCH RBC QN AUTO: 30 PG (ref 26.6–33)
MCHC RBC AUTO-ENTMCNC: 32.4 G/DL (ref 31.5–35.7)
MCV RBC AUTO: 92.9 FL (ref 79–97)
MONOCYTES # BLD AUTO: 0.49 10*3/MM3 (ref 0.1–0.9)
MONOCYTES NFR BLD AUTO: 7.8 % (ref 5–12)
NEUTROPHILS NFR BLD AUTO: 4.23 10*3/MM3 (ref 1.7–7)
NEUTROPHILS NFR BLD AUTO: 66.8 % (ref 42.7–76)
NRBC BLD AUTO-RTO: 0 /100 WBC (ref 0–0.2)
PLATELET # BLD AUTO: 231 10*3/MM3 (ref 140–450)
PMV BLD AUTO: 10 FL (ref 6–12)
RBC # BLD AUTO: 4.76 10*6/MM3 (ref 3.77–5.28)
WBC # BLD AUTO: 6.32 10*3/MM3 (ref 3.4–10.8)

## 2021-05-18 PROCEDURE — 36415 COLL VENOUS BLD VENIPUNCTURE: CPT

## 2021-05-18 PROCEDURE — 85025 COMPLETE CBC W/AUTO DIFF WBC: CPT

## 2021-05-18 PROCEDURE — 85652 RBC SED RATE AUTOMATED: CPT

## 2021-05-18 PROCEDURE — G0463 HOSPITAL OUTPT CLINIC VISIT: HCPCS

## 2021-05-18 PROCEDURE — 86140 C-REACTIVE PROTEIN: CPT

## 2021-05-25 ENCOUNTER — OFFICE VISIT (OUTPATIENT)
Dept: WOUND CARE | Facility: HOSPITAL | Age: 76
End: 2021-05-25

## 2021-05-25 PROCEDURE — 17250 CHEM CAUT OF GRANLTJ TISSUE: CPT

## 2021-05-25 RX ORDER — LISINOPRIL 20 MG/1
TABLET ORAL
Qty: 30 TABLET | Refills: 0 | Status: SHIPPED | OUTPATIENT
Start: 2021-05-25 | End: 2021-06-24

## 2021-05-25 RX ORDER — LEVOTHYROXINE SODIUM 0.03 MG/1
TABLET ORAL
Qty: 90 TABLET | Refills: 0 | Status: SHIPPED | OUTPATIENT
Start: 2021-05-25 | End: 2021-09-14

## 2021-06-01 ENCOUNTER — TRANSCRIBE ORDERS (OUTPATIENT)
Dept: LAB | Facility: HOSPITAL | Age: 76
End: 2021-06-01

## 2021-06-01 ENCOUNTER — OFFICE VISIT (OUTPATIENT)
Dept: WOUND CARE | Facility: HOSPITAL | Age: 76
End: 2021-06-01

## 2021-06-01 ENCOUNTER — LAB (OUTPATIENT)
Dept: LAB | Facility: HOSPITAL | Age: 76
End: 2021-06-01

## 2021-06-01 DIAGNOSIS — L97.512 RIGHT FOOT ULCER, WITH FAT LAYER EXPOSED (HCC): ICD-10-CM

## 2021-06-01 DIAGNOSIS — L97.512 RIGHT FOOT ULCER, WITH FAT LAYER EXPOSED (HCC): Primary | ICD-10-CM

## 2021-06-01 LAB
ALBUMIN SERPL-MCNC: 3.9 G/DL (ref 3.5–5.2)
ALBUMIN/GLOB SERPL: 1 G/DL
ALP SERPL-CCNC: 147 U/L (ref 39–117)
ALT SERPL W P-5'-P-CCNC: 16 U/L (ref 1–33)
ANION GAP SERPL CALCULATED.3IONS-SCNC: 9.5 MMOL/L (ref 5–15)
AST SERPL-CCNC: 20 U/L (ref 1–32)
BASOPHILS # BLD AUTO: 0.04 10*3/MM3 (ref 0–0.2)
BASOPHILS NFR BLD AUTO: 0.6 % (ref 0–1.5)
BILIRUB SERPL-MCNC: 0.3 MG/DL (ref 0–1.2)
BUN SERPL-MCNC: 29 MG/DL (ref 8–23)
BUN/CREAT SERPL: 34.9 (ref 7–25)
CALCIUM SPEC-SCNC: 9.6 MG/DL (ref 8.6–10.5)
CHLORIDE SERPL-SCNC: 103 MMOL/L (ref 98–107)
CO2 SERPL-SCNC: 26.5 MMOL/L (ref 22–29)
CREAT SERPL-MCNC: 0.83 MG/DL (ref 0.57–1)
CRP SERPL-MCNC: 1.26 MG/DL (ref 0–0.5)
DEPRECATED RDW RBC AUTO: 48.3 FL (ref 37–54)
EOSINOPHIL # BLD AUTO: 0.16 10*3/MM3 (ref 0–0.4)
EOSINOPHIL NFR BLD AUTO: 2.6 % (ref 0.3–6.2)
ERYTHROCYTE [DISTWIDTH] IN BLOOD BY AUTOMATED COUNT: 13.6 % (ref 12.3–15.4)
ERYTHROCYTE [SEDIMENTATION RATE] IN BLOOD: 22 MM/HR (ref 0–30)
GFR SERPL CREATININE-BSD FRML MDRD: 67 ML/MIN/1.73
GLOBULIN UR ELPH-MCNC: 3.9 GM/DL
GLUCOSE SERPL-MCNC: 96 MG/DL (ref 65–99)
HCT VFR BLD AUTO: 45.6 % (ref 34–46.6)
HGB BLD-MCNC: 14.5 G/DL (ref 12–15.9)
IMM GRANULOCYTES # BLD AUTO: 0.02 10*3/MM3 (ref 0–0.05)
IMM GRANULOCYTES NFR BLD AUTO: 0.3 % (ref 0–0.5)
LYMPHOCYTES # BLD AUTO: 1.25 10*3/MM3 (ref 0.7–3.1)
LYMPHOCYTES NFR BLD AUTO: 20.2 % (ref 19.6–45.3)
MCH RBC QN AUTO: 30.2 PG (ref 26.6–33)
MCHC RBC AUTO-ENTMCNC: 31.8 G/DL (ref 31.5–35.7)
MCV RBC AUTO: 95 FL (ref 79–97)
MONOCYTES # BLD AUTO: 0.49 10*3/MM3 (ref 0.1–0.9)
MONOCYTES NFR BLD AUTO: 7.9 % (ref 5–12)
NEUTROPHILS NFR BLD AUTO: 4.23 10*3/MM3 (ref 1.7–7)
NEUTROPHILS NFR BLD AUTO: 68.4 % (ref 42.7–76)
NRBC BLD AUTO-RTO: 0 /100 WBC (ref 0–0.2)
PLATELET # BLD AUTO: 225 10*3/MM3 (ref 140–450)
PMV BLD AUTO: 10 FL (ref 6–12)
POTASSIUM SERPL-SCNC: 5.1 MMOL/L (ref 3.5–5.2)
PROT SERPL-MCNC: 7.8 G/DL (ref 6–8.5)
RBC # BLD AUTO: 4.8 10*6/MM3 (ref 3.77–5.28)
SODIUM SERPL-SCNC: 139 MMOL/L (ref 136–145)
WBC # BLD AUTO: 6.19 10*3/MM3 (ref 3.4–10.8)

## 2021-06-01 PROCEDURE — 85025 COMPLETE CBC W/AUTO DIFF WBC: CPT

## 2021-06-01 PROCEDURE — 80053 COMPREHEN METABOLIC PANEL: CPT

## 2021-06-01 PROCEDURE — G0463 HOSPITAL OUTPT CLINIC VISIT: HCPCS

## 2021-06-01 PROCEDURE — 85652 RBC SED RATE AUTOMATED: CPT

## 2021-06-01 PROCEDURE — 86140 C-REACTIVE PROTEIN: CPT

## 2021-06-01 PROCEDURE — 36415 COLL VENOUS BLD VENIPUNCTURE: CPT

## 2021-06-08 ENCOUNTER — OFFICE VISIT (OUTPATIENT)
Dept: WOUND CARE | Facility: HOSPITAL | Age: 76
End: 2021-06-08

## 2021-06-08 PROCEDURE — G0463 HOSPITAL OUTPT CLINIC VISIT: HCPCS

## 2021-06-15 ENCOUNTER — OFFICE VISIT (OUTPATIENT)
Dept: WOUND CARE | Facility: HOSPITAL | Age: 76
End: 2021-06-15

## 2021-06-22 ENCOUNTER — OFFICE VISIT (OUTPATIENT)
Dept: WOUND CARE | Facility: HOSPITAL | Age: 76
End: 2021-06-22

## 2021-06-24 RX ORDER — APIXABAN 2.5 MG/1
TABLET, FILM COATED ORAL
Qty: 180 TABLET | Refills: 1 | Status: SHIPPED | OUTPATIENT
Start: 2021-06-24 | End: 2021-12-14

## 2021-06-24 RX ORDER — LISINOPRIL 20 MG/1
TABLET ORAL
Qty: 90 TABLET | Refills: 1 | Status: SHIPPED | OUTPATIENT
Start: 2021-06-24 | End: 2021-12-14

## 2021-06-29 ENCOUNTER — APPOINTMENT (OUTPATIENT)
Dept: WOUND CARE | Facility: HOSPITAL | Age: 76
End: 2021-06-29

## 2021-06-29 ENCOUNTER — OFFICE VISIT (OUTPATIENT)
Dept: WOUND CARE | Facility: HOSPITAL | Age: 76
End: 2021-06-29

## 2021-06-29 PROCEDURE — G0463 HOSPITAL OUTPT CLINIC VISIT: HCPCS

## 2021-07-13 ENCOUNTER — OFFICE VISIT (OUTPATIENT)
Dept: WOUND CARE | Facility: HOSPITAL | Age: 76
End: 2021-07-13

## 2021-07-13 PROCEDURE — G0463 HOSPITAL OUTPT CLINIC VISIT: HCPCS

## 2021-07-26 ENCOUNTER — OFFICE VISIT (OUTPATIENT)
Dept: WOUND CARE | Facility: HOSPITAL | Age: 76
End: 2021-07-26

## 2021-07-26 PROCEDURE — G0463 HOSPITAL OUTPT CLINIC VISIT: HCPCS

## 2021-07-27 ENCOUNTER — APPOINTMENT (OUTPATIENT)
Dept: WOUND CARE | Facility: HOSPITAL | Age: 76
End: 2021-07-27

## 2021-08-02 ENCOUNTER — OFFICE VISIT (OUTPATIENT)
Dept: WOUND CARE | Facility: HOSPITAL | Age: 76
End: 2021-08-02

## 2021-08-02 PROCEDURE — G0463 HOSPITAL OUTPT CLINIC VISIT: HCPCS

## 2021-08-16 ENCOUNTER — OFFICE VISIT (OUTPATIENT)
Dept: WOUND CARE | Facility: HOSPITAL | Age: 76
End: 2021-08-16

## 2021-08-16 PROCEDURE — G0463 HOSPITAL OUTPT CLINIC VISIT: HCPCS

## 2021-08-30 ENCOUNTER — OFFICE VISIT (OUTPATIENT)
Dept: WOUND CARE | Facility: HOSPITAL | Age: 76
End: 2021-08-30

## 2021-08-30 PROCEDURE — G0463 HOSPITAL OUTPT CLINIC VISIT: HCPCS

## 2021-09-14 RX ORDER — LEVOTHYROXINE SODIUM 0.03 MG/1
TABLET ORAL
Qty: 90 TABLET | Refills: 0 | Status: SHIPPED | OUTPATIENT
Start: 2021-09-14 | End: 2021-12-14

## 2021-09-20 RX ORDER — METOPROLOL SUCCINATE 25 MG/1
TABLET, EXTENDED RELEASE ORAL
Qty: 30 TABLET | Refills: 4 | Status: SHIPPED | OUTPATIENT
Start: 2021-09-20 | End: 2022-02-15

## 2021-10-02 ENCOUNTER — FLU SHOT (OUTPATIENT)
Dept: FAMILY MEDICINE CLINIC | Facility: CLINIC | Age: 76
End: 2021-10-02

## 2021-10-02 DIAGNOSIS — Z23 NEED FOR VACCINATION: Primary | ICD-10-CM

## 2021-10-02 PROCEDURE — G0008 ADMIN INFLUENZA VIRUS VAC: HCPCS | Performed by: FAMILY MEDICINE

## 2021-10-02 PROCEDURE — 90662 IIV NO PRSV INCREASED AG IM: CPT | Performed by: FAMILY MEDICINE

## 2021-10-25 ENCOUNTER — LAB (OUTPATIENT)
Dept: LAB | Facility: HOSPITAL | Age: 76
End: 2021-10-25

## 2021-10-25 ENCOUNTER — OFFICE VISIT (OUTPATIENT)
Dept: FAMILY MEDICINE CLINIC | Facility: CLINIC | Age: 76
End: 2021-10-25

## 2021-10-25 VITALS
HEART RATE: 74 BPM | OXYGEN SATURATION: 93 % | TEMPERATURE: 97.1 F | BODY MASS INDEX: 37.93 KG/M2 | WEIGHT: 280 LBS | HEIGHT: 72 IN

## 2021-10-25 DIAGNOSIS — Z13.9 ENCOUNTER FOR HEALTH-RELATED SCREENING: ICD-10-CM

## 2021-10-25 DIAGNOSIS — K21.9 GASTROESOPHAGEAL REFLUX DISEASE WITHOUT ESOPHAGITIS: Chronic | ICD-10-CM

## 2021-10-25 DIAGNOSIS — I10 PRIMARY HYPERTENSION: Chronic | ICD-10-CM

## 2021-10-25 DIAGNOSIS — D68.59 THROMBOPHILIA (HCC): Chronic | ICD-10-CM

## 2021-10-25 DIAGNOSIS — M81.0 OSTEOPOROSIS, UNSPECIFIED OSTEOPOROSIS TYPE, UNSPECIFIED PATHOLOGICAL FRACTURE PRESENCE: ICD-10-CM

## 2021-10-25 DIAGNOSIS — I10 PRIMARY HYPERTENSION: Primary | Chronic | ICD-10-CM

## 2021-10-25 DIAGNOSIS — Z00.00 ENCOUNTER FOR ANNUAL WELLNESS EXAM IN MEDICARE PATIENT: ICD-10-CM

## 2021-10-25 DIAGNOSIS — E03.9 HYPOTHYROIDISM, UNSPECIFIED TYPE: Chronic | ICD-10-CM

## 2021-10-25 LAB
ANION GAP SERPL CALCULATED.3IONS-SCNC: 8.3 MMOL/L (ref 5–15)
BUN SERPL-MCNC: 26 MG/DL (ref 8–23)
BUN/CREAT SERPL: 28.3 (ref 7–25)
CALCIUM SPEC-SCNC: 9.5 MG/DL (ref 8.6–10.5)
CHLORIDE SERPL-SCNC: 105 MMOL/L (ref 98–107)
CO2 SERPL-SCNC: 26.7 MMOL/L (ref 22–29)
CREAT SERPL-MCNC: 0.92 MG/DL (ref 0.57–1)
GFR SERPL CREATININE-BSD FRML MDRD: 60 ML/MIN/1.73
GLUCOSE SERPL-MCNC: 112 MG/DL (ref 65–99)
POTASSIUM SERPL-SCNC: 4.7 MMOL/L (ref 3.5–5.2)
SODIUM SERPL-SCNC: 140 MMOL/L (ref 136–145)

## 2021-10-25 PROCEDURE — 1170F FXNL STATUS ASSESSED: CPT | Performed by: FAMILY MEDICINE

## 2021-10-25 PROCEDURE — G0439 PPPS, SUBSEQ VISIT: HCPCS | Performed by: FAMILY MEDICINE

## 2021-10-25 PROCEDURE — 99214 OFFICE O/P EST MOD 30 MIN: CPT | Performed by: FAMILY MEDICINE

## 2021-10-25 PROCEDURE — 1160F RVW MEDS BY RX/DR IN RCRD: CPT | Performed by: FAMILY MEDICINE

## 2021-10-25 PROCEDURE — 80048 BASIC METABOLIC PNL TOTAL CA: CPT

## 2021-10-25 PROCEDURE — 1126F AMNT PAIN NOTED NONE PRSNT: CPT | Performed by: FAMILY MEDICINE

## 2021-10-25 PROCEDURE — 36415 COLL VENOUS BLD VENIPUNCTURE: CPT

## 2021-10-25 NOTE — PATIENT INSTRUCTIONS
Continue your current medications and treatment.    Have the follow up labs done and call for results..

## 2021-10-25 NOTE — PROGRESS NOTES
"Subjective   Camelia Gray is a 76 y.o. female.     Chief Complaint   Patient presents with   • Medicare Wellness-subsequent     sub medicare wellness   • Hypertension     follow up   • Hypothyroidism       HPI  Chief complaint: Hypertension thrombophilia hypothyroidism immobility syndrome    Patient is a 76-year-old white female comes in for follow-up and maintenance of her current problems which include    1.  Hypertension-stable-patient on lisinopril 20 mg daily and metoprolol 25 mg daily.  She denied headache lightheadedness dizziness or chest pain.    2.  Thrombophilia-stable-patient on Eliquis 2.5 mg twice a day.  Patient had a DVT and pulmonary embolism several years ago.  Patient had a life-threatening pulmonary embolism.  Patient immobile and is wheelchair-bound.  She does do exercises in a wheelchair on a regular basis.    3.  Hypothyroidism-stable-patient is currently on Synthroid 0.025 mg daily.  She denied heat or cold intolerance tremor weight gain or weight loss.    4.  Gastroesophageal reflux disease-stable on the patient on Protonix 40 mg daily.  She denies dysphagia or heartburn.        The following portions of the patient's history were reviewed and updated as appropriate: allergies, current medications, past family history, past medical history, past social history, past surgical history and problem list.    Review of Systems    Objective     Pulse 74   Temp 97.1 °F (36.2 °C) (Infrared)   Ht 182.9 cm (72\")   Wt 127 kg (280 lb)   SpO2 93%   BMI 37.97 kg/m²     Physical Exam  Vitals and nursing note reviewed.   Constitutional:       Appearance: She is well-developed. She is obese.   HENT:      Head: Normocephalic and atraumatic.      Mouth/Throat:      Mouth: Mucous membranes are moist.      Pharynx: Oropharynx is clear.   Eyes:      Extraocular Movements: Extraocular movements intact.      Conjunctiva/sclera: Conjunctivae normal.      Pupils: Pupils are equal, round, and reactive to light. "   Cardiovascular:      Rate and Rhythm: Normal rate and regular rhythm.      Heart sounds: Normal heart sounds.   Pulmonary:      Effort: Pulmonary effort is normal.      Breath sounds: Normal breath sounds.   Abdominal:      General: Abdomen is flat. Bowel sounds are normal.      Palpations: Abdomen is soft.   Musculoskeletal:         General: Normal range of motion.      Cervical back: Neck supple.   Skin:     General: Skin is warm and dry.   Neurological:      Mental Status: She is alert and oriented to person, place, and time.   Psychiatric:         Behavior: Behavior normal.         Thought Content: Thought content normal.         Judgment: Judgment normal.           Assessment/Plan   Diagnoses and all orders for this visit:    1. Primary hypertension (Primary)  -     Basic Metabolic Panel; Future    2. Encounter for health-related screening  -     Mammo Screening Digital Tomosynthesis Bilateral With CAD; Future    3. Osteoporosis, unspecified osteoporosis type, unspecified pathological fracture presence  -     DEXA Bone Density Axial; Future    4. Encounter for annual wellness exam in Medicare patient    5. Thrombophilia (HCC)    6. Hypothyroidism, unspecified type    7. Gastroesophageal reflux disease without esophagitis      Patient Instructions   Continue your current medications and treatment.    Have the follow up labs done and call for results..      Eric Vickers Jr., MD    10/25/21

## 2021-10-25 NOTE — PROGRESS NOTES
The ABCs of the Annual Wellness Visit  Subsequent Medicare Wellness Visit    Chief Complaint   Patient presents with   • Medicare Wellness-subsequent     sub medicare wellness   • Hypertension     follow up   • Hypothyroidism      Subjective    History of Present Illness:  Camelia Gray is a 76 y.o. female who presents for a Subsequent Medicare Wellness Visit.    The following portions of the patient's history were reviewed and   updated as appropriate: allergies, current medications, past family history, past medical history, past social history, past surgical history and problem list.    Compared to one year ago, the patient feels her physical   health is the same.    Compared to one year ago, the patient feels her mental   health is the same.    Recent Hospitalizations:  She was not admitted to the hospital during the last year.       Current Medical Providers:  Patient Care Team:  Eric Vickers Jr., MD as PCP - General (Family Medicine)    Outpatient Medications Prior to Visit   Medication Sig Dispense Refill   • B Complex Vitamins (vitamin b complex) capsule capsule Take 1 capsule by mouth Daily.     • Eliquis 2.5 MG tablet tablet TAKE ONE TABLET BY MOUTH EVERY 12 HOURS 180 tablet 1   • levothyroxine (SYNTHROID, LEVOTHROID) 25 MCG tablet TAKE ONE TABLET BY MOUTH EVERY DAY 90 tablet 0   • lisinopril (PRINIVIL,ZESTRIL) 20 MG tablet TAKE 1 TABLET BY MOUTH DAILY. NEED APPOINTMENT 90 tablet 1   • metoprolol succinate XL (TOPROL-XL) 25 MG 24 hr tablet TAKE ONE TABLET BY MOUTH EVERY DAY (HOLD IF SYSTOLIC LESS THAN 110 OR HEART RATE LESS THAN 60) 30 tablet 4   • Multiple Vitamins-Minerals (multivitamin with minerals) tablet tablet Take 1 tablet by mouth Daily. LD 11/4     • pantoprazole (Protonix) 40 MG EC tablet Take 1 tablet by mouth Daily. 90 tablet 1   • doxycycline (VIBRAMYCIN) 100 MG capsule Take 100 mg by mouth 2 (Two) Times a Day.       No facility-administered medications prior to visit.       No opioid  "medication identified on active medication list. I have reviewed chart for other potential  high risk medication/s and harmful drug interactions in the elderly.          Aspirin is not on active medication list.  Aspirin use is not indicated based on review of current medical condition/s. Risk of harm outweighs potential benefits.  .    Patient Active Problem List   Diagnosis   • Morbid obesity (HCC)   • Arthritis of left hip   • Deep vein thrombosis (DVT) (HCC)   • Hypertension   • Pulmonary embolism (HCC)   • Stasis dermatitis   • Immobility syndrome   • Skin ulcer of toe of right foot with fat layer exposed (HCC)   • Primary osteoarthritis of left hip   • Hypothyroidism   • Status post total replacement of hip   • Thrombophilia (HCC)   • Primary osteoarthritis involving multiple joints   • Gastroesophageal reflux disease without esophagitis   • Obstructive uropathy     Advance Care Planning  Advance Directive is on file.  ACP discussion was held with the patient during this visit. Patient has an advance directive in EMR which is still valid.           Objective    Vitals:    10/25/21 1130   BP: Comment: unable to take on machine   Pulse: 74   Temp: 97.1 °F (36.2 °C)   TempSrc: Infrared   SpO2: 93%   Weight: 127 kg (280 lb)   Height: 182.9 cm (72\")   PainSc: 0-No pain     BMI Readings from Last 1 Encounters:   10/25/21 37.97 kg/m²   BMI is above normal parameters. Recommendations include: exercise counseling    Does the patient have evidence of cognitive impairment? No    Physical Exam            HEALTH RISK ASSESSMENT    Smoking Status:  Social History     Tobacco Use   Smoking Status Former Smoker   • Packs/day: 0.25   • Years: 25.00   • Pack years: 6.25   • Types: Cigarettes   • Start date: 10/1/1964   • Quit date: 1992   • Years since quittin.8   Smokeless Tobacco Never Used     Alcohol Consumption:  Social History     Substance and Sexual Activity   Alcohol Use No     Fall Risk Screen:    BONITA " Fall Risk Assessment was completed, and patient is at MODERATE risk for falls. Assessment completed on:10/25/2021    Depression Screening:  PHQ-2/PHQ-9 Depression Screening 10/25/2021   Little interest or pleasure in doing things 0   Feeling down, depressed, or hopeless 0   Total Score 0       Health Habits and Functional and Cognitive Screening:  Functional & Cognitive Status 10/25/2021   Do you have difficulty preparing food and eating? No   Do you have difficulty bathing yourself, getting dressed or grooming yourself? Yes   Do you have difficulty using the toilet? No   Do you have difficulty moving around from place to place? Yes   Do you have trouble with steps or getting out of a bed or a chair? Yes   Current Diet Well Balanced Diet   Dental Exam Not up to date   Eye Exam Not up to date   Exercise (times per week) 7 times per week   Current Exercises Include (No Data)        Exercise Comment PT   Do you need help using the phone?  No   Are you deaf or do you have serious difficulty hearing?  No   Do you need help with transportation? Yes   Do you need help shopping? Yes   Do you need help preparing meals?  Yes   Do you need help with housework?  Yes   Do you need help with laundry? Yes   Do you need help taking your medications? No   Do you need help managing money? No   Do you ever drive or ride in a car without wearing a seat belt? No   Have you felt unusual stress, anger or loneliness in the last month? No   Who do you live with? Alone   If you need help, do you have trouble finding someone available to you? No   Have you been bothered in the last four weeks by sexual problems? No   Do you have difficulty concentrating, remembering or making decisions? No       Age-appropriate Screening Schedule:  Refer to the list below for future screening recommendations based on patient's age, sex and/or medical conditions. Orders for these recommended tests are listed in the plan section. The patient has been provided  with a written plan.    Health Maintenance   Topic Date Due   • DXA SCAN  10/25/2021 (Originally 1945)   • ZOSTER VACCINE (1 of 2) 04/23/2022 (Originally 7/27/1996)   • TDAP/TD VACCINES (2 - Td or Tdap) 08/18/2027   • INFLUENZA VACCINE  Completed   • DIABETIC FOOT EXAM  Discontinued   • HEMOGLOBIN A1C  Discontinued   • DIABETIC EYE EXAM  Discontinued   • URINE MICROALBUMIN  Discontinued              Assessment/Plan   CMS Preventative Services Quick Reference  Risk Factors Identified During Encounter  Immunizations Discussed/Encouraged (specific Immunizations; Td, Influenza, Pneumococcal 23, Shingrix and COVID19  The above risks/problems have been discussed with the patient.  Follow up actions/plans if indicated are seen below in the Assessment/Plan Section.  Pertinent information has been shared with the patient in the After Visit Summary.    Diagnoses and all orders for this visit:    1. Encounter for annual wellness exam in Medicare patient (Primary)    2. Encounter for health-related screening  -     Mammo Screening Digital Tomosynthesis Bilateral With CAD; Future    3. Osteoporosis, unspecified osteoporosis type, unspecified pathological fracture presence  -     DEXA Bone Density Axial; Future        Follow Up:   Return in about 6 months (around 4/25/2022).     An After Visit Summary and PPPS were made available to the patient.

## 2021-12-07 ENCOUNTER — TELEPHONE (OUTPATIENT)
Dept: FAMILY MEDICINE CLINIC | Facility: CLINIC | Age: 76
End: 2021-12-07

## 2021-12-14 RX ORDER — APIXABAN 2.5 MG/1
TABLET, FILM COATED ORAL
Qty: 180 TABLET | Refills: 0 | Status: SHIPPED | OUTPATIENT
Start: 2021-12-14 | End: 2022-03-15

## 2021-12-14 RX ORDER — LISINOPRIL 20 MG/1
TABLET ORAL
Qty: 90 TABLET | Refills: 0 | Status: SHIPPED | OUTPATIENT
Start: 2021-12-14 | End: 2022-03-15

## 2021-12-14 RX ORDER — LEVOTHYROXINE SODIUM 0.03 MG/1
TABLET ORAL
Qty: 90 TABLET | Refills: 0 | Status: SHIPPED | OUTPATIENT
Start: 2021-12-14 | End: 2022-03-15

## 2022-01-03 ENCOUNTER — OFFICE VISIT (OUTPATIENT)
Dept: WOUND CARE | Facility: HOSPITAL | Age: 77
End: 2022-01-03

## 2022-01-03 DIAGNOSIS — L97.812 ULCER OF RIGHT PRETIBIAL REGION, WITH FAT LAYER EXPOSED: Primary | ICD-10-CM

## 2022-01-03 PROCEDURE — 87147 CULTURE TYPE IMMUNOLOGIC: CPT

## 2022-01-03 PROCEDURE — 87205 SMEAR GRAM STAIN: CPT

## 2022-01-03 PROCEDURE — 87077 CULTURE AEROBIC IDENTIFY: CPT

## 2022-01-03 PROCEDURE — 87186 SC STD MICRODIL/AGAR DIL: CPT

## 2022-01-03 PROCEDURE — G0463 HOSPITAL OUTPT CLINIC VISIT: HCPCS

## 2022-01-03 PROCEDURE — 87070 CULTURE OTHR SPECIMN AEROBIC: CPT

## 2022-01-06 LAB
BACTERIA SPEC AEROBE CULT: ABNORMAL
GRAM STN SPEC: ABNORMAL
GRAM STN SPEC: ABNORMAL

## 2022-01-10 ENCOUNTER — OFFICE VISIT (OUTPATIENT)
Dept: WOUND CARE | Facility: HOSPITAL | Age: 77
End: 2022-01-10

## 2022-01-17 ENCOUNTER — OFFICE VISIT (OUTPATIENT)
Dept: WOUND CARE | Facility: HOSPITAL | Age: 77
End: 2022-01-17

## 2022-01-17 ENCOUNTER — APPOINTMENT (OUTPATIENT)
Dept: WOUND CARE | Facility: HOSPITAL | Age: 77
End: 2022-01-17

## 2022-01-24 ENCOUNTER — OFFICE VISIT (OUTPATIENT)
Dept: WOUND CARE | Facility: HOSPITAL | Age: 77
End: 2022-01-24

## 2022-01-24 DIAGNOSIS — L97.812 ULCER OF RIGHT PRETIBIAL REGION, WITH FAT LAYER EXPOSED: Primary | ICD-10-CM

## 2022-01-24 PROCEDURE — 87205 SMEAR GRAM STAIN: CPT

## 2022-01-24 PROCEDURE — 87147 CULTURE TYPE IMMUNOLOGIC: CPT

## 2022-01-24 PROCEDURE — 87070 CULTURE OTHR SPECIMN AEROBIC: CPT

## 2022-01-24 PROCEDURE — 87186 SC STD MICRODIL/AGAR DIL: CPT

## 2022-01-26 LAB
BACTERIA SPEC AEROBE CULT: ABNORMAL
GRAM STN SPEC: ABNORMAL

## 2022-01-31 ENCOUNTER — OFFICE VISIT (OUTPATIENT)
Dept: WOUND CARE | Facility: HOSPITAL | Age: 77
End: 2022-01-31

## 2022-02-07 ENCOUNTER — OFFICE VISIT (OUTPATIENT)
Dept: WOUND CARE | Facility: HOSPITAL | Age: 77
End: 2022-02-07

## 2022-02-14 ENCOUNTER — OFFICE VISIT (OUTPATIENT)
Dept: WOUND CARE | Facility: HOSPITAL | Age: 77
End: 2022-02-14

## 2022-02-15 RX ORDER — METOPROLOL SUCCINATE 25 MG/1
TABLET, EXTENDED RELEASE ORAL
Qty: 30 TABLET | Refills: 3 | Status: SHIPPED | OUTPATIENT
Start: 2022-02-15 | End: 2022-06-17

## 2022-02-21 ENCOUNTER — OFFICE VISIT (OUTPATIENT)
Dept: WOUND CARE | Facility: HOSPITAL | Age: 77
End: 2022-02-21

## 2022-02-28 ENCOUNTER — OFFICE VISIT (OUTPATIENT)
Dept: WOUND CARE | Facility: HOSPITAL | Age: 77
End: 2022-02-28

## 2022-03-07 ENCOUNTER — OFFICE VISIT (OUTPATIENT)
Dept: WOUND CARE | Facility: HOSPITAL | Age: 77
End: 2022-03-07

## 2022-03-14 ENCOUNTER — OFFICE VISIT (OUTPATIENT)
Dept: WOUND CARE | Facility: HOSPITAL | Age: 77
End: 2022-03-14

## 2022-03-15 RX ORDER — LEVOTHYROXINE SODIUM 0.03 MG/1
TABLET ORAL
Qty: 90 TABLET | Refills: 0 | Status: SHIPPED | OUTPATIENT
Start: 2022-03-15 | End: 2022-06-14

## 2022-03-15 RX ORDER — APIXABAN 2.5 MG/1
TABLET, FILM COATED ORAL
Qty: 180 TABLET | Refills: 0 | Status: SHIPPED | OUTPATIENT
Start: 2022-03-15 | End: 2022-06-14

## 2022-03-15 RX ORDER — LISINOPRIL 20 MG/1
TABLET ORAL
Qty: 90 TABLET | Refills: 0 | Status: SHIPPED | OUTPATIENT
Start: 2022-03-15 | End: 2022-06-14

## 2022-03-21 ENCOUNTER — OFFICE VISIT (OUTPATIENT)
Dept: WOUND CARE | Facility: HOSPITAL | Age: 77
End: 2022-03-21

## 2022-03-28 ENCOUNTER — OFFICE VISIT (OUTPATIENT)
Dept: WOUND CARE | Facility: HOSPITAL | Age: 77
End: 2022-03-28

## 2022-04-04 ENCOUNTER — OFFICE VISIT (OUTPATIENT)
Dept: WOUND CARE | Facility: HOSPITAL | Age: 77
End: 2022-04-04

## 2022-04-11 ENCOUNTER — OFFICE VISIT (OUTPATIENT)
Dept: WOUND CARE | Facility: HOSPITAL | Age: 77
End: 2022-04-11

## 2022-04-11 PROCEDURE — G0463 HOSPITAL OUTPT CLINIC VISIT: HCPCS

## 2022-04-18 ENCOUNTER — OFFICE VISIT (OUTPATIENT)
Dept: WOUND CARE | Facility: HOSPITAL | Age: 77
End: 2022-04-18

## 2022-04-18 PROCEDURE — G0463 HOSPITAL OUTPT CLINIC VISIT: HCPCS

## 2022-04-25 ENCOUNTER — OFFICE VISIT (OUTPATIENT)
Dept: FAMILY MEDICINE CLINIC | Facility: CLINIC | Age: 77
End: 2022-04-25

## 2022-04-25 ENCOUNTER — LAB (OUTPATIENT)
Dept: LAB | Facility: HOSPITAL | Age: 77
End: 2022-04-25

## 2022-04-25 ENCOUNTER — OFFICE VISIT (OUTPATIENT)
Dept: WOUND CARE | Facility: HOSPITAL | Age: 77
End: 2022-04-25

## 2022-04-25 VITALS
DIASTOLIC BLOOD PRESSURE: 84 MMHG | HEIGHT: 72 IN | SYSTOLIC BLOOD PRESSURE: 153 MMHG | TEMPERATURE: 96.9 F | WEIGHT: 280 LBS | HEART RATE: 71 BPM | OXYGEN SATURATION: 97 % | BODY MASS INDEX: 37.93 KG/M2

## 2022-04-25 DIAGNOSIS — Z13.9 ENCOUNTER FOR HEALTH-RELATED SCREENING: ICD-10-CM

## 2022-04-25 DIAGNOSIS — I10 PRIMARY HYPERTENSION: Primary | ICD-10-CM

## 2022-04-25 DIAGNOSIS — E03.9 HYPOTHYROIDISM, UNSPECIFIED TYPE: ICD-10-CM

## 2022-04-25 DIAGNOSIS — E66.01 MORBID OBESITY: ICD-10-CM

## 2022-04-25 DIAGNOSIS — K21.9 GASTROESOPHAGEAL REFLUX DISEASE WITHOUT ESOPHAGITIS: Chronic | ICD-10-CM

## 2022-04-25 DIAGNOSIS — I10 PRIMARY HYPERTENSION: ICD-10-CM

## 2022-04-25 DIAGNOSIS — D68.59 THROMBOPHILIA: Chronic | ICD-10-CM

## 2022-04-25 LAB
ANION GAP SERPL CALCULATED.3IONS-SCNC: 12 MMOL/L (ref 5–15)
BUN SERPL-MCNC: 23 MG/DL (ref 8–23)
BUN/CREAT SERPL: 23.5 (ref 7–25)
CALCIUM SPEC-SCNC: 10.1 MG/DL (ref 8.6–10.5)
CHLORIDE SERPL-SCNC: 102 MMOL/L (ref 98–107)
CHOLEST SERPL-MCNC: 181 MG/DL (ref 0–200)
CO2 SERPL-SCNC: 27 MMOL/L (ref 22–29)
CREAT SERPL-MCNC: 0.98 MG/DL (ref 0.57–1)
EGFRCR SERPLBLD CKD-EPI 2021: 60.3 ML/MIN/1.73
GLUCOSE SERPL-MCNC: 108 MG/DL (ref 65–99)
HDLC SERPL-MCNC: 64 MG/DL (ref 40–60)
LDLC SERPL CALC-MCNC: 89 MG/DL (ref 0–100)
LDLC/HDLC SERPL: 1.32 {RATIO}
POTASSIUM SERPL-SCNC: 5 MMOL/L (ref 3.5–5.2)
SODIUM SERPL-SCNC: 141 MMOL/L (ref 136–145)
TRIGL SERPL-MCNC: 162 MG/DL (ref 0–150)
TSH SERPL DL<=0.05 MIU/L-ACNC: 3.98 UIU/ML (ref 0.27–4.2)
VLDLC SERPL-MCNC: 28 MG/DL (ref 5–40)

## 2022-04-25 PROCEDURE — 36415 COLL VENOUS BLD VENIPUNCTURE: CPT

## 2022-04-25 PROCEDURE — 84443 ASSAY THYROID STIM HORMONE: CPT

## 2022-04-25 PROCEDURE — 80061 LIPID PANEL: CPT

## 2022-04-25 PROCEDURE — 80048 BASIC METABOLIC PNL TOTAL CA: CPT

## 2022-04-25 PROCEDURE — 17250 CHEM CAUT OF GRANLTJ TISSUE: CPT

## 2022-04-25 PROCEDURE — 99214 OFFICE O/P EST MOD 30 MIN: CPT | Performed by: FAMILY MEDICINE

## 2022-04-25 NOTE — PATIENT INSTRUCTIONS
Continue your current medications and treatment.    Follow up in the office in 6 months.    Have the follow up labs done and call for results.

## 2022-04-25 NOTE — PROGRESS NOTES
"Subjective   Camelia Gray is a 76 y.o. female.     Chief Complaint   Patient presents with   • Hypothyroidism   • Hypertension     6 month f/u       HPI chief complaint: Hypertension hypothyroidism thrombophilia obesity gastroesophageal reflux disease    The patient is a 76-year-old white female who comes in for follow-up and maintenance of her current problems which include    1.  Hypertension-stable-patient is on lisinopril 20 mg daily metoprolol 25 mg once a day.  Her blood pressures controlled.  She denied headache lightheadedness dizziness or chest pain.    2.  Thrombophilia-stable-patient on Eliquis 2.5 mg twice a day.  She denied recent DVT or pulmonary emboli.  She denied cough hemoptysis shortness of breath.    3.  Hypothyroidism-stable after patient on Synthroid 0.025 mg daily.  She denied heat or cold intolerance tremor weight gain or weight loss.    4.  Gastroesophageal reflux disease-stable after patient on Protonix 40 mg daily.  She denies dysphagia or heartburn.    5.  Obesity-patient's morbidly obese.  Her BMI is 37.97.  Patient however was not able to weigh today.  I suspect her BMI is higher than this.  She is limited in her ability to exercise which makes it difficult for her to drop weight.  Her obesity is secondary more to inactivity then excessive caloric intake.          The following portions of the patient's history were reviewed and updated as appropriate: allergies, current medications, past family history, past medical history, past social history, past surgical history and problem list.    Review of Systems    Objective     /84 (BP Location: Left arm, Patient Position: Sitting, Cuff Size: Adult)   Pulse 71   Temp 96.9 °F (36.1 °C) (Infrared)   Ht 182.9 cm (72\")   Wt 127 kg (280 lb)   SpO2 97%   BMI 37.97 kg/m²     Physical Exam  Vitals and nursing note reviewed.   Constitutional:       Appearance: She is well-developed. She is obese.   HENT:      Head: Normocephalic and " atraumatic.   Eyes:      Pupils: Pupils are equal, round, and reactive to light.   Cardiovascular:      Rate and Rhythm: Normal rate and regular rhythm.      Pulses: Normal pulses.      Heart sounds: Normal heart sounds. No murmur heard.    No friction rub. No gallop.   Pulmonary:      Effort: Pulmonary effort is normal.      Breath sounds: Normal breath sounds.   Abdominal:      General: Bowel sounds are normal.      Palpations: Abdomen is soft.   Musculoskeletal:         General: Normal range of motion.      Cervical back: Neck supple.   Skin:     General: Skin is warm and dry.   Neurological:      Mental Status: She is alert and oriented to person, place, and time.   Psychiatric:         Behavior: Behavior normal.         Thought Content: Thought content normal.         Judgment: Judgment normal.           Assessment/Plan   Diagnoses and all orders for this visit:    1. Primary hypertension (Primary)  -     Basic Metabolic Panel; Future    2. Hypothyroidism, unspecified type  -     TSH; Future    3. Encounter for health-related screening  -     Lipid Panel; Future    4. Thrombophilia (HCC)    5. Morbid obesity (HCC)-patient was encouraged the best she can to exercise as much as possible.    6. Gastroesophageal reflux disease without esophagitis      Patient Instructions   Continue your current medications and treatment.    Follow up in the office in 6 months.    Have the follow up labs done and call for results.      Eric Vickers Jr., MD    04/25/22

## 2022-05-02 ENCOUNTER — OFFICE VISIT (OUTPATIENT)
Dept: WOUND CARE | Facility: HOSPITAL | Age: 77
End: 2022-05-02

## 2022-05-02 PROCEDURE — 17250 CHEM CAUT OF GRANLTJ TISSUE: CPT

## 2022-05-03 ENCOUNTER — DOCUMENTATION (OUTPATIENT)
Dept: FAMILY MEDICINE CLINIC | Facility: CLINIC | Age: 77
End: 2022-05-03

## 2022-05-03 DIAGNOSIS — U07.1 COVID-19 VIRUS INFECTION: Primary | ICD-10-CM

## 2022-05-04 ENCOUNTER — LAB (OUTPATIENT)
Dept: LAB | Facility: HOSPITAL | Age: 77
End: 2022-05-04

## 2022-05-04 DIAGNOSIS — U07.1 COVID-19 VIRUS INFECTION: ICD-10-CM

## 2022-05-04 LAB
ALBUMIN SERPL-MCNC: 4 G/DL (ref 3.5–5.2)
ALBUMIN/GLOB SERPL: 1.2 G/DL
ALP SERPL-CCNC: 126 U/L (ref 39–117)
ALT SERPL W P-5'-P-CCNC: 17 U/L (ref 1–33)
ANION GAP SERPL CALCULATED.3IONS-SCNC: 11.4 MMOL/L (ref 5–15)
AST SERPL-CCNC: 18 U/L (ref 1–32)
BASOPHILS # BLD AUTO: 0.03 10*3/MM3 (ref 0–0.2)
BASOPHILS NFR BLD AUTO: 0.6 % (ref 0–1.5)
BILIRUB SERPL-MCNC: 0.3 MG/DL (ref 0–1.2)
BUN SERPL-MCNC: 22 MG/DL (ref 8–23)
BUN/CREAT SERPL: 21.8 (ref 7–25)
CALCIUM SPEC-SCNC: 9.1 MG/DL (ref 8.6–10.5)
CHLORIDE SERPL-SCNC: 102 MMOL/L (ref 98–107)
CO2 SERPL-SCNC: 24.6 MMOL/L (ref 22–29)
CREAT SERPL-MCNC: 1.01 MG/DL (ref 0.57–1)
DEPRECATED RDW RBC AUTO: 46.6 FL (ref 37–54)
EGFRCR SERPLBLD CKD-EPI 2021: 58.2 ML/MIN/1.73
EOSINOPHIL # BLD AUTO: 0.28 10*3/MM3 (ref 0–0.4)
EOSINOPHIL NFR BLD AUTO: 5.6 % (ref 0.3–6.2)
ERYTHROCYTE [DISTWIDTH] IN BLOOD BY AUTOMATED COUNT: 13.9 % (ref 12.3–15.4)
GLOBULIN UR ELPH-MCNC: 3.3 GM/DL
GLUCOSE SERPL-MCNC: 112 MG/DL (ref 65–99)
HCT VFR BLD AUTO: 44.3 % (ref 34–46.6)
HGB BLD-MCNC: 14.3 G/DL (ref 12–15.9)
IMM GRANULOCYTES # BLD AUTO: 0.02 10*3/MM3 (ref 0–0.05)
IMM GRANULOCYTES NFR BLD AUTO: 0.4 % (ref 0–0.5)
LYMPHOCYTES # BLD AUTO: 1.02 10*3/MM3 (ref 0.7–3.1)
LYMPHOCYTES NFR BLD AUTO: 20.2 % (ref 19.6–45.3)
MCH RBC QN AUTO: 29.5 PG (ref 26.6–33)
MCHC RBC AUTO-ENTMCNC: 32.3 G/DL (ref 31.5–35.7)
MCV RBC AUTO: 91.3 FL (ref 79–97)
MONOCYTES # BLD AUTO: 0.87 10*3/MM3 (ref 0.1–0.9)
MONOCYTES NFR BLD AUTO: 17.3 % (ref 5–12)
NEUTROPHILS NFR BLD AUTO: 2.82 10*3/MM3 (ref 1.7–7)
NEUTROPHILS NFR BLD AUTO: 55.9 % (ref 42.7–76)
NRBC BLD AUTO-RTO: 0 /100 WBC (ref 0–0.2)
PLATELET # BLD AUTO: 215 10*3/MM3 (ref 140–450)
PMV BLD AUTO: 9.6 FL (ref 6–12)
POTASSIUM SERPL-SCNC: 4.3 MMOL/L (ref 3.5–5.2)
PROT SERPL-MCNC: 7.3 G/DL (ref 6–8.5)
RBC # BLD AUTO: 4.85 10*6/MM3 (ref 3.77–5.28)
SODIUM SERPL-SCNC: 138 MMOL/L (ref 136–145)
WBC NRBC COR # BLD: 5.04 10*3/MM3 (ref 3.4–10.8)

## 2022-05-04 PROCEDURE — 36415 COLL VENOUS BLD VENIPUNCTURE: CPT

## 2022-05-04 PROCEDURE — 80053 COMPREHEN METABOLIC PANEL: CPT

## 2022-05-04 PROCEDURE — 85025 COMPLETE CBC W/AUTO DIFF WBC: CPT

## 2022-05-05 ENCOUNTER — TELEPHONE (OUTPATIENT)
Dept: FAMILY MEDICINE CLINIC | Facility: CLINIC | Age: 77
End: 2022-05-05

## 2022-05-05 NOTE — TELEPHONE ENCOUNTER
Caller: Camelia Gray    Relationship: Self    Best call back number: [phone] (H)    What test was performed: LAB WORK     When was the test performed: YESTERDAY/ A WEEK AGO     Where was the test performed: IN OFFICE     Additional notes:       PATIENT HAS COVID AND IS WONDERING IF SHE IS ELIGABLE TO GET THE ANTIVIRAL MEDICATION- SHE REQUESTS A CALL BACK REGARDING THESE RESULTS AT OUR E[facility]LIE[employer]

## 2022-05-05 NOTE — TELEPHONE ENCOUNTER
PATIENT IS CALLING TO STATE THE PHARMACY TOLD HER THAT THE ANTIVIRAL MEDICATION WILL INTERACT WITH THE ELIQUIS.    SHE STATES THAT THE PHARMACY TOLD HER SHE WOULD NEED TO GO OFF OF THIS MEDICATION FOR 5 DAYS    Eliquis 2.5 MG tablet tablet    SHE IS WANTING TO KNOW IF DR. DESAI WOULD APPROVE OF HER BEING OFF THE MEDICATION FOR 5 DAYS.  SHE IS REQUESTING A CALL BACK.    PATIENT CALL BACK NUMBER [phone]    PLEASE ADVISE.

## 2022-05-05 NOTE — TELEPHONE ENCOUNTER
The patient called me after hours on 5/3/2022.    The patient has had symptoms of COVID and tested positive for COVID.   We discussed treatment options which were supportive care and symptomatic treatment.  We also discussed patient taking the oral antiviral for COVID.  The patient expressed interest in this.  I contacted the pharmacy that night.  I was advised the patient needed follow-up labs and they will contact the patient to get her the medication.  Patient states she has not heard anything.  I contacted Nixon today at the outpatient lab.    He stated the patient could drive to the hospital and call them and they would bring the medication out to her.  I relayed this to the patient.

## 2022-05-05 NOTE — TELEPHONE ENCOUNTER
Spoke with patient.  She states that she is feeling better.  At this point time is felt that the risk of stopping her Eliquis so that she could take the antiviral outweighs the benefit.  Patient is therefore not cannot take the antiviral medication.  Patient was offered the antibody injection.  Patient states that she is feeling better and she does not want to take this and wants to continue with symptomatic care.

## 2022-05-09 ENCOUNTER — OFFICE VISIT (OUTPATIENT)
Dept: WOUND CARE | Facility: HOSPITAL | Age: 77
End: 2022-05-09

## 2022-05-09 PROCEDURE — 17250 CHEM CAUT OF GRANLTJ TISSUE: CPT

## 2022-05-16 ENCOUNTER — OFFICE VISIT (OUTPATIENT)
Dept: WOUND CARE | Facility: HOSPITAL | Age: 77
End: 2022-05-16

## 2022-05-16 PROCEDURE — G0463 HOSPITAL OUTPT CLINIC VISIT: HCPCS

## 2022-05-23 ENCOUNTER — APPOINTMENT (OUTPATIENT)
Dept: WOUND CARE | Facility: HOSPITAL | Age: 77
End: 2022-05-23

## 2022-05-23 ENCOUNTER — OFFICE VISIT (OUTPATIENT)
Dept: WOUND CARE | Facility: HOSPITAL | Age: 77
End: 2022-05-23

## 2022-05-23 PROCEDURE — G0463 HOSPITAL OUTPT CLINIC VISIT: HCPCS

## 2022-06-06 ENCOUNTER — OFFICE VISIT (OUTPATIENT)
Dept: WOUND CARE | Facility: HOSPITAL | Age: 77
End: 2022-06-06

## 2022-06-06 PROCEDURE — G0463 HOSPITAL OUTPT CLINIC VISIT: HCPCS

## 2022-06-14 RX ORDER — APIXABAN 2.5 MG/1
TABLET, FILM COATED ORAL
Qty: 180 TABLET | Refills: 0 | Status: SHIPPED | OUTPATIENT
Start: 2022-06-14 | End: 2022-09-14

## 2022-06-14 RX ORDER — LEVOTHYROXINE SODIUM 0.03 MG/1
TABLET ORAL
Qty: 90 TABLET | Refills: 0 | Status: SHIPPED | OUTPATIENT
Start: 2022-06-14 | End: 2022-09-14

## 2022-06-14 RX ORDER — LISINOPRIL 20 MG/1
TABLET ORAL
Qty: 90 TABLET | Refills: 0 | Status: SHIPPED | OUTPATIENT
Start: 2022-06-14 | End: 2022-09-14

## 2022-06-17 RX ORDER — METOPROLOL SUCCINATE 25 MG/1
TABLET, EXTENDED RELEASE ORAL
Qty: 30 TABLET | Refills: 2 | Status: SHIPPED | OUTPATIENT
Start: 2022-06-17 | End: 2022-09-14

## 2022-08-30 ENCOUNTER — LAB REQUISITION (OUTPATIENT)
Dept: LAB | Facility: HOSPITAL | Age: 77
End: 2022-08-30

## 2022-08-30 ENCOUNTER — OFFICE VISIT (OUTPATIENT)
Dept: WOUND CARE | Facility: HOSPITAL | Age: 77
End: 2022-08-30

## 2022-08-30 DIAGNOSIS — L97.812 NON-PRESSURE CHRONIC ULCER OF OTHER PART OF RIGHT LOWER LEG WITH FAT LAYER EXPOSED: ICD-10-CM

## 2022-08-30 PROCEDURE — 87070 CULTURE OTHR SPECIMN AEROBIC: CPT | Performed by: SURGERY

## 2022-08-30 PROCEDURE — 87205 SMEAR GRAM STAIN: CPT | Performed by: SURGERY

## 2022-08-30 PROCEDURE — G0463 HOSPITAL OUTPT CLINIC VISIT: HCPCS

## 2022-08-31 ENCOUNTER — TRANSCRIBE ORDERS (OUTPATIENT)
Dept: ADMINISTRATIVE | Facility: HOSPITAL | Age: 77
End: 2022-08-31

## 2022-08-31 ENCOUNTER — LAB (OUTPATIENT)
Dept: LAB | Facility: HOSPITAL | Age: 77
End: 2022-08-31

## 2022-08-31 DIAGNOSIS — Z13.1 SCREENING FOR DIABETES MELLITUS: ICD-10-CM

## 2022-08-31 DIAGNOSIS — E66.01 MORBID OBESITY: ICD-10-CM

## 2022-08-31 DIAGNOSIS — I89.0 LYMPHEDEMA: Primary | ICD-10-CM

## 2022-08-31 DIAGNOSIS — I89.0 LYMPHEDEMA: ICD-10-CM

## 2022-08-31 LAB
ALBUMIN SERPL-MCNC: 4.2 G/DL (ref 3.5–5.2)
ALBUMIN/GLOB SERPL: 1.3 G/DL
ALP SERPL-CCNC: 134 U/L (ref 39–117)
ALT SERPL W P-5'-P-CCNC: 16 U/L (ref 1–33)
ANION GAP SERPL CALCULATED.3IONS-SCNC: 11.7 MMOL/L (ref 5–15)
AST SERPL-CCNC: 14 U/L (ref 1–32)
BASOPHILS # BLD AUTO: 0.02 10*3/MM3 (ref 0–0.2)
BASOPHILS NFR BLD AUTO: 0.3 % (ref 0–1.5)
BILIRUB SERPL-MCNC: 0.3 MG/DL (ref 0–1.2)
BUN SERPL-MCNC: 25 MG/DL (ref 8–23)
BUN/CREAT SERPL: 25.5 (ref 7–25)
CALCIUM SPEC-SCNC: 10.3 MG/DL (ref 8.6–10.5)
CHLORIDE SERPL-SCNC: 101 MMOL/L (ref 98–107)
CO2 SERPL-SCNC: 28.3 MMOL/L (ref 22–29)
CREAT SERPL-MCNC: 0.98 MG/DL (ref 0.57–1)
DEPRECATED RDW RBC AUTO: 41.4 FL (ref 37–54)
EGFRCR SERPLBLD CKD-EPI 2021: 59.9 ML/MIN/1.73
EOSINOPHIL # BLD AUTO: 0.12 10*3/MM3 (ref 0–0.4)
EOSINOPHIL NFR BLD AUTO: 1.9 % (ref 0.3–6.2)
ERYTHROCYTE [DISTWIDTH] IN BLOOD BY AUTOMATED COUNT: 12.9 % (ref 12.3–15.4)
GLOBULIN UR ELPH-MCNC: 3.3 GM/DL
GLUCOSE SERPL-MCNC: 123 MG/DL (ref 65–99)
HBA1C MFR BLD: 6.1 % (ref 3.5–5.6)
HCT VFR BLD AUTO: 42.4 % (ref 34–46.6)
HGB BLD-MCNC: 14.3 G/DL (ref 12–15.9)
IMM GRANULOCYTES # BLD AUTO: 0.02 10*3/MM3 (ref 0–0.05)
IMM GRANULOCYTES NFR BLD AUTO: 0.3 % (ref 0–0.5)
LYMPHOCYTES # BLD AUTO: 1.11 10*3/MM3 (ref 0.7–3.1)
LYMPHOCYTES NFR BLD AUTO: 17.5 % (ref 19.6–45.3)
MCH RBC QN AUTO: 29.6 PG (ref 26.6–33)
MCHC RBC AUTO-ENTMCNC: 33.7 G/DL (ref 31.5–35.7)
MCV RBC AUTO: 87.8 FL (ref 79–97)
MONOCYTES # BLD AUTO: 0.44 10*3/MM3 (ref 0.1–0.9)
MONOCYTES NFR BLD AUTO: 6.9 % (ref 5–12)
NEUTROPHILS NFR BLD AUTO: 4.64 10*3/MM3 (ref 1.7–7)
NEUTROPHILS NFR BLD AUTO: 73.1 % (ref 42.7–76)
NRBC BLD AUTO-RTO: 0 /100 WBC (ref 0–0.2)
PLATELET # BLD AUTO: 227 10*3/MM3 (ref 140–450)
PMV BLD AUTO: 9.6 FL (ref 6–12)
POTASSIUM SERPL-SCNC: 5.2 MMOL/L (ref 3.5–5.2)
PREALB SERPL-MCNC: 21.9 MG/DL (ref 20–40)
PROT SERPL-MCNC: 7.5 G/DL (ref 6–8.5)
RBC # BLD AUTO: 4.83 10*6/MM3 (ref 3.77–5.28)
SODIUM SERPL-SCNC: 141 MMOL/L (ref 136–145)
WBC NRBC COR # BLD: 6.35 10*3/MM3 (ref 3.4–10.8)

## 2022-08-31 PROCEDURE — 80053 COMPREHEN METABOLIC PANEL: CPT

## 2022-08-31 PROCEDURE — 36415 COLL VENOUS BLD VENIPUNCTURE: CPT

## 2022-08-31 PROCEDURE — 84134 ASSAY OF PREALBUMIN: CPT

## 2022-08-31 PROCEDURE — 85025 COMPLETE CBC W/AUTO DIFF WBC: CPT

## 2022-08-31 PROCEDURE — 83036 HEMOGLOBIN GLYCOSYLATED A1C: CPT

## 2022-09-02 LAB
BACTERIA SPEC AEROBE CULT: NORMAL
GRAM STN SPEC: NORMAL

## 2022-09-06 ENCOUNTER — OFFICE VISIT (OUTPATIENT)
Dept: WOUND CARE | Facility: HOSPITAL | Age: 77
End: 2022-09-06

## 2022-09-09 ENCOUNTER — OFFICE (AMBULATORY)
Dept: URBAN - METROPOLITAN AREA CLINIC 64 | Facility: CLINIC | Age: 77
End: 2022-09-09

## 2022-09-09 VITALS — SYSTOLIC BLOOD PRESSURE: 146 MMHG | HEART RATE: 71 BPM | DIASTOLIC BLOOD PRESSURE: 82 MMHG | WEIGHT: 293 LBS

## 2022-09-09 DIAGNOSIS — R13.10 DYSPHAGIA, UNSPECIFIED: ICD-10-CM

## 2022-09-09 PROCEDURE — 99213 OFFICE O/P EST LOW 20 MIN: CPT | Performed by: NURSE PRACTITIONER

## 2022-09-09 RX ORDER — PANTOPRAZOLE SODIUM 40 MG/1
TABLET, DELAYED RELEASE ORAL
Qty: 90 | Refills: 1 | Status: ACTIVE

## 2022-09-13 ENCOUNTER — OFFICE VISIT (OUTPATIENT)
Dept: WOUND CARE | Facility: HOSPITAL | Age: 77
End: 2022-09-13

## 2022-09-14 RX ORDER — LISINOPRIL 20 MG/1
TABLET ORAL
Qty: 90 TABLET | Refills: 0 | Status: SHIPPED | OUTPATIENT
Start: 2022-09-14 | End: 2022-10-13

## 2022-09-14 RX ORDER — APIXABAN 2.5 MG/1
TABLET, FILM COATED ORAL
Qty: 180 TABLET | Refills: 0 | Status: SHIPPED | OUTPATIENT
Start: 2022-09-14 | End: 2022-12-13

## 2022-09-14 RX ORDER — METOPROLOL SUCCINATE 25 MG/1
TABLET, EXTENDED RELEASE ORAL
Qty: 30 TABLET | Refills: 1 | Status: SHIPPED | OUTPATIENT
Start: 2022-09-14 | End: 2022-11-09

## 2022-09-14 RX ORDER — LEVOTHYROXINE SODIUM 0.03 MG/1
TABLET ORAL
Qty: 90 TABLET | Refills: 0 | Status: SHIPPED | OUTPATIENT
Start: 2022-09-14 | End: 2022-10-13

## 2022-09-20 ENCOUNTER — OFFICE VISIT (OUTPATIENT)
Dept: WOUND CARE | Facility: HOSPITAL | Age: 77
End: 2022-09-20

## 2022-09-20 PROCEDURE — G0463 HOSPITAL OUTPT CLINIC VISIT: HCPCS

## 2022-09-28 ENCOUNTER — TELEPHONE (OUTPATIENT)
Dept: FAMILY MEDICINE CLINIC | Facility: CLINIC | Age: 77
End: 2022-09-28

## 2022-09-28 RX ORDER — TRIAMTERENE AND HYDROCHLOROTHIAZIDE 37.5; 25 MG/1; MG/1
1 CAPSULE ORAL EVERY MORNING
Qty: 30 CAPSULE | Refills: 2 | Status: SHIPPED | OUTPATIENT
Start: 2022-09-28 | End: 2022-11-01 | Stop reason: SDUPTHER

## 2022-10-01 ENCOUNTER — CLINICAL SUPPORT (OUTPATIENT)
Dept: FAMILY MEDICINE CLINIC | Facility: CLINIC | Age: 77
End: 2022-10-01

## 2022-10-01 DIAGNOSIS — Z23 NEED FOR INFLUENZA VACCINATION: Primary | ICD-10-CM

## 2022-10-01 PROCEDURE — 90662 IIV NO PRSV INCREASED AG IM: CPT | Performed by: FAMILY MEDICINE

## 2022-10-01 PROCEDURE — G0008 ADMIN INFLUENZA VIRUS VAC: HCPCS | Performed by: FAMILY MEDICINE

## 2022-10-13 RX ORDER — LEVOTHYROXINE SODIUM 0.03 MG/1
TABLET ORAL
Qty: 90 TABLET | Refills: 0 | Status: SHIPPED | OUTPATIENT
Start: 2022-10-13 | End: 2023-03-14

## 2022-10-13 RX ORDER — LISINOPRIL 20 MG/1
TABLET ORAL
Qty: 90 TABLET | Refills: 0 | Status: SHIPPED | OUTPATIENT
Start: 2022-10-13 | End: 2023-03-14

## 2022-11-01 ENCOUNTER — OFFICE VISIT (OUTPATIENT)
Dept: FAMILY MEDICINE CLINIC | Facility: CLINIC | Age: 77
End: 2022-11-01

## 2022-11-01 VITALS
RESPIRATION RATE: 18 BRPM | OXYGEN SATURATION: 93 % | HEIGHT: 72 IN | WEIGHT: 293 LBS | BODY MASS INDEX: 39.68 KG/M2 | SYSTOLIC BLOOD PRESSURE: 130 MMHG | HEART RATE: 78 BPM | DIASTOLIC BLOOD PRESSURE: 83 MMHG | TEMPERATURE: 96.9 F

## 2022-11-01 DIAGNOSIS — Z00.00 ENCOUNTER FOR ANNUAL WELLNESS EXAM IN MEDICARE PATIENT: Primary | ICD-10-CM

## 2022-11-01 DIAGNOSIS — Z12.31 ENCOUNTER FOR SCREENING MAMMOGRAM FOR MALIGNANT NEOPLASM OF BREAST: ICD-10-CM

## 2022-11-01 DIAGNOSIS — Z13.9 ENCOUNTER FOR HEALTH-RELATED SCREENING: ICD-10-CM

## 2022-11-01 PROCEDURE — 1126F AMNT PAIN NOTED NONE PRSNT: CPT | Performed by: FAMILY MEDICINE

## 2022-11-01 PROCEDURE — G0439 PPPS, SUBSEQ VISIT: HCPCS | Performed by: FAMILY MEDICINE

## 2022-11-01 PROCEDURE — 1170F FXNL STATUS ASSESSED: CPT | Performed by: FAMILY MEDICINE

## 2022-11-01 PROCEDURE — 1160F RVW MEDS BY RX/DR IN RCRD: CPT | Performed by: FAMILY MEDICINE

## 2022-11-01 RX ORDER — TRIAMTERENE AND HYDROCHLOROTHIAZIDE 37.5; 25 MG/1; MG/1
1 CAPSULE ORAL DAILY PRN
Qty: 30 CAPSULE | Refills: 2
Start: 2022-11-01

## 2022-11-01 NOTE — PROGRESS NOTES
The ABCs of the Annual Wellness Visit  Subsequent Medicare Wellness Visit    Chief Complaint   Patient presents with   • Medicare Wellness-subsequent   • Hypothyroidism     6 mth f/u   • Hypertension      Subjective    History of Present Illness:  Camelia Gray is a 77 y.o. female who presents for a Subsequent Medicare Wellness Visit.    The patient has history of hypertension thrombophilia hypothyroidism morbid obesity gastroesophageal reflux disease osteoarthritis and immobility syndrome.    The following portions of the patient's history were reviewed and   updated as appropriate: allergies, current medications, past family history, past medical history, past social history, past surgical history and problem list.    Compared to one year ago, the patient feels her physical   health is the same.    Compared to one year ago, the patient feels her mental   health is the same.    Recent Hospitalizations:  She was not admitted to the hospital during the last year.       Current Medical Providers:  Patient Care Team:  Eric Vickers Jr., MD as PCP - General (Family Medicine)    Outpatient Medications Prior to Visit   Medication Sig Dispense Refill   • B Complex Vitamins (vitamin b complex) capsule capsule Take 1 capsule by mouth Daily.     • Eliquis 2.5 MG tablet tablet TAKE ONE TABLET BY MOUTH EVERY 12 HOURS 180 tablet 0   • levothyroxine (SYNTHROID, LEVOTHROID) 25 MCG tablet TAKE ONE TABLET BY MOUTH EVERY DAY 90 tablet 0   • lisinopril (PRINIVIL,ZESTRIL) 20 MG tablet TAKE 1 TABLET BY MOUTH DAILY 90 tablet 0   • metoprolol succinate XL (TOPROL-XL) 25 MG 24 hr tablet TAKE 1 TABLET BY MOUTH EVERY DAY (HOLD IF SYSTOLIC LESS THAN 110 OR HEART RATE LESS THAN 60) 30 tablet 1   • Multiple Vitamins-Minerals (multivitamin with minerals) tablet tablet Take 1 tablet by mouth Daily. LD 11/4     • pantoprazole (Protonix) 40 MG EC tablet Take 1 tablet by mouth Daily. 90 tablet 1   • triamterene-hydrochlorothiazide (Dyazide)  "37.5-25 MG per capsule Take 1 capsule by mouth Every Morning. 30 capsule 2     No facility-administered medications prior to visit.       No opioid medication identified on active medication list. I have reviewed chart for other potential  high risk medication/s and harmful drug interactions in the elderly.          Aspirin is not on active medication list.  Aspirin use is not indicated based on review of current medical condition/s. Risk of harm outweighs potential benefits.  .    Patient Active Problem List   Diagnosis   • Morbid obesity (HCC)   • Arthritis of left hip   • Deep vein thrombosis (DVT) (HCC)   • Hypertension   • Pulmonary embolism (HCC)   • Stasis dermatitis   • Immobility syndrome   • Skin ulcer of toe of right foot with fat layer exposed (HCC)   • Primary osteoarthritis of left hip   • Hypothyroidism   • Status post total replacement of hip   • Thrombophilia (HCC)   • Primary osteoarthritis involving multiple joints   • Gastroesophageal reflux disease without esophagitis   • Obstructive uropathy     Advance Care Planning  Advance Directive is on file.  ACP discussion was held with the patient during this visit. Patient has an advance directive in EMR which is still valid.           Objective    Vitals:    11/01/22 1109   BP: 130/83   BP Location: Left arm   Patient Position: Sitting   Cuff Size: Large Adult   Pulse: 78   Resp: 18   Temp: 96.9 °F (36.1 °C)   TempSrc: Infrared   SpO2: 93%   Weight: (!) 151 kg (332 lb)   Height: 182.9 cm (72\")   PainSc: 0-No pain     Estimated body mass index is 45.03 kg/m² as calculated from the following:    Height as of this encounter: 182.9 cm (72\").    Weight as of this encounter: 151 kg (332 lb).    Class 3 Severe Obesity (BMI >=40). Obesity-related health conditions include the following: hypertension and osteoarthritis. Obesity is unchanged. BMI is is above average; BMI management plan is completed. We discussed portion control and increasing " exercise.      Does the patient have evidence of cognitive impairment? No    Physical Exam  Vitals and nursing note reviewed.   Constitutional:       Appearance: She is well-developed. She is obese.   HENT:      Head: Normocephalic and atraumatic.   Eyes:      Pupils: Pupils are equal, round, and reactive to light.   Cardiovascular:      Rate and Rhythm: Normal rate and regular rhythm.      Heart sounds: Normal heart sounds.   Pulmonary:      Effort: Pulmonary effort is normal.      Breath sounds: Normal breath sounds.   Abdominal:      General: Bowel sounds are normal.      Palpations: Abdomen is soft.   Musculoskeletal:         General: Normal range of motion.      Cervical back: Neck supple.   Skin:     General: Skin is warm and dry.   Neurological:      Mental Status: She is alert and oriented to person, place, and time.   Psychiatric:         Behavior: Behavior normal.         Thought Content: Thought content normal.         Judgment: Judgment normal.       Lab Results   Component Value Date    HGBA1C 6.1 (H) 2022            HEALTH RISK ASSESSMENT    Smoking Status:  Social History     Tobacco Use   Smoking Status Former   • Packs/day: 0.25   • Years: 25.00   • Pack years: 6.25   • Types: Cigarettes   • Start date: 10/1/1964   • Quit date: 1992   • Years since quittin.8   Smokeless Tobacco Never     Alcohol Consumption:  Social History     Substance and Sexual Activity   Alcohol Use No     Fall Risk Screen:    STEADI Fall Risk Assessment was completed, and patient is at MODERATE risk for falls. Assessment completed on:2022    Depression Screening:  PHQ-2/PHQ-9 Depression Screening 2022   Retired PHQ-9 Total Score -   Retired Total Score -   Little Interest or Pleasure in Doing Things 0-->not at all   Feeling Down, Depressed or Hopeless 0-->not at all   PHQ-9: Brief Depression Severity Measure Score 0       Health Habits and Functional and Cognitive Screening:  Functional & Cognitive  Status 11/1/2022   Do you have difficulty preparing food and eating? No   Do you have difficulty bathing yourself, getting dressed or grooming yourself? No   Do you have difficulty using the toilet? No   Do you have difficulty moving around from place to place? Yes   Do you have trouble with steps or getting out of a bed or a chair? No   Current Diet Well Balanced Diet   Dental Exam Not up to date   Eye Exam Not up to date   Exercise (times per week) 0 times per week   Current Exercises Include No Regular Exercise        Exercise Comment -   Do you need help using the phone?  No   Are you deaf or do you have serious difficulty hearing?  No   Do you need help with transportation? Yes   Do you need help shopping? Yes   Do you need help preparing meals?  Yes   Do you need help with housework?  Yes   Do you need help with laundry? Yes   Do you need help taking your medications? No   Do you need help managing money? No   Do you ever drive or ride in a car without wearing a seat belt? No   Have you felt unusual stress, anger or loneliness in the last month? No   Who do you live with? Alone   If you need help, do you have trouble finding someone available to you? No   Have you been bothered in the last four weeks by sexual problems? No   Do you have difficulty concentrating, remembering or making decisions? No       Age-appropriate Screening Schedule:  Refer to the list below for future screening recommendations based on patient's age, sex and/or medical conditions. Orders for these recommended tests are listed in the plan section. The patient has been provided with a written plan.    Health Maintenance   Topic Date Due   • ZOSTER VACCINE (1 of 2) 11/01/2022 (Originally 7/27/1996)   • DXA SCAN  12/06/2023   • TDAP/TD VACCINES (2 - Td or Tdap) 08/18/2027   • INFLUENZA VACCINE  Completed              Assessment & Plan   CMS Preventative Services Quick Reference  Risk Factors Identified During Encounter  Immunizations  Discussed/Encouraged (specific Immunizations; Td, Influenza, Pneumococcal 23, Prevnar 20 (Pneumococcal 20-valent conjugate), Vaxneuvance (Pneumococcal 15-valent conjugate), Shingrix and COVID19  The above risks/problems have been discussed with the patient.  Follow up actions/plans if indicated are seen below in the Assessment/Plan Section.  Pertinent information has been shared with the patient in the After Visit Summary.    Diagnoses and all orders for this visit:    1. Encounter for annual wellness exam in Medicare patient (Primary)    2. Encounter for health-related screening  -     Mammo Screening Digital Tomosynthesis Bilateral With CAD; Future    3. Encounter for screening mammogram for malignant neoplasm of breast  -     Mammo Screening Digital Tomosynthesis Bilateral With CAD; Future    Other orders  -     triamterene-hydrochlorothiazide (Dyazide) 37.5-25 MG per capsule; Take 1 capsule by mouth Daily As Needed (edema; hypertension).  Dispense: 30 capsule; Refill: 2        Follow Up:   No follow-ups on file.     An After Visit Summary and PPPS were made available to the patient.

## 2022-11-09 RX ORDER — METOPROLOL SUCCINATE 25 MG/1
TABLET, EXTENDED RELEASE ORAL
Qty: 30 TABLET | Refills: 6 | Status: SHIPPED | OUTPATIENT
Start: 2022-11-09

## 2022-12-13 RX ORDER — APIXABAN 2.5 MG/1
TABLET, FILM COATED ORAL
Qty: 180 TABLET | Refills: 0 | Status: SHIPPED | OUTPATIENT
Start: 2022-12-13

## 2023-01-10 ENCOUNTER — OFFICE VISIT (OUTPATIENT)
Dept: FAMILY MEDICINE CLINIC | Facility: CLINIC | Age: 78
End: 2023-01-10
Payer: MEDICARE

## 2023-01-10 VITALS
OXYGEN SATURATION: 95 % | TEMPERATURE: 98.4 F | WEIGHT: 293 LBS | HEART RATE: 74 BPM | HEIGHT: 72 IN | BODY MASS INDEX: 39.68 KG/M2

## 2023-01-10 DIAGNOSIS — T14.8XXA WOUND DISCHARGE: Primary | ICD-10-CM

## 2023-01-10 PROCEDURE — 99213 OFFICE O/P EST LOW 20 MIN: CPT | Performed by: NURSE PRACTITIONER

## 2023-01-10 PROCEDURE — 87070 CULTURE OTHR SPECIMN AEROBIC: CPT | Performed by: NURSE PRACTITIONER

## 2023-01-10 PROCEDURE — 87205 SMEAR GRAM STAIN: CPT | Performed by: NURSE PRACTITIONER

## 2023-01-10 RX ORDER — CLINDAMYCIN HYDROCHLORIDE 300 MG/1
300 CAPSULE ORAL 3 TIMES DAILY
Qty: 21 CAPSULE | Refills: 0 | Status: SHIPPED | OUTPATIENT
Start: 2023-01-10

## 2023-01-10 NOTE — PROGRESS NOTES
Subjective        Camelia Gray is a 77 y.o. female.     Chief Complaint   Patient presents with   • Wound Check     Wound LLE, \"bump\" on LLE that is \"oozing and bleeding\"       History of Present Illness  Patient is here for evaluation of wound LLE. She was getting out of her car and hit her leg on it . She said bump came up few hours later. Happened 12/5/2022.   She has been using wrapping to area. Was bruised that went away.   She used ice on the area.  She is on eliquis.   This past Sunday wound opened. She has serosanginous drng from wound now. No odor non painful.   She has always had discolored skin lower ext.   She was not able to get into wound care until 1/24/2023. She has lymphedema in the leg also.  Has been wearing wraps even that day. She wears socks over this . That's what prevented if from breaking skin.  She may have seroma in the area can also be abscess.       The following portions of the patient's history were reviewed and updated as appropriate: allergies, current medications, past family history, past medical history, past social history, past surgical history and problem list.      Current Outpatient Medications:   •  B Complex Vitamins (vitamin b complex) capsule capsule, Take 1 capsule by mouth Daily., Disp: , Rfl:   •  Eliquis 2.5 MG tablet tablet, TAKE ONE TABLET BY MOUTH EVERY 12 HOURS, Disp: 180 tablet, Rfl: 0  •  levothyroxine (SYNTHROID, LEVOTHROID) 25 MCG tablet, TAKE ONE TABLET BY MOUTH EVERY DAY, Disp: 90 tablet, Rfl: 0  •  lisinopril (PRINIVIL,ZESTRIL) 20 MG tablet, TAKE 1 TABLET BY MOUTH DAILY, Disp: 90 tablet, Rfl: 0  •  metoprolol succinate XL (TOPROL-XL) 25 MG 24 hr tablet, TAKE 1 TABLET BY MOUTH EVERY DAY (HOLD IF SYSTOLIC LESS THAN 110 OR HEART RATE LESS THAN 60), Disp: 30 tablet, Rfl: 6  •  Multiple Vitamins-Minerals (multivitamin with minerals) tablet tablet, Take 1 tablet by mouth Daily. LD 11/4, Disp: , Rfl:   •  pantoprazole (Protonix) 40 MG EC tablet, Take 1 tablet by  mouth Daily., Disp: 90 tablet, Rfl: 1  •  triamterene-hydrochlorothiazide (Dyazide) 37.5-25 MG per capsule, Take 1 capsule by mouth Daily As Needed (edema; hypertension)., Disp: 30 capsule, Rfl: 2  •  clindamycin (Cleocin) 300 MG capsule, Take 1 capsule by mouth 3 (Three) Times a Day., Disp: 21 capsule, Rfl: 0    Recent Results (from the past 4032 hour(s))   Wound Culture - Wound, Leg, Right    Collection Time: 08/30/22  2:06 PM    Specimen: Leg, Right; Wound   Result Value Ref Range    Wound Culture No growth at 3 days     Gram Stain Few (2+) Gram negative bacilli    Comprehensive Metabolic Panel    Collection Time: 08/31/22 11:44 AM    Specimen: Blood   Result Value Ref Range    Glucose 123 (H) 65 - 99 mg/dL    BUN 25 (H) 8 - 23 mg/dL    Creatinine 0.98 0.57 - 1.00 mg/dL    Sodium 141 136 - 145 mmol/L    Potassium 5.2 3.5 - 5.2 mmol/L    Chloride 101 98 - 107 mmol/L    CO2 28.3 22.0 - 29.0 mmol/L    Calcium 10.3 8.6 - 10.5 mg/dL    Total Protein 7.5 6.0 - 8.5 g/dL    Albumin 4.20 3.50 - 5.20 g/dL    ALT (SGPT) 16 1 - 33 U/L    AST (SGOT) 14 1 - 32 U/L    Alkaline Phosphatase 134 (H) 39 - 117 U/L    Total Bilirubin 0.3 0.0 - 1.2 mg/dL    Globulin 3.3 gm/dL    A/G Ratio 1.3 g/dL    BUN/Creatinine Ratio 25.5 (H) 7.0 - 25.0    Anion Gap 11.7 5.0 - 15.0 mmol/L    eGFR 59.9 (L) >60.0 mL/min/1.73   Prealbumin    Collection Time: 08/31/22 11:44 AM    Specimen: Blood   Result Value Ref Range    Prealbumin 21.9 20.0 - 40.0 mg/dL   Hemoglobin A1c    Collection Time: 08/31/22 11:44 AM    Specimen: Blood   Result Value Ref Range    Hemoglobin A1C 6.1 (H) 3.5 - 5.6 %   CBC Auto Differential    Collection Time: 08/31/22 11:44 AM    Specimen: Blood   Result Value Ref Range    WBC 6.35 3.40 - 10.80 10*3/mm3    RBC 4.83 3.77 - 5.28 10*6/mm3    Hemoglobin 14.3 12.0 - 15.9 g/dL    Hematocrit 42.4 34.0 - 46.6 %    MCV 87.8 79.0 - 97.0 fL    MCH 29.6 26.6 - 33.0 pg    MCHC 33.7 31.5 - 35.7 g/dL    RDW 12.9 12.3 - 15.4 %    RDW-SD 41.4  37.0 - 54.0 fl    MPV 9.6 6.0 - 12.0 fL    Platelets 227 140 - 450 10*3/mm3    Neutrophil % 73.1 42.7 - 76.0 %    Lymphocyte % 17.5 (L) 19.6 - 45.3 %    Monocyte % 6.9 5.0 - 12.0 %    Eosinophil % 1.9 0.3 - 6.2 %    Basophil % 0.3 0.0 - 1.5 %    Immature Grans % 0.3 0.0 - 0.5 %    Neutrophils, Absolute 4.64 1.70 - 7.00 10*3/mm3    Lymphocytes, Absolute 1.11 0.70 - 3.10 10*3/mm3    Monocytes, Absolute 0.44 0.10 - 0.90 10*3/mm3    Eosinophils, Absolute 0.12 0.00 - 0.40 10*3/mm3    Basophils, Absolute 0.02 0.00 - 0.20 10*3/mm3    Immature Grans, Absolute 0.02 0.00 - 0.05 10*3/mm3    nRBC 0.0 0.0 - 0.2 /100 WBC         Review of Systems    Objective     Pulse 74   Temp 98.4 °F (36.9 °C) (Oral)   Ht 182.9 cm (72\")   Wt (!) 151 kg (332 lb)   SpO2 95%   BMI 45.03 kg/m²     Physical Exam  Vitals and nursing note reviewed.   Skin:            Comments: Skin discoloration LLE mid tibia down  Skin warm  4 cm raised fluctuant area anterioir tib  serosangionous drng.    Neurological:      Mental Status: She is alert.         Result Review :                Assessment & Plan    Diagnoses and all orders for this visit:    1. Wound discharge (Primary)  Comments:  seroma vs abscess tibia. wound care discussed antibiotic prescribed.   Orders:  -     Wound Culture - Wound, Leg, Left; Future    Other orders  -     clindamycin (Cleocin) 300 MG capsule; Take 1 capsule by mouth 3 (Three) Times a Day.  Dispense: 21 capsule; Refill: 0      Patient Instructions   Use warm compress to the lower ext several times a day.   Take the clindamycin prescribed   Keep covered if drng   May open up completely.   If fever chills or it worsens get seen.   Follow up with wound care.         Follow Up   Return in about 1 week (around 1/17/2023).    Patient was given instructions and counseling regarding her condition or for health maintenance advice. Please see specific information pulled into the AVS if appropriate.     Brinda Tony,  APRN    01/10/23

## 2023-01-10 NOTE — PATIENT INSTRUCTIONS
Use warm compress to the lower ext several times a day.   Take the clindamycin prescribed   Keep covered if drng   May open up completely.   If fever chills or it worsens get seen.   Follow up with wound care.

## 2023-01-14 LAB
BACTERIA SPEC AEROBE CULT: NORMAL
GRAM STN SPEC: NORMAL

## 2023-01-17 ENCOUNTER — OFFICE VISIT (OUTPATIENT)
Dept: FAMILY MEDICINE CLINIC | Facility: CLINIC | Age: 78
End: 2023-01-17
Payer: MEDICARE

## 2023-01-17 VITALS
BODY MASS INDEX: 39.68 KG/M2 | OXYGEN SATURATION: 91 % | WEIGHT: 293 LBS | HEIGHT: 72 IN | TEMPERATURE: 98.2 F | HEART RATE: 65 BPM

## 2023-01-17 DIAGNOSIS — M62.3 IMMOBILITY SYNDROME: Chronic | ICD-10-CM

## 2023-01-17 DIAGNOSIS — R60.0 EDEMA OF LOWER EXTREMITY: Chronic | ICD-10-CM

## 2023-01-17 DIAGNOSIS — T14.8XXA WOUND DISCHARGE: ICD-10-CM

## 2023-01-17 PROCEDURE — 99213 OFFICE O/P EST LOW 20 MIN: CPT | Performed by: NURSE PRACTITIONER

## 2023-01-17 NOTE — PROGRESS NOTES
Subjective        Camelia Gray is a 77 y.o. female.     Chief Complaint   Patient presents with   • Wound Check     1 week f/u       History of Present Illness  Patient is here for management of her wound left lower ext.   She has continued to have drainage from the area small amounts. Denies is painful. She is still taking her clindamycin no fever or chills. Has used warm compresses on area.   Less swelling she reports. She had hit the area on a car and this caused the injury.   She will see wound care next week.   She has history of lympedema to the leg and always wear compression ace wraps.  She is on eliquis.     Wound culture is normal.    Gave patient samples of eliquis in office.   The following portions of the patient's history were reviewed and updated as appropriate: allergies, current medications, past family history, past medical history, past social history, past surgical history and problem list.      Current Outpatient Medications:   •  B Complex Vitamins (vitamin b complex) capsule capsule, Take 1 capsule by mouth Daily., Disp: , Rfl:   •  clindamycin (Cleocin) 300 MG capsule, Take 1 capsule by mouth 3 (Three) Times a Day., Disp: 21 capsule, Rfl: 0  •  Eliquis 2.5 MG tablet tablet, TAKE ONE TABLET BY MOUTH EVERY 12 HOURS, Disp: 180 tablet, Rfl: 0  •  levothyroxine (SYNTHROID, LEVOTHROID) 25 MCG tablet, TAKE ONE TABLET BY MOUTH EVERY DAY, Disp: 90 tablet, Rfl: 0  •  lisinopril (PRINIVIL,ZESTRIL) 20 MG tablet, TAKE 1 TABLET BY MOUTH DAILY, Disp: 90 tablet, Rfl: 0  •  metoprolol succinate XL (TOPROL-XL) 25 MG 24 hr tablet, TAKE 1 TABLET BY MOUTH EVERY DAY (HOLD IF SYSTOLIC LESS THAN 110 OR HEART RATE LESS THAN 60), Disp: 30 tablet, Rfl: 6  •  Multiple Vitamins-Minerals (multivitamin with minerals) tablet tablet, Take 1 tablet by mouth Daily. LD 11/4, Disp: , Rfl:   •  pantoprazole (Protonix) 40 MG EC tablet, Take 1 tablet by mouth Daily., Disp: 90 tablet, Rfl: 1  •  triamterene-hydrochlorothiazide  (Dyazide) 37.5-25 MG per capsule, Take 1 capsule by mouth Daily As Needed (edema; hypertension)., Disp: 30 capsule, Rfl: 2    Recent Results (from the past 4032 hour(s))   Wound Culture - Wound, Leg, Right    Collection Time: 08/30/22  2:06 PM    Specimen: Leg, Right; Wound   Result Value Ref Range    Wound Culture No growth at 3 days     Gram Stain Few (2+) Gram negative bacilli    Comprehensive Metabolic Panel    Collection Time: 08/31/22 11:44 AM    Specimen: Blood   Result Value Ref Range    Glucose 123 (H) 65 - 99 mg/dL    BUN 25 (H) 8 - 23 mg/dL    Creatinine 0.98 0.57 - 1.00 mg/dL    Sodium 141 136 - 145 mmol/L    Potassium 5.2 3.5 - 5.2 mmol/L    Chloride 101 98 - 107 mmol/L    CO2 28.3 22.0 - 29.0 mmol/L    Calcium 10.3 8.6 - 10.5 mg/dL    Total Protein 7.5 6.0 - 8.5 g/dL    Albumin 4.20 3.50 - 5.20 g/dL    ALT (SGPT) 16 1 - 33 U/L    AST (SGOT) 14 1 - 32 U/L    Alkaline Phosphatase 134 (H) 39 - 117 U/L    Total Bilirubin 0.3 0.0 - 1.2 mg/dL    Globulin 3.3 gm/dL    A/G Ratio 1.3 g/dL    BUN/Creatinine Ratio 25.5 (H) 7.0 - 25.0    Anion Gap 11.7 5.0 - 15.0 mmol/L    eGFR 59.9 (L) >60.0 mL/min/1.73   Prealbumin    Collection Time: 08/31/22 11:44 AM    Specimen: Blood   Result Value Ref Range    Prealbumin 21.9 20.0 - 40.0 mg/dL   Hemoglobin A1c    Collection Time: 08/31/22 11:44 AM    Specimen: Blood   Result Value Ref Range    Hemoglobin A1C 6.1 (H) 3.5 - 5.6 %   CBC Auto Differential    Collection Time: 08/31/22 11:44 AM    Specimen: Blood   Result Value Ref Range    WBC 6.35 3.40 - 10.80 10*3/mm3    RBC 4.83 3.77 - 5.28 10*6/mm3    Hemoglobin 14.3 12.0 - 15.9 g/dL    Hematocrit 42.4 34.0 - 46.6 %    MCV 87.8 79.0 - 97.0 fL    MCH 29.6 26.6 - 33.0 pg    MCHC 33.7 31.5 - 35.7 g/dL    RDW 12.9 12.3 - 15.4 %    RDW-SD 41.4 37.0 - 54.0 fl    MPV 9.6 6.0 - 12.0 fL    Platelets 227 140 - 450 10*3/mm3    Neutrophil % 73.1 42.7 - 76.0 %    Lymphocyte % 17.5 (L) 19.6 - 45.3 %    Monocyte % 6.9 5.0 - 12.0 %     "Eosinophil % 1.9 0.3 - 6.2 %    Basophil % 0.3 0.0 - 1.5 %    Immature Grans % 0.3 0.0 - 0.5 %    Neutrophils, Absolute 4.64 1.70 - 7.00 10*3/mm3    Lymphocytes, Absolute 1.11 0.70 - 3.10 10*3/mm3    Monocytes, Absolute 0.44 0.10 - 0.90 10*3/mm3    Eosinophils, Absolute 0.12 0.00 - 0.40 10*3/mm3    Basophils, Absolute 0.02 0.00 - 0.20 10*3/mm3    Immature Grans, Absolute 0.02 0.00 - 0.05 10*3/mm3    nRBC 0.0 0.0 - 0.2 /100 WBC   Wound Culture - Wound, Leg, Left    Collection Time: 01/10/23 10:57 AM    Specimen: Leg, Left; Wound   Result Value Ref Range    Wound Culture No growth at 3 days     Gram Stain Rare (1+) WBCs per low power field     Gram Stain No organisms seen     Gram Stain No Epithelial cells per low power field          Review of Systems    Objective     Pulse 65   Temp 98.2 °F (36.8 °C) (Infrared)   Ht 182.9 cm (72\")   Wt (!) 151 kg (332 lb)   SpO2 91%   BMI 45.03 kg/m²     Physical Exam  Vitals and nursing note reviewed.   HENT:      Nose: Nose normal.      Mouth/Throat:      Mouth: Mucous membranes are moist.   Cardiovascular:      Rate and Rhythm: Normal rate and regular rhythm.   Pulmonary:      Effort: Pulmonary effort is normal.   Abdominal:      Palpations: Abdomen is soft.   Skin:     General: Skin is warm.             Comments: Area proximal medial tibia fluctuant with 4 diffferent small openings draining bloody drainage.  Distal to this skin is dark in color   Mild edema.   No odor to the wound.      Neurological:      General: No focal deficit present.      Mental Status: She is oriented to person, place, and time.         Result Review :                Assessment & Plan    Diagnoses and all orders for this visit:    1. Edema of lower extremity  Comments:  history of lympedema.     2. Immobility syndrome  Comments:  stable evaluated in wheelchair    3. Wound discharge  Comments:  continue antibiotics. wound culture negative. dressing advised due to drainage. warm compress f/u wound " care.       There are no Patient Instructions on file for this visit.    Follow Up   No follow-ups on file.    Patient was given instructions and counseling regarding her condition or for health maintenance advice. Please see specific information pulled into the AVS if appropriate.     Brinda Tony, APRN    01/17/23

## 2023-01-24 ENCOUNTER — OFFICE VISIT (OUTPATIENT)
Dept: WOUND CARE | Facility: HOSPITAL | Age: 78
End: 2023-01-24
Payer: MEDICARE

## 2023-01-24 PROCEDURE — G0463 HOSPITAL OUTPT CLINIC VISIT: HCPCS

## 2023-01-27 ENCOUNTER — OFFICE VISIT (OUTPATIENT)
Dept: WOUND CARE | Facility: HOSPITAL | Age: 78
End: 2023-01-27
Payer: MEDICARE

## 2023-02-03 ENCOUNTER — OFFICE VISIT (OUTPATIENT)
Dept: WOUND CARE | Facility: HOSPITAL | Age: 78
End: 2023-02-03
Payer: MEDICARE

## 2023-02-07 ENCOUNTER — OFFICE VISIT (OUTPATIENT)
Dept: WOUND CARE | Facility: HOSPITAL | Age: 78
End: 2023-02-07
Payer: MEDICARE

## 2023-02-14 ENCOUNTER — OFFICE VISIT (OUTPATIENT)
Dept: WOUND CARE | Facility: HOSPITAL | Age: 78
End: 2023-02-14
Payer: MEDICARE

## 2023-02-21 ENCOUNTER — OFFICE VISIT (OUTPATIENT)
Dept: WOUND CARE | Facility: HOSPITAL | Age: 78
End: 2023-02-21
Payer: MEDICARE

## 2023-02-24 ENCOUNTER — OFFICE VISIT (OUTPATIENT)
Dept: WOUND CARE | Facility: HOSPITAL | Age: 78
End: 2023-02-24
Payer: MEDICARE

## 2023-03-07 ENCOUNTER — OFFICE VISIT (OUTPATIENT)
Dept: WOUND CARE | Facility: HOSPITAL | Age: 78
End: 2023-03-07
Payer: MEDICARE

## 2023-03-10 ENCOUNTER — OFFICE VISIT (OUTPATIENT)
Dept: WOUND CARE | Facility: HOSPITAL | Age: 78
End: 2023-03-10
Payer: MEDICARE

## 2023-03-14 ENCOUNTER — OFFICE VISIT (OUTPATIENT)
Dept: WOUND CARE | Facility: HOSPITAL | Age: 78
End: 2023-03-14
Payer: MEDICARE

## 2023-03-14 RX ORDER — LISINOPRIL 20 MG/1
TABLET ORAL
Qty: 90 TABLET | Refills: 0 | Status: SHIPPED | OUTPATIENT
Start: 2023-03-14

## 2023-03-14 RX ORDER — LEVOTHYROXINE SODIUM 0.03 MG/1
TABLET ORAL
Qty: 90 TABLET | Refills: 0 | Status: SHIPPED | OUTPATIENT
Start: 2023-03-14

## 2023-03-21 ENCOUNTER — OFFICE VISIT (OUTPATIENT)
Dept: WOUND CARE | Facility: HOSPITAL | Age: 78
End: 2023-03-21
Payer: MEDICARE

## 2023-03-21 PROCEDURE — G0463 HOSPITAL OUTPT CLINIC VISIT: HCPCS

## 2023-04-11 ENCOUNTER — OFFICE VISIT (OUTPATIENT)
Dept: WOUND CARE | Facility: HOSPITAL | Age: 78
End: 2023-04-11
Payer: MEDICARE

## 2023-04-11 PROCEDURE — G0463 HOSPITAL OUTPT CLINIC VISIT: HCPCS

## 2023-04-11 RX ORDER — APIXABAN 2.5 MG/1
TABLET, FILM COATED ORAL
Qty: 180 TABLET | Refills: 0 | Status: SHIPPED | OUTPATIENT
Start: 2023-04-11

## 2023-05-04 ENCOUNTER — TELEPHONE (OUTPATIENT)
Dept: FAMILY MEDICINE CLINIC | Facility: CLINIC | Age: 78
End: 2023-05-04
Payer: MEDICARE

## 2023-05-04 ENCOUNTER — TELEPHONE (OUTPATIENT)
Dept: FAMILY MEDICINE CLINIC | Facility: CLINIC | Age: 78
End: 2023-05-04

## 2023-05-04 NOTE — TELEPHONE ENCOUNTER
Pt reports cough, congestion, runny nose from allergies, has been taking robitussin and cough drops, wanting to know what else she can take for allergies

## 2023-05-04 NOTE — TELEPHONE ENCOUNTER
Hub to read:    LVM for patient as she is due for a pneumonia vaccine. Has she had one at her pharmacy, if so where and when? If not, would she like to get one at her next appt?

## 2023-05-12 RX ORDER — LISINOPRIL 20 MG/1
TABLET ORAL
Qty: 90 TABLET | Refills: 0 | Status: SHIPPED | OUTPATIENT
Start: 2023-05-12

## 2023-06-06 ENCOUNTER — TELEPHONE (OUTPATIENT)
Dept: FAMILY MEDICINE CLINIC | Facility: CLINIC | Age: 78
End: 2023-06-06
Payer: MEDICARE

## 2023-06-06 DIAGNOSIS — R73.9 HYPERGLYCEMIA: ICD-10-CM

## 2023-06-06 DIAGNOSIS — I10 PRIMARY HYPERTENSION: Chronic | ICD-10-CM

## 2023-06-06 DIAGNOSIS — E03.9 HYPOTHYROIDISM, UNSPECIFIED TYPE: Primary | Chronic | ICD-10-CM

## 2023-06-06 NOTE — TELEPHONE ENCOUNTER
PATIENT STATES SHE IS SUPPOSED TO HAVE LABS PRIOR TO HER 6 MO APT THAT IS COMING UP ON 6/13, CAN DR. DESAI ORDER THOSE AND LET HER KNOW ASAP.   PATIENT> [phone]

## 2023-06-07 RX ORDER — LEVOTHYROXINE SODIUM 0.03 MG/1
TABLET ORAL
Qty: 90 TABLET | Refills: 0 | Status: SHIPPED | OUTPATIENT
Start: 2023-06-07

## 2023-06-07 RX ORDER — METOPROLOL SUCCINATE 25 MG/1
TABLET, EXTENDED RELEASE ORAL
Qty: 30 TABLET | Refills: 5 | Status: SHIPPED | OUTPATIENT
Start: 2023-06-07

## 2023-06-09 ENCOUNTER — LAB (OUTPATIENT)
Dept: LAB | Facility: HOSPITAL | Age: 78
End: 2023-06-09
Payer: MEDICARE

## 2023-06-09 DIAGNOSIS — I10 PRIMARY HYPERTENSION: Chronic | ICD-10-CM

## 2023-06-09 DIAGNOSIS — R73.9 HYPERGLYCEMIA: ICD-10-CM

## 2023-06-09 DIAGNOSIS — E03.9 HYPOTHYROIDISM, UNSPECIFIED TYPE: Chronic | ICD-10-CM

## 2023-06-09 LAB
ANION GAP SERPL CALCULATED.3IONS-SCNC: 10 MMOL/L (ref 5–15)
BUN SERPL-MCNC: 33 MG/DL (ref 8–23)
BUN/CREAT SERPL: 34 (ref 7–25)
CALCIUM SPEC-SCNC: 9.7 MG/DL (ref 8.6–10.5)
CHLORIDE SERPL-SCNC: 103 MMOL/L (ref 98–107)
CHOLEST SERPL-MCNC: 161 MG/DL (ref 0–200)
CO2 SERPL-SCNC: 27 MMOL/L (ref 22–29)
CREAT SERPL-MCNC: 0.97 MG/DL (ref 0.57–1)
EGFRCR SERPLBLD CKD-EPI 2021: 60.7 ML/MIN/1.73
GLUCOSE SERPL-MCNC: 138 MG/DL (ref 65–99)
HBA1C MFR BLD: 5.9 % (ref 4.8–5.6)
HDLC SERPL-MCNC: 61 MG/DL (ref 40–60)
LDLC SERPL CALC-MCNC: 81 MG/DL (ref 0–100)
LDLC/HDLC SERPL: 1.3 {RATIO}
POTASSIUM SERPL-SCNC: 4.9 MMOL/L (ref 3.5–5.2)
SODIUM SERPL-SCNC: 140 MMOL/L (ref 136–145)
TRIGL SERPL-MCNC: 104 MG/DL (ref 0–150)
TSH SERPL DL<=0.05 MIU/L-ACNC: 4.23 UIU/ML (ref 0.27–4.2)
VLDLC SERPL-MCNC: 19 MG/DL (ref 5–40)

## 2023-06-09 PROCEDURE — 36415 COLL VENOUS BLD VENIPUNCTURE: CPT

## 2023-06-09 PROCEDURE — 80061 LIPID PANEL: CPT

## 2023-06-09 PROCEDURE — 84443 ASSAY THYROID STIM HORMONE: CPT

## 2023-06-09 PROCEDURE — 80048 BASIC METABOLIC PNL TOTAL CA: CPT

## 2023-06-09 PROCEDURE — 83036 HEMOGLOBIN GLYCOSYLATED A1C: CPT

## 2023-06-13 ENCOUNTER — OFFICE VISIT (OUTPATIENT)
Dept: FAMILY MEDICINE CLINIC | Facility: CLINIC | Age: 78
End: 2023-06-13
Payer: MEDICARE

## 2023-06-13 VITALS
HEIGHT: 72 IN | WEIGHT: 293 LBS | BODY MASS INDEX: 39.68 KG/M2 | OXYGEN SATURATION: 93 % | DIASTOLIC BLOOD PRESSURE: 73 MMHG | HEART RATE: 65 BPM | TEMPERATURE: 98.3 F | SYSTOLIC BLOOD PRESSURE: 127 MMHG

## 2023-06-13 DIAGNOSIS — E03.9 HYPOTHYROIDISM, UNSPECIFIED TYPE: Chronic | ICD-10-CM

## 2023-06-13 DIAGNOSIS — I10 PRIMARY HYPERTENSION: Primary | Chronic | ICD-10-CM

## 2023-06-13 DIAGNOSIS — D68.59 THROMBOPHILIA: Chronic | ICD-10-CM

## 2023-06-13 DIAGNOSIS — K21.9 GASTROESOPHAGEAL REFLUX DISEASE WITHOUT ESOPHAGITIS: Chronic | ICD-10-CM

## 2023-06-13 DIAGNOSIS — Z23 NEED FOR VACCINATION: ICD-10-CM

## 2023-06-13 DIAGNOSIS — E66.01 MORBID OBESITY: ICD-10-CM

## 2023-06-13 RX ORDER — LISINOPRIL 20 MG/1
20 TABLET ORAL DAILY
Qty: 90 TABLET | Refills: 0 | Status: SHIPPED | OUTPATIENT
Start: 2023-06-13 | End: 2023-06-16 | Stop reason: SDUPTHER

## 2023-06-13 NOTE — PATIENT INSTRUCTIONS
Continue your current medications and treatments.    Follow up in the office in 6 months.    Laboratory testing at that time.

## 2023-06-13 NOTE — PROGRESS NOTES
"Subjective   Camelia Gray is a 77 y.o. female.     Chief Complaint   Patient presents with   • Hypertension     6 month f/u       HPI chief complaint: Hypertension thrombophilia gastroesophageal reflux disease hypothyroidism    The patient is a 77-year-old white female who comes in for follow-up and maintenance of her current problems which include    1.  Hypertension-stable-the patient is currently on lisinopril 20 mg daily metoprolol 25 mg daily and Dyazide 37.5/25 1 tablet daily as needed.  Blood pressure stable.    2.  Thrombophilia-stable-the patient on Eliquis 2.5 mg twice a day.    3.  Gastroesophageal reflux disease-stable-the patient is currently on Protonix 40 mg daily.  She denies dysphagia or heartburn.    4.  Hypothyroidism-stable-the patient is currently on Synthroid 0.025 mg daily.  She denied heat or cold intolerance tremor weight gain or weight loss.    5.  Morbid obesity-unchanged-the patient's BMI is 45.  Patient has a difficult time controlling weight.  She is not mobile.  She is primarily trying to control her weight with diet.          The following portions of the patient's history were reviewed and updated as appropriate: allergies, current medications, past family history, past medical history, past social history, past surgical history and problem list.    Review of Systems    Objective     /73   Pulse 65   Temp 98.3 °F (36.8 °C) (Infrared)   Ht 182.9 cm (72\")   Wt (!) 151 kg (332 lb)   SpO2 93%   BMI 45.03 kg/m²     Physical Exam  Vitals and nursing note reviewed.   Constitutional:       Appearance: She is well-developed. She is obese.   HENT:      Head: Normocephalic and atraumatic.   Eyes:      Pupils: Pupils are equal, round, and reactive to light.   Cardiovascular:      Rate and Rhythm: Normal rate and regular rhythm.      Pulses: Normal pulses.      Heart sounds: Normal heart sounds. No murmur heard.    No friction rub. No gallop.   Pulmonary:      Effort: Pulmonary " effort is normal.      Breath sounds: Normal breath sounds.   Abdominal:      General: Bowel sounds are normal.      Palpations: Abdomen is soft.   Musculoskeletal:         General: Normal range of motion.      Cervical back: Neck supple.   Skin:     General: Skin is warm and dry.   Neurological:      Mental Status: She is alert and oriented to person, place, and time.   Psychiatric:         Behavior: Behavior normal.         Thought Content: Thought content normal.         Judgment: Judgment normal.     Hemoglobin A1c (06/09/2023 11:59)  TSH (06/09/2023 11:59)  Lipid Panel (06/09/2023 11:59)  Basic Metabolic Panel (06/09/2023 11:59)    Assessment & Plan   Diagnoses and all orders for this visit:    1. Primary hypertension (Primary)    2. Thrombophilia    3. Gastroesophageal reflux disease without esophagitis    4. Hypothyroidism, unspecified type    Other orders  -     lisinopril (PRINIVIL,ZESTRIL) 20 MG tablet; Take 1 tablet by mouth Daily.  Dispense: 90 tablet; Refill: 0      Patient Instructions   Continue your current medications and treatments.    Follow up in the office in 6 months.    Laboratory testing at that time.    Eric Vickers Jr., MD    06/13/23

## 2023-06-16 NOTE — TELEPHONE ENCOUNTER
Caller: Camelia Gray CRISTINE    Relationship: Self    Best call back number: [phone]    Requested Prescriptions:   Requested Prescriptions     Pending Prescriptions Disp Refills    apixaban (Eliquis) 2.5 MG tablet tablet 180 tablet 0     Sig: Take 1 tablet by mouth Every 12 (Twelve) Hours.    lisinopril (PRINIVIL,ZESTRIL) 20 MG tablet 90 tablet 0     Sig: Take 1 tablet by mouth Daily.    levothyroxine (SYNTHROID, LEVOTHROID) 25 MCG tablet 90 tablet 0     Sig: Take 1 tablet by mouth Daily.        Pharmacy where request should be sent: [address] [phone] [place] [phone] [facility]     Last office visit with prescribing clinician: 6/13/2023   Last telemedicine visit with prescribing clinician: Visit date not found   Next office visit with prescribing clinician: 11/21/2023     Additional details provided by patient:  PATIENT WANTS TO MAKE SURE THESE DO GET SENT TO  THE PHARMACY     Would you like a call back once the refill request has been completed: [x] Yes [] No    If the office needs to give you a call back, can they leave a voicemail: [x] Yes [] No    Jenny Peres, PCT   06/16/23 10:48 EDT

## 2023-06-17 RX ORDER — LEVOTHYROXINE SODIUM 0.03 MG/1
25 TABLET ORAL DAILY
Qty: 90 TABLET | Refills: 0 | Status: SHIPPED | OUTPATIENT
Start: 2023-06-17

## 2023-06-17 RX ORDER — LISINOPRIL 20 MG/1
20 TABLET ORAL DAILY
Qty: 90 TABLET | Refills: 0 | Status: SHIPPED | OUTPATIENT
Start: 2023-06-17

## 2023-06-29 ENCOUNTER — TELEPHONE (OUTPATIENT)
Dept: FAMILY MEDICINE CLINIC | Facility: CLINIC | Age: 78
End: 2023-06-29

## 2023-06-29 NOTE — TELEPHONE ENCOUNTER
PATIENT STATES AT HER LAST APPOINTMENT SHE WAS INFORMED THAT DR. DESAI WAS LEAVING AND THAT WE WOULD SEE IF [facility] WOULD ACCEPT HER, SHE HAS NOT HEARD ANYTHING SINCE SO SHE IS CALLING NOW TO SEE IF WE HAVE A CONCLUSION IF SHE WILL BE KENDRICKTESSIE PATIENT. PLEASE LET HER KNOW ASAP.     PATIENT> [phone]

## 2023-06-30 NOTE — TELEPHONE ENCOUNTER
Ok hub to read lvm to pt Brinda will herb her as pt, can schedule medicare wellness in December with Brinda

## 2023-08-08 ENCOUNTER — OFFICE VISIT (OUTPATIENT)
Dept: WOUND CARE | Facility: HOSPITAL | Age: 78
End: 2023-08-08
Payer: MEDICARE

## 2023-08-08 PROCEDURE — G0463 HOSPITAL OUTPT CLINIC VISIT: HCPCS

## 2023-08-29 ENCOUNTER — OFFICE VISIT (OUTPATIENT)
Dept: WOUND CARE | Facility: HOSPITAL | Age: 78
End: 2023-08-29
Payer: MEDICARE

## 2023-09-05 ENCOUNTER — OFFICE VISIT (OUTPATIENT)
Dept: WOUND CARE | Facility: HOSPITAL | Age: 78
End: 2023-09-05
Payer: MEDICARE

## 2023-09-12 ENCOUNTER — OFFICE VISIT (OUTPATIENT)
Dept: WOUND CARE | Facility: HOSPITAL | Age: 78
End: 2023-09-12
Payer: MEDICARE

## 2023-09-19 ENCOUNTER — OFFICE VISIT (OUTPATIENT)
Dept: WOUND CARE | Facility: HOSPITAL | Age: 78
End: 2023-09-19
Payer: MEDICARE

## 2023-09-25 ENCOUNTER — OFFICE VISIT (OUTPATIENT)
Dept: WOUND CARE | Facility: HOSPITAL | Age: 78
End: 2023-09-25
Payer: MEDICARE

## 2023-10-03 ENCOUNTER — LAB REQUISITION (OUTPATIENT)
Dept: LAB | Facility: HOSPITAL | Age: 78
End: 2023-10-03
Payer: MEDICARE

## 2023-10-03 ENCOUNTER — OFFICE VISIT (OUTPATIENT)
Dept: WOUND CARE | Facility: HOSPITAL | Age: 78
End: 2023-10-03
Payer: MEDICARE

## 2023-10-03 DIAGNOSIS — L97.812 NON-PRESSURE CHRONIC ULCER OF OTHER PART OF RIGHT LOWER LEG WITH FAT LAYER EXPOSED: ICD-10-CM

## 2023-10-03 PROCEDURE — 87070 CULTURE OTHR SPECIMN AEROBIC: CPT | Performed by: EMERGENCY MEDICINE

## 2023-10-03 PROCEDURE — 97602 WOUND(S) CARE NON-SELECTIVE: CPT

## 2023-10-03 PROCEDURE — 87205 SMEAR GRAM STAIN: CPT | Performed by: EMERGENCY MEDICINE

## 2023-10-05 LAB
BACTERIA SPEC AEROBE CULT: NORMAL
GRAM STN SPEC: NORMAL

## 2023-10-10 ENCOUNTER — OFFICE VISIT (OUTPATIENT)
Dept: WOUND CARE | Facility: HOSPITAL | Age: 78
End: 2023-10-10
Payer: MEDICARE

## 2023-10-10 RX ORDER — APIXABAN 2.5 MG/1
2.5 TABLET, FILM COATED ORAL EVERY 12 HOURS
Qty: 180 TABLET | Refills: 0 | Status: SHIPPED | OUTPATIENT
Start: 2023-10-10

## 2023-10-12 RX ORDER — PANTOPRAZOLE SODIUM 40 MG/1
TABLET, DELAYED RELEASE ORAL
Qty: 90 TABLET | Refills: 0 | Status: SHIPPED | OUTPATIENT
Start: 2023-10-12

## 2023-10-17 ENCOUNTER — OFFICE VISIT (OUTPATIENT)
Dept: WOUND CARE | Facility: HOSPITAL | Age: 78
End: 2023-10-17
Payer: MEDICARE

## 2023-10-24 ENCOUNTER — OFFICE VISIT (OUTPATIENT)
Dept: WOUND CARE | Facility: HOSPITAL | Age: 78
End: 2023-10-24
Payer: MEDICARE

## 2023-10-31 ENCOUNTER — OFFICE VISIT (OUTPATIENT)
Dept: WOUND CARE | Facility: HOSPITAL | Age: 78
End: 2023-10-31
Payer: MEDICARE

## 2023-11-14 ENCOUNTER — OFFICE VISIT (OUTPATIENT)
Dept: WOUND CARE | Facility: HOSPITAL | Age: 78
End: 2023-11-14
Payer: MEDICARE

## 2023-11-14 ENCOUNTER — TRANSCRIBE ORDERS (OUTPATIENT)
Dept: PHYSICAL THERAPY | Facility: HOSPITAL | Age: 78
End: 2023-11-14
Payer: MEDICARE

## 2023-11-14 DIAGNOSIS — I89.0 LYMPHEDEMA: Primary | ICD-10-CM

## 2023-12-05 RX ORDER — LEVOTHYROXINE SODIUM 0.03 MG/1
25 TABLET ORAL DAILY
Qty: 90 TABLET | Refills: 0 | Status: SHIPPED | OUTPATIENT
Start: 2023-12-05

## 2023-12-05 RX ORDER — METOPROLOL SUCCINATE 25 MG/1
TABLET, EXTENDED RELEASE ORAL
Qty: 30 TABLET | Refills: 4 | Status: SHIPPED | OUTPATIENT
Start: 2023-12-05

## 2023-12-12 ENCOUNTER — OFFICE VISIT (OUTPATIENT)
Dept: FAMILY MEDICINE CLINIC | Facility: CLINIC | Age: 78
End: 2023-12-12
Payer: MEDICARE

## 2023-12-12 VITALS
RESPIRATION RATE: 20 BRPM | HEART RATE: 69 BPM | SYSTOLIC BLOOD PRESSURE: 141 MMHG | TEMPERATURE: 97.1 F | DIASTOLIC BLOOD PRESSURE: 81 MMHG | WEIGHT: 293 LBS | BODY MASS INDEX: 41.02 KG/M2 | OXYGEN SATURATION: 93 % | HEIGHT: 71 IN

## 2023-12-12 DIAGNOSIS — I82.4Y3 ACUTE DEEP VEIN THROMBOSIS (DVT) OF PROXIMAL VEIN OF BOTH LOWER EXTREMITIES: Chronic | ICD-10-CM

## 2023-12-12 DIAGNOSIS — K21.9 GASTROESOPHAGEAL REFLUX DISEASE WITHOUT ESOPHAGITIS: Chronic | ICD-10-CM

## 2023-12-12 DIAGNOSIS — E66.01 CLASS 3 SEVERE OBESITY DUE TO EXCESS CALORIES WITH SERIOUS COMORBIDITY AND BODY MASS INDEX (BMI) OF 45.0 TO 49.9 IN ADULT: Chronic | ICD-10-CM

## 2023-12-12 DIAGNOSIS — E03.9 ACQUIRED HYPOTHYROIDISM: Chronic | ICD-10-CM

## 2023-12-12 DIAGNOSIS — I26.99 PULMONARY EMBOLISM, UNSPECIFIED CHRONICITY, UNSPECIFIED PULMONARY EMBOLISM TYPE, UNSPECIFIED WHETHER ACUTE COR PULMONALE PRESENT: Chronic | ICD-10-CM

## 2023-12-12 DIAGNOSIS — M62.3 IMMOBILITY SYNDROME: Chronic | ICD-10-CM

## 2023-12-12 DIAGNOSIS — Z12.31 BREAST CANCER SCREENING BY MAMMOGRAM: Chronic | ICD-10-CM

## 2023-12-12 DIAGNOSIS — N39.45 CONTINUOUS LEAKAGE OF URINE: ICD-10-CM

## 2023-12-12 DIAGNOSIS — Z11.59 NEED FOR HEPATITIS C SCREENING TEST: Chronic | ICD-10-CM

## 2023-12-12 DIAGNOSIS — I89.0 LYMPHEDEMA: Chronic | ICD-10-CM

## 2023-12-12 DIAGNOSIS — D68.59 THROMBOPHILIA: Chronic | ICD-10-CM

## 2023-12-12 DIAGNOSIS — R73.9 HYPERGLYCEMIA: Chronic | ICD-10-CM

## 2023-12-12 DIAGNOSIS — I10 PRIMARY HYPERTENSION: Chronic | ICD-10-CM

## 2023-12-12 PROBLEM — E66.813 CLASS 3 SEVERE OBESITY DUE TO EXCESS CALORIES WITH SERIOUS COMORBIDITY AND BODY MASS INDEX (BMI) OF 45.0 TO 49.9 IN ADULT: Status: ACTIVE | Noted: 2019-07-01

## 2023-12-12 NOTE — SIGNIFICANT NOTE
Currently using walker she uses counters and bars in home.   Has wheelchair when out   Family helps.   Call 911 for help if you fall and can't get up  Has ADT for security

## 2023-12-12 NOTE — PROGRESS NOTES
The ABCs of the Annual Wellness Visit  Subsequent Medicare Wellness Visit    Subjective    Camelia Gray is a 78 y.o. female who presents for a Subsequent Medicare Wellness Visit.    The following portions of the patient's history were reviewed and   updated as appropriate: allergies, current medications, past family history, past medical history, past social history, past surgical history, and problem list.    Compared to one year ago, the patient feels her physical   health is the same.    Compared to one year ago, the patient feels her mental   health is the same.    Recent Hospitalizations:  She was not admitted to the hospital during the last year.       Current Medical Providers:  Patient Care Team:  Brinda Tony APRN as PCP - General (Nurse Practitioner)    Outpatient Medications Prior to Visit   Medication Sig Dispense Refill    B Complex Vitamins (vitamin b complex) capsule capsule Take 1 capsule by mouth Daily.      Eliquis 2.5 MG tablet tablet TAKE 1 TABLET BY MOUTH EVERY 12 HOURS. 180 tablet 0    levothyroxine (SYNTHROID, LEVOTHROID) 25 MCG tablet TAKE 1 TABLET BY MOUTH DAILY. 90 tablet 0    lisinopril (PRINIVIL,ZESTRIL) 20 MG tablet Take 1 tablet by mouth Daily. 90 tablet 0    metoprolol succinate XL (TOPROL-XL) 25 MG 24 hr tablet TAKE 1 TABLET BY MOUTH EVERY DAY (HOLD IF SYSTOLIC LESS THAN 110 OR HEART RATE LESS THAN 60) 30 tablet 4    Multiple Vitamins-Minerals (multivitamin with minerals) tablet tablet Take 1 tablet by mouth Daily. LD 11/4      pantoprazole (PROTONIX) 40 MG EC tablet TAKE ONE TABLET BY MOUTH DAILY **NEED TO BE SEEN** 90 tablet 0    triamterene-hydrochlorothiazide (Dyazide) 37.5-25 MG per capsule Take 1 capsule by mouth Daily As Needed (edema; hypertension). 30 capsule 2     No facility-administered medications prior to visit.       No opioid medication identified on active medication list. I have reviewed chart for other potential  high risk medication/s and harmful drug  "interactions in the elderly.        Aspirin is not on active medication list.  Aspirin use is not indicated based on review of current medical condition/s. Risk of harm outweighs potential benefits.  .    Patient Active Problem List   Diagnosis    Class 3 severe obesity due to excess calories with serious comorbidity and body mass index (BMI) of 45.0 to 49.9 in adult    Arthritis of left hip    Deep vein thrombosis (DVT)    Edema of lower extremity    Hypertension    Pulmonary embolism    Stasis dermatitis    Immobility syndrome    Lymphedema    Skin ulcer of toe of right foot with fat layer exposed    Primary osteoarthritis of left hip    Hypothyroidism    Continuous leakage of urine    Breast cancer screening by mammogram    Status post total replacement of hip    Thrombophilia    Primary osteoarthritis involving multiple joints    Gastroesophageal reflux disease without esophagitis    Obstructive uropathy    Wound discharge    Need for hepatitis C screening test    Hyperglycemia     Advance Care Planning   Advance Care Planning     Advance Directive is on file.  ACP discussion was held with the patient during this visit. Patient has an advance directive in EMR which is still valid.      Objective    Vitals:    12/12/23 1036   Pulse: 69   Resp: 20   Temp: 97.1 °F (36.2 °C)   TempSrc: Infrared   SpO2: 93%   Weight: (!) 159 kg (350 lb)  Comment: stated. in wheelchair.   Height: 180.3 cm (71\")   PainSc: 0-No pain     Estimated body mass index is 48.82 kg/m² as calculated from the following:    Height as of this encounter: 180.3 cm (71\").    Weight as of this encounter: 159 kg (350 lb).    Class 3 Severe Obesity (BMI >=40). Obesity-related health conditions include the following: hypertension and DVT . Obesity is unchanged. BMI is is above average; BMI management plan is completed. We discussed portion control and increasing exercise.      Does the patient have evidence of cognitive impairment? No          HEALTH RISK " ASSESSMENT    Smoking Status:  Social History     Tobacco Use   Smoking Status Former    Packs/day: 0.25    Years: 25.00    Additional pack years: 0.00    Total pack years: 6.25    Types: Cigarettes    Start date: 10/1/1964    Quit date: 1992    Years since quittin.9   Smokeless Tobacco Never     Alcohol Consumption:  Social History     Substance and Sexual Activity   Alcohol Use No     Fall Risk Screen:    SOLADI Fall Risk Assessment was completed, and patient is at MODERATE risk for falls. Assessment completed on:2023    Depression Screenin/12/2023    10:48 AM   PHQ-2/PHQ-9 Depression Screening   Little Interest or Pleasure in Doing Things 0-->not at all   Feeling Down, Depressed or Hopeless 0-->not at all   PHQ-9: Brief Depression Severity Measure Score 0       Health Habits and Functional and Cognitive Screenin/12/2023    10:45 AM   Functional & Cognitive Status   Do you have difficulty preparing food and eating? No   Do you have difficulty bathing yourself, getting dressed or grooming yourself? Yes   Do you have difficulty using the toilet? No   Do you have difficulty moving around from place to place? Yes   Do you have trouble with steps or getting out of a bed or a chair? Yes   Current Diet Well Balanced Diet   Dental Exam Up to date   Eye Exam Up to date   Exercise (times per week) 7 times per week   Current Exercises Include Other   Do you need help using the phone?  No   Are you deaf or do you have serious difficulty hearing?  No   Do you need help to go to places out of walking distance? Yes   Do you need help shopping? No   Do you need help preparing meals?  Yes   Do you need help with housework?  Yes   Do you need help with laundry? Yes   Do you need help taking your medications? No   Do you need help managing money? No   Do you ever drive or ride in a car without wearing a seat belt? No       Age-appropriate Screening Schedule:  Refer to the list below for future  screening recommendations based on patient's age, sex and/or medical conditions. Orders for these recommended tests are listed in the plan section. The patient has been provided with a written plan.    Health Maintenance   Topic Date Due    HEPATITIS C SCREENING  Never done    DXA SCAN  12/12/2023 (Originally 12/6/2023)    ZOSTER VACCINE (1 of 2) 12/12/2023 (Originally 7/27/1996)    ANNUAL WELLNESS VISIT  12/12/2024    BMI FOLLOWUP  12/12/2024    TDAP/TD VACCINES (2 - Td or Tdap) 08/18/2027    COVID-19 Vaccine  Completed    INFLUENZA VACCINE  Completed    Pneumococcal Vaccine 65+  Completed    HEMOGLOBIN A1C  Discontinued    DIABETIC EYE EXAM  Discontinued    URINE MICROALBUMIN  Discontinued                  CMS Preventative Services Quick Reference  Risk Factors Identified During Encounter  Fall Risk-High or Moderate: Discussed Fall Prevention in the home  The above risks/problems have been discussed with the patient.  Pertinent information has been shared with the patient in the After Visit Summary.  An After Visit Summary and PPPS were made available to the patient.    Follow Up:   Next Medicare Wellness visit to be scheduled in 1 year.       Additional E&M Note during same encounter follows:  Patient has multiple medical problems which are significant and separately identifiable that require additional work above and beyond the Medicare Wellness Visit.      Chief Complaint  Medicare Wellness-subsequent    Subjective         HPI  Camelia Gray is also being seen today for hypothyroidism, DVT chronic lymphedema., immobility syndrome, hypertension BMI 48.     Hypothyroidism: levothyroxine 25 mcg daily.     PE/thrombohiilia DVT: eliquis 2.5 mg bid.     Hypertension: lisinopril 20mg daily metoprolol succinate 25 mg daily.   Dyazide daily.    Lymphedema followed by wound care.     GERD taking protonix 40 mg daily.            Objective   Vital Signs:  Pulse 69   Temp 97.1 °F (36.2 °C) (Infrared)   Resp 20   Ht 180.3  "cm (71\")   Wt (!) 159 kg (350 lb) Comment: stated. in wheelchair.  SpO2 93%   BMI 48.82 kg/m²     Physical Exam  Vitals and nursing note reviewed.   Constitutional:       Appearance: She is obese.   HENT:      Head: Normocephalic.      Right Ear: External ear normal.      Left Ear: External ear normal.      Nose: Nose normal.      Mouth/Throat:      Mouth: Mucous membranes are moist.   Eyes:      Conjunctiva/sclera: Conjunctivae normal.      Pupils: Pupils are equal, round, and reactive to light.   Cardiovascular:      Rate and Rhythm: Normal rate and regular rhythm.      Heart sounds: Murmur heard.   Abdominal:      General: Bowel sounds are normal.      Palpations: Abdomen is soft.   Musculoskeletal:      Comments: Evaluate in wheel chair.  Wraps lower legs for lymphedema   Skin:     General: Skin is warm.      Comments: Wearing ace wraps lower ext for lymphedma has lymphedema pumps.     Neurological:      General: No focal deficit present.      Mental Status: She is alert and oriented to person, place, and time.   Psychiatric:         Mood and Affect: Mood normal.         Thought Content: Thought content normal.                         Assessment and Plan   Diagnoses and all orders for this visit:    1. Thrombophilia  Comments:  stable CBC ordered  Orders:  -     Cancel: Ambulatory Referral to Cardiology  -     Ambulatory Referral to Cardiology  -     CBC & Differential; Future    2. Acute deep vein thrombosis (DVT) of proximal vein of both lower extremities  Comments:  stable on eliquis  Orders:  -     Cancel: Ambulatory Referral to Cardiology  -     Ambulatory Referral to Cardiology  -     CBC & Differential; Future    3. Pulmonary embolism, unspecified chronicity, unspecified pulmonary embolism type, unspecified whether acute cor pulmonale present  Comments:  stable on eliquis  Assessment & Plan:  2018. She was diagnosed with thrombophilia then.       Orders:  -     Cancel: Ambulatory Referral to " Cardiology  -     Ambulatory Referral to Cardiology  -     CBC & Differential; Future    4. Acquired hypothyroidism  Comments:  labs ordered  Orders:  -     Cancel: Ambulatory Referral to Cardiology  -     Ambulatory Referral to Cardiology  -     TSH Rfx On Abnormal To Free T4; Future    5. Gastroesophageal reflux disease without esophagitis  Comments:  stable    6. Immobility syndrome  Comments:  stable using walker , wheelchair    7. Lymphedema  Comments:  stable no open wounds followed by wound care    8. Primary hypertension  Comments:  stable  Orders:  -     Cancel: Ambulatory Referral to Cardiology  -     Ambulatory Referral to Cardiology  -     Lipid Panel; Future  -     Comprehensive Metabolic Panel; Future    9. Hyperglycemia  Comments:  A1C ordered  Orders:  -     Comprehensive Metabolic Panel; Future  -     Hemoglobin A1c; Future    10. Need for hepatitis C screening test  Comments:  lab ordered  Orders:  -     Hepatitis C antibody; Future    11. Class 3 severe obesity due to excess calories with serious comorbidity and body mass index (BMI) of 45.0 to 49.9 in adult  Comments:  discussed increasing walking exercise and eating healthy  Assessment & Plan:  Patient's (Body mass index is 48.82 kg/m².) indicates that they are morbidly/severely obese (BMI > 40 or > 35 with obesity - related health condition) with health conditions that include hypertension and DVT, PE  . Weight is unchanged. BMI  is above average; BMI management plan is completed. We discussed portion control and increasing exercise.       12. Breast cancer screening by mammogram  Comments:  mammogram ordered  Orders:  -     Mammo Screening Digital Tomosynthesis Bilateral With CAD; Future    13. Continuous leakage of urine  Comments:  discussed bidet wearing depends             Follow Up   Return in about 6 months (around 6/12/2024).  Patient was given instructions and counseling regarding her condition or for health maintenance advice. Please  see specific information pulled into the AVS if appropriate.

## 2023-12-12 NOTE — ASSESSMENT & PLAN NOTE
Patient's (Body mass index is 48.82 kg/m².) indicates that they are morbidly/severely obese (BMI > 40 or > 35 with obesity - related health condition) with health conditions that include hypertension and DVT, PE  . Weight is unchanged. BMI  is above average; BMI management plan is completed. We discussed portion control and increasing exercise.

## 2023-12-12 NOTE — PATIENT INSTRUCTIONS
Biobidet 2000   Follow up on labs  You need to walk purposefully daily.   Consider going [facility] for warm water therapy.   Schedule mammogram  Cardiology will contact you for appointment.

## 2023-12-20 RX ORDER — LISINOPRIL 20 MG/1
20 TABLET ORAL DAILY
Qty: 90 TABLET | Refills: 0 | Status: SHIPPED | OUTPATIENT
Start: 2023-12-20 | End: 2023-12-24

## 2023-12-20 RX ORDER — LEVOTHYROXINE SODIUM 0.03 MG/1
25 TABLET ORAL DAILY
Qty: 90 TABLET | Refills: 0 | Status: ON HOLD | OUTPATIENT
Start: 2023-12-20

## 2023-12-20 RX ORDER — PANTOPRAZOLE SODIUM 40 MG/1
TABLET, DELAYED RELEASE ORAL
Qty: 90 TABLET | Refills: 0 | Status: SHIPPED | OUTPATIENT
Start: 2023-12-20 | End: 2023-12-24

## 2023-12-20 RX ORDER — APIXABAN 2.5 MG/1
2.5 TABLET, FILM COATED ORAL EVERY 12 HOURS
Qty: 180 TABLET | Refills: 0 | Status: SHIPPED | OUTPATIENT
Start: 2023-12-20 | End: 2023-12-24

## 2023-12-24 ENCOUNTER — APPOINTMENT (OUTPATIENT)
Dept: GENERAL RADIOLOGY | Facility: HOSPITAL | Age: 78
End: 2023-12-24
Payer: MEDICARE

## 2023-12-24 ENCOUNTER — HOSPITAL ENCOUNTER (INPATIENT)
Facility: HOSPITAL | Age: 78
LOS: 6 days | Discharge: NURSING, REHAB OR LONG-TERM CARE | End: 2024-01-02
Attending: EMERGENCY MEDICINE | Admitting: HOSPITALIST
Payer: MEDICARE

## 2023-12-24 DIAGNOSIS — R06.00 DYSPNEA, UNSPECIFIED TYPE: Primary | ICD-10-CM

## 2023-12-24 LAB
ALBUMIN SERPL-MCNC: 3.6 G/DL (ref 3.5–5.2)
ALBUMIN/GLOB SERPL: 1.1 G/DL
ALP SERPL-CCNC: 97 U/L (ref 39–117)
ALT SERPL W P-5'-P-CCNC: 14 U/L (ref 1–33)
ANION GAP SERPL CALCULATED.3IONS-SCNC: 12 MMOL/L (ref 5–15)
APTT PPP: 34.7 SECONDS (ref 61–76.5)
AST SERPL-CCNC: 18 U/L (ref 1–32)
BASOPHILS # BLD AUTO: 0.1 10*3/MM3 (ref 0–0.2)
BASOPHILS NFR BLD AUTO: 1.2 % (ref 0–1.5)
BILIRUB SERPL-MCNC: 0.4 MG/DL (ref 0–1.2)
BUN SERPL-MCNC: 15 MG/DL (ref 8–23)
BUN/CREAT SERPL: 20.3 (ref 7–25)
CALCIUM SPEC-SCNC: 9 MG/DL (ref 8.6–10.5)
CHLORIDE SERPL-SCNC: 105 MMOL/L (ref 98–107)
CO2 SERPL-SCNC: 27 MMOL/L (ref 22–29)
CREAT SERPL-MCNC: 0.74 MG/DL (ref 0.57–1)
DEPRECATED RDW RBC AUTO: 48.1 FL (ref 37–54)
EGFRCR SERPLBLD CKD-EPI 2021: 83.4 ML/MIN/1.73
EOSINOPHIL # BLD AUTO: 0 10*3/MM3 (ref 0–0.4)
EOSINOPHIL NFR BLD AUTO: 0.4 % (ref 0.3–6.2)
ERYTHROCYTE [DISTWIDTH] IN BLOOD BY AUTOMATED COUNT: 14.3 % (ref 12.3–15.4)
FLUAV RNA RESP QL NAA+PROBE: NOT DETECTED
FLUBV RNA RESP QL NAA+PROBE: NOT DETECTED
GEN 5 2HR TROPONIN T REFLEX: 18 NG/L
GLOBULIN UR ELPH-MCNC: 3.4 GM/DL
GLUCOSE SERPL-MCNC: 98 MG/DL (ref 65–99)
HCT VFR BLD AUTO: 40 % (ref 34–46.6)
HGB BLD-MCNC: 12.9 G/DL (ref 12–15.9)
INR PPP: 1.1 (ref 0.93–1.1)
LYMPHOCYTES # BLD AUTO: 1 10*3/MM3 (ref 0.7–3.1)
LYMPHOCYTES NFR BLD AUTO: 8.9 % (ref 19.6–45.3)
MAGNESIUM SERPL-MCNC: 1.9 MG/DL (ref 1.6–2.4)
MCH RBC QN AUTO: 29.5 PG (ref 26.6–33)
MCHC RBC AUTO-ENTMCNC: 32.2 G/DL (ref 31.5–35.7)
MCV RBC AUTO: 91.6 FL (ref 79–97)
MONOCYTES # BLD AUTO: 0.7 10*3/MM3 (ref 0.1–0.9)
MONOCYTES NFR BLD AUTO: 6.3 % (ref 5–12)
NEUTROPHILS NFR BLD AUTO: 83.2 % (ref 42.7–76)
NEUTROPHILS NFR BLD AUTO: 9.7 10*3/MM3 (ref 1.7–7)
NRBC BLD AUTO-RTO: 0 /100 WBC (ref 0–0.2)
NT-PROBNP SERPL-MCNC: 367.9 PG/ML (ref 0–1800)
PLATELET # BLD AUTO: 195 10*3/MM3 (ref 140–450)
PMV BLD AUTO: 7.6 FL (ref 6–12)
POTASSIUM SERPL-SCNC: 3.8 MMOL/L (ref 3.5–5.2)
PROT SERPL-MCNC: 7 G/DL (ref 6–8.5)
PROTHROMBIN TIME: 11.9 SECONDS (ref 9.6–11.7)
RBC # BLD AUTO: 4.37 10*6/MM3 (ref 3.77–5.28)
RSV RNA NPH QL NAA+NON-PROBE: NOT DETECTED
SARS-COV-2 RNA RESP QL NAA+PROBE: NOT DETECTED
SODIUM SERPL-SCNC: 144 MMOL/L (ref 136–145)
TROPONIN T DELTA: 0 NG/L
TROPONIN T SERPL HS-MCNC: 18 NG/L
TSH SERPL DL<=0.05 MIU/L-ACNC: 3.33 UIU/ML (ref 0.27–4.2)
WBC NRBC COR # BLD AUTO: 11.7 10*3/MM3 (ref 3.4–10.8)

## 2023-12-24 PROCEDURE — 99285 EMERGENCY DEPT VISIT HI MDM: CPT

## 2023-12-24 PROCEDURE — 80053 COMPREHEN METABOLIC PANEL: CPT | Performed by: EMERGENCY MEDICINE

## 2023-12-24 PROCEDURE — 25010000002 METHYLPREDNISOLONE PER 125 MG: Performed by: EMERGENCY MEDICINE

## 2023-12-24 PROCEDURE — 94761 N-INVAS EAR/PLS OXIMETRY MLT: CPT

## 2023-12-24 PROCEDURE — 36415 COLL VENOUS BLD VENIPUNCTURE: CPT

## 2023-12-24 PROCEDURE — 84484 ASSAY OF TROPONIN QUANT: CPT | Performed by: EMERGENCY MEDICINE

## 2023-12-24 PROCEDURE — 83880 ASSAY OF NATRIURETIC PEPTIDE: CPT | Performed by: EMERGENCY MEDICINE

## 2023-12-24 PROCEDURE — 93005 ELECTROCARDIOGRAM TRACING: CPT

## 2023-12-24 PROCEDURE — 94799 UNLISTED PULMONARY SVC/PX: CPT

## 2023-12-24 PROCEDURE — 85610 PROTHROMBIN TIME: CPT | Performed by: EMERGENCY MEDICINE

## 2023-12-24 PROCEDURE — G0378 HOSPITAL OBSERVATION PER HR: HCPCS

## 2023-12-24 PROCEDURE — 94640 AIRWAY INHALATION TREATMENT: CPT

## 2023-12-24 PROCEDURE — 83735 ASSAY OF MAGNESIUM: CPT | Performed by: EMERGENCY MEDICINE

## 2023-12-24 PROCEDURE — 85730 THROMBOPLASTIN TIME PARTIAL: CPT | Performed by: EMERGENCY MEDICINE

## 2023-12-24 PROCEDURE — 87637 SARSCOV2&INF A&B&RSV AMP PRB: CPT | Performed by: EMERGENCY MEDICINE

## 2023-12-24 PROCEDURE — 85025 COMPLETE CBC W/AUTO DIFF WBC: CPT | Performed by: EMERGENCY MEDICINE

## 2023-12-24 PROCEDURE — 71045 X-RAY EXAM CHEST 1 VIEW: CPT

## 2023-12-24 PROCEDURE — 93005 ELECTROCARDIOGRAM TRACING: CPT | Performed by: EMERGENCY MEDICINE

## 2023-12-24 PROCEDURE — 84443 ASSAY THYROID STIM HORMONE: CPT | Performed by: EMERGENCY MEDICINE

## 2023-12-24 RX ORDER — SODIUM CHLORIDE 0.9 % (FLUSH) 0.9 %
10 SYRINGE (ML) INJECTION AS NEEDED
Status: DISCONTINUED | OUTPATIENT
Start: 2023-12-24 | End: 2024-01-02 | Stop reason: HOSPADM

## 2023-12-24 RX ORDER — SODIUM CHLORIDE 9 MG/ML
40 INJECTION, SOLUTION INTRAVENOUS AS NEEDED
Status: DISCONTINUED | OUTPATIENT
Start: 2023-12-24 | End: 2024-01-02 | Stop reason: HOSPADM

## 2023-12-24 RX ORDER — ACETAMINOPHEN 160 MG/5ML
650 SOLUTION ORAL EVERY 4 HOURS PRN
Status: DISCONTINUED | OUTPATIENT
Start: 2023-12-24 | End: 2024-01-02 | Stop reason: HOSPADM

## 2023-12-24 RX ORDER — ONDANSETRON 2 MG/ML
4 INJECTION INTRAMUSCULAR; INTRAVENOUS EVERY 6 HOURS PRN
Status: DISCONTINUED | OUTPATIENT
Start: 2023-12-24 | End: 2024-01-02 | Stop reason: HOSPADM

## 2023-12-24 RX ORDER — AMOXICILLIN 250 MG
2 CAPSULE ORAL 2 TIMES DAILY
Status: DISCONTINUED | OUTPATIENT
Start: 2023-12-24 | End: 2024-01-02 | Stop reason: HOSPADM

## 2023-12-24 RX ORDER — ACETAMINOPHEN 650 MG/1
650 SUPPOSITORY RECTAL EVERY 4 HOURS PRN
Status: DISCONTINUED | OUTPATIENT
Start: 2023-12-24 | End: 2024-01-02 | Stop reason: HOSPADM

## 2023-12-24 RX ORDER — METOPROLOL SUCCINATE 25 MG/1
25 TABLET, EXTENDED RELEASE ORAL DAILY
Status: ON HOLD | COMMUNITY
End: 2024-01-02 | Stop reason: SDUPTHER

## 2023-12-24 RX ORDER — ASPIRIN 81 MG/1
162 TABLET, CHEWABLE ORAL ONCE
Status: COMPLETED | OUTPATIENT
Start: 2023-12-24 | End: 2023-12-24

## 2023-12-24 RX ORDER — ALUMINA, MAGNESIA, AND SIMETHICONE 2400; 2400; 240 MG/30ML; MG/30ML; MG/30ML
15 SUSPENSION ORAL EVERY 6 HOURS PRN
Status: DISCONTINUED | OUTPATIENT
Start: 2023-12-24 | End: 2024-01-02 | Stop reason: HOSPADM

## 2023-12-24 RX ORDER — METHYLPREDNISOLONE SODIUM SUCCINATE 125 MG/2ML
125 INJECTION, POWDER, LYOPHILIZED, FOR SOLUTION INTRAMUSCULAR; INTRAVENOUS ONCE
Status: COMPLETED | OUTPATIENT
Start: 2023-12-24 | End: 2023-12-24

## 2023-12-24 RX ORDER — BISACODYL 10 MG
10 SUPPOSITORY, RECTAL RECTAL DAILY PRN
Status: DISCONTINUED | OUTPATIENT
Start: 2023-12-24 | End: 2024-01-02 | Stop reason: HOSPADM

## 2023-12-24 RX ORDER — POLYETHYLENE GLYCOL 3350 17 G/17G
17 POWDER, FOR SOLUTION ORAL DAILY PRN
Status: DISCONTINUED | OUTPATIENT
Start: 2023-12-24 | End: 2024-01-02 | Stop reason: HOSPADM

## 2023-12-24 RX ORDER — LISINOPRIL 20 MG/1
20 TABLET ORAL ONCE
Status: COMPLETED | OUTPATIENT
Start: 2023-12-24 | End: 2023-12-24

## 2023-12-24 RX ORDER — NITROGLYCERIN 0.4 MG/1
0.4 TABLET SUBLINGUAL
Status: DISCONTINUED | OUTPATIENT
Start: 2023-12-24 | End: 2024-01-02 | Stop reason: HOSPADM

## 2023-12-24 RX ORDER — LISINOPRIL 20 MG/1
20 TABLET ORAL DAILY
COMMUNITY
End: 2024-01-02 | Stop reason: HOSPADM

## 2023-12-24 RX ORDER — SODIUM CHLORIDE 0.9 % (FLUSH) 0.9 %
10 SYRINGE (ML) INJECTION EVERY 12 HOURS SCHEDULED
Status: DISCONTINUED | OUTPATIENT
Start: 2023-12-24 | End: 2024-01-02 | Stop reason: HOSPADM

## 2023-12-24 RX ORDER — ACETAMINOPHEN 325 MG/1
650 TABLET ORAL EVERY 4 HOURS PRN
Status: DISCONTINUED | OUTPATIENT
Start: 2023-12-24 | End: 2024-01-02 | Stop reason: HOSPADM

## 2023-12-24 RX ORDER — IPRATROPIUM BROMIDE AND ALBUTEROL SULFATE 2.5; .5 MG/3ML; MG/3ML
3 SOLUTION RESPIRATORY (INHALATION) ONCE
Status: COMPLETED | OUTPATIENT
Start: 2023-12-24 | End: 2023-12-24

## 2023-12-24 RX ORDER — PANTOPRAZOLE SODIUM 40 MG/1
40 TABLET, DELAYED RELEASE ORAL DAILY
COMMUNITY

## 2023-12-24 RX ORDER — BISACODYL 5 MG/1
5 TABLET, DELAYED RELEASE ORAL DAILY PRN
Status: DISCONTINUED | OUTPATIENT
Start: 2023-12-24 | End: 2024-01-02 | Stop reason: HOSPADM

## 2023-12-24 RX ADMIN — LISINOPRIL 20 MG: 20 TABLET ORAL at 18:45

## 2023-12-24 RX ADMIN — Medication 10 ML: at 20:40

## 2023-12-24 RX ADMIN — METHYLPREDNISOLONE SODIUM SUCCINATE 125 MG: 125 INJECTION, POWDER, FOR SOLUTION INTRAMUSCULAR; INTRAVENOUS at 16:18

## 2023-12-24 RX ADMIN — ASPIRIN 81 MG CHEWABLE TABLET 162 MG: 81 TABLET CHEWABLE at 16:18

## 2023-12-24 RX ADMIN — IPRATROPIUM BROMIDE AND ALBUTEROL SULFATE 3 ML: 2.5; .5 SOLUTION RESPIRATORY (INHALATION) at 16:23

## 2023-12-24 NOTE — PLAN OF CARE
Goal Outcome Evaluation:  Plan of Care Reviewed With: patient        Progress: no change  Outcome Evaluation: Pt orientated to care settings, call light within reach.

## 2023-12-24 NOTE — ED NOTES
Nursing report ED to floor  Camelia Gray  78 y.o.  female    HPI:   Chief Complaint   Patient presents with    Shortness of Breath       Admitting doctor:   Bernard Alonso DO    Admitting diagnosis:   The encounter diagnosis was Dyspnea, unspecified type.    Code status:   Current Code Status       Date Active Code Status Order ID Comments User Context       12/24/2023 1605 CPR (Attempt to Resuscitate) [id number]  Carola Jaime APRN ED        Question Answer    Code Status (Patient has no pulse and is not breathing) CPR (Attempt to Resuscitate)    Medical Interventions (Patient has pulse or is breathing) Full Support    Level Of Support Discussed With Patient                    Allergies:   Patient has no known allergies.    Isolation:  No active isolations     Fall Risk:  Fall Risk Assessment was completed, and patient is at high risk for falls.   Predictive Model Details         7 (Low) Factor Value    Calculated 12/24/2023 16:26 Age 78    Risk of Fall Model Musculoskeletal Assessment WDL     Active Peripheral IV Present     Imaging order in this encounter Present     Skin Assessment WDL     Magnesium 1.9 mg/dL     Financial Class Medicare     Number of Distinct Medication Classes administered 3     Drug Use No     Tobacco Use Quit     Mil[place] Scale not on file     Albumin 3.6 g/dL     Peripheral Vascular Assessment WDL     Calcium 9 mg/dL     Gastrointestinal Assessment WDL     Diastolic BP 85     Cardiac Assessment WDL     Respiratory Rate 16     Days after Admission 0.198     Total Bilirubin 0.4 mg/dL     Chloride 105 mmol/L     ALT 14 U/L     Creatinine 0.74 mg/dL     Potassium 3.8 mmol/L         Weight:       12/24/23  1139   Weight: (!) 159 kg (350 lb)       Intake and Output  No intake or output data in the 24 hours ending 12/24/23 1626    Diet:   Dietary Orders (From admission, onward)       Start     Ordered    12/24/23 1618  Diet: Cardiac Diets; Healthy Heart (2-3 Na+); Texture: Regular  Texture (IDDSI 7); Fluid Consistency: Thin (IDDSI 0)  Diet Effective Now        References:    Diet Order Crosswalk   Question Answer Comment   Diets: Cardiac Diets    Cardiac Diet: Healthy Heart (2-3 Na+)    Texture: Regular Texture (IDDSI 7)    Fluid Consistency: Thin (IDDSI 0)        12/24/23 1617                     Most recent vitals:   Vitals:    12/24/23 1515 12/24/23 1530 12/24/23 1545 12/24/23 1623   BP: (!) 153/105 171/85 173/85    BP Location:       Patient Position:       Pulse: 82 73 70 75   Resp:    16   Temp:       TempSrc:       SpO2: 91% 96% 92% 93%   Weight:       Height:           Active LDAs/IV Access:   Lines, Drains & Airways       Active LDAs       Name Placement date Placement time Site Days    Peripheral IV 12/24/23 1304 Left;Posterior Hand 12/24/23  1304  Hand  less than 1                    Skin Condition:   Skin Assessments (last day)       None             Labs (abnormal labs have a star):   Labs Reviewed   PROTIME-INR - Abnormal; Notable for the following components:       Result Value    Protime 11.9 (*)     All other components within normal limits   APTT - Abnormal; Notable for the following components:    PTT 34.7 (*)     All other components within normal limits   TROPONIN - Abnormal; Notable for the following components:    HS Troponin T 18 (*)     All other components within normal limits    Narrative:     High Sensitive Troponin T Reference Range:  <14.0 ng/L- Negative Female for AMI  <22.0 ng/L- Negative Male for AMI  >=14 - Abnormal Female indicating possible myocardial injury.  >=22 - Abnormal Male indicating possible myocardial injury.   Clinicians would have to utilize clinical acumen, EKG, Troponin, and serial changes to determine if it is an Acute Myocardial Infarction or myocardial injury due to an underlying chronic condition.        CBC WITH AUTO DIFFERENTIAL - Abnormal; Notable for the following components:    WBC 11.70 (*)     Neutrophil % 83.2 (*)     Lymphocyte %  8.9 (*)     Neutrophils, Absolute 9.70 (*)     All other components within normal limits   COVID-19/FLUA&B/RSV, NP SWAB IN TRANSPORT MEDIA 1 HR TAT - Normal    Narrative:     Fact sheet for providers: https://www.fda.gov/media/671610/download    Fact sheet for patients: https://www.fda.gov/media/109328/download    Test performed by PCR.   BNP (IN-HOUSE) - Normal    Narrative:     This assay is used as an aid in the diagnosis of individuals suspected of having heart failure. It can be used as an aid in the diagnosis of acute decompensated heart failure (ADHF) in patients presenting with signs and symptoms of ADHF to the emergency department (ED). In addition, NT-proBNP of <300 pg/mL indicates ADHF is not likely.    Age Range Result Interpretation  NT-proBNP Concentration (pg/mL:      <50             Positive            >450                   Gray                 300-450                    Negative             <300    50-75           Positive            >900                  Gray                300-900                  Negative            <300      >75             Positive            >1800                  Gray                300-1800                  Negative            <300   MAGNESIUM - Normal   TSH - Normal   COMPREHENSIVE METABOLIC PANEL    Narrative:     GFR Normal >60  Chronic Kidney Disease <60  Kidney Failure <15    The GFR formula is only valid for adults with stable renal function between ages 18 and 70.   HIGH SENSITIVITIY TROPONIN T 2HR   CBC AND DIFFERENTIAL    Narrative:     The following orders were created for panel order CBC & Differential.  Procedure                               Abnormality         Status                     ---------                               -----------         ------                     CBC Auto Differential[[id number]]        Abnormal            Final result                 Please view results for these tests on the individual orders.       LOC: Person, Place, Time, and  Situation    Telemetry:  Observation Unit    Cardiac Monitoring Ordered: yes    EKG:   ECG 12 Lead Dyspnea   Preliminary Result   HEART RATE= 74  bpm   RR Interval= 836  ms   AK Interval= 197  ms   P Horizontal Axis= 112  deg   P Front Axis= 13  deg   QRSD Interval= 89  ms   QT Interval= 378  ms   QTcB= 413  ms   QRS Axis= 55  deg   T Wave Axis= 34  deg   - ABNORMAL ECG -   Sinus rhythm   Atrial premature complexes   When compared with ECG of 09-Nov-2020 12:53:09,   Nonspecific significant change   Electronically Signed By:    Date and Time of Study: 2023-12-24 12:04:41          Medications Given in the ED:   Medications   sodium chloride 0.9 % flush 10 mL (has no administration in time range)   sodium chloride 0.9 % flush 10 mL (has no administration in time range)   sodium chloride 0.9 % flush 10 mL (has no administration in time range)   sodium chloride 0.9 % infusion 40 mL (has no administration in time range)   sennosides-docusate (PERICOLACE) 8.6-50 MG per tablet 2 tablet (has no administration in time range)     And   polyethylene glycol (MIRALAX) packet 17 g (has no administration in time range)     And   bisacodyl (DULCOLAX) EC tablet 5 mg (has no administration in time range)     And   bisacodyl (DULCOLAX) suppository 10 mg (has no administration in time range)   nitroglycerin (NITROSTAT) SL tablet 0.4 mg (has no administration in time range)   acetaminophen (TYLENOL) tablet 650 mg (has no administration in time range)     Or   acetaminophen (TYLENOL) 160 MG/5ML oral solution 650 mg (has no administration in time range)     Or   acetaminophen (TYLENOL) suppository 650 mg (has no administration in time range)   ondansetron (ZOFRAN) injection 4 mg (has no administration in time range)   aluminum-magnesium hydroxide-simethicone (MAALOX MAX) 400-400-40 MG/5ML suspension 15 mL (has no administration in time range)   methylPREDNISolone sodium succinate (SOLU-Medrol) injection 125 mg (125 mg Intravenous Given  23 1618)   ipratropium-albuterol (DUO-NEB) nebulizer solution 3 mL (3 mL Nebulization Given 23 1623)   aspirin chewable tablet 162 mg (162 mg Oral Given 23 1618)       Imaging results:  XR Chest 1 View    Result Date: 2023  Impression: Mild cardiomegaly. Benign calcified granulomatous changes in the thorax. No acute chest finding. Electronically Signed: Marie Toledo MD  2023 12:47 PM EST  Workstation ID: [id number]     Social issues:   Social History     Socioeconomic History    Marital status: Single   Tobacco Use    Smoking status: Former     Packs/day: 0.25     Years: 25.00     Additional pack years: 0.00     Total pack years: 6.25     Types: Cigarettes     Start date: 10/1/1964     Quit date: 1992     Years since quittin.0    Smokeless tobacco: Never   Vaping Use    Vaping Use: Never used   Substance and Sexual Activity    Alcohol use: No    Drug use: No    Sexual activity: Never       NIH Stroke Scale:  Interval: (not recorded)  1a. Level of Consciousness: (not recorded)  1b. LOC Questions: (not recorded)  1c. LOC Commands: (not recorded)  2. Best Gaze: (not recorded)  3. Visual: (not recorded)  4. Facial Palsy: (not recorded)  5a. Motor Arm, Left: (not recorded)  5b. Motor Arm, Right: (not recorded)  6a. Motor Leg, Left: (not recorded)  6b. Motor Leg, Right: (not recorded)  7. Limb Ataxia: (not recorded)  8. Sensory: (not recorded)  9. Best Language: (not recorded)  10. Dysarthria: (not recorded)  11. Extinction and Inattention (formerly Neglect): (not recorded)    Total (NIH Stroke Scale): (not recorded)     Additional notable assessment information:     Nursing report ED to floor:  PCU MARY Locke RN   23 16:26 EST

## 2023-12-25 ENCOUNTER — APPOINTMENT (OUTPATIENT)
Dept: CT IMAGING | Facility: HOSPITAL | Age: 78
End: 2023-12-25
Payer: MEDICARE

## 2023-12-25 LAB
ANION GAP SERPL CALCULATED.3IONS-SCNC: 12 MMOL/L (ref 5–15)
ARTERIAL PATENCY WRIST A: POSITIVE
ATMOSPHERIC PRESS: ABNORMAL MM[HG]
BASE EXCESS BLDA CALC-SCNC: 3.9 MMOL/L (ref 0–3)
BASOPHILS # BLD AUTO: 0 10*3/MM3 (ref 0–0.2)
BASOPHILS NFR BLD AUTO: 0 % (ref 0–1.5)
BDY SITE: ABNORMAL
BUN SERPL-MCNC: 17 MG/DL (ref 8–23)
BUN/CREAT SERPL: 23.6 (ref 7–25)
CALCIUM SPEC-SCNC: 9.1 MG/DL (ref 8.6–10.5)
CHLORIDE SERPL-SCNC: 101 MMOL/L (ref 98–107)
CHOLEST SERPL-MCNC: 140 MG/DL (ref 0–200)
CO2 BLDA-SCNC: 31.5 MMOL/L (ref 22–29)
CO2 SERPL-SCNC: 27 MMOL/L (ref 22–29)
CREAT SERPL-MCNC: 0.72 MG/DL (ref 0.57–1)
DEPRECATED RDW RBC AUTO: 49 FL (ref 37–54)
EGFRCR SERPLBLD CKD-EPI 2021: 86.2 ML/MIN/1.73
EOSINOPHIL # BLD AUTO: 0 10*3/MM3 (ref 0–0.4)
EOSINOPHIL NFR BLD AUTO: 0 % (ref 0.3–6.2)
ERYTHROCYTE [DISTWIDTH] IN BLOOD BY AUTOMATED COUNT: 14.6 % (ref 12.3–15.4)
GLUCOSE SERPL-MCNC: 168 MG/DL (ref 65–99)
HBA1C MFR BLD: 5.9 % (ref 4.8–5.6)
HCO3 BLDA-SCNC: 29.9 MMOL/L (ref 21–28)
HCT VFR BLD AUTO: 41.5 % (ref 34–46.6)
HDLC SERPL-MCNC: 69 MG/DL (ref 40–60)
HEMODILUTION: NO
HGB BLD-MCNC: 13.7 G/DL (ref 12–15.9)
INHALED O2 CONCENTRATION: 32 %
LDLC SERPL CALC-MCNC: 60 MG/DL (ref 0–100)
LDLC/HDLC SERPL: 0.88 {RATIO}
LYMPHOCYTES # BLD AUTO: 0.7 10*3/MM3 (ref 0.7–3.1)
LYMPHOCYTES NFR BLD AUTO: 5.5 % (ref 19.6–45.3)
MCH RBC QN AUTO: 30.1 PG (ref 26.6–33)
MCHC RBC AUTO-ENTMCNC: 33 G/DL (ref 31.5–35.7)
MCV RBC AUTO: 91.3 FL (ref 79–97)
MODALITY: ABNORMAL
MONOCYTES # BLD AUTO: 0.1 10*3/MM3 (ref 0.1–0.9)
MONOCYTES NFR BLD AUTO: 0.9 % (ref 5–12)
NEUTROPHILS NFR BLD AUTO: 11.2 10*3/MM3 (ref 1.7–7)
NEUTROPHILS NFR BLD AUTO: 93.6 % (ref 42.7–76)
NRBC BLD AUTO-RTO: 0 /100 WBC (ref 0–0.2)
PCO2 BLDA: 49 MM HG (ref 35–48)
PH BLDA: 7.39 PH UNITS (ref 7.35–7.45)
PLATELET # BLD AUTO: 230 10*3/MM3 (ref 140–450)
PMV BLD AUTO: 7.6 FL (ref 6–12)
PO2 BLDA: 71.7 MM HG (ref 83–108)
POTASSIUM SERPL-SCNC: 4.3 MMOL/L (ref 3.5–5.2)
RBC # BLD AUTO: 4.54 10*6/MM3 (ref 3.77–5.28)
SAO2 % BLDCOA: 93.8 % (ref 94–98)
SODIUM SERPL-SCNC: 140 MMOL/L (ref 136–145)
TRIGL SERPL-MCNC: 51 MG/DL (ref 0–150)
VLDLC SERPL-MCNC: 11 MG/DL (ref 5–40)
WBC NRBC COR # BLD AUTO: 12 10*3/MM3 (ref 3.4–10.8)

## 2023-12-25 PROCEDURE — 82803 BLOOD GASES ANY COMBINATION: CPT

## 2023-12-25 PROCEDURE — 80048 BASIC METABOLIC PNL TOTAL CA: CPT | Performed by: NURSE PRACTITIONER

## 2023-12-25 PROCEDURE — 99204 OFFICE O/P NEW MOD 45 MIN: CPT | Performed by: INTERNAL MEDICINE

## 2023-12-25 PROCEDURE — 71260 CT THORAX DX C+: CPT

## 2023-12-25 PROCEDURE — G0378 HOSPITAL OBSERVATION PER HR: HCPCS

## 2023-12-25 PROCEDURE — 80061 LIPID PANEL: CPT | Performed by: PHYSICIAN ASSISTANT

## 2023-12-25 PROCEDURE — 85025 COMPLETE CBC W/AUTO DIFF WBC: CPT | Performed by: NURSE PRACTITIONER

## 2023-12-25 PROCEDURE — 83036 HEMOGLOBIN GLYCOSYLATED A1C: CPT | Performed by: PHYSICIAN ASSISTANT

## 2023-12-25 PROCEDURE — 25010000002 HYDRALAZINE PER 20 MG: Performed by: PHYSICIAN ASSISTANT

## 2023-12-25 PROCEDURE — 63710000001 PREDNISONE PER 1 MG: Performed by: INTERNAL MEDICINE

## 2023-12-25 PROCEDURE — 36600 WITHDRAWAL OF ARTERIAL BLOOD: CPT

## 2023-12-25 PROCEDURE — 63710000001 PREDNISONE PER 5 MG: Performed by: INTERNAL MEDICINE

## 2023-12-25 PROCEDURE — 25510000001 IOPAMIDOL PER 1 ML: Performed by: EMERGENCY MEDICINE

## 2023-12-25 PROCEDURE — 94799 UNLISTED PULMONARY SVC/PX: CPT

## 2023-12-25 RX ORDER — LEVOTHYROXINE SODIUM 0.03 MG/1
25 TABLET ORAL
Status: DISCONTINUED | OUTPATIENT
Start: 2023-12-25 | End: 2024-01-02 | Stop reason: HOSPADM

## 2023-12-25 RX ORDER — METOPROLOL SUCCINATE 25 MG/1
25 TABLET, EXTENDED RELEASE ORAL DAILY
Status: DISCONTINUED | OUTPATIENT
Start: 2023-12-25 | End: 2023-12-26

## 2023-12-25 RX ORDER — AZITHROMYCIN 250 MG/1
500 TABLET, FILM COATED ORAL
Status: COMPLETED | OUTPATIENT
Start: 2023-12-25 | End: 2023-12-27

## 2023-12-25 RX ORDER — TRIAMTERENE AND HYDROCHLOROTHIAZIDE 37.5; 25 MG/1; MG/1
1 TABLET ORAL DAILY
Status: DISCONTINUED | OUTPATIENT
Start: 2023-12-25 | End: 2024-01-02 | Stop reason: HOSPADM

## 2023-12-25 RX ORDER — HYDRALAZINE HYDROCHLORIDE 20 MG/ML
10 INJECTION INTRAMUSCULAR; INTRAVENOUS EVERY 6 HOURS PRN
Status: DISCONTINUED | OUTPATIENT
Start: 2023-12-25 | End: 2024-01-02 | Stop reason: HOSPADM

## 2023-12-25 RX ORDER — BENZONATATE 100 MG/1
100 CAPSULE ORAL 3 TIMES DAILY PRN
Status: DISCONTINUED | OUTPATIENT
Start: 2023-12-25 | End: 2024-01-02 | Stop reason: HOSPADM

## 2023-12-25 RX ORDER — LISINOPRIL 20 MG/1
20 TABLET ORAL DAILY
Status: DISCONTINUED | OUTPATIENT
Start: 2023-12-25 | End: 2023-12-30

## 2023-12-25 RX ORDER — PREDNISONE 20 MG/1
20 TABLET ORAL DAILY
Status: COMPLETED | OUTPATIENT
Start: 2023-12-27 | End: 2023-12-28

## 2023-12-25 RX ORDER — PANTOPRAZOLE SODIUM 40 MG/1
40 TABLET, DELAYED RELEASE ORAL DAILY
Status: DISCONTINUED | OUTPATIENT
Start: 2023-12-25 | End: 2024-01-02 | Stop reason: HOSPADM

## 2023-12-25 RX ORDER — PREDNISONE 10 MG/1
10 TABLET ORAL DAILY
Status: COMPLETED | OUTPATIENT
Start: 2023-12-29 | End: 2023-12-30

## 2023-12-25 RX ORDER — IPRATROPIUM BROMIDE AND ALBUTEROL SULFATE 2.5; .5 MG/3ML; MG/3ML
3 SOLUTION RESPIRATORY (INHALATION)
Status: DISCONTINUED | OUTPATIENT
Start: 2023-12-25 | End: 2023-12-30

## 2023-12-25 RX ADMIN — TRIAMTERENE AND HYDROCHLOROTHIAZIDE 1 TABLET: 37.5; 25 TABLET ORAL at 11:33

## 2023-12-25 RX ADMIN — BENZONATATE 100 MG: 100 CAPSULE ORAL at 01:55

## 2023-12-25 RX ADMIN — Medication 10 ML: at 09:01

## 2023-12-25 RX ADMIN — APIXABAN 2.5 MG: 2.5 TABLET, FILM COATED ORAL at 09:00

## 2023-12-25 RX ADMIN — BENZONATATE 100 MG: 100 CAPSULE ORAL at 11:33

## 2023-12-25 RX ADMIN — HYDRALAZINE HYDROCHLORIDE 10 MG: 20 INJECTION, SOLUTION INTRAMUSCULAR; INTRAVENOUS at 18:47

## 2023-12-25 RX ADMIN — AZITHROMYCIN DIHYDRATE 500 MG: 250 TABLET ORAL at 14:06

## 2023-12-25 RX ADMIN — IOPAMIDOL 100 ML: 755 INJECTION, SOLUTION INTRAVENOUS at 14:56

## 2023-12-25 RX ADMIN — METOPROLOL SUCCINATE 25 MG: 25 TABLET, EXTENDED RELEASE ORAL at 09:00

## 2023-12-25 RX ADMIN — Medication 10 ML: at 18:48

## 2023-12-25 RX ADMIN — PREDNISONE 30 MG: 20 TABLET ORAL at 14:06

## 2023-12-25 RX ADMIN — Medication 10 ML: at 22:03

## 2023-12-25 RX ADMIN — IPRATROPIUM BROMIDE AND ALBUTEROL SULFATE 3 ML: 2.5; .5 SOLUTION RESPIRATORY (INHALATION) at 21:58

## 2023-12-25 RX ADMIN — PANTOPRAZOLE SODIUM 40 MG: 40 TABLET, DELAYED RELEASE ORAL at 09:00

## 2023-12-25 RX ADMIN — LEVOTHYROXINE SODIUM 25 MCG: 0.03 TABLET ORAL at 09:00

## 2023-12-25 RX ADMIN — APIXABAN 2.5 MG: 2.5 TABLET, FILM COATED ORAL at 21:14

## 2023-12-25 RX ADMIN — LISINOPRIL 20 MG: 20 TABLET ORAL at 09:01

## 2023-12-25 RX ADMIN — ACETAMINOPHEN 650 MG: 325 TABLET, FILM COATED ORAL at 18:46

## 2023-12-25 NOTE — PLAN OF CARE
Goal Outcome Evaluation:              Outcome Evaluation: pt. awaiting a stress test, sl'd, cough med. ordered for frequent coughing, 2 L 02 will continue to monitor

## 2023-12-25 NOTE — PLAN OF CARE
Goal Outcome Evaluation:      A/O x4 with cough C/O SOA remains on 2L NC (no home oxygen) patient has BLE wrapped with ace wraps from home per patient swelling lymphedema. Patient obese and states coughs and incontience of urine purewick on. Awaiting pulm and cardio consult. Echo and possible stress test

## 2023-12-25 NOTE — H&P
"[facility] Observation Unit H&P    Patient Name: Camelia Gray  : 1945  MRN: [id number]  Primary Care Physician: Brinda Tony APRN  Date of admission: 2023     Patient Care Team:  Brinda Tony APRN as PCP - General (Nurse Practitioner)          Subjective   History Present Illness     Chief Complaint:   Chief Complaint   Patient presents with    Shortness of Breath     dyspnea    History of Present Illness    78-year-old female who presents with shortness of breath and fatigue.  She has had trouble breathing and she has been coughing.  She had a runny nose.  She has not had hemoptysis.  She has not had swelling.  She has a history of pulmonary embolism in 2018.  She has remained on Eliquis and has not stopped that medication.  She had her wellness check 2 weeks ago and her primary doctor suggested seeing Dr. Brandt for cardiology to \"just be checked\".  She has no underlying heart diagnoses.  She has no asthma or COPD.  She does not wear oxygen normally.  She was concerned also with this because her heart rate was 88.  She states typically her heart rate is 60.  She is not having chest pain.  She does have chronic lymphedema.     Observation   Pt concurs with er hpi. Cardiology and pulmonary consulted. Stress and echo pending.     Review of Systems   Constitutional: Positive for malaise/fatigue. Negative for fever.   Cardiovascular:  Negative for chest pain.        Chronic leg swelling   Respiratory:  Positive for cough and shortness of breath.              Personal History     Past Medical History:   Past Medical History:   Diagnosis Date    Arthritis     Cellulitis of both feet 10/13/2019    Decubitus ulcer of buttock, stage 2 10/14/2019    HEALED    Dermatitis associated with moisture 10/14/2019    Disease of thyroid gland     HYPOTHYROID    Edema of lower extremity 2017    Santos catheter in place     Hypertension     Immobility syndrome 10/13/2019    Lymphedema 10/14/2019    LEGS    " Muscle weakness (generalized)     Non-pressure chronic ulcer of other part of left foot with fat layer exposed 10/14/2019    Non-pressure chronic ulcer of other part of right foot with fat layer exposed 10/14/2019    Obesity     Pulmonary embolism     Stasis dermatitis 7/8/2013    Ureteral calculi     Urinary tract infection 7/2020    Hosp. 7/2020    Venous stasis 10/14/2019    Yeast infection of the skin        Surgical History:      Past Surgical History:   Procedure Laterality Date    CATARACT EXTRACTION Bilateral     CHOLECYSTECTOMY N/A 11/11/2020    Procedure: CHOLECYSTECTOMY LAPAROSCOPIC;  Surgeon: George Crocker DO;  Location: [facility] MAIN OR;  Service: General;  Laterality: N/A;    CORRECTION HAMMER TOE Bilateral     CYSTOSCOPY, URETEROSCOPY, RETROGRADE PYELOGRAM, STENT INSERTION Bilateral 8/18/2020    Procedure: CYSTOSCOPY BILATERAL RETROGRADE PYELOGRAM,;  Surgeon: Shawn Hernandez MD;  Location: [facility] MAIN OR;  Service: Urology;  Laterality: Bilateral;    DENTAL EXAMINATION UNDER ANESTHESIA      ENDOSCOPY N/A 9/13/2020    Procedure: ESOPHAGOGASTRODUODENOSCOPY;  Surgeon: Torsten Johnson MD;  Location: [facility] ENDOSCOPY;  Service: Gastroenterology;  Laterality: N/A;  post: esophageal stricture, reflux esophagitis, hiatal hernia    JOINT REPLACEMENT      Bilateral knees    TOE SURGERY      TONSILLECTOMY  1952    TOTAL HIP ARTHROPLASTY Left 6/12/2020    Procedure: TOTAL HIP ARTHROPLASTY;  Surgeon: Baljit Hutchins II, MD;  Location: [facility] MAIN OR;  Service: Orthopedics;  Laterality: Left;           Family History: family history includes COPD in her father; Heart disease in her father and mother; Hypertension in her father and mother; Kidney disease in her father. Otherwise pertinent FHx was reviewed and unremarkable.     Social History:  reports that she quit smoking about 31 years ago. Her smoking use included cigarettes. She started smoking about 59 years ago. She has a 6.25 pack-year  smoking history. She has never used smokeless tobacco. She reports that she does not drink alcohol and does not use drugs.      Medications:  Prior to Admission medications    Medication Sig Start Date End Date Taking? Authorizing Provider   apixaban (ELIQUIS) 2.5 MG tablet tablet Take 1 tablet by mouth 2 (Two) Times a Day.   Yes Karli Chenye MD   B Complex Vitamins (vitamin b complex) capsule capsule Take 1 capsule by mouth Daily.   Yes Karli Cheney MD   levothyroxine (SYNTHROID, LEVOTHROID) 25 MCG tablet Take 1 tablet by mouth Daily. 12/20/23  Yes Iliana Covington APRN   lisinopril (PRINIVIL,ZESTRIL) 20 MG tablet Take 1 tablet by mouth Daily.   Yes Karli Cheney MD   metoprolol succinate XL (TOPROL-XL) 25 MG 24 hr tablet Take 1 tablet by mouth Daily. Hold if systolic < 110 or HR <60   Yes Karli Cheney MD   Multiple Vitamins-Minerals (multivitamin with minerals) tablet tablet Take 1 tablet by mouth Daily. LD 11/4   Yes Karli Cheney MD   pantoprazole (PROTONIX) 40 MG EC tablet Take 1 tablet by mouth Daily.   Yes Karli Cheney MD       Allergies:  No Known Allergies    Objective   Objective     Vital Signs  Temp:  [98.3 °F (36.8 °C)-99.5 °F (37.5 °C)] 98.3 °F (36.8 °C)  Heart Rate:  [62-82] 74  Resp:  [16-22] 22  BP: (142-236)/() 168/85  SpO2:  [90 %-97 %] 91 %  on  Flow (L/min):  [2] 2;   Device (Oxygen Therapy): nasal cannula  Body mass index is 48.82 kg/m².    Physical Exam  Constitutional:       Appearance: She is obese.   HENT:      Mouth/Throat:      Mouth: Mucous membranes are moist.   Cardiovascular:      Rate and Rhythm: Normal rate and regular rhythm.      Pulses: Normal pulses.      Heart sounds: Normal heart sounds.   Pulmonary:      Effort: Pulmonary effort is normal.      Breath sounds: Normal breath sounds.   Abdominal:      Palpations: Abdomen is soft.   Skin:     General: Skin is warm and dry.   Neurological:      General: No focal deficit  present.      Mental Status: She is alert and oriented to person, place, and time.   Psychiatric:         Mood and Affect: Mood normal.         Behavior: Behavior normal.             Results Review:  I have personally reviewed most recent cardiac tracings, lab results, microbiology results, and radiology images and interpretations and agree with findings, most notably: cbc, cmp, tsh, trop, inr, mag, A1c, chest xray, EKG, covid.    Results from last 7 days   Lab Units 12/25/23  0253 12/24/23  1305   WBC 10*3/mm3 12.00* 11.70*   HEMOGLOBIN g/dL 13.7 12.9   HEMATOCRIT % 41.5 40.0   PLATELETS 10*3/mm3 230 195   INR   --  1.10     Results from last 7 days   Lab Units 12/25/23  0253 12/24/23  1708 12/24/23  1408   SODIUM mmol/L 140  --  144   POTASSIUM mmol/L 4.3  --  3.8   CHLORIDE mmol/L 101  --  105   CO2 mmol/L 27.0  --  27.0   BUN mg/dL 17  --  15   CREATININE mg/dL 0.72  --  0.74   GLUCOSE mg/dL 168*  --  98   CALCIUM mg/dL 9.1  --  9.0   ALK PHOS U/L  --   --  97   ALT (SGPT) U/L  --   --  14   AST (SGOT) U/L  --   --  18   HSTROP T ng/L  --  18* 18*   PROBNP pg/mL  --   --  367.9     Estimated Creatinine Clearance: 109.5 mL/min (by C-G formula based on SCr of 0.72 mg/dL).  Brief Urine Lab Results       None            Microbiology Results (last 10 days)       Procedure Component Value - Date/Time    COVID-19, FLU A/B, RSV PCR 1 HR TAT - Swab, Nasopharynx [[id number]]  (Normal) Collected: 12/24/23 1240    Lab Status: Final result Specimen: Swab from Nasopharynx Updated: 12/24/23 1400     COVID19 Not Detected     Influenza A PCR Not Detected     Influenza B PCR Not Detected     RSV, PCR Not Detected    Narrative:      Fact sheet for providers: https://www.fda.gov/media/308960/download    Fact sheet for patients: https://www.fda.gov/media/126121/download    Test performed by PCR.            ECG/EMG Results (most recent)       Procedure Component Value Units Date/Time    ECG 12 Lead Dyspnea [[id number]] Collected:  12/24/23 1204     Updated: 12/24/23 1205     QT Interval 378 ms      QTC Interval 413 ms     Narrative:      HEART RATE= 74  bpm  RR Interval= 836  ms  ID Interval= 197  ms  P Horizontal Axis= 112  deg  P Front Axis= 13  deg  QRSD Interval= 89  ms  QT Interval= 378  ms  QTcB= 413  ms  QRS Axis= 55  deg  T Wave Axis= 34  deg  - ABNORMAL ECG -  Sinus rhythm  Atrial premature complexes  When compared with ECG of 09-Nov-2020 12:53:09,  Nonspecific significant change  Electronically Signed By:   Date and Time of Study: 2023-12-24 12:04:41            Results for orders placed during the hospital encounter of 10/13/19    Duplex Venous Lower Extremity - Bilateral CAR    Interpretation Summary  · Normal bilateral lower extremity venous duplex scan.          XR Chest 1 View    Result Date: 12/24/2023  Impression: Mild cardiomegaly. Benign calcified granulomatous changes in the thorax. No acute chest finding. Electronically Signed: Marie Toledo MD  12/24/2023 12:47 PM EST  Workstation ID: [id number]       Estimated Creatinine Clearance: 109.5 mL/min (by C-G formula based on SCr of 0.72 mg/dL).    Assessment & Plan   Assessment/Plan       Active Hospital Problems    Diagnosis  POA    **Dyspnea [R06.00]  Unknown      Resolved Hospital Problems   No resolved problems to display.       Dyspnea  - wbc 11. 12 today  - cmp, tsh, bnp is unremarkable  - trop 18, 18  - chest xray reviewed and showing mild cardiomegaly otherwise no acute cardiopulmonary processes  - EKG rate  74 sinus pacs  - covid/flu negative  - cardiology consulted and recommends stress and echo  - pulmonary consulted  - Pt given asa in er  - steroids iv, bd    Hypertension  -moderately Controlled   BP Readings from Last 1 Encounters:   12/25/23 168/85     - Continue lisinopril, metoprolol  - Monitor while admitted     Hypothyroidism  - cont home synthroid    Hx of dvt/pe  - on eliquis      VTE Prophylaxis -   Mechanical Order History:        Ordered        12/24/23  1617  Place Sequential Compression Device  Once            12/24/23 1617  Maintain Sequential Compression Device  Continuous                          Pharmalogical Order History:        Ordered     Dose Route Frequency Stop    12/25/23 0717  apixaban (ELIQUIS) tablet 2.5 mg         2.5 mg PO 2 Times Daily --                    CODE STATUS:    Code Status and Medical Interventions:   Ordered at: 12/24/23 1605     Level Of Support Discussed With:    Patient     Code Status (Patient has no pulse and is not breathing):    CPR (Attempt to Resuscitate)     Medical Interventions (Patient has pulse or is breathing):    Full Support       This patient has been examined wearing personal protective equipment.     I discussed the patient's findings and my recommendations with patient and nursing staff.      Signature:Electronically signed by Dayna Esparza PA-C, 12/25/23, 9:42 AM EST.
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
## 2023-12-26 ENCOUNTER — APPOINTMENT (OUTPATIENT)
Dept: NUCLEAR MEDICINE | Facility: HOSPITAL | Age: 78
End: 2023-12-26
Payer: MEDICARE

## 2023-12-26 ENCOUNTER — APPOINTMENT (OUTPATIENT)
Dept: CARDIOLOGY | Facility: HOSPITAL | Age: 78
End: 2023-12-26
Payer: MEDICARE

## 2023-12-26 PROBLEM — J96.01 ACUTE RESPIRATORY FAILURE WITH HYPOXIA: Status: ACTIVE | Noted: 2023-12-26

## 2023-12-26 PROBLEM — T59.811A SMOKE INHALATION: Status: ACTIVE | Noted: 2023-12-26

## 2023-12-26 PROBLEM — K44.9 HIATAL HERNIA: Status: ACTIVE | Noted: 2023-12-26

## 2023-12-26 LAB
ANION GAP SERPL CALCULATED.3IONS-SCNC: 8 MMOL/L (ref 5–15)
B PARAPERT DNA SPEC QL NAA+PROBE: NOT DETECTED
B PERT DNA SPEC QL NAA+PROBE: NOT DETECTED
BASOPHILS # BLD AUTO: 0.1 10*3/MM3 (ref 0–0.2)
BASOPHILS NFR BLD AUTO: 0.4 % (ref 0–1.5)
BH CV ECHO MEAS - ACS: 1.9 CM
BH CV ECHO MEAS - AO MAX PG: 18.8 MMHG
BH CV ECHO MEAS - AO MEAN PG: 10 MMHG
BH CV ECHO MEAS - AO V2 MAX: 217 CM/SEC
BH CV ECHO MEAS - AO V2 VTI: 43 CM
BH CV ECHO MEAS - EDV(CUBED): 91.1 ML
BH CV ECHO MEAS - EDV(MOD-SP4): 175 ML
BH CV ECHO MEAS - EF(MOD-BP): 68 %
BH CV ECHO MEAS - EF(MOD-SP4): 68.1 %
BH CV ECHO MEAS - ESV(CUBED): 24.4 ML
BH CV ECHO MEAS - ESV(MOD-SP4): 55.8 ML
BH CV ECHO MEAS - FS: 35.6 %
BH CV ECHO MEAS - IVS/LVPW: 1.1 CM
BH CV ECHO MEAS - IVSD: 1.1 CM
BH CV ECHO MEAS - LAT PEAK E' VEL: 12.7 CM/SEC
BH CV ECHO MEAS - LV DIASTOLIC VOL/BSA (35-75): 65.4 CM2
BH CV ECHO MEAS - LV MASS(C)D: 164 GRAMS
BH CV ECHO MEAS - LV MAX PG: 12.7 MMHG
BH CV ECHO MEAS - LV MEAN PG: 7 MMHG
BH CV ECHO MEAS - LV SYSTOLIC VOL/BSA (12-30): 20.9 CM2
BH CV ECHO MEAS - LV V1 MAX: 178 CM/SEC
BH CV ECHO MEAS - LV V1 VTI: 36.4 CM
BH CV ECHO MEAS - LVIDD: 4.5 CM
BH CV ECHO MEAS - LVIDS: 2.9 CM
BH CV ECHO MEAS - LVPWD: 1 CM
BH CV ECHO MEAS - MED PEAK E' VEL: 5.7 CM/SEC
BH CV ECHO MEAS - MV A MAX VEL: 82.8 CM/SEC
BH CV ECHO MEAS - MV DEC SLOPE: 240 CM/SEC2
BH CV ECHO MEAS - MV DEC TIME: 0.28 SEC
BH CV ECHO MEAS - MV E MAX VEL: 61.8 CM/SEC
BH CV ECHO MEAS - MV E/A: 0.75
BH CV ECHO MEAS - MV MAX PG: 4.2 MMHG
BH CV ECHO MEAS - MV MEAN PG: 2 MMHG
BH CV ECHO MEAS - MV P1/2T: 125.7 MSEC
BH CV ECHO MEAS - MV V2 VTI: 38.1 CM
BH CV ECHO MEAS - MVA(P1/2T): 1.75 CM2
BH CV ECHO MEAS - SI(MOD-SP4): 44.6 ML/M2
BH CV ECHO MEAS - SV(MOD-SP4): 119.2 ML
BH CV ECHO MEASUREMENTS AVERAGE E/E' RATIO: 6.72
BH CV NUCLEAR PRIOR STUDY: 3
BH CV REST NUCLEAR ISOTOPE DOSE: 10.5 MCI
BH CV STRESS BP STAGE 3: NORMAL
BH CV STRESS COMMENTS STAGE 1: NORMAL
BH CV STRESS COMMENTS STAGE 2: NORMAL
BH CV STRESS DOSE REGADENOSON STAGE 1: 0.4
BH CV STRESS DURATION MIN STAGE 1: 0
BH CV STRESS DURATION MIN STAGE 2: 4
BH CV STRESS DURATION SEC STAGE 1: 10
BH CV STRESS DURATION SEC STAGE 2: 0
BH CV STRESS HR STAGE 1: 67
BH CV STRESS HR STAGE 2: 83
BH CV STRESS HR STAGE 3: 76
BH CV STRESS NUCLEAR ISOTOPE DOSE: 33 MCI
BH CV STRESS PROTOCOL 1: NORMAL
BH CV STRESS RECOVERY BP: NORMAL MMHG
BH CV STRESS RECOVERY HR: 75 BPM
BH CV STRESS STAGE 1: 1
BH CV STRESS STAGE 2: 2
BH CV STRESS STAGE 3: 3
BH CV XLRA - TDI S': 17.8 CM/SEC
BUN SERPL-MCNC: 27 MG/DL (ref 8–23)
BUN/CREAT SERPL: 36 (ref 7–25)
C PNEUM DNA NPH QL NAA+NON-PROBE: NOT DETECTED
CALCIUM SPEC-SCNC: 9.3 MG/DL (ref 8.6–10.5)
CHLORIDE SERPL-SCNC: 100 MMOL/L (ref 98–107)
CO2 SERPL-SCNC: 32 MMOL/L (ref 22–29)
CREAT SERPL-MCNC: 0.75 MG/DL (ref 0.57–1)
DEPRECATED RDW RBC AUTO: 49.4 FL (ref 37–54)
EGFRCR SERPLBLD CKD-EPI 2021: 82.1 ML/MIN/1.73
EOSINOPHIL # BLD AUTO: 0 10*3/MM3 (ref 0–0.4)
EOSINOPHIL NFR BLD AUTO: 0 % (ref 0.3–6.2)
ERYTHROCYTE [DISTWIDTH] IN BLOOD BY AUTOMATED COUNT: 14.5 % (ref 12.3–15.4)
FLUAV SUBTYP SPEC NAA+PROBE: NOT DETECTED
FLUBV RNA ISLT QL NAA+PROBE: NOT DETECTED
GLUCOSE SERPL-MCNC: 138 MG/DL (ref 65–99)
HADV DNA SPEC NAA+PROBE: NOT DETECTED
HCOV 229E RNA SPEC QL NAA+PROBE: NOT DETECTED
HCOV HKU1 RNA SPEC QL NAA+PROBE: NOT DETECTED
HCOV NL63 RNA SPEC QL NAA+PROBE: NOT DETECTED
HCOV OC43 RNA SPEC QL NAA+PROBE: NOT DETECTED
HCT VFR BLD AUTO: 42.4 % (ref 34–46.6)
HGB BLD-MCNC: 13.8 G/DL (ref 12–15.9)
HMPV RNA NPH QL NAA+NON-PROBE: NOT DETECTED
HPIV1 RNA ISLT QL NAA+PROBE: NOT DETECTED
HPIV2 RNA SPEC QL NAA+PROBE: NOT DETECTED
HPIV3 RNA NPH QL NAA+PROBE: NOT DETECTED
HPIV4 P GENE NPH QL NAA+PROBE: NOT DETECTED
LYMPHOCYTES # BLD AUTO: 0.8 10*3/MM3 (ref 0.7–3.1)
LYMPHOCYTES NFR BLD AUTO: 4.9 % (ref 19.6–45.3)
M PNEUMO IGG SER IA-ACNC: NOT DETECTED
MAXIMAL PREDICTED HEART RATE: 143 BPM
MCH RBC QN AUTO: 29.8 PG (ref 26.6–33)
MCHC RBC AUTO-ENTMCNC: 32.5 G/DL (ref 31.5–35.7)
MCV RBC AUTO: 91.6 FL (ref 79–97)
MONOCYTES # BLD AUTO: 1 10*3/MM3 (ref 0.1–0.9)
MONOCYTES NFR BLD AUTO: 5.9 % (ref 5–12)
NEUTROPHILS NFR BLD AUTO: 15.3 10*3/MM3 (ref 1.7–7)
NEUTROPHILS NFR BLD AUTO: 88.8 % (ref 42.7–76)
NRBC BLD AUTO-RTO: 0 /100 WBC (ref 0–0.2)
PERCENT MAX PREDICTED HR: 58.04 %
PLATELET # BLD AUTO: 266 10*3/MM3 (ref 140–450)
PMV BLD AUTO: 7.2 FL (ref 6–12)
POTASSIUM SERPL-SCNC: 5 MMOL/L (ref 3.5–5.2)
QT INTERVAL: 378 MS
QTC INTERVAL: 413 MS
RBC # BLD AUTO: 4.62 10*6/MM3 (ref 3.77–5.28)
RHINOVIRUS RNA SPEC NAA+PROBE: NOT DETECTED
RSV RNA NPH QL NAA+NON-PROBE: NOT DETECTED
SARS-COV-2 RNA NPH QL NAA+NON-PROBE: NOT DETECTED
SODIUM SERPL-SCNC: 140 MMOL/L (ref 136–145)
STRESS BASELINE BP: NORMAL MMHG
STRESS BASELINE HR: 69 BPM
STRESS PERCENT HR: 68 %
STRESS POST PEAK BP: NORMAL MMHG
STRESS POST PEAK HR: 83 BPM
STRESS TARGET HR: 122 BPM
TROPONIN T SERPL HS-MCNC: 14 NG/L
WBC NRBC COR # BLD AUTO: 17.3 10*3/MM3 (ref 3.4–10.8)

## 2023-12-26 PROCEDURE — 25010000002 REGADENOSON 0.4 MG/5ML SOLUTION: Performed by: EMERGENCY MEDICINE

## 2023-12-26 PROCEDURE — 99232 SBSQ HOSP IP/OBS MODERATE 35: CPT | Performed by: STUDENT IN AN ORGANIZED HEALTH CARE EDUCATION/TRAINING PROGRAM

## 2023-12-26 PROCEDURE — 78452 HT MUSCLE IMAGE SPECT MULT: CPT | Performed by: INTERNAL MEDICINE

## 2023-12-26 PROCEDURE — G0378 HOSPITAL OBSERVATION PER HR: HCPCS

## 2023-12-26 PROCEDURE — 85025 COMPLETE CBC W/AUTO DIFF WBC: CPT | Performed by: NURSE PRACTITIONER

## 2023-12-26 PROCEDURE — 0202U NFCT DS 22 TRGT SARS-COV-2: CPT | Performed by: NURSE PRACTITIONER

## 2023-12-26 PROCEDURE — 80048 BASIC METABOLIC PNL TOTAL CA: CPT | Performed by: NURSE PRACTITIONER

## 2023-12-26 PROCEDURE — 0 TECHNETIUM TETROFOSMIN KIT: Performed by: EMERGENCY MEDICINE

## 2023-12-26 PROCEDURE — 84484 ASSAY OF TROPONIN QUANT: CPT | Performed by: EMERGENCY MEDICINE

## 2023-12-26 PROCEDURE — 93306 TTE W/DOPPLER COMPLETE: CPT

## 2023-12-26 PROCEDURE — A9502 TC99M TETROFOSMIN: HCPCS | Performed by: EMERGENCY MEDICINE

## 2023-12-26 PROCEDURE — 25010000002 SULFUR HEXAFLUORIDE MICROSPH 60.7-25 MG RECONSTITUTED SUSPENSION: Performed by: EMERGENCY MEDICINE

## 2023-12-26 PROCEDURE — 93306 TTE W/DOPPLER COMPLETE: CPT | Performed by: INTERNAL MEDICINE

## 2023-12-26 PROCEDURE — 93018 CV STRESS TEST I&R ONLY: CPT | Performed by: INTERNAL MEDICINE

## 2023-12-26 PROCEDURE — 36415 COLL VENOUS BLD VENIPUNCTURE: CPT | Performed by: NURSE PRACTITIONER

## 2023-12-26 PROCEDURE — 94799 UNLISTED PULMONARY SVC/PX: CPT

## 2023-12-26 PROCEDURE — 78452 HT MUSCLE IMAGE SPECT MULT: CPT

## 2023-12-26 PROCEDURE — 93017 CV STRESS TEST TRACING ONLY: CPT

## 2023-12-26 PROCEDURE — 63710000001 PREDNISONE PER 5 MG: Performed by: INTERNAL MEDICINE

## 2023-12-26 PROCEDURE — 63710000001 PREDNISONE PER 1 MG: Performed by: INTERNAL MEDICINE

## 2023-12-26 RX ORDER — REGADENOSON 0.08 MG/ML
0.4 INJECTION, SOLUTION INTRAVENOUS
Status: COMPLETED | OUTPATIENT
Start: 2023-12-26 | End: 2023-12-26

## 2023-12-26 RX ORDER — NIFEDIPINE 30 MG/1
30 TABLET, EXTENDED RELEASE ORAL
Status: DISCONTINUED | OUTPATIENT
Start: 2023-12-26 | End: 2023-12-27

## 2023-12-26 RX ADMIN — REGADENOSON 0.4 MG: 0.08 INJECTION, SOLUTION INTRAVENOUS at 09:50

## 2023-12-26 RX ADMIN — APIXABAN 2.5 MG: 2.5 TABLET, FILM COATED ORAL at 11:56

## 2023-12-26 RX ADMIN — PREDNISONE 30 MG: 20 TABLET ORAL at 11:56

## 2023-12-26 RX ADMIN — Medication 10 ML: at 20:35

## 2023-12-26 RX ADMIN — ACETAMINOPHEN 650 MG: 325 TABLET, FILM COATED ORAL at 13:45

## 2023-12-26 RX ADMIN — IPRATROPIUM BROMIDE AND ALBUTEROL SULFATE 3 ML: 2.5; .5 SOLUTION RESPIRATORY (INHALATION) at 19:03

## 2023-12-26 RX ADMIN — LISINOPRIL 20 MG: 20 TABLET ORAL at 11:56

## 2023-12-26 RX ADMIN — TETROFOSMIN 1 DOSE: 1.38 INJECTION, POWDER, LYOPHILIZED, FOR SOLUTION INTRAVENOUS at 09:50

## 2023-12-26 RX ADMIN — Medication 10 ML: at 11:57

## 2023-12-26 RX ADMIN — LEVOTHYROXINE SODIUM 25 MCG: 0.03 TABLET ORAL at 06:05

## 2023-12-26 RX ADMIN — NIFEDIPINE 30 MG: 30 TABLET, EXTENDED RELEASE ORAL at 16:01

## 2023-12-26 RX ADMIN — AZITHROMYCIN DIHYDRATE 500 MG: 250 TABLET ORAL at 11:56

## 2023-12-26 RX ADMIN — TETROFOSMIN 1 DOSE: 1.38 INJECTION, POWDER, LYOPHILIZED, FOR SOLUTION INTRAVENOUS at 07:43

## 2023-12-26 RX ADMIN — TRIAMTERENE AND HYDROCHLOROTHIAZIDE 1 TABLET: 37.5; 25 TABLET ORAL at 11:57

## 2023-12-26 RX ADMIN — METOPROLOL SUCCINATE 25 MG: 25 TABLET, EXTENDED RELEASE ORAL at 11:56

## 2023-12-26 RX ADMIN — PANTOPRAZOLE SODIUM 40 MG: 40 TABLET, DELAYED RELEASE ORAL at 11:56

## 2023-12-26 RX ADMIN — ACETAMINOPHEN 650 MG: 325 TABLET, FILM COATED ORAL at 06:05

## 2023-12-26 RX ADMIN — IPRATROPIUM BROMIDE AND ALBUTEROL SULFATE 3 ML: 2.5; .5 SOLUTION RESPIRATORY (INHALATION) at 15:06

## 2023-12-26 RX ADMIN — SULFUR HEXAFLUORIDE 2 ML: KIT at 11:01

## 2023-12-26 NOTE — PLAN OF CARE
Problem: Adult Inpatient Plan of Care  Goal: Plan of Care Review  Outcome: Ongoing, Progressing  Flowsheets (Taken 12/26/2023 0007)  Progress: no change  Plan of Care Reviewed With: patient  Outcome Evaluation: poss stress test iin am.with 2d echo. bedrest, ext cath  Goal: Patient-Specific Goal (Individualized)  Outcome: Ongoing, Progressing  Goal: Absence of Hospital-Acquired Illness or Injury  Outcome: Ongoing, Progressing  Intervention: Identify and Manage Fall Risk  Recent Flowsheet Documentation  Taken 12/26/2023 0000 by Janette Vasquez RN  Safety Promotion/Fall Prevention:   safety round/check completed   nonskid shoes/slippers when out of bed   lighting adjusted  Taken 12/25/2023 2206 by Janette Vasquez RN  Safety Promotion/Fall Prevention: safety round/check completed  Taken 12/25/2023 2000 by Janette Vasquez RN  Safety Promotion/Fall Prevention:   safety round/check completed   nonskid shoes/slippers when out of bed   lighting adjusted  Intervention: Prevent Skin Injury  Recent Flowsheet Documentation  Taken 12/26/2023 0000 by Janette Vasquez RN  Body Position: supine  Taken 12/25/2023 2000 by Janette Vasquez RN  Body Position: patient/family refused  Intervention: Prevent and Manage VTE (Venous Thromboembolism) Risk  Recent Flowsheet Documentation  Taken 12/26/2023 0000 by Janette Vasquez RN  Activity Management: bedrest  Taken 12/25/2023 2000 by Janette Vasquez RN  Activity Management: bedrest  Intervention: Prevent Infection  Recent Flowsheet Documentation  Taken 12/25/2023 2206 by Janette Vasquez RN  Infection Prevention:   single patient room provided   rest/sleep promoted  Taken 12/25/2023 2000 by Janette Vasquez RN  Infection Prevention:   single patient room provided   rest/sleep promoted  Goal: Optimal Comfort and Wellbeing  Outcome: Ongoing, Progressing  Intervention: Provide Person-Centered Care  Recent Flowsheet Documentation  Taken 12/25/2023 2000 by  Janette Vasquez RN  Trust Relationship/Rapport:   care explained   questions answered   reassurance provided  Goal: Readiness for Transition of Care  Outcome: Ongoing, Progressing     Problem: Hypertension Comorbidity  Goal: Blood Pressure in Desired Range  Outcome: Ongoing, Progressing  Intervention: Maintain Blood Pressure Management  Recent Flowsheet Documentation  Taken 12/26/2023 0000 by Janette Vasquez RN  Medication Review/Management: medications reviewed  Taken 12/25/2023 2206 by Janette Vasquez RN  Medication Review/Management: medications reviewed  Taken 12/25/2023 2100 by Janette Vasquez RN  Medication Review/Management: provider consulted  Taken 12/25/2023 2000 by Janette Vasquez RN  Medication Review/Management: medications reviewed     Problem: Skin Injury Risk Increased  Goal: Skin Health and Integrity  Outcome: Ongoing, Progressing  Intervention: Optimize Skin Protection  Recent Flowsheet Documentation  Taken 12/25/2023 2000 by Janette Vasquez RN  Head of Bed (HOB) Positioning: HOB at 45 degrees     Problem: Breathing Pattern Ineffective  Goal: Effective Breathing Pattern  Outcome: Ongoing, Progressing  Intervention: Promote Improved Breathing Pattern  Recent Flowsheet Documentation  Taken 12/25/2023 2000 by Janette Vasquez RN  Head of Bed (HOB) Positioning: HOB at 45 degrees     Problem: Fall Injury Risk  Goal: Absence of Fall and Fall-Related Injury  Outcome: Ongoing, Progressing  Intervention: Identify and Manage Contributors  Recent Flowsheet Documentation  Taken 12/26/2023 0000 by Janette Vasquez RN  Medication Review/Management: medications reviewed  Taken 12/25/2023 2206 by Janette Vasquez RN  Medication Review/Management: medications reviewed  Taken 12/25/2023 2100 by Janette Vasquez RN  Medication Review/Management: provider consulted  Taken 12/25/2023 2000 by Janette Vasquez RN  Medication Review/Management: medications reviewed  Intervention:  Promote Injury-Free Environment  Recent Flowsheet Documentation  Taken 12/26/2023 0000 by Janette Vasquez, RN  Safety Promotion/Fall Prevention:   safety round/check completed   nonskid shoes/slippers when out of bed   lighting adjusted  Taken 12/25/2023 2206 by Janette Vasquez, RN  Safety Promotion/Fall Prevention: safety round/check completed  Taken 12/25/2023 2000 by Janette Vasquez, RN  Safety Promotion/Fall Prevention:   safety round/check completed   nonskid shoes/slippers when out of bed   lighting adjusted   Goal Outcome Evaluation:  Plan of Care Reviewed With: patient        Progress: no change  Outcome Evaluation: poss stress test iin am.with 2d echo. bedrest, ext cath

## 2023-12-26 NOTE — CASE MANAGEMENT/SOCIAL WORK
Discharge Planning Assessment  [facility]     Patient Name: Camelia Gray  MRN: [id number]  Today's Date: 12/26/2023    Admit Date: 12/24/2023    Plan: Return home alone with help from Caregiver and family. Follow for Oxygen. No home oxygen   Discharge Needs Assessment       Row Name 12/26/23 1441       Living Environment    People in Home alone    Current Living Arrangements home    Potentially Unsafe Housing Conditions none    Primary Care Provided by self    Provides Primary Care For no one    Family Caregiver if Needed child(nilam), adult;other (see comments)    Family Caregiver Names Anthony, and hired Caregivers    Quality of Family Relationships helpful;involved;supportive    Able to Return to Prior Arrangements yes       Resource/Environmental Concerns    Resource/Environmental Concerns none    Transportation Concerns none       Transition Planning    Patient/Family Anticipates Transition to home    Patient/Family Anticipated Services at Transition none    Transportation Anticipated family or friend will provide       Discharge Needs Assessment    Readmission Within the Last 30 Days no previous admission in last 30 days    Equipment Currently Used at Home walker, rolling;wheelchair    Anticipated Changes Related to Illness none    Equipment Needed After Discharge oxygen                   Discharge Plan       Row Name 12/26/23 1442       Plan    Plan Return home alone with help from Caregiver and family. Follow for Oxygen. No home oxygen    Patient/Family in Agreement with Plan yes    Plan Comments Barriers: Oxygen 2 liters; bronchoscopy tomorrow. CM met with Ms. Gray who lives alone, does not drives and has help from family, friends and a hired caregiver. She has a rolling walker and wheelchair but no home oxygen.  Currently on 2 liters of oxygen. Verified PCP and Pharmacy and she denied problems obtaining medications. CM will follow as needed                  Continued Care and Services - Admitted Since  12/24/2023    Coordination has not been started for this encounter.       Expected Discharge Date and Time       Expected Discharge Date Expected Discharge Time    Dec 26, 2023            Demographic Summary       Row Name 12/26/23 1440       General Information    Admission Type observation    Arrived From emergency department    Required Notices Provided Observation Status Notice    Referral Source admission list    Reason for Consult discharge planning    Preferred Language English       Contact Information    Permission Granted to Share Info With                    Functional Status       Row Name 12/26/23 1440       Functional Status    Usual Activity Tolerance moderate    Current Activity Tolerance fair       Functional Status, IADL    Medications assistive person    Meal Preparation assistive person    Housekeeping assistive person    Laundry assistive person    Shopping assistive person    IADL Comments has hired CG, friends and family to help                    Maintained distance greater than six feet and spent less than 15 minutes in the room.       Yamini BUNCH,RN Case Manager  [facility]  Phone: Desk- [phone] cell- [phone]

## 2023-12-26 NOTE — PROGRESS NOTES
Referring Provider: Bernard Alonso DO       SUBJECTIVE    Pending pulmonary consult  Echocardiogram and nuclear stress test performed this morning     ROS  Review of all systems negative except as indicated above    Personal History:    Past Medical History:   Diagnosis Date    Arthritis     Cellulitis of both feet 10/13/2019    Decubitus ulcer of buttock, stage 2 10/14/2019    HEALED    Dermatitis associated with moisture 10/14/2019    Disease of thyroid gland     HYPOTHYROID    Edema of lower extremity 05/31/2017    Santos catheter in place     Hypertension     Hypothyroidism     Immobility syndrome 10/13/2019    Lymphedema 10/14/2019    LEGS    Lymphedema     Muscle weakness (generalized)     Non-pressure chronic ulcer of other part of left foot with fat layer exposed 10/14/2019    Non-pressure chronic ulcer of other part of right foot with fat layer exposed 10/14/2019    Obesity     Pulmonary embolism     Stasis dermatitis 07/08/2013    Ureteral calculi     Urinary tract infection 07/2020    Hosp. 7/2020    Venous stasis 10/14/2019    Yeast infection of the skin        Past Surgical History:   Procedure Laterality Date    CATARACT EXTRACTION Bilateral     CHOLECYSTECTOMY N/A 11/11/2020    Procedure: CHOLECYSTECTOMY LAPAROSCOPIC;  Surgeon: George Crocker DO;  Location: [facility] MAIN OR;  Service: General;  Laterality: N/A;    CORRECTION HAMMER TOE Bilateral     CYSTOSCOPY, URETEROSCOPY, RETROGRADE PYELOGRAM, STENT INSERTION Bilateral 8/18/2020    Procedure: CYSTOSCOPY BILATERAL RETROGRADE PYELOGRAM,;  Surgeon: Shawn Hernandez MD;  Location: [facility] MAIN OR;  Service: Urology;  Laterality: Bilateral;    DENTAL EXAMINATION UNDER ANESTHESIA      ENDOSCOPY N/A 9/13/2020    Procedure: ESOPHAGOGASTRODUODENOSCOPY;  Surgeon: Torsten Johnson MD;  Location: [facility] ENDOSCOPY;  Service: Gastroenterology;  Laterality: N/A;  post: esophageal stricture, reflux esophagitis, hiatal hernia    JOINT REPLACEMENT       Bilateral knees    TOE SURGERY      TONSILLECTOMY      TOTAL HIP ARTHROPLASTY Left 2020    Procedure: TOTAL HIP ARTHROPLASTY;  Surgeon: Baljit Hutchins II, MD;  Location: [facility] MAIN OR;  Service: Orthopedics;  Laterality: Left;       Family History   Problem Relation Age of Onset    Heart disease Mother     Hypertension Mother     Heart disease Father     Hypertension Father     Kidney disease Father     COPD Father        Social History     Tobacco Use    Smoking status: Former     Packs/day: 0.25     Years: 25.00     Additional pack years: 0.00     Total pack years: 6.25     Types: Cigarettes     Start date: 10/1/1964     Quit date: 1992     Years since quittin.0    Smokeless tobacco: Never   Vaping Use    Vaping Use: Never used   Substance Use Topics    Alcohol use: No    Drug use: No        Home meds:  Prior to Admission medications    Medication Sig Start Date End Date Taking? Authorizing Provider   apixaban (ELIQUIS) 2.5 MG tablet tablet Take 1 tablet by mouth 2 (Two) Times a Day.   Yes Karli Cheney MD   B Complex Vitamins (vitamin b complex) capsule capsule Take 1 capsule by mouth Daily.   Yes Karli Cheney MD   levothyroxine (SYNTHROID, LEVOTHROID) 25 MCG tablet Take 1 tablet by mouth Daily. 23  Yes Iliana Covington APRN   lisinopril (PRINIVIL,ZESTRIL) 20 MG tablet Take 1 tablet by mouth Daily.   Yes aKrli Cheney MD   metoprolol succinate XL (TOPROL-XL) 25 MG 24 hr tablet Take 1 tablet by mouth Daily. Hold if systolic < 110 or HR <60   Yes Karli Cheney MD   Multiple Vitamins-Minerals (multivitamin with minerals) tablet tablet Take 1 tablet by mouth Daily. LD    Yes Karli Cheney MD   pantoprazole (PROTONIX) 40 MG EC tablet Take 1 tablet by mouth Daily.   Yes Karli Cheney MD       Allergies:  Patient has no known allergies.      OBJECTIVE    Vital Signs  Vitals:    23 0555 23 0610 23 1022 23 1133  "  BP: (!) 189/104 168/95  (!) 187/84   BP Location: Left arm   Left arm   Patient Position: Lying   Lying   Pulse: 70   78   Resp: 16   17   Temp: 98.2 °F (36.8 °C)   98.2 °F (36.8 °C)   TempSrc: Oral   Oral   SpO2: 94%   92%   Weight:   (!) 159 kg (350 lb)    Height:   180.3 cm (71\")        Flowsheet Rows      Flowsheet Row First Filed Value   Admission Height 180.3 cm (71\") Documented at 12/24/2023 1139   Admission Weight 159 kg (350 lb) Documented at 12/24/2023 1139              Intake/Output Summary (Last 24 hours) at 12/26/2023 1315  Last data filed at 12/25/2023 1852  Gross per 24 hour   Intake 420 ml   Output 400 ml   Net 20 ml              Physical Exam:  General-no acute distress  No elevated JVP noted.  Cardiovascular-S1-S2 normal, no murmurs noted.  Respiratory-normal breath sounds, no wheezing/crackles.  GI-abdomen is soft and nontender  No pedal edema        Results Review:    BNP        TROPONIN  Results from last 7 days   Lab Units 12/26/23  0645   HSTROP T ng/L 14*       CoAg  Results from last 7 days   Lab Units 12/24/23  1305   INR  1.10   APTT seconds 34.7*       Creatinine Clearance  Estimated Creatinine Clearance: 105.1 mL/min (by C-G formula based on SCr of 0.75 mg/dL).      Radiology  CT Chest With Contrast Diagnostic    Result Date: 12/25/2023  1. Prominent size of main pulmonary artery which may resent a degree of pulmonary hypertension. No obvious filling defects are noted on this exam 2. Moderate to large hiatal hernia with associated consolidation at the right lung base. Consolidation at the lung bases as mentioned above are accentuated by low lung volumes and favored represent atelectasis. 3. Endobronchial opacity right upper lobe favored represent retained secretion. Follow-up can always be considered 6 to 12 months as clinically indicated 4. Left adrenal mass without significant change from remote comparison. Findings are compatible with an adenoma 5. Coronary artery calcification is " noted. Electronically Signed: Scotty Noyola MD  12/25/2023 5:15 PM EST  Workstation ID: [id number]       EKG  I personally viewed and interpreted the patient's EKG/Telemetry data:  ECG 12 Lead Dyspnea   Final Result   HEART RATE= 74  bpm   RR Interval= 836  ms   FL Interval= 197  ms   P Horizontal Axis= 112  deg   P Front Axis= 13  deg   QRSD Interval= 89  ms   QT Interval= 378  ms   QTcB= 413  ms   QRS Axis= 55  deg   T Wave Axis= 34  deg   - ABNORMAL ECG -   Sinus rhythm   Atrial premature complexes   When compared with ECG of 09-Nov-2020 12:53:09,   Nonspecific significant change   Electronically Signed By: Bernard Alonso (VERONICA) 26-Dec-2023 06:57:54   Date and Time of Study: 2023-12-24 12:04:41      SCANNED - TELEMETRY     Final Result      SCANNED - TELEMETRY     Final Result      SCANNED - TELEMETRY     Final Result      SCANNED - TELEMETRY     Final Result      SCANNED - TELEMETRY     Final Result      SCANNED - TELEMETRY     Final Result      SCANNED - TELEMETRY     Final Result      SCANNED - TELEMETRY     Final Result      SCANNED - TELEMETRY     Final Result      SCANNED - TELEMETRY     Final Result      SCANNED - TELEMETRY     Final Result            Echocardiogram:          Stress Test:         Cardiac Catheterization:  No results found for this or any previous visit.         Other:         ASSESSMENT & PLAN:    Dyspnea on exertion  Does not appear fluid overloaded on exam  Does have significant rhonchi/wheezing on expiration  CTA without evidence of PE  Plan  - Follow-up echocardiogram/nuclear stress test results  - Pulmonary consult    History of PE on Eliquis  CT angiogram this admission did not show evidence of PE, prominent pulmonary artery  Continue anticoagulation with Eliquis    History of hypertension  Continue lisinopril 20 mg daily  Will stop metoprolol in setting of active wheezing/reactive airway disease  Continue triamterene-hydrochlorothiazide combination  Add calcium channel blocker in  lieu of metoprolol          Part of this note may be an electronic transcription/translation of spoken language to printed text using the Dragon Dictation System.    Rosa Isela Quiñonez MD  12/26/23  13:15 EST
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
## 2023-12-26 NOTE — PLAN OF CARE
Goal Outcome Evaluation:  Plan of Care Reviewed With: patient           Outcome Evaluation: Pt remains on 4L of 02, saturation in low 90s.  Pt to have Bronch in the AM per pulmonology.  Consent signed/on chart.  Adjusted BP meds per Dr. Quiñonez with cards.  Echo completed, EF 56-60%.  Myoview normal.  Educated pt on bronch and new medications.
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
## 2023-12-26 NOTE — PROGRESS NOTES
"[facility] Observation Unit Progress Note    Patient Name: Camelia Gray  : 1945  MRN: [id number]  Primary Care Physician: Brinda Tony APRN  Date of admission: 2023     Patient Care Team:  Brinda Tony APRN as PCP - General (Nurse Practitioner)          Subjective   History Present Illness     Chief Complaint:   Chief Complaint   Patient presents with    Shortness of Breath     Shortness of breath     Shortness of Breath      78-year-old female who presents with shortness of breath and fatigue.  She has had trouble breathing and she has been coughing.  She had a runny nose.  She has not had hemoptysis.  She has not had swelling.  She has a history of pulmonary embolism in 2018.  She has remained on Eliquis and has not stopped that medication.  She had her wellness check 2 weeks ago and her primary doctor suggested seeing Dr. Brandt for cardiology to \"just be checked\".  She has no underlying heart diagnoses.  She has no asthma or COPD.  She does not wear oxygen normally.  She was concerned also with this because her heart rate was 88.  She states typically her heart rate is 60.  She is not having chest pain.  She does have chronic lymphedema.      Observation 23  Pt concurs with er hpi. Cardiology and pulmonary consulted. Stress and echo pending.     Observation 23  Patient still requiring oxygen and now titrated up to 4 L nasal cannula by nursing staff.  She denies using home O2.  Still appears short of breath and having frequent cough.  Will consult hospitalist for medical management     Review of Systems   Respiratory:  Positive for shortness of breath.      Review of Systems   Constitutional: Positive for malaise/fatigue. Negative for fever.   Cardiovascular:  Negative for chest pain.        Chronic leg swelling   Respiratory:  Positive for cough and shortness of breath.        Personal History     Past Medical History:   Past Medical History:   Diagnosis Date    Arthritis     " Cellulitis of both feet 10/13/2019    Decubitus ulcer of buttock, stage 2 10/14/2019    HEALED    Dermatitis associated with moisture 10/14/2019    Disease of thyroid gland     HYPOTHYROID    Edema of lower extremity 05/31/2017    Santos catheter in place     Hypertension     Hypothyroidism     Immobility syndrome 10/13/2019    Lymphedema 10/14/2019    LEGS    Lymphedema     Muscle weakness (generalized)     Non-pressure chronic ulcer of other part of left foot with fat layer exposed 10/14/2019    Non-pressure chronic ulcer of other part of right foot with fat layer exposed 10/14/2019    Obesity     Pulmonary embolism     Stasis dermatitis 07/08/2013    Ureteral calculi     Urinary tract infection 07/2020    Hosp. 7/2020    Venous stasis 10/14/2019    Yeast infection of the skin        Surgical History:      Past Surgical History:   Procedure Laterality Date    CATARACT EXTRACTION Bilateral     CHOLECYSTECTOMY N/A 11/11/2020    Procedure: CHOLECYSTECTOMY LAPAROSCOPIC;  Surgeon: George Crocker DO;  Location: [facility] MAIN OR;  Service: General;  Laterality: N/A;    CORRECTION HAMMER TOE Bilateral     CYSTOSCOPY, URETEROSCOPY, RETROGRADE PYELOGRAM, STENT INSERTION Bilateral 8/18/2020    Procedure: CYSTOSCOPY BILATERAL RETROGRADE PYELOGRAM,;  Surgeon: Shawn Hernandez MD;  Location: [facility] MAIN OR;  Service: Urology;  Laterality: Bilateral;    DENTAL EXAMINATION UNDER ANESTHESIA      ENDOSCOPY N/A 9/13/2020    Procedure: ESOPHAGOGASTRODUODENOSCOPY;  Surgeon: Torsten Johnson MD;  Location: [facility] ENDOSCOPY;  Service: Gastroenterology;  Laterality: N/A;  post: esophageal stricture, reflux esophagitis, hiatal hernia    JOINT REPLACEMENT      Bilateral knees    TOE SURGERY      TONSILLECTOMY  1952    TOTAL HIP ARTHROPLASTY Left 6/12/2020    Procedure: TOTAL HIP ARTHROPLASTY;  Surgeon: Baljit Hutchins II, MD;  Location: [facility] MAIN OR;  Service: Orthopedics;  Laterality: Left;           Family History:  family history includes COPD in her father; Heart disease in her father and mother; Hypertension in her father and mother; Kidney disease in her father. Otherwise pertinent FHx was reviewed and unremarkable.     Social History:  reports that she quit smoking about 31 years ago. Her smoking use included cigarettes. She started smoking about 59 years ago. She has a 6.25 pack-year smoking history. She has never used smokeless tobacco. She reports that she does not drink alcohol and does not use drugs.      Medications:  Prior to Admission medications    Medication Sig Start Date End Date Taking? Authorizing Provider   apixaban (ELIQUIS) 2.5 MG tablet tablet Take 1 tablet by mouth 2 (Two) Times a Day.   Yes Karli Cheney MD   B Complex Vitamins (vitamin b complex) capsule capsule Take 1 capsule by mouth Daily.   Yes Karli Cheney MD   levothyroxine (SYNTHROID, LEVOTHROID) 25 MCG tablet Take 1 tablet by mouth Daily. 12/20/23  Yes Iliana Covington APRN   lisinopril (PRINIVIL,ZESTRIL) 20 MG tablet Take 1 tablet by mouth Daily.   Yes Karli Cheney MD   metoprolol succinate XL (TOPROL-XL) 25 MG 24 hr tablet Take 1 tablet by mouth Daily. Hold if systolic < 110 or HR <60   Yes Karli Cheney MD   Multiple Vitamins-Minerals (multivitamin with minerals) tablet tablet Take 1 tablet by mouth Daily. LD 11/4   Yes Karli Cheney MD   pantoprazole (PROTONIX) 40 MG EC tablet Take 1 tablet by mouth Daily.   Yes Karli Cheney MD       Allergies:  No Known Allergies    Objective   Objective     Vital Signs  Temp:  [98 °F (36.7 °C)-100.1 °F (37.8 °C)] 98.2 °F (36.8 °C)  Heart Rate:  [62-78] 70  Resp:  [16-27] 16  BP: (156-189)/() 168/95  SpO2:  [85 %-96 %] 94 %  on  Flow (L/min):  [2-4] 4;   Device (Oxygen Therapy): nasal cannula  Body mass index is 48.82 kg/m².    Physical Exam  Vitals and nursing note reviewed.   Constitutional:       Appearance: She is obese.   HENT:      Head:  Normocephalic and atraumatic.      Right Ear: External ear normal.      Left Ear: External ear normal.      Nose: Nose normal.      Mouth/Throat:      Mouth: Mucous membranes are moist.      Pharynx: Oropharynx is clear.   Eyes:      Extraocular Movements: Extraocular movements intact.   Cardiovascular:      Rate and Rhythm: Normal rate and regular rhythm.      Pulses: Normal pulses.      Heart sounds: Normal heart sounds.   Pulmonary:      Effort: Pulmonary effort is normal.      Breath sounds: Rhonchi and rales present.      Comments: Requiring 4L O2 NC  Abdominal:      General: Abdomen is flat. Bowel sounds are normal.      Palpations: Abdomen is soft.   Musculoskeletal:         General: Normal range of motion.      Cervical back: Normal range of motion.   Skin:     General: Skin is warm.   Neurological:      General: No focal deficit present.      Mental Status: She is alert and oriented to person, place, and time.   Psychiatric:         Mood and Affect: Mood normal.         Behavior: Behavior normal.           Results Review:  I have personally reviewed most recent cardiac tracings, lab results, and radiology images and interpretations and agree with findings, most notably: cbc, cmp, tsh, trop, inr, mag, A1c, chest xray, EKG, covid.   .    Results from last 7 days   Lab Units 12/26/23  0439 12/25/23  0253 12/24/23  1305   WBC 10*3/mm3 17.30*   < > 11.70*   HEMOGLOBIN g/dL 13.8   < > 12.9   HEMATOCRIT % 42.4   < > 40.0   PLATELETS 10*3/mm3 266   < > 195   INR   --   --  1.10    < > = values in this interval not displayed.     Results from last 7 days   Lab Units 12/26/23  0645 12/25/23  0253 12/24/23  1708 12/24/23  1408   SODIUM mmol/L 140   < >  --  144   POTASSIUM mmol/L 5.0   < >  --  3.8   CHLORIDE mmol/L 100   < >  --  105   CO2 mmol/L 32.0*   < >  --  27.0   BUN mg/dL 27*   < >  --  15   CREATININE mg/dL 0.75   < >  --  0.74   GLUCOSE mg/dL 138*   < >  --  98   CALCIUM mg/dL 9.3   < >  --  9.0   ALK PHOS  U/L  --   --   --  97   ALT (SGPT) U/L  --   --   --  14   AST (SGOT) U/L  --   --   --  18   HSTROP T ng/L 14*  --  18* 18*   PROBNP pg/mL  --   --   --  367.9    < > = values in this interval not displayed.     Estimated Creatinine Clearance: 105.1 mL/min (by C-G formula based on SCr of 0.75 mg/dL).  Brief Urine Lab Results       None            Microbiology Results (last 10 days)       Procedure Component Value - Date/Time    COVID-19, FLU A/B, RSV PCR 1 HR TAT - Swab, Nasopharynx [[id number]]  (Normal) Collected: 12/24/23 1240    Lab Status: Final result Specimen: Swab from Nasopharynx Updated: 12/24/23 1400     COVID19 Not Detected     Influenza A PCR Not Detected     Influenza B PCR Not Detected     RSV, PCR Not Detected    Narrative:      Fact sheet for providers: https://www.fda.gov/media/896029/download    Fact sheet for patients: https://www.fda.gov/media/080153/download    Test performed by PCR.            ECG/EMG Results (most recent)       Procedure Component Value Units Date/Time    SCANNED - TELEMETRY   [[id number]] Resulted: 12/24/23     Updated: 12/25/23 2040    SCANNED - TELEMETRY   [[id number]] Resulted: 12/24/23     Updated: 12/25/23 2040    SCANNED - TELEMETRY   [[id number]] Resulted: 12/24/23     Updated: 12/25/23 2104    SCANNED - TELEMETRY   [[id number]] Resulted: 12/24/23     Updated: 12/25/23 2209    SCANNED - TELEMETRY   [[id number]] Resulted: 12/24/23     Updated: 12/25/23 2305    SCANNED - TELEMETRY   [[id number]] Resulted: 12/24/23     Updated: 12/26/23 0015    SCANNED - TELEMETRY   [[id number]] Resulted: 12/24/23     Updated: 12/26/23 0630    ECG 12 Lead Dyspnea [[id number]] Collected: 12/24/23 1204     Updated: 12/26/23 0658     QT Interval 378 ms      QTC Interval 413 ms     Narrative:      HEART RATE= 74  bpm  RR Interval= 836  ms  PA Interval= 197  ms  P Horizontal Axis= 112  deg  P Front Axis= 13  deg  QRSD Interval= 89  ms  QT Interval= 378  ms  QTcB= 413  ms  QRS Axis= 55  deg  T Wave  Axis= 34  deg  - ABNORMAL ECG -  Sinus rhythm  Atrial premature complexes  When compared with ECG of 09-Nov-2020 12:53:09,  Nonspecific significant change  Electronically Signed By: Bernard Alonso (VERONICA) 26-Dec-2023 06:57:54  Date and Time of Study: 2023-12-24 12:04:41    SCANNED - TELEMETRY   [[id number]] Resulted: 12/24/23     Updated: 12/26/23 0855    SCANNED - TELEMETRY   [[id number]] Resulted: 12/24/23     Updated: 12/26/23 0921            Results for orders placed during the hospital encounter of 10/13/19    Duplex Venous Lower Extremity - Bilateral CAR    Interpretation Summary  · Normal bilateral lower extremity venous duplex scan.          CT Chest With Contrast Diagnostic    Result Date: 12/25/2023  1. Prominent size of main pulmonary artery which may resent a degree of pulmonary hypertension. No obvious filling defects are noted on this exam 2. Moderate to large hiatal hernia with associated consolidation at the right lung base. Consolidation at the lung bases as mentioned above are accentuated by low lung volumes and favored represent atelectasis. 3. Endobronchial opacity right upper lobe favored represent retained secretion. Follow-up can always be considered 6 to 12 months as clinically indicated 4. Left adrenal mass without significant change from remote comparison. Findings are compatible with an adenoma 5. Coronary artery calcification is noted. Electronically Signed: Scotty Noyola MD  12/25/2023 5:15 PM EST  Workstation ID: [id number]    XR Chest 1 View    Result Date: 12/24/2023  Impression: Mild cardiomegaly. Benign calcified granulomatous changes in the thorax. No acute chest finding. Electronically Signed: Marie Toledo MD  12/24/2023 12:47 PM EST  Workstation ID: [id number]       Estimated Creatinine Clearance: 105.1 mL/min (by C-G formula based on SCr of 0.75 mg/dL).    Assessment & Plan   Assessment/Plan       Active Hospital Problems    Diagnosis  POA    **Dyspnea [R06.00]  Unknown     Hypertension [I10]  Yes      Resolved Hospital Problems   No resolved problems to display.         Dyspnea  - wbc 11.70, 12 and 17.30 in the setting of steroid administration  - cmp, tsh, bnp are unremarkable  - trop 18, 18, 14  - chest xray reviewed and showing mild cardiomegaly otherwise no acute cardiopulmonary processes  - EKG rate  74 sinus pacs  - covid/flu negative  -Patient still short of breath and requiring O2 4L NC now. Will order a full respiratory panel  - cardiology consulted and recommends stress and echo  - pulmonary consulted and reommended azithromycin 500mg po x 3 days  - Pt given asa in er  - steroids iv, breathing treatments  -Stress test pending results  -Echo pending results  -Patient not ready for discharge and has been in the observation for the past 2 nights. Will consult the hospitalist for medical management      Hypertension  -moderately Controlled   BP Readings from Last 1 Encounters:   12/26/23 168/95   - Continue lisinopril, metoprolol  - Monitor while admitted      Hypothyroidism  - cont home synthroid     Hx of dvt/pe  - on eliquis          VTE Prophylaxis -   Mechanical Order History:        Ordered        12/24/23 1617  Place Sequential Compression Device  Once            12/24/23 1617  Maintain Sequential Compression Device  Continuous                          Pharmalogical Order History:        Ordered     Dose Route Frequency Stop    12/25/23 0717  apixaban (ELIQUIS) tablet 2.5 mg         2.5 mg PO 2 Times Daily --                    CODE STATUS:    Code Status and Medical Interventions:   Ordered at: 12/24/23 1605     Level Of Support Discussed With:    Patient     Code Status (Patient has no pulse and is not breathing):    CPR (Attempt to Resuscitate)     Medical Interventions (Patient has pulse or is breathing):    Full Support       This patient has been examined wearing personal protective equipment.     I discussed the patient's findings and my recommendations with  patient and nursing staff.      Signature:Electronically signed by DEE Rhodes, 12/26/23, 9:45 AM EST.

## 2023-12-27 ENCOUNTER — ANESTHESIA (OUTPATIENT)
Dept: GASTROENTEROLOGY | Facility: HOSPITAL | Age: 78
End: 2023-12-27
Payer: MEDICARE

## 2023-12-27 ENCOUNTER — ANESTHESIA EVENT (OUTPATIENT)
Dept: GASTROENTEROLOGY | Facility: HOSPITAL | Age: 78
End: 2023-12-27
Payer: MEDICARE

## 2023-12-27 LAB
ANION GAP SERPL CALCULATED.3IONS-SCNC: 10 MMOL/L (ref 5–15)
B PARAPERT DNA SPEC QL NAA+PROBE: NOT DETECTED
B PERT DNA SPEC QL NAA+PROBE: NOT DETECTED
BASOPHILS # BLD AUTO: 0 10*3/MM3 (ref 0–0.2)
BASOPHILS NFR BLD AUTO: 0.1 % (ref 0–1.5)
BUN SERPL-MCNC: 26 MG/DL (ref 8–23)
BUN/CREAT SERPL: 32.9 (ref 7–25)
C PNEUM DNA NPH QL NAA+NON-PROBE: NOT DETECTED
CALCIUM SPEC-SCNC: 8.9 MG/DL (ref 8.6–10.5)
CHLORIDE SERPL-SCNC: 101 MMOL/L (ref 98–107)
CO2 SERPL-SCNC: 29 MMOL/L (ref 22–29)
CREAT SERPL-MCNC: 0.79 MG/DL (ref 0.57–1)
DEPRECATED RDW RBC AUTO: 49.9 FL (ref 37–54)
EGFRCR SERPLBLD CKD-EPI 2021: 77.2 ML/MIN/1.73
EOSINOPHIL # BLD AUTO: 0 10*3/MM3 (ref 0–0.4)
EOSINOPHIL NFR BLD AUTO: 0 % (ref 0.3–6.2)
ERYTHROCYTE [DISTWIDTH] IN BLOOD BY AUTOMATED COUNT: 14.7 % (ref 12.3–15.4)
FLUAV SUBTYP SPEC NAA+PROBE: NOT DETECTED
FLUBV RNA ISLT QL NAA+PROBE: NOT DETECTED
GLUCOSE SERPL-MCNC: 118 MG/DL (ref 65–99)
HADV DNA SPEC NAA+PROBE: NOT DETECTED
HCOV 229E RNA SPEC QL NAA+PROBE: NOT DETECTED
HCOV HKU1 RNA SPEC QL NAA+PROBE: NOT DETECTED
HCOV NL63 RNA SPEC QL NAA+PROBE: NOT DETECTED
HCOV OC43 RNA SPEC QL NAA+PROBE: NOT DETECTED
HCT VFR BLD AUTO: 42.8 % (ref 34–46.6)
HGB BLD-MCNC: 13.9 G/DL (ref 12–15.9)
HMPV RNA NPH QL NAA+NON-PROBE: NOT DETECTED
HPIV1 RNA ISLT QL NAA+PROBE: NOT DETECTED
HPIV2 RNA SPEC QL NAA+PROBE: NOT DETECTED
HPIV3 RNA NPH QL NAA+PROBE: NOT DETECTED
HPIV4 P GENE NPH QL NAA+PROBE: NOT DETECTED
LYMPHOCYTES # BLD AUTO: 0.8 10*3/MM3 (ref 0.7–3.1)
LYMPHOCYTES NFR BLD AUTO: 5.7 % (ref 19.6–45.3)
M PNEUMO IGG SER IA-ACNC: NOT DETECTED
MCH RBC QN AUTO: 29.8 PG (ref 26.6–33)
MCHC RBC AUTO-ENTMCNC: 32.5 G/DL (ref 31.5–35.7)
MCV RBC AUTO: 91.6 FL (ref 79–97)
MONOCYTES # BLD AUTO: 0.7 10*3/MM3 (ref 0.1–0.9)
MONOCYTES NFR BLD AUTO: 5.6 % (ref 5–12)
NEUTROPHILS NFR BLD AUTO: 11.8 10*3/MM3 (ref 1.7–7)
NEUTROPHILS NFR BLD AUTO: 88.6 % (ref 42.7–76)
NRBC BLD AUTO-RTO: 0 /100 WBC (ref 0–0.2)
PLATELET # BLD AUTO: 249 10*3/MM3 (ref 140–450)
PMV BLD AUTO: 7.3 FL (ref 6–12)
POTASSIUM SERPL-SCNC: 5.2 MMOL/L (ref 3.5–5.2)
RBC # BLD AUTO: 4.67 10*6/MM3 (ref 3.77–5.28)
RHINOVIRUS RNA SPEC NAA+PROBE: NOT DETECTED
RSV RNA NPH QL NAA+NON-PROBE: NOT DETECTED
SARS-COV-2 RNA NPH QL NAA+NON-PROBE: NOT DETECTED
SODIUM SERPL-SCNC: 140 MMOL/L (ref 136–145)
WBC NRBC COR # BLD AUTO: 13.3 10*3/MM3 (ref 3.4–10.8)

## 2023-12-27 PROCEDURE — 94799 UNLISTED PULMONARY SVC/PX: CPT

## 2023-12-27 PROCEDURE — 80048 BASIC METABOLIC PNL TOTAL CA: CPT | Performed by: HOSPITALIST

## 2023-12-27 PROCEDURE — 94664 DEMO&/EVAL PT USE INHALER: CPT

## 2023-12-27 PROCEDURE — 99232 SBSQ HOSP IP/OBS MODERATE 35: CPT | Performed by: STUDENT IN AN ORGANIZED HEALTH CARE EDUCATION/TRAINING PROGRAM

## 2023-12-27 PROCEDURE — 87798 DETECT AGENT NOS DNA AMP: CPT | Performed by: INTERNAL MEDICINE

## 2023-12-27 PROCEDURE — 87116 MYCOBACTERIA CULTURE: CPT | Performed by: INTERNAL MEDICINE

## 2023-12-27 PROCEDURE — 25010000002 AMIODARONE IN DEXTROSE 5% 360-4.14 MG/200ML-% SOLUTION: Performed by: NURSE PRACTITIONER

## 2023-12-27 PROCEDURE — 85025 COMPLETE CBC W/AUTO DIFF WBC: CPT | Performed by: HOSPITALIST

## 2023-12-27 PROCEDURE — 25010000002 PROPOFOL 1000 MG/100ML EMULSION

## 2023-12-27 PROCEDURE — 93010 ELECTROCARDIOGRAM REPORT: CPT | Performed by: INTERNAL MEDICINE

## 2023-12-27 PROCEDURE — 93005 ELECTROCARDIOGRAM TRACING: CPT | Performed by: HOSPITALIST

## 2023-12-27 PROCEDURE — 93005 ELECTROCARDIOGRAM TRACING: CPT | Performed by: NURSE PRACTITIONER

## 2023-12-27 PROCEDURE — 25010000002 AMIODARONE IN DEXTROSE 5% 150-4.21 MG/100ML-% SOLUTION: Performed by: NURSE PRACTITIONER

## 2023-12-27 PROCEDURE — 0202U NFCT DS 22 TRGT SARS-COV-2: CPT | Performed by: INTERNAL MEDICINE

## 2023-12-27 PROCEDURE — 87205 SMEAR GRAM STAIN: CPT | Performed by: INTERNAL MEDICINE

## 2023-12-27 PROCEDURE — 0BD38ZX EXTRACTION OF RIGHT MAIN BRONCHUS, VIA NATURAL OR ARTIFICIAL OPENING ENDOSCOPIC, DIAGNOSTIC: ICD-10-PCS | Performed by: INTERNAL MEDICINE

## 2023-12-27 PROCEDURE — 88108 CYTOPATH CONCENTRATE TECH: CPT | Performed by: INTERNAL MEDICINE

## 2023-12-27 PROCEDURE — 87070 CULTURE OTHR SPECIMN AEROBIC: CPT | Performed by: INTERNAL MEDICINE

## 2023-12-27 PROCEDURE — 94761 N-INVAS EAR/PLS OXIMETRY MLT: CPT

## 2023-12-27 PROCEDURE — 87102 FUNGUS ISOLATION CULTURE: CPT | Performed by: INTERNAL MEDICINE

## 2023-12-27 PROCEDURE — 63710000001 PREDNISONE PER 1 MG: Performed by: INTERNAL MEDICINE

## 2023-12-27 PROCEDURE — 0B9M8ZX DRAINAGE OF BILATERAL LUNGS, VIA NATURAL OR ARTIFICIAL OPENING ENDOSCOPIC, DIAGNOSTIC: ICD-10-PCS | Performed by: INTERNAL MEDICINE

## 2023-12-27 PROCEDURE — 87206 SMEAR FLUORESCENT/ACID STAI: CPT | Performed by: INTERNAL MEDICINE

## 2023-12-27 PROCEDURE — 25810000003 SODIUM CHLORIDE 0.9 % SOLUTION

## 2023-12-27 RX ORDER — DILTIAZEM HYDROCHLORIDE 120 MG/1
120 CAPSULE, COATED, EXTENDED RELEASE ORAL
Status: DISCONTINUED | OUTPATIENT
Start: 2023-12-27 | End: 2023-12-28

## 2023-12-27 RX ORDER — AMOXICILLIN AND CLAVULANATE POTASSIUM 875; 125 MG/1; MG/1
1 TABLET, FILM COATED ORAL EVERY 12 HOURS SCHEDULED
Status: COMPLETED | OUTPATIENT
Start: 2023-12-27 | End: 2024-01-02

## 2023-12-27 RX ORDER — DILTIAZEM HYDROCHLORIDE 5 MG/ML
10 INJECTION INTRAVENOUS ONCE
Status: COMPLETED | OUTPATIENT
Start: 2023-12-27 | End: 2023-12-27

## 2023-12-27 RX ORDER — METOPROLOL TARTRATE 1 MG/ML
5 INJECTION, SOLUTION INTRAVENOUS EVERY 6 HOURS
Status: DISCONTINUED | OUTPATIENT
Start: 2023-12-27 | End: 2023-12-27

## 2023-12-27 RX ORDER — METOPROLOL TARTRATE 1 MG/ML
5 INJECTION, SOLUTION INTRAVENOUS ONCE
Status: COMPLETED | OUTPATIENT
Start: 2023-12-27 | End: 2023-12-27

## 2023-12-27 RX ORDER — SODIUM CHLORIDE 9 MG/ML
INJECTION, SOLUTION INTRAVENOUS CONTINUOUS PRN
Status: DISCONTINUED | OUTPATIENT
Start: 2023-12-27 | End: 2023-12-27 | Stop reason: SURG

## 2023-12-27 RX ORDER — PROPOFOL 10 MG/ML
INJECTION, EMULSION INTRAVENOUS AS NEEDED
Status: DISCONTINUED | OUTPATIENT
Start: 2023-12-27 | End: 2023-12-27 | Stop reason: SURG

## 2023-12-27 RX ORDER — LIDOCAINE HYDROCHLORIDE 20 MG/ML
INJECTION, SOLUTION INFILTRATION; PERINEURAL AS NEEDED
Status: DISCONTINUED | OUTPATIENT
Start: 2023-12-27 | End: 2023-12-27 | Stop reason: HOSPADM

## 2023-12-27 RX ORDER — LIDOCAINE HYDROCHLORIDE 20 MG/ML
INJECTION, SOLUTION INFILTRATION; PERINEURAL AS NEEDED
Status: DISCONTINUED | OUTPATIENT
Start: 2023-12-27 | End: 2023-12-27 | Stop reason: SURG

## 2023-12-27 RX ORDER — LIDOCAINE 50 MG/G
OINTMENT TOPICAL AS NEEDED
Status: DISCONTINUED | OUTPATIENT
Start: 2023-12-27 | End: 2023-12-27 | Stop reason: HOSPADM

## 2023-12-27 RX ADMIN — NIFEDIPINE 30 MG: 30 TABLET, EXTENDED RELEASE ORAL at 10:27

## 2023-12-27 RX ADMIN — LISINOPRIL 20 MG: 20 TABLET ORAL at 10:27

## 2023-12-27 RX ADMIN — LIDOCAINE HYDROCHLORIDE 80 MG: 20 INJECTION, SOLUTION INFILTRATION; PERINEURAL at 07:51

## 2023-12-27 RX ADMIN — APIXABAN 5 MG: 5 TABLET, FILM COATED ORAL at 21:34

## 2023-12-27 RX ADMIN — DILTIAZEM HYDROCHLORIDE 120 MG: 120 CAPSULE, COATED, EXTENDED RELEASE ORAL at 15:39

## 2023-12-27 RX ADMIN — APIXABAN 2.5 MG: 2.5 TABLET, FILM COATED ORAL at 10:27

## 2023-12-27 RX ADMIN — METOPROLOL TARTRATE 5 MG: 1 INJECTION, SOLUTION INTRAVENOUS at 12:45

## 2023-12-27 RX ADMIN — PROPOFOL INJECTABLE EMULSION 60 MG: 10 INJECTION, EMULSION INTRAVENOUS at 07:51

## 2023-12-27 RX ADMIN — AZITHROMYCIN DIHYDRATE 500 MG: 250 TABLET ORAL at 10:27

## 2023-12-27 RX ADMIN — PANTOPRAZOLE SODIUM 40 MG: 40 TABLET, DELAYED RELEASE ORAL at 10:27

## 2023-12-27 RX ADMIN — AMOXICILLIN AND CLAVULANATE POTASSIUM 1 TABLET: 875; 125 TABLET, FILM COATED ORAL at 14:13

## 2023-12-27 RX ADMIN — IPRATROPIUM BROMIDE AND ALBUTEROL SULFATE 3 ML: 2.5; .5 SOLUTION RESPIRATORY (INHALATION) at 16:31

## 2023-12-27 RX ADMIN — IPRATROPIUM BROMIDE AND ALBUTEROL SULFATE 3 ML: 2.5; .5 SOLUTION RESPIRATORY (INHALATION) at 11:21

## 2023-12-27 RX ADMIN — TRIAMTERENE AND HYDROCHLOROTHIAZIDE 1 TABLET: 37.5; 25 TABLET ORAL at 10:28

## 2023-12-27 RX ADMIN — Medication 10 ML: at 14:15

## 2023-12-27 RX ADMIN — AMIODARONE HYDROCHLORIDE 1 MG/MIN: 1.8 INJECTION, SOLUTION INTRAVENOUS at 15:57

## 2023-12-27 RX ADMIN — PREDNISONE 20 MG: 20 TABLET ORAL at 10:27

## 2023-12-27 RX ADMIN — AMIODARONE HYDROCHLORIDE 150 MG: 1.5 INJECTION, SOLUTION INTRAVENOUS at 15:39

## 2023-12-27 RX ADMIN — AMIODARONE HYDROCHLORIDE 0.5 MG/MIN: 1.8 INJECTION, SOLUTION INTRAVENOUS at 21:46

## 2023-12-27 RX ADMIN — DILTIAZEM HYDROCHLORIDE 10 MG: 5 INJECTION INTRAVENOUS at 14:15

## 2023-12-27 RX ADMIN — Medication 10 ML: at 10:28

## 2023-12-27 RX ADMIN — SODIUM CHLORIDE: 9 INJECTION, SOLUTION INTRAVENOUS at 07:44

## 2023-12-27 NOTE — ANESTHESIA POSTPROCEDURE EVALUATION
Patient: Camelia Gray    Procedure Summary       Date: 12/27/23 Room / Location: [facility] ENDOSCOPY 2 / [facility] ENDOSCOPY    Anesthesia Start: 0744 Anesthesia Stop: 0801    Procedure: BRONCHOSCOPY with right lung washing (Bronchus) Diagnosis:       Dyspnea, unspecified type      (Dyspnea, unspecified type [R06.00])    Surgeons: Mayra Ramachandran MD Provider: Tawny Kuhn MD    Anesthesia Type: general ASA Status: 3            Anesthesia Type: general    Vitals  Vitals Value Taken Time   /75 12/27/23 0830   Temp     Pulse 69 12/27/23 0834   Resp 16 12/27/23 0802   SpO2 90 % 12/27/23 0834   Vitals shown include unfiled device data.        Post Anesthesia Care and Evaluation    Patient location during evaluation: PACU  Patient participation: complete - patient participated  Level of consciousness: awake and alert  Pain management: satisfactory to patient    Airway patency: patent  Anesthetic complications: No anesthetic complications  PONV Status: none  Cardiovascular status: acceptable  Respiratory status: acceptable  Hydration status: acceptable

## 2023-12-27 NOTE — PLAN OF CARE
Goal Outcome Evaluation:              Outcome Evaluation: Pt went down for bronchoscopy this AM. Pt remained on 4 liters post procedure and patient tolerated okay. Pt asking about going home and stated she has help. Hospitalist told patient he was waiting on culture from bronch. Nurse also ordered/consult for wounds before being discharged.
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
## 2023-12-27 NOTE — ANESTHESIA PREPROCEDURE EVALUATION
Anesthesia Evaluation     Patient summary reviewed and Nursing notes reviewed   no history of anesthetic complications:   NPO Solid Status: > 8 hours  NPO Liquid Status: > 8 hours           Airway   Mallampati: II  TM distance: >3 FB  Neck ROM: full  No difficulty expected  Dental      Pulmonary - normal exam   (+) pneumonia , pulmonary embolism,  Cardiovascular - normal exam    ECG reviewed  PT is on anticoagulation therapy  Patient on routine beta blocker    (+) hypertension, valvular problems/murmurs AS, DVT      Neuro/Psych  (+) weakness  GI/Hepatic/Renal/Endo    (+) morbid obesity, hiatal hernia, GERD, renal disease- stones, thyroid problem hypothyroidism    Musculoskeletal     Abdominal   (+) obese   Substance History      OB/GYN          Other   arthritis,     ROS/Med Hx Other: Lymphedema, cellulitis, foot ulcer, venous status, immobility, stasis dermatitis, cystitis, hematuria, porcelain gallbladder, esophageal stricture, thrombophilia    Echo  Findings: A trileaflet aortic valve is not well visualized though cusp  separation fears mildly decreased on M-mode with adequate coaptation.  The mitral tricuspid valves are structure normal in both demonstrate  satisfactory diastolic leaflet excursion.  Pulmonic level not well visualized.  RV dimension systolic function within normal limits.  LV chamber size wall thickness and global contractility satisfactory with no  segmental wall motion abnormalities; LVEF 60+ percent  Normal aortic root left atrial chamber dimensions.  There is no pericardial effusion intracardiac thrombus vegetation or mass  identified.     Supplementary Doppler exam showed normal aortic mitral valve for velocity with  no significant regurgitant jets. RV systolic pressures quantitated at 19 mm  Hg.     IMPRESSIONS  SOFY WITH LIMITED ACOUSTICAL WINDOWS SHOWS NORMAL CHAMBER DIMENSIONS WITH GOOD  GLOBAL CONTRACTILITY; LVEF 60+ PERCENT.  AORTIC VALVE NOT WELL VISUALIZED THOUGH MITRAL AND TRICUSPID  VALVES ARE  STRUCTURE NORMAL.  PULMONIC VALVE NOT WELL VISUALIZED.  DOPPLER EXAM SHOWS NO SIGNIFICANT ABNORMALITIES; RV SP QUANTITATED 19 MM HG.    PSH  TOE SURGERY DENTAL EXAMINATION UNDER ANESTHESIA  TOTAL HIP ARTHROPLASTY CATARACT EXTRACTION  JOINT REPLACEMENT CORRECTION HAMMER TOE  CYSTOSCOPY, URETEROSCOPY, RETROGRADE PYELOGRAM, STENT INSERTION ENDOSCOPY                    Anesthesia Plan    ASA 3     general     (Patient identified; pre-operative vital signs, all relevant labs/studies, complete medical/surgical/anesthetic history, full medication list, full allergy list, and NPO status obtained/reviewed; physical assessment performed; anesthetic options, side effects, potential complications, risks, and benefits discussed; questions answered; written anesthesia consent obtained; patient cleared for procedure; anesthesia machine and equipment checked and functioning)  intravenous induction     Anesthetic plan, risks, benefits, and alternatives have been provided, discussed and informed consent has been obtained with: patient.    Plan discussed with CRNA.

## 2023-12-27 NOTE — PROGRESS NOTES
Referring Provider: Cristo Huang MD       SUBJECTIVE    status post bronchoscopy with mucopurulent secretions and lung wash  Noted to continue atrial fibrillation with RVR today    ROS  Review of all systems negative except as indicated above    Personal History:    Past Medical History:   Diagnosis Date    Arthritis     Cellulitis of both feet 10/13/2019    Decubitus ulcer of buttock, stage 2 10/14/2019    HEALED    Dermatitis associated with moisture 10/14/2019    Disease of thyroid gland     HYPOTHYROID    Edema of lower extremity 05/31/2017    Santos catheter in place     Hypertension     Hypothyroidism     Immobility syndrome 10/13/2019    Lymphedema 10/14/2019    LEGS    Lymphedema     Muscle weakness (generalized)     Non-pressure chronic ulcer of other part of left foot with fat layer exposed 10/14/2019    Non-pressure chronic ulcer of other part of right foot with fat layer exposed 10/14/2019    Obesity     Pulmonary embolism     Stasis dermatitis 07/08/2013    Ureteral calculi     Urinary tract infection 07/2020    Hosp. 7/2020    Venous stasis 10/14/2019    Yeast infection of the skin        Past Surgical History:   Procedure Laterality Date    CATARACT EXTRACTION Bilateral     CHOLECYSTECTOMY N/A 11/11/2020    Procedure: CHOLECYSTECTOMY LAPAROSCOPIC;  Surgeon: George Crocker DO;  Location: [facility] MAIN OR;  Service: General;  Laterality: N/A;    CORRECTION HAMMER TOE Bilateral     CYSTOSCOPY, URETEROSCOPY, RETROGRADE PYELOGRAM, STENT INSERTION Bilateral 8/18/2020    Procedure: CYSTOSCOPY BILATERAL RETROGRADE PYELOGRAM,;  Surgeon: Shawn Hernandez MD;  Location: [facility] MAIN OR;  Service: Urology;  Laterality: Bilateral;    DENTAL EXAMINATION UNDER ANESTHESIA      ENDOSCOPY N/A 9/13/2020    Procedure: ESOPHAGOGASTRODUODENOSCOPY;  Surgeon: Torsten Johnson MD;  Location: [facility] ENDOSCOPY;  Service: Gastroenterology;  Laterality: N/A;  post: esophageal stricture, reflux esophagitis, hiatal  hernia    JOINT REPLACEMENT      Bilateral knees    TOE SURGERY      TONSILLECTOMY      TOTAL HIP ARTHROPLASTY Left 2020    Procedure: TOTAL HIP ARTHROPLASTY;  Surgeon: Baljit Hutchins II, MD;  Location: [facility] MAIN OR;  Service: Orthopedics;  Laterality: Left;       Family History   Problem Relation Age of Onset    Heart disease Mother     Hypertension Mother     Heart disease Father     Hypertension Father     Kidney disease Father     COPD Father        Social History     Tobacco Use    Smoking status: Former     Packs/day: 0.25     Years: 25.00     Additional pack years: 0.00     Total pack years: 6.25     Types: Cigarettes     Start date: 10/1/1964     Quit date: 1992     Years since quittin.0    Smokeless tobacco: Never   Vaping Use    Vaping Use: Never used   Substance Use Topics    Alcohol use: No    Drug use: No        Home meds:  Prior to Admission medications    Medication Sig Start Date End Date Taking? Authorizing Provider   apixaban (ELIQUIS) 2.5 MG tablet tablet Take 1 tablet by mouth 2 (Two) Times a Day.   Yes Karli Cheney MD   B Complex Vitamins (vitamin b complex) capsule capsule Take 1 capsule by mouth Daily.   Yes Karli Cheney MD   levothyroxine (SYNTHROID, LEVOTHROID) 25 MCG tablet Take 1 tablet by mouth Daily. 23  Yes Iliana Covington APRN   lisinopril (PRINIVIL,ZESTRIL) 20 MG tablet Take 1 tablet by mouth Daily.   Yes Karli Cheney MD   metoprolol succinate XL (TOPROL-XL) 25 MG 24 hr tablet Take 1 tablet by mouth Daily. Hold if systolic < 110 or HR <60   Yes Karli Cheney MD   Multiple Vitamins-Minerals (multivitamin with minerals) tablet tablet Take 1 tablet by mouth Daily. LD    Yes Karli Cheney MD   pantoprazole (PROTONIX) 40 MG EC tablet Take 1 tablet by mouth Daily.   Yes Karli Cheney MD       Allergies:  Patient has no known allergies.      OBJECTIVE    Vital Signs  Vitals:    23 1042 23  "1121 12/27/23 1124 12/27/23 1245   BP: 153/87   148/72   BP Location: Left arm      Patient Position: Lying      Pulse: 66 77 78 (!) 149   Resp: 21 23 9    Temp: 97.9 °F (36.6 °C)      TempSrc: Oral      SpO2: 91% 94% 94%    Weight:       Height:           Flowsheet Rows      Flowsheet Row First Filed Value   Admission Height 180.3 cm (71\") Documented at 12/24/2023 1139   Admission Weight 159 kg (350 lb) Documented at 12/24/2023 1139              Intake/Output Summary (Last 24 hours) at 12/27/2023 1439  Last data filed at 12/27/2023 0801  Gross per 24 hour   Intake 100 ml   Output 1800 ml   Net -1700 ml              Physical Exam:  General-no acute distress  No elevated JVP noted.  Cardiovascular-S1-S2 normal, no murmurs noted.  Respiratory-normal breath sounds, no wheezing/crackles.  GI-abdomen is soft and nontender  No pedal edema        Results Review:    BNP        TROPONIN  Results from last 7 days   Lab Units 12/26/23  0645   HSTROP T ng/L 14*       CoAg  Results from last 7 days   Lab Units 12/24/23  1305   INR  1.10   APTT seconds 34.7*       Creatinine Clearance  Estimated Creatinine Clearance: 99.8 mL/min (by C-G formula based on SCr of 0.79 mg/dL).      Radiology  CT Chest With Contrast Diagnostic    Result Date: 12/25/2023  1. Prominent size of main pulmonary artery which may resent a degree of pulmonary hypertension. No obvious filling defects are noted on this exam 2. Moderate to large hiatal hernia with associated consolidation at the right lung base. Consolidation at the lung bases as mentioned above are accentuated by low lung volumes and favored represent atelectasis. 3. Endobronchial opacity right upper lobe favored represent retained secretion. Follow-up can always be considered 6 to 12 months as clinically indicated 4. Left adrenal mass without significant change from remote comparison. Findings are compatible with an adenoma 5. Coronary artery calcification is noted. Electronically Signed: " Scotty Noyola MD  12/25/2023 5:15 PM EST  Workstation ID: [id number]       EKG  I personally viewed and interpreted the patient's EKG/Telemetry data:  ECG 12 Lead Tachycardia   Preliminary Result   HEART RATE= 143  bpm   RR Interval= 418  ms   MA Interval=   ms   P Horizontal Axis=   deg   P Front Axis=   deg   QRSD Interval= 84  ms   QT Interval= 280  ms   QTcB= 433  ms   QRS Axis= 50  deg   T Wave Axis= 263  deg   - ABNORMAL ECG -   Atrial fibrillation   Repolarization abnormality, prob rate related   Electronically Signed By:    Date and Time of Study: 2023-12-27 12:30:32      ECG 12 Lead Dyspnea   Final Result   HEART RATE= 74  bpm   RR Interval= 836  ms   MA Interval= 197  ms   P Horizontal Axis= 112  deg   P Front Axis= 13  deg   QRSD Interval= 89  ms   QT Interval= 378  ms   QTcB= 413  ms   QRS Axis= 55  deg   T Wave Axis= 34  deg   - ABNORMAL ECG -   Sinus rhythm   Atrial premature complexes   When compared with ECG of 09-Nov-2020 12:53:09,   Nonspecific significant change   Electronically Signed By: Bernard Alonso ([facility]) 26-Dec-2023 06:57:54   Date and Time of Study: 2023-12-24 12:04:41      SCANNED - TELEMETRY     Final Result      SCANNED - TELEMETRY     Final Result      SCANNED - TELEMETRY     Final Result      SCANNED - TELEMETRY     Final Result      SCANNED - TELEMETRY     Final Result      SCANNED - TELEMETRY     Final Result      SCANNED - TELEMETRY     Final Result      SCANNED - TELEMETRY     Final Result      SCANNED - TELEMETRY     Final Result      SCANNED - TELEMETRY     Final Result      SCANNED - TELEMETRY     Final Result      SCANNED - TELEMETRY     Final Result      SCANNED - TELEMETRY     Final Result      SCANNED - TELEMETRY     Final Result      SCANNED - TELEMETRY     Final Result      SCANNED - TELEMETRY     Final Result      SCANNED - TELEMETRY     Final Result      SCANNED - TELEMETRY     Final Result      ECG 12 Lead Drug Monitoring; Amiodarone    (Results Pending)          Echocardiogram:    Results for orders placed during the hospital encounter of 12/24/23    Adult Transthoracic Echo Complete W/ Cont if Necessary Per Protocol    Interpretation Summary    Left ventricular ejection fraction appears to be 56 - 60%.    Indications  Chest pain  Shortness of breath    Technically satisfactory study.  Mitral valve is structurally normal.  Tricuspid valve is structurally normal.  Aortic valve is thickened with decreased opening motion.  Gradient across the aortic valve is 20/10 mmHg.  Pulmonic valve could not be well visualized.  No evidence for mitral tricuspid or aortic regurgitation is seen by Doppler study.  Left atrium is normal in size.  Right atrium is normal in size.  Left ventricle is normal in size and contractility with ejection fraction of 60%.  Diastolic dysfunction.  Right ventricle is normal in size.  IVC 2.06 cm.  Atrial septum is intact.  Aorta is normal.  No pericardial effusion or intracardiac thrombus is seen.    Impression  Mild aortic valve stenosis with gradient of 20/10 mmHg.  Otherwise, structurally and functionally normal cardiac valves.  Mild diastolic dysfunction is  Left ventricular size and contractility is normal with ejection fraction of 60%.        Stress Test:  Results for orders placed during the hospital encounter of 12/24/23    Stress Test With Myocardial Perfusion One Day    Interpretation Summary  Indications  Chest pain  Shortness of breath    This study was performed under the direct supervision of Radha HERNANDEZ.    Resting ECG  Sinus rhythm    The patient was injected with Lexiscan intravenously while constantly monitoring electrocardiogram and vital signs.  Patient did not have any chest discomfort ST abnormalities or ectopy with injection of Lexiscan.    Cardiolite was used as an imaging agent.    Cardiolite images showed uniform distribution of radionuclide without any evidence for myocardial ischemia.    Gated SPECT images revealed normal  left ventricle size and contractility with ejection fraction of 74%.    Impression  ========  Lexiscan Cardiolite test is negative for myocardial ischemia.    Gated SPECT images revealed normal left ventricular size and contractility with ejection fraction of 74%.         Cardiac Catheterization:  No results found for this or any previous visit.         Other:         ASSESSMENT & PLAN:    New onset atrial fibrillation with rapid ventricular rate  Patient remains asymptomatic, hemodynamically stable  Plan  - Amiodarone bolus followed by gtt.  Will switch nifedipine to p.o. diltiazem for rate control  - If patient remains in uncontrolled atrial fibrillation, will arrange for JAHAIRA cardioversion, will need JAHAIRA despite new A-fib in the hospital in monitored setting given suboptimal anticoagulation  - Increase apixaban to 5 mg twice daily    Dyspnea on exertion  Does not appear fluid overloaded on exam  Does have significant rhonchi/wheezing on expiration  CTA without evidence of PE  Nuclear stress test without evidence of myocardial ischemia  Echocardiogram showed normal ejection fraction, mild diastolic dysfunction     History of PE on Eliquis  CT angiogram this admission did not show evidence of PE, prominent pulmonary artery  Continue anticoagulation with Eliquis     History of hypertension  Continue lisinopril 20 mg daily  Will stop metoprolol in setting of active wheezing/reactive airway disease  Continue triamterene-hydrochlorothiazide combination  Add calcium channel blocker in lieu of metoprolol     Part of this note may be an electronic transcription/translation of spoken language to printed text using the Dragon Dictation System.    Rosa Isela Quiñonez MD  12/27/23  14:39 EST

## 2023-12-27 NOTE — OP NOTE
Bronchoscopy Procedure Note    Timeout was done appropriately by staff    Procedure:  Bronchoscopy, Therapeutic suctioning of very thick mucopurulent secretions  Right lung washing    Preoperative Diagnosis: Right upper lobe endobronchial filling defect    Postoperative Diagnosis:     Copious amount mucopurulent secretions suctioned therapeutically  Right lung washing was obtained    Anesthesia: Moderate Sedation    Procedure Details: Patient was consented for the procedure with all risks and benefits of the procedure explained in detail.  Patient was given the opportunity to ask questions and all concerns were answered.  The bronchocope was inserted into the main airway via the oropharynx. An anatomical survey was done of the main airways and the subsegmental bronchus to at least the first subsegmental level of all five lobes of both lungs.  The findings are reported below.  A bronchoalveolar lavage was performed using aliquots of normal saline instilled into the airways then aspirated back.    Findings:  Bronchoscope passed into oral cavity to the level of the vocal cords.  Lidocaine used for local anesthetic over vocal cords.  Bronchoscope was passed between the vocal cords into the trachea.  All airways were visualized to at least the first subsegment level of all 5 lobes of both lungs.     Copious amount mucopurulent secretions suctioned therapeutically  Right lung washing was obtained  Moderate inflammatory changes        Patient tolerated procedure well        Estimated Blood Loss:  Minimal           Specimens:  Sent purulent fluid                Complications:  None; patient tolerated the procedure well.           Disposition: PACU - hemodynamically stable.      Patient tolerated the procedure well.    Mayra Ramachandran MD  12/27/2023  12:35 EST

## 2023-12-27 NOTE — CASE MANAGEMENT/SOCIAL WORK
Continued Stay Note   Chris     Patient Name: Camelia Gray  MRN: [id number]  Today's Date: 12/27/2023    Admit Date: 12/24/2023    Plan: Return home alone with help from Caregiver and family. Follow for Oxygen. No home oxygen   Discharge Plan       Row Name 12/27/23 1015       Plan    Plan Comments Barriers: Bronchoscopy today with right lung washing. Oxygen 5 liters with no  home oxygen. Will need 6 MWT prior to discharge. Ms. Gray plans to return home at discharge with assistance from family and hired caregiver                        Phone communication or documentation only - no physical contact with patient or family.   Yamini BUNCH,RN Case Manager  [facility]  Phone: Desk- [phone] cell- [phone]

## 2023-12-28 PROBLEM — I48.20 CHRONIC ATRIAL FIBRILLATION WITH RAPID VENTRICULAR RESPONSE: Status: ACTIVE | Noted: 2023-12-28

## 2023-12-28 LAB
ANION GAP SERPL CALCULATED.3IONS-SCNC: 9 MMOL/L (ref 5–15)
BASOPHILS # BLD AUTO: 0 10*3/MM3 (ref 0–0.2)
BASOPHILS NFR BLD AUTO: 0.3 % (ref 0–1.5)
BUN SERPL-MCNC: 34 MG/DL (ref 8–23)
BUN/CREAT SERPL: 43 (ref 7–25)
CALCIUM SPEC-SCNC: 8.9 MG/DL (ref 8.6–10.5)
CHLORIDE SERPL-SCNC: 99 MMOL/L (ref 98–107)
CO2 SERPL-SCNC: 31 MMOL/L (ref 22–29)
CREAT SERPL-MCNC: 0.79 MG/DL (ref 0.57–1)
DEPRECATED RDW RBC AUTO: 49.4 FL (ref 37–54)
EGFRCR SERPLBLD CKD-EPI 2021: 77.2 ML/MIN/1.73
EOSINOPHIL # BLD AUTO: 0 10*3/MM3 (ref 0–0.4)
EOSINOPHIL NFR BLD AUTO: 0.2 % (ref 0.3–6.2)
ERYTHROCYTE [DISTWIDTH] IN BLOOD BY AUTOMATED COUNT: 14.6 % (ref 12.3–15.4)
GEN 5 2HR TROPONIN T REFLEX: 19 NG/L
GLUCOSE SERPL-MCNC: 122 MG/DL (ref 65–99)
HCT VFR BLD AUTO: 43.2 % (ref 34–46.6)
HGB BLD-MCNC: 14.1 G/DL (ref 12–15.9)
LAB AP CASE REPORT: NORMAL
LYMPHOCYTES # BLD AUTO: 1.1 10*3/MM3 (ref 0.7–3.1)
LYMPHOCYTES NFR BLD AUTO: 8.1 % (ref 19.6–45.3)
MCH RBC QN AUTO: 29.8 PG (ref 26.6–33)
MCHC RBC AUTO-ENTMCNC: 32.6 G/DL (ref 31.5–35.7)
MCV RBC AUTO: 91.5 FL (ref 79–97)
MONOCYTES # BLD AUTO: 0.8 10*3/MM3 (ref 0.1–0.9)
MONOCYTES NFR BLD AUTO: 6.5 % (ref 5–12)
NEUTROPHILS NFR BLD AUTO: 11 10*3/MM3 (ref 1.7–7)
NEUTROPHILS NFR BLD AUTO: 84.9 % (ref 42.7–76)
NRBC BLD AUTO-RTO: 0 /100 WBC (ref 0–0.2)
PATH REPORT.FINAL DX SPEC: NORMAL
PATH REPORT.GROSS SPEC: NORMAL
PLATELET # BLD AUTO: 278 10*3/MM3 (ref 140–450)
PMV BLD AUTO: 7.6 FL (ref 6–12)
POTASSIUM SERPL-SCNC: 4.9 MMOL/L (ref 3.5–5.2)
RBC # BLD AUTO: 4.73 10*6/MM3 (ref 3.77–5.28)
SODIUM SERPL-SCNC: 139 MMOL/L (ref 136–145)
TROPONIN T DELTA: ABNORMAL
WBC NRBC COR # BLD AUTO: 12.9 10*3/MM3 (ref 3.4–10.8)

## 2023-12-28 PROCEDURE — 94761 N-INVAS EAR/PLS OXIMETRY MLT: CPT

## 2023-12-28 PROCEDURE — 63710000001 PREDNISONE PER 1 MG: Performed by: INTERNAL MEDICINE

## 2023-12-28 PROCEDURE — 99232 SBSQ HOSP IP/OBS MODERATE 35: CPT | Performed by: NURSE PRACTITIONER

## 2023-12-28 PROCEDURE — 94664 DEMO&/EVAL PT USE INHALER: CPT

## 2023-12-28 PROCEDURE — 94799 UNLISTED PULMONARY SVC/PX: CPT

## 2023-12-28 PROCEDURE — 93010 ELECTROCARDIOGRAM REPORT: CPT | Performed by: INTERNAL MEDICINE

## 2023-12-28 PROCEDURE — 25010000002 AMIODARONE IN DEXTROSE 5% 360-4.14 MG/200ML-% SOLUTION: Performed by: NURSE PRACTITIONER

## 2023-12-28 PROCEDURE — 93005 ELECTROCARDIOGRAM TRACING: CPT | Performed by: NURSE PRACTITIONER

## 2023-12-28 PROCEDURE — 84484 ASSAY OF TROPONIN QUANT: CPT | Performed by: EMERGENCY MEDICINE

## 2023-12-28 PROCEDURE — 80048 BASIC METABOLIC PNL TOTAL CA: CPT | Performed by: HOSPITALIST

## 2023-12-28 PROCEDURE — 85025 COMPLETE CBC W/AUTO DIFF WBC: CPT | Performed by: HOSPITALIST

## 2023-12-28 RX ORDER — DILTIAZEM HYDROCHLORIDE 60 MG/1
60 TABLET, FILM COATED ORAL EVERY 8 HOURS SCHEDULED
Status: DISCONTINUED | OUTPATIENT
Start: 2023-12-28 | End: 2023-12-31

## 2023-12-28 RX ADMIN — DILTIAZEM HYDROCHLORIDE 60 MG: 60 TABLET ORAL at 14:42

## 2023-12-28 RX ADMIN — DILTIAZEM HYDROCHLORIDE 60 MG: 60 TABLET ORAL at 22:05

## 2023-12-28 RX ADMIN — AMIODARONE HYDROCHLORIDE 0.5 MG/MIN: 1.8 INJECTION, SOLUTION INTRAVENOUS at 12:01

## 2023-12-28 RX ADMIN — PREDNISONE 20 MG: 20 TABLET ORAL at 09:06

## 2023-12-28 RX ADMIN — AMOXICILLIN AND CLAVULANATE POTASSIUM 1 TABLET: 875; 125 TABLET, FILM COATED ORAL at 22:05

## 2023-12-28 RX ADMIN — AMIODARONE HYDROCHLORIDE 0.5 MG/MIN: 1.8 INJECTION, SOLUTION INTRAVENOUS at 23:28

## 2023-12-28 RX ADMIN — DILTIAZEM HYDROCHLORIDE 120 MG: 120 CAPSULE, COATED, EXTENDED RELEASE ORAL at 09:05

## 2023-12-28 RX ADMIN — APIXABAN 5 MG: 5 TABLET, FILM COATED ORAL at 22:05

## 2023-12-28 RX ADMIN — AMOXICILLIN AND CLAVULANATE POTASSIUM 1 TABLET: 875; 125 TABLET, FILM COATED ORAL at 09:05

## 2023-12-28 RX ADMIN — AMOXICILLIN AND CLAVULANATE POTASSIUM 1 TABLET: 875; 125 TABLET, FILM COATED ORAL at 02:09

## 2023-12-28 RX ADMIN — PANTOPRAZOLE SODIUM 40 MG: 40 TABLET, DELAYED RELEASE ORAL at 09:06

## 2023-12-28 RX ADMIN — IPRATROPIUM BROMIDE AND ALBUTEROL SULFATE 3 ML: 2.5; .5 SOLUTION RESPIRATORY (INHALATION) at 06:41

## 2023-12-28 RX ADMIN — LISINOPRIL 20 MG: 20 TABLET ORAL at 09:05

## 2023-12-28 RX ADMIN — APIXABAN 5 MG: 5 TABLET, FILM COATED ORAL at 09:05

## 2023-12-28 RX ADMIN — LEVOTHYROXINE SODIUM 25 MCG: 0.03 TABLET ORAL at 06:40

## 2023-12-28 RX ADMIN — TRIAMTERENE AND HYDROCHLOROTHIAZIDE 1 TABLET: 37.5; 25 TABLET ORAL at 09:06

## 2023-12-28 RX ADMIN — Medication 10 ML: at 09:06

## 2023-12-28 RX ADMIN — Medication 10 ML: at 22:07
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
## 2023-12-28 NOTE — PROGRESS NOTES
Referring Provider: Cristo Huang MD       SUBJECTIVE    status post bronchoscopy with mucopurulent secretions and lung wash  Noted to continue atrial fibrillation with RVR today    ROS  Review of all systems negative except as indicated above    Personal History:    Past Medical History:   Diagnosis Date    Arthritis     Cellulitis of both feet 10/13/2019    Decubitus ulcer of buttock, stage 2 10/14/2019    HEALED    Dermatitis associated with moisture 10/14/2019    Disease of thyroid gland     HYPOTHYROID    Edema of lower extremity 05/31/2017    Santos catheter in place     Hypertension     Hypothyroidism     Immobility syndrome 10/13/2019    Lymphedema 10/14/2019    LEGS    Lymphedema     Muscle weakness (generalized)     Non-pressure chronic ulcer of other part of left foot with fat layer exposed 10/14/2019    Non-pressure chronic ulcer of other part of right foot with fat layer exposed 10/14/2019    Obesity     Pulmonary embolism     Stasis dermatitis 07/08/2013    Ureteral calculi     Urinary tract infection 07/2020    Hosp. 7/2020    Venous stasis 10/14/2019    Yeast infection of the skin        Past Surgical History:   Procedure Laterality Date    BRONCHOSCOPY N/A 12/27/2023    Procedure: BRONCHOSCOPY with right lung washing;  Surgeon: Mayra Ramachandran MD;  Location: [facility] ENDOSCOPY;  Service: Pulmonary;  Laterality: N/A;  Post- Pneumonia    CATARACT EXTRACTION Bilateral     CHOLECYSTECTOMY N/A 11/11/2020    Procedure: CHOLECYSTECTOMY LAPAROSCOPIC;  Surgeon: George Crocker DO;  Location: [facility] MAIN OR;  Service: General;  Laterality: N/A;    CORRECTION HAMMER TOE Bilateral     CYSTOSCOPY, URETEROSCOPY, RETROGRADE PYELOGRAM, STENT INSERTION Bilateral 8/18/2020    Procedure: CYSTOSCOPY BILATERAL RETROGRADE PYELOGRAM,;  Surgeon: Shawn Hernandez MD;  Location: [facility] MAIN OR;  Service: Urology;  Laterality: Bilateral;    DENTAL EXAMINATION UNDER ANESTHESIA      ENDOSCOPY N/A 9/13/2020    Procedure:  ESOPHAGOGASTRODUODENOSCOPY;  Surgeon: Torsten Johnson MD;  Location: [facility] ENDOSCOPY;  Service: Gastroenterology;  Laterality: N/A;  post: esophageal stricture, reflux esophagitis, hiatal hernia    JOINT REPLACEMENT      Bilateral knees    TOE SURGERY      TONSILLECTOMY      TOTAL HIP ARTHROPLASTY Left 2020    Procedure: TOTAL HIP ARTHROPLASTY;  Surgeon: Baljit Hutchins II, MD;  Location: [facility] MAIN OR;  Service: Orthopedics;  Laterality: Left;       Family History   Problem Relation Age of Onset    Heart disease Mother     Hypertension Mother     Heart disease Father     Hypertension Father     Kidney disease Father     COPD Father        Social History     Tobacco Use    Smoking status: Former     Packs/day: 0.25     Years: 25.00     Additional pack years: 0.00     Total pack years: 6.25     Types: Cigarettes     Start date: 10/1/1964     Quit date: 1992     Years since quittin.0    Smokeless tobacco: Never   Vaping Use    Vaping Use: Never used   Substance Use Topics    Alcohol use: No    Drug use: No        Home meds:  Prior to Admission medications    Medication Sig Start Date End Date Taking? Authorizing Provider   apixaban (ELIQUIS) 2.5 MG tablet tablet Take 1 tablet by mouth 2 (Two) Times a Day.   Yes Karli Cheney MD   B Complex Vitamins (vitamin b complex) capsule capsule Take 1 capsule by mouth Daily.   Yes Karli Cheney MD   levothyroxine (SYNTHROID, LEVOTHROID) 25 MCG tablet Take 1 tablet by mouth Daily. 23  Yes Iliana Covington APRN   lisinopril (PRINIVIL,ZESTRIL) 20 MG tablet Take 1 tablet by mouth Daily.   Yes Karli Cheney MD   metoprolol succinate XL (TOPROL-XL) 25 MG 24 hr tablet Take 1 tablet by mouth Daily. Hold if systolic < 110 or HR <60   Yes Karli Cheney MD   Multiple Vitamins-Minerals (multivitamin with minerals) tablet tablet Take 1 tablet by mouth Daily. LD    Yes Karli Cheney MD   pantoprazole  "(PROTONIX) 40 MG EC tablet Take 1 tablet by mouth Daily.   Yes Provider, MD Karli       Allergies:  Patient has no known allergies.      OBJECTIVE    Vital Signs  Vitals:    12/28/23 0641 12/28/23 0644 12/28/23 1018 12/28/23 1203   BP:   117/82    BP Location:   Right arm    Patient Position:   Lying    Pulse: 103 101 115 115   Resp: 16 11 25    Temp:   97.5 °F (36.4 °C)    TempSrc:   Oral    SpO2: 92% 93% 93%    Weight:       Height:           Flowsheet Rows      Flowsheet Row First Filed Value   Admission Height 180.3 cm (71\") Documented at 12/24/2023 1139   Admission Weight 159 kg (350 lb) Documented at 12/24/2023 1139              Intake/Output Summary (Last 24 hours) at 12/28/2023 1524  Last data filed at 12/28/2023 1018  Gross per 24 hour   Intake 460 ml   Output 800 ml   Net -340 ml              Physical Exam:  General-no acute distress  No elevated JVP noted.  Cardiovascular-S1-S2 normal, no murmurs noted. Irregularly irregular  Respiratory-normal breath sounds, no wheezing/crackles.  GI-abdomen is soft and nontender  No pedal edema        Results Review:    BNP        TROPONIN  Results from last 7 days   Lab Units 12/28/23  0403   HSTROP T ng/L 19*       CoAg  Results from last 7 days   Lab Units 12/24/23  1305   INR  1.10   APTT seconds 34.7*       Creatinine Clearance  Estimated Creatinine Clearance: 99.8 mL/min (by C-G formula based on SCr of 0.79 mg/dL).      Radiology  No radiology results for the last day      EKG  I personally viewed and interpreted the patient's EKG/Telemetry data:  ECG 12 Lead Drug Monitoring; Amiodarone   Preliminary Result   HEART RATE= 96  bpm   RR Interval= 626  ms   NJ Interval=   ms   P Horizontal Axis=   deg   P Front Axis=   deg   QRSD Interval= 83  ms   QT Interval= 321  ms   QTcB= 406  ms   QRS Axis= 31  deg   T Wave Axis= 15  deg   - ABNORMAL ECG -   Atrial fibrillation   When compared with ECG of 27-Dec-2023 16:10:01,   Significant rate decrease   Electronically " Signed By:    Date and Time of Study: 2023-12-28 05:46:01      ECG 12 Lead Drug Monitoring; Amiodarone   Preliminary Result   HEART RATE= 122  bpm   RR Interval= 492  ms   ME Interval=   ms   P Horizontal Axis=   deg   P Front Axis=   deg   QRSD Interval= 81  ms   QT Interval= 292  ms   QTcB= 416  ms   QRS Axis= 42  deg   T Wave Axis= 25  deg   - ABNORMAL ECG -   Atrial fibrillation   When compared with ECG of 27-Dec-2023 12:30:32,   Significant rate decrease   Significant repolarization change   Electronically Signed By:    Date and Time of Study: 2023-12-27 16:10:01      ECG 12 Lead Tachycardia   Preliminary Result   HEART RATE= 143  bpm   RR Interval= 418  ms   ME Interval=   ms   P Horizontal Axis=   deg   P Front Axis=   deg   QRSD Interval= 84  ms   QT Interval= 280  ms   QTcB= 433  ms   QRS Axis= 50  deg   T Wave Axis= 263  deg   - ABNORMAL ECG -   Atrial fibrillation   Repolarization abnormality, prob rate related   Electronically Signed By:    Date and Time of Study: 2023-12-27 12:30:32      ECG 12 Lead Dyspnea   Final Result   HEART RATE= 74  bpm   RR Interval= 836  ms   ME Interval= 197  ms   P Horizontal Axis= 112  deg   P Front Axis= 13  deg   QRSD Interval= 89  ms   QT Interval= 378  ms   QTcB= 413  ms   QRS Axis= 55  deg   T Wave Axis= 34  deg   - ABNORMAL ECG -   Sinus rhythm   Atrial premature complexes   When compared with ECG of 09-Nov-2020 12:53:09,   Nonspecific significant change   Electronically Signed By: Bernard Alonso (VERONICA) 26-Dec-2023 06:57:54   Date and Time of Study: 2023-12-24 12:04:41      SCANNED - TELEMETRY     Final Result      SCANNED - TELEMETRY     Final Result      SCANNED - TELEMETRY     Final Result      SCANNED - TELEMETRY     Final Result      SCANNED - TELEMETRY     Final Result      SCANNED - TELEMETRY     Final Result      SCANNED - TELEMETRY     Final Result      SCANNED - TELEMETRY     Final Result      SCANNED - TELEMETRY     Final Result      SCANNED - TELEMETRY      Final Result      SCANNED - TELEMETRY     Final Result      SCANNED - TELEMETRY     Final Result      SCANNED - TELEMETRY     Final Result      SCANNED - TELEMETRY     Final Result      SCANNED - TELEMETRY     Final Result      SCANNED - TELEMETRY     Final Result      SCANNED - TELEMETRY     Final Result      SCANNED - TELEMETRY     Final Result      SCANNED - TELEMETRY     Final Result      SCANNED - TELEMETRY     Final Result      SCANNED - TELEMETRY     Final Result      SCANNED - TELEMETRY     Final Result      SCANNED - TELEMETRY     Final Result      SCANNED - TELEMETRY     Final Result            Echocardiogram:    Results for orders placed during the hospital encounter of 12/24/23    Adult Transthoracic Echo Complete W/ Cont if Necessary Per Protocol    Interpretation Summary    Left ventricular ejection fraction appears to be 56 - 60%.    Indications  Chest pain  Shortness of breath    Technically satisfactory study.  Mitral valve is structurally normal.  Tricuspid valve is structurally normal.  Aortic valve is thickened with decreased opening motion.  Gradient across the aortic valve is 20/10 mmHg.  Pulmonic valve could not be well visualized.  No evidence for mitral tricuspid or aortic regurgitation is seen by Doppler study.  Left atrium is normal in size.  Right atrium is normal in size.  Left ventricle is normal in size and contractility with ejection fraction of 60%.  Diastolic dysfunction.  Right ventricle is normal in size.  IVC 2.06 cm.  Atrial septum is intact.  Aorta is normal.  No pericardial effusion or intracardiac thrombus is seen.    Impression  Mild aortic valve stenosis with gradient of 20/10 mmHg.  Otherwise, structurally and functionally normal cardiac valves.  Mild diastolic dysfunction is  Left ventricular size and contractility is normal with ejection fraction of 60%.        Stress Test:  Results for orders placed during the hospital encounter of 12/24/23    Stress Test With  Myocardial Perfusion One Day    Interpretation Summary  Indications  Chest pain  Shortness of breath    This study was performed under the direct supervision of Radha HERNANDEZ.    Resting ECG  Sinus rhythm    The patient was injected with Lexiscan intravenously while constantly monitoring electrocardiogram and vital signs.  Patient did not have any chest discomfort ST abnormalities or ectopy with injection of Lexiscan.    Cardiolite was used as an imaging agent.    Cardiolite images showed uniform distribution of radionuclide without any evidence for myocardial ischemia.    Gated SPECT images revealed normal left ventricle size and contractility with ejection fraction of 74%.    Impression  ========  Lexiscan Cardiolite test is negative for myocardial ischemia.    Gated SPECT images revealed normal left ventricular size and contractility with ejection fraction of 74%.         Cardiac Catheterization:  No results found for this or any previous visit.         Other:         ASSESSMENT & PLAN:    New onset atrial fibrillation with rapid ventricular rate  Patient remains asymptomatic, hemodynamically stable  Plan  - Amiodarone bolus followed by gtt.  Will switch nifedipine to p.o. diltiazem for rate control  - If patient remains in uncontrolled atrial fibrillation, will arrange for JAHAIRA cardioversion, will need JAHAIRA despite new A-fib in the hospital in monitored setting given suboptimal anticoagulation  - Increase apixaban to 5 mg twice daily  -Change cardizem cd to short acting and increase dose for rate control    Dyspnea on exertion  Does not appear fluid overloaded on exam  Does have significant rhonchi/wheezing on expiration  CTA without evidence of PE  Nuclear stress test without evidence of myocardial ischemia  Echocardiogram showed normal ejection fraction, mild diastolic dysfunction     History of PE on Eliquis  CT angiogram this admission did not show evidence of PE, prominent pulmonary artery  Continue  anticoagulation with Eliquis     History of hypertension  Continue lisinopril 20 mg daily  Will stop metoprolol in setting of active wheezing/reactive airway disease  Continue triamterene-hydrochlorothiazide combination  Add calcium channel blocker in lieu of metoprolol     Part of this note may be an electronic transcription/translation of spoken language to printed text using the Dragon Dictation System.    Jacqueline Banks, APRN  12/28/23  15:24 EST

## 2023-12-28 NOTE — DISCHARGE PLACEMENT REQUEST
"Camelia Gray (78 y.o. Female)       Date of Birth   1945    Social Security Number       Address   [address] [place] [address]    Home Phone   [phone]    MRN   [id number]       Catholic   Mormon    Marital Status   Single                            Admission Date   12/24/23    Admission Type   Emergency    Admitting Provider   Cristo Huang MD    Attending Provider   Cristo Huang MD    Department, Room/Bed   [facility] OBSERVATION, 225/1       Discharge Date       Discharge Disposition       Discharge Destination                                 Attending Provider: Cristo Huang MD    Allergies: No Known Allergies    Isolation: None   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)   Code Status: CPR    Ht: 180.3 cm (71\")   Wt: 159 kg (350 lb)    Admission Cmt: None   Principal Problem: Acute respiratory failure with hypoxia [J96.01]                   Active Insurance as of 12/24/2023       Primary Coverage       Payor Plan Insurance Group Employer/Plan Group    MEDICARE MEDICARE A & B        Payor Plan Address Payor Plan Phone Number Payor Plan Fax Number Effective Dates    [address] [phone]  7/1/2011 - None Entered    [facility] [id number]         Subscriber Name Subscriber Birth Date Member ID       CAMELIA GRAY 1945 [id number]               Secondary Coverage       Payor Plan Insurance Group Employer/Plan Group    [facility] SUPP [id number]       Payor Plan Address Payor Plan Phone Number Payor Plan Fax Number Effective Dates    [address]   12/1/2016 - None Entered    [facility] [id number]         Subscriber Name Subscriber Birth Date Member ID       CAMELIA GRAY 1945 [id number]                     Emergency Contacts        (Rel.) Home Phone Work Phone Mobile Phone    LEAH GRAY (Relative) -- -- [phone]    ([employer]),Arabella (Care Giver) -- -- [phone]              Insurance Information           "        MEDICARE/MEDICARE A & B Phone: [phone]    Subscriber: Camelia Gray Subscriber#: [id number]    Group#: -- Precert#: --        [employer] BLUE CROSS/NANCY DOBSON [facility] Phone: --    Subscriber: Camelia Gray Subscriber#: [id number]    Group#: [id number] Precert#: --

## 2023-12-28 NOTE — PLAN OF CARE
Problem: Adult Inpatient Plan of Care  Goal: Optimal Comfort and Wellbeing  Intervention: Provide Person-Centered Care  Recent Flowsheet Documentation  Taken 12/27/2023 2000 by Latesha Holliday, RN  Trust Relationship/Rapport:   care explained   emotional support provided   empathic listening provided   questions answered   reassurance provided   questions encouraged   thoughts/feelings acknowledged     Problem: Breathing Pattern Ineffective  Goal: Effective Breathing Pattern  Intervention: Promote Improved Breathing Pattern  Recent Flowsheet Documentation  Taken 12/27/2023 2000 by Latesha Holliday, RN  Supportive Measures: active listening utilized  Head of Bed (HOB) Positioning: HOB at 20-30 degrees     Problem: Dysrhythmia  Goal: Normalized Cardiac Rhythm  Outcome: Ongoing, Progressing   Goal Outcome Evaluation:     Patient continues getting IV Amiodarone. Heart rate continues to trend downward, Oxygen saturation >92%, pt resting comfortably, will continue to monitor.

## 2023-12-28 NOTE — CASE MANAGEMENT/SOCIAL WORK
Continued Stay Note  [facility]     Patient Name: Camelia Gray  MRN: [id number]  Today's Date: 12/28/2023    Admit Date: 12/24/2023    Plan: Return home alone with daily help from Caregiver and family. Follow for Oxygen. No home oxygen. Referred to [facility]; acceptance pending   Discharge Plan       Row Name 12/28/23 1053       Plan    Plan Return home alone with daily help from Caregiver and family. Follow for Oxygen. No home oxygen. Referred to [facility]; acceptance pending    Patient/Family in Agreement with Plan yes    Plan Comments Barriers: Oxygen 4 liters with no home oxygen.  Will need 6 MWT prior to discharge.  IMM explained to Ms. Gray and copy left for her after signing. Pending PT eval and DAMIAN discussed SNF vs HH. She refused SNF and said she will increase her Elder Care Caregiver to daily for as long as needed. She prefers home with [facility] who she has had in the past. CM referred to Patti with [facility] and added to Epic; acceptance pending                    Maintained distance greater than six feet and spent less than 15 minutes in the room.     Yamini BUNCH,RN Case Manager  [facility]  Phone: Desk- [phone] cell- [phone]

## 2023-12-29 LAB
ANION GAP SERPL CALCULATED.3IONS-SCNC: 8 MMOL/L (ref 5–15)
BACTERIA SPEC RESP CULT: NORMAL
BUN SERPL-MCNC: 38 MG/DL (ref 8–23)
BUN/CREAT SERPL: 45.2 (ref 7–25)
CALCIUM SPEC-SCNC: 9 MG/DL (ref 8.6–10.5)
CHLORIDE SERPL-SCNC: 99 MMOL/L (ref 98–107)
CO2 SERPL-SCNC: 32 MMOL/L (ref 22–29)
CREAT SERPL-MCNC: 0.84 MG/DL (ref 0.57–1)
DEPRECATED RDW RBC AUTO: 50.3 FL (ref 37–54)
EGFRCR SERPLBLD CKD-EPI 2021: 71.7 ML/MIN/1.73
ERYTHROCYTE [DISTWIDTH] IN BLOOD BY AUTOMATED COUNT: 15.1 % (ref 12.3–15.4)
GLUCOSE SERPL-MCNC: 116 MG/DL (ref 65–99)
GRAM STN SPEC: NORMAL
HCT VFR BLD AUTO: 44.1 % (ref 34–46.6)
HGB BLD-MCNC: 14.4 G/DL (ref 12–15.9)
LYMPHOCYTES # BLD MANUAL: 1 10*3/MM3 (ref 0.7–3.1)
LYMPHOCYTES NFR BLD MANUAL: 7 % (ref 5–12)
MCH RBC QN AUTO: 29.7 PG (ref 26.6–33)
MCHC RBC AUTO-ENTMCNC: 32.8 G/DL (ref 31.5–35.7)
MCV RBC AUTO: 90.8 FL (ref 79–97)
MONOCYTES # BLD: 0.88 10*3/MM3 (ref 0.1–0.9)
NEUTROPHILS # BLD AUTO: 10.63 10*3/MM3 (ref 1.7–7)
NEUTROPHILS NFR BLD MANUAL: 83 % (ref 42.7–76)
NEUTS BAND NFR BLD MANUAL: 2 % (ref 0–5)
PLAT MORPH BLD: NORMAL
PLATELET # BLD AUTO: 289 10*3/MM3 (ref 140–450)
PMV BLD AUTO: 7.2 FL (ref 6–12)
POTASSIUM SERPL-SCNC: 4.8 MMOL/L (ref 3.5–5.2)
RBC # BLD AUTO: 4.86 10*6/MM3 (ref 3.77–5.28)
RBC MORPH BLD: NORMAL
SCAN SLIDE: NORMAL
SODIUM SERPL-SCNC: 139 MMOL/L (ref 136–145)
VARIANT LYMPHS NFR BLD MANUAL: 8 % (ref 19.6–45.3)
WBC MORPH BLD: NORMAL
WBC NRBC COR # BLD AUTO: 12.5 10*3/MM3 (ref 3.4–10.8)

## 2023-12-29 PROCEDURE — 94799 UNLISTED PULMONARY SVC/PX: CPT

## 2023-12-29 PROCEDURE — 80048 BASIC METABOLIC PNL TOTAL CA: CPT | Performed by: HOSPITALIST

## 2023-12-29 PROCEDURE — 93005 ELECTROCARDIOGRAM TRACING: CPT | Performed by: NURSE PRACTITIONER

## 2023-12-29 PROCEDURE — 94664 DEMO&/EVAL PT USE INHALER: CPT

## 2023-12-29 PROCEDURE — 85025 COMPLETE CBC W/AUTO DIFF WBC: CPT | Performed by: HOSPITALIST

## 2023-12-29 PROCEDURE — 97162 PT EVAL MOD COMPLEX 30 MIN: CPT

## 2023-12-29 PROCEDURE — 99232 SBSQ HOSP IP/OBS MODERATE 35: CPT | Performed by: NURSE PRACTITIONER

## 2023-12-29 PROCEDURE — 94761 N-INVAS EAR/PLS OXIMETRY MLT: CPT

## 2023-12-29 PROCEDURE — 63710000001 PREDNISONE PER 5 MG: Performed by: INTERNAL MEDICINE

## 2023-12-29 PROCEDURE — 85007 BL SMEAR W/DIFF WBC COUNT: CPT | Performed by: HOSPITALIST

## 2023-12-29 RX ORDER — AMIODARONE HYDROCHLORIDE 200 MG/1
200 TABLET ORAL 2 TIMES DAILY WITH MEALS
Status: DISCONTINUED | OUTPATIENT
Start: 2023-12-29 | End: 2024-01-02 | Stop reason: HOSPADM

## 2023-12-29 RX ADMIN — PREDNISONE 10 MG: 10 TABLET ORAL at 09:31

## 2023-12-29 RX ADMIN — PANTOPRAZOLE SODIUM 40 MG: 40 TABLET, DELAYED RELEASE ORAL at 09:32

## 2023-12-29 RX ADMIN — APIXABAN 5 MG: 5 TABLET, FILM COATED ORAL at 22:02

## 2023-12-29 RX ADMIN — AMIODARONE HYDROCHLORIDE 200 MG: 200 TABLET ORAL at 18:20

## 2023-12-29 RX ADMIN — AMIODARONE HYDROCHLORIDE 200 MG: 200 TABLET ORAL at 11:56

## 2023-12-29 RX ADMIN — LISINOPRIL 20 MG: 20 TABLET ORAL at 09:31

## 2023-12-29 RX ADMIN — DILTIAZEM HYDROCHLORIDE 60 MG: 60 TABLET ORAL at 16:00

## 2023-12-29 RX ADMIN — Medication 10 ML: at 09:33

## 2023-12-29 RX ADMIN — IPRATROPIUM BROMIDE AND ALBUTEROL SULFATE 3 ML: 2.5; .5 SOLUTION RESPIRATORY (INHALATION) at 19:13

## 2023-12-29 RX ADMIN — Medication 10 ML: at 22:02

## 2023-12-29 RX ADMIN — SENNOSIDES AND DOCUSATE SODIUM 2 TABLET: 50; 8.6 TABLET ORAL at 09:31

## 2023-12-29 RX ADMIN — AMOXICILLIN AND CLAVULANATE POTASSIUM 1 TABLET: 875; 125 TABLET, FILM COATED ORAL at 22:01

## 2023-12-29 RX ADMIN — LEVOTHYROXINE SODIUM 25 MCG: 0.03 TABLET ORAL at 05:57

## 2023-12-29 RX ADMIN — IPRATROPIUM BROMIDE AND ALBUTEROL SULFATE 3 ML: 2.5; .5 SOLUTION RESPIRATORY (INHALATION) at 07:13

## 2023-12-29 RX ADMIN — DILTIAZEM HYDROCHLORIDE 60 MG: 60 TABLET ORAL at 22:01

## 2023-12-29 RX ADMIN — DILTIAZEM HYDROCHLORIDE 60 MG: 60 TABLET ORAL at 05:58

## 2023-12-29 RX ADMIN — APIXABAN 5 MG: 5 TABLET, FILM COATED ORAL at 09:32

## 2023-12-29 RX ADMIN — AMOXICILLIN AND CLAVULANATE POTASSIUM 1 TABLET: 875; 125 TABLET, FILM COATED ORAL at 09:31

## 2023-12-29 NOTE — PROGRESS NOTES
Referring Provider: Misty James MD       SUBJECTIVE    Ventricular rates better controlled  Denies chest pain     ROS  Review of all systems negative except as indicated above    Personal History:    Past Medical History:   Diagnosis Date    Arthritis     Cellulitis of both feet 10/13/2019    Decubitus ulcer of buttock, stage 2 10/14/2019    HEALED    Dermatitis associated with moisture 10/14/2019    Disease of thyroid gland     HYPOTHYROID    Edema of lower extremity 05/31/2017    Santos catheter in place     Hypertension     Hypothyroidism     Immobility syndrome 10/13/2019    Lymphedema 10/14/2019    LEGS    Lymphedema     Muscle weakness (generalized)     Non-pressure chronic ulcer of other part of left foot with fat layer exposed 10/14/2019    Non-pressure chronic ulcer of other part of right foot with fat layer exposed 10/14/2019    Obesity     Pulmonary embolism     Stasis dermatitis 07/08/2013    Ureteral calculi     Urinary tract infection 07/2020    Hosp. 7/2020    Venous stasis 10/14/2019    Yeast infection of the skin        Past Surgical History:   Procedure Laterality Date    BRONCHOSCOPY N/A 12/27/2023    Procedure: BRONCHOSCOPY with right lung washing;  Surgeon: Mayra Ramachandran MD;  Location: [facility] ENDOSCOPY;  Service: Pulmonary;  Laterality: N/A;  Post- Pneumonia    CATARACT EXTRACTION Bilateral     CHOLECYSTECTOMY N/A 11/11/2020    Procedure: CHOLECYSTECTOMY LAPAROSCOPIC;  Surgeon: George Crocker DO;  Location: [facility] MAIN OR;  Service: General;  Laterality: N/A;    CORRECTION HAMMER TOE Bilateral     CYSTOSCOPY, URETEROSCOPY, RETROGRADE PYELOGRAM, STENT INSERTION Bilateral 8/18/2020    Procedure: CYSTOSCOPY BILATERAL RETROGRADE PYELOGRAM,;  Surgeon: Shawn Hernandez MD;  Location: [facility] MAIN OR;  Service: Urology;  Laterality: Bilateral;    DENTAL EXAMINATION UNDER ANESTHESIA      ENDOSCOPY N/A 9/13/2020    Procedure: ESOPHAGOGASTRODUODENOSCOPY;  Surgeon: Torsten Johnson MD;  Location:  [facility] ENDOSCOPY;  Service: Gastroenterology;  Laterality: N/A;  post: esophageal stricture, reflux esophagitis, hiatal hernia    JOINT REPLACEMENT      Bilateral knees    TOE SURGERY      TONSILLECTOMY      TOTAL HIP ARTHROPLASTY Left 2020    Procedure: TOTAL HIP ARTHROPLASTY;  Surgeon: Baljit Hutchins II, MD;  Location: [facility] MAIN OR;  Service: Orthopedics;  Laterality: Left;       Family History   Problem Relation Age of Onset    Heart disease Mother     Hypertension Mother     Heart disease Father     Hypertension Father     Kidney disease Father     COPD Father        Social History     Tobacco Use    Smoking status: Former     Packs/day: 0.25     Years: 25.00     Additional pack years: 0.00     Total pack years: 6.25     Types: Cigarettes     Start date: 10/1/1964     Quit date: 1992     Years since quittin.0    Smokeless tobacco: Never   Vaping Use    Vaping Use: Never used   Substance Use Topics    Alcohol use: No    Drug use: No        Home meds:  Prior to Admission medications    Medication Sig Start Date End Date Taking? Authorizing Provider   apixaban (ELIQUIS) 2.5 MG tablet tablet Take 1 tablet by mouth 2 (Two) Times a Day.   Yes Karli Cheney MD   B Complex Vitamins (vitamin b complex) capsule capsule Take 1 capsule by mouth Daily.   Yes Karli Cheney MD   levothyroxine (SYNTHROID, LEVOTHROID) 25 MCG tablet Take 1 tablet by mouth Daily. 23  Yes Iliana Covington APRN   lisinopril (PRINIVIL,ZESTRIL) 20 MG tablet Take 1 tablet by mouth Daily.   Yes Karli Cheney MD   metoprolol succinate XL (TOPROL-XL) 25 MG 24 hr tablet Take 1 tablet by mouth Daily. Hold if systolic < 110 or HR <60   Yes Karli Cheney MD   Multiple Vitamins-Minerals (multivitamin with minerals) tablet tablet Take 1 tablet by mouth Daily. LD    Yes Karli Cheney MD   pantoprazole (PROTONIX) 40 MG EC tablet Take 1 tablet by mouth Daily.   Yes Shravan  "MD Karli       Allergies:  Patient has no known allergies.      OBJECTIVE    Vital Signs  Vitals:    12/29/23 0705 12/29/23 0713 12/29/23 0716 12/29/23 0930   BP: 101/65   107/68   BP Location:       Patient Position:       Pulse: 86 95 102    Resp:  24 24    Temp:       TempSrc:       SpO2: 95% 95% 95%    Weight:       Height:           Flowsheet Rows      Flowsheet Row First Filed Value   Admission Height 180.3 cm (71\") Documented at 12/24/2023 1139   Admission Weight 159 kg (350 lb) Documented at 12/24/2023 1139              Intake/Output Summary (Last 24 hours) at 12/29/2023 0950  Last data filed at 12/28/2023 1807  Gross per 24 hour   Intake 560 ml   Output --   Net 560 ml              Physical Exam:  General-no acute distress  No elevated JVP noted.  Cardiovascular-S1-S2 normal, no murmurs noted. Irregularly irregular  Respiratory-normal breath sounds, no wheezing/crackles.  GI-abdomen is soft and nontender  No pedal edema        Results Review:    BNP        TROPONIN  Results from last 7 days   Lab Units 12/28/23  0403   HSTROP T ng/L 19*       CoAg  Results from last 7 days   Lab Units 12/24/23  1305   INR  1.10   APTT seconds 34.7*       Creatinine Clearance  Estimated Creatinine Clearance: 93.9 mL/min (by C-G formula based on SCr of 0.84 mg/dL).      Radiology  No radiology results for the last day      EKG  I personally viewed and interpreted the patient's EKG/Telemetry data:  ECG 12 Lead Drug Monitoring; Amiodarone   Preliminary Result   HEART RATE= 85  bpm   RR Interval= 703  ms   KY Interval=   ms   P Horizontal Axis=   deg   P Front Axis=   deg   QRSD Interval= 87  ms   QT Interval= 339  ms   QTcB= 404  ms   QRS Axis= 33  deg   T Wave Axis= 13  deg   - ABNORMAL ECG -   Atrial fibrillation   Electronically Signed By:    Date and Time of Study: 2023-12-29 06:28:18      ECG 12 Lead Drug Monitoring; Amiodarone   Preliminary Result   HEART RATE= 96  bpm   RR Interval= 626  ms   KY Interval=   ms   P " Horizontal Axis=   deg   P Front Axis=   deg   QRSD Interval= 83  ms   QT Interval= 321  ms   QTcB= 406  ms   QRS Axis= 31  deg   T Wave Axis= 15  deg   - ABNORMAL ECG -   Atrial fibrillation   When compared with ECG of 27-Dec-2023 16:10:01,   Significant rate decrease   Electronically Signed By:    Date and Time of Study: 2023-12-28 05:46:01      ECG 12 Lead Drug Monitoring; Amiodarone   Preliminary Result   HEART RATE= 122  bpm   RR Interval= 492  ms   MT Interval=   ms   P Horizontal Axis=   deg   P Front Axis=   deg   QRSD Interval= 81  ms   QT Interval= 292  ms   QTcB= 416  ms   QRS Axis= 42  deg   T Wave Axis= 25  deg   - ABNORMAL ECG -   Atrial fibrillation   When compared with ECG of 27-Dec-2023 12:30:32,   Significant rate decrease   Significant repolarization change   Electronically Signed By:    Date and Time of Study: 2023-12-27 16:10:01      ECG 12 Lead Tachycardia   Preliminary Result   HEART RATE= 143  bpm   RR Interval= 418  ms   MT Interval=   ms   P Horizontal Axis=   deg   P Front Axis=   deg   QRSD Interval= 84  ms   QT Interval= 280  ms   QTcB= 433  ms   QRS Axis= 50  deg   T Wave Axis= 263  deg   - ABNORMAL ECG -   Atrial fibrillation   Repolarization abnormality, prob rate related   Electronically Signed By:    Date and Time of Study: 2023-12-27 12:30:32      ECG 12 Lead Dyspnea   Final Result   HEART RATE= 74  bpm   RR Interval= 836  ms   MT Interval= 197  ms   P Horizontal Axis= 112  deg   P Front Axis= 13  deg   QRSD Interval= 89  ms   QT Interval= 378  ms   QTcB= 413  ms   QRS Axis= 55  deg   T Wave Axis= 34  deg   - ABNORMAL ECG -   Sinus rhythm   Atrial premature complexes   When compared with ECG of 09-Nov-2020 12:53:09,   Nonspecific significant change   Electronically Signed By: Bernard Alonso (VERONICA) 26-Dec-2023 06:57:54   Date and Time of Study: 2023-12-24 12:04:41      SCANNED - TELEMETRY     Final Result      SCANNED - TELEMETRY     Final Result      SCANNED - TELEMETRY     Final  Result      SCANNED - TELEMETRY     Final Result      SCANNED - TELEMETRY     Final Result      SCANNED - TELEMETRY     Final Result      SCANNED - TELEMETRY     Final Result      SCANNED - TELEMETRY     Final Result      SCANNED - TELEMETRY     Final Result      SCANNED - TELEMETRY     Final Result      SCANNED - TELEMETRY     Final Result      SCANNED - TELEMETRY     Final Result      SCANNED - TELEMETRY     Final Result      SCANNED - TELEMETRY     Final Result      SCANNED - TELEMETRY     Final Result      SCANNED - TELEMETRY     Final Result      SCANNED - TELEMETRY     Final Result      SCANNED - TELEMETRY     Final Result      SCANNED - TELEMETRY     Final Result      SCANNED - TELEMETRY     Final Result      SCANNED - TELEMETRY     Final Result      SCANNED - TELEMETRY     Final Result      SCANNED - TELEMETRY     Final Result      SCANNED - TELEMETRY     Final Result      SCANNED - TELEMETRY     Final Result      SCANNED - TELEMETRY     Final Result      SCANNED - TELEMETRY     Final Result      SCANNED - TELEMETRY     Final Result      SCANNED - TELEMETRY     Final Result      SCANNED - TELEMETRY     Final Result                  Echocardiogram:    Results for orders placed during the hospital encounter of 12/24/23    Adult Transthoracic Echo Complete W/ Cont if Necessary Per Protocol    Interpretation Summary    Left ventricular ejection fraction appears to be 56 - 60%.    Indications  Chest pain  Shortness of breath    Technically satisfactory study.  Mitral valve is structurally normal.  Tricuspid valve is structurally normal.  Aortic valve is thickened with decreased opening motion.  Gradient across the aortic valve is 20/10 mmHg.  Pulmonic valve could not be well visualized.  No evidence for mitral tricuspid or aortic regurgitation is seen by Doppler study.  Left atrium is normal in size.  Right atrium is normal in size.  Left ventricle is normal in size and contractility with ejection fraction of  60%.  Diastolic dysfunction.  Right ventricle is normal in size.  IVC 2.06 cm.  Atrial septum is intact.  Aorta is normal.  No pericardial effusion or intracardiac thrombus is seen.    Impression  Mild aortic valve stenosis with gradient of 20/10 mmHg.  Otherwise, structurally and functionally normal cardiac valves.  Mild diastolic dysfunction is  Left ventricular size and contractility is normal with ejection fraction of 60%.        Stress Test:  Results for orders placed during the hospital encounter of 12/24/23    Stress Test With Myocardial Perfusion One Day    Interpretation Summary  Indications  Chest pain  Shortness of breath    This study was performed under the direct supervision of Radha HERNANDEZ.    Resting ECG  Sinus rhythm    The patient was injected with Lexiscan intravenously while constantly monitoring electrocardiogram and vital signs.  Patient did not have any chest discomfort ST abnormalities or ectopy with injection of Lexiscan.    Cardiolite was used as an imaging agent.    Cardiolite images showed uniform distribution of radionuclide without any evidence for myocardial ischemia.    Gated SPECT images revealed normal left ventricle size and contractility with ejection fraction of 74%.    Impression  ========  Lexiscan Cardiolite test is negative for myocardial ischemia.    Gated SPECT images revealed normal left ventricular size and contractility with ejection fraction of 74%.         Cardiac Catheterization:  No results found for this or any previous visit.         Other:         ASSESSMENT & PLAN:    New onset atrial fibrillation with rapid ventricular rate  Patient remains asymptomatic, hemodynamically stable  Plan  - If patient remains in uncontrolled atrial fibrillation, will arrange for JAHAIRA cardioversion, will need JAHAIRA despite new A-fib in the hospital in monitored setting given suboptimal anticoagulation  - Continue  apixaban to 5 mg twice daily  -Change cardizem cd to short acting and  increase dose for rate control  -Will change IV amiodarone to po  Ventricular rates better controlled this am  BB avoided due to exacerbation of wheezing  Consider CV as op if does not convert to SR    Dyspnea on exertion  Does not appear fluid overloaded on exam  S/p bronch with mucopurulent secretions  CTA without evidence of PE  Nuclear stress test without evidence of myocardial ischemia  Echocardiogram showed normal ejection fraction, mild diastolic dysfunction     History of PE on Eliquis  CT angiogram this admission did not show evidence of PE, prominent pulmonary artery  Continue anticoagulation with Eliquis     History of hypertension  Continue lisinopril 20 mg daily  Will stop metoprolol in setting of active wheezing/reactive airway disease  Continue triamterene-hydrochlorothiazide combination  Add calcium channel blocker in lieu of metoprolol     Part of this note may be an electronic transcription/translation of spoken language to printed text using the Dragon Dictation System.    Jacqueline Banks, APRN  12/29/23  09:50 EST
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
## 2023-12-29 NOTE — CASE MANAGEMENT/SOCIAL WORK
Continued Stay Note   Chris     Patient Name: Camelia Gray  MRN: [id number]  Today's Date: 12/29/2023    Admit Date: 12/24/2023    Plan: Return home alone with Caregiver and family. NO home oxygen. Will need 6 MWT.  Accepted by [facility]. Need Ambulatory home health order   Discharge Plan       Row Name 12/29/23 1512       Plan    Plan Return home alone with Caregiver and family. NO home oxygen. Will need 6 MWT.  Accepted by [facility]. Need Ambulatory home health order    Plan Comments Barriers: Oxygen 2 liters with no home oxygen . Accepted by [facility] for home health at discharge. She will return home alone and will increase here caregiver hours through [employer]                        Phone communication or documentation only - no physical contact with patient or family.   Yamini BUNCH,RN Case Manager  [facility]  Phone: Desk- [phone]  Cell- [phone]

## 2023-12-29 NOTE — PLAN OF CARE
Goal Outcome Evaluation:              Outcome Evaluation: Pt is a 79 y/o female presenting to [facility] on 12/24 with c/o SOA and fatigue, coughing, congestion. Pt s/p bronchoscopy 12/27. Pt with a-fib and RVR.   PMHx includes PE 2018.  Pt A&Ox4. Pt reports new reliance on supplemental O2 (2L currently). Pt reports using Rwx for household mobility/ambulation, living alone with limited caregiver assist (22 hours/week), uses manual w/c for community mobility. Pt lives alone in single story home with ramp entry. At time of PT eval, pt requires MOD A with supine to sit (reports sleeping in recliner at home), and MIN A with sit to stand / SPS transfers / gait x5 ft with RWx to BSC. Pt would benefit from SNF, but resistant and desires home with HHPT. PT concerned for pt's limited social support and pt reports she can and will hire 24/7 caregiver support. Should pt opt to return home with assist, follow-up HHPT recommended to address functional decline and fall risk.      Anticipated Discharge Disposition (PT): home with 24/7 care, home with home health

## 2023-12-29 NOTE — NURSING NOTE
78-year-old female who presents to the hospital shortness of breath and fatigue.  Patient consult received to assess patient's bilateral lower extremities.  Patient is known to this service from previous hospital admissions.  She has been current with outpatient wound care was discharged a month or 2 ago.  She wears Ace wrap's at home.  She does not wear compression garments.  She states that the right lower leg had started to open and that she has been using a silver alginate dressing at home on the area.    Dressings were removed from both legs.  Both the areas are dry and healed.  There is no exudate noted.  There is a scab to the left calf but nothing open.  Nothing draining.  I cleansed her legs.  I applied skin repair cream.  I applied a Curlex and I applied two 4 inch Ace wrap's to each leg wrapping from the base of the toes to just below the knee. This  can be done  every other day
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
## 2023-12-30 PROCEDURE — 93005 ELECTROCARDIOGRAM TRACING: CPT | Performed by: NURSE PRACTITIONER

## 2023-12-30 PROCEDURE — 93010 ELECTROCARDIOGRAM REPORT: CPT | Performed by: INTERNAL MEDICINE

## 2023-12-30 PROCEDURE — 25010000002 FUROSEMIDE PER 20 MG: Performed by: INTERNAL MEDICINE

## 2023-12-30 PROCEDURE — 99232 SBSQ HOSP IP/OBS MODERATE 35: CPT | Performed by: INTERNAL MEDICINE

## 2023-12-30 PROCEDURE — 63710000001 PREDNISONE PER 5 MG: Performed by: INTERNAL MEDICINE

## 2023-12-30 PROCEDURE — 94761 N-INVAS EAR/PLS OXIMETRY MLT: CPT

## 2023-12-30 PROCEDURE — 94799 UNLISTED PULMONARY SVC/PX: CPT

## 2023-12-30 PROCEDURE — 94664 DEMO&/EVAL PT USE INHALER: CPT

## 2023-12-30 RX ORDER — FUROSEMIDE 10 MG/ML
20 INJECTION INTRAMUSCULAR; INTRAVENOUS ONCE
Status: COMPLETED | OUTPATIENT
Start: 2023-12-30 | End: 2023-12-30

## 2023-12-30 RX ORDER — IPRATROPIUM BROMIDE AND ALBUTEROL SULFATE 2.5; .5 MG/3ML; MG/3ML
3 SOLUTION RESPIRATORY (INHALATION) EVERY 6 HOURS PRN
Status: DISCONTINUED | OUTPATIENT
Start: 2023-12-30 | End: 2024-01-02 | Stop reason: HOSPADM

## 2023-12-30 RX ORDER — LISINOPRIL 5 MG/1
10 TABLET ORAL DAILY
Status: DISCONTINUED | OUTPATIENT
Start: 2023-12-31 | End: 2023-12-31

## 2023-12-30 RX ADMIN — TRIAMTERENE AND HYDROCHLOROTHIAZIDE 1 TABLET: 37.5; 25 TABLET ORAL at 09:05

## 2023-12-30 RX ADMIN — AMIODARONE HYDROCHLORIDE 200 MG: 200 TABLET ORAL at 17:53

## 2023-12-30 RX ADMIN — AMOXICILLIN AND CLAVULANATE POTASSIUM 1 TABLET: 875; 125 TABLET, FILM COATED ORAL at 20:04

## 2023-12-30 RX ADMIN — PREDNISONE 10 MG: 10 TABLET ORAL at 09:02

## 2023-12-30 RX ADMIN — SENNOSIDES AND DOCUSATE SODIUM 2 TABLET: 50; 8.6 TABLET ORAL at 09:02

## 2023-12-30 RX ADMIN — DILTIAZEM HYDROCHLORIDE 60 MG: 60 TABLET ORAL at 20:22

## 2023-12-30 RX ADMIN — APIXABAN 5 MG: 5 TABLET, FILM COATED ORAL at 09:02

## 2023-12-30 RX ADMIN — DILTIAZEM HYDROCHLORIDE 60 MG: 60 TABLET ORAL at 06:04

## 2023-12-30 RX ADMIN — DILTIAZEM HYDROCHLORIDE 60 MG: 60 TABLET ORAL at 14:15

## 2023-12-30 RX ADMIN — Medication 10 ML: at 09:08

## 2023-12-30 RX ADMIN — PANTOPRAZOLE SODIUM 40 MG: 40 TABLET, DELAYED RELEASE ORAL at 09:02

## 2023-12-30 RX ADMIN — LISINOPRIL 20 MG: 20 TABLET ORAL at 09:02

## 2023-12-30 RX ADMIN — AMIODARONE HYDROCHLORIDE 200 MG: 200 TABLET ORAL at 09:02

## 2023-12-30 RX ADMIN — AMOXICILLIN AND CLAVULANATE POTASSIUM 1 TABLET: 875; 125 TABLET, FILM COATED ORAL at 09:02

## 2023-12-30 RX ADMIN — APIXABAN 5 MG: 5 TABLET, FILM COATED ORAL at 20:04

## 2023-12-30 RX ADMIN — FUROSEMIDE 20 MG: 10 INJECTION, SOLUTION INTRAMUSCULAR; INTRAVENOUS at 20:04

## 2023-12-30 RX ADMIN — LEVOTHYROXINE SODIUM 25 MCG: 0.03 TABLET ORAL at 06:04

## 2023-12-30 RX ADMIN — IPRATROPIUM BROMIDE AND ALBUTEROL SULFATE 3 ML: 2.5; .5 SOLUTION RESPIRATORY (INHALATION) at 07:02

## 2023-12-30 NOTE — PLAN OF CARE
Goal Outcome Evaluation:              Outcome Evaluation: Pt. on 2L 02, external cath, sl'd, vss, taking all PO meds again,, poss. d/c home with H.H,  will continue to monitor
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
## 2023-12-30 NOTE — PLAN OF CARE
Goal Outcome Evaluation:  Plan of Care Reviewed With: patient   Patient resting in bed. No pain at this time. No respiratory distress noted. Will continue to monitor.

## 2023-12-30 NOTE — PROGRESS NOTES
Referring Provider: Misty James MD       SUBJECTIVE    Ventricular rates better controlled  Denies chest pain  No acute events overnight    Amiodarone twice daily  Eliquis  Diltiazem  Lisinopril  Triamterene HCTZ unchanged from prior encounter  ROS  Review of all systems negative except as indicated above    Personal History:    Past Medical History:   Diagnosis Date    Arthritis     Cellulitis of both feet 10/13/2019    Decubitus ulcer of buttock, stage 2 10/14/2019    HEALED    Dermatitis associated with moisture 10/14/2019    Disease of thyroid gland     HYPOTHYROID    Edema of lower extremity 05/31/2017    Santos catheter in place     Hypertension     Hypothyroidism     Immobility syndrome 10/13/2019    Lymphedema 10/14/2019    LEGS    Lymphedema     Muscle weakness (generalized)     Non-pressure chronic ulcer of other part of left foot with fat layer exposed 10/14/2019    Non-pressure chronic ulcer of other part of right foot with fat layer exposed 10/14/2019    Obesity     Pulmonary embolism     Stasis dermatitis 07/08/2013    Ureteral calculi     Urinary tract infection 07/2020    Hosp. 7/2020    Venous stasis 10/14/2019    Yeast infection of the skin        Past Surgical History:   Procedure Laterality Date    BRONCHOSCOPY N/A 12/27/2023    Procedure: BRONCHOSCOPY with right lung washing;  Surgeon: Mayra Ramachandran MD;  Location: [facility] ENDOSCOPY;  Service: Pulmonary;  Laterality: N/A;  Post- Pneumonia    CATARACT EXTRACTION Bilateral     CHOLECYSTECTOMY N/A 11/11/2020    Procedure: CHOLECYSTECTOMY LAPAROSCOPIC;  Surgeon: George Crocker DO;  Location: [facility] MAIN OR;  Service: General;  Laterality: N/A;    CORRECTION HAMMER TOE Bilateral     CYSTOSCOPY, URETEROSCOPY, RETROGRADE PYELOGRAM, STENT INSERTION Bilateral 8/18/2020    Procedure: CYSTOSCOPY BILATERAL RETROGRADE PYELOGRAM,;  Surgeon: Shawn Hernandez MD;  Location: [facility] MAIN OR;  Service: Urology;  Laterality: Bilateral;    DENTAL EXAMINATION  UNDER ANESTHESIA      ENDOSCOPY N/A 2020    Procedure: ESOPHAGOGASTRODUODENOSCOPY;  Surgeon: Torsten Johnson MD;  Location: [facility] ENDOSCOPY;  Service: Gastroenterology;  Laterality: N/A;  post: esophageal stricture, reflux esophagitis, hiatal hernia    JOINT REPLACEMENT      Bilateral knees    TOE SURGERY      TONSILLECTOMY      TOTAL HIP ARTHROPLASTY Left 2020    Procedure: TOTAL HIP ARTHROPLASTY;  Surgeon: Baljit Hutchins II, MD;  Location: [facility] MAIN OR;  Service: Orthopedics;  Laterality: Left;       Family History   Problem Relation Age of Onset    Heart disease Mother     Hypertension Mother     Heart disease Father     Hypertension Father     Kidney disease Father     COPD Father        Social History     Tobacco Use    Smoking status: Former     Packs/day: 0.25     Years: 25.00     Additional pack years: 0.00     Total pack years: 6.25     Types: Cigarettes     Start date: 10/1/1964     Quit date: 1992     Years since quittin.0    Smokeless tobacco: Never   Vaping Use    Vaping Use: Never used   Substance Use Topics    Alcohol use: No    Drug use: No        Home meds:  Prior to Admission medications    Medication Sig Start Date End Date Taking? Authorizing Provider   apixaban (ELIQUIS) 2.5 MG tablet tablet Take 1 tablet by mouth 2 (Two) Times a Day.   Yes ProviderKarli MD   B Complex Vitamins (vitamin b complex) capsule capsule Take 1 capsule by mouth Daily.   Yes ProviderKarli MD   levothyroxine (SYNTHROID, LEVOTHROID) 25 MCG tablet Take 1 tablet by mouth Daily. 23  Yes Iliana Covington APRN   lisinopril (PRINIVIL,ZESTRIL) 20 MG tablet Take 1 tablet by mouth Daily.   Yes ProviderKarli MD   metoprolol succinate XL (TOPROL-XL) 25 MG 24 hr tablet Take 1 tablet by mouth Daily. Hold if systolic < 110 or HR <60   Yes Karli Cheney MD   Multiple Vitamins-Minerals (multivitamin with minerals) tablet tablet Take 1 tablet by mouth Daily.  "LD 11/4   Yes Provider, MD Karli   pantoprazole (PROTONIX) 40 MG EC tablet Take 1 tablet by mouth Daily.   Yes Provider, Karli, MD       Allergies:  Patient has no known allergies.      OBJECTIVE    Vital Signs  Vitals:    12/30/23 0535 12/30/23 0702 12/30/23 0705 12/30/23 1052   BP: 121/77   109/74   BP Location: Right arm   Right arm   Patient Position: Lying   Lying   Pulse: 106 96 91    Resp: 17 14 16 23   Temp: 98 °F (36.7 °C)      TempSrc: Oral      SpO2: 95% 93% 95%    Weight:       Height:           Flowsheet Rows      Flowsheet Row First Filed Value   Admission Height 180.3 cm (71\") Documented at 12/24/2023 1139   Admission Weight 159 kg (350 lb) Documented at 12/24/2023 1139              Intake/Output Summary (Last 24 hours) at 12/30/2023 1449  Last data filed at 12/30/2023 0535  Gross per 24 hour   Intake --   Output 1500 ml   Net -1500 ml          Vitals reviewed above     Physical Exam:  General-no acute distress  No elevated JVP noted.  Cardiovascular-S1-S2 normal, no murmurs noted. Irregularly irregular  Respiratory-normal breath sounds, no wheezing/crackles.  GI-abdomen is soft and nontender  No pedal edema    Unchanged from prior encounter    Results Review:    BNP        TROPONIN  Results from last 7 days   Lab Units 12/28/23  0403   HSTROP T ng/L 19*       CoAg  Results from last 7 days   Lab Units 12/24/23  1305   INR  1.10   APTT seconds 34.7*       Creatinine Clearance  Estimated Creatinine Clearance: 93.9 mL/min (by C-G formula based on SCr of 0.84 mg/dL).      Radiology  No radiology results for the last day      EKG  I personally viewed and interpreted the patient's EKG/Telemetry data:  ECG 12 Lead Drug Monitoring; Amiodarone   Preliminary Result   HEART RATE= 97  bpm   RR Interval= 619  ms   AZ Interval=   ms   P Horizontal Axis=   deg   P Front Axis=   deg   QRSD Interval= 88  ms   QT Interval= 342  ms   QTcB= 435  ms   QRS Axis= 63  deg   T Wave Axis= 3  deg   - ABNORMAL ECG - "   Atrial fibrillation   Low voltage, precordial leads   Consider anterior infarct   Electronically Signed By:    Date and Time of Study: 2023-12-30 06:45:24      ECG 12 Lead Drug Monitoring; Amiodarone   Preliminary Result   HEART RATE= 85  bpm   RR Interval= 703  ms   OR Interval=   ms   P Horizontal Axis=   deg   P Front Axis=   deg   QRSD Interval= 87  ms   QT Interval= 339  ms   QTcB= 404  ms   QRS Axis= 33  deg   T Wave Axis= 13  deg   - ABNORMAL ECG -   Atrial fibrillation   Electronically Signed By:    Date and Time of Study: 2023-12-29 06:28:18      ECG 12 Lead Drug Monitoring; Amiodarone   Preliminary Result   HEART RATE= 96  bpm   RR Interval= 626  ms   OR Interval=   ms   P Horizontal Axis=   deg   P Front Axis=   deg   QRSD Interval= 83  ms   QT Interval= 321  ms   QTcB= 406  ms   QRS Axis= 31  deg   T Wave Axis= 15  deg   - ABNORMAL ECG -   Atrial fibrillation   When compared with ECG of 27-Dec-2023 16:10:01,   Significant rate decrease   Electronically Signed By:    Date and Time of Study: 2023-12-28 05:46:01      ECG 12 Lead Drug Monitoring; Amiodarone   Preliminary Result   HEART RATE= 122  bpm   RR Interval= 492  ms   OR Interval=   ms   P Horizontal Axis=   deg   P Front Axis=   deg   QRSD Interval= 81  ms   QT Interval= 292  ms   QTcB= 416  ms   QRS Axis= 42  deg   T Wave Axis= 25  deg   - ABNORMAL ECG -   Atrial fibrillation   When compared with ECG of 27-Dec-2023 12:30:32,   Significant rate decrease   Significant repolarization change   Electronically Signed By:    Date and Time of Study: 2023-12-27 16:10:01      ECG 12 Lead Tachycardia   Preliminary Result   HEART RATE= 143  bpm   RR Interval= 418  ms   OR Interval=   ms   P Horizontal Axis=   deg   P Front Axis=   deg   QRSD Interval= 84  ms   QT Interval= 280  ms   QTcB= 433  ms   QRS Axis= 50  deg   T Wave Axis= 263  deg   - ABNORMAL ECG -   Atrial fibrillation   Repolarization abnormality, prob rate related   Electronically Signed By:     Date and Time of Study: 2023-12-27 12:30:32      ECG 12 Lead Dyspnea   Final Result   HEART RATE= 74  bpm   RR Interval= 836  ms   LA Interval= 197  ms   P Horizontal Axis= 112  deg   P Front Axis= 13  deg   QRSD Interval= 89  ms   QT Interval= 378  ms   QTcB= 413  ms   QRS Axis= 55  deg   T Wave Axis= 34  deg   - ABNORMAL ECG -   Sinus rhythm   Atrial premature complexes   When compared with ECG of 09-Nov-2020 12:53:09,   Nonspecific significant change   Electronically Signed By: Bernard Alonso ([facility]) 26-Dec-2023 06:57:54   Date and Time of Study: 2023-12-24 12:04:41      SCANNED - TELEMETRY     Final Result      SCANNED - TELEMETRY     Final Result      SCANNED - TELEMETRY     Final Result      SCANNED - TELEMETRY     Final Result      SCANNED - TELEMETRY     Final Result      SCANNED - TELEMETRY     Final Result      SCANNED - TELEMETRY     Final Result      SCANNED - TELEMETRY     Final Result      SCANNED - TELEMETRY     Final Result      SCANNED - TELEMETRY     Final Result      SCANNED - TELEMETRY     Final Result      SCANNED - TELEMETRY     Final Result      SCANNED - TELEMETRY     Final Result      SCANNED - TELEMETRY     Final Result      SCANNED - TELEMETRY     Final Result      SCANNED - TELEMETRY     Final Result      SCANNED - TELEMETRY     Final Result      SCANNED - TELEMETRY     Final Result      SCANNED - TELEMETRY     Final Result      SCANNED - TELEMETRY     Final Result      SCANNED - TELEMETRY     Final Result      SCANNED - TELEMETRY     Final Result      SCANNED - TELEMETRY     Final Result      SCANNED - TELEMETRY     Final Result      SCANNED - TELEMETRY     Final Result      SCANNED - TELEMETRY     Final Result      SCANNED - TELEMETRY     Final Result      SCANNED - TELEMETRY     Final Result      SCANNED - TELEMETRY     Final Result      SCANNED - TELEMETRY     Final Result      SCANNED - TELEMETRY     Final Result      SCANNED - TELEMETRY     Final Result      SCANNED - TELEMETRY      Final Result      SCANNED - TELEMETRY     Final Result                  Echocardiogram:    Results for orders placed during the hospital encounter of 12/24/23    Adult Transthoracic Echo Complete W/ Cont if Necessary Per Protocol    Interpretation Summary    Left ventricular ejection fraction appears to be 56 - 60%.    Indications  Chest pain  Shortness of breath    Technically satisfactory study.  Mitral valve is structurally normal.  Tricuspid valve is structurally normal.  Aortic valve is thickened with decreased opening motion.  Gradient across the aortic valve is 20/10 mmHg.  Pulmonic valve could not be well visualized.  No evidence for mitral tricuspid or aortic regurgitation is seen by Doppler study.  Left atrium is normal in size.  Right atrium is normal in size.  Left ventricle is normal in size and contractility with ejection fraction of 60%.  Diastolic dysfunction.  Right ventricle is normal in size.  IVC 2.06 cm.  Atrial septum is intact.  Aorta is normal.  No pericardial effusion or intracardiac thrombus is seen.    Impression  Mild aortic valve stenosis with gradient of 20/10 mmHg.  Otherwise, structurally and functionally normal cardiac valves.  Mild diastolic dysfunction is  Left ventricular size and contractility is normal with ejection fraction of 60%.        Stress Test:  Results for orders placed during the hospital encounter of 12/24/23    Stress Test With Myocardial Perfusion One Day    Interpretation Summary  Indications  Chest pain  Shortness of breath    This study was performed under the direct supervision of Radha HERNANDEZ.    Resting ECG  Sinus rhythm    The patient was injected with Lexiscan intravenously while constantly monitoring electrocardiogram and vital signs.  Patient did not have any chest discomfort ST abnormalities or ectopy with injection of Lexiscan.    Cardiolite was used as an imaging agent.    Cardiolite images showed uniform distribution of radionuclide without any evidence  for myocardial ischemia.    Gated SPECT images revealed normal left ventricle size and contractility with ejection fraction of 74%.    Impression  ========  Lexiscan Cardiolite test is negative for myocardial ischemia.    Gated SPECT images revealed normal left ventricular size and contractility with ejection fraction of 74%.         Cardiac Catheterization:  No results found for this or any previous visit.         Other:         ASSESSMENT & PLAN:    New onset atrial fibrillation with rapid ventricular rate  Patient remains asymptomatic, hemodynamically stable  Plan  - Continue  apixaban to 5 mg twice daily  -Change cardizem cd to short acting and increase dose for rate control  -Continue amiodarone to po  Ventricular rates better controlled this am  BB avoided due to exacerbation of wheezing  Consider CV as op if does not convert to SR    Dyspnea on exertion  Does not appear fluid overloaded on exam  S/p bronch with mucopurulent secretions  CTA without evidence of PE  Nuclear stress test without evidence of myocardial ischemia  Echocardiogram showed normal ejection fraction, mild diastolic dysfunction     History of PE on Eliquis  CT angiogram this admission did not show evidence of PE, prominent pulmonary artery  Continue anticoagulation with Eliquis, lifelong indication     History of hypertension  Continue lisinopril 20 mg daily  Will stop metoprolol in setting of active wheezing/reactive airway disease  Continue triamterene-hydrochlorothiazide combination  Add calcium channel blocker in lieu of metoprolol previously, continue     New patient to me today with new problems above  Continue to follow while in house      Multiple CV comorbidities addressed individually, high risk medicines, essential hypertension, dyspnea exertion, atrial fibrillation with RVR, chronic anticoagulation needed, history of PE    Kevin Angel MD  12/30/23  14:49 EST

## 2023-12-31 ENCOUNTER — APPOINTMENT (OUTPATIENT)
Dept: GENERAL RADIOLOGY | Facility: HOSPITAL | Age: 78
End: 2023-12-31
Payer: MEDICARE

## 2023-12-31 LAB
ANION GAP SERPL CALCULATED.3IONS-SCNC: 5 MMOL/L (ref 5–15)
ARTERIAL PATENCY WRIST A: POSITIVE
ATMOSPHERIC PRESS: ABNORMAL MM[HG]
BASE EXCESS BLDA CALC-SCNC: 5 MMOL/L (ref 0–3)
BDY SITE: ABNORMAL
BUN SERPL-MCNC: 49 MG/DL (ref 8–23)
BUN/CREAT SERPL: 51 (ref 7–25)
CALCIUM SPEC-SCNC: 9.3 MG/DL (ref 8.6–10.5)
CHLORIDE SERPL-SCNC: 97 MMOL/L (ref 98–107)
CO2 BLDA-SCNC: 30.7 MMOL/L (ref 22–29)
CO2 SERPL-SCNC: 35 MMOL/L (ref 22–29)
CREAT SERPL-MCNC: 0.96 MG/DL (ref 0.57–1)
EGFRCR SERPLBLD CKD-EPI 2021: 61.1 ML/MIN/1.73
GLUCOSE SERPL-MCNC: 123 MG/DL (ref 65–99)
HCO3 BLDA-SCNC: 29.4 MMOL/L (ref 21–28)
HEMODILUTION: NO
INHALED O2 CONCENTRATION: 29 %
MODALITY: ABNORMAL
PCO2 BLDA: 41.1 MM HG (ref 35–48)
PH BLDA: 7.46 PH UNITS (ref 7.35–7.45)
PO2 BLDA: 91.7 MM HG (ref 83–108)
POTASSIUM SERPL-SCNC: 4.6 MMOL/L (ref 3.5–5.2)
SAO2 % BLDCOA: 97.5 % (ref 94–98)
SODIUM SERPL-SCNC: 137 MMOL/L (ref 136–145)

## 2023-12-31 PROCEDURE — 82803 BLOOD GASES ANY COMBINATION: CPT

## 2023-12-31 PROCEDURE — 99232 SBSQ HOSP IP/OBS MODERATE 35: CPT | Performed by: INTERNAL MEDICINE

## 2023-12-31 PROCEDURE — 71046 X-RAY EXAM CHEST 2 VIEWS: CPT

## 2023-12-31 PROCEDURE — 80048 BASIC METABOLIC PNL TOTAL CA: CPT | Performed by: INTERNAL MEDICINE

## 2023-12-31 PROCEDURE — 36600 WITHDRAWAL OF ARTERIAL BLOOD: CPT

## 2023-12-31 RX ADMIN — Medication 10 ML: at 20:30

## 2023-12-31 RX ADMIN — AMOXICILLIN AND CLAVULANATE POTASSIUM 1 TABLET: 875; 125 TABLET, FILM COATED ORAL at 08:38

## 2023-12-31 RX ADMIN — METOPROLOL TARTRATE 25 MG: 25 TABLET, FILM COATED ORAL at 13:04

## 2023-12-31 RX ADMIN — APIXABAN 5 MG: 5 TABLET, FILM COATED ORAL at 08:38

## 2023-12-31 RX ADMIN — Medication 10 ML: at 08:39

## 2023-12-31 RX ADMIN — APIXABAN 5 MG: 5 TABLET, FILM COATED ORAL at 20:29

## 2023-12-31 RX ADMIN — LEVOTHYROXINE SODIUM 25 MCG: 0.03 TABLET ORAL at 05:02

## 2023-12-31 RX ADMIN — AMOXICILLIN AND CLAVULANATE POTASSIUM 1 TABLET: 875; 125 TABLET, FILM COATED ORAL at 20:30

## 2023-12-31 RX ADMIN — AMIODARONE HYDROCHLORIDE 200 MG: 200 TABLET ORAL at 17:26

## 2023-12-31 RX ADMIN — SENNOSIDES AND DOCUSATE SODIUM 2 TABLET: 50; 8.6 TABLET ORAL at 08:37

## 2023-12-31 RX ADMIN — METOPROLOL TARTRATE 25 MG: 25 TABLET, FILM COATED ORAL at 20:29

## 2023-12-31 RX ADMIN — PANTOPRAZOLE SODIUM 40 MG: 40 TABLET, DELAYED RELEASE ORAL at 08:38

## 2023-12-31 RX ADMIN — AMIODARONE HYDROCHLORIDE 200 MG: 200 TABLET ORAL at 08:38

## 2023-12-31 RX ADMIN — DILTIAZEM HYDROCHLORIDE 60 MG: 60 TABLET ORAL at 05:02
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
## 2023-12-31 NOTE — PROGRESS NOTES
Referring Provider: Misty James MD       SUBJECTIVE    Ventricular rates better controlled 80s to 90s at bedside, nursing present on encounter as well today  Remains on nasal cannula  Overall feels slightly better  Denies chest pain  No acute events overnight    Wondering about discharge which we discussed was up to primary for timing and discharge criteria    Amiodarone twice daily  Eliquis  Diltiazem  Lisinopril  Triamterene HCTZ unchanged from prior encounter  ROS  Review of all systems negative except as indicated above    Personal History:    Past Medical History:   Diagnosis Date    Arthritis     Cellulitis of both feet 10/13/2019    Decubitus ulcer of buttock, stage 2 10/14/2019    HEALED    Dermatitis associated with moisture 10/14/2019    Disease of thyroid gland     HYPOTHYROID    Edema of lower extremity 05/31/2017    Santos catheter in place     Hypertension     Hypothyroidism     Immobility syndrome 10/13/2019    Lymphedema 10/14/2019    LEGS    Lymphedema     Muscle weakness (generalized)     Non-pressure chronic ulcer of other part of left foot with fat layer exposed 10/14/2019    Non-pressure chronic ulcer of other part of right foot with fat layer exposed 10/14/2019    Obesity     Pulmonary embolism     Stasis dermatitis 07/08/2013    Ureteral calculi     Urinary tract infection 07/2020    Hosp. 7/2020    Venous stasis 10/14/2019    Yeast infection of the skin        Past Surgical History:   Procedure Laterality Date    BRONCHOSCOPY N/A 12/27/2023    Procedure: BRONCHOSCOPY with right lung washing;  Surgeon: Mayra Ramachandran MD;  Location: [facility] ENDOSCOPY;  Service: Pulmonary;  Laterality: N/A;  Post- Pneumonia    CATARACT EXTRACTION Bilateral     CHOLECYSTECTOMY N/A 11/11/2020    Procedure: CHOLECYSTECTOMY LAPAROSCOPIC;  Surgeon: George Crocker DO;  Location: [facility] MAIN OR;  Service: General;  Laterality: N/A;    CORRECTION HAMMER TOE Bilateral     CYSTOSCOPY, URETEROSCOPY, RETROGRADE PYELOGRAM,  STENT INSERTION Bilateral 2020    Procedure: CYSTOSCOPY BILATERAL RETROGRADE PYELOGRAM,;  Surgeon: Shawn Hernandez MD;  Location: [facility] MAIN OR;  Service: Urology;  Laterality: Bilateral;    DENTAL EXAMINATION UNDER ANESTHESIA      ENDOSCOPY N/A 2020    Procedure: ESOPHAGOGASTRODUODENOSCOPY;  Surgeon: Torsten Johnson MD;  Location: [facility] ENDOSCOPY;  Service: Gastroenterology;  Laterality: N/A;  post: esophageal stricture, reflux esophagitis, hiatal hernia    JOINT REPLACEMENT      Bilateral knees    TOE SURGERY      TONSILLECTOMY      TOTAL HIP ARTHROPLASTY Left 2020    Procedure: TOTAL HIP ARTHROPLASTY;  Surgeon: Baljit Hutchins II, MD;  Location: [facility] MAIN OR;  Service: Orthopedics;  Laterality: Left;       Family History   Problem Relation Age of Onset    Heart disease Mother     Hypertension Mother     Heart disease Father     Hypertension Father     Kidney disease Father     COPD Father        Social History     Tobacco Use    Smoking status: Former     Packs/day: 0.25     Years: 25.00     Additional pack years: 0.00     Total pack years: 6.25     Types: Cigarettes     Start date: 10/1/1964     Quit date: 1992     Years since quittin.0    Smokeless tobacco: Never   Vaping Use    Vaping Use: Never used   Substance Use Topics    Alcohol use: No    Drug use: No        Home meds:  Prior to Admission medications    Medication Sig Start Date End Date Taking? Authorizing Provider   apixaban (ELIQUIS) 2.5 MG tablet tablet Take 1 tablet by mouth 2 (Two) Times a Day.   Yes ProviderKarli MD   B Complex Vitamins (vitamin b complex) capsule capsule Take 1 capsule by mouth Daily.   Yes ProviderKarli MD   levothyroxine (SYNTHROID, LEVOTHROID) 25 MCG tablet Take 1 tablet by mouth Daily. 23  Yes Iliana Covington APRN   lisinopril (PRINIVIL,ZESTRIL) 20 MG tablet Take 1 tablet by mouth Daily.   Yes ProviderKarli MD   metoprolol succinate XL  "(TOPROL-XL) 25 MG 24 hr tablet Take 1 tablet by mouth Daily. Hold if systolic < 110 or HR <60   Yes Provider, Historical, MD   Multiple Vitamins-Minerals (multivitamin with minerals) tablet tablet Take 1 tablet by mouth Daily. LD 11/4   Yes Provider, MD Karli   pantoprazole (PROTONIX) 40 MG EC tablet Take 1 tablet by mouth Daily.   Yes Provider, Historical, MD       Allergies:  Patient has no known allergies.      OBJECTIVE    Vital Signs  Vitals:    12/31/23 1052 12/31/23 1117 12/31/23 1247 12/31/23 1457   BP:  102/60 119/65 97/67   BP Location:    Right arm   Patient Position:    Lying   Pulse:  108  80   Resp: (!) 29   17   Temp:    98 °F (36.7 °C)   TempSrc:    Tympanic   SpO2:  92%  94%   Weight:       Height:           Flowsheet Rows      Flowsheet Row First Filed Value   Admission Height 180.3 cm (71\") Documented at 12/24/2023 1139   Admission Weight 159 kg (350 lb) Documented at 12/24/2023 1139              Intake/Output Summary (Last 24 hours) at 12/31/2023 1534  Last data filed at 12/31/2023 0840  Gross per 24 hour   Intake 100 ml   Output 900 ml   Net -800 ml          Vitals reviewed above     Physical Exam:  General-no acute distress, nasal cannula 2 to 3 L, oxygen saturation is 95%  No elevated JVP noted.  Cardiovascular-S1-S2 normal, no murmurs noted. Irregularly irregular  Respiratory-normal breath sounds, no wheezing/crackles.  GI-abdomen is soft and nontender  No pedal edema    Unchanged from prior encounter    Results Review:    BNP        TROPONIN  Results from last 7 days   Lab Units 12/28/23  0403   HSTROP T ng/L 19*       CoAg          Creatinine Clearance  Estimated Creatinine Clearance: 82.1 mL/min (by C-G formula based on SCr of 0.96 mg/dL).      Radiology  No radiology results for the last day      EKG  I personally viewed and interpreted the patient's EKG/Telemetry data:  ECG 12 Lead Drug Monitoring; Amiodarone   Preliminary Result   HEART RATE= 97  bpm   RR Interval= 619  ms   NC " Interval=   ms   P Horizontal Axis=   deg   P Front Axis=   deg   QRSD Interval= 88  ms   QT Interval= 342  ms   QTcB= 435  ms   QRS Axis= 63  deg   T Wave Axis= 3  deg   - ABNORMAL ECG -   Atrial fibrillation   Low voltage, precordial leads   Consider anterior infarct   Electronically Signed By:    Date and Time of Study: 2023-12-30 06:45:24      ECG 12 Lead Drug Monitoring; Amiodarone   Preliminary Result   HEART RATE= 85  bpm   RR Interval= 703  ms   ME Interval=   ms   P Horizontal Axis=   deg   P Front Axis=   deg   QRSD Interval= 87  ms   QT Interval= 339  ms   QTcB= 404  ms   QRS Axis= 33  deg   T Wave Axis= 13  deg   - ABNORMAL ECG -   Atrial fibrillation   Electronically Signed By:    Date and Time of Study: 2023-12-29 06:28:18      ECG 12 Lead Drug Monitoring; Amiodarone   Preliminary Result   HEART RATE= 96  bpm   RR Interval= 626  ms   ME Interval=   ms   P Horizontal Axis=   deg   P Front Axis=   deg   QRSD Interval= 83  ms   QT Interval= 321  ms   QTcB= 406  ms   QRS Axis= 31  deg   T Wave Axis= 15  deg   - ABNORMAL ECG -   Atrial fibrillation   When compared with ECG of 27-Dec-2023 16:10:01,   Significant rate decrease   Electronically Signed By:    Date and Time of Study: 2023-12-28 05:46:01      ECG 12 Lead Drug Monitoring; Amiodarone   Preliminary Result   HEART RATE= 122  bpm   RR Interval= 492  ms   ME Interval=   ms   P Horizontal Axis=   deg   P Front Axis=   deg   QRSD Interval= 81  ms   QT Interval= 292  ms   QTcB= 416  ms   QRS Axis= 42  deg   T Wave Axis= 25  deg   - ABNORMAL ECG -   Atrial fibrillation   When compared with ECG of 27-Dec-2023 12:30:32,   Significant rate decrease   Significant repolarization change   Electronically Signed By:    Date and Time of Study: 2023-12-27 16:10:01      ECG 12 Lead Tachycardia   Preliminary Result   HEART RATE= 143  bpm   RR Interval= 418  ms   ME Interval=   ms   P Horizontal Axis=   deg   P Front Axis=   deg   QRSD Interval= 84  ms   QT Interval=  280  ms   QTcB= 433  ms   QRS Axis= 50  deg   T Wave Axis= 263  deg   - ABNORMAL ECG -   Atrial fibrillation   Repolarization abnormality, prob rate related   Electronically Signed By:    Date and Time of Study: 2023-12-27 12:30:32      ECG 12 Lead Dyspnea   Final Result   HEART RATE= 74  bpm   RR Interval= 836  ms   NJ Interval= 197  ms   P Horizontal Axis= 112  deg   P Front Axis= 13  deg   QRSD Interval= 89  ms   QT Interval= 378  ms   QTcB= 413  ms   QRS Axis= 55  deg   T Wave Axis= 34  deg   - ABNORMAL ECG -   Sinus rhythm   Atrial premature complexes   When compared with ECG of 09-Nov-2020 12:53:09,   Nonspecific significant change   Electronically Signed By: Bernard Alonso ([facility]) 26-Dec-2023 06:57:54   Date and Time of Study: 2023-12-24 12:04:41      SCANNED - TELEMETRY     Final Result      SCANNED - TELEMETRY     Final Result      SCANNED - TELEMETRY     Final Result      SCANNED - TELEMETRY     Final Result      SCANNED - TELEMETRY     Final Result      SCANNED - TELEMETRY     Final Result      SCANNED - TELEMETRY     Final Result      SCANNED - TELEMETRY     Final Result      SCANNED - TELEMETRY     Final Result      SCANNED - TELEMETRY     Final Result      SCANNED - TELEMETRY     Final Result      SCANNED - TELEMETRY     Final Result      SCANNED - TELEMETRY     Final Result      SCANNED - TELEMETRY     Final Result      SCANNED - TELEMETRY     Final Result      SCANNED - TELEMETRY     Final Result      SCANNED - TELEMETRY     Final Result      SCANNED - TELEMETRY     Final Result      SCANNED - TELEMETRY     Final Result      SCANNED - TELEMETRY     Final Result      SCANNED - TELEMETRY     Final Result      SCANNED - TELEMETRY     Final Result      SCANNED - TELEMETRY     Final Result      SCANNED - TELEMETRY     Final Result      SCANNED - TELEMETRY     Final Result      SCANNED - TELEMETRY     Final Result      SCANNED - TELEMETRY     Final Result      SCANNED - TELEMETRY     Final Result       SCANNED - TELEMETRY     Final Result      SCANNED - TELEMETRY     Final Result      SCANNED - TELEMETRY     Final Result      SCANNED - TELEMETRY     Final Result      SCANNED - TELEMETRY     Final Result      SCANNED - TELEMETRY     Final Result      SCANNED - TELEMETRY     Final Result                  Echocardiogram:    Results for orders placed during the hospital encounter of 12/24/23    Adult Transthoracic Echo Complete W/ Cont if Necessary Per Protocol    Interpretation Summary    Left ventricular ejection fraction appears to be 56 - 60%.    Indications  Chest pain  Shortness of breath    Technically satisfactory study.  Mitral valve is structurally normal.  Tricuspid valve is structurally normal.  Aortic valve is thickened with decreased opening motion.  Gradient across the aortic valve is 20/10 mmHg.  Pulmonic valve could not be well visualized.  No evidence for mitral tricuspid or aortic regurgitation is seen by Doppler study.  Left atrium is normal in size.  Right atrium is normal in size.  Left ventricle is normal in size and contractility with ejection fraction of 60%.  Diastolic dysfunction.  Right ventricle is normal in size.  IVC 2.06 cm.  Atrial septum is intact.  Aorta is normal.  No pericardial effusion or intracardiac thrombus is seen.    Impression  Mild aortic valve stenosis with gradient of 20/10 mmHg.  Otherwise, structurally and functionally normal cardiac valves.  Mild diastolic dysfunction is  Left ventricular size and contractility is normal with ejection fraction of 60%.        Stress Test:  Results for orders placed during the hospital encounter of 12/24/23    Stress Test With Myocardial Perfusion One Day    Interpretation Summary  Indications  Chest pain  Shortness of breath    This study was performed under the direct supervision of Radha HERNANDEZ.    Resting ECG  Sinus rhythm    The patient was injected with Lexiscan intravenously while constantly monitoring electrocardiogram and vital  signs.  Patient did not have any chest discomfort ST abnormalities or ectopy with injection of Lexiscan.    Cardiolite was used as an imaging agent.    Cardiolite images showed uniform distribution of radionuclide without any evidence for myocardial ischemia.    Gated SPECT images revealed normal left ventricle size and contractility with ejection fraction of 74%.    Impression  ========  Lexiscan Cardiolite test is negative for myocardial ischemia.    Gated SPECT images revealed normal left ventricular size and contractility with ejection fraction of 74%.         Cardiac Catheterization:  No results found for this or any previous visit.         Other:         ASSESSMENT & PLAN:    New onset atrial fibrillation with rapid ventricular rate  Patient remains asymptomatic, hemodynamically stable  Plan  - Continue  apixaban to 5 mg twice daily  -Change cardizem cd to short acting and increase dose for rate control  -Continue amiodarone to po  Ventricular rates better controlled this am  BB avoided due to exacerbation of wheezing  Consider CV as op if does not convert to SR    Dyspnea on exertion  Does not appear fluid overloaded on exam  S/p bronch with mucopurulent secretions  CTA without evidence of PE  Nuclear stress test without evidence of myocardial ischemia  Echocardiogram showed normal ejection fraction, mild diastolic dysfunction     History of PE on Eliquis  CT angiogram this admission did not show evidence of PE, prominent pulmonary artery  Continue anticoagulation with Eliquis, lifelong indication     History of hypertension  Continue triamterene-hydrochlorothiazide combination which will help volume status         Today  Continue nasal cannula  Pulmonary follow-up likely needed  Continues on Eliquis with atrial fibrillation [id number] score 3-4  Amiodarone will continue, continue to monitor high risk medicines  Because she continues in rate controlled atrial fibrillation  Blood pressure soft discontinuing  diltiazem which was held this morning  Start metoprolol 25 mg twice daily  Off lisinopril at this point with soft blood pressures  She continues with mild pursed lip breathing on nasal cannula  DuoNebs    Hopefully anticipated discharge tomorrow with medicine changes today are well-tolerated    Multiple CV comorbidities addressed individually, high risk medicines, essential hypertension, dyspnea exertion, atrial fibrillation with RVR, chronic anticoagulation needed, history of PE    Kevin Angel MD  12/31/23  15:34 EST

## 2023-12-31 NOTE — PLAN OF CARE
Problem: Adult Inpatient Plan of Care  Goal: Plan of Care Review  Outcome: Ongoing, Progressing  Goal: Patient-Specific Goal (Individualized)  Outcome: Ongoing, Progressing  Goal: Absence of Hospital-Acquired Illness or Injury  Outcome: Ongoing, Progressing  Intervention: Identify and Manage Fall Risk  Recent Flowsheet Documentation  Taken 12/31/2023 0000 by Collin Kim RN  Safety Promotion/Fall Prevention: safety round/check completed  Taken 12/30/2023 2200 by Collin Kim RN  Safety Promotion/Fall Prevention: safety round/check completed  Taken 12/30/2023 2000 by Collin Kim RN  Safety Promotion/Fall Prevention: safety round/check completed  Intervention: Prevent Skin Injury  Recent Flowsheet Documentation  Taken 12/31/2023 0000 by Collin Kim RN  Body Position:   position changed independently   30 degrees  Taken 12/30/2023 2200 by Collin Kim RN  Body Position:   position changed independently   30 degrees  Taken 12/30/2023 2000 by Collin Kim RN  Body Position:   position changed independently   30 degrees  Skin Protection: adhesive use limited  Intervention: Prevent and Manage VTE (Venous Thromboembolism) Risk  Recent Flowsheet Documentation  Taken 12/30/2023 2000 by Collin Kim RN  Range of Motion: active ROM (range of motion) encouraged  Intervention: Prevent Infection  Recent Flowsheet Documentation  Taken 12/30/2023 2000 by Collin Kim RN  Infection Prevention:   rest/sleep promoted   hand hygiene promoted  Goal: Optimal Comfort and Wellbeing  Outcome: Ongoing, Progressing  Goal: Readiness for Transition of Care  Outcome: Ongoing, Progressing     Problem: Hypertension Comorbidity  Goal: Blood Pressure in Desired Range  Outcome: Ongoing, Progressing  Intervention: Maintain Blood Pressure Management  Recent Flowsheet Documentation  Taken 12/30/2023 2000 by Collin Kim RN  Medication Review/Management: medications reviewed     Problem: Skin Injury Risk Increased  Goal: Skin Health and  Integrity  Outcome: Ongoing, Progressing  Intervention: Optimize Skin Protection  Recent Flowsheet Documentation  Taken 12/31/2023 0000 by Collin Kim RN  Head of Bed (HOB) Positioning: HOB at 30 degrees  Taken 12/30/2023 2200 by Collin Kim RN  Head of Bed (HOB) Positioning: HOB at 30 degrees  Taken 12/30/2023 2000 by Collin Kim RN  Pressure Reduction Techniques: frequent weight shift encouraged  Head of Bed (HOB) Positioning: HOB at 30 degrees  Skin Protection: adhesive use limited     Problem: Breathing Pattern Ineffective  Goal: Effective Breathing Pattern  Outcome: Ongoing, Progressing  Intervention: Promote Improved Breathing Pattern  Recent Flowsheet Documentation  Taken 12/31/2023 0000 by Collin Kim RN  Head of Bed (HOB) Positioning: HOB at 30 degrees  Taken 12/30/2023 2200 by Collin Kim RN  Head of Bed (HOB) Positioning: HOB at 30 degrees  Taken 12/30/2023 2000 by Collin Kim RN  Supportive Measures: active listening utilized  Head of Bed (HOB) Positioning: HOB at 30 degrees  Goal: Effective Breathing Pattern  Outcome: Ongoing, Progressing  Intervention: Promote Improved Breathing Pattern  Recent Flowsheet Documentation  Taken 12/31/2023 0000 by Collin Kim RN  Head of Bed (HOB) Positioning: HOB at 30 degrees  Taken 12/30/2023 2200 by Collin Kim RN  Head of Bed (HOB) Positioning: HOB at 30 degrees  Taken 12/30/2023 2000 by Collin Kim RN  Supportive Measures: active listening utilized  Head of Bed (HOB) Positioning: HOB at 30 degrees     Problem: Fall Injury Risk  Goal: Absence of Fall and Fall-Related Injury  Outcome: Ongoing, Progressing  Intervention: Identify and Manage Contributors  Recent Flowsheet Documentation  Taken 12/30/2023 2000 by Collin Kim RN  Medication Review/Management: medications reviewed  Intervention: Promote Injury-Free Environment  Recent Flowsheet Documentation  Taken 12/31/2023 0000 by Collin Kim RN  Safety Promotion/Fall Prevention: safety round/check  completed  Taken 12/30/2023 2200 by Collin Kim, RN  Safety Promotion/Fall Prevention: safety round/check completed  Taken 12/30/2023 2000 by Collin Kim, RN  Safety Promotion/Fall Prevention: safety round/check completed     Problem: Dysrhythmia  Goal: Normalized Cardiac Rhythm  Outcome: Ongoing, Progressing   Goal Outcome Evaluation:

## 2023-12-31 NOTE — PLAN OF CARE
Problem: Fall Injury Risk  Goal: Absence of Fall and Fall-Related Injury  Outcome: Ongoing, Progressing  Intervention: Identify and Manage Contributors  Recent Flowsheet Documentation  Taken 12/31/2023 1200 by Sturgeon, Sierrah, RN  Medication Review/Management: medications reviewed  Taken 12/31/2023 1100 by Sturgeon, Sierrah, RN  Medication Review/Management: medications reviewed  Taken 12/31/2023 1000 by Sturgeon, Sierrah, RN  Medication Review/Management: medications reviewed  Taken 12/31/2023 0840 by Sturgeon, Sierrah, RN  Medication Review/Management: medications reviewed  Intervention: Promote Injury-Free Environment  Recent Flowsheet Documentation  Taken 12/31/2023 1600 by Sturgeon, Sierrah, RN  Safety Promotion/Fall Prevention: patient off unit  Taken 12/31/2023 1500 by Sturgeon, Sierrah, RN  Safety Promotion/Fall Prevention: safety round/check completed  Taken 12/31/2023 1420 by Sturgeon, Sierrah, RN  Safety Promotion/Fall Prevention: safety round/check completed  Taken 12/31/2023 1200 by Sturgeon, Sierrah, RN  Safety Promotion/Fall Prevention: safety round/check completed  Taken 12/31/2023 1100 by Sturgeon, Sierrah, RN  Safety Promotion/Fall Prevention: safety round/check completed  Taken 12/31/2023 1000 by Sturgeon, Sierrah, RN  Safety Promotion/Fall Prevention: safety round/check completed  Taken 12/31/2023 0840 by Sturgeon, Sierrah, RN  Safety Promotion/Fall Prevention: safety round/check completed   Goal Outcome Evaluation:
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
## 2024-01-01 ENCOUNTER — APPOINTMENT (OUTPATIENT)
Dept: GENERAL RADIOLOGY | Facility: HOSPITAL | Age: 79
End: 2024-01-01
Payer: MEDICARE

## 2024-01-01 LAB
ARTERIAL PATENCY WRIST A: ABNORMAL
ATMOSPHERIC PRESS: ABNORMAL MM[HG]
BASE EXCESS BLDA CALC-SCNC: 5.6 MMOL/L (ref 0–3)
BDY SITE: ABNORMAL
CO2 BLDA-SCNC: 32.2 MMOL/L (ref 22–29)
D DIMER PPP FEU-MCNC: 0.47 MG/L (FEU) (ref 0–0.77)
HCO3 BLDA-SCNC: 30.8 MMOL/L (ref 21–28)
HEMODILUTION: NO
INHALED O2 CONCENTRATION: 21 %
MODALITY: ABNORMAL
PCO2 BLDA: 45.2 MM HG (ref 35–48)
PH BLDA: 7.44 PH UNITS (ref 7.35–7.45)
PO2 BLDA: 59.9 MM HG (ref 83–108)
SAO2 % BLDCOA: 91.2 % (ref 94–98)

## 2024-01-01 PROCEDURE — 85379 FIBRIN DEGRADATION QUANT: CPT | Performed by: INTERNAL MEDICINE

## 2024-01-01 PROCEDURE — 94618 PULMONARY STRESS TESTING: CPT

## 2024-01-01 PROCEDURE — 25810000003 SODIUM CHLORIDE 0.9 % SOLUTION: Performed by: INTERNAL MEDICINE

## 2024-01-01 PROCEDURE — 99232 SBSQ HOSP IP/OBS MODERATE 35: CPT | Performed by: INTERNAL MEDICINE

## 2024-01-01 PROCEDURE — 93005 ELECTROCARDIOGRAM TRACING: CPT | Performed by: INTERNAL MEDICINE

## 2024-01-01 PROCEDURE — 93010 ELECTROCARDIOGRAM REPORT: CPT | Performed by: INTERNAL MEDICINE

## 2024-01-01 PROCEDURE — 36600 WITHDRAWAL OF ARTERIAL BLOOD: CPT

## 2024-01-01 PROCEDURE — 71045 X-RAY EXAM CHEST 1 VIEW: CPT

## 2024-01-01 PROCEDURE — 82803 BLOOD GASES ANY COMBINATION: CPT

## 2024-01-01 RX ORDER — AMIODARONE HYDROCHLORIDE 200 MG/1
200 TABLET ORAL 2 TIMES DAILY WITH MEALS
Qty: 60 TABLET | Refills: 0 | Status: SHIPPED | OUTPATIENT
Start: 2024-01-01 | End: 2024-01-02 | Stop reason: SDUPTHER

## 2024-01-01 RX ORDER — TRIAMTERENE AND HYDROCHLOROTHIAZIDE 37.5; 25 MG/1; MG/1
1 TABLET ORAL DAILY
Qty: 30 TABLET | Refills: 0 | Status: SHIPPED | OUTPATIENT
Start: 2024-01-01

## 2024-01-01 RX ORDER — AMOXICILLIN AND CLAVULANATE POTASSIUM 875; 125 MG/1; MG/1
1 TABLET, FILM COATED ORAL EVERY 12 HOURS SCHEDULED
Qty: 4 TABLET | Refills: 0 | Status: SHIPPED | OUTPATIENT
Start: 2024-01-01 | End: 2024-01-03

## 2024-01-01 RX ORDER — PREDNISONE 20 MG/1
20 TABLET ORAL DAILY
Qty: 5 TABLET | Refills: 0 | Status: SHIPPED | OUTPATIENT
Start: 2024-01-01

## 2024-01-01 RX ORDER — BENZONATATE 100 MG/1
100 CAPSULE ORAL 3 TIMES DAILY PRN
Qty: 10 CAPSULE | Refills: 0 | Status: SHIPPED | OUTPATIENT
Start: 2024-01-01

## 2024-01-01 RX ADMIN — AMIODARONE HYDROCHLORIDE 200 MG: 200 TABLET ORAL at 18:05

## 2024-01-01 RX ADMIN — AMIODARONE HYDROCHLORIDE 200 MG: 200 TABLET ORAL at 10:20

## 2024-01-01 RX ADMIN — APIXABAN 5 MG: 5 TABLET, FILM COATED ORAL at 10:20

## 2024-01-01 RX ADMIN — METOPROLOL TARTRATE 25 MG: 25 TABLET, FILM COATED ORAL at 21:00

## 2024-01-01 RX ADMIN — AMOXICILLIN AND CLAVULANATE POTASSIUM 1 TABLET: 875; 125 TABLET, FILM COATED ORAL at 21:00

## 2024-01-01 RX ADMIN — LEVOTHYROXINE SODIUM 25 MCG: 0.03 TABLET ORAL at 06:40

## 2024-01-01 RX ADMIN — APIXABAN 5 MG: 5 TABLET, FILM COATED ORAL at 21:00

## 2024-01-01 RX ADMIN — AMOXICILLIN AND CLAVULANATE POTASSIUM 1 TABLET: 875; 125 TABLET, FILM COATED ORAL at 10:20

## 2024-01-01 RX ADMIN — Medication 10 ML: at 10:24

## 2024-01-01 RX ADMIN — SENNOSIDES AND DOCUSATE SODIUM 2 TABLET: 50; 8.6 TABLET ORAL at 21:00

## 2024-01-01 RX ADMIN — Medication 10 ML: at 21:00

## 2024-01-01 RX ADMIN — METOPROLOL TARTRATE 25 MG: 25 TABLET, FILM COATED ORAL at 10:20

## 2024-01-01 RX ADMIN — SODIUM CHLORIDE 250 ML: 0.9 INJECTION, SOLUTION INTRAVENOUS at 15:26

## 2024-01-01 RX ADMIN — PANTOPRAZOLE SODIUM 40 MG: 40 TABLET, DELAYED RELEASE ORAL at 10:20

## 2024-01-01 NOTE — PROGRESS NOTES
Exercise Oximetry    Patient Name:Camelia Gray   MRN: [id number]   Date: 01/01/24             ROOM AIR BASELINE   SpO2% 95% room air    Heart Rate    Blood Pressure      EXERCISE ON ROOM AIR SpO2% EXERCISE ON O2 @  LPM SpO2%   1 MINUTE 95 1 MINUTE    2 MINUTES 94 2 MINUTES    3 MINUTES 93 3 MINUTES    4 MINUTES 92 4 MINUTES    5 MINUTES 92 5 MINUTES    6 MINUTES 93 6 MINUTES               Distance Walked   Distance Walked   Dyspnea (Carmen Scale)   Dyspnea (Carmen Scale)   Fatigue (Carmen Scale)   Fatigue (Carmen Scale)   SpO2% Post Exercise  93% room air SpO2% Post Exercise   HR Post Exercise   HR Post Exercise   Time to Recovery   Time to Recovery     Comments:

## 2024-01-01 NOTE — CASE MANAGEMENT/SOCIAL WORK
Continued Stay Note  [facility]     Patient Name: Camelia Gray  MRN: [id number]  Today's Date: 1/1/2024    Admit Date: 12/24/2023    Plan: D/C Plan: Home with [facility] pending acceptance; Home health PT/Nsg order needed.  No 02 needed; Will have 24/7 pd caregivers thru [facility].   Discharge Plan       Row Name 01/01/24 1533       Plan    Plan D/C Plan: Home with [facility] pending acceptance; Home health PT/Nsg order needed.  No 02 needed; Will have 24/7 pd caregivers thru [facility].               Expected Discharge Date and Time       Expected Discharge Date Expected Discharge Time    Jan 1, 2024               Brooklyn Boss RN

## 2024-01-01 NOTE — PROGRESS NOTES
Referring Provider: Misty James MD       SUBJECTIVE    Ventricular rates are stable  Blood pressure stable  Needs outpatient follow-up, JAHAIRA with DC cardioversion if remains in atrial fibrillation  Remains on nasal cannula    Antibiotics continue  Wondering about discharge which we discussed was up to primary for timing and discharge criteria    Amiodarone twice daily, de-escalate to daily in 7 days  Eliquis twice daily  Metoprolol  Lisinopril discontinued secondary to blood pressures and preference of rate control agents  Triamterene HCTZ unchanged from prior encounter  ROS  Review of all systems negative except as indicated above    Personal History:    Past Medical History:   Diagnosis Date    Arthritis     Cellulitis of both feet 10/13/2019    Decubitus ulcer of buttock, stage 2 10/14/2019    HEALED    Dermatitis associated with moisture 10/14/2019    Disease of thyroid gland     HYPOTHYROID    Edema of lower extremity 05/31/2017    Santos catheter in place     Hypertension     Hypothyroidism     Immobility syndrome 10/13/2019    Lymphedema 10/14/2019    LEGS    Lymphedema     Muscle weakness (generalized)     Non-pressure chronic ulcer of other part of left foot with fat layer exposed 10/14/2019    Non-pressure chronic ulcer of other part of right foot with fat layer exposed 10/14/2019    Obesity     Pulmonary embolism     Stasis dermatitis 07/08/2013    Ureteral calculi     Urinary tract infection 07/2020    Hosp. 7/2020    Venous stasis 10/14/2019    Yeast infection of the skin        Past Surgical History:   Procedure Laterality Date    BRONCHOSCOPY N/A 12/27/2023    Procedure: BRONCHOSCOPY with right lung washing;  Surgeon: Mayra Ramachandran MD;  Location: [facility] ENDOSCOPY;  Service: Pulmonary;  Laterality: N/A;  Post- Pneumonia    CATARACT EXTRACTION Bilateral     CHOLECYSTECTOMY N/A 11/11/2020    Procedure: CHOLECYSTECTOMY LAPAROSCOPIC;  Surgeon: George Crocker DO;  Location: [facility] MAIN OR;  Service:  General;  Laterality: N/A;    CORRECTION HAMMER TOE Bilateral     CYSTOSCOPY, URETEROSCOPY, RETROGRADE PYELOGRAM, STENT INSERTION Bilateral 2020    Procedure: CYSTOSCOPY BILATERAL RETROGRADE PYELOGRAM,;  Surgeon: Shawn Hernandez MD;  Location: [facility] MAIN OR;  Service: Urology;  Laterality: Bilateral;    DENTAL EXAMINATION UNDER ANESTHESIA      ENDOSCOPY N/A 2020    Procedure: ESOPHAGOGASTRODUODENOSCOPY;  Surgeon: Torsten Johnson MD;  Location: [facility] ENDOSCOPY;  Service: Gastroenterology;  Laterality: N/A;  post: esophageal stricture, reflux esophagitis, hiatal hernia    JOINT REPLACEMENT      Bilateral knees    TOE SURGERY      TONSILLECTOMY      TOTAL HIP ARTHROPLASTY Left 2020    Procedure: TOTAL HIP ARTHROPLASTY;  Surgeon: Baljit Hutchins II, MD;  Location: [facility] MAIN OR;  Service: Orthopedics;  Laterality: Left;       Family History   Problem Relation Age of Onset    Heart disease Mother     Hypertension Mother     Heart disease Father     Hypertension Father     Kidney disease Father     COPD Father        Social History     Tobacco Use    Smoking status: Former     Packs/day: 0.25     Years: 25.00     Additional pack years: 0.00     Total pack years: 6.25     Types: Cigarettes     Start date: 10/1/1964     Quit date: 1992     Years since quittin.0    Smokeless tobacco: Never   Vaping Use    Vaping Use: Never used   Substance Use Topics    Alcohol use: No    Drug use: No        Home meds:  Prior to Admission medications    Medication Sig Start Date End Date Taking? Authorizing Provider   apixaban (ELIQUIS) 2.5 MG tablet tablet Take 1 tablet by mouth 2 (Two) Times a Day.   Yes Provider, MD Karli   B Complex Vitamins (vitamin b complex) capsule capsule Take 1 capsule by mouth Daily.   Yes Provider, MD Karli   levothyroxine (SYNTHROID, LEVOTHROID) 25 MCG tablet Take 1 tablet by mouth Daily. 23  Yes Iliana Covington APRN   lisinopril  "(PRINIVIL,ZESTRIL) 20 MG tablet Take 1 tablet by mouth Daily.   Yes Provider, MD Karli   metoprolol succinate XL (TOPROL-XL) 25 MG 24 hr tablet Take 1 tablet by mouth Daily. Hold if systolic < 110 or HR <60   Yes Provider, MD Karli   Multiple Vitamins-Minerals (multivitamin with minerals) tablet tablet Take 1 tablet by mouth Daily. LD 11/4   Yes Provider, MD Karli   pantoprazole (PROTONIX) 40 MG EC tablet Take 1 tablet by mouth Daily.   Yes Provider, MD Karli       Allergies:  Patient has no known allergies.      OBJECTIVE    Vital Signs  Vitals:    12/31/23 2213 01/01/24 0227 01/01/24 0555 01/01/24 1131   BP: 109/64 108/70 108/66 107/69   BP Location: Right arm Right arm Right arm    Patient Position: Lying Lying Lying    Pulse: 83 87 82 98   Resp: 18 17 18 20   Temp: 98.1 °F (36.7 °C) 98.3 °F (36.8 °C) 97.9 °F (36.6 °C) 98.4 °F (36.9 °C)   TempSrc: Oral Oral Oral    SpO2: 92% 95% 92% 91%   Weight:       Height:           Flowsheet Rows      Flowsheet Row First Filed Value   Admission Height 180.3 cm (71\") Documented at 12/24/2023 1139   Admission Weight 159 kg (350 lb) Documented at 12/24/2023 1139              Intake/Output Summary (Last 24 hours) at 1/1/2024 1438  Last data filed at 1/1/2024 0555  Gross per 24 hour   Intake --   Output 1200 ml   Net -1200 ml          Vitals reviewed above     Physical Exam:  General-no acute distress, nasal cannula 2 to 3 L, oxygen saturation is 95%  No elevated JVP noted.  Cardiovascular-S1-S2 normal, no murmurs noted. Irregularly irregular  Respiratory-normal breath sounds, no wheezing/crackles.  GI-abdomen is soft and nontender  No pedal edema    Unchanged from prior encounter    Results Review:    BNP        TROPONIN  Results from last 7 days   Lab Units 12/28/23  0403   HSTROP T ng/L 19*       CoAg          Creatinine Clearance  Estimated Creatinine Clearance: 82.1 mL/min (by C-G formula based on SCr of 0.96 mg/dL).      Radiology  XR Chest PA & " Lateral    Result Date: 12/31/2023  Impression: 1. Mild bibasilar atelectasis. 2. Stable cardiomegaly. 3. Moderate hiatal hernia. Electronically Signed: Andres Flores MD  12/31/2023 4:44 PM EST  Workstation ID: [id number]       EKG  I personally viewed and interpreted the patient's EKG/Telemetry data:  ECG 12 Lead Drug Monitoring; Amiodarone   Preliminary Result   HEART RATE= 97  bpm   RR Interval= 619  ms   OH Interval=   ms   P Horizontal Axis=   deg   P Front Axis=   deg   QRSD Interval= 88  ms   QT Interval= 342  ms   QTcB= 435  ms   QRS Axis= 63  deg   T Wave Axis= 3  deg   - ABNORMAL ECG -   Atrial fibrillation   Low voltage, precordial leads   Consider anterior infarct   Electronically Signed By:    Date and Time of Study: 2023-12-30 06:45:24      ECG 12 Lead Drug Monitoring; Amiodarone   Preliminary Result   HEART RATE= 85  bpm   RR Interval= 703  ms   OH Interval=   ms   P Horizontal Axis=   deg   P Front Axis=   deg   QRSD Interval= 87  ms   QT Interval= 339  ms   QTcB= 404  ms   QRS Axis= 33  deg   T Wave Axis= 13  deg   - ABNORMAL ECG -   Atrial fibrillation   Electronically Signed By:    Date and Time of Study: 2023-12-29 06:28:18      ECG 12 Lead Drug Monitoring; Amiodarone   Preliminary Result   HEART RATE= 96  bpm   RR Interval= 626  ms   OH Interval=   ms   P Horizontal Axis=   deg   P Front Axis=   deg   QRSD Interval= 83  ms   QT Interval= 321  ms   QTcB= 406  ms   QRS Axis= 31  deg   T Wave Axis= 15  deg   - ABNORMAL ECG -   Atrial fibrillation   When compared with ECG of 27-Dec-2023 16:10:01,   Significant rate decrease   Electronically Signed By:    Date and Time of Study: 2023-12-28 05:46:01      ECG 12 Lead Drug Monitoring; Amiodarone   Preliminary Result   HEART RATE= 122  bpm   RR Interval= 492  ms   OH Interval=   ms   P Horizontal Axis=   deg   P Front Axis=   deg   QRSD Interval= 81  ms   QT Interval= 292  ms   QTcB= 416  ms   QRS Axis= 42  deg   T Wave Axis= 25  deg   - ABNORMAL ECG -    Atrial fibrillation   When compared with ECG of 27-Dec-2023 12:30:32,   Significant rate decrease   Significant repolarization change   Electronically Signed By:    Date and Time of Study: 2023-12-27 16:10:01      ECG 12 Lead Tachycardia   Preliminary Result   HEART RATE= 143  bpm   RR Interval= 418  ms   NJ Interval=   ms   P Horizontal Axis=   deg   P Front Axis=   deg   QRSD Interval= 84  ms   QT Interval= 280  ms   QTcB= 433  ms   QRS Axis= 50  deg   T Wave Axis= 263  deg   - ABNORMAL ECG -   Atrial fibrillation   Repolarization abnormality, prob rate related   Electronically Signed By:    Date and Time of Study: 2023-12-27 12:30:32      ECG 12 Lead Dyspnea   Final Result   HEART RATE= 74  bpm   RR Interval= 836  ms   NJ Interval= 197  ms   P Horizontal Axis= 112  deg   P Front Axis= 13  deg   QRSD Interval= 89  ms   QT Interval= 378  ms   QTcB= 413  ms   QRS Axis= 55  deg   T Wave Axis= 34  deg   - ABNORMAL ECG -   Sinus rhythm   Atrial premature complexes   When compared with ECG of 09-Nov-2020 12:53:09,   Nonspecific significant change   Electronically Signed By: Bernard Alonso (VERONICA) 26-Dec-2023 06:57:54   Date and Time of Study: 2023-12-24 12:04:41      SCANNED - TELEMETRY     Final Result      SCANNED - TELEMETRY     Final Result      SCANNED - TELEMETRY     Final Result      SCANNED - TELEMETRY     Final Result      SCANNED - TELEMETRY     Final Result      SCANNED - TELEMETRY     Final Result      SCANNED - TELEMETRY     Final Result      SCANNED - TELEMETRY     Final Result      SCANNED - TELEMETRY     Final Result      SCANNED - TELEMETRY     Final Result      SCANNED - TELEMETRY     Final Result      SCANNED - TELEMETRY     Final Result      SCANNED - TELEMETRY     Final Result      SCANNED - TELEMETRY     Final Result      SCANNED - TELEMETRY     Final Result      SCANNED - TELEMETRY     Final Result      SCANNED - TELEMETRY     Final Result      SCANNED - TELEMETRY     Final Result      SCANNED -  TELEMETRY     Final Result      SCANNED - TELEMETRY     Final Result      SCANNED - TELEMETRY     Final Result      SCANNED - TELEMETRY     Final Result      SCANNED - TELEMETRY     Final Result      SCANNED - TELEMETRY     Final Result      SCANNED - TELEMETRY     Final Result      SCANNED - TELEMETRY     Final Result      SCANNED - TELEMETRY     Final Result      SCANNED - TELEMETRY     Final Result      SCANNED - TELEMETRY     Final Result      SCANNED - TELEMETRY     Final Result      SCANNED - TELEMETRY     Final Result      SCANNED - TELEMETRY     Final Result      SCANNED - TELEMETRY     Final Result      SCANNED - TELEMETRY     Final Result      SCANNED - TELEMETRY     Final Result      SCANNED - TELEMETRY     Final Result      SCANNED - TELEMETRY     Final Result      SCANNED - TELEMETRY     Final Result      SCANNED - TELEMETRY     Final Result      SCANNED - TELEMETRY     Final Result                  Echocardiogram:    Results for orders placed during the hospital encounter of 12/24/23    Adult Transthoracic Echo Complete W/ Cont if Necessary Per Protocol    Interpretation Summary    Left ventricular ejection fraction appears to be 56 - 60%.    Indications  Chest pain  Shortness of breath    Technically satisfactory study.  Mitral valve is structurally normal.  Tricuspid valve is structurally normal.  Aortic valve is thickened with decreased opening motion.  Gradient across the aortic valve is 20/10 mmHg.  Pulmonic valve could not be well visualized.  No evidence for mitral tricuspid or aortic regurgitation is seen by Doppler study.  Left atrium is normal in size.  Right atrium is normal in size.  Left ventricle is normal in size and contractility with ejection fraction of 60%.  Diastolic dysfunction.  Right ventricle is normal in size.  IVC 2.06 cm.  Atrial septum is intact.  Aorta is normal.  No pericardial effusion or intracardiac thrombus is seen.    Impression  Mild aortic valve stenosis with  gradient of 20/10 mmHg.  Otherwise, structurally and functionally normal cardiac valves.  Mild diastolic dysfunction is  Left ventricular size and contractility is normal with ejection fraction of 60%.        Stress Test:  Results for orders placed during the hospital encounter of 12/24/23    Stress Test With Myocardial Perfusion One Day    Interpretation Summary  Indications  Chest pain  Shortness of breath    This study was performed under the direct supervision of Radha HERNANDEZ.    Resting ECG  Sinus rhythm    The patient was injected with Lexiscan intravenously while constantly monitoring electrocardiogram and vital signs.  Patient did not have any chest discomfort ST abnormalities or ectopy with injection of Lexiscan.    Cardiolite was used as an imaging agent.    Cardiolite images showed uniform distribution of radionuclide without any evidence for myocardial ischemia.    Gated SPECT images revealed normal left ventricle size and contractility with ejection fraction of 74%.    Impression  ========  Lexiscan Cardiolite test is negative for myocardial ischemia.    Gated SPECT images revealed normal left ventricular size and contractility with ejection fraction of 74%.         Cardiac Catheterization:  No results found for this or any previous visit.         Other:         ASSESSMENT & PLAN:    New onset atrial fibrillation with rapid ventricular rate  Patient remains asymptomatic, hemodynamically stable  Plan  - Continue  apixaban to 5 mg twice daily  -Change cardizem cd to short acting and increase dose for rate control  -Continue amiodarone to po  Ventricular rates better controlled this am  BB avoided due to exacerbation of wheezing  Consider CV as op if does not convert to SR    Dyspnea on exertion  Does not appear fluid overloaded on exam  S/p bronch with mucopurulent secretions  CTA without evidence of PE  Nuclear stress test without evidence of myocardial ischemia  Echocardiogram showed normal ejection  fraction, mild diastolic dysfunction     History of PE on Eliquis  CT angiogram this admission did not show evidence of PE, prominent pulmonary artery  Continue anticoagulation with Eliquis, lifelong indication     History of hypertension  Continue triamterene-hydrochlorothiazide combination which will help volume status         Today  Continue nasal cannula  Pulmonary follow-up likely needed, continue treatment for pneumonia  Will need outpatient follow-up for JAHAIRA with DC cardioversion if remains in atrial fibrillation  Amiodarone weaning in 7 days  Should be seen in cardiology in 7 to 10 days with EKG, outpatient labs BMP  Continue beta-blocker  Continues on Eliquis with atrial fibrillation [id number] score 3-4      Multiple CV comorbidities addressed individually, high risk medicines, essential hypertension, dyspnea exertion, atrial fibrillation with RVR, chronic anticoagulation needed, history of PE    Kevin Angel MD  01/01/24  14:38 EST

## 2024-01-01 NOTE — PLAN OF CARE
Goal Outcome Evaluation:     Problem: Adult Inpatient Plan of Care  Goal: Plan of Care Review  Outcome: Ongoing, Progressing  Goal: Patient-Specific Goal (Individualized)  Outcome: Ongoing, Progressing  Goal: Absence of Hospital-Acquired Illness or Injury  Outcome: Ongoing, Progressing  Intervention: Identify and Manage Fall Risk  Recent Flowsheet Documentation  Taken 1/1/2024 0342 by Joycelyn Armijo RN  Safety Promotion/Fall Prevention:   safety round/check completed   room organization consistent   fall prevention program maintained   clutter free environment maintained   assistive device/personal items within reach  Taken 1/1/2024 0200 by Jyocelyn Armijo RN  Safety Promotion/Fall Prevention:   safety round/check completed   room organization consistent   fall prevention program maintained   assistive device/personal items within reach   clutter free environment maintained  Taken 1/1/2024 0000 by Joycelyn Armijo RN  Safety Promotion/Fall Prevention:   safety round/check completed   room organization consistent   fall prevention program maintained   clutter free environment maintained   assistive device/personal items within reach  Taken 12/31/2023 2200 by Joycelyn Armijo RN  Safety Promotion/Fall Prevention:   safety round/check completed   room organization consistent   fall prevention program maintained   clutter free environment maintained   assistive device/personal items within reach  Taken 12/31/2023 2034 by Joycelyn Armijo RN  Safety Promotion/Fall Prevention:   safety round/check completed   room organization consistent   fall prevention program maintained   clutter free environment maintained   assistive device/personal items within reach  Intervention: Prevent Skin Injury  Recent Flowsheet Documentation  Taken 12/31/2023 2034 by Joycelyn Armijo RN  Body Position: weight shifting  Skin Protection: adhesive use limited  Intervention: Prevent Infection  Recent Flowsheet Documentation  Taken 1/1/2024 0342 by Aleksander  MARY Hirsch  Infection Prevention:   hand hygiene promoted   personal protective equipment utilized   rest/sleep promoted  Taken 1/1/2024 0200 by Joycelyn Armijo RN  Infection Prevention:   hand hygiene promoted   personal protective equipment utilized   rest/sleep promoted  Taken 1/1/2024 0000 by Joycelyn Armijo RN  Infection Prevention:   hand hygiene promoted   personal protective equipment utilized   rest/sleep promoted  Taken 12/31/2023 2200 by Joycelyn Armijo RN  Infection Prevention:   hand hygiene promoted   personal protective equipment utilized   rest/sleep promoted  Taken 12/31/2023 2034 by Joycelyn Armijo RN  Infection Prevention:   hand hygiene promoted   rest/sleep promoted   personal protective equipment utilized  Goal: Optimal Comfort and Wellbeing  Outcome: Ongoing, Progressing  Intervention: Provide Person-Centered Care  Recent Flowsheet Documentation  Taken 12/31/2023 2034 by Joycelyn Armijo RN  Trust Relationship/Rapport:   care explained   questions answered   thoughts/feelings acknowledged  Goal: Readiness for Transition of Care  Outcome: Ongoing, Progressing     Problem: Hypertension Comorbidity  Goal: Blood Pressure in Desired Range  Outcome: Ongoing, Progressing  Intervention: Maintain Blood Pressure Management  Recent Flowsheet Documentation  Taken 12/31/2023 2034 by Joycelyn Armijo RN  Medication Review/Management: medications reviewed     Problem: Skin Injury Risk Increased  Goal: Skin Health and Integrity  Outcome: Ongoing, Progressing  Intervention: Optimize Skin Protection  Recent Flowsheet Documentation  Taken 12/31/2023 2034 by Joycelyn Armijo RN  Pressure Reduction Techniques:   frequent weight shift encouraged   weight shift assistance provided  Head of Bed (HOB) Positioning: HOB elevated  Pressure Reduction Devices: pressure-redistributing mattress utilized  Skin Protection: adhesive use limited     Problem: Breathing Pattern Ineffective  Goal: Effective Breathing Pattern  Outcome: Ongoing,  Progressing  Intervention: Promote Improved Breathing Pattern  Recent Flowsheet Documentation  Taken 12/31/2023 2034 by Joycelyn Armijo RN  Supportive Measures: active listening utilized  Head of Bed (HOB) Positioning: HOB elevated  Goal: Effective Breathing Pattern  Outcome: Ongoing, Progressing  Intervention: Promote Improved Breathing Pattern  Recent Flowsheet Documentation  Taken 12/31/2023 2034 by Joycelyn Armijo RN  Supportive Measures: active listening utilized  Head of Bed (HOB) Positioning: HOB elevated     Problem: Fall Injury Risk  Goal: Absence of Fall and Fall-Related Injury  Outcome: Ongoing, Progressing  Intervention: Identify and Manage Contributors  Recent Flowsheet Documentation  Taken 12/31/2023 2034 by Joycelyn Armijo RN  Medication Review/Management: medications reviewed  Intervention: Promote Injury-Free Environment  Recent Flowsheet Documentation  Taken 1/1/2024 0342 by Joycelyn Armijo RN  Safety Promotion/Fall Prevention:   safety round/check completed   room organization consistent   fall prevention program maintained   clutter free environment maintained   assistive device/personal items within reach  Taken 1/1/2024 0200 by Joycelyn Armijo RN  Safety Promotion/Fall Prevention:   safety round/check completed   room organization consistent   fall prevention program maintained   assistive device/personal items within reach   clutter free environment maintained  Taken 1/1/2024 0000 by Joycelyn Armijo RN  Safety Promotion/Fall Prevention:   safety round/check completed   room organization consistent   fall prevention program maintained   clutter free environment maintained   assistive device/personal items within reach  Taken 12/31/2023 2200 by Joycelyn Armijo RN  Safety Promotion/Fall Prevention:   safety round/check completed   room organization consistent   fall prevention program maintained   clutter free environment maintained   assistive device/personal items within reach  Taken 12/31/2023 2034 by Aleksander  Joycelyn RN  Safety Promotion/Fall Prevention:   safety round/check completed   room organization consistent   fall prevention program maintained   clutter free environment maintained   assistive device/personal items within reach     Problem: Dysrhythmia  Goal: Normalized Cardiac Rhythm  Outcome: Ongoing, Progressing
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
## 2024-01-01 NOTE — PLAN OF CARE
Goal Outcome Evaluation:  Plan of Care Reviewed With: patient        Progress: improving  Outcome Evaluation: Pt on RA, SPO2 WNL. Pt tachycardic at times provider notified, EKG obtained, arterial gases obtained, and D-dimer drawn. Discharge orders were placed this morning however her  agency does not have staffing, case management notified and following. Erika DAVILA okay with her staying another night. Call light within reach.
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
## 2024-01-02 VITALS
HEIGHT: 71 IN | SYSTOLIC BLOOD PRESSURE: 132 MMHG | RESPIRATION RATE: 18 BRPM | HEART RATE: 88 BPM | TEMPERATURE: 98 F | WEIGHT: 293 LBS | DIASTOLIC BLOOD PRESSURE: 78 MMHG | BODY MASS INDEX: 41.02 KG/M2 | OXYGEN SATURATION: 94 %

## 2024-01-02 LAB
FUNGUS WND CULT: ABNORMAL
P JIROVECII DNA L RESP QL NAA+NON-PROBE: NEGATIVE
REF LAB TEST METHOD: NORMAL

## 2024-01-02 PROCEDURE — 99232 SBSQ HOSP IP/OBS MODERATE 35: CPT | Performed by: STUDENT IN AN ORGANIZED HEALTH CARE EDUCATION/TRAINING PROGRAM

## 2024-01-02 RX ORDER — AMIODARONE HYDROCHLORIDE 200 MG/1
200 TABLET ORAL 2 TIMES DAILY WITH MEALS
Qty: 20 TABLET | Refills: 0 | Status: SHIPPED | OUTPATIENT
Start: 2024-01-02 | End: 2024-01-09

## 2024-01-02 RX ORDER — AMIODARONE HYDROCHLORIDE 200 MG/1
200 TABLET ORAL DAILY
Qty: 30 TABLET | Refills: 11 | Status: SHIPPED | OUTPATIENT
Start: 2024-01-02 | End: 2025-01-01

## 2024-01-02 RX ORDER — METOPROLOL SUCCINATE 25 MG/1
50 TABLET, EXTENDED RELEASE ORAL DAILY
Qty: 30 TABLET | Refills: 0 | Status: SHIPPED | OUTPATIENT
Start: 2024-01-02

## 2024-01-02 RX ORDER — METOPROLOL SUCCINATE 50 MG/1
50 TABLET, EXTENDED RELEASE ORAL DAILY
Qty: 30 TABLET | Refills: 11 | Status: SHIPPED | OUTPATIENT
Start: 2024-01-02 | End: 2025-01-01

## 2024-01-02 RX ORDER — AMIODARONE HYDROCHLORIDE 200 MG/1
200 TABLET ORAL 2 TIMES DAILY WITH MEALS
Qty: 14 TABLET | Refills: 0 | Status: SHIPPED | OUTPATIENT
Start: 2024-01-02 | End: 2024-01-02 | Stop reason: SDUPTHER

## 2024-01-02 RX ADMIN — APIXABAN 5 MG: 5 TABLET, FILM COATED ORAL at 09:09

## 2024-01-02 RX ADMIN — PANTOPRAZOLE SODIUM 40 MG: 40 TABLET, DELAYED RELEASE ORAL at 09:09

## 2024-01-02 RX ADMIN — AMOXICILLIN AND CLAVULANATE POTASSIUM 1 TABLET: 875; 125 TABLET, FILM COATED ORAL at 17:21

## 2024-01-02 RX ADMIN — LEVOTHYROXINE SODIUM 25 MCG: 0.03 TABLET ORAL at 05:05

## 2024-01-02 RX ADMIN — METOPROLOL TARTRATE 25 MG: 25 TABLET, FILM COATED ORAL at 09:09

## 2024-01-02 RX ADMIN — AMOXICILLIN AND CLAVULANATE POTASSIUM 1 TABLET: 875; 125 TABLET, FILM COATED ORAL at 09:09

## 2024-01-02 RX ADMIN — AMIODARONE HYDROCHLORIDE 200 MG: 200 TABLET ORAL at 17:21

## 2024-01-02 RX ADMIN — AMIODARONE HYDROCHLORIDE 200 MG: 200 TABLET ORAL at 09:09

## 2024-01-02 RX ADMIN — Medication 10 ML: at 09:16
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
## 2024-01-02 NOTE — PROGRESS NOTES
Referring Provider: Misty James MD       SUBJECTIVE  Feels well, off oxygen, cough is better, using incentive spirometer.  Appetite is good.  Will be going to rehab today.  Continues to be in atrial fibrillation with heart rate ranging between 80-high 90s.     ROS  Review of all systems negative except as indicated above    Personal History:    Past Medical History:   Diagnosis Date    Arthritis     Cellulitis of both feet 10/13/2019    Decubitus ulcer of buttock, stage 2 10/14/2019    HEALED    Dermatitis associated with moisture 10/14/2019    Disease of thyroid gland     HYPOTHYROID    Edema of lower extremity 05/31/2017    Santos catheter in place     Hypertension     Hypothyroidism     Immobility syndrome 10/13/2019    Lymphedema 10/14/2019    LEGS    Lymphedema     Muscle weakness (generalized)     Non-pressure chronic ulcer of other part of left foot with fat layer exposed 10/14/2019    Non-pressure chronic ulcer of other part of right foot with fat layer exposed 10/14/2019    Obesity     Pulmonary embolism     Stasis dermatitis 07/08/2013    Ureteral calculi     Urinary tract infection 07/2020    Hosp. 7/2020    Venous stasis 10/14/2019    Yeast infection of the skin        Past Surgical History:   Procedure Laterality Date    BRONCHOSCOPY N/A 12/27/2023    Procedure: BRONCHOSCOPY with right lung washing;  Surgeon: Mayra Ramachandran MD;  Location: [facility] ENDOSCOPY;  Service: Pulmonary;  Laterality: N/A;  Post- Pneumonia    CATARACT EXTRACTION Bilateral     CHOLECYSTECTOMY N/A 11/11/2020    Procedure: CHOLECYSTECTOMY LAPAROSCOPIC;  Surgeon: George Crocker DO;  Location: [facility] MAIN OR;  Service: General;  Laterality: N/A;    CORRECTION HAMMER TOE Bilateral     CYSTOSCOPY, URETEROSCOPY, RETROGRADE PYELOGRAM, STENT INSERTION Bilateral 8/18/2020    Procedure: CYSTOSCOPY BILATERAL RETROGRADE PYELOGRAM,;  Surgeon: Shawn Hernandez MD;  Location: [facility] MAIN OR;  Service: Urology;  Laterality: Bilateral;    DENTAL  EXAMINATION UNDER ANESTHESIA      ENDOSCOPY N/A 2020    Procedure: ESOPHAGOGASTRODUODENOSCOPY;  Surgeon: Torsten Johnson MD;  Location: [facility] ENDOSCOPY;  Service: Gastroenterology;  Laterality: N/A;  post: esophageal stricture, reflux esophagitis, hiatal hernia    JOINT REPLACEMENT      Bilateral knees    TOE SURGERY      TONSILLECTOMY      TOTAL HIP ARTHROPLASTY Left 2020    Procedure: TOTAL HIP ARTHROPLASTY;  Surgeon: Baljit Hutchins II, MD;  Location: [facility] MAIN OR;  Service: Orthopedics;  Laterality: Left;       Family History   Problem Relation Age of Onset    Heart disease Mother     Hypertension Mother     Heart disease Father     Hypertension Father     Kidney disease Father     COPD Father        Social History     Tobacco Use    Smoking status: Former     Packs/day: 0.25     Years: 25.00     Additional pack years: 0.00     Total pack years: 6.25     Types: Cigarettes     Start date: 10/1/1964     Quit date: 1992     Years since quittin.0    Smokeless tobacco: Never   Vaping Use    Vaping Use: Never used   Substance Use Topics    Alcohol use: No    Drug use: No        Home meds:  Prior to Admission medications    Medication Sig Start Date End Date Taking? Authorizing Provider   amiodarone (PACERONE) 200 MG tablet Take 1 tablet by mouth 2 (Two) Times a Day With Meals for 7 days. 24 Yes Misty James MD   amoxicillin-clavulanate (AUGMENTIN) 875-125 MG per tablet Take 1 tablet by mouth Every 12 (Twelve) Hours for 4 doses. Indications: Pneumonia 1/1/24 1/3/24 Yes Misty James MD   apixaban (ELIQUIS) 2.5 MG tablet tablet Take 1 tablet by mouth 2 (Two) Times a Day.   Yes ProviderKarli MD   B Complex Vitamins (vitamin b complex) capsule capsule Take 1 capsule by mouth Daily.   Yes ProviderKarli MD   benzonatate (TESSALON) 100 MG capsule Take 1 capsule by mouth 3 (Three) Times a Day As Needed for Cough. 24  Yes Misty James MD  "  levothyroxine (SYNTHROID, LEVOTHROID) 25 MCG tablet Take 1 tablet by mouth Daily. 12/20/23  Yes Iliana Covington APRN   lisinopril (PRINIVIL,ZESTRIL) 20 MG tablet Take 1 tablet by mouth Daily.   Yes Karli Cheney MD   metoprolol succinate XL (TOPROL-XL) 25 MG 24 hr tablet Take 1 tablet by mouth Daily. Hold if systolic < 110 or HR <60   Yes Karli Cheney MD   metoprolol tartrate (LOPRESSOR) 25 MG tablet Take 1 tablet by mouth Every 12 (Twelve) Hours. 1/2/24  Yes Misty James MD   Multiple Vitamins-Minerals (multivitamin with minerals) tablet tablet Take 1 tablet by mouth Daily. LD 11/4   Yes Karli Cheney MD   pantoprazole (PROTONIX) 40 MG EC tablet Take 1 tablet by mouth Daily.   Yes Karli Cheney MD   triamterene-hydrochlorothiazide (MAXZIDE-25) 37.5-25 MG per tablet Take 1 tablet by mouth Daily. 1/1/24  Yes Misty James MD   amiodarone (PACERONE) 200 MG tablet Take 1 tablet by mouth 2 (Two) Times a Day With Meals. 1/1/24 1/2/24 Yes Misty James MD   amiodarone (Pacerone) 200 MG tablet Take 1 tablet by mouth Daily. 1/2/24 1/1/25  Misty James MD   predniSONE (DELTASONE) 20 MG tablet Take 1 tablet by mouth Daily. 1/1/24   Misty James MD       Allergies:  Patient has no known allergies.      OBJECTIVE    Vital Signs  Vitals:    01/01/24 1831 01/01/24 2155 01/02/24 0213 01/02/24 0557   BP: 103/79 92/71 121/77 122/77   BP Location:  Left arm Left arm Left arm   Patient Position:  Lying Lying Lying   Pulse: 96 102 91 75   Resp: 18 18 18 18   Temp: 98.4 °F (36.9 °C) 97.7 °F (36.5 °C) 98 °F (36.7 °C) 97.8 °F (36.6 °C)   TempSrc:  Oral Oral Oral   SpO2: 92% 90% 94% 95%   Weight:       Height:           Flowsheet Rows      Flowsheet Row First Filed Value   Admission Height 180.3 cm (71\") Documented at 12/24/2023 1139   Admission Weight 159 kg (350 lb) Documented at 12/24/2023 1139              Intake/Output Summary (Last 24 hours) at 1/2/2024 1326  Last data filed at " 1/2/2024 0213  Gross per 24 hour   Intake 480 ml   Output 900 ml   Net -420 ml              Physical Exam:  General-no acute distress  No elevated JVP noted.  Cardiovascular-S1-S2 normal, no murmurs noted.  Respiratory-normal breath sounds, no wheezing/crackles.  GI-abdomen is soft and nontender  No pedal edema        Results Review:    BNP        TROPONIN  Results from last 7 days   Lab Units 12/28/23  0403   HSTROP T ng/L 19*       CoAg        Creatinine Clearance  Estimated Creatinine Clearance: 82.1 mL/min (by C-G formula based on SCr of 0.96 mg/dL).      Radiology  XR Chest 1 View    Result Date: 1/1/2024  Impression: No acute process. Electronically Signed: Andres Flores MD  1/1/2024 3:54 PM EST  Workstation ID: [id number]    XR Chest PA & Lateral    Result Date: 12/31/2023  Impression: 1. Mild bibasilar atelectasis. 2. Stable cardiomegaly. 3. Moderate hiatal hernia. Electronically Signed: Andres Flores MD  12/31/2023 4:44 PM EST  Workstation ID: [id number]       EKG  I personally viewed and interpreted the patient's EKG/Telemetry data:  ECG 12 Lead Dyspnea   Preliminary Result   HEART RATE= 79  bpm   RR Interval= 690  ms   PA Interval=   ms   P Horizontal Axis=   deg   P Front Axis=   deg   QRSD Interval= 89  ms   QT Interval= 335  ms   QTcB= 403  ms   QRS Axis= 17  deg   T Wave Axis= 19  deg   - ABNORMAL ECG -   Atrial fibrillation   Inferior infarct, old   Electronically Signed By:    Date and Time of Study: 2024-01-01 15:34:47      ECG 12 Lead Drug Monitoring; Amiodarone   Preliminary Result   HEART RATE= 97  bpm   RR Interval= 619  ms   PA Interval=   ms   P Horizontal Axis=   deg   P Front Axis=   deg   QRSD Interval= 88  ms   QT Interval= 342  ms   QTcB= 435  ms   QRS Axis= 63  deg   T Wave Axis= 3  deg   - ABNORMAL ECG -   Atrial fibrillation   Low voltage, precordial leads   Consider anterior infarct   Electronically Signed By:    Date and Time of Study: 2023-12-30 06:45:24      ECG 12 Lead Drug Monitoring;  Amiodarone   Preliminary Result   HEART RATE= 85  bpm   RR Interval= 703  ms   OH Interval=   ms   P Horizontal Axis=   deg   P Front Axis=   deg   QRSD Interval= 87  ms   QT Interval= 339  ms   QTcB= 404  ms   QRS Axis= 33  deg   T Wave Axis= 13  deg   - ABNORMAL ECG -   Atrial fibrillation   Electronically Signed By:    Date and Time of Study: 2023-12-29 06:28:18      ECG 12 Lead Drug Monitoring; Amiodarone   Preliminary Result   HEART RATE= 96  bpm   RR Interval= 626  ms   OH Interval=   ms   P Horizontal Axis=   deg   P Front Axis=   deg   QRSD Interval= 83  ms   QT Interval= 321  ms   QTcB= 406  ms   QRS Axis= 31  deg   T Wave Axis= 15  deg   - ABNORMAL ECG -   Atrial fibrillation   When compared with ECG of 27-Dec-2023 16:10:01,   Significant rate decrease   Electronically Signed By:    Date and Time of Study: 2023-12-28 05:46:01      ECG 12 Lead Drug Monitoring; Amiodarone   Preliminary Result   HEART RATE= 122  bpm   RR Interval= 492  ms   OH Interval=   ms   P Horizontal Axis=   deg   P Front Axis=   deg   QRSD Interval= 81  ms   QT Interval= 292  ms   QTcB= 416  ms   QRS Axis= 42  deg   T Wave Axis= 25  deg   - ABNORMAL ECG -   Atrial fibrillation   When compared with ECG of 27-Dec-2023 12:30:32,   Significant rate decrease   Significant repolarization change   Electronically Signed By:    Date and Time of Study: 2023-12-27 16:10:01      ECG 12 Lead Tachycardia   Preliminary Result   HEART RATE= 143  bpm   RR Interval= 418  ms   OH Interval=   ms   P Horizontal Axis=   deg   P Front Axis=   deg   QRSD Interval= 84  ms   QT Interval= 280  ms   QTcB= 433  ms   QRS Axis= 50  deg   T Wave Axis= 263  deg   - ABNORMAL ECG -   Atrial fibrillation   Repolarization abnormality, prob rate related   Electronically Signed By:    Date and Time of Study: 2023-12-27 12:30:32      ECG 12 Lead Dyspnea   Final Result   HEART RATE= 74  bpm   RR Interval= 836  ms   OH Interval= 197  ms   P Horizontal Axis= 112  deg   P Front  Axis= 13  deg   QRSD Interval= 89  ms   QT Interval= 378  ms   QTcB= 413  ms   QRS Axis= 55  deg   T Wave Axis= 34  deg   - ABNORMAL ECG -   Sinus rhythm   Atrial premature complexes   When compared with ECG of 09-Nov-2020 12:53:09,   Nonspecific significant change   Electronically Signed By: Bernard Alonso (VERONICA) 26-Dec-2023 06:57:54   Date and Time of Study: 2023-12-24 12:04:41      SCANNED - TELEMETRY     Final Result      SCANNED - TELEMETRY     Final Result      SCANNED - TELEMETRY     Final Result      SCANNED - TELEMETRY     Final Result      SCANNED - TELEMETRY     Final Result      SCANNED - TELEMETRY     Final Result      SCANNED - TELEMETRY     Final Result      SCANNED - TELEMETRY     Final Result      SCANNED - TELEMETRY     Final Result      SCANNED - TELEMETRY     Final Result      SCANNED - TELEMETRY     Final Result      SCANNED - TELEMETRY     Final Result      SCANNED - TELEMETRY     Final Result      SCANNED - TELEMETRY     Final Result      SCANNED - TELEMETRY     Final Result      SCANNED - TELEMETRY     Final Result      SCANNED - TELEMETRY     Final Result      SCANNED - TELEMETRY     Final Result      SCANNED - TELEMETRY     Final Result      SCANNED - TELEMETRY     Final Result      SCANNED - TELEMETRY     Final Result      SCANNED - TELEMETRY     Final Result      SCANNED - TELEMETRY     Final Result      SCANNED - TELEMETRY     Final Result      SCANNED - TELEMETRY     Final Result      SCANNED - TELEMETRY     Final Result      SCANNED - TELEMETRY     Final Result      SCANNED - TELEMETRY     Final Result      SCANNED - TELEMETRY     Final Result      SCANNED - TELEMETRY     Final Result      SCANNED - TELEMETRY     Final Result      SCANNED - TELEMETRY     Final Result      SCANNED - TELEMETRY     Final Result      SCANNED - TELEMETRY     Final Result      SCANNED - TELEMETRY     Final Result      SCANNED - TELEMETRY     Final Result      SCANNED - TELEMETRY     Final Result      SCANNED  - TELEMETRY     Final Result      SCANNED - TELEMETRY     Final Result      SCANNED - TELEMETRY     Final Result      SCANNED - TELEMETRY     Final Result      SCANNED - TELEMETRY     Final Result      SCANNED - TELEMETRY     Final Result      SCANNED - TELEMETRY     Final Result      SCANNED - TELEMETRY     Final Result            Echocardiogram:    Results for orders placed during the hospital encounter of 12/24/23    Adult Transthoracic Echo Complete W/ Cont if Necessary Per Protocol    Interpretation Summary    Left ventricular ejection fraction appears to be 56 - 60%.    Indications  Chest pain  Shortness of breath    Technically satisfactory study.  Mitral valve is structurally normal.  Tricuspid valve is structurally normal.  Aortic valve is thickened with decreased opening motion.  Gradient across the aortic valve is 20/10 mmHg.  Pulmonic valve could not be well visualized.  No evidence for mitral tricuspid or aortic regurgitation is seen by Doppler study.  Left atrium is normal in size.  Right atrium is normal in size.  Left ventricle is normal in size and contractility with ejection fraction of 60%.  Diastolic dysfunction.  Right ventricle is normal in size.  IVC 2.06 cm.  Atrial septum is intact.  Aorta is normal.  No pericardial effusion or intracardiac thrombus is seen.    Impression  Mild aortic valve stenosis with gradient of 20/10 mmHg.  Otherwise, structurally and functionally normal cardiac valves.  Mild diastolic dysfunction is  Left ventricular size and contractility is normal with ejection fraction of 60%.        Stress Test:  Results for orders placed during the hospital encounter of 12/24/23    Stress Test With Myocardial Perfusion One Day    Interpretation Summary  Indications  Chest pain  Shortness of breath    This study was performed under the direct supervision of Radha HERNANDEZ.    Resting ECG  Sinus rhythm    The patient was injected with Lexiscan intravenously while constantly monitoring  electrocardiogram and vital signs.  Patient did not have any chest discomfort ST abnormalities or ectopy with injection of Lexiscan.    Cardiolite was used as an imaging agent.    Cardiolite images showed uniform distribution of radionuclide without any evidence for myocardial ischemia.    Gated SPECT images revealed normal left ventricle size and contractility with ejection fraction of 74%.    Impression  ========  Lexiscan Cardiolite test is negative for myocardial ischemia.    Gated SPECT images revealed normal left ventricular size and contractility with ejection fraction of 74%.         Cardiac Catheterization:  No results found for this or any previous visit.         Other:         ASSESSMENT & PLAN:    New onset atrial fibrillation with rapid ventricular rate  Patient remains asymptomatic, hemodynamically stable  Plan  - Continue  apixaban to 5 mg twice daily, [id number] score 3-4   -Switch Lopressor to metoprolol succinate 50 mg daily.  -Status post IV amiodarone infusion, amiodarone p.o. 200 mg twice daily for 2 weeks followed by 200 mg daily.  If continues to be in atrial fibrillation on follow-up in clinic, will plan for outpatient JAHAIRA cardioversion.      Dyspnea on exertion  Does not appear fluid overloaded on exam  S/p bronch with mucopurulent secretions  CTA without evidence of PE  Nuclear stress test without evidence of myocardial ischemia  Echocardiogram showed normal ejection fraction, mild diastolic dysfunction     History of PE on Eliquis  CT angiogram this admission did not show evidence of PE, prominent pulmonary artery  Continue anticoagulation with Eliquis, lifelong indication     History of hypertension  Continue triamterene-hydrochlorothiazide combination which will help volume status          Part of this note may be an electronic transcription/translation of spoken language to printed text using the Dragon Dictation System.    Rosa Isela Quiñonez MD  01/02/24  13:26 EST

## 2024-01-02 NOTE — DISCHARGE PLACEMENT REQUEST
"Camelia Gray (78 y.o. Female)       Date of Birth   1945    Social Security Number       Address   [address] [facility] [place] [address]    Home Phone   [phone]    MRN   [id number]       Episcopal   M[employer]ism    Marital Status   Single                            Admission Date   12/24/23    Admission Type   Emergency    Admitting Provider   Cristo Huang MD    Attending Provider   Misty James MD    Department, Room/Bed   [facility] OBSERVATION, 225/1       Discharge Date       Discharge Disposition   Home or Self Care    Discharge Destination                                 Attending Provider: Misty James MD    Allergies: No Known Allergies    Isolation: None   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)   Code Status: CPR    Ht: 180.3 cm (71\")   Wt: 159 kg (350 lb)    Admission Cmt: None   Principal Problem: Acute respiratory failure with hypoxia [J96.01]                   Active Insurance as of 12/24/2023       Primary Coverage       Payor Plan Insurance Group Employer/Plan Group    MEDICARE MEDICARE A & B        Payor Plan Address Payor Plan Phone Number Payor Plan Fax Number Effective Dates    [address] [phone]  7/1/2011 - None Entered    [facility] [address]         Subscriber Name Subscriber Birth Date Member ID       CAMELIA GRAY 1945 [id number]               Secondary Coverage       Payor Plan Insurance Group Employer/Plan Group    [facility] [id number]       Payor Plan Address Payor Plan Phone Number Payor Plan Fax Number Effective Dates    [address]   12/1/2016 - None Entered    [facility] [address]         Subscriber Name Subscriber Birth Date Member ID       CAMELIA GRAY 1945 [id number]                     Emergency Contacts        (Rel.) Home Phone Work Phone Mobile Phone    LEAH GRAY (Relative) -- -- [phone]    ([employer]),Arabella (Care Giver) -- -- [phone]              Insurance " Information                  MEDICARE/MEDICARE A & B Phone: [phone]    Subscriber: Camelia Gray Subscriber#: [id number]    Group#: -- Precert#: --        [employer] [facility] Phone: --    Subscriber: Camelia Gray Subscriber#: [id number]    Group#: [id number] Precert#: --
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
## 2024-01-02 NOTE — PROGRESS NOTES
"Enter Query Response Below      Query Response: Chronic pulmonary edema            If applicable, please update the problem list.       Patient: Camelia Gray        : 1945  Account: [id number]           Admit Date:         How to Respond to this query:       a. Click New Note     b. Answer query within the yellow box.                c. Update the Problem List, if applicable.      If you have any questions about this query contact me at: [email or web address]         78 year old female admitted with aspiration pneumonia and acute hypoxic respiratory failure. Significant wheeze and increased congestion noted. Pulmonary edema documented in progress notes  - 24. IV lasix 20 mg given 12/30 x1. Chest x-ray , \"vascular crowding and consolidation medially at the lung bases.\" IV lasix 20 mg x1 given . Repeat chest x-ray 24 \"no acute process.\"     Please clarify if patient treated/monitored for one or more of the following:    Acute pulmonary edema  Chronic pulmonary edema  Other- specify_____  Unable to determine      By submitting this query, we are merely seeking further clarification of documentation to accurately reflect all conditions that you are monitoring, evaluating, treating or that extend the hospitalization or utilize additional resources of care. Please utilize your independent clinical judgment when addressing the question(s) above.     This query and your response, once completed, will be entered into the legal medical record.    Sincerely,  Janette Jaeger RN CCDS  Clinical Documentation Integrity Program     "

## 2024-01-02 NOTE — NURSING NOTE
Pt resting in bed on room air, SpO2 WNL. Discharge orders complete and waiting transport to [facility].

## 2024-01-02 NOTE — CASE MANAGEMENT/SOCIAL WORK
Continued Stay Note  [facility]     Patient Name: Camelia Gray  MRN: [id number]  Today's Date: 1/2/2024    Admit Date: 12/24/2023    Plan: From home alone with caregivers. Referred to [facility]; acceptance pending. No precert needed. PASRR completed   Discharge Plan       Row Name 01/02/24 0945       Plan    Plan From home alone with caregivers. Referred to [facility]; acceptance pending. No precert needed. PASRR completed    Plan Comments Barriers: Ms. Gray has discharge orders and CM met with her and she was unable to get her caregiver hours increased. She is private pay with  [employer]. She agrees to go to a SNF prior to returning home. First choice is [facility]. CM contacted [facility] admission's liasion and added to Epic. Acceptance pending. SW completed PASRR                    Maintained distance greater than six feet and spent less than 15 minutes in the room.       Yamini BUNCH,RN Case Manager  [facility]  Phone: Desk- [phone] cell- [phone]

## 2024-01-03 LAB
MYCOBACTERIUM SPEC CULT: NORMAL
NIGHT BLUE STAIN TISS: NORMAL
QT INTERVAL: 280 MS
QT INTERVAL: 292 MS
QT INTERVAL: 321 MS
QT INTERVAL: 335 MS
QT INTERVAL: 342 MS
QTC INTERVAL: 403 MS
QTC INTERVAL: 406 MS
QTC INTERVAL: 416 MS
QTC INTERVAL: 433 MS
QTC INTERVAL: 435 MS

## 2024-01-03 NOTE — CASE MANAGEMENT/SOCIAL WORK
Case Management Discharge Note      Final Note: [facility]. Transported by [facility] wheelchair van         Selected Continued Care - Discharged on 1/2/2024 Admission date: 12/24/2023 - Discharge disposition: Skilled Nursing Facility (DC - External)      Destination Coordination complete.      Service Provider Selected Services Address Phone Fax Patient Preferred    [facility] [address] [place] [address] [address] [place] [id number] [phone] --                     Transportation Services  W/C Van: Edda Byers    Final Discharge Disposition Code: 03 - skilled nursing facility (SNF)

## 2024-01-04 LAB
QT INTERVAL: 339 MS
QTC INTERVAL: 404 MS

## 2024-01-09 ENCOUNTER — DOCUMENTATION (OUTPATIENT)
Dept: HOME HEALTH SERVICES | Facility: HOME HEALTHCARE | Age: 79
End: 2024-01-09
Payer: MEDICARE

## 2024-01-09 NOTE — PROGRESS NOTES
Facility Discharge -   Camelia Gray - [birth year] -   Medicare -   [facility]   RN/PT/OT    Brinda Tony NP ([facility]) is the PCP/POC/Attending and agrees to follow for home health on 1/8/24 at 15:47 via epic.  Yu Peña NP is the ordering provider: RN/PT/OT  Yu Peña NP performed the F2F on 1/3/24  DX: pneumonia, AFIB with RVR, hypothyroidism, HTN, HX DVT/PE, GERD, morbid obesity    Contact:  [phone]  (home)  [phone] (nephew - Leo)    Address confirmed:  [address] [place] [address] [place] [address]    I met with the patient at the bedside, and she is agreeable to home health and does not have any additional skilled services in her home. She pays for private duty/non-skilled personal care in her home.

## 2024-01-10 ENCOUNTER — HOME HEALTH ADMISSION (OUTPATIENT)
Dept: HOME HEALTH SERVICES | Facility: HOME HEALTHCARE | Age: 79
End: 2024-01-10
Payer: MEDICARE

## 2024-01-10 ENCOUNTER — TRANSCRIBE ORDERS (OUTPATIENT)
Dept: HOME HEALTH SERVICES | Facility: HOME HEALTHCARE | Age: 79
End: 2024-01-10
Payer: MEDICARE

## 2024-01-10 DIAGNOSIS — I48.91 ATRIAL FIBRILLATION, UNSPECIFIED TYPE: Primary | ICD-10-CM

## 2024-01-10 LAB
MYCOBACTERIUM SPEC CULT: NORMAL
NIGHT BLUE STAIN TISS: NORMAL

## 2024-01-11 ENCOUNTER — HOME CARE VISIT (OUTPATIENT)
Dept: HOME HEALTH SERVICES | Facility: HOME HEALTHCARE | Age: 79
End: 2024-01-11
Payer: MEDICARE

## 2024-01-11 PROCEDURE — G0299 HHS/HOSPICE OF RN EA 15 MIN: HCPCS

## 2024-01-11 NOTE — Clinical Note
Drug-Drug  <jscript:void(0)>  Drug-Drug: metoprolol succinate XL and amiodarone   Administration of amiodarone and metoprolol succinate XL may result in severe bradycardia, hypotension and cardiac arrest.   Details Major   metoprolol succinate XL (TOPROL-XL) 25 MG 24 hr tablet   Long-term.     amiodarone (Pacerone) 200 MG tablet     metoprolol succinate XL (Toprol XL) 50 MG 24 hr tablet   Long-term.

## 2024-01-12 ENCOUNTER — HOME CARE VISIT (OUTPATIENT)
Dept: HOME HEALTH SERVICES | Facility: HOME HEALTHCARE | Age: 79
End: 2024-01-12
Payer: MEDICARE

## 2024-01-12 VITALS
SYSTOLIC BLOOD PRESSURE: 108 MMHG | RESPIRATION RATE: 18 BRPM | HEIGHT: 72 IN | BODY MASS INDEX: 39.68 KG/M2 | DIASTOLIC BLOOD PRESSURE: 72 MMHG | TEMPERATURE: 97 F | HEART RATE: 90 BPM | WEIGHT: 293 LBS | OXYGEN SATURATION: 98 %

## 2024-01-12 VITALS
TEMPERATURE: 98.1 F | SYSTOLIC BLOOD PRESSURE: 134 MMHG | OXYGEN SATURATION: 92 % | DIASTOLIC BLOOD PRESSURE: 86 MMHG | HEART RATE: 101 BPM

## 2024-01-12 PROCEDURE — G0151 HHCP-SERV OF PT,EA 15 MIN: HCPCS

## 2024-01-12 NOTE — HOME HEALTH
"SOC Note: pt. with discharge from [facility] on 1/10/24 for rehab. r/t weakness and new diagnosis of Atrial Fibrillation.  Pt. was inpatient in December at [facility] with new onset of Atrial fibrillation.  Pt. lives alone in 2 story home but has a caregiver that comes routinely during day and assists with care. Patient states she has suffered from lymphedema to BLE in the past, and wraps her legs with kerlix and ace wraps three times a week, currently no openings or weeping.  Pt. mainly uses wheelchair around home she states d/t getting around faster, but also uses walker.  Pt. is in agreeance with [facility] for SN, OT, and PT.     Home Health ordered for: disciplines SN, OT, and PT    Reason for Hosp/Primary Dx/Co-morbidities: new onset of Atrial fibrillation    Focus of Care: Unspecified atrial fibrillation     Patient's goal(s): \"To get stronger\"    Current Functional status/mobility/DME: wheelchair, walker    HB status/Living Arrangements: homebound lives alone    Skin Integrity/wound status: at risk     Code Status: Full code    Fall Risk/Safety concerns: at risk    Educated on Emergency Plan, steps to take prior to going to the ER and when to Call [facility]:  pt aware     Medication issues/Concerns:no concerns    Additional Problems/Concerns: n/a    SDOH Barriers (i.e. caregiver concerns, social isolation, transportation, food insecurity, environment, income etc.)/Need for MSW: no    Plan for next visit: CP assess, atrial fibrillation education, pain assess, medication education, assess BLE    SN2w2, 1w6"

## 2024-01-12 NOTE — HOME HEALTH
"Eval Note Patient referred for home health PT sp hospitalization and rehab stay for aspiration pneumonia and afib.    Patient's goal(s):\"Be able walk and stand straighter\"    Services required to achieve goals: PT    Potential Issues for goal attainment: none    Problems identified: impaired mobility and posture due to weakness.     Describe the Functional status and safety: Pt requires SBA for transfers and ambulation with use of wheeled walker x 40 ft. Pt with stooped posture. Pt can only stand x 14 secs with straight posture and use of wheeled walker. Pt uses wc in house for mobility and sleeps in lift chair.     Describe any environmental issues:none    Any equipment needs: none    POC confirmed with patient  on 1/12/24    Plan for next visit: thera ex, transfer training, balance and gait training."
Heterosexual

## 2024-01-15 ENCOUNTER — HOME CARE VISIT (OUTPATIENT)
Dept: HOME HEALTH SERVICES | Facility: HOME HEALTHCARE | Age: 79
End: 2024-01-15
Payer: MEDICARE

## 2024-01-15 VITALS
OXYGEN SATURATION: 95 % | HEART RATE: 83 BPM | RESPIRATION RATE: 15 BRPM | SYSTOLIC BLOOD PRESSURE: 134 MMHG | TEMPERATURE: 97.6 F | DIASTOLIC BLOOD PRESSURE: 74 MMHG

## 2024-01-15 PROCEDURE — G0157 HHC PT ASSISTANT EA 15: HCPCS

## 2024-01-16 ENCOUNTER — HOME CARE VISIT (OUTPATIENT)
Dept: HOME HEALTH SERVICES | Facility: HOME HEALTHCARE | Age: 79
End: 2024-01-16
Payer: MEDICARE

## 2024-01-16 PROCEDURE — G0299 HHS/HOSPICE OF RN EA 15 MIN: HCPCS

## 2024-01-16 NOTE — HOME HEALTH
Routine Visit Note:  pt. to see cardiologist tomorrow, states SOB is much improved.  Pt has PT coming to house and is happy about her improvement and endurance, pt. doing well on medications, very knowledgable on them,    Skill/education provided: CP assess, pain assess, falls assess, medication assess, atrial fibrillation education    Patient/caregiver response: tolerated well and verbalized understanding of education    Plan for next visit: follow up cardiologist, CP assess, med. assess, falls assess, pain assess, teaching on Atrial fibrillation    Other pertinent info: n/a

## 2024-01-16 NOTE — HOME HEALTH
pt sitting up and is prepared for PT session,      no med changes or complications reported.  pt denies any falls and is motivated to improve.      pt reports she uses the wc throughout the day with the wc but is ambulating very short distances.       pt was limited today in all areas,   with weakness and fatigue/low endurance.       pt was limited today in the ability to stand and transfer with noted quad weakness and dyspnea with cues to properly deep plb.   pt was minimally unsteady upon standing but not significant,     required the walker.     gait tolerance was limited to 40 ft with c/o fatigue     plan for next session-   cont PT with gait trg,  balance trg and transfer trg all with education and advancement as able

## 2024-01-17 ENCOUNTER — HOME CARE VISIT (OUTPATIENT)
Dept: HOME HEALTH SERVICES | Facility: HOME HEALTHCARE | Age: 79
End: 2024-01-17
Payer: MEDICARE

## 2024-01-17 LAB
MYCOBACTERIUM SPEC CULT: NORMAL
NIGHT BLUE STAIN TISS: NORMAL

## 2024-01-17 PROCEDURE — G0152 HHCP-SERV OF OT,EA 15 MIN: HCPCS

## 2024-01-18 ENCOUNTER — OFFICE (AMBULATORY)
Dept: URBAN - METROPOLITAN AREA CLINIC 64 | Facility: CLINIC | Age: 79
End: 2024-01-18

## 2024-01-18 ENCOUNTER — HOME CARE VISIT (OUTPATIENT)
Dept: HOME HEALTH SERVICES | Facility: HOME HEALTHCARE | Age: 79
End: 2024-01-18
Payer: MEDICARE

## 2024-01-18 VITALS — SYSTOLIC BLOOD PRESSURE: 121 MMHG | WEIGHT: 293 LBS | DIASTOLIC BLOOD PRESSURE: 76 MMHG | HEART RATE: 75 BPM

## 2024-01-18 VITALS — SYSTOLIC BLOOD PRESSURE: 110 MMHG | HEART RATE: 103 BPM | OXYGEN SATURATION: 96 % | DIASTOLIC BLOOD PRESSURE: 86 MMHG

## 2024-01-18 VITALS
SYSTOLIC BLOOD PRESSURE: 124 MMHG | OXYGEN SATURATION: 96 % | DIASTOLIC BLOOD PRESSURE: 68 MMHG | TEMPERATURE: 97.5 F | RESPIRATION RATE: 15 BRPM | HEART RATE: 105 BPM

## 2024-01-18 DIAGNOSIS — K44.9 DIAPHRAGMATIC HERNIA WITHOUT OBSTRUCTION OR GANGRENE: ICD-10-CM

## 2024-01-18 DIAGNOSIS — R05.9 COUGH, UNSPECIFIED: ICD-10-CM

## 2024-01-18 DIAGNOSIS — I48.91 UNSPECIFIED ATRIAL FIBRILLATION: ICD-10-CM

## 2024-01-18 DIAGNOSIS — K59.00 CONSTIPATION, UNSPECIFIED: ICD-10-CM

## 2024-01-18 LAB
FUNGUS WND CULT: ABNORMAL
FUNGUS WND CULT: ABNORMAL

## 2024-01-18 PROCEDURE — G0157 HHC PT ASSISTANT EA 15: HCPCS

## 2024-01-18 PROCEDURE — 99214 OFFICE O/P EST MOD 30 MIN: CPT | Performed by: NURSE PRACTITIONER

## 2024-01-18 NOTE — HOME HEALTH
Pt is a 79 yo female who lives in a 2 story house alone.   Pt recently       PLOF pt independent with all self care and light meal prep      Currently pt independent with feeding grooming sponge bathing and toileting.   Pt requries MIN assist with LB dressing and MAX assist with IADLs      Skilled OT for ther ex/HEP      Next visit ther ex      Pt denies any falls or med changes  w  w  w

## 2024-01-19 ENCOUNTER — HOME CARE VISIT (OUTPATIENT)
Dept: HOME HEALTH SERVICES | Facility: HOME HEALTHCARE | Age: 79
End: 2024-01-19
Payer: MEDICARE

## 2024-01-19 NOTE — HOME HEALTH
pt up in her wc on arrival-     feeling well but fatigued from earlier MD [place]son.  pt reports she will soonn have a shock procedure for A-fib,   and will also have her esophagus stretched but to be scheduled     pt was limited and hr was up to 126 post gait trg,    pt was limited today in the ability to stand and ambulate with cues needed to stand upright.    pt was educated today on proper deep plb and to pace activities with a work/rest ratio of 50/50.    pt ambulated well but fatigued and needed to sit.           plan for next session-  cont PT with gait trg, hep review,    standing and transfer as able with advancement as tolerated.

## 2024-01-22 ENCOUNTER — HOME CARE VISIT (OUTPATIENT)
Dept: HOME HEALTH SERVICES | Facility: HOME HEALTHCARE | Age: 79
End: 2024-01-22
Payer: MEDICARE

## 2024-01-22 ENCOUNTER — OFFICE VISIT (OUTPATIENT)
Dept: FAMILY MEDICINE CLINIC | Facility: CLINIC | Age: 79
End: 2024-01-22
Payer: MEDICARE

## 2024-01-22 VITALS
SYSTOLIC BLOOD PRESSURE: 130 MMHG | BODY MASS INDEX: 39.68 KG/M2 | HEIGHT: 72 IN | HEART RATE: 111 BPM | WEIGHT: 293 LBS | DIASTOLIC BLOOD PRESSURE: 92 MMHG | TEMPERATURE: 98.5 F | OXYGEN SATURATION: 94 %

## 2024-01-22 VITALS
RESPIRATION RATE: 15 BRPM | OXYGEN SATURATION: 97 % | SYSTOLIC BLOOD PRESSURE: 140 MMHG | TEMPERATURE: 98.3 F | DIASTOLIC BLOOD PRESSURE: 68 MMHG | HEART RATE: 98 BPM

## 2024-01-22 DIAGNOSIS — R13.11 ORAL PHASE DYSPHAGIA: Chronic | ICD-10-CM

## 2024-01-22 DIAGNOSIS — J96.01 ACUTE RESPIRATORY FAILURE WITH HYPOXIA: ICD-10-CM

## 2024-01-22 DIAGNOSIS — I48.20 CHRONIC ATRIAL FIBRILLATION WITH RAPID VENTRICULAR RESPONSE: Chronic | ICD-10-CM

## 2024-01-22 DIAGNOSIS — Z09 HOSPITAL DISCHARGE FOLLOW-UP: Primary | ICD-10-CM

## 2024-01-22 DIAGNOSIS — I10 PRIMARY HYPERTENSION: Chronic | ICD-10-CM

## 2024-01-22 PROCEDURE — G0157 HHC PT ASSISTANT EA 15: HCPCS

## 2024-01-22 PROCEDURE — 99214 OFFICE O/P EST MOD 30 MIN: CPT | Performed by: NURSE PRACTITIONER

## 2024-01-22 PROCEDURE — 1111F DSCHRG MED/CURRENT MED MERGE: CPT | Performed by: NURSE PRACTITIONER

## 2024-01-22 PROCEDURE — 3075F SYST BP GE 130 - 139MM HG: CPT | Performed by: NURSE PRACTITIONER

## 2024-01-22 PROCEDURE — 3080F DIAST BP >= 90 MM HG: CPT | Performed by: NURSE PRACTITIONER

## 2024-01-22 PROCEDURE — 1159F MED LIST DOCD IN RCRD: CPT | Performed by: NURSE PRACTITIONER

## 2024-01-22 PROCEDURE — 1160F RVW MEDS BY RX/DR IN RCRD: CPT | Performed by: NURSE PRACTITIONER

## 2024-01-22 NOTE — PROGRESS NOTES
Subjective        Camelia Gray is a 78 y.o. female.     Chief Complaint   Patient presents with    Hospital Follow Up Visit     TCM       History of Present Illness  Patient is here for hospital follow up and rehab discharge: admitted on 12/24 thru 1/1/24 transferred to [facility] discharge 1/10/2024.   She currently has PT and home health.   She is evaluated today in wheelchair. She has caregiver that transports her.   Discharge diagnosis: aspiration pneumonia large hiatal hernia, bronchoscopy negative, CTA was neg for PE. Stress test was negative for ischemia. Normal EF. Cardiology evaluated for A-fib started IV amiodarone was switched to oral amiodarone. Afib ventricular rates are stable. Sill need JAHAIRA with DC cardioversion if remains in atrial fib.   Pneumonia treat with azithromycin and augmentin. Discharged oral steroids and augmentin.   Current outpatient and discharge medications have been reconciled for the patient.  Reviewed by: DEE Conte     Diaphragmatic hernia without obstruction or gangrene: evaluated by gastroenterology : reviewed notes:   She will have upcoming EGD.   She completed swallow study . Was found to have dysphagia oral.   Pharyngeal dysphagia , pharyngoesophageal dysphagia and esophageal dysphagia.   Recommend mechanically altered diet.           The following portions of the patient's history were reviewed and updated as appropriate: allergies, current medications, past family history, past medical history, past social history, past surgical history and problem list.      Current Outpatient Medications:     amiodarone (Pacerone) 200 MG tablet, Take 1 tablet by mouth Daily. (Patient taking differently: Take 1 tablet by mouth 2 (Two) Times a Day. Indications: Ventricular Fibrillation), Disp: 30 tablet, Rfl: 11    apixaban (ELIQUIS) 5 MG tablet tablet, Take 1 tablet by mouth 2 (Two) Times a Day. Indications: Atrial Fibrillation, history of DVT/PE, Disp: 60 tablet, Rfl: 0    B  Complex Vitamins (vitamin b complex) capsule capsule, Take 1 capsule by mouth Daily. Indications: Vitamin Deficiency, Disp: , Rfl:     levothyroxine (SYNTHROID, LEVOTHROID) 25 MCG tablet, Take 1 tablet by mouth Daily., Disp: 90 tablet, Rfl: 0    metoprolol succinate XL (Toprol XL) 50 MG 24 hr tablet, Take 1 tablet by mouth Daily., Disp: 30 tablet, Rfl: 11    Multiple Vitamins-Minerals (multivitamin with minerals) tablet tablet, Take 1 tablet by mouth Daily. Indications: Vitamin Deficiency, Disp: , Rfl:     pantoprazole (PROTONIX) 40 MG EC tablet, Take 1 tablet by mouth Daily. Indications: Gastroesophageal Reflux Disease, Disp: , Rfl:     triamterene-hydrochlorothiazide (MAXZIDE-25) 37.5-25 MG per tablet, Take 1 tablet by mouth Daily., Disp: 30 tablet, Rfl: 0    Recent Results (from the past 4032 hour(s))   Wound Culture    Collection Time: 10/03/23 12:00 PM    Specimen: Leg, Right; Wound   Result Value Ref Range    Wound Culture Scant growth (1+) Normal Skin Shanae     Gram Stain No WBCs or organisms seen    ECG 12 Lead Dyspnea    Collection Time: 12/24/23 12:04 PM   Result Value Ref Range    QT Interval 378 ms    QTC Interval 413 ms   COVID-19, FLU A/B, RSV PCR 1 HR TAT - Swab, Nasopharynx    Collection Time: 12/24/23 12:40 PM    Specimen: Nasopharynx; Swab   Result Value Ref Range    COVID19 Not Detected Not Detected - Ref. Range    Influenza A PCR Not Detected Not Detected    Influenza B PCR Not Detected Not Detected    RSV, PCR Not Detected Not Detected   Protime-INR    Collection Time: 12/24/23  1:05 PM    Specimen: Blood   Result Value Ref Range    Protime 11.9 (H) 9.6 - 11.7 Seconds    INR 1.10 0.93 - 1.10   aPTT    Collection Time: 12/24/23  1:05 PM    Specimen: Blood   Result Value Ref Range    PTT 34.7 (L) 61.0 - 76.5 seconds   CBC Auto Differential    Collection Time: 12/24/23  1:05 PM    Specimen: Blood   Result Value Ref Range    WBC 11.70 (H) 3.40 - 10.80 10*3/mm3    RBC 4.37 3.77 - 5.28 10*6/mm3     Hemoglobin 12.9 12.0 - 15.9 g/dL    Hematocrit 40.0 34.0 - 46.6 %    MCV 91.6 79.0 - 97.0 fL    MCH 29.5 26.6 - 33.0 pg    MCHC 32.2 31.5 - 35.7 g/dL    RDW 14.3 12.3 - 15.4 %    RDW-SD 48.1 37.0 - 54.0 fl    MPV 7.6 6.0 - 12.0 fL    Platelets 195 140 - 450 10*3/mm3    Neutrophil % 83.2 (H) 42.7 - 76.0 %    Lymphocyte % 8.9 (L) 19.6 - 45.3 %    Monocyte % 6.3 5.0 - 12.0 %    Eosinophil % 0.4 0.3 - 6.2 %    Basophil % 1.2 0.0 - 1.5 %    Neutrophils, Absolute 9.70 (H) 1.70 - 7.00 10*3/mm3    Lymphocytes, Absolute 1.00 0.70 - 3.10 10*3/mm3    Monocytes, Absolute 0.70 0.10 - 0.90 10*3/mm3    Eosinophils, Absolute 0.00 0.00 - 0.40 10*3/mm3    Basophils, Absolute 0.10 0.00 - 0.20 10*3/mm3    nRBC 0.0 0.0 - 0.2 /100 WBC   Comprehensive Metabolic Panel    Collection Time: 12/24/23  2:08 PM    Specimen: Blood   Result Value Ref Range    Glucose 98 65 - 99 mg/dL    BUN 15 8 - 23 mg/dL    Creatinine 0.74 0.57 - 1.00 mg/dL    Sodium 144 136 - 145 mmol/L    Potassium 3.8 3.5 - 5.2 mmol/L    Chloride 105 98 - 107 mmol/L    CO2 27.0 22.0 - 29.0 mmol/L    Calcium 9.0 8.6 - 10.5 mg/dL    Total Protein 7.0 6.0 - 8.5 g/dL    Albumin 3.6 3.5 - 5.2 g/dL    ALT (SGPT) 14 1 - 33 U/L    AST (SGOT) 18 1 - 32 U/L    Alkaline Phosphatase 97 39 - 117 U/L    Total Bilirubin 0.4 0.0 - 1.2 mg/dL    Globulin 3.4 gm/dL    A/G Ratio 1.1 g/dL    BUN/Creatinine Ratio 20.3 7.0 - 25.0    Anion Gap 12.0 5.0 - 15.0 mmol/L    eGFR 83.4 >60.0 mL/min/1.73   BNP    Collection Time: 12/24/23  2:08 PM    Specimen: Blood   Result Value Ref Range    proBNP 367.9 0.0 - 1,800.0 pg/mL   High Sensitivity Troponin T    Collection Time: 12/24/23  2:08 PM    Specimen: Blood   Result Value Ref Range    HS Troponin T 18 (H) <14 ng/L   Magnesium    Collection Time: 12/24/23  2:08 PM    Specimen: Blood   Result Value Ref Range    Magnesium 1.9 1.6 - 2.4 mg/dL   TSH    Collection Time: 12/24/23  2:08 PM    Specimen: Blood   Result Value Ref Range    TSH 3.330 0.270 - 4.200  uIU/mL   High Sensitivity Troponin T 2Hr    Collection Time: 12/24/23  5:08 PM    Specimen: Blood   Result Value Ref Range    HS Troponin T 18 (H) <14 ng/L    Troponin T Delta 0 >=-4 - <+4 ng/L   Basic Metabolic Panel    Collection Time: 12/25/23  2:53 AM    Specimen: Blood   Result Value Ref Range    Glucose 168 (H) 65 - 99 mg/dL    BUN 17 8 - 23 mg/dL    Creatinine 0.72 0.57 - 1.00 mg/dL    Sodium 140 136 - 145 mmol/L    Potassium 4.3 3.5 - 5.2 mmol/L    Chloride 101 98 - 107 mmol/L    CO2 27.0 22.0 - 29.0 mmol/L    Calcium 9.1 8.6 - 10.5 mg/dL    BUN/Creatinine Ratio 23.6 7.0 - 25.0    Anion Gap 12.0 5.0 - 15.0 mmol/L    eGFR 86.2 >60.0 mL/min/1.73   CBC Auto Differential    Collection Time: 12/25/23  2:53 AM    Specimen: Blood   Result Value Ref Range    WBC 12.00 (H) 3.40 - 10.80 10*3/mm3    RBC 4.54 3.77 - 5.28 10*6/mm3    Hemoglobin 13.7 12.0 - 15.9 g/dL    Hematocrit 41.5 34.0 - 46.6 %    MCV 91.3 79.0 - 97.0 fL    MCH 30.1 26.6 - 33.0 pg    MCHC 33.0 31.5 - 35.7 g/dL    RDW 14.6 12.3 - 15.4 %    RDW-SD 49.0 37.0 - 54.0 fl    MPV 7.6 6.0 - 12.0 fL    Platelets 230 140 - 450 10*3/mm3    Neutrophil % 93.6 (H) 42.7 - 76.0 %    Lymphocyte % 5.5 (L) 19.6 - 45.3 %    Monocyte % 0.9 (L) 5.0 - 12.0 %    Eosinophil % 0.0 (L) 0.3 - 6.2 %    Basophil % 0.0 0.0 - 1.5 %    Neutrophils, Absolute 11.20 (H) 1.70 - 7.00 10*3/mm3    Lymphocytes, Absolute 0.70 0.70 - 3.10 10*3/mm3    Monocytes, Absolute 0.10 0.10 - 0.90 10*3/mm3    Eosinophils, Absolute 0.00 0.00 - 0.40 10*3/mm3    Basophils, Absolute 0.00 0.00 - 0.20 10*3/mm3    nRBC 0.0 0.0 - 0.2 /100 WBC   Hemoglobin A1c    Collection Time: 12/25/23  2:53 AM    Specimen: Blood   Result Value Ref Range    Hemoglobin A1C 5.90 (H) 4.80 - 5.60 %   Lipid Panel    Collection Time: 12/25/23  2:53 AM    Specimen: Blood   Result Value Ref Range    Total Cholesterol 140 0 - 200 mg/dL    Triglycerides 51 0 - 150 mg/dL    HDL Cholesterol 69 (H) 40 - 60 mg/dL    LDL Cholesterol  60 0 -  100 mg/dL    VLDL Cholesterol 11 5 - 40 mg/dL    LDL/HDL Ratio 0.88    Blood Gas, Arterial -    Collection Time: 12/25/23  9:57 PM    Specimen: Arterial Blood   Result Value Ref Range    Site Right Radial     Baltazar's Test Positive     pH, Arterial 7.394 7.350 - 7.450 pH units    pCO2, Arterial 49.0 (H) 35.0 - 48.0 mm Hg    pO2, Arterial 71.7 (L) 83.0 - 108.0 mm Hg    HCO3, Arterial 29.9 (H) 21.0 - 28.0 mmol/L    Base Excess, Arterial 3.9 (H) 0.0 - 3.0 mmol/L    O2 Saturation, Arterial 93.8 (L) 94.0 - 98.0 %    CO2 Content 31.5 (H) 22 - 29 mmol/L    Barometric Pressure for Blood Gas      Modality Cannula     FIO2 32 %    Hemodilution No    CBC Auto Differential    Collection Time: 12/26/23  4:39 AM    Specimen: Arm, Right; Blood   Result Value Ref Range    WBC 17.30 (H) 3.40 - 10.80 10*3/mm3    RBC 4.62 3.77 - 5.28 10*6/mm3    Hemoglobin 13.8 12.0 - 15.9 g/dL    Hematocrit 42.4 34.0 - 46.6 %    MCV 91.6 79.0 - 97.0 fL    MCH 29.8 26.6 - 33.0 pg    MCHC 32.5 31.5 - 35.7 g/dL    RDW 14.5 12.3 - 15.4 %    RDW-SD 49.4 37.0 - 54.0 fl    MPV 7.2 6.0 - 12.0 fL    Platelets 266 140 - 450 10*3/mm3    Neutrophil % 88.8 (H) 42.7 - 76.0 %    Lymphocyte % 4.9 (L) 19.6 - 45.3 %    Monocyte % 5.9 5.0 - 12.0 %    Eosinophil % 0.0 (L) 0.3 - 6.2 %    Basophil % 0.4 0.0 - 1.5 %    Neutrophils, Absolute 15.30 (H) 1.70 - 7.00 10*3/mm3    Lymphocytes, Absolute 0.80 0.70 - 3.10 10*3/mm3    Monocytes, Absolute 1.00 (H) 0.10 - 0.90 10*3/mm3    Eosinophils, Absolute 0.00 0.00 - 0.40 10*3/mm3    Basophils, Absolute 0.10 0.00 - 0.20 10*3/mm3    nRBC 0.0 0.0 - 0.2 /100 WBC   Basic Metabolic Panel    Collection Time: 12/26/23  6:45 AM    Specimen: Arm, Right; Blood   Result Value Ref Range    Glucose 138 (H) 65 - 99 mg/dL    BUN 27 (H) 8 - 23 mg/dL    Creatinine 0.75 0.57 - 1.00 mg/dL    Sodium 140 136 - 145 mmol/L    Potassium 5.0 3.5 - 5.2 mmol/L    Chloride 100 98 - 107 mmol/L    CO2 32.0 (H) 22.0 - 29.0 mmol/L    Calcium 9.3 8.6 - 10.5 mg/dL     BUN/Creatinine Ratio 36.0 (H) 7.0 - 25.0    Anion Gap 8.0 5.0 - 15.0 mmol/L    eGFR 82.1 >60.0 mL/min/1.73   High Sensitivity Troponin T    Collection Time: 12/26/23  6:45 AM    Specimen: Arm, Right; Blood   Result Value Ref Range    HS Troponin T 14 (H) <14 ng/L   Stress Test With Myocardial Perfusion One Day    Collection Time: 12/26/23 10:01 AM   Result Value Ref Range    BH CV STRESS PROTOCOL 1 Pharmacologic     Stage 1 1.0     HR Stage 1 67     Duration Min Stage 1 0     Duration Sec Stage 1 10     Stress Dose Regadenoson Stage 1 0.40     Stress Comments Stage 1 10 sec bolus injection     Stage 2 2.0     HR Stage 2 83     Duration Min Stage 2 4     Duration Sec Stage 2 0     Stress Comments Stage 2 recovery     Stage 3 3.0     HR Stage 3 76     BP Stage 3 144/117     Baseline HR 69 bpm    Baseline /54 mmHg    Peak HR 83 bpm    Peak /54 mmHg    Recovery HR 75 bpm    Recovery /59 mmHg    Target HR (85%) 122 bpm    Max. Pred. HR (100%) 143 bpm    Percent Max Pred HR 58.04 %    Percent Target HR 68 %    Nuclear Prior Study 3.0     BH CV REST NUCLEAR ISOTOPE DOSE 10.5 mCi    BH CV STRESS NUCLEAR ISOTOPE DOSE 33.0 mCi   Adult Transthoracic Echo Complete W/ Cont if Necessary Per Protocol    Collection Time: 12/26/23 10:58 AM   Result Value Ref Range    LVIDd 4.5 cm    LVIDs 2.9 cm    IVSd 1.10 cm    LVPWd 1.00 cm    FS 35.6 %    IVS/LVPW 1.10 cm    ESV(cubed) 24.4 ml    LV Sys Vol (BSA corrected) 20.9 cm2    EDV(cubed) 91.1 ml    LV Coronado Vol (BSA corrected) 65.4 cm2    LV mass(C)d 164.0 grams    EDV(MOD-sp4) 175.0 ml    ESV(MOD-sp4) 55.8 ml    SV(MOD-sp4) 119.2 ml    SI(MOD-sp4) 44.6 ml/m2    EF(MOD-sp4) 68.1 %    MV E max christiano 61.8 cm/sec    MV A max christiano 82.8 cm/sec    MV dec time 0.28 sec    MV E/A 0.75     Med Peak E' Christiano 5.7 cm/sec    Lat Peak E' Christiano 12.7 cm/sec    Avg E/e' ratio 6.72     RV S' 17.8 cm/sec    LV V1 max 178.0 cm/sec    LV V1 max PG 12.7 mmHg    LV V1 mean PG 7.0 mmHg    LV V1  VTI 36.4 cm    Ao pk tyler 217.0 cm/sec    Ao max PG 18.8 mmHg    Ao mean PG 10.0 mmHg    Ao V2 VTI 43.0 cm    MV max PG 4.2 mmHg    MV mean PG 2.00 mmHg    MV V2 VTI 38.1 cm    MV P1/2t 125.7 msec    MVA(P1/2t) 1.75 cm2    MV dec slope 240.0 cm/sec2    ACS 1.90 cm    EF(MOD-bp) 68.0 %   Respiratory Panel PCR w/COVID-19(SARS-CoV-2) PRATIMA/BRIANNA/VERONICA/PAD/COR/CARLOS In-House, NP Swab in UTM/VTM, 2 HR TAT - Swab, Nasopharynx    Collection Time: 12/26/23 12:34 PM    Specimen: Nasopharynx; Swab   Result Value Ref Range    ADENOVIRUS, PCR Not Detected Not Detected    Coronavirus 229E Not Detected Not Detected    Coronavirus HKU1 Not Detected Not Detected    Coronavirus NL63 Not Detected Not Detected    Coronavirus OC43 Not Detected Not Detected    COVID19 Not Detected Not Detected - Ref. Range    Human Metapneumovirus Not Detected Not Detected    Human Rhinovirus/Enterovirus Not Detected Not Detected    Influenza A PCR Not Detected Not Detected    Influenza B PCR Not Detected Not Detected    Parainfluenza Virus 1 Not Detected Not Detected    Parainfluenza Virus 2 Not Detected Not Detected    Parainfluenza Virus 3 Not Detected Not Detected    Parainfluenza Virus 4 Not Detected Not Detected    RSV, PCR Not Detected Not Detected    Bordetella pertussis pcr Not Detected Not Detected    Bordetella parapertussis PCR Not Detected Not Detected    Chlamydophila pneumoniae PCR Not Detected Not Detected    Mycoplasma pneumo by PCR Not Detected Not Detected   Basic Metabolic Panel    Collection Time: 12/27/23  3:58 AM    Specimen: Blood   Result Value Ref Range    Glucose 118 (H) 65 - 99 mg/dL    BUN 26 (H) 8 - 23 mg/dL    Creatinine 0.79 0.57 - 1.00 mg/dL    Sodium 140 136 - 145 mmol/L    Potassium 5.2 3.5 - 5.2 mmol/L    Chloride 101 98 - 107 mmol/L    CO2 29.0 22.0 - 29.0 mmol/L    Calcium 8.9 8.6 - 10.5 mg/dL    BUN/Creatinine Ratio 32.9 (H) 7.0 - 25.0    Anion Gap 10.0 5.0 - 15.0 mmol/L    eGFR 77.2 >60.0 mL/min/1.73   CBC Auto  "Differential    Collection Time: 12/27/23  3:58 AM    Specimen: Blood   Result Value Ref Range    WBC 13.30 (H) 3.40 - 10.80 10*3/mm3    RBC 4.67 3.77 - 5.28 10*6/mm3    Hemoglobin 13.9 12.0 - 15.9 g/dL    Hematocrit 42.8 34.0 - 46.6 %    MCV 91.6 79.0 - 97.0 fL    MCH 29.8 26.6 - 33.0 pg    MCHC 32.5 31.5 - 35.7 g/dL    RDW 14.7 12.3 - 15.4 %    RDW-SD 49.9 37.0 - 54.0 fl    MPV 7.3 6.0 - 12.0 fL    Platelets 249 140 - 450 10*3/mm3    Neutrophil % 88.6 (H) 42.7 - 76.0 %    Lymphocyte % 5.7 (L) 19.6 - 45.3 %    Monocyte % 5.6 5.0 - 12.0 %    Eosinophil % 0.0 (L) 0.3 - 6.2 %    Basophil % 0.1 0.0 - 1.5 %    Neutrophils, Absolute 11.80 (H) 1.70 - 7.00 10*3/mm3    Lymphocytes, Absolute 0.80 0.70 - 3.10 10*3/mm3    Monocytes, Absolute 0.70 0.10 - 0.90 10*3/mm3    Eosinophils, Absolute 0.00 0.00 - 0.40 10*3/mm3    Basophils, Absolute 0.00 0.00 - 0.20 10*3/mm3    nRBC 0.0 0.0 - 0.2 /100 WBC   Non-gynecologic Cytology    Collection Time: 12/27/23  7:55 AM    Specimen: Lung, R; Wash   Result Value Ref Range    Case Report       Medical Cytology Report                           Case: [id number]                                  Authorizing Provider:  Mayra Ramachandran MD             Collected:           12/27/2023 07:55 AM          Ordering Location:     [facility]  Received:            12/27/2023 10:22 AM                                 SUITES                                                                       Pathologist:           Kishan Grider MD                                                            Specimen:    Lung, R                                                                                    Final Diagnosis       Right lung, bronchial wash with smears and cytospin:  Acute inflammation, mature squamous cells, pulmonary macrophages and bronchial epithelial cells  No malignancy identified    FRANK      Gross Description       1. Lung, R.  Received in [employer] and designated \"bronchial washing, right " "lung\" are 35 mL of cloudy, blush colored fluid. Particulate matter is present. This specimen is processed as per protocol.         Fungus Culture - Wash, Lung, R    Collection Time: 12/27/23  7:55 AM    Specimen: Lung, R; Wash   Result Value Ref Range    Fungus Culture Candida species (A)     Fungus Culture Saprophytic Mold isolated (A)    AFB Culture - Wash, Lung, R    Collection Time: 12/27/23  7:55 AM    Specimen: Lung, R; Wash   Result Value Ref Range    AFB Culture No AFB isolated at 3 weeks     AFB Stain No acid fast bacilli seen on concentrated smear    Respiratory Culture - Wash, Lung, R    Collection Time: 12/27/23  7:55 AM    Specimen: Lung, R; Wash   Result Value Ref Range    Respiratory Culture       Light growth (2+) Normal respiratory gerardo. No S. aureus or Pseudomonas aeruginosa detected. Final report.    Gram Stain Moderate (3+) WBCs per low power field     Gram Stain Few (2+) Gram positive cocci in pairs     Gram Stain Rare (1+) Gram positive bacilli    Pneumocystis PCR - Wash, Lung, R    Collection Time: 12/27/23  7:55 AM    Specimen: Lung, R; Wash   Result Value Ref Range    Reference Lab Report See Attached Report     Pneumocystis Negative Negative - Ref. Range   Respiratory Panel PCR w/COVID-19(SARS-CoV-2) PRATIMA/BRIANNA/VERONICA/PAD/COR/CARLOS In-House, NP Swab in UTM/VTM, 2 HR TAT - Wash, Lung, R    Collection Time: 12/27/23  7:55 AM    Specimen: Lung, R; Wash   Result Value Ref Range    ADENOVIRUS, PCR Not Detected Not Detected    Coronavirus 229E Not Detected Not Detected    Coronavirus HKU1 Not Detected Not Detected    Coronavirus NL63 Not Detected Not Detected    Coronavirus OC43 Not Detected Not Detected    COVID19 Not Detected Not Detected - Ref. Range    Human Metapneumovirus Not Detected Not Detected    Human Rhinovirus/Enterovirus Not Detected Not Detected    Influenza A PCR Not Detected Not Detected    Influenza B PCR Not Detected Not Detected    Parainfluenza Virus 1 Not Detected Not Detected    " Parainfluenza Virus 2 Not Detected Not Detected    Parainfluenza Virus 3 Not Detected Not Detected    Parainfluenza Virus 4 Not Detected Not Detected    RSV, PCR Not Detected Not Detected    Bordetella pertussis pcr Not Detected Not Detected    Bordetella parapertussis PCR Not Detected Not Detected    Chlamydophila pneumoniae PCR Not Detected Not Detected    Mycoplasma pneumo by PCR Not Detected Not Detected   ECG 12 Lead Tachycardia    Collection Time: 12/27/23 12:30 PM   Result Value Ref Range    QT Interval 280 ms    QTC Interval 433 ms   ECG 12 Lead Drug Monitoring; Amiodarone    Collection Time: 12/27/23  4:10 PM   Result Value Ref Range    QT Interval 292 ms    QTC Interval 416 ms   High Sensitivity Troponin T 2Hr    Collection Time: 12/28/23  4:03 AM    Specimen: Arm, Left; Blood   Result Value Ref Range    HS Troponin T 19 (H) <14 ng/L    Troponin T Delta     Basic Metabolic Panel    Collection Time: 12/28/23  4:03 AM    Specimen: Arm, Left; Blood   Result Value Ref Range    Glucose 122 (H) 65 - 99 mg/dL    BUN 34 (H) 8 - 23 mg/dL    Creatinine 0.79 0.57 - 1.00 mg/dL    Sodium 139 136 - 145 mmol/L    Potassium 4.9 3.5 - 5.2 mmol/L    Chloride 99 98 - 107 mmol/L    CO2 31.0 (H) 22.0 - 29.0 mmol/L    Calcium 8.9 8.6 - 10.5 mg/dL    BUN/Creatinine Ratio 43.0 (H) 7.0 - 25.0    Anion Gap 9.0 5.0 - 15.0 mmol/L    eGFR 77.2 >60.0 mL/min/1.73   CBC Auto Differential    Collection Time: 12/28/23  4:03 AM    Specimen: Arm, Left; Blood   Result Value Ref Range    WBC 12.90 (H) 3.40 - 10.80 10*3/mm3    RBC 4.73 3.77 - 5.28 10*6/mm3    Hemoglobin 14.1 12.0 - 15.9 g/dL    Hematocrit 43.2 34.0 - 46.6 %    MCV 91.5 79.0 - 97.0 fL    MCH 29.8 26.6 - 33.0 pg    MCHC 32.6 31.5 - 35.7 g/dL    RDW 14.6 12.3 - 15.4 %    RDW-SD 49.4 37.0 - 54.0 fl    MPV 7.6 6.0 - 12.0 fL    Platelets 278 140 - 450 10*3/mm3    Neutrophil % 84.9 (H) 42.7 - 76.0 %    Lymphocyte % 8.1 (L) 19.6 - 45.3 %    Monocyte % 6.5 5.0 - 12.0 %    Eosinophil %  0.2 (L) 0.3 - 6.2 %    Basophil % 0.3 0.0 - 1.5 %    Neutrophils, Absolute 11.00 (H) 1.70 - 7.00 10*3/mm3    Lymphocytes, Absolute 1.10 0.70 - 3.10 10*3/mm3    Monocytes, Absolute 0.80 0.10 - 0.90 10*3/mm3    Eosinophils, Absolute 0.00 0.00 - 0.40 10*3/mm3    Basophils, Absolute 0.00 0.00 - 0.20 10*3/mm3    nRBC 0.0 0.0 - 0.2 /100 WBC   ECG 12 Lead Drug Monitoring; Amiodarone    Collection Time: 12/28/23  5:46 AM   Result Value Ref Range    QT Interval 321 ms    QTC Interval 406 ms   Basic Metabolic Panel    Collection Time: 12/29/23  4:58 AM    Specimen: Blood   Result Value Ref Range    Glucose 116 (H) 65 - 99 mg/dL    BUN 38 (H) 8 - 23 mg/dL    Creatinine 0.84 0.57 - 1.00 mg/dL    Sodium 139 136 - 145 mmol/L    Potassium 4.8 3.5 - 5.2 mmol/L    Chloride 99 98 - 107 mmol/L    CO2 32.0 (H) 22.0 - 29.0 mmol/L    Calcium 9.0 8.6 - 10.5 mg/dL    BUN/Creatinine Ratio 45.2 (H) 7.0 - 25.0    Anion Gap 8.0 5.0 - 15.0 mmol/L    eGFR 71.7 >60.0 mL/min/1.73   CBC Auto Differential    Collection Time: 12/29/23  4:58 AM    Specimen: Blood   Result Value Ref Range    WBC 12.50 (H) 3.40 - 10.80 10*3/mm3    RBC 4.86 3.77 - 5.28 10*6/mm3    Hemoglobin 14.4 12.0 - 15.9 g/dL    Hematocrit 44.1 34.0 - 46.6 %    MCV 90.8 79.0 - 97.0 fL    MCH 29.7 26.6 - 33.0 pg    MCHC 32.8 31.5 - 35.7 g/dL    RDW 15.1 12.3 - 15.4 %    RDW-SD 50.3 37.0 - 54.0 fl    MPV 7.2 6.0 - 12.0 fL    Platelets 289 140 - 450 10*3/mm3   Scan Slide    Collection Time: 12/29/23  4:58 AM    Specimen: Blood   Result Value Ref Range    Scan Slide     Manual Differential    Collection Time: 12/29/23  4:58 AM    Specimen: Blood   Result Value Ref Range    Neutrophil % 83.0 (H) 42.7 - 76.0 %    Lymphocyte % 8.0 (L) 19.6 - 45.3 %    Monocyte % 7.0 5.0 - 12.0 %    Bands %  2.0 0.0 - 5.0 %    Neutrophils Absolute 10.63 (H) 1.70 - 7.00 10*3/mm3    Lymphocytes Absolute 1.00 0.70 - 3.10 10*3/mm3    Monocytes Absolute 0.88 0.10 - 0.90 10*3/mm3    RBC Morphology Normal Normal     WBC Morphology Normal Normal    Platelet Morphology Normal Normal   ECG 12 Lead Drug Monitoring; Amiodarone    Collection Time: 12/29/23  6:28 AM   Result Value Ref Range    QT Interval 339 ms    QTC Interval 404 ms   ECG 12 Lead Drug Monitoring; Amiodarone    Collection Time: 12/30/23  6:45 AM   Result Value Ref Range    QT Interval 342 ms    QTC Interval 435 ms   Basic Metabolic Panel    Collection Time: 12/31/23 12:45 PM    Specimen: Blood   Result Value Ref Range    Glucose 123 (H) 65 - 99 mg/dL    BUN 49 (H) 8 - 23 mg/dL    Creatinine 0.96 0.57 - 1.00 mg/dL    Sodium 137 136 - 145 mmol/L    Potassium 4.6 3.5 - 5.2 mmol/L    Chloride 97 (L) 98 - 107 mmol/L    CO2 35.0 (H) 22.0 - 29.0 mmol/L    Calcium 9.3 8.6 - 10.5 mg/dL    BUN/Creatinine Ratio 51.0 (H) 7.0 - 25.0    Anion Gap 5.0 5.0 - 15.0 mmol/L    eGFR 61.1 >60.0 mL/min/1.73   Blood Gas, Arterial -    Collection Time: 12/31/23  2:27 PM    Specimen: Arterial Blood   Result Value Ref Range    Site Right Radial     Baltazar's Test Positive     pH, Arterial 7.462 (H) 7.350 - 7.450 pH units    pCO2, Arterial 41.1 35.0 - 48.0 mm Hg    pO2, Arterial 91.7 83.0 - 108.0 mm Hg    HCO3, Arterial 29.4 (H) 21.0 - 28.0 mmol/L    Base Excess, Arterial 5.0 (H) 0.0 - 3.0 mmol/L    O2 Saturation, Arterial 97.5 94.0 - 98.0 %    CO2 Content 30.7 (H) 22 - 29 mmol/L    Barometric Pressure for Blood Gas      Modality Cannula     FIO2 29 %    Hemodilution No    Blood Gas, Arterial -    Collection Time: 01/01/24  3:09 PM    Specimen: Arterial Blood   Result Value Ref Range    Site Right Brachial     Baltazar's Test N/A     pH, Arterial 7.442 7.350 - 7.450 pH units    pCO2, Arterial 45.2 35.0 - 48.0 mm Hg    pO2, Arterial 59.9 (L) 83.0 - 108.0 mm Hg    HCO3, Arterial 30.8 (H) 21.0 - 28.0 mmol/L    Base Excess, Arterial 5.6 (H) 0.0 - 3.0 mmol/L    O2 Saturation, Arterial 91.2 (L) 94.0 - 98.0 %    CO2 Content 32.2 (H) 22 - 29 mmol/L    Barometric Pressure for Blood Gas      Modality Room Air  "    FIO2 21 %    Hemodilution No    D-dimer, Quantitative    Collection Time: 01/01/24  3:25 PM    Specimen: Arm, Left; Blood   Result Value Ref Range    D-Dimer, Quantitative 0.47 0.00 - 0.77 mg/L (FEU)   ECG 12 Lead Dyspnea    Collection Time: 01/01/24  3:34 PM   Result Value Ref Range    QT Interval 335 ms    QTC Interval 403 ms         Review of Systems    Objective     /92   Pulse 111   Temp 98.5 °F (36.9 °C) (Infrared)   Ht 182.9 cm (72\")   Wt (!) 152 kg (335 lb)   SpO2 94%   BMI 45.43 kg/m²     Physical Exam  Vitals and nursing note reviewed.   Constitutional:       Appearance: Normal appearance.   HENT:      Head: Normocephalic.   Eyes:      Pupils: Pupils are equal, round, and reactive to light.   Cardiovascular:      Rate and Rhythm: Rhythm irregular.      Heart sounds: Murmur heard.   Pulmonary:      Effort: Pulmonary effort is normal.      Breath sounds: Normal breath sounds.   Neurological:      General: No focal deficit present.      Mental Status: She is alert and oriented to person, place, and time.   Psychiatric:         Mood and Affect: Mood normal.         Behavior: Behavior normal.         Thought Content: Thought content normal.         Judgment: Judgment normal.         Result Review :                Assessment & Plan    Diagnoses and all orders for this visit:    1. Hospital discharge follow-up (Primary)  Comments:  currently has home health care and PT>    2. Oral phase dysphagia  Comments:  stable    3. Chronic atrial fibrillation with rapid ventricular response  Comments:  followed by cardiology    4. Primary hypertension  Comments:  stable    5. Acute respiratory failure with hypoxia  Comments:  diagnosed with pneumonia. completed treatment.      Patient Instructions   Continue current medications.  Keep appointment with specialist.      Follow Up   Return for Next scheduled follow up.    Patient was given instructions and counseling regarding her condition or for health " maintenance advice. Please see specific information pulled into the AVS if appropriate.     Brinda Tony, APRN    01/22/24

## 2024-01-23 ENCOUNTER — HOME CARE VISIT (OUTPATIENT)
Dept: HOME HEALTH SERVICES | Facility: HOME HEALTHCARE | Age: 79
End: 2024-01-23
Payer: MEDICARE

## 2024-01-23 VITALS
HEART RATE: 83 BPM | OXYGEN SATURATION: 92 % | RESPIRATION RATE: 16 BRPM | TEMPERATURE: 98.2 F | DIASTOLIC BLOOD PRESSURE: 72 MMHG | SYSTOLIC BLOOD PRESSURE: 132 MMHG

## 2024-01-23 PROCEDURE — G0162 HHC RN E&M PLAN SVS, 15 MIN: HCPCS

## 2024-01-23 NOTE — HOME HEALTH
pt up and is prepared for PT session,   feeling fair but with limitations.    pt reports her strength and mobility are improving at this time but with continued limitations.  pt continues to ambulate only with the cg assistance and for short distance.      pt was limited today in all areas,  with weakness and fatigue  with low endurance with limited rom with hep review.    pt performed the requested activities but needed cues to remain on task and to perform within the correct plane.       pt was able to ambulate extended distance today,  but was fatigued to end gait trg and unalb to go any further.    pt was minimally unsteady upon standing but was able to self correct with the walker      plan for next session- cont PT with gait trg, hep review,  balance trg and transfer education all with advancement as able.

## 2024-01-23 NOTE — HOME HEALTH
Routine Visit Note:  Pt had PCP visit this week.  No changes.  Pt will see cardiology tomorrow.  Pt reports that she checks BP and SpO2 daily.  SBP has been 130-140 and SpO2 has been around 95%.  Pt reports that she was feeling constipated and took 1 dose of Miralax on Saturday and was able to have a BM and felt relief.  Pt uses wheelchair to move around home.  Pt reports that she uses  ACE wraps and aquaphor on BLE and has caregiver change them each day.  Pt does not have family local and nearest relatives are a niece and nephew.      Skill/education provided: CP assess, GI/ assess, Skin assess, Med ed and management    Patient/caregiver response: Pt able to reteach med ed r/t miralax    Plan for next visit: CP assess, Skin assess, GI/ assess, Med ed and management    Other pertinent info: FU cardiology appt this week

## 2024-01-24 ENCOUNTER — HOME CARE VISIT (OUTPATIENT)
Dept: HOME HEALTH SERVICES | Facility: HOME HEALTHCARE | Age: 79
End: 2024-01-24
Payer: MEDICARE

## 2024-01-24 ENCOUNTER — OFFICE VISIT (OUTPATIENT)
Dept: CARDIOLOGY | Facility: CLINIC | Age: 79
End: 2024-01-24
Payer: MEDICARE

## 2024-01-24 VITALS
HEIGHT: 72 IN | OXYGEN SATURATION: 94 % | HEART RATE: 96 BPM | BODY MASS INDEX: 39.68 KG/M2 | SYSTOLIC BLOOD PRESSURE: 162 MMHG | WEIGHT: 293 LBS | DIASTOLIC BLOOD PRESSURE: 90 MMHG | RESPIRATION RATE: 16 BRPM

## 2024-01-24 VITALS — HEART RATE: 105 BPM | DIASTOLIC BLOOD PRESSURE: 80 MMHG | OXYGEN SATURATION: 95 % | SYSTOLIC BLOOD PRESSURE: 120 MMHG

## 2024-01-24 DIAGNOSIS — I10 PRIMARY HYPERTENSION: Chronic | ICD-10-CM

## 2024-01-24 DIAGNOSIS — I48.20 CHRONIC ATRIAL FIBRILLATION WITH RAPID VENTRICULAR RESPONSE: ICD-10-CM

## 2024-01-24 DIAGNOSIS — I48.20 ATRIAL FIBRILLATION, CHRONIC: Primary | ICD-10-CM

## 2024-01-24 LAB
MYCOBACTERIUM SPEC CULT: NORMAL
NIGHT BLUE STAIN TISS: NORMAL

## 2024-01-24 PROCEDURE — G0152 HHCP-SERV OF OT,EA 15 MIN: HCPCS

## 2024-01-24 RX ORDER — METOPROLOL SUCCINATE 50 MG/1
100 TABLET, EXTENDED RELEASE ORAL DAILY
Qty: 90 TABLET | Refills: 3 | Status: SHIPPED | OUTPATIENT
Start: 2024-01-24 | End: 2025-01-23

## 2024-01-24 RX ORDER — AMIODARONE HYDROCHLORIDE 200 MG/1
200 TABLET ORAL DAILY
Qty: 90 TABLET | Refills: 3 | Status: SHIPPED | OUTPATIENT
Start: 2024-01-24
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
## 2024-01-25 ENCOUNTER — HOME CARE VISIT (OUTPATIENT)
Dept: HOME HEALTH SERVICES | Facility: HOME HEALTHCARE | Age: 79
End: 2024-01-25
Payer: MEDICARE

## 2024-01-25 PROCEDURE — G0157 HHC PT ASSISTANT EA 15: HCPCS

## 2024-01-25 NOTE — HOME HEALTH
pt sitting up and is prepared for PT session-     pt reporting she has been improving and is standing more often throughout the day,    and attempting hep review as tolerated.   pt denies any pain today       pt was educated today on proper posture and lumbar stab techniques.   pt was minimally unsteady upon standing but corrected with the walker.      pt displayed dyspnea and was cued to properly deep plb for energy conservation.       pt was cued on proper form with hep- with cues given to deep plb and to properly contract/hold within the correct plane.          plan for next session-  cont PT with gait trg, hep review, balance trg,  transfer education and advance as tolerated goals

## 2024-01-25 NOTE — HOME HEALTH
Routine Visit Note:    Skill/education provided: OT instr and educated pt on HEP    Patient/caregiver response: MIN assist    Plan for next visit: ther ex     Other pertinent info: pt denies any falls

## 2024-01-26 ENCOUNTER — HOME CARE VISIT (OUTPATIENT)
Dept: HOME HEALTH SERVICES | Facility: HOME HEALTHCARE | Age: 79
End: 2024-01-26
Payer: MEDICARE

## 2024-01-26 VITALS
HEART RATE: 84 BPM | SYSTOLIC BLOOD PRESSURE: 122 MMHG | OXYGEN SATURATION: 95 % | TEMPERATURE: 97 F | RESPIRATION RATE: 18 BRPM | DIASTOLIC BLOOD PRESSURE: 74 MMHG

## 2024-01-26 PROCEDURE — G0299 HHS/HOSPICE OF RN EA 15 MIN: HCPCS

## 2024-01-26 RX ORDER — LISINOPRIL 20 MG/1
20 TABLET ORAL DAILY
Qty: 90 TABLET | Refills: 0 | Status: SHIPPED | OUTPATIENT
Start: 2024-01-26

## 2024-01-26 NOTE — HOME HEALTH
Routine Visit Note:  pt. opened door upon SN arrival, to have EGD next Thursday, pt. doing well with medications very knowledgable, pt. states she saw PCP this week and cardiology (Dr. Brandt)     Skill/education provided: CP assess, pain assess, falls assess, atrial fibrillation    Patient/caregiver response: verbalized understanding of education on medication    Plan for next visit: follow up EGD, CP assess, pain assess, falls assess, medication assess, atrial fibrillation     Other pertinent info: n/a

## 2024-01-29 ENCOUNTER — HOME CARE VISIT (OUTPATIENT)
Dept: HOME HEALTH SERVICES | Facility: HOME HEALTHCARE | Age: 79
End: 2024-01-29
Payer: MEDICARE

## 2024-01-29 VITALS
OXYGEN SATURATION: 95 % | DIASTOLIC BLOOD PRESSURE: 68 MMHG | HEART RATE: 81 BPM | RESPIRATION RATE: 15 BRPM | SYSTOLIC BLOOD PRESSURE: 126 MMHG | TEMPERATURE: 98.2 F

## 2024-01-29 PROCEDURE — G0157 HHC PT ASSISTANT EA 15: HCPCS

## 2024-01-30 NOTE — HOME HEALTH
pt sitting up and is prepared for PT session-     feeling fair but with limited strength and functional capacity.        pt reporting she is scheduled to have her EGD on 2/1/24 and will move her PT visit moved due to this.   pt reporting she was taken off Eliquis until the procedure is completed.      pt reports no falls       pt remains compliant throughout PT session but was able to perform the requested activities,  and gait tolerance was improved as well.  pt was better able to transfer to standing and tolerance to ther ex continues to improve.   hr elevated to 120 following gait trg.               plan for next session-  cont PT with gait trg, hep review with advancement and education as needed.
Statement Selected

## 2024-01-31 ENCOUNTER — HOME CARE VISIT (OUTPATIENT)
Dept: HOME HEALTH SERVICES | Facility: HOME HEALTHCARE | Age: 79
End: 2024-01-31
Payer: MEDICARE

## 2024-01-31 VITALS — OXYGEN SATURATION: 96 % | HEART RATE: 80 BPM | SYSTOLIC BLOOD PRESSURE: 124 MMHG | DIASTOLIC BLOOD PRESSURE: 80 MMHG

## 2024-01-31 LAB
MYCOBACTERIUM SPEC CULT: NORMAL
NIGHT BLUE STAIN TISS: NORMAL

## 2024-01-31 PROCEDURE — G0152 HHCP-SERV OF OT,EA 15 MIN: HCPCS

## 2024-01-31 PROCEDURE — G0157 HHC PT ASSISTANT EA 15: HCPCS

## 2024-02-01 ENCOUNTER — ANESTHESIA (OUTPATIENT)
Dept: GASTROENTEROLOGY | Facility: HOSPITAL | Age: 79
End: 2024-02-01
Payer: MEDICARE

## 2024-02-01 ENCOUNTER — DOCUMENTATION (OUTPATIENT)
Dept: HOME HEALTH SERVICES | Facility: HOME HEALTHCARE | Age: 79
End: 2024-02-01
Payer: MEDICARE

## 2024-02-01 ENCOUNTER — HOSPITAL ENCOUNTER (OUTPATIENT)
Facility: HOSPITAL | Age: 79
Setting detail: HOSPITAL OUTPATIENT SURGERY
Discharge: HOME | End: 2024-02-01
Attending: INTERNAL MEDICINE | Admitting: INTERNAL MEDICINE
Payer: MEDICARE

## 2024-02-01 ENCOUNTER — ON CAMPUS - OUTPATIENT (AMBULATORY)
Dept: URBAN - METROPOLITAN AREA HOSPITAL 85 | Facility: HOSPITAL | Age: 79
End: 2024-02-01

## 2024-02-01 ENCOUNTER — ANESTHESIA EVENT (OUTPATIENT)
Dept: GASTROENTEROLOGY | Facility: HOSPITAL | Age: 79
End: 2024-02-01
Payer: MEDICARE

## 2024-02-01 VITALS
OXYGEN SATURATION: 92 % | HEART RATE: 99 BPM | SYSTOLIC BLOOD PRESSURE: 132 MMHG | DIASTOLIC BLOOD PRESSURE: 82 MMHG | TEMPERATURE: 97.5 F

## 2024-02-01 VITALS
BODY MASS INDEX: 39.68 KG/M2 | DIASTOLIC BLOOD PRESSURE: 82 MMHG | TEMPERATURE: 98.6 F | OXYGEN SATURATION: 95 % | SYSTOLIC BLOOD PRESSURE: 124 MMHG | RESPIRATION RATE: 16 BRPM | HEIGHT: 72 IN | HEART RATE: 79 BPM | WEIGHT: 293 LBS

## 2024-02-01 DIAGNOSIS — T18.2XXA FOREIGN BODY IN STOMACH, INITIAL ENCOUNTER: ICD-10-CM

## 2024-02-01 DIAGNOSIS — K21.9 GASTRO-ESOPHAGEAL REFLUX DISEASE WITHOUT ESOPHAGITIS: ICD-10-CM

## 2024-02-01 DIAGNOSIS — K22.2 ESOPHAGEAL OBSTRUCTION: ICD-10-CM

## 2024-02-01 DIAGNOSIS — K44.9 DIAPHRAGMATIC HERNIA WITHOUT OBSTRUCTION OR GANGRENE: ICD-10-CM

## 2024-02-01 PROCEDURE — 43450 DILATE ESOPHAGUS 1/MULT PASS: CPT | Performed by: INTERNAL MEDICINE

## 2024-02-01 PROCEDURE — 25810000003 SODIUM CHLORIDE 0.9 % SOLUTION: Performed by: NURSE ANESTHETIST, CERTIFIED REGISTERED

## 2024-02-01 PROCEDURE — 43235 EGD DIAGNOSTIC BRUSH WASH: CPT | Performed by: INTERNAL MEDICINE

## 2024-02-01 PROCEDURE — 25010000002 PROPOFOL 200 MG/20ML EMULSION: Performed by: NURSE ANESTHETIST, CERTIFIED REGISTERED

## 2024-02-01 RX ORDER — ONDANSETRON 2 MG/ML
4 INJECTION INTRAMUSCULAR; INTRAVENOUS ONCE AS NEEDED
Status: DISCONTINUED | OUTPATIENT
Start: 2024-02-01 | End: 2024-02-01 | Stop reason: HOSPADM

## 2024-02-01 RX ORDER — LABETALOL HYDROCHLORIDE 5 MG/ML
5 INJECTION, SOLUTION INTRAVENOUS
Status: DISCONTINUED | OUTPATIENT
Start: 2024-02-01 | End: 2024-02-01 | Stop reason: HOSPADM

## 2024-02-01 RX ORDER — DIPHENHYDRAMINE HYDROCHLORIDE 50 MG/ML
12.5 INJECTION INTRAMUSCULAR; INTRAVENOUS
Status: DISCONTINUED | OUTPATIENT
Start: 2024-02-01 | End: 2024-02-01 | Stop reason: HOSPADM

## 2024-02-01 RX ORDER — PROPOFOL 10 MG/ML
INJECTION, EMULSION INTRAVENOUS AS NEEDED
Status: DISCONTINUED | OUTPATIENT
Start: 2024-02-01 | End: 2024-02-01 | Stop reason: SURG

## 2024-02-01 RX ORDER — HYDRALAZINE HYDROCHLORIDE 20 MG/ML
5 INJECTION INTRAMUSCULAR; INTRAVENOUS
Status: DISCONTINUED | OUTPATIENT
Start: 2024-02-01 | End: 2024-02-01 | Stop reason: HOSPADM

## 2024-02-01 RX ORDER — EPHEDRINE SULFATE 5 MG/ML
5 INJECTION INTRAVENOUS ONCE AS NEEDED
Status: DISCONTINUED | OUTPATIENT
Start: 2024-02-01 | End: 2024-02-01 | Stop reason: HOSPADM

## 2024-02-01 RX ORDER — LIDOCAINE HYDROCHLORIDE 20 MG/ML
INJECTION, SOLUTION EPIDURAL; INFILTRATION; INTRACAUDAL; PERINEURAL AS NEEDED
Status: DISCONTINUED | OUTPATIENT
Start: 2024-02-01 | End: 2024-02-01 | Stop reason: SURG

## 2024-02-01 RX ORDER — IPRATROPIUM BROMIDE AND ALBUTEROL SULFATE 2.5; .5 MG/3ML; MG/3ML
3 SOLUTION RESPIRATORY (INHALATION) ONCE AS NEEDED
Status: DISCONTINUED | OUTPATIENT
Start: 2024-02-01 | End: 2024-02-01 | Stop reason: HOSPADM

## 2024-02-01 RX ORDER — SODIUM CHLORIDE 9 MG/ML
INJECTION, SOLUTION INTRAVENOUS CONTINUOUS PRN
Status: DISCONTINUED | OUTPATIENT
Start: 2024-02-01 | End: 2024-02-01 | Stop reason: SURG

## 2024-02-01 RX ADMIN — PROPOFOL 50 MG: 10 INJECTION, EMULSION INTRAVENOUS at 09:31

## 2024-02-01 RX ADMIN — LIDOCAINE HYDROCHLORIDE 60 MG: 20 INJECTION, SOLUTION EPIDURAL; INFILTRATION; INTRACAUDAL; PERINEURAL at 09:26

## 2024-02-01 RX ADMIN — SODIUM CHLORIDE: 9 INJECTION, SOLUTION INTRAVENOUS at 09:17

## 2024-02-01 RX ADMIN — PROPOFOL 50 MG: 10 INJECTION, EMULSION INTRAVENOUS at 09:29

## 2024-02-01 RX ADMIN — PROPOFOL 100 MG: 10 INJECTION, EMULSION INTRAVENOUS at 09:26

## 2024-02-01 NOTE — ANESTHESIA POSTPROCEDURE EVALUATION
Patient: Camelia Gray    Procedure Summary       Date: 02/01/24 Room / Location: [facility] ENDOSCOPY 1 / [facility] ENDOSCOPY    Anesthesia Start: 0917 Anesthesia Stop: 0934    Procedure: ESOPHAGOGASTRODUODENOSCOPY, esophageal dilation (40-46 Fr non-guided Bougie) Diagnosis:       Large hiatal hernia      Aspiration pneumonia      Cough      A-fib      Constipation      (Large hiatal hernia [K44.9])      (Aspiration pneumonia [J69.0])      (Cough [R05.9])      (A-fib [I48.91])      (Constipation [K59.00])    Surgeons: Torsten Johnson MD Provider: Tawny Kuhn MD    Anesthesia Type: general ASA Status: 3            Anesthesia Type: general    Vitals  Vitals Value Taken Time   /82 02/01/24 0958   Temp     Pulse 91 02/01/24 1000   Resp 16 02/01/24 0957   SpO2 95 % 02/01/24 0959   Vitals shown include unfiled device data.        Post Anesthesia Care and Evaluation    Patient location during evaluation: PACU  Patient participation: complete - patient participated  Level of consciousness: awake and alert  Pain management: satisfactory to patient    Airway patency: patent  Anesthetic complications: No anesthetic complications  PONV Status: none  Cardiovascular status: acceptable  Respiratory status: acceptable  Hydration status: acceptable

## 2024-02-01 NOTE — ANESTHESIA PREPROCEDURE EVALUATION
Anesthesia Evaluation     Patient summary reviewed and Nursing notes reviewed   no history of anesthetic complications:   NPO Solid Status: > 8 hours  NPO Liquid Status: > 8 hours           Airway   Mallampati: II  TM distance: >3 FB  Neck ROM: full  No difficulty expected  Dental      Pulmonary - normal exam   (+) pulmonary embolism, a smoker Former,shortness of breath  Cardiovascular - normal exam    ECG reviewed  PT is on anticoagulation therapy  Patient on routine beta blocker    (+) hypertension, valvular problems/murmurs AS, dysrhythmias Atrial Fib, DVT resolved      Neuro/Psych  (+) weakness  GI/Hepatic/Renal/Endo    (+) morbid obesity, hiatal hernia, GERD, renal disease- stones, thyroid problem hypothyroidism    Musculoskeletal     Abdominal   (+) obese   Substance History      OB/GYN          Other   arthritis,     ROS/Med Hx Other: Lymphedema, cellulitis, foot ulcer, venous status, immobility, stasis dermatitis, cystitis, hematuria, porcelain gallbladder, esophageal stricture, thrombophilia    Echo  Findings: A trileaflet aortic valve is not well visualized though cusp  separation fears mildly decreased on M-mode with adequate coaptation.  The mitral tricuspid valves are structure normal in both demonstrate  satisfactory diastolic leaflet excursion.  Pulmonic level not well visualized.  RV dimension systolic function within normal limits.  LV chamber size wall thickness and global contractility satisfactory with no  segmental wall motion abnormalities; LVEF 60+ percent  Normal aortic root left atrial chamber dimensions.  There is no pericardial effusion intracardiac thrombus vegetation or mass  identified.     Supplementary Doppler exam showed normal aortic mitral valve for velocity with  no significant regurgitant jets. RV systolic pressures quantitated at 19 mm  Hg.     IMPRESSIONS  SOFY WITH LIMITED ACOUSTICAL WINDOWS SHOWS NORMAL CHAMBER DIMENSIONS WITH GOOD  GLOBAL CONTRACTILITY; LVEF 60+  PERCENT.  AORTIC VALVE NOT WELL VISUALIZED THOUGH MITRAL AND TRICUSPID VALVES ARE  STRUCTURE NORMAL.  PULMONIC VALVE NOT WELL VISUALIZED.  DOPPLER EXAM SHOWS NO SIGNIFICANT ABNORMALITIES; RV SP QUANTITATED 19 MM HG.    PSH  TOE SURGERY DENTAL EXAMINATION UNDER ANESTHESIA  TOTAL HIP ARTHROPLASTY CATARACT EXTRACTION  JOINT REPLACEMENT CORRECTION HAMMER TOE  CYSTOSCOPY, URETEROSCOPY, RETROGRADE PYELOGRAM, STENT INSERTION ENDOSCOPY                Anesthesia Plan    ASA 3     general   total IV anesthesia  (Patient identified; pre-operative vital signs, all relevant labs/studies, complete medical/surgical/anesthetic history, full medication list, full allergy list, and NPO status obtained/reviewed; physical assessment performed; anesthetic options, side effects, potential complications, risks, and benefits discussed; questions answered; written anesthesia consent obtained; patient cleared for procedure; anesthesia machine and equipment checked and functioning)  intravenous induction     Anesthetic plan, risks, benefits, and alternatives have been provided, discussed and informed consent has been obtained with: patient.    Plan discussed with CRNA.

## 2024-02-01 NOTE — H&P
GI PREOPERATIVE HISTORY AND PHYSICAL:    Referring Provider:    Brinda Tony APRN    Chief complaint: Gastroesophageal reflux disease, large hiatal hernia    Subjective .     History of present illness:      Camelia Gray is a 78 y.o. female who presents today for Procedure(s):  ESOPHAGOGASTRODUODENOSCOPY for the indications listed below.     Gastroesophageal reflux disease, large hiatal hernia    The updated Patient Profile was reviewed prior to the procedure, in conjunction with the Physical Exam, including medical conditions, surgical procedures, medications, allergies, family history and social history.     Pre-operatively, I reviewed the indication(s) for the procedure, the risks of the procedure [including but not limited to: unexpected bleeding possibly requiring hospitalization and/or unplanned repeat procedures, perforation possibly requiring surgical treatment, missed lesions and complications of sedation/MAC (also explained by anesthesia staff)].     I have evaluated the patient for risks associated with the planned anesthesia and the procedure to be performed and find the patient an acceptable candidate for IV sedation.    Multiple opportunities were provided for any questions or concerns, and all questions were answered satisfactorily before any anesthesia was administered. We will proceed with the planned procedure.    Past Medical History:  Past Medical History:   Diagnosis Date    A-fib     Arthritis     Cellulitis of both feet 10/13/2019    Decubitus ulcer of buttock, stage 2 10/14/2019    HEALED    Dermatitis associated with moisture 10/14/2019    Disease of thyroid gland     HYPOTHYROID    Edema of lower extremity 05/31/2017    Santos catheter in place     Hypertension     Hypothyroidism     Immobility syndrome 10/13/2019    Lymphedema 10/14/2019    LEGS    Lymphedema     Muscle weakness (generalized)     Non-pressure chronic ulcer of other part of left foot with fat layer exposed 10/14/2019     Non-pressure chronic ulcer of other part of right foot with fat layer exposed 10/14/2019    Obesity     Pulmonary embolism     Stasis dermatitis 2013    Ureteral calculi     Urinary tract infection 2020    Hosp. 2020    Venous stasis 10/14/2019    Yeast infection of the skin        Past Surgical History:  Past Surgical History:   Procedure Laterality Date    BRONCHOSCOPY N/A 2023    Procedure: BRONCHOSCOPY with right lung washing;  Surgeon: Mayra Ramachandran MD;  Location: [facility] ENDOSCOPY;  Service: Pulmonary;  Laterality: N/A;  Post- Pneumonia    CATARACT EXTRACTION Bilateral     CHOLECYSTECTOMY N/A 2020    Procedure: CHOLECYSTECTOMY LAPAROSCOPIC;  Surgeon: George Crocker DO;  Location: [facility] MAIN OR;  Service: General;  Laterality: N/A;    CORRECTION HAMMER TOE Bilateral     CYSTOSCOPY, URETEROSCOPY, RETROGRADE PYELOGRAM, STENT INSERTION Bilateral 2020    Procedure: CYSTOSCOPY BILATERAL RETROGRADE PYELOGRAM,;  Surgeon: Shawn Hernandez MD;  Location: [facility] MAIN OR;  Service: Urology;  Laterality: Bilateral;    DENTAL EXAMINATION UNDER ANESTHESIA      ENDOSCOPY N/A 2020    Procedure: ESOPHAGOGASTRODUODENOSCOPY;  Surgeon: Torsten Johnson MD;  Location: [facility] ENDOSCOPY;  Service: Gastroenterology;  Laterality: N/A;  post: esophageal stricture, reflux esophagitis, hiatal hernia    JOINT REPLACEMENT      Bilateral knees    TOE SURGERY      TONSILLECTOMY      TOTAL HIP ARTHROPLASTY Left 2020    Procedure: TOTAL HIP ARTHROPLASTY;  Surgeon: Baljit Hutchins II, MD;  Location: [facility] MAIN OR;  Service: Orthopedics;  Laterality: Left;       Social History:  Social History     Tobacco Use    Smoking status: Former     Packs/day: 0.25     Years: 25.00     Additional pack years: 0.00     Total pack years: 6.25     Types: Cigarettes     Start date: 10/1/1964     Quit date: 1992     Years since quittin.1    Smokeless tobacco: Never   Vaping Use    Vaping Use:  Never used   Substance Use Topics    Alcohol use: No    Drug use: No       Family History:  Family History   Problem Relation Age of Onset    Heart disease Mother     Hypertension Mother     Heart disease Father     Hypertension Father     Kidney disease Father     COPD Father        Medications:  No medications prior to admission.       Scheduled Meds:  Continuous Infusions:No current facility-administered medications for this encounter.    PRN Meds:.    ALLERGIES:  Patient has no known allergies.    ROS:  The following systems were reviewed and negative;   Constitution:  No fevers, chills, no unintentional weight loss  Skin: no rash, no jaundice  Eyes:  No blurry vision, no eye pain  HENT:  No change in hearing or smell  Resp:  No dyspnea or cough  CV:  No chest pain or palpitations  :  No dysuria, hematuria  Musculoskeletal:  No leg cramps or arthralgias  Neuro:  No tremor, no numbness  Psych:  No depression or confsuion    Objective     Vital Signs:   There were no vitals filed for this visit.    Physical Exam:       General Appearance:    Awake and alert, in no acute distress   Head:    Normocephalic, without obvious abnormality, atraumatic   Throat:   No oral lesions, no thrush, oral mucosa moist   Lungs  Cardiac:  Abdomen:  Extremities:     Respirations regular, even and unlabored    Regular rate and rhythm, no murmur, gallop, rub    Non-distended, good bowel sounds, non tender, no masses     No edema, pulses 2+   Skin:   No rash, no jaundice       Results Review:  Lab Results (last 24 hours)       ** No results found for the last 24 hours. **            Imaging Results (Last 24 Hours)       ** No results found for the last 24 hours. **             I reviewed the patient's labs and imaging.    ASSESSMENT AND PLAN:  Gastroesophageal reflux disease, large hiatal hernia    Active Problems:    * No active hospital problems. *       Procedure(s):  ESOPHAGOGASTRODUODENOSCOPY      I discussed the patients findings  and my recommendations with the patient.    Torsten Johnson MD  02/01/24  07:10 EST

## 2024-02-01 NOTE — DISCHARGE INSTRUCTIONS
A responsible adult should stay with you and you should rest quietly for the rest of the day.    Do not drink alcohol, drive, operate any heavy machinery or power tools or make any legal/important decisions for the next 24 hours.     Progress your diet as tolerated.  If you begin to experience severe pain, increased shortness of breath, racing heartbeat or a fever above 101 F, seek immediate medical attention.     Follow up with MD as instructed. Call office for results in 3 to 5 days if needed.    [phone]    Impression:  1.  Esophageal stricture status post 46 French Ye dilation  2.  Large hiatal hernia with retained food in the hiatal hernia sac     Recommendations:  1.  She does not have a clinically significant esophageal stricture.  I would not recommend repeat EGD with dilation.  2.  I would recommend a low residue diet because the majority of her reflux and or aspiration is likely related to regurgitation of food from her hiatal hernia sac.  3.  She is not a candidate for hiatal hernia repair so a low residue diet is her best treatment option.  4.  Follow-up in my office as needed

## 2024-02-01 NOTE — OP NOTE
ESOPHAGOGASTRODUODENOSCOPY Procedure Report    Patient Name:  Camelia Gray  YOB: 1945    Date of Surgery:  2/1/2024     Pre-Op Diagnosis:  1.  Gastroesophageal reflux disease  2.  Large hiatal hernia    Post-Op Diagnosis:  1.  Esophageal stricture status post 46 Kittitian Ye dilation  2.  Large hiatal hernia with retained food in the hiatal hernia sac       Procedure/CPT® Codes:      Procedure(s):  ESOPHAGOGASTRODUODENOSCOPY, esophageal dilation (40-46 Fr non-guided Bougie)    Staff:  Surgeon(s):  Torsten Johnson MD      Anesthesia: Monitored Anesthesia Care    Description of Procedure:  A description of the procedure as well as risks, benefits and alternative methods were explained to the patient who voiced understanding and signed the corresponding consent form. A physical exam was performed and vital signs were monitored throughout the procedure.    After informed consent was obtained, the patient was placed in the left lateral decubitus position and sedated as above.  The Olympus video gastroscope was inserted into the oropharynx and the esophagus was intubated without difficulty.  Examination of the esophagus, stomach, pylorus, duodenal bulb, and second portion of the duodenum was performed without difficulty. The scope was then retroflexed and the fundus was visualized. The procedure was not difficult and there were no immediate complications.  There was no blood loss.    Findings:  Esophagus: Peptic appearing stricture noted at the GE junction.  40 Kittitian and 46 Kittitian Ye dilators passed with no resistance felt and no mucosal trauma noted.  I did not feel that the esophageal stricture was clinically significant.  Stomach: Large hiatal hernia with large amount of retained food retained within the hiatal hernia sac.  Duodenum: Normal    Impression:  1.  Esophageal stricture status post 46 Kittitian Ye dilation  2.  Large hiatal hernia with retained food in the hiatal hernia  sac    Recommendations:  1.  She does not have a clinically significant esophageal stricture.  I would not recommend repeat EGD with dilation.  2.  I would recommend a low residue diet because the majority of her reflux and or aspiration is likely related to regurgitation of food from her hiatal hernia sac.  3.  She is not a candidate for hiatal hernia repair so a low residue diet is her best treatment option.  4.  Follow-up in my office as needed      Torsten Johnson MD     Date: 2/1/2024    Time: 09:34 EST      .

## 2024-02-02 ENCOUNTER — HOME CARE VISIT (OUTPATIENT)
Dept: HOME HEALTH SERVICES | Facility: HOME HEALTHCARE | Age: 79
End: 2024-02-02
Payer: MEDICARE

## 2024-02-02 VITALS
TEMPERATURE: 97.8 F | DIASTOLIC BLOOD PRESSURE: 68 MMHG | SYSTOLIC BLOOD PRESSURE: 118 MMHG | RESPIRATION RATE: 18 BRPM | HEART RATE: 76 BPM | OXYGEN SATURATION: 94 %

## 2024-02-02 PROCEDURE — G0299 HHS/HOSPICE OF RN EA 15 MIN: HCPCS

## 2024-02-02 NOTE — HOME HEALTH
Routine Visit Note:    Skill/education provided: OT further instr and educated pt on HEP/ther ex    Patient/caregiver response: MIN assist    Plan for next visit: ther ex    Other pertinent info: pt denies any falls or med changes

## 2024-02-02 NOTE — HOME HEALTH
Routine Visit Note: Medications reviewed. Appetite well. No issues with elimination. CP assessed.     Skill/education provided: Vital signs stable. No wounds present. Cardiopulmonary assessment completed. Patient denies falls and pain. Education provided about low fiber diet and high risk medications. No edema present.     Patient/caregiver response: Patient verbilized understanding.     Plan for next visit: Vital signs, review medications, cardiopulmonary assessment, assess falls and pain, cp assess, assess diet    Other pertinent info: na

## 2024-02-05 ENCOUNTER — HOME CARE VISIT (OUTPATIENT)
Dept: HOME HEALTH SERVICES | Facility: HOME HEALTHCARE | Age: 79
End: 2024-02-05
Payer: MEDICARE

## 2024-02-05 VITALS
DIASTOLIC BLOOD PRESSURE: 82 MMHG | TEMPERATURE: 97.7 F | SYSTOLIC BLOOD PRESSURE: 134 MMHG | HEART RATE: 75 BPM | OXYGEN SATURATION: 98 %

## 2024-02-05 PROCEDURE — G0151 HHCP-SERV OF PT,EA 15 MIN: HCPCS

## 2024-02-05 RX ORDER — APIXABAN 5 MG/1
5 TABLET, FILM COATED ORAL 2 TIMES DAILY
Qty: 60 TABLET | Refills: 0 | OUTPATIENT
Start: 2024-02-05

## 2024-02-05 RX ORDER — TRIAMTERENE AND HYDROCHLOROTHIAZIDE 37.5; 25 MG/1; MG/1
1 TABLET ORAL DAILY
Qty: 30 TABLET | Refills: 0 | Status: SHIPPED | OUTPATIENT
Start: 2024-02-05

## 2024-02-06 ENCOUNTER — HOME CARE VISIT (OUTPATIENT)
Dept: HOME HEALTH SERVICES | Facility: HOME HEALTHCARE | Age: 79
End: 2024-02-06
Payer: MEDICARE

## 2024-02-06 ENCOUNTER — TELEPHONE (OUTPATIENT)
Dept: CARDIOLOGY | Facility: CLINIC | Age: 79
End: 2024-02-06
Payer: MEDICARE

## 2024-02-06 VITALS
TEMPERATURE: 97.7 F | HEART RATE: 111 BPM | RESPIRATION RATE: 16 BRPM | DIASTOLIC BLOOD PRESSURE: 84 MMHG | OXYGEN SATURATION: 94 % | SYSTOLIC BLOOD PRESSURE: 124 MMHG

## 2024-02-06 DIAGNOSIS — I48.91 ATRIAL FIBRILLATION WITH RVR: Primary | ICD-10-CM

## 2024-02-06 DIAGNOSIS — I48.20 ATRIAL FIBRILLATION, CHRONIC: Primary | ICD-10-CM

## 2024-02-06 DIAGNOSIS — I48.91 ATRIAL FIBRILLATION, UNSPECIFIED TYPE: Primary | ICD-10-CM

## 2024-02-06 PROCEDURE — G0162 HHC RN E&M PLAN SVS, 15 MIN: HCPCS

## 2024-02-06 RX ORDER — APIXABAN 5 MG/1
5 TABLET, FILM COATED ORAL 2 TIMES DAILY
Qty: 60 TABLET | Refills: 0 | Status: SHIPPED | OUTPATIENT
Start: 2024-02-06 | End: 2024-02-08

## 2024-02-06 NOTE — HOME HEALTH
Routine Visit Note: Pt reports improved mobility.    Skill/education provided: thera ex, transfer training, balance and gait training.     Patient/caregiver response: tolerated with improved tech for balance and gait.     Plan for next visit: thera ex, balance and gait training. Discussed possible PT DC on next visit.

## 2024-02-06 NOTE — TELEPHONE ENCOUNTER
Dr. Quiñonez,  Do you still want the JAHAIRA with Cardioversion on pt?  If so, can you please place orders for the JAHAIRA?  Please advise.

## 2024-02-06 NOTE — TELEPHONE ENCOUNTER
Called patient to discuss JAHAIRA/CV that is scheduled on 02/08/2024. Please transfer patient to office to speak to Deborah.

## 2024-02-06 NOTE — HOME HEALTH
Routine Visit Note:  Pt had EGD that showed hiatal hernia and stricture.  Pt had several questions about dietary information provided by [facility].  SN and pt reviewed dietary recommendations.  Pt was able to reteach dietary information.  Pt reports that she bumped her right outer foot and wanted SN to check.  No injury noted.  Teaching about foot care and daily foot checks.  Pt was able to reteach information.      Skill/education provided: CP assess, Skin assess, Dietary ed, Med ed and management    Patient/caregiver response: Pt able to reteach ed as noted above    Plan for next visit: CP assess, Skin assess, Med ed and management    Other pertinent info: Pt has cardiology appt in March to discuss JAHAIRA and cardioversion for Afib

## 2024-02-07 ENCOUNTER — HOME CARE VISIT (OUTPATIENT)
Dept: HOME HEALTH SERVICES | Facility: HOME HEALTHCARE | Age: 79
End: 2024-02-07
Payer: MEDICARE

## 2024-02-07 VITALS — OXYGEN SATURATION: 96 % | DIASTOLIC BLOOD PRESSURE: 83 MMHG | SYSTOLIC BLOOD PRESSURE: 124 MMHG | HEART RATE: 113 BPM

## 2024-02-07 VITALS
HEART RATE: 88 BPM | OXYGEN SATURATION: 97 % | SYSTOLIC BLOOD PRESSURE: 134 MMHG | TEMPERATURE: 98.2 F | DIASTOLIC BLOOD PRESSURE: 84 MMHG

## 2024-02-07 LAB
MYCOBACTERIUM SPEC CULT: NORMAL
NIGHT BLUE STAIN TISS: NORMAL

## 2024-02-07 PROCEDURE — G0152 HHCP-SERV OF OT,EA 15 MIN: HCPCS

## 2024-02-07 PROCEDURE — G0151 HHCP-SERV OF PT,EA 15 MIN: HCPCS

## 2024-02-08 ENCOUNTER — HOSPITAL ENCOUNTER (OUTPATIENT)
Dept: CARDIOLOGY | Facility: HOSPITAL | Age: 79
Discharge: HOME | End: 2024-02-08
Payer: MEDICARE

## 2024-02-08 ENCOUNTER — ANESTHESIA (OUTPATIENT)
Dept: CARDIOLOGY | Facility: HOSPITAL | Age: 79
End: 2024-02-08
Payer: MEDICARE

## 2024-02-08 ENCOUNTER — ANESTHESIA EVENT (OUTPATIENT)
Dept: CARDIOLOGY | Facility: HOSPITAL | Age: 79
End: 2024-02-08
Payer: MEDICARE

## 2024-02-08 VITALS
HEART RATE: 55 BPM | OXYGEN SATURATION: 95 % | RESPIRATION RATE: 19 BRPM | DIASTOLIC BLOOD PRESSURE: 68 MMHG | BODY MASS INDEX: 50.02 KG/M2 | WEIGHT: 293 LBS | SYSTOLIC BLOOD PRESSURE: 132 MMHG | TEMPERATURE: 97.2 F | HEIGHT: 64 IN

## 2024-02-08 DIAGNOSIS — I48.20 ATRIAL FIBRILLATION, CHRONIC: ICD-10-CM

## 2024-02-08 LAB
BH CV ECHO SHUNT ASSESSMENT PERFORMED (HIDDEN SCRIPTING): 1
QT INTERVAL: 446 MS
QTC INTERVAL: 379 MS

## 2024-02-08 PROCEDURE — 93312 ECHO TRANSESOPHAGEAL: CPT

## 2024-02-08 PROCEDURE — 92960 CARDIOVERSION ELECTRIC EXT: CPT

## 2024-02-08 PROCEDURE — 25010000002 PROPOFOL 200 MG/20ML EMULSION: Performed by: NURSE ANESTHETIST, CERTIFIED REGISTERED

## 2024-02-08 PROCEDURE — 25810000003 SODIUM CHLORIDE 0.9 % SOLUTION: Performed by: STUDENT IN AN ORGANIZED HEALTH CARE EDUCATION/TRAINING PROGRAM

## 2024-02-08 PROCEDURE — 93325 DOPPLER ECHO COLOR FLOW MAPG: CPT

## 2024-02-08 PROCEDURE — 93005 ELECTROCARDIOGRAM TRACING: CPT | Performed by: STUDENT IN AN ORGANIZED HEALTH CARE EDUCATION/TRAINING PROGRAM

## 2024-02-08 PROCEDURE — 93320 DOPPLER ECHO COMPLETE: CPT

## 2024-02-08 RX ORDER — EPHEDRINE SULFATE 5 MG/ML
5 INJECTION INTRAVENOUS ONCE AS NEEDED
OUTPATIENT
Start: 2024-02-08

## 2024-02-08 RX ORDER — DIPHENHYDRAMINE HYDROCHLORIDE 50 MG/ML
12.5 INJECTION INTRAMUSCULAR; INTRAVENOUS
OUTPATIENT
Start: 2024-02-08

## 2024-02-08 RX ORDER — HYDRALAZINE HYDROCHLORIDE 20 MG/ML
5 INJECTION INTRAMUSCULAR; INTRAVENOUS
OUTPATIENT
Start: 2024-02-08

## 2024-02-08 RX ORDER — APIXABAN 5 MG/1
5 TABLET, FILM COATED ORAL 2 TIMES DAILY
Qty: 60 TABLET | Refills: 0 | Status: SHIPPED | OUTPATIENT
Start: 2024-02-08

## 2024-02-08 RX ORDER — SODIUM CHLORIDE 9 MG/ML
30 INJECTION, SOLUTION INTRAVENOUS CONTINUOUS
Status: DISCONTINUED | OUTPATIENT
Start: 2024-02-08 | End: 2024-02-09 | Stop reason: HOSPADM

## 2024-02-08 RX ORDER — LIDOCAINE HYDROCHLORIDE 20 MG/ML
INJECTION, SOLUTION EPIDURAL; INFILTRATION; INTRACAUDAL; PERINEURAL AS NEEDED
Status: DISCONTINUED | OUTPATIENT
Start: 2024-02-08 | End: 2024-02-08 | Stop reason: SURG

## 2024-02-08 RX ORDER — PROPOFOL 10 MG/ML
INJECTION, EMULSION INTRAVENOUS AS NEEDED
Status: DISCONTINUED | OUTPATIENT
Start: 2024-02-08 | End: 2024-02-08 | Stop reason: SURG

## 2024-02-08 RX ORDER — ONDANSETRON 2 MG/ML
4 INJECTION INTRAMUSCULAR; INTRAVENOUS ONCE AS NEEDED
OUTPATIENT
Start: 2024-02-08

## 2024-02-08 RX ORDER — LABETALOL HYDROCHLORIDE 5 MG/ML
5 INJECTION, SOLUTION INTRAVENOUS
OUTPATIENT
Start: 2024-02-08

## 2024-02-08 RX ORDER — IPRATROPIUM BROMIDE AND ALBUTEROL SULFATE 2.5; .5 MG/3ML; MG/3ML
3 SOLUTION RESPIRATORY (INHALATION) ONCE AS NEEDED
OUTPATIENT
Start: 2024-02-08

## 2024-02-08 RX ADMIN — LIDOCAINE HYDROCHLORIDE 80 MG: 20 INJECTION, SOLUTION EPIDURAL; INFILTRATION; INTRACAUDAL; PERINEURAL at 11:42

## 2024-02-08 RX ADMIN — SODIUM CHLORIDE 30 ML/HR: 9 INJECTION, SOLUTION INTRAVENOUS at 10:42

## 2024-02-08 RX ADMIN — PROPOFOL 100 MG: 10 INJECTION, EMULSION INTRAVENOUS at 11:42

## 2024-02-08 RX ADMIN — PROPOFOL 50 MG: 10 INJECTION, EMULSION INTRAVENOUS at 11:46

## 2024-02-08 NOTE — DISCHARGE INSTRUCTIONS
JAHAIRA DC Instructions    The medication, which was used to put the patient to sleep, will be acting in your body for the next twenty-four (24) hours, so you might feel a little sleepy; this feeling will slowly wear off.  Because the medicine is still in your system for the next twenty-four (24) hours, the adult patient SHOULD NOT:  Drive a car, operate machinery, or power tools  Drink any alcoholic drinks (not even beer)  Make any important decisions such as to sign important papers    We strongly suggest that a responsible adult be with the patient the rest of the day.  It may be better to start with liquids such as soft drinks, then soups, and graduate up to solid foods.     Any problems with:  EXCESSIVE MUCOUS  SPITTING UP BLOOD  SORE THROAT AT MORE THAN 72 HOURS  CALL THE [facility] EMERG[facility] -[phone].

## 2024-02-08 NOTE — ANESTHESIA PREPROCEDURE EVALUATION
Anesthesia Evaluation     Patient summary reviewed and Nursing notes reviewed   no history of anesthetic complications:   NPO Solid Status: > 8 hours  NPO Liquid Status: > 2 hours           Airway   Dental      Pulmonary    (+) pulmonary embolism, a smoker Former,shortness of breath  Cardiovascular     ECG reviewed  PT is on anticoagulation therapy  Patient on routine beta blocker    (+) hypertension, valvular problems/murmurs AI, dysrhythmias Atrial Fib, DVT      Neuro/Psych  (+) weakness  GI/Hepatic/Renal/Endo    (+) morbid obesity, hiatal hernia, GERD, renal disease- stones, thyroid problem hypothyroidism    Musculoskeletal     Abdominal    Substance History      OB/GYN          Other   arthritis,     ROS/Med Hx Other: Additional History:  Afib/RVR, edema, immobility, cellulitis    Echo:  ·  Left ventricular ejection fraction appears to be 56 - 60%.     Indications  Chest pain  Shortness of breath     Technically satisfactory study.  Mitral valve is structurally normal.  Tricuspid valve is structurally normal.  Aortic valve is thickened with decreased opening motion.  Gradient across the aortic valve is 20/10 mmHg.  Pulmonic valve could not be well visualized.  No evidence for mitral tricuspid or aortic regurgitation is seen by Doppler study.  Left atrium is normal in size.  Right atrium is normal in size.  Left ventricle is normal in size and contractility with ejection fraction of 60%.  Diastolic dysfunction.  Right ventricle is normal in size.  IVC 2.06 cm.  Atrial septum is intact.  Aorta is normal.  No pericardial effusion or intracardiac thrombus is seen.     Impression  Mild aortic valve stenosis with gradient of 20/10 mmHg.  Otherwise, structurally and functionally normal cardiac valves.  Mild diastolic dysfunction is  Left ventricular size and contractility is normal with ejection fraction of 60%.      Stress Test:  Lexiscan Cardiolite test is negative for myocardial ischemia.     Gated SPECT images  revealed normal left ventricular size and contractility with ejection fraction of 74%.      PSH:  TOE SURGERY DENTAL EXAMINATION UNDER ANESTHESIA  TOTAL HIP ARTHROPLASTY CATARACT EXTRACTION  JOINT REPLACEMENT CORRECTION HAMMER TOE  CYSTOSCOPY, URETEROSCOPY, RETROGRADE PYELOGRAM, STENT INSERTION ENDOSCOPY  CHOLECYSTECTOMY TONSILLECTOMY  BRONCHOSCOPY ENDOSCOPY                Anesthesia Plan    ASA 4     general     (Patient identified; pre-operative vital signs, all relevant labs/studies, complete medical/surgical/anesthetic history, full medication list, full allergy list, and NPO status obtained/reviewed; physical assessment performed; anesthetic options, side effects, potential complications, risks, and benefits discussed; questions answered; written anesthesia consent obtained; patient cleared for procedure; anesthesia machine and equipment checked and functioning)  intravenous induction     Anesthetic plan, risks, benefits, and alternatives have been provided, discussed and informed consent has been obtained with: patient.    Plan discussed with CRNA and CAA.    CODE STATUS:

## 2024-02-08 NOTE — ANESTHESIA POSTPROCEDURE EVALUATION
Patient: Camelia Gray    Procedure Summary       Date: 02/08/24 Room / Location: [facility]    Anesthesia Start: 1137 Anesthesia Stop: 1153    Procedures:       CARDIOVERSION EXTERNAL IN CARDIOLOGY DEPARTMENT      ADULT TRANSESOPHAGEAL ECHO (JAHAIRA) W/ CONT IF NECESSARY PER PROTOCOL Diagnosis:       Atrial fibrillation, chronic      (Afib)      (Cardiac Source of Emboli)    Scheduled Providers: Rosa Isela Quiñonez MD Provider: Isaias Sifuentes MD    Anesthesia Type: general ASA Status: 3            Anesthesia Type: general    Vitals  Vitals Value Taken Time   /68 02/08/24 1300   Temp     Pulse 55 02/08/24 1300   Resp 19 02/08/24 1300   SpO2 95 % 02/08/24 1300           Post Anesthesia Care and Evaluation    Patient location during evaluation: bedside  Patient participation: complete - patient participated  Level of consciousness: awake  Pain scale: See nurse's notes for pain score.  Pain management: adequate    Airway patency: patent  Anesthetic complications: No anesthetic complications  PONV Status: none  Cardiovascular status: acceptable  Respiratory status: acceptable and spontaneous ventilation  Hydration status: acceptable    Comments: Patient seen and examined postoperatively; vital signs stable; SpO2 greater than or equal to 90%; cardiopulmonary status stable; nausea/vomiting adequately controlled; pain adequately controlled; no apparent anesthesia complications; patient discharged from anesthesia care when discharge criteria were met

## 2024-02-08 NOTE — HOME HEALTH
Pt discharged from OT services as pt has met goals.  Pt and therapist in agreement with dc.   Pt to continue with HEP.      Pt denies any falls or med changes

## 2024-02-09 ENCOUNTER — TELEPHONE (OUTPATIENT)
Dept: FAMILY MEDICINE CLINIC | Facility: CLINIC | Age: 79
End: 2024-02-09

## 2024-02-09 RX ORDER — METOPROLOL SUCCINATE 50 MG/1
50 TABLET, EXTENDED RELEASE ORAL DAILY
Start: 2024-02-09 | End: 2025-02-08

## 2024-02-09 NOTE — TELEPHONE ENCOUNTER
Caller: Camelia Gray    Relationship: Self    Best call back number: [phone]     What was the call regarding: PATIENT IS WANTING ANNIE LEYVA TO GIVE HER A CALL BACK TO DISCUSS THE TWO RECENT PROCEDURES SHE HAS HAD DONE AS WELL AS A FEW QUESTIONS SHE HAS.    PATIENT WOULD NOT DISCLOSE ANY OTHER INFORMATION    PLEASE CALL AND ADVISE

## 2024-02-09 NOTE — TELEPHONE ENCOUNTER
10 the patient's phone call she states that pharmacy delivered lisinopril she has not been on lisinopril and I told her it is not in her med list.  She had her JAHAIRA completed she said she did not have any problems postprocedure.  She also had an EGD completed with Dr. Johnson.  Denies any complaints or problems post that procedure.  She had other concerns she said that she was measured at the hospital and her height was not what she knew it to be and had concerns so we will check her height next visit

## 2024-02-12 ENCOUNTER — HOME CARE VISIT (OUTPATIENT)
Dept: HOME HEALTH SERVICES | Facility: HOME HEALTHCARE | Age: 79
End: 2024-02-12
Payer: MEDICARE

## 2024-02-12 VITALS
TEMPERATURE: 97 F | RESPIRATION RATE: 18 BRPM | HEART RATE: 58 BPM | OXYGEN SATURATION: 97 % | SYSTOLIC BLOOD PRESSURE: 133 MMHG | DIASTOLIC BLOOD PRESSURE: 68 MMHG

## 2024-02-12 PROCEDURE — G0299 HHS/HOSPICE OF RN EA 15 MIN: HCPCS

## 2024-02-14 LAB
QT INTERVAL: 446 MS
QTC INTERVAL: 379 MS

## 2024-02-20 ENCOUNTER — HOME CARE VISIT (OUTPATIENT)
Dept: HOME HEALTH SERVICES | Facility: HOME HEALTHCARE | Age: 79
End: 2024-02-20
Payer: MEDICARE

## 2024-02-20 VITALS
RESPIRATION RATE: 18 BRPM | DIASTOLIC BLOOD PRESSURE: 68 MMHG | HEART RATE: 55 BPM | SYSTOLIC BLOOD PRESSURE: 108 MMHG | OXYGEN SATURATION: 98 % | TEMPERATURE: 97 F

## 2024-02-20 PROCEDURE — G0299 HHS/HOSPICE OF RN EA 15 MIN: HCPCS

## 2024-02-20 NOTE — HOME HEALTH
Routine Visit Note:  pt. doing well, states no abnormal heart beats, or feelings of SOB.  Pt.denies falls, states no pain. Pt. is doing well with medicatins and is aware of indications    Skill/education provided: CP assess, pain assess, falls assess, atrial fibrillation education    Patient/caregiver response: verbalized feedback on education given regarding SOB, abnormal heart beat    Plan for next visit: CP assess, pain assess, falls assess, Discharge from [facility]    Other pertinent info: n/a

## 2024-02-21 RX ORDER — APIXABAN 5 MG/1
5 TABLET, FILM COATED ORAL 2 TIMES DAILY
Qty: 60 TABLET | Refills: 0 | Status: SHIPPED | OUTPATIENT
Start: 2024-02-21

## 2024-02-22 RX ORDER — TRIAMTERENE AND HYDROCHLOROTHIAZIDE 37.5; 25 MG/1; MG/1
1 TABLET ORAL DAILY
Qty: 90 TABLET | Refills: 0 | Status: SHIPPED | OUTPATIENT
Start: 2024-02-22

## 2024-02-27 ENCOUNTER — HOME CARE VISIT (OUTPATIENT)
Dept: HOME HEALTH SERVICES | Facility: HOME HEALTHCARE | Age: 79
End: 2024-02-27
Payer: MEDICARE

## 2024-02-27 VITALS
RESPIRATION RATE: 18 BRPM | OXYGEN SATURATION: 98 % | BODY MASS INDEX: 55.8 KG/M2 | TEMPERATURE: 97 F | HEART RATE: 56 BPM | WEIGHT: 293 LBS | DIASTOLIC BLOOD PRESSURE: 78 MMHG | SYSTOLIC BLOOD PRESSURE: 126 MMHG

## 2024-02-27 PROCEDURE — G0299 HHS/HOSPICE OF RN EA 15 MIN: HCPCS

## 2024-03-04 ENCOUNTER — OFFICE VISIT (OUTPATIENT)
Dept: CARDIOLOGY | Facility: CLINIC | Age: 79
End: 2024-03-04
Payer: MEDICARE

## 2024-03-04 VITALS
DIASTOLIC BLOOD PRESSURE: 59 MMHG | WEIGHT: 293 LBS | HEIGHT: 63 IN | OXYGEN SATURATION: 95 % | HEART RATE: 54 BPM | BODY MASS INDEX: 51.91 KG/M2 | RESPIRATION RATE: 18 BRPM | SYSTOLIC BLOOD PRESSURE: 127 MMHG

## 2024-03-04 DIAGNOSIS — I10 PRIMARY HYPERTENSION: Chronic | ICD-10-CM

## 2024-03-04 DIAGNOSIS — E66.01 CLASS 3 SEVERE OBESITY DUE TO EXCESS CALORIES WITH SERIOUS COMORBIDITY AND BODY MASS INDEX (BMI) OF 45.0 TO 49.9 IN ADULT: ICD-10-CM

## 2024-03-04 DIAGNOSIS — I48.20 CHRONIC ATRIAL FIBRILLATION WITH RAPID VENTRICULAR RESPONSE: ICD-10-CM

## 2024-03-04 DIAGNOSIS — I82.4Y3 ACUTE DEEP VEIN THROMBOSIS (DVT) OF PROXIMAL VEIN OF BOTH LOWER EXTREMITIES: Primary | ICD-10-CM

## 2024-03-04 PROCEDURE — 1159F MED LIST DOCD IN RCRD: CPT | Performed by: STUDENT IN AN ORGANIZED HEALTH CARE EDUCATION/TRAINING PROGRAM

## 2024-03-04 PROCEDURE — 99214 OFFICE O/P EST MOD 30 MIN: CPT | Performed by: STUDENT IN AN ORGANIZED HEALTH CARE EDUCATION/TRAINING PROGRAM

## 2024-03-04 PROCEDURE — 3074F SYST BP LT 130 MM HG: CPT | Performed by: STUDENT IN AN ORGANIZED HEALTH CARE EDUCATION/TRAINING PROGRAM

## 2024-03-04 PROCEDURE — 3078F DIAST BP <80 MM HG: CPT | Performed by: STUDENT IN AN ORGANIZED HEALTH CARE EDUCATION/TRAINING PROGRAM

## 2024-03-04 PROCEDURE — 93000 ELECTROCARDIOGRAM COMPLETE: CPT | Performed by: STUDENT IN AN ORGANIZED HEALTH CARE EDUCATION/TRAINING PROGRAM

## 2024-03-04 PROCEDURE — 1160F RVW MEDS BY RX/DR IN RCRD: CPT | Performed by: STUDENT IN AN ORGANIZED HEALTH CARE EDUCATION/TRAINING PROGRAM

## 2024-03-04 RX ORDER — TRIAMTERENE AND HYDROCHLOROTHIAZIDE 37.5; 25 MG/1; MG/1
1 TABLET ORAL DAILY
Qty: 90 TABLET | Refills: 3 | Status: SHIPPED | OUTPATIENT
Start: 2024-03-04
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
## 2024-03-27 RX ORDER — LEVOTHYROXINE SODIUM 0.03 MG/1
25 TABLET ORAL DAILY
Qty: 90 TABLET | Refills: 0 | Status: SHIPPED | OUTPATIENT
Start: 2024-03-27

## 2024-03-27 RX ORDER — PANTOPRAZOLE SODIUM 40 MG/1
40 TABLET, DELAYED RELEASE ORAL DAILY
Qty: 90 TABLET | Refills: 0 | Status: SHIPPED | OUTPATIENT
Start: 2024-03-27

## 2024-03-27 RX ORDER — APIXABAN 5 MG/1
5 TABLET, FILM COATED ORAL 2 TIMES DAILY
Qty: 60 TABLET | Refills: 3 | Status: SHIPPED | OUTPATIENT
Start: 2024-03-27

## 2024-04-30 ENCOUNTER — TELEPHONE (OUTPATIENT)
Dept: CARDIOLOGY | Facility: CLINIC | Age: 79
End: 2024-04-30
Payer: MEDICARE

## 2024-04-30 ENCOUNTER — OFFICE (AMBULATORY)
Dept: URBAN - METROPOLITAN AREA CLINIC 64 | Facility: CLINIC | Age: 79
End: 2024-04-30

## 2024-04-30 VITALS
HEART RATE: 52 BPM | SYSTOLIC BLOOD PRESSURE: 145 MMHG | DIASTOLIC BLOOD PRESSURE: 81 MMHG | SYSTOLIC BLOOD PRESSURE: 152 MMHG | WEIGHT: 293 LBS | DIASTOLIC BLOOD PRESSURE: 68 MMHG

## 2024-04-30 DIAGNOSIS — J69.0 PNEUMONITIS DUE TO INHALATION OF FOOD AND VOMIT: ICD-10-CM

## 2024-04-30 DIAGNOSIS — K44.9 DIAPHRAGMATIC HERNIA WITHOUT OBSTRUCTION OR GANGRENE: ICD-10-CM

## 2024-04-30 PROCEDURE — 99212 OFFICE O/P EST SF 10 MIN: CPT | Performed by: NURSE PRACTITIONER

## 2024-04-30 RX ORDER — METOPROLOL SUCCINATE 50 MG/1
50 TABLET, EXTENDED RELEASE ORAL DAILY
Qty: 30 TABLET | Refills: 11 | Status: SHIPPED | OUTPATIENT
Start: 2024-04-30 | End: 2025-04-30

## 2024-04-30 NOTE — TELEPHONE ENCOUNTER
Caller: KAYLIE     Relationship: SELF    Best call back number: [phone]  What is the best time to reach you: ANY    Who are you requesting to speak with (clinical staff, provider,  specific staff member): CLINICAL      What was the call regarding: PATIENT IS TAKING ONLY ONE PILL OF THE METOPROLOL IN HER CHART BUT THE PRESCRIPTION THAT IS AT HER PHARMACY SAYS TO TAKE TWO PILLS A DAY, SHE IS ASKING IF A NEW PRESCRIPTION CAN BE SENT TO THE PHARMACY WITH CORRECT INFO AND DOSAGE.

## 2024-05-14 ENCOUNTER — HOSPITAL ENCOUNTER (EMERGENCY)
Facility: HOSPITAL | Age: 79
Discharge: HOME | End: 2024-05-14
Attending: EMERGENCY MEDICINE | Admitting: EMERGENCY MEDICINE
Payer: MEDICARE

## 2024-05-14 VITALS
OXYGEN SATURATION: 94 % | HEART RATE: 68 BPM | DIASTOLIC BLOOD PRESSURE: 96 MMHG | BODY MASS INDEX: 41.02 KG/M2 | TEMPERATURE: 98.1 F | HEIGHT: 71 IN | SYSTOLIC BLOOD PRESSURE: 131 MMHG | WEIGHT: 293 LBS | RESPIRATION RATE: 18 BRPM

## 2024-05-14 DIAGNOSIS — R19.7 DIARRHEA, UNSPECIFIED TYPE: Primary | ICD-10-CM

## 2024-05-14 LAB
ABO GROUP BLD: NORMAL
ANION GAP SERPL CALCULATED.3IONS-SCNC: 12 MMOL/L (ref 5–15)
BASOPHILS # BLD AUTO: 0.02 10*3/MM3 (ref 0–0.2)
BASOPHILS NFR BLD AUTO: 0.3 % (ref 0–1.5)
BLD GP AB SCN SERPL QL: NEGATIVE
BUN SERPL-MCNC: 20 MG/DL (ref 8–23)
BUN/CREAT SERPL: 22.2 (ref 7–25)
CALCIUM SPEC-SCNC: 9.5 MG/DL (ref 8.6–10.5)
CHLORIDE SERPL-SCNC: 103 MMOL/L (ref 98–107)
CO2 SERPL-SCNC: 28 MMOL/L (ref 22–29)
CREAT SERPL-MCNC: 0.9 MG/DL (ref 0.57–1)
DEPRECATED RDW RBC AUTO: 47.3 FL (ref 37–54)
EGFRCR SERPLBLD CKD-EPI 2021: 66 ML/MIN/1.73
EOSINOPHIL # BLD AUTO: 0.11 10*3/MM3 (ref 0–0.4)
EOSINOPHIL NFR BLD AUTO: 1.5 % (ref 0.3–6.2)
ERYTHROCYTE [DISTWIDTH] IN BLOOD BY AUTOMATED COUNT: 14.1 % (ref 12.3–15.4)
GLUCOSE SERPL-MCNC: 103 MG/DL (ref 65–99)
HCT VFR BLD AUTO: 46.4 % (ref 34–46.6)
HGB BLD-MCNC: 14.8 G/DL (ref 12–15.9)
IMM GRANULOCYTES # BLD AUTO: 0.02 10*3/MM3 (ref 0–0.05)
IMM GRANULOCYTES NFR BLD AUTO: 0.3 % (ref 0–0.5)
LYMPHOCYTES # BLD AUTO: 0.56 10*3/MM3 (ref 0.7–3.1)
LYMPHOCYTES NFR BLD AUTO: 7.4 % (ref 19.6–45.3)
MCH RBC QN AUTO: 28.9 PG (ref 26.6–33)
MCHC RBC AUTO-ENTMCNC: 31.9 G/DL (ref 31.5–35.7)
MCV RBC AUTO: 90.6 FL (ref 79–97)
MONOCYTES # BLD AUTO: 0.67 10*3/MM3 (ref 0.1–0.9)
MONOCYTES NFR BLD AUTO: 8.9 % (ref 5–12)
NEUTROPHILS NFR BLD AUTO: 6.18 10*3/MM3 (ref 1.7–7)
NEUTROPHILS NFR BLD AUTO: 81.6 % (ref 42.7–76)
NRBC BLD AUTO-RTO: 0 /100 WBC (ref 0–0.2)
PLATELET # BLD AUTO: 180 10*3/MM3 (ref 140–450)
PMV BLD AUTO: 9.3 FL (ref 6–12)
POTASSIUM SERPL-SCNC: 4.2 MMOL/L (ref 3.5–5.2)
RBC # BLD AUTO: 5.12 10*6/MM3 (ref 3.77–5.28)
RH BLD: POSITIVE
SODIUM SERPL-SCNC: 143 MMOL/L (ref 136–145)
T&S EXPIRATION DATE: NORMAL
WBC NRBC COR # BLD AUTO: 7.56 10*3/MM3 (ref 3.4–10.8)

## 2024-05-14 PROCEDURE — 85025 COMPLETE CBC W/AUTO DIFF WBC: CPT | Performed by: EMERGENCY MEDICINE

## 2024-05-14 PROCEDURE — 86850 RBC ANTIBODY SCREEN: CPT | Performed by: EMERGENCY MEDICINE

## 2024-05-14 PROCEDURE — 86901 BLOOD TYPING SEROLOGIC RH(D): CPT | Performed by: EMERGENCY MEDICINE

## 2024-05-14 PROCEDURE — 86900 BLOOD TYPING SEROLOGIC ABO: CPT | Performed by: EMERGENCY MEDICINE

## 2024-05-14 PROCEDURE — 99283 EMERGENCY DEPT VISIT LOW MDM: CPT

## 2024-05-14 PROCEDURE — 80048 BASIC METABOLIC PNL TOTAL CA: CPT | Performed by: EMERGENCY MEDICINE

## 2024-05-14 RX ORDER — SODIUM CHLORIDE 0.9 % (FLUSH) 0.9 %
10 SYRINGE (ML) INJECTION AS NEEDED
Status: DISCONTINUED | OUTPATIENT
Start: 2024-05-14 | End: 2024-05-14 | Stop reason: HOSPADM

## 2024-05-14 NOTE — ED PROVIDER NOTES
Subjective   History of Present Illness  Patient is a 78-year-old female complaining of diarrhea throughout the day today.  States she had an episode of blood in her stool later in the day.  She denies abdominal pain fever diarrhea prior to this event dysuria vomiting or other complaint      Review of Systems    Past Medical History:   Diagnosis Date    A-fib 12/2023    Arthritis     Cellulitis of both feet 10/13/2019    Decubitus ulcer of buttock, stage 2 10/14/2019    HEALED    Dermatitis associated with moisture 10/14/2019    Disease of thyroid gland     HYPOTHYROID    Edema of lower extremity 05/31/2017    Hypertension     Hypothyroidism     Immobility syndrome 10/13/2019    Lymphedema 10/14/2019    LEGS    Lymphedema     Muscle weakness (generalized)     Non-pressure chronic ulcer of other part of left foot with fat layer exposed 10/14/2019    Non-pressure chronic ulcer of other part of right foot with fat layer exposed 10/14/2019    Obesity     Pulmonary embolism     Stasis dermatitis 07/08/2013    Ureteral calculi     Urinary tract infection 07/2020    Hosp. 7/2020    Venous stasis 10/14/2019    Yeast infection of the skin        No Known Allergies    Past Surgical History:   Procedure Laterality Date    BRONCHOSCOPY N/A 12/27/2023    Procedure: BRONCHOSCOPY with right lung washing;  Surgeon: Mayra Ramachandran MD;  Location: [facility] ENDOSCOPY;  Service: Pulmonary;  Laterality: N/A;  Post- Pneumonia    CATARACT EXTRACTION Bilateral     CHOLECYSTECTOMY N/A 11/11/2020    Procedure: CHOLECYSTECTOMY LAPAROSCOPIC;  Surgeon: George Crocker DO;  Location: [facility] MAIN OR;  Service: General;  Laterality: N/A;    CORRECTION HAMMER TOE Bilateral     CYSTOSCOPY, URETEROSCOPY, RETROGRADE PYELOGRAM, STENT INSERTION Bilateral 08/18/2020    Procedure: CYSTOSCOPY BILATERAL RETROGRADE PYELOGRAM,;  Surgeon: Shawn Hernandez MD;  Location: [facility] MAIN OR;  Service: Urology;  Laterality: Bilateral;    DENTAL EXAMINATION UNDER  ANESTHESIA      ENDOSCOPY N/A 2020    Procedure: ESOPHAGOGASTRODUODENOSCOPY;  Surgeon: Torsten Johnson MD;  Location: [facility] ENDOSCOPY;  Service: Gastroenterology;  Laterality: N/A;  post: esophageal stricture, reflux esophagitis, hiatal hernia    ENDOSCOPY N/A 2024    Procedure: ESOPHAGOGASTRODUODENOSCOPY, esophageal dilation (40-46 Fr non-guided Bougie);  Surgeon: Torsten Johnson MD;  Location: [facility] ENDOSCOPY;  Service: Gastroenterology;  Laterality: N/A;  post: hiatal hernia, esophageal stricture    JOINT REPLACEMENT Bilateral     Bilateral knees    TOE SURGERY      TONSILLECTOMY      TOTAL HIP ARTHROPLASTY Left 2020    Procedure: TOTAL HIP ARTHROPLASTY;  Surgeon: Baljit Hutchins II, MD;  Location: [facility] MAIN OR;  Service: Orthopedics;  Laterality: Left;       Family History   Problem Relation Age of Onset    Heart disease Mother     Hypertension Mother     Heart disease Father     Hypertension Father     Kidney disease Father     COPD Father        Social History     Socioeconomic History    Marital status: Single   Tobacco Use    Smoking status: Former     Current packs/day: 0.00     Average packs/day: 0.3 packs/day for 28.2 years (7.1 ttl pk-yrs)     Types: Cigarettes     Start date: 10/1/1964     Quit date: 1992     Years since quittin.4    Smokeless tobacco: Never   Vaping Use    Vaping status: Never Used   Substance and Sexual Activity    Alcohol use: No    Drug use: No    Sexual activity: Never           Objective   Physical Exam  Lungs are clear.  Heart has regular rhythm.  Ab soft nontender.  Patient has normal bowel sounds.  Back has no tenderness.  Rectal exam shows bright red blood per rectum but no active bleeding.  Procedures           ED Course      Results for orders placed or performed during the hospital encounter of 24   Basic Metabolic Panel    Specimen: Blood   Result Value Ref Range    Glucose 103 (H) 65 - 99 mg/dL    BUN 20 8  - 23 mg/dL    Creatinine 0.90 0.57 - 1.00 mg/dL    Sodium 143 136 - 145 mmol/L    Potassium 4.2 3.5 - 5.2 mmol/L    Chloride 103 98 - 107 mmol/L    CO2 28.0 22.0 - 29.0 mmol/L    Calcium 9.5 8.6 - 10.5 mg/dL    BUN/Creatinine Ratio 22.2 7.0 - 25.0    Anion Gap 12.0 5.0 - 15.0 mmol/L    eGFR 66.0 >60.0 mL/min/1.73   CBC Auto Differential    Specimen: Blood   Result Value Ref Range    WBC 7.56 3.40 - 10.80 10*3/mm3    RBC 5.12 3.77 - 5.28 10*6/mm3    Hemoglobin 14.8 12.0 - 15.9 g/dL    Hematocrit 46.4 34.0 - 46.6 %    MCV 90.6 79.0 - 97.0 fL    MCH 28.9 26.6 - 33.0 pg    MCHC 31.9 31.5 - 35.7 g/dL    RDW 14.1 12.3 - 15.4 %    RDW-SD 47.3 37.0 - 54.0 fl    MPV 9.3 6.0 - 12.0 fL    Platelets 180 140 - 450 10*3/mm3    Neutrophil % 81.6 (H) 42.7 - 76.0 %    Lymphocyte % 7.4 (L) 19.6 - 45.3 %    Monocyte % 8.9 5.0 - 12.0 %    Eosinophil % 1.5 0.3 - 6.2 %    Basophil % 0.3 0.0 - 1.5 %    Immature Grans % 0.3 0.0 - 0.5 %    Neutrophils, Absolute 6.18 1.70 - 7.00 10*3/mm3    Lymphocytes, Absolute 0.56 (L) 0.70 - 3.10 10*3/mm3    Monocytes, Absolute 0.67 0.10 - 0.90 10*3/mm3    Eosinophils, Absolute 0.11 0.00 - 0.40 10*3/mm3    Basophils, Absolute 0.02 0.00 - 0.20 10*3/mm3    Immature Grans, Absolute 0.02 0.00 - 0.05 10*3/mm3    nRBC 0.0 0.0 - 0.2 /100 WBC   Type & Screen    Specimen: Blood   Result Value Ref Range    ABO Type O     RH type Positive                                             Medical Decision Making  BMP shows no renal insufficiency or electrolyte abnormality.  CBC shows no leukocytosis left shift or anemia.  Patient will be discharged.  She will follow with her gastroenterologist for outpatient evaluation as her hemoglobin is normal.    Amount and/or Complexity of Data Reviewed  Labs: ordered. Decision-making details documented in ED Course.    Risk  Prescription drug management.        Final diagnoses:   Diarrhea, unspecified type       ED Disposition  ED Disposition       ED Disposition   Discharge     Condition   Stable    Comment   --               No follow-up provider specified.       Medication List      No changes were made to your prescriptions during this visit.            Norm Doll MD  05/14/24 0124

## 2024-05-16 ENCOUNTER — TELEPHONE (OUTPATIENT)
Dept: FAMILY MEDICINE CLINIC | Facility: CLINIC | Age: 79
End: 2024-05-16

## 2024-05-16 NOTE — TELEPHONE ENCOUNTER
Caller: Camelia Gray    Relationship: Self    Best call back number: [phone]     What was the call regarding: PATIENT STATED SHE WENT TO THE EMERGENCY ROOM ON 5/14/24 AT [facility]. PATIENT WAS SEEN FOR RECTAL BLEEDING. PATIENT DOES NOT WANT TO SCHEDULE A FOLLOW UP APPOINTMENT AT THIS TIME BECAUSE SHE HAS AN UPCOMING APPOINTMENT ALREADY ON 6/11/24. PATIENT HAD LABS COMPLETED WHILE IN THE HOSPITAL AND IS REQUESTING TO KNOW IF SHE WILL NEED MORE LABS DONE FOR HER UPCOMING APPOINTMENT    PLEASE ADVISE

## 2024-06-11 ENCOUNTER — OFFICE VISIT (OUTPATIENT)
Dept: FAMILY MEDICINE CLINIC | Facility: CLINIC | Age: 79
End: 2024-06-11
Payer: MEDICARE

## 2024-06-11 ENCOUNTER — TELEPHONE (OUTPATIENT)
Dept: FAMILY MEDICINE CLINIC | Facility: CLINIC | Age: 79
End: 2024-06-11

## 2024-06-11 VITALS
RESPIRATION RATE: 17 BRPM | WEIGHT: 293 LBS | HEIGHT: 71 IN | SYSTOLIC BLOOD PRESSURE: 160 MMHG | TEMPERATURE: 98.5 F | HEART RATE: 58 BPM | OXYGEN SATURATION: 92 % | DIASTOLIC BLOOD PRESSURE: 98 MMHG | BODY MASS INDEX: 41.02 KG/M2

## 2024-06-11 DIAGNOSIS — Z11.59 NEED FOR HEPATITIS C SCREENING TEST: ICD-10-CM

## 2024-06-11 DIAGNOSIS — I89.0 LYMPHEDEMA: Chronic | ICD-10-CM

## 2024-06-11 DIAGNOSIS — K21.9 GASTROESOPHAGEAL REFLUX DISEASE WITHOUT ESOPHAGITIS: Chronic | ICD-10-CM

## 2024-06-11 DIAGNOSIS — I10 PRIMARY HYPERTENSION: Primary | Chronic | ICD-10-CM

## 2024-06-11 DIAGNOSIS — Z12.31 BREAST CANCER SCREENING BY MAMMOGRAM: Chronic | ICD-10-CM

## 2024-06-11 DIAGNOSIS — M62.3 IMMOBILITY SYNDROME: Chronic | ICD-10-CM

## 2024-06-11 DIAGNOSIS — E03.9 ACQUIRED HYPOTHYROIDISM: Chronic | ICD-10-CM

## 2024-06-11 PROBLEM — T59.811A SMOKE INHALATION: Status: RESOLVED | Noted: 2023-12-26 | Resolved: 2024-06-11

## 2024-06-11 PROCEDURE — 99214 OFFICE O/P EST MOD 30 MIN: CPT | Performed by: NURSE PRACTITIONER

## 2024-06-11 PROCEDURE — G2211 COMPLEX E/M VISIT ADD ON: HCPCS | Performed by: NURSE PRACTITIONER

## 2024-06-11 PROCEDURE — 1126F AMNT PAIN NOTED NONE PRSNT: CPT | Performed by: NURSE PRACTITIONER

## 2024-06-11 PROCEDURE — 3080F DIAST BP >= 90 MM HG: CPT | Performed by: NURSE PRACTITIONER

## 2024-06-11 PROCEDURE — 3077F SYST BP >= 140 MM HG: CPT | Performed by: NURSE PRACTITIONER

## 2024-06-11 RX ORDER — DIPHENOXYLATE HYDROCHLORIDE AND ATROPINE SULFATE 2.5; .025 MG/1; MG/1
1 TABLET ORAL DAILY
COMMUNITY

## 2024-06-11 RX ORDER — PANTOPRAZOLE SODIUM 40 MG/1
40 TABLET, DELAYED RELEASE ORAL DAILY
Qty: 90 TABLET | Refills: 0 | Status: SHIPPED | OUTPATIENT
Start: 2024-06-11

## 2024-06-11 RX ORDER — LISINOPRIL 20 MG/1
20 TABLET ORAL DAILY
COMMUNITY
Start: 2024-04-09 | End: 2024-06-11 | Stop reason: ALTCHOICE

## 2024-06-11 RX ORDER — LEVOTHYROXINE SODIUM 0.03 MG/1
25 TABLET ORAL DAILY
Qty: 90 TABLET | Refills: 0 | Status: SHIPPED | OUTPATIENT
Start: 2024-06-11

## 2024-06-11 NOTE — PROGRESS NOTES
Subjective        Camelia Gray is a 78 y.o. female.     Chief Complaint   Patient presents with    Hypertension     6 month FU       Hypertension    Patient is here for management of her chronic medical problems:  Hypertension, atrial fibrillation with rapid ventricular response,   PE, hypothyroidism, GERD, immobiity syndrome, lymphedema.     Hypertension: maxzide 37.5 -25 daily, metoprolol succinate  XL 50mg daily lisinopril 20 mg daily, she has not had any of her blood pressure medications today.     Lymphedema: she is doing wraps and eucerin at home. She is currently buying her own supplies. She will get me information so that we can try get help paying for this by medicare.     Hypothyroidism: levothyroxine 25 mcg  daily.     GERD protonix 40mg daily    PE/chronic atrial fibrillation with rapid ventricular response followed by cardiology. On eliquis .     Hemorrhoids recently had diarrhea and bleeding  from hemorrhoid had to go to the ED for evaluation. Hgb and hct normal. 5/2024.   Cbc normal. She has upcoming appointment with [facility] to discuss hemorrhoids. She is not having bleeding related to eliquis use at this time. Reports it stopped at the hospital with no intervention. She is using otc medication.     Urinary incontinence stopped her maxide of recent said made her have sit on toilet. Was 3 weeks ago.     Immobility syndrome she is here in wheelchair. She is using walker at home unless she caries something major. She is cautious.               The following portions of the patient's history were reviewed and updated as appropriate: allergies, current medications, past family history, past medical history, past social history, past surgical history and problem list.      Current Outpatient Medications:     apixaban (Eliquis) 5 MG tablet tablet, Take 1 tablet by mouth 2 (Two) Times a Day. Indications: Atrial Fibrillation, Disp: 60 tablet, Rfl: 3    B Complex Vitamins (vitamin b complex) capsule capsule, Take  1 capsule by mouth Daily. Indications: Vitamin Deficiency, Disp: , Rfl:     levothyroxine (SYNTHROID, LEVOTHROID) 25 MCG tablet, Take 1 tablet by mouth Daily. Indications: Underactive Thyroid, Disp: 90 tablet, Rfl: 0    lisinopril (PRINIVIL,ZESTRIL) 20 MG tablet, Take 1 tablet by mouth Daily., Disp: , Rfl:     metoprolol succinate XL (Toprol XL) 50 MG 24 hr tablet, Take 1 tablet by mouth Daily. Indications: High Blood Pressure Disorder, Disp: 30 tablet, Rfl: 11    Multiple Vitamins-Minerals (multivitamin with minerals) tablet tablet, Take 1 tablet by mouth Daily. Indications: Vitamin Deficiency, Disp: , Rfl:     multivitamin (MULTI VITAMIN DAILY PO), Take 1 tablet by mouth Daily., Disp: , Rfl:     pantoprazole (PROTONIX) 40 MG EC tablet, Take 1 tablet by mouth Daily., Disp: 90 tablet, Rfl: 0    triamterene-hydrochlorothiazide (Maxzide-25) 37.5-25 MG per tablet, Take 1 tablet by mouth Daily., Disp: 90 tablet, Rfl: 3    Recent Results (from the past 4032 hour(s))   Adult Transthoracic Echo Complete W/ Cont if Necessary Per Protocol    Collection Time: 12/26/23 10:58 AM   Result Value Ref Range    LVIDd 4.5 cm    LVIDs 2.9 cm    IVSd 1.10 cm    LVPWd 1.00 cm    FS 35.6 %    IVS/LVPW 1.10 cm    ESV(cubed) 24.4 ml    LV Sys Vol (BSA corrected) 20.9 cm2    EDV(cubed) 91.1 ml    LV [place] Vol (BSA corrected) 65.4 cm2    LV mass(C)d 164.0 grams    EDV(MOD-sp4) 175.0 ml    ESV(MOD-sp4) 55.8 ml    SV(MOD-sp4) 119.2 ml    SVi(MOD-SP4) 44.6 ml/m2    EF(MOD-sp4) 68.1 %    MV E max christiano 61.8 cm/sec    MV A max christiano 82.8 cm/sec    MV dec time 0.28 sec    MV E/A 0.75     Med Peak E' Christiano 5.7 cm/sec    Lat Peak E' Christiano 12.7 cm/sec    Avg E/e' ratio 6.72     RV S' 17.8 cm/sec    LV V1 max 178.0 cm/sec    LV V1 max PG 12.7 mmHg    LV V1 mean PG 7.0 mmHg    LV V1 VTI 36.4 cm    Ao pk christiano 217.0 cm/sec    Ao max PG 18.8 mmHg    Ao mean PG 10.0 mmHg    Ao V2 VTI 43.0 cm    MV max PG 4.2 mmHg    MV mean PG 2.00 mmHg    MV V2 VTI 38.1 cm    MV  P1/2t 125.7 msec    MVA(P1/2t) 1.75 cm2    MV dec slope 240.0 cm/sec2    ACS 1.90 cm    EF(MOD-bp) 68.0 %   Respiratory Panel PCR w/COVID-19(SARS-CoV-2) PRATIMA/BRIANNA/VERONICA/PAD/COR/CARLOS In-House, NP Swab in UTM/VTM, 2 HR TAT - Swab, Nasopharynx    Collection Time: 12/26/23 12:34 PM    Specimen: Nasopharynx; Swab   Result Value Ref Range    ADENOVIRUS, PCR Not Detected Not Detected    Coronavirus 229E Not Detected Not Detected    Coronavirus HKU1 Not Detected Not Detected    Coronavirus NL63 Not Detected Not Detected    Coronavirus OC43 Not Detected Not Detected    COVID19 Not Detected Not Detected - Ref. Range    Human Metapneumovirus Not Detected Not Detected    Human Rhinovirus/Enterovirus Not Detected Not Detected    Influenza A PCR Not Detected Not Detected    Influenza B PCR Not Detected Not Detected    Parainfluenza Virus 1 Not Detected Not Detected    Parainfluenza Virus 2 Not Detected Not Detected    Parainfluenza Virus 3 Not Detected Not Detected    Parainfluenza Virus 4 Not Detected Not Detected    RSV, PCR Not Detected Not Detected    Bordetella pertussis pcr Not Detected Not Detected    Bordetella parapertussis PCR Not Detected Not Detected    Chlamydophila pneumoniae PCR Not Detected Not Detected    Mycoplasma pneumo by PCR Not Detected Not Detected   Basic Metabolic Panel    Collection Time: 12/27/23  3:58 AM    Specimen: Blood   Result Value Ref Range    Glucose 118 (H) 65 - 99 mg/dL    BUN 26 (H) 8 - 23 mg/dL    Creatinine 0.79 0.57 - 1.00 mg/dL    Sodium 140 136 - 145 mmol/L    Potassium 5.2 3.5 - 5.2 mmol/L    Chloride 101 98 - 107 mmol/L    CO2 29.0 22.0 - 29.0 mmol/L    Calcium 8.9 8.6 - 10.5 mg/dL    BUN/Creatinine Ratio 32.9 (H) 7.0 - 25.0    Anion Gap 10.0 5.0 - 15.0 mmol/L    eGFR 77.2 >60.0 mL/min/1.73   CBC Auto Differential    Collection Time: 12/27/23  3:58 AM    Specimen: Blood   Result Value Ref Range    WBC 13.30 (H) 3.40 - 10.80 10*3/mm3    RBC 4.67 3.77 - 5.28 10*6/mm3    Hemoglobin 13.9 12.0  "- 15.9 g/dL    Hematocrit 42.8 34.0 - 46.6 %    MCV 91.6 79.0 - 97.0 fL    MCH 29.8 26.6 - 33.0 pg    MCHC 32.5 31.5 - 35.7 g/dL    RDW 14.7 12.3 - 15.4 %    RDW-SD 49.9 37.0 - 54.0 fl    MPV 7.3 6.0 - 12.0 fL    Platelets 249 140 - 450 10*3/mm3    Neutrophil % 88.6 (H) 42.7 - 76.0 %    Lymphocyte % 5.7 (L) 19.6 - 45.3 %    Monocyte % 5.6 5.0 - 12.0 %    Eosinophil % 0.0 (L) 0.3 - 6.2 %    Basophil % 0.1 0.0 - 1.5 %    Neutrophils, Absolute 11.80 (H) 1.70 - 7.00 10*3/mm3    Lymphocytes, Absolute 0.80 0.70 - 3.10 10*3/mm3    Monocytes, Absolute 0.70 0.10 - 0.90 10*3/mm3    Eosinophils, Absolute 0.00 0.00 - 0.40 10*3/mm3    Basophils, Absolute 0.00 0.00 - 0.20 10*3/mm3    nRBC 0.0 0.0 - 0.2 /100 WBC   Non-gynecologic Cytology    Collection Time: 12/27/23  7:55 AM    Specimen: Lung, R; Wash   Result Value Ref Range    Case Report       Medical Cytology Report                           Case: [id number]                                  Authorizing Provider:  Mayra Ramachandran MD             Collected:           12/27/2023 07:55 AM          Ordering Location:     [facility]  Received:            12/27/2023 10:22 AM                                 SUITES                                                                       Pathologist:           Kishan Grider MD                                                            Specimen:    Lung, R                                                                                    Final Diagnosis       Right lung, bronchial wash with smears and cytospin:  Acute inflammation, mature squamous cells, pulmonary macrophages and bronchial epithelial cells  No malignancy identified    FRANK      Gross Description       1. Lung, R.  Received in [employer] and designated \"bronchial washing, right lung\" are 35 mL of cloudy, blush colored fluid. Particulate matter is present. This specimen is processed as per protocol.         Fungus Culture - Wash, Lung, R    Collection Time: 12/27/23  " 7:55 AM    Specimen: Lung, R; Wash   Result Value Ref Range    Fungus Culture Candida species (A)     Fungus Culture Saprophytic Mold isolated (A)    AFB Culture - Wash, Lung, R    Collection Time: 12/27/23  7:55 AM    Specimen: Lung, R; Wash   Result Value Ref Range    AFB Culture No AFB isolated at 6 weeks     AFB Stain No acid fast bacilli seen on concentrated smear    Respiratory Culture - Wash, Lung, R    Collection Time: 12/27/23  7:55 AM    Specimen: Lung, R; Wash   Result Value Ref Range    Respiratory Culture       Light growth (2+) Normal respiratory gerardo. No S. aureus or Pseudomonas aeruginosa detected. Final report.    Gram Stain Moderate (3+) WBCs per low power field     Gram Stain Few (2+) Gram positive cocci in pairs     Gram Stain Rare (1+) Gram positive bacilli    Pneumocystis PCR - Wash, Lung, R    Collection Time: 12/27/23  7:55 AM    Specimen: Lung, R; Wash   Result Value Ref Range    Reference Lab Report See Attached Report     Pneumocystis Negative Negative - Ref. Range   Respiratory Panel PCR w/COVID-19(SARS-CoV-2) PRATIMA/BRIANNA/VERONICA/PAD/COR/CARLOS In-House, NP Swab in UTM/VTM, 2 HR TAT - Wash, Lung, R    Collection Time: 12/27/23  7:55 AM    Specimen: Lung, R; Wash   Result Value Ref Range    ADENOVIRUS, PCR Not Detected Not Detected    Coronavirus 229E Not Detected Not Detected    Coronavirus HKU1 Not Detected Not Detected    Coronavirus NL63 Not Detected Not Detected    Coronavirus OC43 Not Detected Not Detected    COVID19 Not Detected Not Detected - Ref. Range    Human Metapneumovirus Not Detected Not Detected    Human Rhinovirus/Enterovirus Not Detected Not Detected    Influenza A PCR Not Detected Not Detected    Influenza B PCR Not Detected Not Detected    Parainfluenza Virus 1 Not Detected Not Detected    Parainfluenza Virus 2 Not Detected Not Detected    Parainfluenza Virus 3 Not Detected Not Detected    Parainfluenza Virus 4 Not Detected Not Detected    RSV, PCR Not Detected Not Detected     Bordetella pertussis pcr Not Detected Not Detected    Bordetella parapertussis PCR Not Detected Not Detected    Chlamydophila pneumoniae PCR Not Detected Not Detected    Mycoplasma pneumo by PCR Not Detected Not Detected   ECG 12 Lead Tachycardia    Collection Time: 12/27/23 12:30 PM   Result Value Ref Range    QT Interval 280 ms    QTC Interval 433 ms   ECG 12 Lead Drug Monitoring; Amiodarone    Collection Time: 12/27/23  4:10 PM   Result Value Ref Range    QT Interval 292 ms    QTC Interval 416 ms   High Sensitivity Troponin T 2Hr    Collection Time: 12/28/23  4:03 AM    Specimen: Arm, Left; Blood   Result Value Ref Range    HS Troponin T 19 (H) <14 ng/L    Troponin T Delta     Basic Metabolic Panel    Collection Time: 12/28/23  4:03 AM    Specimen: Arm, Left; Blood   Result Value Ref Range    Glucose 122 (H) 65 - 99 mg/dL    BUN 34 (H) 8 - 23 mg/dL    Creatinine 0.79 0.57 - 1.00 mg/dL    Sodium 139 136 - 145 mmol/L    Potassium 4.9 3.5 - 5.2 mmol/L    Chloride 99 98 - 107 mmol/L    CO2 31.0 (H) 22.0 - 29.0 mmol/L    Calcium 8.9 8.6 - 10.5 mg/dL    BUN/Creatinine Ratio 43.0 (H) 7.0 - 25.0    Anion Gap 9.0 5.0 - 15.0 mmol/L    eGFR 77.2 >60.0 mL/min/1.73   CBC Auto Differential    Collection Time: 12/28/23  4:03 AM    Specimen: Arm, Left; Blood   Result Value Ref Range    WBC 12.90 (H) 3.40 - 10.80 10*3/mm3    RBC 4.73 3.77 - 5.28 10*6/mm3    Hemoglobin 14.1 12.0 - 15.9 g/dL    Hematocrit 43.2 34.0 - 46.6 %    MCV 91.5 79.0 - 97.0 fL    MCH 29.8 26.6 - 33.0 pg    MCHC 32.6 31.5 - 35.7 g/dL    RDW 14.6 12.3 - 15.4 %    RDW-SD 49.4 37.0 - 54.0 fl    MPV 7.6 6.0 - 12.0 fL    Platelets 278 140 - 450 10*3/mm3    Neutrophil % 84.9 (H) 42.7 - 76.0 %    Lymphocyte % 8.1 (L) 19.6 - 45.3 %    Monocyte % 6.5 5.0 - 12.0 %    Eosinophil % 0.2 (L) 0.3 - 6.2 %    Basophil % 0.3 0.0 - 1.5 %    Neutrophils, Absolute 11.00 (H) 1.70 - 7.00 10*3/mm3    Lymphocytes, Absolute 1.10 0.70 - 3.10 10*3/mm3    Monocytes, Absolute 0.80 0.10 -  0.90 10*3/mm3    Eosinophils, Absolute 0.00 0.00 - 0.40 10*3/mm3    Basophils, Absolute 0.00 0.00 - 0.20 10*3/mm3    nRBC 0.0 0.0 - 0.2 /100 WBC   ECG 12 Lead Drug Monitoring; Amiodarone    Collection Time: 12/28/23  5:46 AM   Result Value Ref Range    QT Interval 321 ms    QTC Interval 406 ms   Basic Metabolic Panel    Collection Time: 12/29/23  4:58 AM    Specimen: Blood   Result Value Ref Range    Glucose 116 (H) 65 - 99 mg/dL    BUN 38 (H) 8 - 23 mg/dL    Creatinine 0.84 0.57 - 1.00 mg/dL    Sodium 139 136 - 145 mmol/L    Potassium 4.8 3.5 - 5.2 mmol/L    Chloride 99 98 - 107 mmol/L    CO2 32.0 (H) 22.0 - 29.0 mmol/L    Calcium 9.0 8.6 - 10.5 mg/dL    BUN/Creatinine Ratio 45.2 (H) 7.0 - 25.0    Anion Gap 8.0 5.0 - 15.0 mmol/L    eGFR 71.7 >60.0 mL/min/1.73   CBC Auto Differential    Collection Time: 12/29/23  4:58 AM    Specimen: Blood   Result Value Ref Range    WBC 12.50 (H) 3.40 - 10.80 10*3/mm3    RBC 4.86 3.77 - 5.28 10*6/mm3    Hemoglobin 14.4 12.0 - 15.9 g/dL    Hematocrit 44.1 34.0 - 46.6 %    MCV 90.8 79.0 - 97.0 fL    MCH 29.7 26.6 - 33.0 pg    MCHC 32.8 31.5 - 35.7 g/dL    RDW 15.1 12.3 - 15.4 %    RDW-SD 50.3 37.0 - 54.0 fl    MPV 7.2 6.0 - 12.0 fL    Platelets 289 140 - 450 10*3/mm3   Scan Slide    Collection Time: 12/29/23  4:58 AM    Specimen: Blood   Result Value Ref Range    Scan Slide     Manual Differential    Collection Time: 12/29/23  4:58 AM    Specimen: Blood   Result Value Ref Range    Neutrophil % 83.0 (H) 42.7 - 76.0 %    Lymphocyte % 8.0 (L) 19.6 - 45.3 %    Monocyte % 7.0 5.0 - 12.0 %    Bands %  2.0 0.0 - 5.0 %    Neutrophils Absolute 10.63 (H) 1.70 - 7.00 10*3/mm3    Lymphocytes Absolute 1.00 0.70 - 3.10 10*3/mm3    Monocytes Absolute 0.88 0.10 - 0.90 10*3/mm3    RBC Morphology Normal Normal    WBC Morphology Normal Normal    Platelet Morphology Normal Normal   ECG 12 Lead Drug Monitoring; Amiodarone    Collection Time: 12/29/23  6:28 AM   Result Value Ref Range    QT Interval 339  ms    QTC Interval 404 ms   ECG 12 Lead Drug Monitoring; Amiodarone    Collection Time: 12/30/23  6:45 AM   Result Value Ref Range    QT Interval 342 ms    QTC Interval 435 ms   Basic Metabolic Panel    Collection Time: 12/31/23 12:45 PM    Specimen: Blood   Result Value Ref Range    Glucose 123 (H) 65 - 99 mg/dL    BUN 49 (H) 8 - 23 mg/dL    Creatinine 0.96 0.57 - 1.00 mg/dL    Sodium 137 136 - 145 mmol/L    Potassium 4.6 3.5 - 5.2 mmol/L    Chloride 97 (L) 98 - 107 mmol/L    CO2 35.0 (H) 22.0 - 29.0 mmol/L    Calcium 9.3 8.6 - 10.5 mg/dL    BUN/Creatinine Ratio 51.0 (H) 7.0 - 25.0    Anion Gap 5.0 5.0 - 15.0 mmol/L    eGFR 61.1 >60.0 mL/min/1.73   Blood Gas, Arterial -    Collection Time: 12/31/23  2:27 PM    Specimen: Arterial Blood   Result Value Ref Range    Site Right Radial     Baltazar's Test Positive     pH, Arterial 7.462 (H) 7.350 - 7.450 pH units    pCO2, Arterial 41.1 35.0 - 48.0 mm Hg    pO2, Arterial 91.7 83.0 - 108.0 mm Hg    HCO3, Arterial 29.4 (H) 21.0 - 28.0 mmol/L    Base Excess, Arterial 5.0 (H) 0.0 - 3.0 mmol/L    O2 Saturation, Arterial 97.5 94.0 - 98.0 %    CO2 Content 30.7 (H) 22 - 29 mmol/L    Barometric Pressure for Blood Gas      Modality Cannula     FIO2 29 %    Hemodilution No    Blood Gas, Arterial -    Collection Time: 01/01/24  3:09 PM    Specimen: Arterial Blood   Result Value Ref Range    Site Right Brachial     Baltazar's Test N/A     pH, Arterial 7.442 7.350 - 7.450 pH units    pCO2, Arterial 45.2 35.0 - 48.0 mm Hg    pO2, Arterial 59.9 (L) 83.0 - 108.0 mm Hg    HCO3, Arterial 30.8 (H) 21.0 - 28.0 mmol/L    Base Excess, Arterial 5.6 (H) 0.0 - 3.0 mmol/L    O2 Saturation, Arterial 91.2 (L) 94.0 - 98.0 %    CO2 Content 32.2 (H) 22 - 29 mmol/L    Barometric Pressure for Blood Gas      Modality Room Air     FIO2 21 %    Hemodilution No    D-dimer, Quantitative    Collection Time: 01/01/24  3:25 PM    Specimen: Arm, Left; Blood   Result Value Ref Range    D-Dimer, Quantitative 0.47 0.00 -  0.77 mg/L (FEU)   ECG 12 Lead Dyspnea    Collection Time: 01/01/24  3:34 PM   Result Value Ref Range    QT Interval 335 ms    QTC Interval 403 ms   Adult Transesophageal Echo (JAHAIRA) W/ Cont if Necessary Per Protocol    Collection Time: 02/08/24 12:03 PM   Result Value Ref Range    BH CV ECHO SHUNT ASSESSMENT PERFORMED (HIDDEN SCRIPTING) 1    ECG 12 Lead Rhythm Change    Collection Time: 02/08/24 12:03 PM   Result Value Ref Range    QT Interval 446 ms    QTC Interval 379 ms   Basic Metabolic Panel    Collection Time: 05/14/24  6:21 PM    Specimen: Blood   Result Value Ref Range    Glucose 103 (H) 65 - 99 mg/dL    BUN 20 8 - 23 mg/dL    Creatinine 0.90 0.57 - 1.00 mg/dL    Sodium 143 136 - 145 mmol/L    Potassium 4.2 3.5 - 5.2 mmol/L    Chloride 103 98 - 107 mmol/L    CO2 28.0 22.0 - 29.0 mmol/L    Calcium 9.5 8.6 - 10.5 mg/dL    BUN/Creatinine Ratio 22.2 7.0 - 25.0    Anion Gap 12.0 5.0 - 15.0 mmol/L    eGFR 66.0 >60.0 mL/min/1.73   Type & Screen    Collection Time: 05/14/24  6:21 PM    Specimen: Blood   Result Value Ref Range    ABO Type O     RH type Positive     Antibody Screen Negative     T&S Expiration Date 5/17/2024 11:59:59 PM    CBC Auto Differential    Collection Time: 05/14/24  6:21 PM    Specimen: Blood   Result Value Ref Range    WBC 7.56 3.40 - 10.80 10*3/mm3    RBC 5.12 3.77 - 5.28 10*6/mm3    Hemoglobin 14.8 12.0 - 15.9 g/dL    Hematocrit 46.4 34.0 - 46.6 %    MCV 90.6 79.0 - 97.0 fL    MCH 28.9 26.6 - 33.0 pg    MCHC 31.9 31.5 - 35.7 g/dL    RDW 14.1 12.3 - 15.4 %    RDW-SD 47.3 37.0 - 54.0 fl    MPV 9.3 6.0 - 12.0 fL    Platelets 180 140 - 450 10*3/mm3    Neutrophil % 81.6 (H) 42.7 - 76.0 %    Lymphocyte % 7.4 (L) 19.6 - 45.3 %    Monocyte % 8.9 5.0 - 12.0 %    Eosinophil % 1.5 0.3 - 6.2 %    Basophil % 0.3 0.0 - 1.5 %    Immature Grans % 0.3 0.0 - 0.5 %    Neutrophils, Absolute 6.18 1.70 - 7.00 10*3/mm3    Lymphocytes, Absolute 0.56 (L) 0.70 - 3.10 10*3/mm3    Monocytes, Absolute 0.67 0.10 - 0.90  "10*3/mm3    Eosinophils, Absolute 0.11 0.00 - 0.40 10*3/mm3    Basophils, Absolute 0.02 0.00 - 0.20 10*3/mm3    Immature Grans, Absolute 0.02 0.00 - 0.05 10*3/mm3    nRBC 0.0 0.0 - 0.2 /100 WBC         Review of Systems    Objective     /98   Pulse 58   Temp 98.5 °F (36.9 °C) (Oral)   Resp 17   Ht 180.3 cm (70.98\")   Wt (!) 150 kg (330 lb 1.6 oz)   SpO2 92%   BMI 46.06 kg/m²     Physical Exam  Vitals and nursing note reviewed.   Constitutional:       General: She is not in acute distress.     Appearance: She is obese. She is not ill-appearing.   HENT:      Head: Normocephalic.      Right Ear: Tympanic membrane normal.      Left Ear: Tympanic membrane normal.   Eyes:      Pupils: Pupils are equal, round, and reactive to light.   Cardiovascular:      Rate and Rhythm: Normal rate and regular rhythm.      Pulses: Normal pulses.      Heart sounds: Normal heart sounds.   Pulmonary:      Effort: Pulmonary effort is normal.      Breath sounds: Normal breath sounds.   Musculoskeletal:      Comments: Currently with dressings and ace wraps lower ext.  She is being evaluated in wheelchair . She uses walker at home.      Skin:     General: Skin is warm.   Neurological:      General: No focal deficit present.      Mental Status: She is alert and oriented to person, place, and time.   Psychiatric:         Mood and Affect: Mood normal.         Thought Content: Thought content normal.         Result Review :                Assessment & Plan    Diagnoses and all orders for this visit:    1. Primary hypertension (Primary)  Comments:  she has not taken medications today she will monitor  Orders:  -     Lipid Panel; Future  -     CBC & Differential; Future  -     Comprehensive Metabolic Panel; Future    2. Breast cancer screening by mammogram  Comments:  mammogram ordered  Orders:  -     Mammo Screening Digital Tomosynthesis Bilateral With CAD; Future    3. Acquired hypothyroidism  Comments:  labs ordered  Orders:  -     " TSH Rfx On Abnormal To Free T4; Future    4. Gastroesophageal reflux disease without esophagitis  Comments:  stable on protonix    5. Immobility syndrome  Comments:  stable    6. Lymphedema  Comments:  stable with her doing dressing changes at home  Assessment & Plan:  Currently using ace wraps , rolled kerlix guaze and eucrin to the skin. No longer doing wound care.   She manages her own dressing changes.       7. Need for hepatitis C screening test  Comments:  lab ordered  Orders:  -     Hepatitis C antibody; Future    Other orders  -     pantoprazole (PROTONIX) 40 MG EC tablet; Take 1 tablet by mouth Daily.  Dispense: 90 tablet; Refill: 0  -     levothyroxine (SYNTHROID, LEVOTHROID) 25 MCG tablet; Take 1 tablet by mouth Daily. Indications: Underactive Thyroid  Dispense: 90 tablet; Refill: 0  -     apixaban (Eliquis) 5 MG tablet tablet; Take 1 tablet by mouth 2 (Two) Times a Day. Indications: Atrial Fibrillation  Dispense: 60 tablet; Refill: 3      Patient Instructions   RSV vaccine is recommended.   Take blood pressure reading at least 4 times a week stay off the dyazide unless running 140-150 or higher over 80-90's    Follow Up   Return in about 1 month (around 7/11/2024).    Patient was given instructions and counseling regarding her condition or for health maintenance advice. Please see specific information pulled into the AVS if appropriate.     Brinda Tony, APRN    06/11/24

## 2024-06-11 NOTE — TELEPHONE ENCOUNTER
Caller: Camelia Gray    Relationship: Self    Best call back number: [phone]     What was the call regarding:   PATIENT NO LONGER TAKES THE:  lisinopril (PRINIVIL,ZESTRIL) 20 MG tablet     ALSO PLEASE ORDER THE FOLLOWING:  GAUZE BANDAGE ROLLS  6 PLY COTTON REORDER #: [id number]    ORDERED [facility]

## 2024-06-11 NOTE — ASSESSMENT & PLAN NOTE
Currently using ace wraps , rolled kerlix guaze and eucrin to the skin. No longer doing wound care.   She manages her own dressing changes.

## 2024-06-11 NOTE — PATIENT INSTRUCTIONS
RSV vaccine is recommended.   Take blood pressure reading at least 4 times a week stay off the dyazide unless running 140-150 or higher over 80-90's

## 2024-06-11 NOTE — TELEPHONE ENCOUNTER
I returned the patient's call I took lisinopril off her med list.  She is to monitor her blood pressure.  I wrote an order for the gauze bandage rolls.  She is to contact Sue Esposito or Jatinder see who her insurance will cover to possibly help pay for her dressing changes for her lymphedema.

## 2024-06-12 ENCOUNTER — TELEPHONE (OUTPATIENT)
Dept: CARDIOLOGY | Facility: CLINIC | Age: 79
End: 2024-06-12
Payer: MEDICARE

## 2024-06-12 ENCOUNTER — CLINICAL SUPPORT (OUTPATIENT)
Dept: CARDIOLOGY | Facility: CLINIC | Age: 79
End: 2024-06-12
Payer: MEDICARE

## 2024-06-12 VITALS — SYSTOLIC BLOOD PRESSURE: 159 MMHG | DIASTOLIC BLOOD PRESSURE: 97 MMHG | HEART RATE: 115 BPM

## 2024-06-12 RX ORDER — METOPROLOL SUCCINATE 50 MG/1
50 TABLET, EXTENDED RELEASE ORAL 2 TIMES DAILY
Start: 2024-06-12 | End: 2024-06-17

## 2024-06-12 NOTE — TELEPHONE ENCOUNTER
Patient called back to let her know that she has been took her BP since she has been back home and that she is worried. Right now it is 168/17. Pulse 103.

## 2024-06-12 NOTE — TELEPHONE ENCOUNTER
Patient's heart rate is normally in the 50's since procedure in February. Right now it is 71,103, 107, and 80. Saw her PCP yesterday. BP was in 150's-160's. BP today is 144/91. No other symptoms. Hasn't been taking Maxzide since a month ago since BP was fine. She said that it made her feel funny. Started taking it again yesterday afternoon per PCP advice.

## 2024-06-17 ENCOUNTER — OFFICE VISIT (OUTPATIENT)
Dept: CARDIOLOGY | Facility: CLINIC | Age: 79
End: 2024-06-17
Payer: MEDICARE

## 2024-06-17 VITALS
DIASTOLIC BLOOD PRESSURE: 71 MMHG | RESPIRATION RATE: 18 BRPM | OXYGEN SATURATION: 91 % | SYSTOLIC BLOOD PRESSURE: 162 MMHG | HEIGHT: 71 IN | BODY MASS INDEX: 41.02 KG/M2 | WEIGHT: 293 LBS | HEART RATE: 56 BPM

## 2024-06-17 DIAGNOSIS — I10 PRIMARY HYPERTENSION: Chronic | ICD-10-CM

## 2024-06-17 DIAGNOSIS — I48.20 CHRONIC ATRIAL FIBRILLATION WITH RAPID VENTRICULAR RESPONSE: Primary | ICD-10-CM

## 2024-06-17 DIAGNOSIS — E66.01 CLASS 3 SEVERE OBESITY DUE TO EXCESS CALORIES WITH SERIOUS COMORBIDITY AND BODY MASS INDEX (BMI) OF 45.0 TO 49.9 IN ADULT: ICD-10-CM

## 2024-06-17 PROCEDURE — 1160F RVW MEDS BY RX/DR IN RCRD: CPT | Performed by: STUDENT IN AN ORGANIZED HEALTH CARE EDUCATION/TRAINING PROGRAM

## 2024-06-17 PROCEDURE — 1159F MED LIST DOCD IN RCRD: CPT | Performed by: STUDENT IN AN ORGANIZED HEALTH CARE EDUCATION/TRAINING PROGRAM

## 2024-06-17 PROCEDURE — 3077F SYST BP >= 140 MM HG: CPT | Performed by: STUDENT IN AN ORGANIZED HEALTH CARE EDUCATION/TRAINING PROGRAM

## 2024-06-17 PROCEDURE — 3078F DIAST BP <80 MM HG: CPT | Performed by: STUDENT IN AN ORGANIZED HEALTH CARE EDUCATION/TRAINING PROGRAM

## 2024-06-17 PROCEDURE — 99213 OFFICE O/P EST LOW 20 MIN: CPT | Performed by: STUDENT IN AN ORGANIZED HEALTH CARE EDUCATION/TRAINING PROGRAM

## 2024-06-17 RX ORDER — METOPROLOL SUCCINATE 50 MG/1
100 TABLET, EXTENDED RELEASE ORAL DAILY
Qty: 90 TABLET | Refills: 3 | Status: SHIPPED | OUTPATIENT
Start: 2024-06-17

## 2024-06-17 NOTE — PROGRESS NOTES
Subjective:     Encounter Date:06/17/2024      Patient ID: Camelia Gray is a 78 y.o. female.    Chief Complaint:  Chief Complaint   Patient presents with    Follow-up       HPI:      This is a pleasant 78-year-old white female, morbidly obese, history of PE on Eliquis, hypertension presents to [facility] to establish care  Patient was initially admitted to [facility] in early January 2024 with dyspnea, found to have aspiration pneumonia, large hiatal hernia, bronchoscopy showing significant mucopurulent discharge, on telemetry showed new atrial fibrillation with rapid ventricular rate.  This was controlled with beta-blocker and amiodarone and Eliquis dose was increased to therapeutic range.     1/24/2024-was in rehab for 1 week after which she was discharged to home with home physical therapy/Occupational Therapy.  Overall she has been doing well, denies any palpitations, syncope, chest pain.  Recently seen by gastroenterology team for esophageal stricture and is planned for possible dilatation.  ECG today shows atrial fibrillation, will consider JAHAIRA cardioversion once esophageal dilatation performed.     3/4/2024-successful JAHAIRA cardioversion, currently in sinus rhythm, amiodarone discontinued, heart rate in the 50s currently on metoprolol.  Blood pressure is very well-controlled.  Patient does not want to continue hydrochlorothiazide given need for frequent urination however agrees to continue for now given excellent blood pressure control.  Otherwise asymptomatic and feels well.  Will schedule 6-month follow-up    6/17/2024-patient does not like Maxzide and was not feeling well, was evaluated in cardiology clinic, ECG showing atrial fibrillation with rapid ventricular rate, was recommended to discontinue antihypertensive medication and increase metoprolol to 50 mg twice daily, she has been doing the same, blood pressure at home consistently less than 150, heart rate in the high 50s.  Patient  "overall feels well, denies chest pain or palpitations.        Objective:         /71 (BP Location: Left arm, Patient Position: Sitting, Cuff Size: Adult)   Pulse 56   Resp 18   Ht 180.3 cm (70.98\")   Wt (!) 150 kg (330 lb)   SpO2 91%   BMI 46.05 kg/m²     Physical exam  General-no acute distress  CVS-S1-S2 normal, no murmur  Respiratory-clear breath sounds  GI-soft nontender abdomen  No pedal edema  Alert oriented X3    ROS:  14 point review of systems negative except as mentioned above    Current Outpatient Medications:     apixaban (Eliquis) 5 MG tablet tablet, Take 1 tablet by mouth 2 (Two) Times a Day. Indications: Atrial Fibrillation, Disp: 60 tablet, Rfl: 3    B Complex Vitamins (vitamin b complex) capsule capsule, Take 1 capsule by mouth Daily. Indications: Vitamin Deficiency, Disp: , Rfl:     levothyroxine (SYNTHROID, LEVOTHROID) 25 MCG tablet, Take 1 tablet by mouth Daily. Indications: Underactive Thyroid, Disp: 90 tablet, Rfl: 0    Multiple Vitamins-Minerals (multivitamin with minerals) tablet tablet, Take 1 tablet by mouth Daily. Indications: Vitamin Deficiency, Disp: , Rfl:     multivitamin (MULTI VITAMIN DAILY PO), Take 1 tablet by mouth Daily., Disp: , Rfl:     pantoprazole (PROTONIX) 40 MG EC tablet, Take 1 tablet by mouth Daily., Disp: 90 tablet, Rfl: 0    metoprolol succinate XL (TOPROL-XL) 50 MG 24 hr tablet, Take 2 tablets by mouth Daily., Disp: 90 tablet, Rfl: 3    Problem List:  Patient Active Problem List   Diagnosis    Class 3 severe obesity due to excess calories with serious comorbidity and body mass index (BMI) of 45.0 to 49.9 in adult    Arthritis of left hip    Deep vein thrombosis (DVT)    Edema of lower extremity    Hypertension    Pulmonary embolism    Stasis dermatitis    Immobility syndrome    Lymphedema    Skin ulcer of toe of right foot with fat layer exposed    Primary osteoarthritis of left hip    Hypothyroidism    Continuous leakage of urine    Breast cancer " screening by mammogram    Status post total replacement of hip    Thrombophilia    Primary osteoarthritis involving multiple joints    Gastroesophageal reflux disease without esophagitis    Obstructive uropathy    Wound discharge    Need for hepatitis C screening test    Hyperglycemia    Dyspnea    Hiatal hernia    Chronic atrial fibrillation with rapid ventricular response    Oral phase dysphagia    Hospital discharge follow-up     Past Medical History:  Past Medical History:   Diagnosis Date    A-fib 12/2023    Arthritis     Cellulitis of both feet 10/13/2019    Decubitus ulcer of buttock, stage 2 10/14/2019    HEALED    Dermatitis associated with moisture 10/14/2019    Disease of thyroid gland     HYPOTHYROID    Edema of lower extremity 05/31/2017    Hypertension     Hypothyroidism     Immobility syndrome 10/13/2019    Lymphedema 10/14/2019    LEGS    Lymphedema     Muscle weakness (generalized)     Non-pressure chronic ulcer of other part of left foot with fat layer exposed 10/14/2019    Non-pressure chronic ulcer of other part of right foot with fat layer exposed 10/14/2019    Obesity     Pulmonary embolism     Stasis dermatitis 07/08/2013    Ureteral calculi     Urinary tract infection 07/2020    Hosp. 7/2020    Venous stasis 10/14/2019    Yeast infection of the skin      Past Surgical History:  Past Surgical History:   Procedure Laterality Date    BRONCHOSCOPY N/A 12/27/2023    Procedure: BRONCHOSCOPY with right lung washing;  Surgeon: Mayra Ramachandran MD;  Location: [facility] ENDOSCOPY;  Service: Pulmonary;  Laterality: N/A;  Post- Pneumonia    CATARACT EXTRACTION Bilateral     CHOLECYSTECTOMY N/A 11/11/2020    Procedure: CHOLECYSTECTOMY LAPAROSCOPIC;  Surgeon: George Crocker DO;  Location: [facility] MAIN OR;  Service: General;  Laterality: N/A;    CORRECTION HAMMER TOE Bilateral     CYSTOSCOPY, URETEROSCOPY, RETROGRADE PYELOGRAM, STENT INSERTION Bilateral 08/18/2020    Procedure: CYSTOSCOPY BILATERAL RETROGRADE  PYELOGRAM,;  Surgeon: Shawn Hernandez MD;  Location: [facility] MAIN OR;  Service: Urology;  Laterality: Bilateral;    DENTAL EXAMINATION UNDER ANESTHESIA      ENDOSCOPY N/A 2020    Procedure: ESOPHAGOGASTRODUODENOSCOPY;  Surgeon: Torsten Johnson MD;  Location: [facility] ENDOSCOPY;  Service: Gastroenterology;  Laterality: N/A;  post: esophageal stricture, reflux esophagitis, hiatal hernia    ENDOSCOPY N/A 2024    Procedure: ESOPHAGOGASTRODUODENOSCOPY, esophageal dilation (40-46 Fr non-guided Bougie);  Surgeon: Torsten Johnson MD;  Location: [facility] ENDOSCOPY;  Service: Gastroenterology;  Laterality: N/A;  post: hiatal hernia, esophageal stricture    JOINT REPLACEMENT Bilateral     Bilateral knees    TOE SURGERY      TONSILLECTOMY      TOTAL HIP ARTHROPLASTY Left 2020    Procedure: TOTAL HIP ARTHROPLASTY;  Surgeon: Baljit Hutchins II, MD;  Location: [facility] MAIN OR;  Service: Orthopedics;  Laterality: Left;     Social History:  Social History     Socioeconomic History    Marital status: Single   Tobacco Use    Smoking status: Former     Current packs/day: 0.00     Average packs/day: 0.3 packs/day for 28.2 years (7.1 ttl pk-yrs)     Types: Cigarettes     Start date: 10/1/1964     Quit date: 1992     Years since quittin.5     Passive exposure: Past    Smokeless tobacco: Never   Vaping Use    Vaping status: Never Used   Substance and Sexual Activity    Alcohol use: No    Drug use: No    Sexual activity: Never     Allergies:  No Known Allergies  Immunizations:  Immunization History   Administered Date(s) Administered    COVID-19 (MODERNA) 1st,2nd,3rd Dose Monovalent 2021, 2021, 10/08/2021    COVID-19 (MODERNA) Monovalent Original Booster 10/25/2022    COVID-19 F23 (MODERNA) 12YRS+ (SPIKEVAX) 2023    Flu Vaccine Split Quad 10/18/2019    Fluad Quad 65+ 2023    Fluzone (or Fluarix & Flulaval for VFC) >6mos 10/18/2019    Fluzone High Dose =>65 Years  (Vaxcare ONLY) 01/07/2013, 10/02/2021    Fluzone High-Dose 65+yrs 10/02/2021, 10/01/2022    H1N1 Inj 11/09/2009    Influenza Quad Vaccine (Inpatient) 12/02/2014    Influenza Seasonal Injectable 11/01/2021    Pneumococcal Conjugate 20-Valent (PCV20) 06/13/2023    Tdap 08/18/2017            In-Office Procedure(s):  No orders to display        ASCVD RIsk Score::  The 10-year ASCVD risk score (Bill LINK, et al., 2019) is: 61%    Values used to calculate the score:      Age: 78 years      Sex: Female      Is Non- : No      Diabetic: Yes      Tobacco smoker: No      Systolic Blood Pressure: 162 mmHg      Is BP treated: Yes      HDL Cholesterol: 69 mg/dL      Total Cholesterol: 140 mg/dL    Imaging:    Results for orders placed during the hospital encounter of 12/24/23    XR Chest 1 View    Narrative  XR CHEST 1 VW    Date of Exam: 1/1/2024 3:07 PM EST    Indication: sob    Comparison: 12/31/2023    Findings:  Heart size mildly enlarged, stable, exaggerated by technique. Calcified left basilar granuloma. Lungs are without consolidation. Negative for pneumothorax. No pleural effusion. Osseous structures grossly intact.    Impression  Impression:  No acute process.        Electronically Signed: Andres Flores MD  1/1/2024 3:54 PM EST  Workstation ID: [id number]       Results for orders placed during the hospital encounter of 12/24/23    CT Chest With Contrast Diagnostic    Narrative  CT CHEST W CONTRAST DIAGNOSTIC    Date of Exam: 12/25/2023 2:55 PM EST    Indication: Dyspnea, chronic, unclear etiology.    Comparison: 12/24/2023 and prior    Technique: Axial CT images were obtained of the chest after the uneventful intravenous administration of iodinated contrast.  Sagittal and coronal reconstructions were performed.  Automated exposure control and iterative reconstruction methods were used.        FINDINGS:    There is motion limitation.    Mediastinum: There is a moderate to large hiatal hernia. Heart  size is within normal limits. There is no suspicious pericardial effusion. Vascular crowding on the right secondary to low lung volumes and large hiatal hernia noted. No suspicious hilar  adenopathy. Vascular crowding also noted on the left. There are calcified mediastinal nodes compatible with old granulomatous disease. The aorta and origin of the great vessels is grossly unremarkable in appearance. There is coronary artery  calcification. Prominence of the main pulmonary artery noted which is accentuated by the low lung volumes. A degree of pulmonary hypertension not excluded. Limited imaging the base of the neck and the esophagus is unremarkable.    Coronary artery calcification noted.      Lungs/pleura: Lung volumes are low with associated vascular crowding and consolidation medially at the lung bases. These findings are favored represent atelectasis. There is some partial opacification of the small airways in these regions. Additional  endobronchial opacification noted in the right upper lobe and perihilar region image 38. Central airways are otherwise patent. Lungs are otherwise grossly clear.    Upper Abdomen: Patient is status post cholecystectomy. Fatty atrophy is noted of the pancreas. 3.3 x 3.3 cm left adrenal mass not cynically changed in size from the comparison compatible with a adenoma. Nonobstructing calculus noted left kidney. Hiatal  hernia noted as above    Soft tissues/Bones: There is no acute osseous abnormality.    Impression  1. Prominent size of main pulmonary artery which may resent a degree of pulmonary hypertension. No obvious filling defects are noted on this exam    2. Moderate to large hiatal hernia with associated consolidation at the right lung base. Consolidation at the lung bases as mentioned above are accentuated by low lung volumes and favored represent atelectasis.    3. Endobronchial opacity right upper lobe favored represent retained secretion. Follow-up can always be  considered 6 to 12 months as clinically indicated    4. Left adrenal mass without significant change from remote comparison. Findings are compatible with an adenoma    5. Coronary artery calcification is noted.        Electronically Signed: Scotty Noyola MD  12/25/2023 5:15 PM EST  Workstation ID: [id number]      Results for orders placed during the hospital encounter of 12/24/23    CT Chest With Contrast Diagnostic    Narrative  CT CHEST W CONTRAST DIAGNOSTIC    Date of Exam: 12/25/2023 2:55 PM EST    Indication: Dyspnea, chronic, unclear etiology.    Comparison: 12/24/2023 and prior    Technique: Axial CT images were obtained of the chest after the uneventful intravenous administration of iodinated contrast.  Sagittal and coronal reconstructions were performed.  Automated exposure control and iterative reconstruction methods were used.        FINDINGS:    There is motion limitation.    Mediastinum: There is a moderate to large hiatal hernia. Heart size is within normal limits. There is no suspicious pericardial effusion. Vascular crowding on the right secondary to low lung volumes and large hiatal hernia noted. No suspicious hilar  adenopathy. Vascular crowding also noted on the left. There are calcified mediastinal nodes compatible with old granulomatous disease. The aorta and origin of the great vessels is grossly unremarkable in appearance. There is coronary artery  calcification. Prominence of the main pulmonary artery noted which is accentuated by the low lung volumes. A degree of pulmonary hypertension not excluded. Limited imaging the base of the neck and the esophagus is unremarkable.    Coronary artery calcification noted.      Lungs/pleura: Lung volumes are low with associated vascular crowding and consolidation medially at the lung bases. These findings are favored represent atelectasis. There is some partial opacification of the small airways in these regions. Additional  endobronchial opacification  noted in the right upper lobe and perihilar region image 38. Central airways are otherwise patent. Lungs are otherwise grossly clear.    Upper Abdomen: Patient is status post cholecystectomy. Fatty atrophy is noted of the pancreas. 3.3 x 3.3 cm left adrenal mass not cynically changed in size from the comparison compatible with a adenoma. Nonobstructing calculus noted left kidney. Hiatal  hernia noted as above    Soft tissues/Bones: There is no acute osseous abnormality.    Impression  1. Prominent size of main pulmonary artery which may resent a degree of pulmonary hypertension. No obvious filling defects are noted on this exam    2. Moderate to large hiatal hernia with associated consolidation at the right lung base. Consolidation at the lung bases as mentioned above are accentuated by low lung volumes and favored represent atelectasis.    3. Endobronchial opacity right upper lobe favored represent retained secretion. Follow-up can always be considered 6 to 12 months as clinically indicated    4. Left adrenal mass without significant change from remote comparison. Findings are compatible with an adenoma    5. Coronary artery calcification is noted.        Electronically Signed: Scotty Noyola MD  12/25/2023 5:15 PM EST  Workstation ID: [id number]        Most recent EKG as reviewed and interpreted by me:  Procedures     Most recent echo as reviewed and interpreted by me:  Results for orders placed during the hospital encounter of 02/08/24    Adult Transesophageal Echo (JAHAIRA) W/ Cont if Necessary Per Protocol    Interpretation Summary    There is (grade 1) plaque in the descending aorta present.    Normal left and right ventricular size and systolic function.  Biatrial dilatation.  No left atrial appendage thrombus noted.  Agitated saline/bubble study did not reveal interatrial shunt/PFO.  Aortic valve sclerosis without visual evidence of stenosis.  Grade 1 atherosclerotic plaque in the descending thoracic  aorta.      Most recent stress test as reviewed and interpreted by me:  Results for orders placed during the hospital encounter of 12/24/23    Stress Test With Myocardial Perfusion One Day    Interpretation Summary  Indications  Chest pain  Shortness of breath    This study was performed under the direct supervision of Radha HERNANDEZ.    Resting ECG  Sinus rhythm    The patient was injected with Lexiscan intravenously while constantly monitoring electrocardiogram and vital signs.  Patient did not have any chest discomfort ST abnormalities or ectopy with injection of Lexiscan.    Cardiolite was used as an imaging agent.    Cardiolite images showed uniform distribution of radionuclide without any evidence for myocardial ischemia.    Gated SPECT images revealed normal left ventricle size and contractility with ejection fraction of 74%.    Impression  ========  Lexiscan Cardiolite test is negative for myocardial ischemia.    Gated SPECT images revealed normal left ventricular size and contractility with ejection fraction of 74%.      Most recent cardiac catheterization as reviewed interpreted by me:  No results found for this or any previous visit.      Assessment & Plan :       Atrial fibrillation status post JAHAIRA cardioversion 2/2024  Diagnosed while admitted to [facility] in January 2024 for aspiration pneumonia  Currently in sinus rhythm, continue metoprolol 50 mg daily  Continue Eliquis 5 mg twice daily, [id number] score of 4  Episode of atrial fibrillation with RVR last week, off antihypertensive medication and increased dose of metoprolol with improvement in symptoms and currently in sinus rhythm  If patient has recurrence of atrial fibrillation we will need to consider ablative therapy        History of hypertension  Patient self discontinued triamterene-hydrochlorothiazide combination, blood pressure relatively well-controlled for elderly female  Continue metoprolol 50 mg as above     History of pulmonary  embolism  Continue Eliquis        Part of this note may be an electronic transcription/translation of spoken language to printed text using the Dragon Dictation System.    Rosa Isela Quiñonez MD  06/17/24  .

## 2024-07-09 ENCOUNTER — LAB (OUTPATIENT)
Dept: LAB | Facility: HOSPITAL | Age: 79
End: 2024-07-09
Payer: MEDICARE

## 2024-07-09 DIAGNOSIS — Z11.59 NEED FOR HEPATITIS C SCREENING TEST: ICD-10-CM

## 2024-07-09 DIAGNOSIS — E03.9 ACQUIRED HYPOTHYROIDISM: ICD-10-CM

## 2024-07-09 DIAGNOSIS — I10 PRIMARY HYPERTENSION: ICD-10-CM

## 2024-07-09 LAB
ALBUMIN SERPL-MCNC: 4.2 G/DL (ref 3.5–5.2)
ALBUMIN/GLOB SERPL: 1.4 G/DL
ALP SERPL-CCNC: 124 U/L (ref 39–117)
ALT SERPL W P-5'-P-CCNC: 23 U/L (ref 1–33)
ANION GAP SERPL CALCULATED.3IONS-SCNC: 9.3 MMOL/L (ref 5–15)
AST SERPL-CCNC: 23 U/L (ref 1–32)
BASOPHILS # BLD AUTO: 0.02 10*3/MM3 (ref 0–0.2)
BASOPHILS NFR BLD AUTO: 0.3 % (ref 0–1.5)
BILIRUB SERPL-MCNC: 0.5 MG/DL (ref 0–1.2)
BUN SERPL-MCNC: 24 MG/DL (ref 8–23)
BUN/CREAT SERPL: 27 (ref 7–25)
CALCIUM SPEC-SCNC: 9.7 MG/DL (ref 8.6–10.5)
CHLORIDE SERPL-SCNC: 103 MMOL/L (ref 98–107)
CHOLEST SERPL-MCNC: 149 MG/DL (ref 0–200)
CO2 SERPL-SCNC: 28.7 MMOL/L (ref 22–29)
CREAT SERPL-MCNC: 0.89 MG/DL (ref 0.57–1)
DEPRECATED RDW RBC AUTO: 45.7 FL (ref 37–54)
EGFRCR SERPLBLD CKD-EPI 2021: 66.9 ML/MIN/1.73
EOSINOPHIL # BLD AUTO: 0.19 10*3/MM3 (ref 0–0.4)
EOSINOPHIL NFR BLD AUTO: 3.1 % (ref 0.3–6.2)
ERYTHROCYTE [DISTWIDTH] IN BLOOD BY AUTOMATED COUNT: 14.3 % (ref 12.3–15.4)
GLOBULIN UR ELPH-MCNC: 3.1 GM/DL
GLUCOSE SERPL-MCNC: 110 MG/DL (ref 65–99)
HCT VFR BLD AUTO: 44.7 % (ref 34–46.6)
HCV AB SER QL: NORMAL
HDLC SERPL-MCNC: 69 MG/DL (ref 40–60)
HGB BLD-MCNC: 14.5 G/DL (ref 12–15.9)
IMM GRANULOCYTES # BLD AUTO: 0.05 10*3/MM3 (ref 0–0.05)
IMM GRANULOCYTES NFR BLD AUTO: 0.8 % (ref 0–0.5)
LDLC SERPL CALC-MCNC: 64 MG/DL (ref 0–100)
LDLC/HDLC SERPL: 0.92 {RATIO}
LYMPHOCYTES # BLD AUTO: 1.13 10*3/MM3 (ref 0.7–3.1)
LYMPHOCYTES NFR BLD AUTO: 18.4 % (ref 19.6–45.3)
MCH RBC QN AUTO: 28.8 PG (ref 26.6–33)
MCHC RBC AUTO-ENTMCNC: 32.4 G/DL (ref 31.5–35.7)
MCV RBC AUTO: 88.9 FL (ref 79–97)
MONOCYTES # BLD AUTO: 0.58 10*3/MM3 (ref 0.1–0.9)
MONOCYTES NFR BLD AUTO: 9.5 % (ref 5–12)
NEUTROPHILS NFR BLD AUTO: 4.16 10*3/MM3 (ref 1.7–7)
NEUTROPHILS NFR BLD AUTO: 67.9 % (ref 42.7–76)
NRBC BLD AUTO-RTO: 0 /100 WBC (ref 0–0.2)
PLATELET # BLD AUTO: 215 10*3/MM3 (ref 140–450)
PMV BLD AUTO: 10.4 FL (ref 6–12)
POTASSIUM SERPL-SCNC: 4.3 MMOL/L (ref 3.5–5.2)
PROT SERPL-MCNC: 7.3 G/DL (ref 6–8.5)
RBC # BLD AUTO: 5.03 10*6/MM3 (ref 3.77–5.28)
SODIUM SERPL-SCNC: 141 MMOL/L (ref 136–145)
T4 FREE SERPL-MCNC: 0.98 NG/DL (ref 0.93–1.7)
TRIGL SERPL-MCNC: 83 MG/DL (ref 0–150)
TSH SERPL DL<=0.05 MIU/L-ACNC: 12.4 UIU/ML (ref 0.27–4.2)
VLDLC SERPL-MCNC: 16 MG/DL (ref 5–40)
WBC NRBC COR # BLD AUTO: 6.13 10*3/MM3 (ref 3.4–10.8)

## 2024-07-09 PROCEDURE — 86803 HEPATITIS C AB TEST: CPT

## 2024-07-09 PROCEDURE — 84443 ASSAY THYROID STIM HORMONE: CPT

## 2024-07-09 PROCEDURE — 80053 COMPREHEN METABOLIC PANEL: CPT

## 2024-07-09 PROCEDURE — 85025 COMPLETE CBC W/AUTO DIFF WBC: CPT

## 2024-07-09 PROCEDURE — 80061 LIPID PANEL: CPT

## 2024-07-09 PROCEDURE — 84439 ASSAY OF FREE THYROXINE: CPT

## 2024-07-09 PROCEDURE — 36415 COLL VENOUS BLD VENIPUNCTURE: CPT

## 2024-07-11 RX ORDER — LEVOTHYROXINE SODIUM 0.05 MG/1
50 TABLET ORAL DAILY
Qty: 90 TABLET | Refills: 1 | Status: SHIPPED | OUTPATIENT
Start: 2024-07-11

## 2024-07-16 ENCOUNTER — OFFICE VISIT (OUTPATIENT)
Dept: FAMILY MEDICINE CLINIC | Facility: CLINIC | Age: 79
End: 2024-07-16
Payer: MEDICARE

## 2024-07-16 VITALS
BODY MASS INDEX: 46.05 KG/M2 | SYSTOLIC BLOOD PRESSURE: 148 MMHG | OXYGEN SATURATION: 94 % | RESPIRATION RATE: 17 BRPM | HEART RATE: 86 BPM | TEMPERATURE: 98.4 F | HEIGHT: 71 IN | DIASTOLIC BLOOD PRESSURE: 91 MMHG

## 2024-07-16 DIAGNOSIS — I10 PRIMARY HYPERTENSION: Primary | Chronic | ICD-10-CM

## 2024-07-16 DIAGNOSIS — I48.20 CHRONIC ATRIAL FIBRILLATION WITH RAPID VENTRICULAR RESPONSE: Chronic | ICD-10-CM

## 2024-07-16 PROCEDURE — 99214 OFFICE O/P EST MOD 30 MIN: CPT | Performed by: NURSE PRACTITIONER

## 2024-07-16 PROCEDURE — 1126F AMNT PAIN NOTED NONE PRSNT: CPT | Performed by: NURSE PRACTITIONER

## 2024-07-16 PROCEDURE — 3080F DIAST BP >= 90 MM HG: CPT | Performed by: NURSE PRACTITIONER

## 2024-07-16 PROCEDURE — 3077F SYST BP >= 140 MM HG: CPT | Performed by: NURSE PRACTITIONER

## 2024-07-16 NOTE — PATIENT INSTRUCTIONS
Monitor blood pressure and pulse at home   Get seen if short of air chest discomfort faster than normal heart rate.

## 2024-07-16 NOTE — PROGRESS NOTES
Subjective        Camelia Gray is a 78 y.o. female.     Chief Complaint   Patient presents with    Hypertension     1 month f/u       History of Present Illness  Patient is here for management of her hypertension.  Since last seen she saw cardiology for heart palpitations and concern about her heart rate when she took dyazide.   Notes reviewed:   Clinic ECG: showing atrial fibrillation with rapid ventricular rate, her metoprolol was increased to 50 mg bid.  She is checking at home running 129/79. Hr 77 when she checked.     Hypothyroidism: she recently had labs and her TSH was high her synthroid was increased to 50 mcg.  She has afib with RVR.     AFIB currently on eliquis has history of ablation.   Taking metoprolol 50 mg bid.   Placed her on pulse oximeter in office and has rate varying from 80-90 highest.   No chest pain not short of air. She uses wheelchair to get to her appointments.         The following portions of the patient's history were reviewed and updated as appropriate: allergies, current medications, past family history, past medical history, past social history, past surgical history and problem list.      Current Outpatient Medications:     apixaban (Eliquis) 5 MG tablet tablet, Take 1 tablet by mouth 2 (Two) Times a Day. Indications: Atrial Fibrillation, Disp: 60 tablet, Rfl: 3    B Complex Vitamins (vitamin b complex) capsule capsule, Take 1 capsule by mouth Daily. Indications: Vitamin Deficiency, Disp: , Rfl:     levothyroxine (SYNTHROID, LEVOTHROID) 50 MCG tablet, Take 1 tablet by mouth Daily. Indications: Underactive Thyroid, Disp: 90 tablet, Rfl: 1    metoprolol succinate XL (TOPROL-XL) 50 MG 24 hr tablet, Take 2 tablets by mouth Daily., Disp: 90 tablet, Rfl: 3    Multiple Vitamins-Minerals (multivitamin with minerals) tablet tablet, Take 1 tablet by mouth Daily. Indications: Vitamin Deficiency, Disp: , Rfl:     multivitamin (MULTI VITAMIN DAILY PO), Take 1 tablet by mouth Daily., Disp: ,  Rfl:     pantoprazole (PROTONIX) 40 MG EC tablet, Take 1 tablet by mouth Daily., Disp: 90 tablet, Rfl: 0    Recent Results (from the past 4032 hour(s))   Adult Transesophageal Echo (JAHAIRA) W/ Cont if Necessary Per Protocol    Collection Time: 02/08/24 12:03 PM   Result Value Ref Range    BH CV ECHO SHUNT ASSESSMENT PERFORMED (HIDDEN SCRIPTING) 1    ECG 12 Lead Rhythm Change    Collection Time: 02/08/24 12:03 PM   Result Value Ref Range    QT Interval 446 ms    QTC Interval 379 ms   Basic Metabolic Panel    Collection Time: 05/14/24  6:21 PM    Specimen: Blood   Result Value Ref Range    Glucose 103 (H) 65 - 99 mg/dL    BUN 20 8 - 23 mg/dL    Creatinine 0.90 0.57 - 1.00 mg/dL    Sodium 143 136 - 145 mmol/L    Potassium 4.2 3.5 - 5.2 mmol/L    Chloride 103 98 - 107 mmol/L    CO2 28.0 22.0 - 29.0 mmol/L    Calcium 9.5 8.6 - 10.5 mg/dL    BUN/Creatinine Ratio 22.2 7.0 - 25.0    Anion Gap 12.0 5.0 - 15.0 mmol/L    eGFR 66.0 >60.0 mL/min/1.73   Type & Screen    Collection Time: 05/14/24  6:21 PM    Specimen: Blood   Result Value Ref Range    ABO Type O     RH type Positive     Antibody Screen Negative     T&S Expiration Date 5/17/2024 11:59:59 PM    CBC Auto Differential    Collection Time: 05/14/24  6:21 PM    Specimen: Blood   Result Value Ref Range    WBC 7.56 3.40 - 10.80 10*3/mm3    RBC 5.12 3.77 - 5.28 10*6/mm3    Hemoglobin 14.8 12.0 - 15.9 g/dL    Hematocrit 46.4 34.0 - 46.6 %    MCV 90.6 79.0 - 97.0 fL    MCH 28.9 26.6 - 33.0 pg    MCHC 31.9 31.5 - 35.7 g/dL    RDW 14.1 12.3 - 15.4 %    RDW-SD 47.3 37.0 - 54.0 fl    MPV 9.3 6.0 - 12.0 fL    Platelets 180 140 - 450 10*3/mm3    Neutrophil % 81.6 (H) 42.7 - 76.0 %    Lymphocyte % 7.4 (L) 19.6 - 45.3 %    Monocyte % 8.9 5.0 - 12.0 %    Eosinophil % 1.5 0.3 - 6.2 %    Basophil % 0.3 0.0 - 1.5 %    Immature Grans % 0.3 0.0 - 0.5 %    Neutrophils, Absolute 6.18 1.70 - 7.00 10*3/mm3    Lymphocytes, Absolute 0.56 (L) 0.70 - 3.10 10*3/mm3    Monocytes, Absolute 0.67 0.10 -  0.90 10*3/mm3    Eosinophils, Absolute 0.11 0.00 - 0.40 10*3/mm3    Basophils, Absolute 0.02 0.00 - 0.20 10*3/mm3    Immature Grans, Absolute 0.02 0.00 - 0.05 10*3/mm3    nRBC 0.0 0.0 - 0.2 /100 WBC   Lipid Panel    Collection Time: 07/09/24 11:39 AM    Specimen: Blood   Result Value Ref Range    Total Cholesterol 149 0 - 200 mg/dL    Triglycerides 83 0 - 150 mg/dL    HDL Cholesterol 69 (H) 40 - 60 mg/dL    LDL Cholesterol  64 0 - 100 mg/dL    VLDL Cholesterol 16 5 - 40 mg/dL    LDL/HDL Ratio 0.92    Comprehensive Metabolic Panel    Collection Time: 07/09/24 11:39 AM    Specimen: Blood   Result Value Ref Range    Glucose 110 (H) 65 - 99 mg/dL    BUN 24 (H) 8 - 23 mg/dL    Creatinine 0.89 0.57 - 1.00 mg/dL    Sodium 141 136 - 145 mmol/L    Potassium 4.3 3.5 - 5.2 mmol/L    Chloride 103 98 - 107 mmol/L    CO2 28.7 22.0 - 29.0 mmol/L    Calcium 9.7 8.6 - 10.5 mg/dL    Total Protein 7.3 6.0 - 8.5 g/dL    Albumin 4.2 3.5 - 5.2 g/dL    ALT (SGPT) 23 1 - 33 U/L    AST (SGOT) 23 1 - 32 U/L    Alkaline Phosphatase 124 (H) 39 - 117 U/L    Total Bilirubin 0.5 0.0 - 1.2 mg/dL    Globulin 3.1 gm/dL    A/G Ratio 1.4 g/dL    BUN/Creatinine Ratio 27.0 (H) 7.0 - 25.0    Anion Gap 9.3 5.0 - 15.0 mmol/L    eGFR 66.9 >60.0 mL/min/1.73   Hepatitis C antibody    Collection Time: 07/09/24 11:39 AM    Specimen: Blood   Result Value Ref Range    Hepatitis C Ab Non-Reactive Non-Reactive   TSH Rfx On Abnormal To Free T4    Collection Time: 07/09/24 11:39 AM    Specimen: Blood   Result Value Ref Range    TSH 12.400 (H) 0.270 - 4.200 uIU/mL   CBC Auto Differential    Collection Time: 07/09/24 11:39 AM    Specimen: Blood   Result Value Ref Range    WBC 6.13 3.40 - 10.80 10*3/mm3    RBC 5.03 3.77 - 5.28 10*6/mm3    Hemoglobin 14.5 12.0 - 15.9 g/dL    Hematocrit 44.7 34.0 - 46.6 %    MCV 88.9 79.0 - 97.0 fL    MCH 28.8 26.6 - 33.0 pg    MCHC 32.4 31.5 - 35.7 g/dL    RDW 14.3 12.3 - 15.4 %    RDW-SD 45.7 37.0 - 54.0 fl    MPV 10.4 6.0 - 12.0 fL     "Platelets 215 140 - 450 10*3/mm3    Neutrophil % 67.9 42.7 - 76.0 %    Lymphocyte % 18.4 (L) 19.6 - 45.3 %    Monocyte % 9.5 5.0 - 12.0 %    Eosinophil % 3.1 0.3 - 6.2 %    Basophil % 0.3 0.0 - 1.5 %    Immature Grans % 0.8 (H) 0.0 - 0.5 %    Neutrophils, Absolute 4.16 1.70 - 7.00 10*3/mm3    Lymphocytes, Absolute 1.13 0.70 - 3.10 10*3/mm3    Monocytes, Absolute 0.58 0.10 - 0.90 10*3/mm3    Eosinophils, Absolute 0.19 0.00 - 0.40 10*3/mm3    Basophils, Absolute 0.02 0.00 - 0.20 10*3/mm3    Immature Grans, Absolute 0.05 0.00 - 0.05 10*3/mm3    nRBC 0.0 0.0 - 0.2 /100 WBC   T4, Free    Collection Time: 07/09/24 11:39 AM    Specimen: Blood   Result Value Ref Range    Free T4 0.98 0.93 - 1.70 ng/dL         Review of Systems    Objective     /91 (BP Location: Left arm, Patient Position: Sitting, Cuff Size: Large Adult)   Pulse 86   Temp 98.4 °F (36.9 °C) (Oral)   Resp 17   Ht 180.3 cm (70.98\")   SpO2 94%   BMI 46.05 kg/m²     Physical Exam  Vitals and nursing note reviewed.   Constitutional:       Appearance: She is obese.   HENT:      Head: Normocephalic.      Mouth/Throat:      Mouth: Mucous membranes are moist.   Eyes:      Pupils: Pupils are equal, round, and reactive to light.   Cardiovascular:      Rate and Rhythm: Normal rate. Rhythm irregular.      Heart sounds: Murmur heard.   Pulmonary:      Breath sounds: Normal breath sounds.   Neurological:      General: No focal deficit present.      Mental Status: She is alert.   Psychiatric:         Mood and Affect: Mood normal.         Result Review :                Assessment & Plan    Diagnoses and all orders for this visit:    1. Primary hypertension (Primary)  Comments:  stable on metoprolol succinate XL 50 mg bid she is checking at home [id number]    2. Chronic atrial fibrillation with rapid ventricular response  Comments:  continues on metoprolol and eliquis. will see cardiology 9/2024.      Patient Instructions   Monitor blood pressure and pulse at home "   Get seen if short of air chest discomfort faster than normal heart rate.     Follow Up   Return for Next scheduled follow up.    Patient was given instructions and counseling regarding her condition or for health maintenance advice. Please see specific information pulled into the AVS if appropriate.     Brinda Tony, APRN    07/16/24

## 2024-08-16 ENCOUNTER — TELEPHONE (OUTPATIENT)
Dept: FAMILY MEDICINE CLINIC | Facility: CLINIC | Age: 79
End: 2024-08-16

## 2024-09-10 ENCOUNTER — OFFICE VISIT (OUTPATIENT)
Dept: CARDIOLOGY | Facility: CLINIC | Age: 79
End: 2024-09-10
Payer: MEDICARE

## 2024-09-10 VITALS
RESPIRATION RATE: 18 BRPM | HEART RATE: 96 BPM | WEIGHT: 293 LBS | DIASTOLIC BLOOD PRESSURE: 83 MMHG | BODY MASS INDEX: 41.02 KG/M2 | SYSTOLIC BLOOD PRESSURE: 147 MMHG | HEIGHT: 71 IN | OXYGEN SATURATION: 92 %

## 2024-09-10 DIAGNOSIS — R60.0 EDEMA OF LOWER EXTREMITY: ICD-10-CM

## 2024-09-10 DIAGNOSIS — I82.4Y3 ACUTE DEEP VEIN THROMBOSIS (DVT) OF PROXIMAL VEIN OF BOTH LOWER EXTREMITIES: ICD-10-CM

## 2024-09-10 DIAGNOSIS — I10 PRIMARY HYPERTENSION: Primary | ICD-10-CM

## 2024-09-10 DIAGNOSIS — I48.91 ATRIAL FIBRILLATION WITH RVR: ICD-10-CM

## 2024-09-10 RX ORDER — SOTALOL HYDROCHLORIDE 80 MG/1
80 TABLET ORAL 2 TIMES DAILY
Qty: 60 TABLET | Refills: 5 | Status: SHIPPED | OUTPATIENT
Start: 2024-09-10

## 2024-09-10 NOTE — PROGRESS NOTES
Cardiology Clinic Note  Kevin Angel MD, PhD    Subjective:     Encounter Date:09/10/2024      Patient ID: Camelia Gray is a 79 y.o. female.    Chief Complaint:  Chief Complaint   Patient presents with    Follow-up     3 months       HPI:    I the pleasure to follow-up with this 79-year-old with recurrent paroxysmal atrial fibrillation status post ablation 2024.  Was previously on amiodarone but this was discontinued and lieu of simple beta-blocker.  She is on Eliquis for anticoagulation.  Blood pressure is well-controlled 130 systolic at home although she remains morbidly obese, wheelchair-bound and sedentary.  She has a history of DVT in the past as well, stasis dermatitis, lymphedema, essential hypertension, immobility syndrome morbid obesity.   she is back in atrial fibrillation today after some diuretic therapy on Maxide she says that might have been contributory.  She was also in the hospital for diarrhea back in May.  After reviewing her medicines she is agreeable to switch to sotalol for rhythm control strategy given paroxysmal nature of her atrial fibrillation.  She will be on lifelong anticoagulation she has no coronary disease unstable angina PND orthopnea or presyncope although she does have chronic lower extremity edema in both legs are wrapped today.  She does have diastolic dysfunction with normal EF 65 to 70% with biatrial enlargement.  Transesophageal echo in February demonstrated biatrial enlargement with negative bubble study, no valvular stenoses, no thrombus.    Review of systems otherwise negative x 14 point review of systems except was mentioned above    Historical data copied forward from previous encounters in EMR including the history, exam, and assessment/plan has been reviewed and is unchanged unless noted otherwise.    Cardiac medicines reviewed with risk, benefits, and necessity of each discussed.    Risk and benefit of cardiac testing reviewed including death heart attack stroke  "pain bleeding infection need for vascular /cardiovascular surgery were discussed and the patient     Objective:         /83 (BP Location: Right arm, Patient Position: Sitting, Cuff Size: Large Adult)   Pulse 96   Resp 18   Ht 180.3 cm (71\")   Wt (!) 150 kg (330 lb)   SpO2 92%   BMI 46.03 kg/m²     Physical Exam  Irregularly irregular, mildly tachycardic low 100s, no rubs gallops heave or lift  No clubbing cyanosis, legs bilaterally wrapped 1+ edema  Morbidly obese soft nontender nondistended  Clear to auscultation  No carotid bruits or JVD  Intact grossly  Assessment:       Paroxysmal A-fib  Essential hypertension  Long-term use of anticoagulation  Obesity complicating all aspects of care  Immobility  Chronic diastolic CHF  Aortic valve sclerosis  High risk medicines    Continue Eliquis  Change metoprolol to sotalol 80 twice daily, EKG today and again in a few days  Renal function is normal, potassium is normal on last labs  Encourage activity is much as tolerated per functional status      Secondary prevention goals  See her back in 1 week, examine EKG, QT interval, rate and rhythm response to current dose and increase to 120 twice daily if able  Will discharge with M cot as well      Kevin Angel MD, PhD  There are no diagnoses linked to this encounter.       Plan:              The pleasure to be involved in this patient's cardiovascular care.  Please call with any questions or concerns  Kevin Angel MD, PhD    Most recent EKG as reviewed and interpreted by me:  Procedures     Most recent echo as reviewed and interpreted by me:  Results for orders placed during the hospital encounter of 02/08/24    Adult Transesophageal Echo (JAHAIRA) W/ Cont if Necessary Per Protocol    Interpretation Summary    There is (grade 1) plaque in the descending aorta present.    Normal left and right ventricular size and systolic function.  Biatrial dilatation.  No left atrial appendage thrombus noted.  Agitated " saline/bubble study did not reveal interatrial shunt/PFO.  Aortic valve sclerosis without visual evidence of stenosis.  Grade 1 atherosclerotic plaque in the descending thoracic aorta.      Most recent stress test as reviewed and interpreted by me:  Results for orders placed during the hospital encounter of 12/24/23    Stress Test With Myocardial Perfusion One Day    Interpretation Summary  Indications  Chest pain  Shortness of breath    This study was performed under the direct supervision of Radha HERNANDEZ.    Resting ECG  Sinus rhythm    The patient was injected with Lexiscan intravenously while constantly monitoring electrocardiogram and vital signs.  Patient did not have any chest discomfort ST abnormalities or ectopy with injection of Lexiscan.    Cardiolite was used as an imaging agent.    Cardiolite images showed uniform distribution of radionuclide without any evidence for myocardial ischemia.    Gated SPECT images revealed normal left ventricle size and contractility with ejection fraction of 74%.    Impression  ========  Lexiscan Cardiolite test is negative for myocardial ischemia.    Gated SPECT images revealed normal left ventricular size and contractility with ejection fraction of 74%.      Most recent cardiac catheterization as reviewed interpreted by me:  No results found for this or any previous visit.    The following portions of the patient's history were reviewed and updated as appropriate: allergies, current medications, past family history, past medical history, past social history, past surgical history, and problem list.      ROS:  14 point review of systems negative except as mentioned above    Current Outpatient Medications:     apixaban (Eliquis) 5 MG tablet tablet, Take 1 tablet by mouth 2 (Two) Times a Day. Indications: Atrial Fibrillation, Disp: 60 tablet, Rfl: 3    B Complex Vitamins (vitamin b complex) capsule capsule, Take 1 capsule by mouth Daily. Indications: Vitamin Deficiency, Disp: ,  Rfl:     levothyroxine (SYNTHROID, LEVOTHROID) 50 MCG tablet, Take 1 tablet by mouth Daily. Indications: Underactive Thyroid, Disp: 90 tablet, Rfl: 1    metoprolol succinate XL (TOPROL-XL) 50 MG 24 hr tablet, Take 2 tablets by mouth Daily., Disp: 90 tablet, Rfl: 3    Multiple Vitamins-Minerals (multivitamin with minerals) tablet tablet, Take 1 tablet by mouth Daily. Indications: Vitamin Deficiency, Disp: , Rfl:     multivitamin (MULTI VITAMIN DAILY PO), Take 1 tablet by mouth Daily., Disp: , Rfl:     pantoprazole (PROTONIX) 40 MG EC tablet, Take 1 tablet by mouth Daily., Disp: 90 tablet, Rfl: 0    Problem List:  Patient Active Problem List   Diagnosis    Class 3 severe obesity due to excess calories with serious comorbidity and body mass index (BMI) of 45.0 to 49.9 in adult    Arthritis of left hip    Deep vein thrombosis (DVT)    Edema of lower extremity    Hypertension    Pulmonary embolism    Stasis dermatitis    Immobility syndrome    Lymphedema    Skin ulcer of toe of right foot with fat layer exposed    Primary osteoarthritis of left hip    Hypothyroidism    Continuous leakage of urine    Breast cancer screening by mammogram    Status post total replacement of hip    Thrombophilia    Primary osteoarthritis involving multiple joints    Gastroesophageal reflux disease without esophagitis    Obstructive uropathy    Wound discharge    Need for hepatitis C screening test    Hyperglycemia    Dyspnea    Hiatal hernia    Chronic atrial fibrillation with rapid ventricular response    Oral phase dysphagia    Hospital discharge follow-up     Past Medical History:  Past Medical History:   Diagnosis Date    A-fib 12/2023    Arthritis     Cellulitis of both feet 10/13/2019    Decubitus ulcer of buttock, stage 2 10/14/2019    HEALED    Dermatitis associated with moisture 10/14/2019    Disease of thyroid gland     HYPOTHYROID    Edema of lower extremity 05/31/2017    Hypertension     Hypothyroidism     Immobility syndrome  10/13/2019    Lymphedema 10/14/2019    LEGS    Lymphedema     Muscle weakness (generalized)     Non-pressure chronic ulcer of other part of left foot with fat layer exposed 10/14/2019    Non-pressure chronic ulcer of other part of right foot with fat layer exposed 10/14/2019    Obesity     Pulmonary embolism     Stasis dermatitis 07/08/2013    Ureteral calculi     Urinary tract infection 07/2020    Hosp. 7/2020    Venous stasis 10/14/2019    Yeast infection of the skin      Past Surgical History:  Past Surgical History:   Procedure Laterality Date    BRONCHOSCOPY N/A 12/27/2023    Procedure: BRONCHOSCOPY with right lung washing;  Surgeon: Mayra Ramachandran MD;  Location: [facility] ENDOSCOPY;  Service: Pulmonary;  Laterality: N/A;  Post- Pneumonia    CATARACT EXTRACTION Bilateral     CHOLECYSTECTOMY N/A 11/11/2020    Procedure: CHOLECYSTECTOMY LAPAROSCOPIC;  Surgeon: George Crocker DO;  Location: [facility] MAIN OR;  Service: General;  Laterality: N/A;    CORRECTION HAMMER TOE Bilateral     CYSTOSCOPY, URETEROSCOPY, RETROGRADE PYELOGRAM, STENT INSERTION Bilateral 08/18/2020    Procedure: CYSTOSCOPY BILATERAL RETROGRADE PYELOGRAM,;  Surgeon: Shawn Hernandez MD;  Location: [facility] MAIN OR;  Service: Urology;  Laterality: Bilateral;    DENTAL EXAMINATION UNDER ANESTHESIA      ENDOSCOPY N/A 09/13/2020    Procedure: ESOPHAGOGASTRODUODENOSCOPY;  Surgeon: Torsten Johnson MD;  Location: [facility] ENDOSCOPY;  Service: Gastroenterology;  Laterality: N/A;  post: esophageal stricture, reflux esophagitis, hiatal hernia    ENDOSCOPY N/A 02/01/2024    Procedure: ESOPHAGOGASTRODUODENOSCOPY, esophageal dilation (40-46 Fr non-guided Bougie);  Surgeon: Torsten Johnson MD;  Location: [facility] ENDOSCOPY;  Service: Gastroenterology;  Laterality: N/A;  post: hiatal hernia, esophageal stricture    JOINT REPLACEMENT Bilateral     Bilateral knees    TOE SURGERY      TONSILLECTOMY  1952    TOTAL HIP ARTHROPLASTY Left 06/12/2020    Procedure:  TOTAL HIP ARTHROPLASTY;  Surgeon: Baljit Hutchins II, MD;  Location: [facility] MAIN OR;  Service: Orthopedics;  Laterality: Left;     Social History:  Social History     Socioeconomic History    Marital status: Single   Tobacco Use    Smoking status: Former     Current packs/day: 0.00     Average packs/day: 0.3 packs/day for 28.2 years (7.1 ttl pk-yrs)     Types: Cigarettes     Start date: 10/1/1964     Quit date: 1992     Years since quittin.7     Passive exposure: Past    Smokeless tobacco: Never   Vaping Use    Vaping status: Never Used   Substance and Sexual Activity    Alcohol use: No    Drug use: No    Sexual activity: Never     Allergies:  Allergies   Allergen Reactions    Maxzide [Hydrochlorothiazide W-Triamterene] Arrhythmia     Immunizations:  Immunization History   Administered Date(s) Administered    COVID-19 (MODERNA) 1st,2nd,3rd Dose Monovalent 2021, 2021, 10/08/2021    COVID-19 (MODERNA) Monovalent Original Booster 10/25/2022    COVID-19 F23 (MODERNA) 12YRS+ (SPIKEVAX) 2023    Flu Vaccine Split Quad 10/18/2019    Fluad Quad 65+ 2023    Fluzone  >6mos 2014    Fluzone (or Fluarix & Flulaval for VFC) >6mos 10/18/2019    Fluzone High-Dose 65+YRS 2013, 10/02/2021    Fluzone High-Dose 65+yrs 10/02/2021, 10/01/2022    H1N1 Inj 2009    Influenza Seasonal Injectable 2021    Pneumococcal Conjugate 20-Valent (PCV20) 2023    Tdap 2017            In-Office Procedure(s):  No orders to display        ASCVD RIsk Score::  The 10-year ASCVD risk score (Bill LINK, et al., 2019) is: 58.8%    Values used to calculate the score:      Age: 79 years      Sex: Female      Is Non- : No      Diabetic: Yes      Tobacco smoker: No      Systolic Blood Pressure: 147 mmHg      Is BP treated: Yes      HDL Cholesterol: 69 mg/dL      Total Cholesterol: 149 mg/dL    Imaging:    Results for orders placed during the hospital encounter of  12/24/23    XR Chest 1 View    Narrative  XR CHEST 1 VW    Date of Exam: 1/1/2024 3:07 PM EST    Indication: sob    Comparison: 12/31/2023    Findings:  Heart size mildly enlarged, stable, exaggerated by technique. Calcified left basilar granuloma. Lungs are without consolidation. Negative for pneumothorax. No pleural effusion. Osseous structures grossly intact.    Impression  Impression:  No acute process.        Electronically Signed: Andres Flores MD  1/1/2024 3:54 PM EST  Workstation ID: [id number]       Results for orders placed during the hospital encounter of 12/24/23    CT Chest With Contrast Diagnostic    Narrative  CT CHEST W CONTRAST DIAGNOSTIC    Date of Exam: 12/25/2023 2:55 PM EST    Indication: Dyspnea, chronic, unclear etiology.    Comparison: 12/24/2023 and prior    Technique: Axial CT images were obtained of the chest after the uneventful intravenous administration of iodinated contrast.  Sagittal and coronal reconstructions were performed.  Automated exposure control and iterative reconstruction methods were used.        FINDINGS:    There is motion limitation.    Mediastinum: There is a moderate to large hiatal hernia. Heart size is within normal limits. There is no suspicious pericardial effusion. Vascular crowding on the right secondary to low lung volumes and large hiatal hernia noted. No suspicious hilar  adenopathy. Vascular crowding also noted on the left. There are calcified mediastinal nodes compatible with old granulomatous disease. The aorta and origin of the great vessels is grossly unremarkable in appearance. There is coronary artery  calcification. Prominence of the main pulmonary artery noted which is accentuated by the low lung volumes. A degree of pulmonary hypertension not excluded. Limited imaging the base of the neck and the esophagus is unremarkable.    Coronary artery calcification noted.      Lungs/pleura: Lung volumes are low with associated vascular crowding and consolidation  medially at the lung bases. These findings are favored represent atelectasis. There is some partial opacification of the small airways in these regions. Additional  endobronchial opacification noted in the right upper lobe and perihilar region image 38. Central airways are otherwise patent. Lungs are otherwise grossly clear.    Upper Abdomen: Patient is status post cholecystectomy. Fatty atrophy is noted of the pancreas. 3.3 x 3.3 cm left adrenal mass not cynically changed in size from the comparison compatible with a adenoma. Nonobstructing calculus noted left kidney. Hiatal  hernia noted as above    Soft tissues/Bones: There is no acute osseous abnormality.    Impression  1. Prominent size of main pulmonary artery which may resent a degree of pulmonary hypertension. No obvious filling defects are noted on this exam    2. Moderate to large hiatal hernia with associated consolidation at the right lung base. Consolidation at the lung bases as mentioned above are accentuated by low lung volumes and favored represent atelectasis.    3. Endobronchial opacity right upper lobe favored represent retained secretion. Follow-up can always be considered 6 to 12 months as clinically indicated    4. Left adrenal mass without significant change from remote comparison. Findings are compatible with an adenoma    5. Coronary artery calcification is noted.        Electronically Signed: Scotty Noyola MD  12/25/2023 5:15 PM EST  Workstation ID: [id number]      Results for orders placed during the hospital encounter of 12/24/23    CT Chest With Contrast Diagnostic    Narrative  CT CHEST W CONTRAST DIAGNOSTIC    Date of Exam: 12/25/2023 2:55 PM EST    Indication: Dyspnea, chronic, unclear etiology.    Comparison: 12/24/2023 and prior    Technique: Axial CT images were obtained of the chest after the uneventful intravenous administration of iodinated contrast.  Sagittal and coronal reconstructions were performed.  Automated exposure  control and iterative reconstruction methods were used.        FINDINGS:    There is motion limitation.    Mediastinum: There is a moderate to large hiatal hernia. Heart size is within normal limits. There is no suspicious pericardial effusion. Vascular crowding on the right secondary to low lung volumes and large hiatal hernia noted. No suspicious hilar  adenopathy. Vascular crowding also noted on the left. There are calcified mediastinal nodes compatible with old granulomatous disease. The aorta and origin of the great vessels is grossly unremarkable in appearance. There is coronary artery  calcification. Prominence of the main pulmonary artery noted which is accentuated by the low lung volumes. A degree of pulmonary hypertension not excluded. Limited imaging the base of the neck and the esophagus is unremarkable.    Coronary artery calcification noted.      Lungs/pleura: Lung volumes are low with associated vascular crowding and consolidation medially at the lung bases. These findings are favored represent atelectasis. There is some partial opacification of the small airways in these regions. Additional  endobronchial opacification noted in the right upper lobe and perihilar region image 38. Central airways are otherwise patent. Lungs are otherwise grossly clear.    Upper Abdomen: Patient is status post cholecystectomy. Fatty atrophy is noted of the pancreas. 3.3 x 3.3 cm left adrenal mass not cynically changed in size from the comparison compatible with a adenoma. Nonobstructing calculus noted left kidney. Hiatal  hernia noted as above    Soft tissues/Bones: There is no acute osseous abnormality.    Impression  1. Prominent size of main pulmonary artery which may resent a degree of pulmonary hypertension. No obvious filling defects are noted on this exam    2. Moderate to large hiatal hernia with associated consolidation at the right lung base. Consolidation at the lung bases as mentioned above are accentuated  by low lung volumes and favored represent atelectasis.    3. Endobronchial opacity right upper lobe favored represent retained secretion. Follow-up can always be considered 6 to 12 months as clinically indicated    4. Left adrenal mass without significant change from remote comparison. Findings are compatible with an adenoma    5. Coronary artery calcification is noted.        Electronically Signed: Scotty Noyola MD  12/25/2023 5:15 PM EST  Workstation ID: [id number]      Lab Review:   Lab on 07/09/2024   Component Date Value    Total Cholesterol 07/09/2024 149     Triglycerides 07/09/2024 83     HDL Cholesterol 07/09/2024 69 (H)     LDL Cholesterol  07/09/2024 64     VLDL Cholesterol 07/09/2024 16     LDL/HDL Ratio 07/09/2024 0.92     Glucose 07/09/2024 110 (H)     BUN 07/09/2024 24 (H)     Creatinine 07/09/2024 0.89     Sodium 07/09/2024 141     Potassium 07/09/2024 4.3     Chloride 07/09/2024 103     CO2 07/09/2024 28.7     Calcium 07/09/2024 9.7     Total Protein 07/09/2024 7.3     Albumin 07/09/2024 4.2     ALT (SGPT) 07/09/2024 23     AST (SGOT) 07/09/2024 23     Alkaline Phosphatase 07/09/2024 124 (H)     Total Bilirubin 07/09/2024 0.5     Globulin 07/09/2024 3.1     A/G Ratio 07/09/2024 1.4     BUN/Creatinine Ratio 07/09/2024 27.0 (H)     Anion Gap 07/09/2024 9.3     eGFR 07/09/2024 66.9     Hepatitis C Ab 07/09/2024 Non-Reactive     TSH 07/09/2024 12.400 (H)     WBC 07/09/2024 6.13     RBC 07/09/2024 5.03     Hemoglobin 07/09/2024 14.5     Hematocrit 07/09/2024 44.7     MCV 07/09/2024 88.9     MCH 07/09/2024 28.8     MCHC 07/09/2024 32.4     RDW 07/09/2024 14.3     RDW-SD 07/09/2024 45.7     MPV 07/09/2024 10.4     Platelets 07/09/2024 215     Neutrophil % 07/09/2024 67.9     Lymphocyte % 07/09/2024 18.4 (L)     Monocyte % 07/09/2024 9.5     Eosinophil % 07/09/2024 3.1     Basophil % 07/09/2024 0.3     Immature Grans % 07/09/2024 0.8 (H)     Neutrophils, Absolute 07/09/2024 4.16     Lymphocytes, Absolute  07/09/2024 1.13     Monocytes, Absolute 07/09/2024 0.58     Eosinophils, Absolute 07/09/2024 0.19     Basophils, Absolute 07/09/2024 0.02     Immature Grans, Absolute 07/09/2024 0.05     nRBC 07/09/2024 0.0     Free T4 07/09/2024 0.98    Admission on 05/14/2024, Discharged on 05/14/2024   Component Date Value    Glucose 05/14/2024 103 (H)     BUN 05/14/2024 20     Creatinine 05/14/2024 0.90     Sodium 05/14/2024 143     Potassium 05/14/2024 4.2     Chloride 05/14/2024 103     CO2 05/14/2024 28.0     Calcium 05/14/2024 9.5     BUN/Creatinine Ratio 05/14/2024 22.2     Anion Gap 05/14/2024 12.0     eGFR 05/14/2024 66.0     ABO Type 05/14/2024 O     RH type 05/14/2024 Positive     Antibody Screen 05/14/2024 Negative     T&S Expiration Date 05/14/2024 5/17/2024 11:59:59 PM     WBC 05/14/2024 7.56     RBC 05/14/2024 5.12     Hemoglobin 05/14/2024 14.8     Hematocrit 05/14/2024 46.4     MCV 05/14/2024 90.6     MCH 05/14/2024 28.9     MCHC 05/14/2024 31.9     RDW 05/14/2024 14.1     RDW-SD 05/14/2024 47.3     MPV 05/14/2024 9.3     Platelets 05/14/2024 180     Neutrophil % 05/14/2024 81.6 (H)     Lymphocyte % 05/14/2024 7.4 (L)     Monocyte % 05/14/2024 8.9     Eosinophil % 05/14/2024 1.5     Basophil % 05/14/2024 0.3     Immature Grans % 05/14/2024 0.3     Neutrophils, Absolute 05/14/2024 6.18     Lymphocytes, Absolute 05/14/2024 0.56 (L)     Monocytes, Absolute 05/14/2024 0.67     Eosinophils, Absolute 05/14/2024 0.11     Basophils, Absolute 05/14/2024 0.02     Immature Grans, Absolute 05/14/2024 0.02     nRBC 05/14/2024 0.0      Recent labs reviewed and interpreted for clinical significance and application            Level of Care:           Kevin Angle MD  09/10/24  .

## 2024-09-17 ENCOUNTER — CLINICAL SUPPORT (OUTPATIENT)
Dept: CARDIOLOGY | Facility: CLINIC | Age: 79
End: 2024-09-17
Payer: MEDICARE

## 2024-09-17 DIAGNOSIS — I10 PRIMARY HYPERTENSION: Primary | Chronic | ICD-10-CM

## 2024-09-17 PROCEDURE — 93000 ELECTROCARDIOGRAM COMPLETE: CPT | Performed by: INTERNAL MEDICINE

## 2024-09-18 ENCOUNTER — TELEPHONE (OUTPATIENT)
Dept: CARDIOLOGY | Facility: CLINIC | Age: 79
End: 2024-09-18

## 2024-09-18 NOTE — TELEPHONE ENCOUNTER
Caller: Camelia Gray    Relationship: Self    Best call back number: [phone]    Caller requesting test results: EKG    What test was performed: EKG    When was the test performed: 08.17.24    Where was the test performed: [facility]      Additional notes: PATIENT IS WANTING A CALL BACK TO GO OVER RESULTS. PLEASE ADVISE

## 2024-09-27 RX ORDER — PANTOPRAZOLE SODIUM 40 MG/1
40 TABLET, DELAYED RELEASE ORAL DAILY
Qty: 90 TABLET | Refills: 0 | Status: SHIPPED | OUTPATIENT
Start: 2024-09-27

## 2024-10-24 RX ORDER — APIXABAN 5 MG/1
5 TABLET, FILM COATED ORAL 2 TIMES DAILY
Qty: 60 TABLET | Refills: 2 | OUTPATIENT
Start: 2024-10-24

## 2024-10-25 RX ORDER — APIXABAN 5 MG/1
5 TABLET, FILM COATED ORAL 2 TIMES DAILY
Qty: 60 TABLET | Refills: 2 | Status: SHIPPED | OUTPATIENT
Start: 2024-10-25

## 2024-11-12 ENCOUNTER — APPOINTMENT (OUTPATIENT)
Dept: GENERAL RADIOLOGY | Facility: HOSPITAL | Age: 79
End: 2024-11-12
Payer: MEDICARE

## 2024-11-12 ENCOUNTER — HOSPITAL ENCOUNTER (INPATIENT)
Facility: HOSPITAL | Age: 79
LOS: 9 days | Discharge: HOME | End: 2024-11-22
Attending: EMERGENCY MEDICINE | Admitting: INTERNAL MEDICINE
Payer: MEDICARE

## 2024-11-12 DIAGNOSIS — M16.12 PRIMARY OSTEOARTHRITIS OF LEFT HIP: ICD-10-CM

## 2024-11-12 DIAGNOSIS — I48.0 PAROXYSMAL ATRIAL FIBRILLATION: ICD-10-CM

## 2024-11-12 DIAGNOSIS — J45.41 MODERATE PERSISTENT ASTHMATIC BRONCHITIS WITH ACUTE EXACERBATION: ICD-10-CM

## 2024-11-12 DIAGNOSIS — R06.02 SHORTNESS OF BREATH: ICD-10-CM

## 2024-11-12 DIAGNOSIS — R00.1 SYMPTOMATIC BRADYCARDIA: Primary | ICD-10-CM

## 2024-11-12 DIAGNOSIS — Z09 HOSPITAL DISCHARGE FOLLOW-UP: ICD-10-CM

## 2024-11-12 DIAGNOSIS — I48.19 PERSISTENT ATRIAL FIBRILLATION: ICD-10-CM

## 2024-11-12 DIAGNOSIS — I16.0 HYPERTENSIVE URGENCY: ICD-10-CM

## 2024-11-12 DIAGNOSIS — T17.800A MULTIPLE TRACHEOBRONCHIAL MUCUS PLUGS: ICD-10-CM

## 2024-11-12 DIAGNOSIS — J98.11 ATELECTASIS: ICD-10-CM

## 2024-11-12 LAB
ALBUMIN SERPL-MCNC: 3.8 G/DL (ref 3.5–5.2)
ALBUMIN/GLOB SERPL: 1.1 G/DL
ALP SERPL-CCNC: 128 U/L (ref 39–117)
ALT SERPL W P-5'-P-CCNC: 23 U/L (ref 1–33)
ANION GAP SERPL CALCULATED.3IONS-SCNC: 9.8 MMOL/L (ref 5–15)
AST SERPL-CCNC: 28 U/L (ref 1–32)
B PARAPERT DNA SPEC QL NAA+PROBE: NOT DETECTED
B PERT DNA SPEC QL NAA+PROBE: NOT DETECTED
BASOPHILS # BLD AUTO: 0.02 10*3/MM3 (ref 0–0.2)
BASOPHILS NFR BLD AUTO: 0.3 % (ref 0–1.5)
BILIRUB SERPL-MCNC: 0.4 MG/DL (ref 0–1.2)
BUN SERPL-MCNC: 22 MG/DL (ref 8–23)
BUN/CREAT SERPL: 30.1 (ref 7–25)
C PNEUM DNA NPH QL NAA+NON-PROBE: NOT DETECTED
CALCIUM SPEC-SCNC: 9.8 MG/DL (ref 8.6–10.5)
CHLORIDE SERPL-SCNC: 104 MMOL/L (ref 98–107)
CO2 SERPL-SCNC: 28.2 MMOL/L (ref 22–29)
CREAT SERPL-MCNC: 0.73 MG/DL (ref 0.57–1)
D-LACTATE SERPL-SCNC: 0.9 MMOL/L (ref 0.3–2)
DEPRECATED RDW RBC AUTO: 50.2 FL (ref 37–54)
EGFRCR SERPLBLD CKD-EPI 2021: 84.3 ML/MIN/1.73
EOSINOPHIL # BLD AUTO: 0.24 10*3/MM3 (ref 0–0.4)
EOSINOPHIL NFR BLD AUTO: 3.5 % (ref 0.3–6.2)
ERYTHROCYTE [DISTWIDTH] IN BLOOD BY AUTOMATED COUNT: 15.2 % (ref 12.3–15.4)
FLUAV SUBTYP SPEC NAA+PROBE: NOT DETECTED
FLUBV RNA ISLT QL NAA+PROBE: NOT DETECTED
GEN 5 2HR TROPONIN T REFLEX: 15 NG/L
GLOBULIN UR ELPH-MCNC: 3.5 GM/DL
GLUCOSE SERPL-MCNC: 113 MG/DL (ref 65–99)
HADV DNA SPEC NAA+PROBE: NOT DETECTED
HCOV 229E RNA SPEC QL NAA+PROBE: NOT DETECTED
HCOV HKU1 RNA SPEC QL NAA+PROBE: NOT DETECTED
HCOV NL63 RNA SPEC QL NAA+PROBE: NOT DETECTED
HCOV OC43 RNA SPEC QL NAA+PROBE: NOT DETECTED
HCT VFR BLD AUTO: 45.3 % (ref 34–46.6)
HGB BLD-MCNC: 14.2 G/DL (ref 12–15.9)
HMPV RNA NPH QL NAA+NON-PROBE: NOT DETECTED
HOLD SPECIMEN: NORMAL
HOLD SPECIMEN: NORMAL
HPIV1 RNA ISLT QL NAA+PROBE: NOT DETECTED
HPIV2 RNA SPEC QL NAA+PROBE: NOT DETECTED
HPIV3 RNA NPH QL NAA+PROBE: NOT DETECTED
HPIV4 P GENE NPH QL NAA+PROBE: NOT DETECTED
IMM GRANULOCYTES # BLD AUTO: 0.03 10*3/MM3 (ref 0–0.05)
IMM GRANULOCYTES NFR BLD AUTO: 0.4 % (ref 0–0.5)
LYMPHOCYTES # BLD AUTO: 1.09 10*3/MM3 (ref 0.7–3.1)
LYMPHOCYTES NFR BLD AUTO: 15.8 % (ref 19.6–45.3)
M PNEUMO IGG SER IA-ACNC: NOT DETECTED
MCH RBC QN AUTO: 28.4 PG (ref 26.6–33)
MCHC RBC AUTO-ENTMCNC: 31.3 G/DL (ref 31.5–35.7)
MCV RBC AUTO: 90.6 FL (ref 79–97)
MONOCYTES # BLD AUTO: 0.56 10*3/MM3 (ref 0.1–0.9)
MONOCYTES NFR BLD AUTO: 8.1 % (ref 5–12)
NEUTROPHILS NFR BLD AUTO: 4.95 10*3/MM3 (ref 1.7–7)
NEUTROPHILS NFR BLD AUTO: 71.9 % (ref 42.7–76)
NRBC BLD AUTO-RTO: 0 /100 WBC (ref 0–0.2)
NT-PROBNP SERPL-MCNC: 1757 PG/ML (ref 0–1800)
PLATELET # BLD AUTO: 212 10*3/MM3 (ref 140–450)
PMV BLD AUTO: 9 FL (ref 6–12)
POTASSIUM SERPL-SCNC: 3.8 MMOL/L (ref 3.5–5.2)
PROT SERPL-MCNC: 7.3 G/DL (ref 6–8.5)
RBC # BLD AUTO: 5 10*6/MM3 (ref 3.77–5.28)
RHINOVIRUS RNA SPEC NAA+PROBE: NOT DETECTED
RSV RNA NPH QL NAA+NON-PROBE: NOT DETECTED
SARS-COV-2 RNA NPH QL NAA+NON-PROBE: NOT DETECTED
SODIUM SERPL-SCNC: 142 MMOL/L (ref 136–145)
TROPONIN T DELTA: 0 NG/L
TROPONIN T SERPL HS-MCNC: 15 NG/L
WBC NRBC COR # BLD AUTO: 6.89 10*3/MM3 (ref 3.4–10.8)
WHOLE BLOOD HOLD COAG: NORMAL
WHOLE BLOOD HOLD SPECIMEN: NORMAL
WHOLE BLOOD HOLD SPECIMEN: NORMAL

## 2024-11-12 PROCEDURE — 71045 X-RAY EXAM CHEST 1 VIEW: CPT

## 2024-11-12 PROCEDURE — 93005 ELECTROCARDIOGRAM TRACING: CPT | Performed by: EMERGENCY MEDICINE

## 2024-11-12 PROCEDURE — 36415 COLL VENOUS BLD VENIPUNCTURE: CPT

## 2024-11-12 PROCEDURE — 83880 ASSAY OF NATRIURETIC PEPTIDE: CPT | Performed by: EMERGENCY MEDICINE

## 2024-11-12 PROCEDURE — 25010000002 HYDRALAZINE PER 20 MG

## 2024-11-12 PROCEDURE — 83605 ASSAY OF LACTIC ACID: CPT | Performed by: EMERGENCY MEDICINE

## 2024-11-12 PROCEDURE — 94640 AIRWAY INHALATION TREATMENT: CPT

## 2024-11-12 PROCEDURE — 94761 N-INVAS EAR/PLS OXIMETRY MLT: CPT

## 2024-11-12 PROCEDURE — 94664 DEMO&/EVAL PT USE INHALER: CPT

## 2024-11-12 PROCEDURE — G0378 HOSPITAL OBSERVATION PER HR: HCPCS

## 2024-11-12 PROCEDURE — 80053 COMPREHEN METABOLIC PANEL: CPT | Performed by: EMERGENCY MEDICINE

## 2024-11-12 PROCEDURE — 94799 UNLISTED PULMONARY SVC/PX: CPT

## 2024-11-12 PROCEDURE — 25010000002 METHYLPREDNISOLONE PER 125 MG: Performed by: EMERGENCY MEDICINE

## 2024-11-12 PROCEDURE — 85025 COMPLETE CBC W/AUTO DIFF WBC: CPT | Performed by: EMERGENCY MEDICINE

## 2024-11-12 PROCEDURE — 93005 ELECTROCARDIOGRAM TRACING: CPT

## 2024-11-12 PROCEDURE — 99285 EMERGENCY DEPT VISIT HI MDM: CPT

## 2024-11-12 PROCEDURE — 84484 ASSAY OF TROPONIN QUANT: CPT | Performed by: EMERGENCY MEDICINE

## 2024-11-12 PROCEDURE — 25010000002 LABETALOL 5 MG/ML SOLUTION: Performed by: EMERGENCY MEDICINE

## 2024-11-12 PROCEDURE — 0202U NFCT DS 22 TRGT SARS-COV-2: CPT | Performed by: EMERGENCY MEDICINE

## 2024-11-12 PROCEDURE — 87040 BLOOD CULTURE FOR BACTERIA: CPT | Performed by: EMERGENCY MEDICINE

## 2024-11-12 RX ORDER — HYDRALAZINE HYDROCHLORIDE 20 MG/ML
10 INJECTION INTRAMUSCULAR; INTRAVENOUS EVERY 6 HOURS PRN
Status: DISCONTINUED | OUTPATIENT
Start: 2024-11-12 | End: 2024-11-13

## 2024-11-12 RX ORDER — BISACODYL 10 MG
10 SUPPOSITORY, RECTAL RECTAL DAILY PRN
Status: DISCONTINUED | OUTPATIENT
Start: 2024-11-12 | End: 2024-11-22 | Stop reason: HOSPADM

## 2024-11-12 RX ORDER — POLYETHYLENE GLYCOL 3350 17 G/17G
17 POWDER, FOR SOLUTION ORAL DAILY PRN
Status: DISCONTINUED | OUTPATIENT
Start: 2024-11-12 | End: 2024-11-22 | Stop reason: HOSPADM

## 2024-11-12 RX ORDER — ALBUTEROL SULFATE 0.83 MG/ML
2.5 SOLUTION RESPIRATORY (INHALATION)
Status: DISCONTINUED | OUTPATIENT
Start: 2024-11-12 | End: 2024-11-12

## 2024-11-12 RX ORDER — IPRATROPIUM BROMIDE AND ALBUTEROL SULFATE 2.5; .5 MG/3ML; MG/3ML
3 SOLUTION RESPIRATORY (INHALATION)
Status: DISCONTINUED | OUTPATIENT
Start: 2024-11-12 | End: 2024-11-22 | Stop reason: HOSPADM

## 2024-11-12 RX ORDER — ALBUTEROL SULFATE 0.83 MG/ML
2.5 SOLUTION RESPIRATORY (INHALATION) ONCE
Status: COMPLETED | OUTPATIENT
Start: 2024-11-12 | End: 2024-11-12

## 2024-11-12 RX ORDER — METHYLPREDNISOLONE SODIUM SUCCINATE 125 MG/2ML
60 INJECTION, POWDER, LYOPHILIZED, FOR SOLUTION INTRAMUSCULAR; INTRAVENOUS ONCE
Status: COMPLETED | OUTPATIENT
Start: 2024-11-12 | End: 2024-11-12

## 2024-11-12 RX ORDER — PANTOPRAZOLE SODIUM 40 MG/1
40 TABLET, DELAYED RELEASE ORAL DAILY
Status: DISCONTINUED | OUTPATIENT
Start: 2024-11-13 | End: 2024-11-22 | Stop reason: HOSPADM

## 2024-11-12 RX ORDER — AMOXICILLIN 250 MG
2 CAPSULE ORAL 2 TIMES DAILY PRN
Status: DISCONTINUED | OUTPATIENT
Start: 2024-11-12 | End: 2024-11-22 | Stop reason: HOSPADM

## 2024-11-12 RX ORDER — BISACODYL 5 MG/1
5 TABLET, DELAYED RELEASE ORAL DAILY PRN
Status: DISCONTINUED | OUTPATIENT
Start: 2024-11-12 | End: 2024-11-22 | Stop reason: HOSPADM

## 2024-11-12 RX ORDER — MULTIPLE VITAMINS W/ MINERALS TAB 9MG-400MCG
1 TAB ORAL DAILY
Status: DISCONTINUED | OUTPATIENT
Start: 2024-11-13 | End: 2024-11-22 | Stop reason: HOSPADM

## 2024-11-12 RX ORDER — SOTALOL HYDROCHLORIDE 80 MG/1
80 TABLET ORAL 2 TIMES DAILY
Status: DISCONTINUED | OUTPATIENT
Start: 2024-11-12 | End: 2024-11-15

## 2024-11-12 RX ORDER — LABETALOL HYDROCHLORIDE 5 MG/ML
20 INJECTION, SOLUTION INTRAVENOUS ONCE
Status: COMPLETED | OUTPATIENT
Start: 2024-11-12 | End: 2024-11-12

## 2024-11-12 RX ORDER — ALBUTEROL SULFATE 0.83 MG/ML
2.5 SOLUTION RESPIRATORY (INHALATION) EVERY 6 HOURS PRN
Status: DISCONTINUED | OUTPATIENT
Start: 2024-11-12 | End: 2024-11-22 | Stop reason: HOSPADM

## 2024-11-12 RX ORDER — METHYLPREDNISOLONE SODIUM SUCCINATE 40 MG/ML
40 INJECTION, POWDER, LYOPHILIZED, FOR SOLUTION INTRAMUSCULAR; INTRAVENOUS EVERY 8 HOURS
Status: DISCONTINUED | OUTPATIENT
Start: 2024-11-12 | End: 2024-11-14

## 2024-11-12 RX ORDER — IPRATROPIUM BROMIDE AND ALBUTEROL SULFATE 2.5; .5 MG/3ML; MG/3ML
3 SOLUTION RESPIRATORY (INHALATION) ONCE
Status: COMPLETED | OUTPATIENT
Start: 2024-11-12 | End: 2024-11-12

## 2024-11-12 RX ORDER — LEVOTHYROXINE SODIUM 50 UG/1
50 TABLET ORAL DAILY
Status: DISCONTINUED | OUTPATIENT
Start: 2024-11-13 | End: 2024-11-22 | Stop reason: HOSPADM

## 2024-11-12 RX ORDER — SODIUM CHLORIDE 0.9 % (FLUSH) 0.9 %
10 SYRINGE (ML) INJECTION AS NEEDED
Status: DISCONTINUED | OUTPATIENT
Start: 2024-11-12 | End: 2024-11-22 | Stop reason: HOSPADM

## 2024-11-12 RX ADMIN — HYDRALAZINE HYDROCHLORIDE 10 MG: 20 INJECTION INTRAMUSCULAR; INTRAVENOUS at 18:14

## 2024-11-12 RX ADMIN — IPRATROPIUM BROMIDE AND ALBUTEROL SULFATE 3 ML: .5; 3 SOLUTION RESPIRATORY (INHALATION) at 19:22

## 2024-11-12 RX ADMIN — IPRATROPIUM BROMIDE AND ALBUTEROL SULFATE 3 ML: .5; 3 SOLUTION RESPIRATORY (INHALATION) at 15:30

## 2024-11-12 RX ADMIN — ALBUTEROL SULFATE 2.5 MG: 2.5 SOLUTION RESPIRATORY (INHALATION) at 16:17

## 2024-11-12 RX ADMIN — METHYLPREDNISOLONE SODIUM SUCCINATE 60 MG: 125 INJECTION, POWDER, FOR SOLUTION INTRAMUSCULAR; INTRAVENOUS at 16:32

## 2024-11-12 RX ADMIN — LABETALOL HYDROCHLORIDE 20 MG: 5 INJECTION, SOLUTION INTRAVENOUS at 13:44

## 2024-11-12 NOTE — H&P
"[facility] Medicine Services  History & Physical    Patient Name: Camelia Gray  : 1945  MRN: [id number]  Primary Care Physician:  Brinda Tony APRN  Date of admission: 2024  Date and Time of Service: 2024 at 1645    Subjective      Chief Complaint: shortness of breath    History of Present Illness: Camelia Gray is a 79 y.o. female with a CMH of atrial fibrillation, arthritis, hypothyroidism, hypertension, lymphedema, immobility syndrome who presented to [facility] on 2024 with shortness of breath.  Patient reports symptoms have been going on for the past 4 to 5 days and are progressively worsening.  Patient states that she did have a cold within the last week and reports she continues to have nasal congestion.  Patient denies fever, chills, pain.  Denies new edema in legs and reports her legs continue to stay wrapped due to lymphedema.  Patient does continue to have mild cough at this time.    On ED evaluation, patient vital signs show tachycardia and hypertension on arrival as well as tachypnea.  Patient required IV labetalol for blood pressure control in ED.  Labs essentially unremarkable.  Respiratory viral panel negative.  Chest x-ray showed \"1.  Cardiomegaly. 2.  Evidence of prior granulomatous exposure. 3.  Possible atelectasis or scarring in the right lower lobe adjacent to shadow which could relate to known hiatal hernia\".  EKG does show atrial fibrillation however seems rate is controlled at 105. patient received IV steroids and DuoNeb in ED however shortness of breath persisted therefore hospitalist group was asked for admission.      Review of Systems   Constitutional:  Negative for chills, diaphoresis, fatigue and fever.   HENT:  Positive for congestion.    Respiratory:  Positive for cough and shortness of breath.    Cardiovascular:  Negative for chest pain.   Gastrointestinal:  Negative for abdominal pain.   Neurological:  Negative for dizziness and " headaches.       Personal History     Past Medical History:   Diagnosis Date    A-fib 12/2023    Arthritis     Cellulitis of both feet 10/13/2019    Decubitus ulcer of buttock, stage 2 10/14/2019    HEALED    Dermatitis associated with moisture 10/14/2019    Disease of thyroid gland     HYPOTHYROID    Edema of lower extremity 05/31/2017    Hypertension     Hypothyroidism     Immobility syndrome 10/13/2019    Lymphedema 10/14/2019    LEGS    Lymphedema     Muscle weakness (generalized)     Non-pressure chronic ulcer of other part of left foot with fat layer exposed 10/14/2019    Non-pressure chronic ulcer of other part of right foot with fat layer exposed 10/14/2019    Obesity     Pulmonary embolism     Stasis dermatitis 07/08/2013    Ureteral calculi     Urinary tract infection 07/2020    Hosp. 7/2020    Venous stasis 10/14/2019    Yeast infection of the skin        Past Surgical History:   Procedure Laterality Date    BRONCHOSCOPY N/A 12/27/2023    Procedure: BRONCHOSCOPY with right lung washing;  Surgeon: Mayra Ramachandran MD;  Location: [facility] ENDOSCOPY;  Service: Pulmonary;  Laterality: N/A;  Post- Pneumonia    CATARACT EXTRACTION Bilateral     CHOLECYSTECTOMY N/A 11/11/2020    Procedure: CHOLECYSTECTOMY LAPAROSCOPIC;  Surgeon: George Crocker DO;  Location: [facility] MAIN OR;  Service: General;  Laterality: N/A;    CORRECTION HAMMER TOE Bilateral     CYSTOSCOPY, URETEROSCOPY, RETROGRADE PYELOGRAM, STENT INSERTION Bilateral 08/18/2020    Procedure: CYSTOSCOPY BILATERAL RETROGRADE PYELOGRAM,;  Surgeon: Shawn Hernandez MD;  Location: [facility] MAIN OR;  Service: Urology;  Laterality: Bilateral;    DENTAL EXAMINATION UNDER ANESTHESIA      ENDOSCOPY N/A 09/13/2020    Procedure: ESOPHAGOGASTRODUODENOSCOPY;  Surgeon: Torsten Johnson MD;  Location: [facility] ENDOSCOPY;  Service: Gastroenterology;  Laterality: N/A;  post: esophageal stricture, reflux esophagitis, hiatal hernia    ENDOSCOPY N/A 02/01/2024    Procedure:  ESOPHAGOGASTRODUODENOSCOPY, esophageal dilation (40-46 Fr non-guided Bougie);  Surgeon: Torsten Johnson MD;  Location: [facility] ENDOSCOPY;  Service: Gastroenterology;  Laterality: N/A;  post: hiatal hernia, esophageal stricture    JOINT REPLACEMENT Bilateral     Bilateral knees    TOE SURGERY      TONSILLECTOMY  1952    TOTAL HIP ARTHROPLASTY Left 06/12/2020    Procedure: TOTAL HIP ARTHROPLASTY;  Surgeon: Baljit Hutchins II, MD;  Location: [facility] MAIN OR;  Service: Orthopedics;  Laterality: Left;       Family History: family history includes COPD in her father; Heart disease in her father and mother; Hypertension in her father and mother; Kidney disease in her father. Otherwise pertinent FHx was reviewed and not pertinent to current issue.    Social History:  reports that she quit smoking about 31 years ago. Her smoking use included cigarettes. She started smoking about 60 years ago. She has a 7.1 pack-year smoking history. She has been exposed to tobacco smoke. She has never used smokeless tobacco. She reports that she does not drink alcohol and does not use drugs.    Home Medications:  Prior to Admission Medications       Prescriptions Last Dose Informant Patient Reported? Taking?    apixaban (ELIQUIS) 5 MG tablet tablet 11/12/2024  No Yes    Take 1 tablet by mouth 2 (Two) Times a Day.    B Complex Vitamins (vitamin b complex) capsule capsule 11/12/2024  Yes Yes    Take 1 capsule by mouth Daily. Indications: Vitamin Deficiency    levothyroxine (SYNTHROID, LEVOTHROID) 50 MCG tablet 11/12/2024  No Yes    Take 1 tablet by mouth Daily. Indications: Underactive Thyroid    Multiple Vitamins-Minerals (multivitamin with minerals) tablet tablet 11/12/2024  Yes Yes    Take 1 tablet by mouth Daily. Indications: Vitamin Deficiency    pantoprazole (PROTONIX) 40 MG EC tablet 11/12/2024  No Yes    TAKE ONE TABLET BY MOUTH DAILY    sotalol (BETAPACE) 80 MG tablet 11/12/2024  No Yes    Take 1 tablet by mouth 2 (Two)  Times a Day.              Allergies:  Allergies   Allergen Reactions    Maxzide [Hydrochlorothiazide W-Triamterene] Arrhythmia       Objective      Vitals:   Temp:  [98.1 °F (36.7 °C)] 98.1 °F (36.7 °C)  Heart Rate:  [] 79  Resp:  [13-24] 15  BP: (157-193)/() 160/100  Body mass index is 46.03 kg/m².  Physical Exam  General: 80 yo female, Alert and oriented, morbidly obese, no acute distress.  HENT: Normocephalic, normal hearing, moist oral mucosa, no scleral icterus.  Neck: Supple, non-tender, no carotid bruits, no JVD, no LAD.  Lungs: non-labored respiration.  Heart: Tachycardic, EKG showing A-fib, no murmur, gallop or edema.  Abdomen: Soft, nontender, non-distended, + bowel sounds.  Musculoskeletal: Normal range of motion and strength, no tenderness or swelling.  Skin: Skin is warm, dry and pink, no rashes or lesions.  Psychiatric: Cooperative, appropriate mood and affect.      Diagnostic Data:  Lab Results (last 24 hours)       Procedure Component Value Units Date/Time    Respiratory Panel PCR w/COVID-19(SARS-CoV-2) PRATIMA/BRIANNA/VERONICA/PAD/COR/CARLOS In-House, NP Swab in UTM/VTM, 2 HR TAT - Swab, Nasopharynx [[id number]]  (Normal) Collected: 11/12/24 1406    Specimen: Swab from Nasopharynx Updated: 11/12/24 1500     ADENOVIRUS, PCR Not Detected     Coronavirus 229E Not Detected     Coronavirus HKU1 Not Detected     Coronavirus NL63 Not Detected     Coronavirus OC43 Not Detected     COVID19 Not Detected     Human Metapneumovirus Not Detected     Human Rhinovirus/Enterovirus Not Detected     Influenza A PCR Not Detected     Influenza B PCR Not Detected     Parainfluenza Virus 1 Not Detected     Parainfluenza Virus 2 Not Detected     Parainfluenza Virus 3 Not Detected     Parainfluenza Virus 4 Not Detected     RSV, PCR Not Detected     Bordetella pertussis pcr Not Detected     Bordetella parapertussis PCR Not Detected     Chlamydophila pneumoniae PCR Not Detected     Mycoplasma pneumo by PCR Not Detected     Narrative:      In the setting of a positive respiratory panel with a viral infection PLUS a negative procalcitonin without other underlying concern for bacterial infection, consider observing off antibiotics or discontinuation of antibiotics and continue supportive care. If the respiratory panel is positive for atypical bacterial infection (Bordetella pertussis, Chlamydophila pneumoniae, or Mycoplasma pneumoniae), consider antibiotic de-escalation to target atypical bacterial infection.    High Sensitivity Troponin T 2Hr [[id number]]  (Abnormal) Collected: 11/12/24 1427    Specimen: Blood from Arm, Left Updated: 11/12/24 1456     HS Troponin T 15 ng/L      Troponin T Delta 0 ng/L     Narrative:      High Sensitive Troponin T Reference Range:  <14.0 ng/L- Negative Female for AMI  <22.0 ng/L- Negative Male for AMI  >=14 - Abnormal Female indicating possible myocardial injury.  >=22 - Abnormal Male indicating possible myocardial injury.   Clinicians would have to utilize clinical acumen, EKG, Troponin, and serial changes to determine if it is an Acute Myocardial Infarction or myocardial injury due to an underlying chronic condition.         Extra Tubes [[id number]] Collected: 11/12/24 1427    Specimen: Blood from Arm, Left Updated: 11/12/24 1445    Narrative:      The following orders were created for panel order Extra Tubes.  Procedure                               Abnormality         Status                     ---------                               -----------         ------                     Lavender Top[[id number]]                                     Final result                 Please view results for these tests on the individual orders.    Lavender Top [[id number]] Collected: 11/12/24 1427    Specimen: Blood from Arm, Left Updated: 11/12/24 1445     Extra Tube hold for add-on     Comment: Auto resulted       [place] Draw [[id number]] Collected: 11/12/24 1321    Specimen: Blood Updated: 11/12/24 1430    Narrative:       The following orders were created for panel order [place] Draw.  Procedure                               Abnormality         Status                     ---------                               -----------         ------                     Green Top (Gel)[[id number]]                                  Final result               Lavender Top[[id number]]                                     Final result               Gold Top - SST[[id number]]                                   Final result               Light Blue Top[[id number]]                                   Final result                 Please view results for these tests on the individual orders.    Gold Top - SST [[id number]] Collected: 11/12/24 1427    Specimen: Blood from Arm, Left Updated: 11/12/24 1430     Extra Tube Hold for add-ons.     Comment: Auto resulted.       Light Blue Top [[id number]] Collected: 11/12/24 1427    Specimen: Blood from Arm, Left Updated: 11/12/24 1430     Extra Tube Hold for add-ons.     Comment: Auto resulted       BNP [[id number]]  (Normal) Collected: 11/12/24 1321    Specimen: Blood from Hand, Left Updated: 11/12/24 1409     proBNP 1,757.0 pg/mL     Narrative:      This assay is used as an aid in the diagnosis of individuals suspected of having heart failure. It can be used as an aid in the diagnosis of acute decompensated heart failure (ADHF) in patients presenting with signs and symptoms of ADHF to the emergency department (ED). In addition, NT-proBNP of <300 pg/mL indicates ADHF is not likely.    Age Range Result Interpretation  NT-proBNP Concentration (pg/mL:      <50             Positive            >450                   Gray                 300-450                    Negative             <300    50-75           Positive            >900                  Gray                300-900                  Negative            <300      >75             Positive            >1800                  Gray                300-1800                   Negative            <300    Comprehensive Metabolic Panel [[id number]]  (Abnormal) Collected: 11/12/24 1321    Specimen: Blood from Hand, Left Updated: 11/12/24 1357     Glucose 113 mg/dL      BUN 22 mg/dL      Creatinine 0.73 mg/dL      Sodium 142 mmol/L      Potassium 3.8 mmol/L      Chloride 104 mmol/L      CO2 28.2 mmol/L      Calcium 9.8 mg/dL      Total Protein 7.3 g/dL      Albumin 3.8 g/dL      ALT (SGPT) 23 U/L      AST (SGOT) 28 U/L      Alkaline Phosphatase 128 U/L      Total Bilirubin 0.4 mg/dL      Globulin 3.5 gm/dL      A/G Ratio 1.1 g/dL      BUN/Creatinine Ratio 30.1     Anion Gap 9.8 mmol/L      eGFR 84.3 mL/min/1.73     Narrative:      GFR Normal >60  Chronic Kidney Disease <60  Kidney Failure <15    The GFR formula is only valid for adults with stable renal function between ages 18 and 70.    High Sensitivity Troponin T [[id number]]  (Abnormal) Collected: 11/12/24 1321    Specimen: Blood from Hand, Left Updated: 11/12/24 1357     HS Troponin T 15 ng/L     Narrative:      High Sensitive Troponin T Reference Range:  <14.0 ng/L- Negative Female for AMI  <22.0 ng/L- Negative Male for AMI  >=14 - Abnormal Female indicating possible myocardial injury.  >=22 - Abnormal Male indicating possible myocardial injury.   Clinicians would have to utilize clinical acumen, EKG, Troponin, and serial changes to determine if it is an Acute Myocardial Infarction or myocardial injury due to an underlying chronic condition.         Green Top (Gel) [[id number]] Collected: 11/12/24 1321    Specimen: Blood from Hand, Left Updated: 11/12/24 1330     Extra Tube Hold for add-ons.     Comment: Auto resulted.       Lavender Top [[id number]] Collected: 11/12/24 1321    Specimen: Blood from Hand, Left Updated: 11/12/24 1330     Extra Tube hold for add-on     Comment: Auto resulted       POC Lactate [[id number]]  (Normal) Collected: 11/12/24 1327    Specimen: Blood Updated: 11/12/24 1329     Lactate 0.9 mmol/L      Comment: Serial  Number: [id number]:  [id number]       CBC & Differential [[id number]]  (Abnormal) Collected: 11/12/24 1321    Specimen: Blood from Hand, Left Updated: 11/12/24 1327    Narrative:      The following orders were created for panel order CBC & Differential.  Procedure                               Abnormality         Status                     ---------                               -----------         ------                     CBC Auto Differential[[id number]]        Abnormal            Final result                 Please view results for these tests on the individual orders.    CBC Auto Differential [[id number]]  (Abnormal) Collected: 11/12/24 1321    Specimen: Blood from Hand, Left Updated: 11/12/24 1327     WBC 6.89 10*3/mm3      RBC 5.00 10*6/mm3      Hemoglobin 14.2 g/dL      Hematocrit 45.3 %      MCV 90.6 fL      MCH 28.4 pg      MCHC 31.3 g/dL      RDW 15.2 %      RDW-SD 50.2 fl      MPV 9.0 fL      Platelets 212 10*3/mm3      Neutrophil % 71.9 %      Lymphocyte % 15.8 %      Monocyte % 8.1 %      Eosinophil % 3.5 %      Basophil % 0.3 %      Immature Grans % 0.4 %      Neutrophils, Absolute 4.95 10*3/mm3      Lymphocytes, Absolute 1.09 10*3/mm3      Monocytes, Absolute 0.56 10*3/mm3      Eosinophils, Absolute 0.24 10*3/mm3      Basophils, Absolute 0.02 10*3/mm3      Immature Grans, Absolute 0.03 10*3/mm3      nRBC 0.0 /100 WBC     Blood Culture - Blood, Hand, Left [[id number]] Collected: 11/12/24 1321    Specimen: Blood from Hand, Left Updated: 11/12/24 1325    Blood Culture - Blood, Arm, Right [[id number]] Collected: 11/12/24 1236    Specimen: Blood from Arm, Right Updated: 11/12/24 1244             Imaging Results (Last 24 Hours)       Procedure Component Value Units Date/Time    XR Chest 1 View [[id number]] Collected: 11/12/24 1318     Updated: 11/12/24 1323    Narrative:      XR CHEST 1 VW    Date of Exam: 11/12/2024 12:44 PM EST    Indication: soa    Comparison: January 1, 2024    Findings:  The heart  looks enlarged. There is calcification in the left lower chest and along the left superior mediastinal area that could relate to prior granulomatous exposure. There are no pleural effusions. There may be some atelectasis or scarring in the   right lower lobe medially. There is a shadow in the right lower chest medially that could relate to known hiatal hernia as well.      Impression:      Impression:  1.Cardiomegaly  2.Evidence for prior granulomatous exposure  3.Possible atelectasis or scarring in the right lower lobe adjacent to shadow which could relate to known hiatal hernia      Electronically Signed: Gee Meraz MD    11/12/2024 1:21 PM EST    Workstation ID: [id number]              Assessment & Plan        This is a 79 y.o. female with:    Active and Resolved Problems  There are no hospital problems to display for this patient.      Shortness of breath  RVP negative  In ED patient received IV steroids and DuoNeb  Patient on room air at this time  DuoNebs q6h and Albuterol prn  IV steroids every 8 hours-patient will need to be transition to oral prior to discharge  As needed oxygen supplementation    Hypertension   BP stable elevated on arrival  IV labetalol given in ED  PRN Hydralazine  Resume home medications once reconciled     Atrial fibrillation  Patient has a history of A-fib, on Eliquis at home  EKG showing A-fib with rate of 105  Continue home medicines including sotalol and Eliquis    Hypothyroidism  Continue Levothyroxine    GERD  Continue PPI        VTE Prophylaxis:  Mechanical VTE prophylaxis orders are present.        The patient desires to be as follows:    CODE STATUS:    Code Status (Patient has no pulse and is not breathing): CPR (Attempt to Resuscitate)  Medical Interventions (Patient has pulse or is breathing): Full Support        Anthony Gray, who can be contacted at [phone], is the designated person to make medical decisions on the patient's behalf if She is incapable of doing so.  This was clarified with patient and/or next of kin on 11/12/2024 during the course of this H&P.    Admission Status:  I believe this patient meets observation status.    Expected Length of Stay: <2 midnights    PDMP and Medication Dispenses via Sidebar reviewed and consistent with patient reported medications.    I discussed the patient's findings and my recommendations with patient.      Signature:     This document has been electronically signed by DEE Nance on November 12, 2024 17:07 [facility] Hospitalist Team

## 2024-11-12 NOTE — LETTER
"    EMS Transport Request  For use at [facility], [place], [place], [place], and [place] only   Patient Name: Camelia Gray : 1945   Weight:(!) 160 kg (352 lb 8.3 oz) Pick-up Location: [address] [place], [place] [address]S/ALS: BLS/ALS: BLS   Insurance: MEDICARE Auth End Date: .   Pre-Cert #: D/C Summary complete:    Destination: Home {Required info for transport home:69865::\"How many stairs ***\",\"Will the patient be on the main level ***\",\"Is there a ramp available ***\",\"Can the patient stand and pivot ***\",\"Address ***\",\"Name/contact number for who will be present ***\"}   Contact Precautions: {Precautions:44886}   Equipment (O2, Fluids, etc.): {Equipment:88139}   Arrive By Date/Time: *** Stretcher/WC: {Stretcher/WC:63702}   CM Requesting: Yamini Beasley Ext: ***   Notes/Medical Necessity: ***     ______________________________________________________________________    *Only 2 patient bags OR 1 carry-on size bag are permitted.  Wheelchairs and walkers CANNOT transported with the patient. Acknowledge: {Acknowledge:25113::\"Yes\"}    "

## 2024-11-12 NOTE — LETTER
EMS Transport Request  For use at [facility], [place], [place], [place], and [place] only   Patient Name: Camelia Gray : 1945   Weight:(!) 160 kg (352 lb 15.3 oz) [facility]ALS[facility] BLS   Insurance: MEDICARE Auth End Date: .   Pre-Cert #: D/C Summary complete:    Destination:    [address] [facility] [place] [address]      Contact Precautions: None   Equipment (O2, Fluids, etc.): None   Arrive By Date/Time: . Stretcher/WC: Wheelchair   CM Requesting: Yamini Brewernasrin Ext: [phone]   Notes/Medical Necessity: A/O, max assist to transfer. Will have her wheelchair at home to transfer into. Lives alone     ______________________________________________________________________    *Only 2 patient bags OR 1 carry-on size bag are permitted.  Wheelchairs and walkers CANNOT transported with the patient. Acknowledge: Yes

## 2024-11-12 NOTE — ED PROVIDER NOTES
Subjective   History of Present Illness  71-year-old female presents with shortness of breath.  She reports she has had symptoms for about 5 days.  She reports she had normal cold last week.  She has had no fever.  No complaints of pain.  She has no complaints of new edema in her legs.  She does have lymphedema and keeps legs wrapped.  She still has had some cough congestion.  Review of Systems    Past Medical History:   Diagnosis Date    A-fib 12/2023    Arthritis     Cellulitis of both feet 10/13/2019    Decubitus ulcer of buttock, stage 2 10/14/2019    HEALED    Dermatitis associated with moisture 10/14/2019    Disease of thyroid gland     HYPOTHYROID    Edema of lower extremity 05/31/2017    Hypertension     Hypothyroidism     Immobility syndrome 10/13/2019    Lymphedema 10/14/2019    LEGS    Lymphedema     Muscle weakness (generalized)     Non-pressure chronic ulcer of other part of left foot with fat layer exposed 10/14/2019    Non-pressure chronic ulcer of other part of right foot with fat layer exposed 10/14/2019    Obesity     Pulmonary embolism     Stasis dermatitis 07/08/2013    Ureteral calculi     Urinary tract infection 07/2020    Hosp. 7/2020    Venous stasis 10/14/2019    Yeast infection of the skin        Allergies   Allergen Reactions    Maxzide [Hydrochlorothiazide W-Triamterene] Arrhythmia       Past Surgical History:   Procedure Laterality Date    BRONCHOSCOPY N/A 12/27/2023    Procedure: BRONCHOSCOPY with right lung washing;  Surgeon: Mayra Ramachandran MD;  Location: [facility] ENDOSCOPY;  Service: Pulmonary;  Laterality: N/A;  Post- Pneumonia    CATARACT EXTRACTION Bilateral     CHOLECYSTECTOMY N/A 11/11/2020    Procedure: CHOLECYSTECTOMY LAPAROSCOPIC;  Surgeon: George Crocker DO;  Location: [facility] MAIN OR;  Service: General;  Laterality: N/A;    CORRECTION HAMMER TOE Bilateral     CYSTOSCOPY, URETEROSCOPY, RETROGRADE PYELOGRAM, STENT INSERTION Bilateral 08/18/2020    Procedure: CYSTOSCOPY BILATERAL  RETROGRADE PYELOGRAM,;  Surgeon: Shawn Hernandez MD;  Location: [facility] MAIN OR;  Service: Urology;  Laterality: Bilateral;    DENTAL EXAMINATION UNDER ANESTHESIA      ENDOSCOPY N/A 2020    Procedure: ESOPHAGOGASTRODUODENOSCOPY;  Surgeon: Torsten Johnson MD;  Location: [facility] ENDOSCOPY;  Service: Gastroenterology;  Laterality: N/A;  post: esophageal stricture, reflux esophagitis, hiatal hernia    ENDOSCOPY N/A 2024    Procedure: ESOPHAGOGASTRODUODENOSCOPY, esophageal dilation (40-46 Fr non-guided Bougie);  Surgeon: Torsten Johnson MD;  Location: [facility] ENDOSCOPY;  Service: Gastroenterology;  Laterality: N/A;  post: hiatal hernia, esophageal stricture    JOINT REPLACEMENT Bilateral     Bilateral knees    TOE SURGERY      TONSILLECTOMY      TOTAL HIP ARTHROPLASTY Left 2020    Procedure: TOTAL HIP ARTHROPLASTY;  Surgeon: Baljit Hutchins II, MD;  Location: [facility] MAIN OR;  Service: Orthopedics;  Laterality: Left;       Family History   Problem Relation Age of Onset    Heart disease Mother     Hypertension Mother     Heart disease Father     Hypertension Father     Kidney disease Father     COPD Father        Social History     Socioeconomic History    Marital status: Single   Tobacco Use    Smoking status: Former     Current packs/day: 0.00     Average packs/day: 0.3 packs/day for 28.2 years (7.1 ttl pk-yrs)     Types: Cigarettes     Start date: 10/1/1964     Quit date: 1992     Years since quittin.9     Passive exposure: Past    Smokeless tobacco: Never   Vaping Use    Vaping status: Never Used   Substance and Sexual Activity    Alcohol use: No    Drug use: No    Sexual activity: Never     Prior to Admission medications    Medication Sig Start Date End Date Taking? Authorizing Provider   apixaban (ELIQUIS) 5 MG tablet tablet Take 1 tablet by mouth 2 (Two) Times a Day. 10/29/24   Kevin Angel MD   B Complex Vitamins (vitamin b complex) capsule capsule  Take 1 capsule by mouth Daily. Indications: Vitamin Deficiency 1/11/24   Karli Cheney MD   Eliquis 5 MG tablet tablet TAKE 1 TABLET BY MOUTH TWICE DAILY 10/25/24   Kevin Angel MD   levothyroxine (SYNTHROID, LEVOTHROID) 50 MCG tablet Take 1 tablet by mouth Daily. Indications: Underactive Thyroid 7/11/24   Brinda Tony APRN   Multiple Vitamins-Minerals (multivitamin with minerals) tablet tablet Take 1 tablet by mouth Daily. Indications: Vitamin Deficiency 1/11/24   Karli Cheney MD   multivitamin (MULTI VITAMIN DAILY PO) Take 1 tablet by mouth Daily.    Karli Cheney MD   pantoprazole (PROTONIX) 40 MG EC tablet TAKE ONE TABLET BY MOUTH DAILY 9/27/24   Brinda Tony APRN   sotalol (BETAPACE) 80 MG tablet Take 1 tablet by mouth 2 (Two) Times a Day. 9/10/24   Kevin Angel MD           Objective   Physical Exam  71-year-old female awake alert.  Generally obese.  Neck supple chest reveals few scattered wheezes.  Cardiovascular regular rate and rhythm.  Abdomen soft nontender examination extremities she has wraps to both lower legs.  Neurologic exam without focal findings noted.  Procedures           ED Course      Results for orders placed or performed during the hospital encounter of 11/12/24   ECG 12 Lead Dyspnea    Collection Time: 11/12/24 12:18 PM   Result Value Ref Range    QT Interval 353 ms    QTC Interval 467 ms   Comprehensive Metabolic Panel    Collection Time: 11/12/24  1:21 PM    Specimen: Hand, Left; Blood   Result Value Ref Range    Glucose 113 (H) 65 - 99 mg/dL    BUN 22 8 - 23 mg/dL    Creatinine 0.73 0.57 - 1.00 mg/dL    Sodium 142 136 - 145 mmol/L    Potassium 3.8 3.5 - 5.2 mmol/L    Chloride 104 98 - 107 mmol/L    CO2 28.2 22.0 - 29.0 mmol/L    Calcium 9.8 8.6 - 10.5 mg/dL    Total Protein 7.3 6.0 - 8.5 g/dL    Albumin 3.8 3.5 - 5.2 g/dL    ALT (SGPT) 23 1 - 33 U/L    AST (SGOT) 28 1 - 32 U/L    Alkaline Phosphatase 128 (H) 39 - 117 U/L    Total  Bilirubin 0.4 0.0 - 1.2 mg/dL    Globulin 3.5 gm/dL    A/G Ratio 1.1 g/dL    BUN/Creatinine Ratio 30.1 (H) 7.0 - 25.0    Anion Gap 9.8 5.0 - 15.0 mmol/L    eGFR 84.3 >60.0 mL/min/1.73   High Sensitivity Troponin T    Collection Time: 11/12/24  1:21 PM    Specimen: Hand, Left; Blood   Result Value Ref Range    HS Troponin T 15 (H) <14 ng/L   CBC Auto Differential    Collection Time: 11/12/24  1:21 PM    Specimen: Hand, Left; Blood   Result Value Ref Range    WBC 6.89 3.40 - 10.80 10*3/mm3    RBC 5.00 3.77 - 5.28 10*6/mm3    Hemoglobin 14.2 12.0 - 15.9 g/dL    Hematocrit 45.3 34.0 - 46.6 %    MCV 90.6 79.0 - 97.0 fL    MCH 28.4 26.6 - 33.0 pg    MCHC 31.3 (L) 31.5 - 35.7 g/dL    RDW 15.2 12.3 - 15.4 %    RDW-SD 50.2 37.0 - 54.0 fl    MPV 9.0 6.0 - 12.0 fL    Platelets 212 140 - 450 10*3/mm3    Neutrophil % 71.9 42.7 - 76.0 %    Lymphocyte % 15.8 (L) 19.6 - 45.3 %    Monocyte % 8.1 5.0 - 12.0 %    Eosinophil % 3.5 0.3 - 6.2 %    Basophil % 0.3 0.0 - 1.5 %    Immature Grans % 0.4 0.0 - 0.5 %    Neutrophils, Absolute 4.95 1.70 - 7.00 10*3/mm3    Lymphocytes, Absolute 1.09 0.70 - 3.10 10*3/mm3    Monocytes, Absolute 0.56 0.10 - 0.90 10*3/mm3    Eosinophils, Absolute 0.24 0.00 - 0.40 10*3/mm3    Basophils, Absolute 0.02 0.00 - 0.20 10*3/mm3    Immature Grans, Absolute 0.03 0.00 - 0.05 10*3/mm3    nRBC 0.0 0.0 - 0.2 /100 WBC   BNP    Collection Time: 11/12/24  1:21 PM    Specimen: Hand, Left; Blood   Result Value Ref Range    proBNP 1,757.0 0.0 - 1,800.0 pg/mL   Green Top (Gel)    Collection Time: 11/12/24  1:21 PM   Result Value Ref Range    Extra Tube Hold for add-ons.    Lavender Top    Collection Time: 11/12/24  1:21 PM   Result Value Ref Range    Extra Tube hold for add-on    POC Lactate    Collection Time: 11/12/24  1:27 PM    Specimen: Blood   Result Value Ref Range    Lactate 0.9 0.3 - 2.0 mmol/L   Respiratory Panel PCR w/COVID-19(SARS-CoV-2) PRATIMA/BRIANNA/VERONICA/PAD/COR/CARLOS In-House, NP Swab in UTM/VTM, 2 HR TAT - Swab,  Nasopharynx    Collection Time: 11/12/24  2:06 PM    Specimen: Nasopharynx; Swab   Result Value Ref Range    ADENOVIRUS, PCR Not Detected Not Detected    Coronavirus 229E Not Detected Not Detected    Coronavirus HKU1 Not Detected Not Detected    Coronavirus NL63 Not Detected Not Detected    Coronavirus OC43 Not Detected Not Detected    COVID19 Not Detected Not Detected - Ref. Range    Human Metapneumovirus Not Detected Not Detected    Human Rhinovirus/Enterovirus Not Detected Not Detected    Influenza A PCR Not Detected Not Detected    Influenza B PCR Not Detected Not Detected    Parainfluenza Virus 1 Not Detected Not Detected    Parainfluenza Virus 2 Not Detected Not Detected    Parainfluenza Virus 3 Not Detected Not Detected    Parainfluenza Virus 4 Not Detected Not Detected    RSV, PCR Not Detected Not Detected    Bordetella pertussis pcr Not Detected Not Detected    Bordetella parapertussis PCR Not Detected Not Detected    Chlamydophila pneumoniae PCR Not Detected Not Detected    Mycoplasma pneumo by PCR Not Detected Not Detected   High Sensitivity Troponin T 2Hr    Collection Time: 11/12/24  2:27 PM    Specimen: Arm, Left; Blood   Result Value Ref Range    HS Troponin T 15 (H) <14 ng/L    Troponin T Delta 0 >=-4 - <+4 ng/L   Gold Top - SST    Collection Time: 11/12/24  2:27 PM   Result Value Ref Range    Extra Tube Hold for add-ons.    Light Blue Top    Collection Time: 11/12/24  2:27 PM   Result Value Ref Range    Extra Tube Hold for add-ons.    Lavender Top    Collection Time: 11/12/24  2:27 PM   Result Value Ref Range    Extra Tube hold for add-on      XR Chest 1 View   Final Result   Impression:   1.Cardiomegaly   2.Evidence for prior granulomatous exposure   3.Possible atelectasis or scarring in the right lower lobe adjacent to shadow which could relate to known hiatal hernia         Electronically Signed: Gee Meraz MD     11/12/2024 1:21 PM EST     Workstation ID: [id number]          Medications   sodium  "chloride 0.9 % flush 10 mL (has no administration in time range)   methylPREDNISolone sodium succinate (SOLU-Medrol) injection 60 mg (has no administration in time range)   albuterol (PROVENTIL) nebulizer solution 0.083% 2.5 mg/3mL (has no administration in time range)   labetalol (NORMODYNE,TRANDATE) injection 20 mg (20 mg Intravenous Given 11/12/24 1344)   ipratropium-albuterol (DUO-NEB) nebulizer solution 3 mL (3 mL Nebulization Given 11/12/24 1530)     /95   Pulse 88   Temp 98.1 °F (36.7 °C) (Oral)   Resp 13   Ht 180.3 cm (71\")   Wt (!) 150 kg (330 lb)   SpO2 93%   BMI 46.03 kg/m²                   No data recorded                             Medical Decision Making    Chart review: Patient had office visit with cardiology for primary hypertension atrial fibrillation RVR September 10.  Patient is on chronic anticoagulation with Eliquis.  Comorbidity: As per past history   Differential: Pneumonia, congestive heart failure, viral illness  My EKG interpretation: Atrial fibrillation rate of 105 low voltage precordial leads.  Compared to previous atrial fibrillation now present   lab: Respiratory panel negative comprehensive metabolic panel no significant abnormality glucose 113 BNP normal 1757 lactic acid normal troponin 15 x 2.  CBC essentially normal  My Radiology review and interpretation: Chest x-ray reveals evidence of prior granulomatous disease.  There are some atelectasis or scarring of the right lower lobe adjacent to shadow which may relate to known hiatal hernia.  There is no pleural effusion.  Heart looks mildly enlarged.  Discussion/treatment: Patient had IV placed.  She had diastolic blood pressures in the 120s she was given Normodyne 20 mg for hypertension.  She had improvement with treatment to 164/95.  Patient was given a DuoNeb.  She still has some wheezes posttreatment.  She was given Solu-Medrol additional albuterol.  Patient was discussed with the nurse practitioner for the " hospitalist.  Will be admitted to their service for continued care.  Patient was evaluated using appropriate PPE    Final diagnoses:   Shortness of breath   Moderate persistent asthmatic bronchitis with acute exacerbation   Hypertensive urgency       ED Disposition  ED Disposition       ED Disposition   Intended Admit    Condition   --    Comment   --               No follow-up provider specified.       Medication List      No changes were made to your prescriptions during this visit.            Eduardo Anderson MD  11/12/24 8714

## 2024-11-12 NOTE — Clinical Note
A 7 fr sheath was successfully inserted using micropuncture technique with ultrasound guidance into the left subclavian vein.

## 2024-11-12 NOTE — LETTER
EMS Transport Request  For use at [facility], [place], [place], [place], and [place] only   Patient Name: Camelia Gray : 1945   Weight:(!) 160 kg (352 lb 15.3 oz) Pick-up Location: [facility] BLS/ALS: BLS/ALS: BLS   Insurance: MEDICARE Auth End Date: 24   Pre-Cert #: D/C Summary complete:    Destination: Home How many stairs 0, Will the patient be on the main level yes, Is there a ramp available yes, Can the patient stand and pivot yes, Address [address] [place] [address], and Name/contact number for who will be present self   Contact Precautions: None   Equipment (O2, Fluids, etc.): None   Arrive By Date/Time: 24 Stretcher/WC: Wheelchair   CM Requesting: Yamini Madrigal Gale Ext: [phone]   Notes/Medical Necessity: WILL CALL. A/O, Max assist to transfer. Has her own wheelchair to transfer into at her house     ______________________________________________________________________    *Only 2 patient bags OR 1 carry-on size bag are permitted.  Wheelchairs and walkers CANNOT transported with the patient. Acknowledge: Yes

## 2024-11-13 ENCOUNTER — APPOINTMENT (OUTPATIENT)
Dept: CT IMAGING | Facility: HOSPITAL | Age: 79
End: 2024-11-13
Payer: MEDICARE

## 2024-11-13 ENCOUNTER — APPOINTMENT (OUTPATIENT)
Dept: CARDIOLOGY | Facility: HOSPITAL | Age: 79
End: 2024-11-13
Payer: MEDICARE

## 2024-11-13 LAB
ANION GAP SERPL CALCULATED.3IONS-SCNC: 10.9 MMOL/L (ref 5–15)
AORTIC DIMENSIONLESS INDEX: 0.69 (DI)
BASOPHILS # BLD AUTO: 0.01 10*3/MM3 (ref 0–0.2)
BASOPHILS NFR BLD AUTO: 0.1 % (ref 0–1.5)
BH CV ECHO MEAS - ACS: 1.8 CM
BH CV ECHO MEAS - AO MAX PG: 10.6 MMHG
BH CV ECHO MEAS - AO MEAN PG: 7 MMHG
BH CV ECHO MEAS - AO V2 MAX: 163 CM/SEC
BH CV ECHO MEAS - AO V2 VTI: 32.1 CM
BH CV ECHO MEAS - AVA(I,D): 1.79 CM2
BH CV ECHO MEAS - EDV(CUBED): 91.1 ML
BH CV ECHO MEAS - EDV(MOD-SP4): 97.3 ML
BH CV ECHO MEAS - EF(MOD-BP): 69 %
BH CV ECHO MEAS - EF(MOD-SP4): 69.2 %
BH CV ECHO MEAS - ESV(CUBED): 27 ML
BH CV ECHO MEAS - ESV(MOD-SP4): 30 ML
BH CV ECHO MEAS - FS: 33.3 %
BH CV ECHO MEAS - IVS/LVPW: 1.1 CM
BH CV ECHO MEAS - IVSD: 1.1 CM
BH CV ECHO MEAS - LA DIMENSION: 5 CM
BH CV ECHO MEAS - LAT PEAK E' VEL: 10.4 CM/SEC
BH CV ECHO MEAS - LV DIASTOLIC VOL/BSA (35-75): 36.5 CM2
BH CV ECHO MEAS - LV MASS(C)D: 164 GRAMS
BH CV ECHO MEAS - LV MAX PG: 5.1 MMHG
BH CV ECHO MEAS - LV MEAN PG: 3 MMHG
BH CV ECHO MEAS - LV SYSTOLIC VOL/BSA (12-30): 11.3 CM2
BH CV ECHO MEAS - LV V1 MAX: 113 CM/SEC
BH CV ECHO MEAS - LV V1 VTI: 20.3 CM
BH CV ECHO MEAS - LVIDD: 4.5 CM
BH CV ECHO MEAS - LVIDS: 3 CM
BH CV ECHO MEAS - LVOT AREA: 2.8 CM2
BH CV ECHO MEAS - LVOT DIAM: 1.9 CM
BH CV ECHO MEAS - LVPWD: 1 CM
BH CV ECHO MEAS - MR MAX PG: 60.8 MMHG
BH CV ECHO MEAS - MR MAX VEL: 390 CM/SEC
BH CV ECHO MEAS - MV DEC SLOPE: 616 CM/SEC2
BH CV ECHO MEAS - MV E MAX VEL: 96.7 CM/SEC
BH CV ECHO MEAS - MV MAX PG: 8.2 MMHG
BH CV ECHO MEAS - MV MEAN PG: 3 MMHG
BH CV ECHO MEAS - MV P1/2T: 66.6 MSEC
BH CV ECHO MEAS - MV V2 VTI: 26 CM
BH CV ECHO MEAS - MVA(P1/2T): 3.3 CM2
BH CV ECHO MEAS - MVA(VTI): 2.21 CM2
BH CV ECHO MEAS - PA ACC TIME: 0.08 SEC
BH CV ECHO MEAS - PA V2 MAX: 96.3 CM/SEC
BH CV ECHO MEAS - RAP SYSTOLE: 15 MMHG
BH CV ECHO MEAS - RV MAX PG: 2.9 MMHG
BH CV ECHO MEAS - RV V1 MAX: 85.4 CM/SEC
BH CV ECHO MEAS - RV V1 VTI: 13.8 CM
BH CV ECHO MEAS - RVDD: 3.3 CM
BH CV ECHO MEAS - RVSP: 25.6 MMHG
BH CV ECHO MEAS - SV(LVOT): 57.6 ML
BH CV ECHO MEAS - SV(MOD-SP4): 67.3 ML
BH CV ECHO MEAS - SVI(LVOT): 21.6 ML/M2
BH CV ECHO MEAS - SVI(MOD-SP4): 25.3 ML/M2
BH CV ECHO MEAS - TR MAX PG: 10.6 MMHG
BH CV ECHO MEAS - TR MAX VEL: 163 CM/SEC
BH CV XLRA - TDI S': 9.9 CM/SEC
BUN SERPL-MCNC: 18 MG/DL (ref 8–23)
BUN/CREAT SERPL: 24 (ref 7–25)
CALCIUM SPEC-SCNC: 9.4 MG/DL (ref 8.6–10.5)
CHLORIDE SERPL-SCNC: 105 MMOL/L (ref 98–107)
CO2 SERPL-SCNC: 26.1 MMOL/L (ref 22–29)
CREAT SERPL-MCNC: 0.75 MG/DL (ref 0.57–1)
D-LACTATE SERPL-SCNC: 1.9 MMOL/L (ref 0.5–2)
D-LACTATE SERPL-SCNC: 2.8 MMOL/L (ref 0.5–2)
DEPRECATED RDW RBC AUTO: 50.4 FL (ref 37–54)
EGFRCR SERPLBLD CKD-EPI 2021: 81.6 ML/MIN/1.73
EOSINOPHIL # BLD AUTO: 0.01 10*3/MM3 (ref 0–0.4)
EOSINOPHIL NFR BLD AUTO: 0.1 % (ref 0.3–6.2)
ERYTHROCYTE [DISTWIDTH] IN BLOOD BY AUTOMATED COUNT: 15.3 % (ref 12.3–15.4)
GLUCOSE SERPL-MCNC: 165 MG/DL (ref 65–99)
HCT VFR BLD AUTO: 46.8 % (ref 34–46.6)
HGB BLD-MCNC: 14.8 G/DL (ref 12–15.9)
IMM GRANULOCYTES # BLD AUTO: 0.09 10*3/MM3 (ref 0–0.05)
IMM GRANULOCYTES NFR BLD AUTO: 1.2 % (ref 0–0.5)
LEFT ATRIUM VOLUME INDEX: 19.2 ML/M2
LYMPHOCYTES # BLD AUTO: 0.7 10*3/MM3 (ref 0.7–3.1)
LYMPHOCYTES NFR BLD AUTO: 9.4 % (ref 19.6–45.3)
MCH RBC QN AUTO: 28.3 PG (ref 26.6–33)
MCHC RBC AUTO-ENTMCNC: 31.6 G/DL (ref 31.5–35.7)
MCV RBC AUTO: 89.5 FL (ref 79–97)
MONOCYTES # BLD AUTO: 0.05 10*3/MM3 (ref 0.1–0.9)
MONOCYTES NFR BLD AUTO: 0.7 % (ref 5–12)
NEUTROPHILS NFR BLD AUTO: 6.57 10*3/MM3 (ref 1.7–7)
NEUTROPHILS NFR BLD AUTO: 88.5 % (ref 42.7–76)
NRBC BLD AUTO-RTO: 0 /100 WBC (ref 0–0.2)
PLATELET # BLD AUTO: 224 10*3/MM3 (ref 140–450)
PMV BLD AUTO: 9.4 FL (ref 6–12)
POTASSIUM SERPL-SCNC: 4.2 MMOL/L (ref 3.5–5.2)
QT INTERVAL: 353 MS
QTC INTERVAL: 467 MS
RBC # BLD AUTO: 5.23 10*6/MM3 (ref 3.77–5.28)
SINUS: 2.7 CM
SODIUM SERPL-SCNC: 142 MMOL/L (ref 136–145)
WBC NRBC COR # BLD AUTO: 7.43 10*3/MM3 (ref 3.4–10.8)

## 2024-11-13 PROCEDURE — 99222 1ST HOSP IP/OBS MODERATE 55: CPT | Performed by: INTERNAL MEDICINE

## 2024-11-13 PROCEDURE — 85025 COMPLETE CBC W/AUTO DIFF WBC: CPT

## 2024-11-13 PROCEDURE — 94799 UNLISTED PULMONARY SVC/PX: CPT

## 2024-11-13 PROCEDURE — 80048 BASIC METABOLIC PNL TOTAL CA: CPT

## 2024-11-13 PROCEDURE — 93306 TTE W/DOPPLER COMPLETE: CPT | Performed by: INTERNAL MEDICINE

## 2024-11-13 PROCEDURE — 94664 DEMO&/EVAL PT USE INHALER: CPT

## 2024-11-13 PROCEDURE — 93306 TTE W/DOPPLER COMPLETE: CPT

## 2024-11-13 PROCEDURE — 25010000002 METHYLPREDNISOLONE PER 40 MG

## 2024-11-13 PROCEDURE — 83605 ASSAY OF LACTIC ACID: CPT | Performed by: NURSE PRACTITIONER

## 2024-11-13 PROCEDURE — 97162 PT EVAL MOD COMPLEX 30 MIN: CPT

## 2024-11-13 PROCEDURE — 25810000003 SODIUM CHLORIDE 0.9 % SOLUTION 250 ML FLEX CONT: Performed by: INTERNAL MEDICINE

## 2024-11-13 PROCEDURE — 25010000002 HYDRALAZINE PER 20 MG: Performed by: INTERNAL MEDICINE

## 2024-11-13 PROCEDURE — 71250 CT THORAX DX C-: CPT

## 2024-11-13 PROCEDURE — 25010000002 CEFTRIAXONE PER 250 MG: Performed by: NURSE PRACTITIONER

## 2024-11-13 PROCEDURE — 25010000002 NICARDIPINE 2.5 MG/ML SOLUTION 10 ML VIAL: Performed by: INTERNAL MEDICINE

## 2024-11-13 PROCEDURE — 25010000002 FUROSEMIDE PER 20 MG: Performed by: INTERNAL MEDICINE

## 2024-11-13 PROCEDURE — 25010000002 SULFUR HEXAFLUORIDE MICROSPH 60.7-25 MG RECONSTITUTED SUSPENSION: Performed by: INTERNAL MEDICINE

## 2024-11-13 PROCEDURE — 94761 N-INVAS EAR/PLS OXIMETRY MLT: CPT

## 2024-11-13 RX ORDER — LABETALOL HYDROCHLORIDE 5 MG/ML
10 INJECTION, SOLUTION INTRAVENOUS
Status: DISCONTINUED | OUTPATIENT
Start: 2024-11-13 | End: 2024-11-14

## 2024-11-13 RX ORDER — FUROSEMIDE 10 MG/ML
40 INJECTION INTRAMUSCULAR; INTRAVENOUS
Status: DISCONTINUED | OUTPATIENT
Start: 2024-11-13 | End: 2024-11-13

## 2024-11-13 RX ORDER — GUAIFENESIN/DEXTROMETHORPHAN 100-10MG/5
10 SYRUP ORAL EVERY 4 HOURS PRN
Status: DISCONTINUED | OUTPATIENT
Start: 2024-11-13 | End: 2024-11-14

## 2024-11-13 RX ORDER — HYDRALAZINE HYDROCHLORIDE 20 MG/ML
10 INJECTION INTRAMUSCULAR; INTRAVENOUS EVERY 6 HOURS PRN
Status: DISCONTINUED | OUTPATIENT
Start: 2024-11-13 | End: 2024-11-22 | Stop reason: HOSPADM

## 2024-11-13 RX ORDER — DILTIAZEM HYDROCHLORIDE 30 MG/1
30 TABLET, FILM COATED ORAL EVERY 8 HOURS SCHEDULED
Status: DISCONTINUED | OUTPATIENT
Start: 2024-11-13 | End: 2024-11-14

## 2024-11-13 RX ADMIN — IPRATROPIUM BROMIDE AND ALBUTEROL SULFATE 3 ML: .5; 3 SOLUTION RESPIRATORY (INHALATION) at 21:10

## 2024-11-13 RX ADMIN — SOTALOL HYDROCHLORIDE 80 MG: 80 TABLET ORAL at 08:58

## 2024-11-13 RX ADMIN — LEVOTHYROXINE SODIUM 50 MCG: 0.05 TABLET ORAL at 08:58

## 2024-11-13 RX ADMIN — CEFTRIAXONE 2000 MG: 2 INJECTION, POWDER, FOR SOLUTION INTRAMUSCULAR; INTRAVENOUS at 15:23

## 2024-11-13 RX ADMIN — SODIUM CHLORIDE 5 MG/HR: 9 INJECTION, SOLUTION INTRAVENOUS at 03:02

## 2024-11-13 RX ADMIN — APIXABAN 5 MG: 5 TABLET, FILM COATED ORAL at 00:22

## 2024-11-13 RX ADMIN — PANTOPRAZOLE SODIUM 40 MG: 40 TABLET, DELAYED RELEASE ORAL at 08:58

## 2024-11-13 RX ADMIN — Medication 1 TABLET: at 08:58

## 2024-11-13 RX ADMIN — DILTIAZEM HYDROCHLORIDE 30 MG: 30 TABLET, FILM COATED ORAL at 21:50

## 2024-11-13 RX ADMIN — IPRATROPIUM BROMIDE AND ALBUTEROL SULFATE 3 ML: .5; 3 SOLUTION RESPIRATORY (INHALATION) at 11:57

## 2024-11-13 RX ADMIN — Medication 10 ML: at 08:58

## 2024-11-13 RX ADMIN — SULFUR HEXAFLUORIDE 3 ML: KIT at 12:45

## 2024-11-13 RX ADMIN — IPRATROPIUM BROMIDE AND ALBUTEROL SULFATE 3 ML: .5; 3 SOLUTION RESPIRATORY (INHALATION) at 15:12

## 2024-11-13 RX ADMIN — GUAIFENESIN AND DEXTROMETHORPHAN 10 ML: 100; 10 SYRUP ORAL at 22:09

## 2024-11-13 RX ADMIN — GUAIFENESIN AND DEXTROMETHORPHAN 10 ML: 100; 10 SYRUP ORAL at 06:53

## 2024-11-13 RX ADMIN — APIXABAN 5 MG: 5 TABLET, FILM COATED ORAL at 20:22

## 2024-11-13 RX ADMIN — METHYLPREDNISOLONE SODIUM SUCCINATE 40 MG: 40 INJECTION, POWDER, FOR SOLUTION INTRAMUSCULAR; INTRAVENOUS at 15:22

## 2024-11-13 RX ADMIN — HYDRALAZINE HYDROCHLORIDE 10 MG: 20 INJECTION INTRAMUSCULAR; INTRAVENOUS at 00:21

## 2024-11-13 RX ADMIN — METHYLPREDNISOLONE SODIUM SUCCINATE 40 MG: 40 INJECTION, POWDER, FOR SOLUTION INTRAMUSCULAR; INTRAVENOUS at 00:22

## 2024-11-13 RX ADMIN — SODIUM CHLORIDE 2.5 MG/HR: 9 INJECTION, SOLUTION INTRAVENOUS at 11:36

## 2024-11-13 RX ADMIN — FUROSEMIDE 40 MG: 10 INJECTION, SOLUTION INTRAMUSCULAR; INTRAVENOUS at 09:54

## 2024-11-13 RX ADMIN — APIXABAN 5 MG: 5 TABLET, FILM COATED ORAL at 08:58

## 2024-11-13 RX ADMIN — METHYLPREDNISOLONE SODIUM SUCCINATE 40 MG: 40 INJECTION, POWDER, FOR SOLUTION INTRAMUSCULAR; INTRAVENOUS at 21:50

## 2024-11-13 RX ADMIN — SOTALOL HYDROCHLORIDE 80 MG: 80 TABLET ORAL at 00:22

## 2024-11-13 RX ADMIN — DILTIAZEM HYDROCHLORIDE 30 MG: 30 TABLET, FILM COATED ORAL at 15:22

## 2024-11-13 RX ADMIN — METHYLPREDNISOLONE SODIUM SUCCINATE 40 MG: 40 INJECTION, POWDER, FOR SOLUTION INTRAMUSCULAR; INTRAVENOUS at 05:14

## 2024-11-13 RX ADMIN — SOTALOL HYDROCHLORIDE 80 MG: 80 TABLET ORAL at 20:22

## 2024-11-13 RX ADMIN — IPRATROPIUM BROMIDE AND ALBUTEROL SULFATE 3 ML: .5; 3 SOLUTION RESPIRATORY (INHALATION) at 08:37

## 2024-11-13 NOTE — PLAN OF CARE
"Goal Outcome Evaluation:  Plan of Care Reviewed With: patient           Outcome Evaluation: Camelia is a 78 y/o F who presents to [facility] ED with c/o shortness of breath. Respiratory viral panel negative. Chest x-ray showed \"1. Cardiomegaly. 2.  Evidence of prior granulomatous exposure. 3.  Possible atelectasis or scarring in the right lower lobe adjacent to shadow which could relate to known hiatal hernia\".  EKG does show atrial fibrillation rate is controlled at ~100bpm. She is agreeable to participate in initial PT eval. AOx4. Reports coughing and shortness of air has been improving throughout the day. Min-A, supine-to-sit assisted pt with trunk and sit-to-supine assisted pt with BLE . HOB elevated and use of bed rail. Max-A [id number] scooting pt in bed with repositionining sheet. Demonstrates good sitting balance unsupported at edge of bed. STS c/ rw, SBA verbal cues for erect posture. Pt denied amb today says she is fatigued. Did not get a good night's rest and many tests throughout the day. Although did not amb today, anticipate pt's capable due to easy tfs. Recommending d/c home with OPPT to address decreased endurance.    Anticipated Discharge Disposition (PT): home, home with outpatient therapy services                        "

## 2024-11-13 NOTE — PROGRESS NOTES
[facility] MEDICINE SERVICE  DAILY PROGRESS NOTE    NAME: Camelia Gray  : 1945  MRN: [id number]      LOS: 0 days     PROVIDER OF SERVICE: DEE Erazo    Chief Complaint: Shortness of breath    Subjective:     Interval History:  History taken from: patient chart    Patient seen and examined at bedside.  Wound care was examining patient's legs at this time.  States patient her shortness of breath is improving.  Inquiring about when she will be able to be discharged home.    Review of Systems:   Review of Systems   Constitutional:  Negative for chills, diaphoresis, fatigue and fever.   HENT:  Positive for congestion.    Respiratory:  Positive for cough and shortness of breath.    Cardiovascular:  Negative for chest pain.   Gastrointestinal:  Negative for abdominal pain.   Neurological:  Negative for dizziness and headaches.    Objective:     Vital Signs  Temp:  [97.2 °F (36.2 °C)-97.8 °F (36.6 °C)] 97.7 °F (36.5 °C)  Heart Rate:  [] 97  Resp:  [13-22] 18  BP: (118-193)/() 145/89  Flow (L/min) (Oxygen Therapy):  [3] 3   Body mass index is 48.26 kg/m².    Physical Exam  PHYSICAL EXAM  Constitutional:  Well-developed, well-nourished, no acute distress, nontoxic appearance   Eyes:  PERRL, conjunctivae normal, EOMI   HENT:  Atraumatic, external ears normal, nose normal, oropharynx moist, no pharyngeal exudates. Neck-normal range of motion, no tenderness, supple, trachea midline  Respiratory: Diminished with expiratory wheezes, nonlabored respirations without accessory muscle use patient on 3 L nasal cannula.  Cardiovascular:  Normal rate, normal rhythm, no murmurs, no gallops, no rubs   GI:  Soft, nondistended, normal bowel sounds, nontender, no organomegaly, no mass, no rebound, no guarding.  Mild BLE edema noted.  :  No costovertebral angle tenderness   Musculoskeletal:  No edema, no tenderness, no deformities  Integument:  Well hydrated, no rash.  Edema noted to bilateral lower  extremities.  Lymphatic:  No lymphadenopathy noted   Neurologic:  Alert & oriented x 3, CN 2-12 normal, normal motor function, normal sensory function, no focal deficits noted   Psychiatric:  Speech and behavior appropriate          Diagnostic Data    Results from last 7 days   Lab Units 11/13/24  0522 11/12/24  1321   WBC 10*3/mm3 7.43 6.89   HEMOGLOBIN g/dL 14.8 14.2   HEMATOCRIT % 46.8* 45.3   PLATELETS 10*3/mm3 224 212   GLUCOSE mg/dL 165* 113*   CREATININE mg/dL 0.75 0.73   BUN mg/dL 18 22   SODIUM mmol/L 142 142   POTASSIUM mmol/L 4.2 3.8   AST (SGOT) U/L  --  28   ALT (SGPT) U/L  --  23   ALK PHOS U/L  --  128*   BILIRUBIN mg/dL  --  0.4   ANION GAP mmol/L 10.9 9.8       CT Chest Without Contrast Diagnostic    Result Date: 11/13/2024  Impression: 1. Right lower lobe endobronchial opacification is present, new since the prior study. 2. Consolidative change in the right lower lobe may present atelectasis or postobstructive pneumonia. 3. Mild subsegmental atelectasis in the left upper lobe. 4. Stable appearance of moderate to large esophageal hiatal hernia. 5. Stable appearance of pulmonary artery dilation. Correlate for pulmonary hypertension. 6. Stable appearance of the left adrenal adenoma. 7. Nonobstructing left renal stone. Electronically Signed: Marie Toledo MD  11/13/2024 11:43 AM EST  Workstation ID: [id number]    XR Chest 1 View    Result Date: 11/12/2024  Impression: 1.Cardiomegaly 2.Evidence for prior granulomatous exposure 3.Possible atelectasis or scarring in the right lower lobe adjacent to shadow which could relate to known hiatal hernia Electronically Signed: Gee Meraz MD  11/12/2024 1:21 PM EST  Workstation ID: [id number]       I reviewed the patient's new clinical results.    Assessment/Plan:     Active and Resolved Problems  Active Hospital Problems    Diagnosis  POA    **Shortness of breath [R06.02]  Yes      Resolved Hospital Problems   No resolved problems to display.       Shortness of  breath  RVP negative  In ED patient received IV steroids and DuoNeb  Patient on room air at this time  DuoNebs q6h and Albuterol prn  IV steroids every 8 hours-patient will need to be transition to oral prior to discharge  As needed oxygen supplementation  CT chest noted right lower lobe endobronchial opacification, consolidative change in the right lower lobe may present atelectasis or postobstructive pneumonia.  Mild segmental atelectasis of left lung.  Stable appearance of moderate to large esophageal hiatal hernia.  Stable appearance of pulmonary artery dilation.  Echo pending     Hypertension   BP stable elevated most recent blood pressure 145/89  PRN Hydralazine  Continue usual home medication     Atrial fibrillation  Patient has a history of A-fib, on Eliquis at home  EKG showing A-fib with rate of 105  Continue home medicines including sotalol and Eliquis     Hypothyroidism  Continue Levothyroxine     GERD  Continue PPI    VTE Prophylaxis:  Pharmacologic & mechanical VTE prophylaxis orders are present.       Patient seen and evaluated at bedside.  Patient has a mildly elevated BNP noted near parameters.  Due to patient's habitus nature, obtained a CT scan.  Which showed possible pneumonia.  Will place patient on broad-spectrum antibiotics.  Echo is pending.  That this may have some cardiac etiology.      Disposition Planning:     Barriers to Discharge: General requirement, diagnostic testing  Anticipated Date of Discharge: 11/15/2024  Place of Discharge: Home      Time: 35 minutes     Code Status and Medical Interventions: CPR (Attempt to Resuscitate); Full Support   Ordered at: 11/12/24 1641     Code Status (Patient has no pulse and is not breathing):    CPR (Attempt to Resuscitate)     Medical Interventions (Patient has pulse or is breathing):    Full Support       Signature: Electronically signed by DEE Erazo, 11/13/24, 12:08 EST.  [facility] Hospitalist Team

## 2024-11-13 NOTE — PLAN OF CARE
Goal Outcome Evaluation:  Plan of Care Reviewed With: patient        Progress: no change  Outcome Evaluation: Pt reporting SOB and cough. BP stabilizing with Cardene gtt. Call light within reach, care continues.            Problem: Adult Inpatient Plan of Care  Goal: Plan of Care Review  Outcome: Progressing  Flowsheets (Taken 11/13/2024 0549)  Progress: no change  Outcome Evaluation: Pt reporting SOB and cough. BP stabilizing with Cardene gtt. Call light within reach, care continues.  Plan of Care Reviewed With: patient  Goal: Patient-Specific Goal (Individualized)  Outcome: Progressing  Goal: Absence of Hospital-Acquired Illness or Injury  Outcome: Progressing  Intervention: Identify and Manage Fall Risk  Recent Flowsheet Documentation  Taken 11/13/2024 0300 by Lexus Caicedo RN  Safety Promotion/Fall Prevention:   activity supervised   assistive device/personal items within reach   clutter free environment maintained   fall prevention program maintained   nonskid shoes/slippers when out of bed   room organization consistent   safety round/check completed  Intervention: Prevent Skin Injury  Recent Flowsheet Documentation  Taken 11/13/2024 0300 by Lexus Caicedo RN  Body Position: weight shifting  Intervention: Prevent and Manage VTE (Venous Thromboembolism) Risk  Recent Flowsheet Documentation  Taken 11/13/2024 0300 by Lexus Caicedo RN  VTE Prevention/Management:   bilateral   SCDs (sequential compression devices) on  Intervention: Prevent Infection  Recent Flowsheet Documentation  Taken 11/13/2024 0300 by Lexus Caicedo RN  Infection Prevention:   hand hygiene promoted   personal protective equipment utilized   rest/sleep promoted   single patient room provided  Goal: Optimal Comfort and Wellbeing  Outcome: Progressing  Intervention: Provide Person-Centered Care  Recent Flowsheet Documentation  Taken 11/13/2024 0300 by Lexus Caicedo RN  Trust Relationship/Rapport:   care explained   thoughts/feelings acknowledged  Goal:  Readiness for Transition of Care  Outcome: Progressing     Problem: Breathing Pattern Ineffective  Goal: Effective Breathing Pattern  Outcome: Progressing  Intervention: Promote Improved Breathing Pattern  Recent Flowsheet Documentation  Taken 11/13/2024 0300 by Lexus Caicedo RN  Head of Bed (HOB) Positioning: HOB elevated     Problem: Sepsis/Septic Shock  Goal: Optimal Coping  Outcome: Progressing  Goal: Absence of Bleeding  Outcome: Progressing  Goal: Blood Glucose Level Within Target Range  Outcome: Progressing  Goal: Absence of Infection Signs and Symptoms  Outcome: Progressing  Intervention: Initiate Sepsis Management  Recent Flowsheet Documentation  Taken 11/13/2024 0300 by Lexus Caicedo, RN  Infection Prevention:   hand hygiene promoted   personal protective equipment utilized   rest/sleep promoted   single patient room provided  Goal: Optimal Nutrition Delivery  Outcome: Progressing

## 2024-11-13 NOTE — PLAN OF CARE
Problem: Adult Inpatient Plan of Care  Goal: Plan of Care Review  Outcome: Progressing  Goal: Patient-Specific Goal (Individualized)  Outcome: Progressing  Goal: Absence of Hospital-Acquired Illness or Injury  Outcome: Progressing  Intervention: Identify and Manage Fall Risk  Recent Flowsheet Documentation  Taken 11/13/2024 1622 by Katherine Espino LPN  Safety Promotion/Fall Prevention:   safety round/check completed   room organization consistent   assistive device/personal items within reach   activity supervised   clutter free environment maintained   lighting adjusted  Taken 11/13/2024 1400 by Katherine Espino LPN  Safety Promotion/Fall Prevention:   activity supervised   assistive device/personal items within reach   clutter free environment maintained   lighting adjusted   room organization consistent   safety round/check completed  Taken 11/13/2024 1205 by Katherine Espino LPN  Safety Promotion/Fall Prevention:   safety round/check completed   room organization consistent   assistive device/personal items within reach   activity supervised   clutter free environment maintained   lighting adjusted  Taken 11/13/2024 1042 by Katherine Espino LPN  Safety Promotion/Fall Prevention:   activity supervised   assistive device/personal items within reach   clutter free environment maintained   lighting adjusted   safety round/check completed   room organization consistent  Taken 11/13/2024 0830 by Katherine Espino LPN  Safety Promotion/Fall Prevention:   safety round/check completed   room organization consistent   assistive device/personal items within reach   activity supervised   clutter free environment maintained   lighting adjusted  Intervention: Prevent Skin Injury  Recent Flowsheet Documentation  Taken 11/13/2024 1622 by Katherine Espino LPN  Skin Protection: incontinence pads utilized  Taken 11/13/2024 1205 by Katherine Espino LPN  Skin Protection: incontinence pads utilized  Taken 11/13/2024 0830 by  Srinivas, Katherine, LPN  Skin Protection: incontinence pads utilized  Intervention: Prevent Infection  Recent Flowsheet Documentation  Taken 11/13/2024 1622 by Katherine Espino LPN  Infection Prevention:   cohorting utilized   environmental surveillance performed   hand hygiene promoted   personal protective equipment utilized   rest/sleep promoted   single patient room provided   visitors restricted/screened  Taken 11/13/2024 1400 by Katherine Espino LPN  Infection Prevention:   cohorting utilized   environmental surveillance performed   hand hygiene promoted   personal protective equipment utilized   rest/sleep promoted   single patient room provided   visitors restricted/screened  Taken 11/13/2024 1205 by Katherine Epsino LPN  Infection Prevention:   cohorting utilized   environmental surveillance performed   hand hygiene promoted   personal protective equipment utilized   rest/sleep promoted   single patient room provided   visitors restricted/screened  Taken 11/13/2024 1042 by Katherine Espino LPN  Infection Prevention:   cohorting utilized   environmental surveillance performed   hand hygiene promoted   personal protective equipment utilized   rest/sleep promoted   single patient room provided   visitors restricted/screened  Taken 11/13/2024 0830 by Katherine Espino LPN  Infection Prevention:   cohorting utilized   environmental surveillance performed   hand hygiene promoted   personal protective equipment utilized   rest/sleep promoted   single patient room provided   visitors restricted/screened  Goal: Optimal Comfort and Wellbeing  Outcome: Progressing  Intervention: Monitor Pain and Promote Comfort  Recent Flowsheet Documentation  Taken 11/13/2024 1622 by Katherine Espino LPN  Pain Management Interventions:   care clustered   diversional activity provided   position adjusted   pillow support provided  Taken 11/13/2024 1205 by Katherine Espino LPN  Pain Management Interventions:   care clustered   diversional  activity provided   position adjusted   pillow support provided  Taken 11/13/2024 0830 by Katherine Espino LPN  Pain Management Interventions:   care clustered   diversional activity provided   position adjusted   pillow support provided  Intervention: Provide Person-Centered Care  Recent Flowsheet Documentation  Taken 11/13/2024 1622 by Katherine Espino LPN  Trust Relationship/Rapport:   care explained   choices provided   thoughts/feelings acknowledged  Taken 11/13/2024 1205 by Katherine Espino LPN  Trust Relationship/Rapport:   care explained   choices provided   thoughts/feelings acknowledged  Taken 11/13/2024 0830 by Katherine Espino LPN  Trust Relationship/Rapport:   care explained   choices provided   thoughts/feelings acknowledged  Goal: Readiness for Transition of Care  Outcome: Progressing   Goal Outcome Evaluation:           Patient has been up to bedside commode this shift. Patient was refusing turns & educated on importance of turning. Patient has no c/o pain. SOB @ times.  O2 in place per nasal cannula.  Patient has bilateral legs wrapped that she doesn't want to remove except by wound care. Wound care nurse seen patient today & rewrapped bilateral legs. Patient also has DTI on butt and is encouraged to turn. Speciality bed has been ordered awaiting arrival of bed. ;Call light within reach.

## 2024-11-13 NOTE — ED NOTES
Nursing report ED to floor  Camelia Gray  79 y.o.  female    HPI:   Chief Complaint   Patient presents with    Shortness of Breath     SOA, feels like something in lungs, Pt is coughing with congestion,  NO pain, no swelling.         Admitting doctor:   Biju Fernández MD    Admitting diagnosis:   The primary encounter diagnosis was Shortness of breath. Diagnoses of Moderate persistent asthmatic bronchitis with acute exacerbation and Hypertensive urgency were also pertinent to this visit.    Code status:   Current Code Status       Date Active Code Status Order ID Comments User Context       11/12/2024 1653 CPR (Attempt to Resuscitate) [id number]  Jessika Hall APRN ED        Question Answer    Code Status (Patient has no pulse and is not breathing) CPR (Attempt to Resuscitate)    Medical Interventions (Patient has pulse or is breathing) Full Support                    Allergies:   Maxzide [hydrochlorothiazide w-triamterene]    Isolation:  No active isolations     Fall Risk:  Fall Risk Assessment was completed, and patient is at high risk for falls.   Predictive Model Details         11 (Low) Factor Value    Calculated 11/12/2024 22:49 Age 79    Risk of Fall Model Active Peripheral IV Present     Imaging order in this encounter Present     Respiratory Rate 20     Magnesium not on file     Number of Distinct Medication Classes administered 4     Mil[place] Scale not on file     Albumin 3.8 g/dL     Days after Admission 0.449     Diastolic BP 96     Number of administrations of Anti-Hypertensives 1     ALT 23 U/L     Total Bilirubin 0.4 mg/dL     Tobacco Use Quit     Chloride 104 mmol/L     Creatinine 0.73 mg/dL     Potassium 3.8 mmol/L     Calcium 9.8 mg/dL         Weight:       11/12/24  1159   Weight: (!) 150 kg (330 lb)       Intake and Output  No intake or output data in the 24 hours ending 11/12/24 2252    Diet:   Dietary Orders (From admission, onward)       Start     Ordered    11/12/24 1652  Diet: Cardiac; Healthy  Heart (2-3 Na+); Fluid Consistency: Thin (IDDSI 0)  Diet Effective Now        References:    Diet Order [facility]   Question Answer Comment   Diets: Cardiac    Cardiac Diet: Healthy Heart (2-3 Na+)    Fluid Consistency: Thin (IDDSI 0)        11/12/24 1653                     Most recent vitals:   Vitals:    11/12/24 1848 11/12/24 1916 11/12/24 1922 11/12/24 1929   BP: 157/99 168/96     BP Location:       Pulse: 107 105 107 101   Resp:   20 20   Temp:       TempSrc:       SpO2: 94% 90% 93%    Weight:       Height:           Active LDAs/IV Access:   Lines, Drains & Airways       Active LDAs       Name Placement date Placement time Site Days    Peripheral IV 11/12/24 1235 Distal;Posterior;Right Forearm 11/12/24  1235  Forearm  less than 1                    Skin Condition:   Skin Assessments (last day)       None             Labs (abnormal labs have a star):   Labs Reviewed   COMPREHENSIVE METABOLIC PANEL - Abnormal; Notable for the following components:       Result Value    Glucose 113 (*)     Alkaline Phosphatase 128 (*)     BUN/Creatinine Ratio 30.1 (*)     All other components within normal limits    Narrative:     GFR Normal >60  Chronic Kidney Disease <60  Kidney Failure <15    The GFR formula is only valid for adults with stable renal function between ages 18 and 70.   TROPONIN - Abnormal; Notable for the following components:    HS Troponin T 15 (*)     All other components within normal limits    Narrative:     High Sensitive Troponin T Reference Range:  <14.0 ng/L- Negative Female for AMI  <22.0 ng/L- Negative Male for AMI  >=14 - Abnormal Female indicating possible myocardial injury.  >=22 - Abnormal Male indicating possible myocardial injury.   Clinicians would have to utilize clinical acumen, EKG, Troponin, and serial changes to determine if it is an Acute Myocardial Infarction or myocardial injury due to an underlying chronic condition.        CBC WITH AUTO DIFFERENTIAL - Abnormal; Notable for the  following components:    MCHC 31.3 (*)     Lymphocyte % 15.8 (*)     All other components within normal limits   HIGH SENSITIVITIY TROPONIN T 2HR - Abnormal; Notable for the following components:    HS Troponin T 15 (*)     All other components within normal limits    Narrative:     High Sensitive Troponin T Reference Range:  <14.0 ng/L- Negative Female for AMI  <22.0 ng/L- Negative Male for AMI  >=14 - Abnormal Female indicating possible myocardial injury.  >=22 - Abnormal Male indicating possible myocardial injury.   Clinicians would have to utilize clinical acumen, EKG, Troponin, and serial changes to determine if it is an Acute Myocardial Infarction or myocardial injury due to an underlying chronic condition.        RESPIRATORY PANEL PCR W/ COVID-19 (SARS-COV-2), NP SWAB IN UTM/VTP, 2 HR TAT - Normal    Narrative:     In the setting of a positive respiratory panel with a viral infection PLUS a negative procalcitonin without other underlying concern for bacterial infection, consider observing off antibiotics or discontinuation of antibiotics and continue supportive care. If the respiratory panel is positive for atypical bacterial infection (Bordetella pertussis, Chlamydophila pneumoniae, or Mycoplasma pneumoniae), consider antibiotic de-escalation to target atypical bacterial infection.   BNP (IN-HOUSE) - Normal    Narrative:     This assay is used as an aid in the diagnosis of individuals suspected of having heart failure. It can be used as an aid in the diagnosis of acute decompensated heart failure (ADHF) in patients presenting with signs and symptoms of ADHF to the emergency department (ED). In addition, NT-proBNP of <300 pg/mL indicates ADHF is not likely.    Age Range Result Interpretation  NT-proBNP Concentration (pg/mL:      <50             Positive            >450                   Gray                 300-450                    Negative             <300    50-75           Positive            >900                   Stewart                300-900                  Negative            <300      >75             Positive            >1800                  Gray                300-1800                  Negative            <300   POC LACTATE - Normal   BLOOD CULTURE   BLOOD CULTURE   RAINBOW DRAW    Narrative:     The following orders were created for panel order [facility] Draw.  Procedure                               Abnormality         Status                     ---------                               -----------         ------                     Green Top (Gel)[[id number]]                                  Final result               Lavender Top[[id number]]                                     Final result               Gold Top - SST[[id number]]                                   Final result               Light Blue Top[[id number]]                                   Final result                 Please view results for these tests on the individual orders.   BASIC METABOLIC PANEL   CBC WITH AUTO DIFFERENTIAL   CBC AND DIFFERENTIAL    Narrative:     The following orders were created for panel order CBC & Differential.  Procedure                               Abnormality         Status                     ---------                               -----------         ------                     CBC Auto Differential[[id number]]        Abnormal            Final result                 Please view results for these tests on the individual orders.   GREEN TOP   LAVENDER TOP   GOLD TOP - SST   LIGHT BLUE TOP   EXTRA TUBES    Narrative:     The following orders were created for panel order Extra Tubes.  Procedure                               Abnormality         Status                     ---------                               -----------         ------                     Lavender Top[[id number]]                                     Final result                 Please view results for these tests on the individual orders.   LAVENDER TOP   CBC  AND DIFFERENTIAL    Narrative:     The following orders were created for panel order CBC & Differential.  Procedure                               Abnormality         Status                     ---------                               -----------         ------                     CBC Auto Differential[[id number]]                                                         Please view results for these tests on the individual orders.       LOC: Person, Place, Time, and Situation    Telemetry:  Telemetry    Cardiac Monitoring Ordered: yes    EKG:   ECG 12 Lead Dyspnea   Preliminary Result   HEART EIIQ=854  bpm   RR Robipwxa=535  ms   IA Interval=  ms   P Horizontal Axis=  deg   P Front Axis=  deg   QRSD Interval=87  ms   QT Bjlspdhq=649  ms   BGaJ=445  ms   QRS Axis=45  deg   T Wave Axis=8  deg   - ABNORMAL ECG -   Atrial fibrillation   Low voltage, precordial leads   Date and Time of Study:2024-11-12 12:18:10          Medications Given in the ED:   Medications   sodium chloride 0.9 % flush 10 mL (has no administration in time range)   Potassium Replacement - Follow Nurse / BPA Driven Protocol (has no administration in time range)   Magnesium Standard Dose Replacement - Follow Nurse / BPA Driven Protocol (has no administration in time range)   Phosphorus Replacement - Follow Nurse / BPA Driven Protocol (has no administration in time range)   Calcium Replacement - Follow Nurse / BPA Driven Protocol (has no administration in time range)   sennosides-docusate (PERICOLACE) 8.6-50 MG per tablet 2 tablet (has no administration in time range)     And   polyethylene glycol (MIRALAX) packet 17 g (has no administration in time range)     And   bisacodyl (DULCOLAX) EC tablet 5 mg (has no administration in time range)     And   bisacodyl (DULCOLAX) suppository 10 mg (has no administration in time range)   methylPREDNISolone sodium succinate (SOLU-Medrol) injection 40 mg (has no administration in time range)   ipratropium-albuterol  (DUO-NEB) nebulizer solution 3 mL (3 mL Nebulization Given 24 1922)   albuterol (PROVENTIL) nebulizer solution 0.083% 2.5 mg/3mL (has no administration in time range)   sotalol (BETAPACE) tablet 80 mg (has no administration in time range)   pantoprazole (PROTONIX) EC tablet 40 mg (has no administration in time range)   multivitamin with minerals 1 tablet (has no administration in time range)   levothyroxine (SYNTHROID, LEVOTHROID) tablet 50 mcg (has no administration in time range)   apixaban (ELIQUIS) tablet 5 mg (has no administration in time range)   hydrALAZINE (APRESOLINE) injection 10 mg (10 mg Intravenous Given 24 1814)   labetalol (NORMODYNE,TRANDATE) injection 20 mg (20 mg Intravenous Given 24 1344)   ipratropium-albuterol (DUO-NEB) nebulizer solution 3 mL (3 mL Nebulization Given 24 1530)   methylPREDNISolone sodium succinate (SOLU-Medrol) injection 60 mg (60 mg Intravenous Given 24 1632)   albuterol (PROVENTIL) nebulizer solution 0.083% 2.5 mg/3mL (2.5 mg Nebulization Given 24 1617)       Imaging results:  XR Chest 1 View    Result Date: 2024  Impression: 1.Cardiomegaly 2.Evidence for prior granulomatous exposure 3.Possible atelectasis or scarring in the right lower lobe adjacent to shadow which could relate to known hiatal hernia Electronically Signed: Gee Meraz MD  2024 1:21 PM EST  Workstation ID: [id number]     Social issues:   Social History     Socioeconomic History    Marital status: Single   Tobacco Use    Smoking status: Former     Current packs/day: 0.00     Average packs/day: 0.3 packs/day for 28.2 years (7.1 ttl pk-yrs)     Types: Cigarettes     Start date: 10/1/1964     Quit date: 1992     Years since quittin.9     Passive exposure: Past    Smokeless tobacco: Never   Vaping Use    Vaping status: Never Used   Substance and Sexual Activity    Alcohol use: No    Drug use: No    Sexual activity: Never       NIH Stroke Scale:  Interval:  (not recorded)  1a. Level of Consciousness: (not recorded)  1b. LOC Questions: (not recorded)  1c. LOC Commands: (not recorded)  2. Best Gaze: (not recorded)  3. Visual: (not recorded)  4. Facial Palsy: (not recorded)  5a. Motor Arm, Left: (not recorded)  5b. Motor Arm, Right: (not recorded)  6a. Motor Leg, Left: (not recorded)  6b. Motor Leg, Right: (not recorded)  7. Limb Ataxia: (not recorded)  8. Sensory: (not recorded)  9. Best Language: (not recorded)  10. Dysarthria: (not recorded)  11. Extinction and Inattention (formerly Neglect): (not recorded)    Total (NIH Stroke Scale): (not recorded)     Additional notable assessment information:     Nursing report ED to floor:  [facility] room 301    Angelina Sunshine RN   11/12/24 22:52 EST

## 2024-11-13 NOTE — NURSING NOTE
WOCN note:    79 yr old female admitted 11/12/24 with shortness of breath. Patient has a hx of afib, HTN, hypothyroidism, arthritis, and lymphedema. WOCN consult received for BLE compression wraps, gluteal pressure injuries and MASD in abdominal folds.   Patient presents with both legs wrapped with ACE wraps from the ankle to the bend of the knee. She had been followed by the wound center up until about one year ago for LE wounds. Since then she has kept her legs wrapped with ACE wraps with no further skin breakdown.   The wraps were removed and her skin was cleansed and moisturized. There are areas of dry flaky skin to the posterior aspects of both legs and hemosiderin staining is noted to both gaiter regions. There are now wounds, blistering or weeping noted. The legs were re-wrapped per patient instruction with gauze conforming wrap and ACE from the ankle to the bend of the knee.     Patient also noted to have blanchable ecchymosis to her buttocks and posterior thighs consistent with chronic pressure. She also has intertrigo within her abdominal fold. She states she uses Desitin or Calazime at home. Calazime has been applied to the area. Recommend using folded pillow cases to keep the skin folds . An Agiliti bariatric low air loss bed has been ordered. Continue pressure injury prevention measures per protocol. We will follow as needed.

## 2024-11-13 NOTE — THERAPY EVALUATION
Patient Name: Camelia Gray  : 1945    MRN: [id number]                              Today's Date: 2024       Admit Date: 2024    Visit Dx:     ICD-10-CM ICD-9-CM   1. Shortness of breath  R06.02 786.05   2. Moderate persistent asthmatic bronchitis with acute exacerbation  J45.41 493.92   3. Hypertensive urgency  I16.0 401.9     Patient Active Problem List   Diagnosis    Class 3 severe obesity due to excess calories with serious comorbidity and body mass index (BMI) of 45.0 to 49.9 in adult    Arthritis of left hip    Deep vein thrombosis (DVT)    Edema of lower extremity    Hypertension    Pulmonary embolism    Stasis dermatitis    Immobility syndrome    Lymphedema    Skin ulcer of toe of right foot with fat layer exposed    Primary osteoarthritis of left hip    Hypothyroidism    Continuous leakage of urine    Breast cancer screening by mammogram    Status post total replacement of hip    Thrombophilia    Primary osteoarthritis involving multiple joints    Gastroesophageal reflux disease without esophagitis    Obstructive uropathy    Wound discharge    Need for hepatitis C screening test    Hyperglycemia    Dyspnea    Hiatal hernia    Chronic atrial fibrillation with rapid ventricular response    Oral phase dysphagia    Hospital discharge follow-up    Shortness of breath     Past Medical History:   Diagnosis Date    A-fib 2023    Arthritis     Cellulitis of both feet 10/13/2019    Decubitus ulcer of buttock, stage 2 10/14/2019    HEALED    Dermatitis associated with moisture 10/14/2019    Disease of thyroid gland     HYPOTHYROID    Edema of lower extremity 2017    Hypertension     Hypothyroidism     Immobility syndrome 10/13/2019    Lymphedema 10/14/2019    LEGS    Lymphedema     Muscle weakness (generalized)     Non-pressure chronic ulcer of other part of left foot with fat layer exposed 10/14/2019    Non-pressure chronic ulcer of other part of right foot with fat layer exposed  10/14/2019    Obesity     Pulmonary embolism     Stasis dermatitis 07/08/2013    Ureteral calculi     Urinary tract infection 07/2020    Hosp. 7/2020    Venous stasis 10/14/2019    Yeast infection of the skin      Past Surgical History:   Procedure Laterality Date    BRONCHOSCOPY N/A 12/27/2023    Procedure: BRONCHOSCOPY with right lung washing;  Surgeon: Mayra Ramachandran MD;  Location: [facility] ENDOSCOPY;  Service: Pulmonary;  Laterality: N/A;  Post- Pneumonia    CATARACT EXTRACTION Bilateral     CHOLECYSTECTOMY N/A 11/11/2020    Procedure: CHOLECYSTECTOMY LAPAROSCOPIC;  Surgeon: George Crocker DO;  Location: [facility] MAIN OR;  Service: General;  Laterality: N/A;    CORRECTION HAMMER TOE Bilateral     CYSTOSCOPY, URETEROSCOPY, RETROGRADE PYELOGRAM, STENT INSERTION Bilateral 08/18/2020    Procedure: CYSTOSCOPY BILATERAL RETROGRADE PYELOGRAM,;  Surgeon: Shawn Hernandez MD;  Location: [facility] MAIN OR;  Service: Urology;  Laterality: Bilateral;    DENTAL EXAMINATION UNDER ANESTHESIA      ENDOSCOPY N/A 09/13/2020    Procedure: ESOPHAGOGASTRODUODENOSCOPY;  Surgeon: Torsten Johnson MD;  Location: [facility] ENDOSCOPY;  Service: Gastroenterology;  Laterality: N/A;  post: esophageal stricture, reflux esophagitis, hiatal hernia    ENDOSCOPY N/A 02/01/2024    Procedure: ESOPHAGOGASTRODUODENOSCOPY, esophageal dilation (40-46 Fr non-guided Bougie);  Surgeon: Torsten Johnson MD;  Location: [facility] ENDOSCOPY;  Service: Gastroenterology;  Laterality: N/A;  post: hiatal hernia, esophageal stricture    JOINT REPLACEMENT Bilateral     Bilateral knees    TOE SURGERY      TONSILLECTOMY  1952    TOTAL HIP ARTHROPLASTY Left 06/12/2020    Procedure: TOTAL HIP ARTHROPLASTY;  Surgeon: Baljit Hutchins II, MD;  Location: [facility] MAIN OR;  Service: Orthopedics;  Laterality: Left;      General Information       Row Name 11/13/24 3558          Physical Therapy Time and Intention    Document Type evaluation  -SS (r) AN (t) SS (c)      Mode of Treatment physical therapy  -SS (r) AN (t) SS (c)       Row Name 11/13/24 1638          General Information    Patient Profile Reviewed yes  -SS (r) AN (t) SS (c)     Prior Level of Function independent:;ADL's;gait  amb c/ rw, manual wc for longer distances  -SS (r) AN (t) SS (c)       Row Name 11/13/24 1638          Living Environment    People in Home alone  -SS (r) AN (t) SS (c)       Row Name 11/13/24 1638          Safety Issues/Impairments Affecting Functional Mobility    Impairments Affecting Function (Mobility) balance;endurance/activity tolerance  -SS (r) AN (t) SS (c)               User Key  (r) = Recorded By, (t) = Taken By, (c) = Cosigned By      Initials Name Provider Type    SS Xin Rose, PT Physical Therapist    Carolyn Timmons, PT Student PT Student                   Mobility       Row Name 11/13/24 1641          Bed Mobility    Bed Mobility scooting/bridging;supine-sit;sit-supine  -SS (r) AN (t) SS (c)     Scooting/Bridging [place] (Bed Mobility) maximum assist (25% patient effort);2 person assist  -SS (r) AN (t) SS (c)     Supine-Sit [place] (Bed Mobility) minimum assist (75% patient effort)  assist with trunk  -SS (r) AN (t) SS (c)     Sit-Supine [place] (Bed Mobility) minimum assist (75% patient effort)  assist with BLE  -SS (r) AN (t) SS (c)     Assistive Device (Bed Mobility) head of bed elevated;bed rails;repositioning sheet  -SS (r) AN (t) SS (c)     Comment, (Bed Mobility) R side of the bed  -SS (r) AN (t) SS (c)       Row Name 11/13/24 1641          Sit-Stand Transfer    Sit-Stand [place] (Transfers) standby assist  -SS (r) AN (t) SS (c)     Assistive Device (Sit-Stand Transfers) walker, front-wheeled  -SS (r) AN (t) SS (c)       Row Name 11/13/24 1641          Gait/Stairs (Locomotion)    [place] Level (Gait) not tested  -SS (r) AN (t) SS (c)     Patient was able to Ambulate no, other medical factors prevent ambulation  -SS (r) AN (t) SS (c)      Reason Patient was unable to Ambulate Other (Comment)  pt denied amb. reports fatigue - did not get a good night's sleep and busy day with testing  -SS (r) AN (t) SS (c)               User Key  (r) = Recorded By, (t) = Taken By, (c) = Cosigned By      Initials Name Provider Type    SS Xin Rose, PT Physical Therapist    Carolyn Timmons, PT Student PT Student                   Obj/Interventions       Row Name 11/13/24 1643          Range of Motion Comprehensive    General Range of Motion bilateral lower extremity ROM WFL  -SS (r) AN (t) SS (c)     Comment, General Range of Motion ROM WFL for ADLs  -SS (r) AN (t) SS (c)       Row Name 11/13/24 1643          Strength Comprehensive (MMT)    General Manual Muscle Testing (MMT) Assessment other (see comments)  -SS (r) AN (t) SS (c)     Comment, General Manual Muscle Testing (MMT) Assessment BLE strength WFL for ADLs and amb  -SS (r) AN (t) SS (c)               User Key  (r) = Recorded By, (t) = Taken By, (c) = Cosigned By      Initials Name Provider Type    SS Xin Rose, PT Physical Therapist    Carolyn Timmons, PT Student PT Student                   Goals/Plan       Row Name 11/13/24 1646          Bed Mobility Goal 1 (PT)    Activity/Assistive Device (Bed Mobility Goal 1, PT) bed mobility activities, all  -SS (r) AN (t) SS (c)     [place] Level/Cues Needed (Bed Mobility Goal 1, PT) modified independence  -SS (r) AN (t) SS (c)     Time Frame (Bed Mobility Goal 1, PT) long term goal (LTG);2 weeks  -SS (r) AN (t) SS (c)       Row Name 11/13/24 1646          Transfer Goal 1 (PT)    Activity/Assistive Device (Transfer Goal 1, PT) transfers, all;walker, rolling  -SS (r) AN (t) SS (c)     [place] Level/Cues Needed (Transfer Goal 1, PT) standby assist  -SS (r) AN (t) SS (c)       Row Name 11/13/24 1646          Gait Training Goal 1 (PT)    Activity/Assistive Device (Gait Training Goal 1, PT) gait (walking locomotion);walker, rolling  -SS (r) AN  "(t) SS (c)     [place] Level (Gait Training Goal 1, PT) standby assist  -SS (r) AN (t) SS (c)     Distance (Gait Training Goal 1, PT) amb >50ft c/ rw, SBA  -SS (r) AN (t) SS (c)       Row Name 11/13/24 4626          Therapy Assessment/Plan (PT)    Planned Therapy Interventions (PT) balance training;gait training;stair training;strengthening;transfer training  -SS (r) AN (t) SS (c)               User Key  (r) = Recorded By, (t) = Taken By, (c) = Cosigned By      Initials Name Provider Type    SS Xin Rose, PT Physical Therapist    Carolyn Timmons, PT Student PT Student                   Clinical Impression       Row Name 11/13/24 0457          Pain    Pretreatment Pain Rating 0/10 - no pain  -SS (r) AN (t) SS (c)     Posttreatment Pain Rating 0/10 - no pain  -SS (r) AN (t) SS (c)       Row Name 11/13/24 1088          Plan of Care Review    Plan of Care Reviewed With patient  -SS (r) AN (t) SS (c)     Outcome Evaluation Camelia is a 80 y/o F who presents to [facility] ED with c/o shortness of breath. Respiratory viral panel negative. Chest x-ray showed \"1. Cardiomegaly. 2.  Evidence of prior granulomatous exposure. 3.  Possible atelectasis or scarring in the right lower lobe adjacent to shadow which could relate to known hiatal hernia\".  EKG does show atrial fibrillation rate is controlled at ~100bpm. She is agreeable to participate in initial PT eval. AOx4. Reports coughing and shortness of air has been improving throughout the day. Min-A, supine-to-sit assisted pt with trunk and sit-to-supine assisted pt with BLE . HOB elevated and use of bed rail. Max-A [id number] scooting pt in bed with repositionining sheet. Demonstrates good sitting balance unsupported at edge of bed. STS c/ rw, SBA verbal cues for erect posture. Pt denied amb today says she is fatigued. Did not get a good night's rest and many tests throughout the day. Although did not amb today, anticipate pt's capable due to easy tfs. Recommending d/c " "home with OPPT to address decreased endurance.  -SS (r) AN (t) SS (c)       Row Name 11/13/24 1644          Therapy Assessment/Plan (PT)    Criteria for Skilled Interventions Met (PT) yes;skilled treatment is necessary  -SS (r) AN (t) SS (c)     Therapy Frequency (PT) 5 times/wk  -SS (r) AN (t) SS (c)     Predicted Duration of Therapy Intervention (PT) until d/c  -SS (r) AN (t) SS (c)       Row Name 11/13/24 0549          Vital Signs    Intra Systolic BP Rehab 147  -SS (r) AN (t) SS (c)     Intra Treatment Diastolic BP 73  -SS (r) AN (t) SS (c)       Row Name 11/13/24 0549          Positioning and Restraints    Pre-Treatment Position in bed  -SS (r) AN (t) SS (c)     Post Treatment Position bed  -SS (r) AN (t) SS (c)     In Bed call light within reach;encouraged to call for assist;supine  -SS (r) AN (t) SS (c)               User Key  (r) = Recorded By, (t) = Taken By, (c) = Cosigned By      Initials Name Provider Type    SS Xin Rose, PT Physical Therapist    Carolyn Timmons, PT Student PT Student                   Outcome Measures    No documentation.                                Physical Therapy Education       Title: PT OT SLP Therapies (Done)       Topic: Physical Therapy (Done)       Point: Mobility training (Done)       Learning Progress Summary            Patient Acceptance, E, VU by AN at 11/13/2024 1648                                      User Key       Initials Effective Dates Name Provider Type Discipline    AN 06/27/24 -  Carolyn Goode, PT Student PT Student PT                  PT Recommendation and Plan  Planned Therapy Interventions (PT): balance training, gait training, stair training, strengthening, transfer training  Outcome Evaluation: Camelia is a 78 y/o F who presents to [facility] ED with c/o shortness of breath. Respiratory viral panel negative. Chest x-ray showed \"1. Cardiomegaly. 2.  Evidence of prior granulomatous exposure. 3.  Possible atelectasis or scarring in the right lower lobe " "adjacent to shadow which could relate to known hiatal hernia\".  EKG does show atrial fibrillation rate is controlled at ~100bpm. She is agreeable to participate in initial PT eval. AOx4. Reports coughing and shortness of air has been improving throughout the day. Min-A, supine-to-sit assisted pt with trunk and sit-to-supine assisted pt with BLE . HOB elevated and use of bed rail. Max-A [id number] scooting pt in bed with repositionining sheet. Demonstrates good sitting balance unsupported at edge of bed. STS c/ rw, SBA verbal cues for erect posture. Pt denied amb today says she is fatigued. Did not get a good night's rest and many tests throughout the day. Although did not amb today, anticipate pt's capable due to easy tfs. Recommending d/c home with OPPT to address decreased endurance.     Time Calculation:         PT Charges       Row Name 11/13/24 1649             Time Calculation    Start Time 1604  -SS (r) AN (t) SS (c)      Stop Time 1630  -SS (r) AN (t) SS (c)      Time Calculation (min) 26 min  -SS (r) AN (t)      PT Received On 11/13/24  -SS (r) AN (t) SS (c)      PT - Next Appointment 11/14/24  -SS (r) AN (t) SS (c)      PT Goal Re-Cert Due Date 11/27/24  -SS (r) AN (t) SS (c)         Time Calculation- PT    Total Timed Code Minutes- PT 0 minute(s)  -SS (r) AN (t) SS (c)                User Key  (r) = Recorded By, (t) = Taken By, (c) = Cosigned By      Initials Name Provider Type    SS Xin Rose, PT Physical Therapist    Carolyn Timmons, PT Student PT Student                  Therapy Charges for Today       Code Description Service Date Service Provider Modifiers Qty    [id number] HC PT EVAL MOD COMPLEXITY 4 11/13/2024 Carolyn Goode PT Student GP 1            PT G-Codes  AM-PAC 6 Clicks Score (PT): 11  PT Discharge Summary  Anticipated Discharge Disposition (PT): home, home with outpatient therapy services    Carolyn Goode PT Student  11/13/2024    "

## 2024-11-13 NOTE — CONSULTS
Referring Provider: Clemente Bowens MD    Reason for Consultation:      Atrial fibrillation with RVR      Patient Care Team:  Brinda Tony APRN as PCP - General (Nurse Practitioner)  Kevin Angel MD as Consulting Physician (Cardiology)    Seen and examined agree with narrative as discussed with nurse practitioner after face-to-face encounter with scribed findings below, greater than 50% of total encounter time in medical decision making performed by me    SUBJECTIVE     Chief Complaint: Shortness of breath    History of present illness:  Camelia Gray is a 79 y.o. female with a history of paroxysmal atrial fibrillation who presented to [facility] with complaint of shortness of breath.     Patient presented to the emergency room at [facility] on November 12, 2024 with complaints of shortness of breath.  Evaluation in the emergency room shows atrial fibrillation with rate of 105.  She was given IV steroids and DuoNebs.High-sensitivity troponin was 15, 15.  proBNP 1757.  BUN and creatinine 18 and 0.7 blood culture pending respiratory virus panel is negative CT of her chest showed right lower lobe endobronchial opacification.  Consolidation changes in the right lower lobe.  Large hiatal hernia.  Echocardiogram was ordered and is pending.    Patient is a 79-year-old female who is previously been followed in our office.  She has a history of previous PE and is anticoagulated with Eliquis.  Earlier this year she was hospitalized and found to be in atrial fibrillation with RVR which was new for the patient.  She was started on amiodarone and beta-blocker.  Eliquis dose was increased to therapeutic range for A-fib.    She is followed up in the office with our providers.In February 2024 the patient underwent JAHAIRA and cardioversion.  Successfully converting atrial fibrillation to sinus rhythm.  JAHAIRA showed normal LV and RV size and function.  Biatrial dilatation.  There was aortic valve  sclerosis but no stenosis.    Patient was last seen in our office in September 2024.  She was found to be in atrial fibrillation at that time.  She was started on sotalol.    She reports over the last week she has had an upper respiratory infection and had progressive shortness of breath.  She reports compliance with Eliquis and has not missed any doses    EKG on admission this time demonstrates rate controlled atrial fibrillation in the 80s with no ischemic findings, QT interval of 4 60-4 80 acceptable on sotalol with narrow complex rhythm    Historical data copied forward from previous encounters in EMR is unchanged        Most recent stress evaluation December 2023 less than 12 months ago, negative for myocardial ischemia with EF of 74%, normal stress and rest perfusion, no ECG changes      Most recent 2D echo February 2024, bubble study was negative, normal LV and RV size and function, biatrial enlargement, aortic valve sclerosis without stenosis was seen, grade 1 plaque in the aorta, mild mitral and tricuspid valve regurgitation      Review of systems:    Constitutional: C/o weakness, fatigue, denies fever, rigors, chills   Eyes: No vision changes, eye pain   ENT/oropharynx: No difficulty swallowing, sore throat, epistaxis, changes in hearing   Cardiovascular: No chest pain, chest tightness, palpitations, paroxysmal nocturnal dyspnea, orthopnea, diaphoresis, dizziness / syncopal episode   Respiratory: C/o shortness of breath, dyspnea on exertion, denies cough, wheezing, hemoptysis   Gastrointestinal: No abdominal pain, nausea, vomiting, diarrhea, bloody stools   Genitourinary: No hematuria, dysuria   Neurological: No headache, tremors, numbness, one-sided weakness    Musculoskeletal: No cramps, myalgias, joint pain, joint swelling   Integument: No rash, edema        Personal History:      Past Medical History:   Diagnosis Date    A-fib 12/2023    Arthritis     Cellulitis of both feet 10/13/2019    Decubitus  ulcer of buttock, stage 2 10/14/2019    HEALED    Dermatitis associated with moisture 10/14/2019    Disease of thyroid gland     HYPOTHYROID    Edema of lower extremity 05/31/2017    Hypertension     Hypothyroidism     Immobility syndrome 10/13/2019    Lymphedema 10/14/2019    LEGS    Lymphedema     Muscle weakness (generalized)     Non-pressure chronic ulcer of other part of left foot with fat layer exposed 10/14/2019    Non-pressure chronic ulcer of other part of right foot with fat layer exposed 10/14/2019    Obesity     Pulmonary embolism     Stasis dermatitis 07/08/2013    Ureteral calculi     Urinary tract infection 07/2020    Hosp. 7/2020    Venous stasis 10/14/2019    Yeast infection of the skin        Past Surgical History:   Procedure Laterality Date    BRONCHOSCOPY N/A 12/27/2023    Procedure: BRONCHOSCOPY with right lung washing;  Surgeon: Mayra Ramachandran MD;  Location: [facility] ENDOSCOPY;  Service: Pulmonary;  Laterality: N/A;  Post- Pneumonia    CATARACT EXTRACTION Bilateral     CHOLECYSTECTOMY N/A 11/11/2020    Procedure: CHOLECYSTECTOMY LAPAROSCOPIC;  Surgeon: George Crocker DO;  Location: [facility] MAIN OR;  Service: General;  Laterality: N/A;    CORRECTION HAMMER TOE Bilateral     CYSTOSCOPY, URETEROSCOPY, RETROGRADE PYELOGRAM, STENT INSERTION Bilateral 08/18/2020    Procedure: CYSTOSCOPY BILATERAL RETROGRADE PYELOGRAM,;  Surgeon: Shawn Hernandez MD;  Location: [facility] MAIN OR;  Service: Urology;  Laterality: Bilateral;    DENTAL EXAMINATION UNDER ANESTHESIA      ENDOSCOPY N/A 09/13/2020    Procedure: ESOPHAGOGASTRODUODENOSCOPY;  Surgeon: Torsten Johnson MD;  Location: [facility] ENDOSCOPY;  Service: Gastroenterology;  Laterality: N/A;  post: esophageal stricture, reflux esophagitis, hiatal hernia    ENDOSCOPY N/A 02/01/2024    Procedure: ESOPHAGOGASTRODUODENOSCOPY, esophageal dilation (40-46 Fr non-guided Bougie);  Surgeon: Torsten Johnson MD;  Location: [facility] ENDOSCOPY;  Service:  Gastroenterology;  Laterality: N/A;  post: hiatal hernia, esophageal stricture    JOINT REPLACEMENT Bilateral     Bilateral knees    TOE SURGERY      TONSILLECTOMY      TOTAL HIP ARTHROPLASTY Left 2020    Procedure: TOTAL HIP ARTHROPLASTY;  Surgeon: Baljit Hutchins II, MD;  Location: [facility] MAIN OR;  Service: Orthopedics;  Laterality: Left;       Family History   Problem Relation Age of Onset    Heart disease Mother     Hypertension Mother     Heart disease Father     Hypertension Father     Kidney disease Father     COPD Father        Social History     Tobacco Use    Smoking status: Former     Current packs/day: 0.00     Average packs/day: 0.3 packs/day for 28.2 years (7.1 ttl pk-yrs)     Types: Cigarettes     Start date: 10/1/1964     Quit date: 1992     Years since quittin.9     Passive exposure: Past    Smokeless tobacco: Never   Vaping Use    Vaping status: Never Used   Substance Use Topics    Alcohol use: No    Drug use: No        Home meds:  Prior to Admission medications    Medication Sig Start Date End Date Taking? Authorizing Provider   apixaban (ELIQUIS) 5 MG tablet tablet Take 1 tablet by mouth 2 (Two) Times a Day. 10/29/24  Yes Kevin Angel MD   B Complex Vitamins (vitamin b complex) capsule capsule Take 1 capsule by mouth Daily. Indications: Vitamin Deficiency 24  Yes Karli Cheney MD   levothyroxine (SYNTHROID, LEVOTHROID) 50 MCG tablet Take 1 tablet by mouth Daily. Indications: Underactive Thyroid 24  Yes Brinda Tony APRN   Multiple Vitamins-Minerals (multivitamin with minerals) tablet tablet Take 1 tablet by mouth Daily. Indications: Vitamin Deficiency 24  Yes Karli Cheney MD   pantoprazole (PROTONIX) 40 MG EC tablet TAKE ONE TABLET BY MOUTH DAILY 24  Yes Brinda Tony APRN   sotalol (BETAPACE) 80 MG tablet Take 1 tablet by mouth 2 (Two) Times a Day. 9/10/24  Yes Kevin Angel MD       Allergies:    "  Maxzide [hydrochlorothiazide w-triamterene]    Scheduled Meds:apixaban, 5 mg, Oral, BID  furosemide, 40 mg, Intravenous, BID  ipratropium-albuterol, 3 mL, Nebulization, 4x Daily - RT  levothyroxine, 50 mcg, Oral, Daily  methylPREDNISolone sodium succinate, 40 mg, Intravenous, Q8H  multivitamin with minerals, 1 tablet, Oral, Daily  pantoprazole, 40 mg, Oral, Daily  sotalol, 80 mg, Oral, BID      Continuous Infusions:niCARdipine, 5-15 mg/hr, Last Rate: 2.5 mg/hr (11/13/24 1136)      PRN Meds:  albuterol    senna-docusate sodium **AND** polyethylene glycol **AND** bisacodyl **AND** bisacodyl    Calcium Replacement - Follow Nurse / BPA Driven Protocol    guaiFENesin-dextromethorphan    hydrALAZINE    labetalol    Magnesium Standard Dose Replacement - Follow Nurse / BPA Driven Protocol    Phosphorus Replacement - Follow Nurse / BPA Driven Protocol    Potassium Replacement - Follow Nurse / BPA Driven Protocol    sodium chloride      OBJECTIVE    Vital Signs  Vitals:    11/13/24 0841 11/13/24 0900 11/13/24 0950 11/13/24 1126   BP:  135/91 149/96 129/88   BP Location:    Right arm   Patient Position:    Lying   Pulse: 112 113 95 98   Resp: 16   18   Temp:    97.7 °F (36.5 °C)   TempSrc:    Oral   SpO2: 96% 92% 92% 95%   Weight:       Height:           Flowsheet Rows      Flowsheet Row First Filed Value   Admission Height 180.3 cm (71\") Documented at 11/12/2024 1159   Admission Weight 150 kg (330 lb) Documented at 11/12/2024 1159              Intake/Output Summary (Last 24 hours) at 11/13/2024 1151  Last data filed at 11/13/2024 0900  Gross per 24 hour   Intake --   Output 1000 ml   Net -1000 ml        Telemetry:  af    Physical Exam:  The patient is alert, oriented and in no distress.  Vital signs as noted above.  Head and neck revealed no carotid bruits or jugular venous distention.   Lungs diminished in the bases no wheezing.  Breath sounds are normal bilaterally.  Heart: Irregularly irregular rhythm normal first and " second heart sounds. No murmur.    Abdomen: Soft and nontender.  Rates are controlled  Extremities with good peripheral pulses bilateral lower extremities wrapped   skin: Warm and dry.  Musculoskeletal system is grossly normal.  CNS grossly normal.       Results Review:  I have personally reviewed the results from the time of this admission to 11/13/2024 11:51 EST and agree with these findings:  []  Laboratory  []  Microbiology  []  Radiology  []  EKG/Telemetry   []  Cardiology/Vascular   []  Pathology  []  Old records  []  Other:    Most notable findings include:     Lab Results (last 24 hours)       Procedure Component Value Units Date/Time    Basic Metabolic Panel [[id number]]  (Abnormal) Collected: 11/13/24 0522    Specimen: Blood from Arm, Right Updated: 11/13/24 0630     Glucose 165 mg/dL      BUN 18 mg/dL      Creatinine 0.75 mg/dL      Sodium 142 mmol/L      Potassium 4.2 mmol/L      Comment: Specimen hemolyzed.  Result may be falsely elevated.        Chloride 105 mmol/L      CO2 26.1 mmol/L      Calcium 9.4 mg/dL      BUN/Creatinine Ratio 24.0     Anion Gap 10.9 mmol/L      eGFR 81.6 mL/min/1.73     Narrative:      GFR Normal >60  Chronic Kidney Disease <60  Kidney Failure <15    The GFR formula is only valid for adults with stable renal function between ages 18 and 70.    CBC & Differential [[id number]]  (Abnormal) Collected: 11/13/24 0522    Specimen: Blood from Arm, Right Updated: 11/13/24 0605    Narrative:      The following orders were created for panel order CBC & Differential.  Procedure                               Abnormality         Status                     ---------                               -----------         ------                     CBC Auto Differential[[id number]]        Abnormal            Final result                 Please view results for these tests on the individual orders.    CBC Auto Differential [[id number]]  (Abnormal) Collected: 11/13/24 0522    Specimen: Blood from Arm,  Right Updated: 11/13/24 0605     WBC 7.43 10*3/mm3      RBC 5.23 10*6/mm3      Hemoglobin 14.8 g/dL      Hematocrit 46.8 %      MCV 89.5 fL      MCH 28.3 pg      MCHC 31.6 g/dL      RDW 15.3 %      RDW-SD 50.4 fl      MPV 9.4 fL      Platelets 224 10*3/mm3      Neutrophil % 88.5 %      Lymphocyte % 9.4 %      Monocyte % 0.7 %      Eosinophil % 0.1 %      Basophil % 0.1 %      Immature Grans % 1.2 %      Neutrophils, Absolute 6.57 10*3/mm3      Lymphocytes, Absolute 0.70 10*3/mm3      Monocytes, Absolute 0.05 10*3/mm3      Eosinophils, Absolute 0.01 10*3/mm3      Basophils, Absolute 0.01 10*3/mm3      Immature Grans, Absolute 0.09 10*3/mm3      nRBC 0.0 /100 WBC     Respiratory Panel PCR w/COVID-19(SARS-CoV-2) PRATIMA/BRIANNA/VERONICA/PAD/COR/CARLOS In-House, NP Swab in UTM/VTM, 2 HR TAT - Swab, Nasopharynx [[id number]]  (Normal) Collected: 11/12/24 1406    Specimen: Swab from Nasopharynx Updated: 11/12/24 1500     ADENOVIRUS, PCR Not Detected     Coronavirus 229E Not Detected     Coronavirus HKU1 Not Detected     Coronavirus NL63 Not Detected     Coronavirus OC43 Not Detected     COVID19 Not Detected     Human Metapneumovirus Not Detected     Human Rhinovirus/Enterovirus Not Detected     Influenza A PCR Not Detected     Influenza B PCR Not Detected     Parainfluenza Virus 1 Not Detected     Parainfluenza Virus 2 Not Detected     Parainfluenza Virus 3 Not Detected     Parainfluenza Virus 4 Not Detected     RSV, PCR Not Detected     Bordetella pertussis pcr Not Detected     Bordetella parapertussis PCR Not Detected     Chlamydophila pneumoniae PCR Not Detected     Mycoplasma pneumo by PCR Not Detected    Narrative:      In the setting of a positive respiratory panel with a viral infection PLUS a negative procalcitonin without other underlying concern for bacterial infection, consider observing off antibiotics or discontinuation of antibiotics and continue supportive care. If the respiratory panel is positive for atypical bacterial  infection (Bordetella pertussis, Chlamydophila pneumoniae, or Mycoplasma pneumoniae), consider antibiotic de-escalation to target atypical bacterial infection.    High Sensitivity Troponin T 2Hr [[id number]]  (Abnormal) Collected: 11/12/24 1427    Specimen: Blood from Arm, Left Updated: 11/12/24 1456     HS Troponin T 15 ng/L      Troponin T Delta 0 ng/L     Narrative:      High Sensitive Troponin T Reference Range:  <14.0 ng/L- Negative Female for AMI  <22.0 ng/L- Negative Male for AMI  >=14 - Abnormal Female indicating possible myocardial injury.  >=22 - Abnormal Male indicating possible myocardial injury.   Clinicians would have to utilize clinical acumen, EKG, Troponin, and serial changes to determine if it is an Acute Myocardial Infarction or myocardial injury due to an underlying chronic condition.         Extra Tubes [[id number]] Collected: 11/12/24 1427    Specimen: Blood from Arm, Left Updated: 11/12/24 1445    Narrative:      The following orders were created for panel order Extra Tubes.  Procedure                               Abnormality         Status                     ---------                               -----------         ------                     Lavender Top[[id number]]                                     Final result                 Please view results for these tests on the individual orders.    Lavender Top [[id number]] Collected: 11/12/24 1427    Specimen: Blood from Arm, Left Updated: 11/12/24 1445     Extra Tube hold for add-on     Comment: Auto resulted       [place] Draw [[id number]] Collected: 11/12/24 1321    Specimen: Blood Updated: 11/12/24 1430    Narrative:      The following orders were created for panel order [place] Draw.  Procedure                               Abnormality         Status                     ---------                               -----------         ------                     Green Top (Gel)[[id number]]                                  Final result                Lavender Top[[id number]]                                     Final result               Gold Top - SST[[id number]]                                   Final result               Light Blue Top[[id number]]                                   Final result                 Please view results for these tests on the individual orders.    Gold Top - SST [[id number]] Collected: 11/12/24 1427    Specimen: Blood from Arm, Left Updated: 11/12/24 1430     Extra Tube Hold for add-ons.     Comment: Auto resulted.       Light Blue Top [[id number]] Collected: 11/12/24 1427    Specimen: Blood from Arm, Left Updated: 11/12/24 1430     Extra Tube Hold for add-ons.     Comment: Auto resulted       BNP [[id number]]  (Normal) Collected: 11/12/24 1321    Specimen: Blood from Hand, Left Updated: 11/12/24 1409     proBNP 1,757.0 pg/mL     Narrative:      This assay is used as an aid in the diagnosis of individuals suspected of having heart failure. It can be used as an aid in the diagnosis of acute decompensated heart failure (ADHF) in patients presenting with signs and symptoms of ADHF to the emergency department (ED). In addition, NT-proBNP of <300 pg/mL indicates ADHF is not likely.    Age Range Result Interpretation  NT-proBNP Concentration (pg/mL:      <50             Positive            >450                   Gray                 300-450                    Negative             <300    50-75           Positive            >900                  Gray                300-900                  Negative            <300      >75             Positive            >1800                  Gray                300-1800                  Negative            <300    Comprehensive Metabolic Panel [[id number]]  (Abnormal) Collected: 11/12/24 1321    Specimen: Blood from Hand, Left Updated: 11/12/24 1357     Glucose 113 mg/dL      BUN 22 mg/dL      Creatinine 0.73 mg/dL      Sodium 142 mmol/L      Potassium 3.8 mmol/L      Chloride 104 mmol/L      CO2 28.2 mmol/L       Calcium 9.8 mg/dL      Total Protein 7.3 g/dL      Albumin 3.8 g/dL      ALT (SGPT) 23 U/L      AST (SGOT) 28 U/L      Alkaline Phosphatase 128 U/L      Total Bilirubin 0.4 mg/dL      Globulin 3.5 gm/dL      A/G Ratio 1.1 g/dL      BUN/Creatinine Ratio 30.1     Anion Gap 9.8 mmol/L      eGFR 84.3 mL/min/1.73     Narrative:      GFR Normal >60  Chronic Kidney Disease <60  Kidney Failure <15    The GFR formula is only valid for adults with stable renal function between ages 18 and 70.    High Sensitivity Troponin T [[id number]]  (Abnormal) Collected: 11/12/24 1321    Specimen: Blood from Hand, Left Updated: 11/12/24 1357     HS Troponin T 15 ng/L     Narrative:      High Sensitive Troponin T Reference Range:  <14.0 ng/L- Negative Female for AMI  <22.0 ng/L- Negative Male for AMI  >=14 - Abnormal Female indicating possible myocardial injury.  >=22 - Abnormal Male indicating possible myocardial injury.   Clinicians would have to utilize clinical acumen, EKG, Troponin, and serial changes to determine if it is an Acute Myocardial Infarction or myocardial injury due to an underlying chronic condition.         Green Top (Gel) [[id number]] Collected: 11/12/24 1321    Specimen: Blood from Hand, Left Updated: 11/12/24 1330     Extra Tube Hold for add-ons.     Comment: Auto resulted.       Lavender Top [[id number]] Collected: 11/12/24 1321    Specimen: Blood from Hand, Left Updated: 11/12/24 1330     Extra Tube hold for add-on     Comment: Auto resulted       POC Lactate [[id number]]  (Normal) Collected: 11/12/24 1327    Specimen: Blood Updated: 11/12/24 1329     Lactate 0.9 mmol/L      Comment: Serial Number: [id number]:  [id number]       CBC & Differential [[id number]]  (Abnormal) Collected: 11/12/24 1321    Specimen: Blood from Hand, Left Updated: 11/12/24 1327    Narrative:      The following orders were created for panel order CBC & Differential.  Procedure                               Abnormality         Status                      ---------                               -----------         ------                     CBC Auto Differential[[id number]]        Abnormal            Final result                 Please view results for these tests on the individual orders.    CBC Auto Differential [[id number]]  (Abnormal) Collected: 11/12/24 1321    Specimen: Blood from Hand, Left Updated: 11/12/24 1327     WBC 6.89 10*3/mm3      RBC 5.00 10*6/mm3      Hemoglobin 14.2 g/dL      Hematocrit 45.3 %      MCV 90.6 fL      MCH 28.4 pg      MCHC 31.3 g/dL      RDW 15.2 %      RDW-SD 50.2 fl      MPV 9.0 fL      Platelets 212 10*3/mm3      Neutrophil % 71.9 %      Lymphocyte % 15.8 %      Monocyte % 8.1 %      Eosinophil % 3.5 %      Basophil % 0.3 %      Immature Grans % 0.4 %      Neutrophils, Absolute 4.95 10*3/mm3      Lymphocytes, Absolute 1.09 10*3/mm3      Monocytes, Absolute 0.56 10*3/mm3      Eosinophils, Absolute 0.24 10*3/mm3      Basophils, Absolute 0.02 10*3/mm3      Immature Grans, Absolute 0.03 10*3/mm3      nRBC 0.0 /100 WBC     Blood Culture - Blood, Hand, Left [[id number]] Collected: 11/12/24 1321    Specimen: Blood from Hand, Left Updated: 11/12/24 1325    Blood Culture - Blood, Arm, Right [[id number]] Collected: 11/12/24 1236    Specimen: Blood from Arm, Right Updated: 11/12/24 1244            Imaging Results (Last 24 Hours)       Procedure Component Value Units Date/Time    CT Chest Without Contrast Diagnostic [[id number]] Collected: 11/13/24 1128     Updated: 11/13/24 1145    Narrative:      CT CHEST WO CONTRAST DIAGNOSTIC    Date of Exam: 11/13/2024 11:00 AM EST    Indication: dyspnea.    Comparison: AP chest 11/12/2024, CT chest 12/25/2023, 11/10/2018    Technique: Axial CT images were obtained of the chest without contrast administration.  Sagittal and coronal reconstructions were performed.  Automated exposure control and iterative reconstruction methods were used.      Findings:  Moderate to large hiatal hernia projects  right of midline in the lower thorax, similar to the prior study. There is endobronchial opacification within the right lower lobe with right lower lobe airspace disease, represent atelectasis or pneumonia. The   airspace disease in the right lower lobe is greater than that on the 12/25/2023 examination. There is mild subsegmental atelectasis posteriorly in the left upper lobe. Benign calcified nodule is seen in the left upper lobe.    Heart size is within normal limits. Coronary artery calcifications are present. Main pulmonary artery remains dilated, as before. Benign calcified mediastinal lymph nodes are present. No pericardial effusion. Trace right basilar pleural fluid.    Stable 3.2 cm low-density left adrenal nodule (noncontrast Hounsfield units 4.6), consistent with a benign adenoma. Nonobstructing left renal stone measures 6 mm. Slightly hyperdense lesion projecting laterally from the left mid kidney measuring 7 mm is   unchanged, favoring benign etiology such as a small hemorrhagic or proteinaceous cyst. Cholecystectomy changes are present. Remainder of imaged upper abdominal organs have a normal noncontrast appearance.    Advanced degenerative changes within both shoulders. No acute or suspicious osseous abnormalities are identified.            Impression:      Impression:  1. Right lower lobe endobronchial opacification is present, new since the prior study.  2. Consolidative change in the right lower lobe may present atelectasis or postobstructive pneumonia.  3. Mild subsegmental atelectasis in the left upper lobe.  4. Stable appearance of moderate to large esophageal hiatal hernia.  5. Stable appearance of pulmonary artery dilation. Correlate for pulmonary hypertension.  6. Stable appearance of the left adrenal adenoma.  7. Nonobstructing left renal stone.      Electronically Signed: Marie Toledo MD    11/13/2024 11:43 AM EST    Workstation ID: [id number]    XR Chest 1 View [[id number]] Collected:  11/12/24 1318     Updated: 11/12/24 1323    Narrative:      XR CHEST 1 VW    Date of Exam: 11/12/2024 12:44 PM EST    Indication: soa    Comparison: January 1, 2024    Findings:  The heart looks enlarged. There is calcification in the left lower chest and along the left superior mediastinal area that could relate to prior granulomatous exposure. There are no pleural effusions. There may be some atelectasis or scarring in the   right lower lobe medially. There is a shadow in the right lower chest medially that could relate to known hiatal hernia as well.      Impression:      Impression:  1.Cardiomegaly  2.Evidence for prior granulomatous exposure  3.Possible atelectasis or scarring in the right lower lobe adjacent to shadow which could relate to known hiatal hernia      Electronically Signed: Gee Meraz MD    11/12/2024 1:21 PM EST    Workstation ID: [id number]            LAB RESULTS (LAST 7 DAYS)    CBC  Results from last 7 days   Lab Units 11/13/24  0522 11/12/24  1321   WBC 10*3/mm3 7.43 6.89   RBC 10*6/mm3 5.23 5.00   HEMOGLOBIN g/dL 14.8 14.2   HEMATOCRIT % 46.8* 45.3   MCV fL 89.5 90.6   PLATELETS 10*3/mm3 224 212       BMP  Results from last 7 days   Lab Units 11/13/24  0522 11/12/24  1321   SODIUM mmol/L 142 142   POTASSIUM mmol/L 4.2 3.8   CHLORIDE mmol/L 105 104   CO2 mmol/L 26.1 28.2   BUN mg/dL 18 22   CREATININE mg/dL 0.75 0.73   GLUCOSE mg/dL 165* 113*       CMP   Results from last 7 days   Lab Units 11/13/24  0522 11/12/24  1321   SODIUM mmol/L 142 142   POTASSIUM mmol/L 4.2 3.8   CHLORIDE mmol/L 105 104   CO2 mmol/L 26.1 28.2   BUN mg/dL 18 22   CREATININE mg/dL 0.75 0.73   GLUCOSE mg/dL 165* 113*   ALBUMIN g/dL  --  3.8   BILIRUBIN mg/dL  --  0.4   ALK PHOS U/L  --  128*   AST (SGOT) U/L  --  28   ALT (SGPT) U/L  --  23       BNP        TROPONIN  Results from last 7 days   Lab Units 11/12/24  1427   HSTROP T ng/L 15*       CoAg        Creatinine Clearance  Estimated Creatinine Clearance: 102.5  mL/min (by C-G formula based on SCr of 0.75 mg/dL).    ABG          Radiology  CT Chest Without Contrast Diagnostic    Result Date: 11/13/2024  Impression: 1. Right lower lobe endobronchial opacification is present, new since the prior study. 2. Consolidative change in the right lower lobe may present atelectasis or postobstructive pneumonia. 3. Mild subsegmental atelectasis in the left upper lobe. 4. Stable appearance of moderate to large esophageal hiatal hernia. 5. Stable appearance of pulmonary artery dilation. Correlate for pulmonary hypertension. 6. Stable appearance of the left adrenal adenoma. 7. Nonobstructing left renal stone. Electronically Signed: Marie Toledo MD  11/13/2024 11:43 AM EST  Workstation ID: [id number]    XR Chest 1 View    Result Date: 11/12/2024  Impression: 1.Cardiomegaly 2.Evidence for prior granulomatous exposure 3.Possible atelectasis or scarring in the right lower lobe adjacent to shadow which could relate to known hiatal hernia Electronically Signed: Gee Meraz MD  11/12/2024 1:21 PM EST  Workstation ID: [id number]       EKG  I personally viewed and interpreted the patient's EKG/Telemetry data:  ECG 12 Lead Dyspnea   Final Result   HEART CSLR=201  bpm   RR Ntinueud=147  ms   AZ Interval=  ms   P Horizontal Axis=  deg   P Front Axis=  deg   QRSD Interval=87  ms   QT Wtsmtoas=877  ms   RWzN=349  ms   QRS Axis=45  deg   T Wave Axis=8  deg   - ABNORMAL ECG -   Atrial fibrillation   Low voltage, precordial leads   When compared with ECG of 08-Feb-2024 12:03:26,   Significant change in rhythm: previously sinus   Electronically Signed By: Eduardo Anderson ([facility]) 2024-11-13 06:01:52   Date and Time of Study:2024-11-12 12:18:10                  Echocardiogram:    Results for orders placed during the hospital encounter of 02/08/24    Adult Transesophageal Echo (JAHAIRA) W/ Cont if Necessary Per Protocol    Interpretation Summary    There is (grade 1) plaque in the descending aorta present.    Normal  left and right ventricular size and systolic function.  Biatrial dilatation.  No left atrial appendage thrombus noted.  Agitated saline/bubble study did not reveal interatrial shunt/PFO.  Aortic valve sclerosis without visual evidence of stenosis.  Grade 1 atherosclerotic plaque in the descending thoracic aorta.        Stress Test:  Results for orders placed during the hospital encounter of 12/24/23    Stress Test With Myocardial Perfusion One Day    Interpretation Summary  Indications  Chest pain  Shortness of breath    This study was performed under the direct supervision of Radha HERNANDEZ.    Resting ECG  Sinus rhythm    The patient was injected with Lexiscan intravenously while constantly monitoring electrocardiogram and vital signs.  Patient did not have any chest discomfort ST abnormalities or ectopy with injection of Lexiscan.    Cardiolite was used as an imaging agent.    Cardiolite images showed uniform distribution of radionuclide without any evidence for myocardial ischemia.    Gated SPECT images revealed normal left ventricle size and contractility with ejection fraction of 74%.    Impression  ========  Lexiscan Cardiolite test is negative for myocardial ischemia.    Gated SPECT images revealed normal left ventricular size and contractility with ejection fraction of 74%.        Cardiac Catheterization:  No results found for this or any previous visit.        Other:      ASSESSMENT & PLAN:    Principal Problem:    Shortness of breath    Persistent atrial fibrillation  Intermittent RVR  Currently on sotalol 80 mg twice daily  Consider cardioversion after overall condition stabilizes  Uninterrupted anticoagulation on Eliquis, continue in house or if needed can change to Lovenox therapeutic dose      Dyspnea with exertion  Chronic lower extremity edema  Historically preserved LV function  No evidence of pulmonary edema on imaging.  proBNP within normal limits for age  Mild diastolic dysfunction  CT with right  lower lobe endobronchial opacification and consolidative changes in the right lower lobe    History of pulmonary embolism  Currently anticoagulated with Eliquis therapeutic dose uninterrupted    Hypertension  Currently on IV Cardene, taper off as allowed  We will add oral Cardizem to help with better rate control    Will wean IV Cardene to off this afternoon  Add oral Cardizem for better rate control  Consider cardioversion as she has had uninterrupted anticoagulation prior to discharge, if she has recurrence will need EP follow-up for consideration of ablation to maintain sinus rhythm versus changing her antiarrhythmic therapy, possibly Tikosyn  Will wait until pulmonary status stabilizes  2D echo pending at this time for review      Further recommendations to follow findings and clinical course  Kevin Angel MD, PhD    Jacqueline Banks, DEE  11/13/24  11:51 EST

## 2024-11-14 ENCOUNTER — PREP FOR SURGERY (OUTPATIENT)
Dept: OTHER | Facility: HOSPITAL | Age: 79
End: 2024-11-14
Payer: MEDICARE

## 2024-11-14 DIAGNOSIS — I48.0 PAROXYSMAL ATRIAL FIBRILLATION: Primary | ICD-10-CM

## 2024-11-14 PROBLEM — I48.19 PERSISTENT ATRIAL FIBRILLATION: Status: ACTIVE | Noted: 2024-11-14

## 2024-11-14 LAB
ANION GAP SERPL CALCULATED.3IONS-SCNC: 9.6 MMOL/L (ref 5–15)
BASOPHILS # BLD AUTO: 0.01 10*3/MM3 (ref 0–0.2)
BASOPHILS NFR BLD AUTO: 0.1 % (ref 0–1.5)
BUN SERPL-MCNC: 30 MG/DL (ref 8–23)
BUN/CREAT SERPL: 36.6 (ref 7–25)
CALCIUM SPEC-SCNC: 9.1 MG/DL (ref 8.6–10.5)
CHLORIDE SERPL-SCNC: 103 MMOL/L (ref 98–107)
CO2 SERPL-SCNC: 29.4 MMOL/L (ref 22–29)
CREAT SERPL-MCNC: 0.82 MG/DL (ref 0.57–1)
DEPRECATED RDW RBC AUTO: 50.9 FL (ref 37–54)
EGFRCR SERPLBLD CKD-EPI 2021: 73.3 ML/MIN/1.73
EOSINOPHIL # BLD AUTO: 0 10*3/MM3 (ref 0–0.4)
EOSINOPHIL NFR BLD AUTO: 0 % (ref 0.3–6.2)
ERYTHROCYTE [DISTWIDTH] IN BLOOD BY AUTOMATED COUNT: 15.6 % (ref 12.3–15.4)
GLUCOSE SERPL-MCNC: 161 MG/DL (ref 65–99)
HCT VFR BLD AUTO: 45.7 % (ref 34–46.6)
HGB BLD-MCNC: 14 G/DL (ref 12–15.9)
IMM GRANULOCYTES # BLD AUTO: 0.08 10*3/MM3 (ref 0–0.05)
IMM GRANULOCYTES NFR BLD AUTO: 0.6 % (ref 0–0.5)
LYMPHOCYTES # BLD AUTO: 0.83 10*3/MM3 (ref 0.7–3.1)
LYMPHOCYTES NFR BLD AUTO: 5.9 % (ref 19.6–45.3)
MCH RBC QN AUTO: 27.7 PG (ref 26.6–33)
MCHC RBC AUTO-ENTMCNC: 30.6 G/DL (ref 31.5–35.7)
MCV RBC AUTO: 90.3 FL (ref 79–97)
MONOCYTES # BLD AUTO: 0.21 10*3/MM3 (ref 0.1–0.9)
MONOCYTES NFR BLD AUTO: 1.5 % (ref 5–12)
NEUTROPHILS NFR BLD AUTO: 12.96 10*3/MM3 (ref 1.7–7)
NEUTROPHILS NFR BLD AUTO: 91.9 % (ref 42.7–76)
NRBC BLD AUTO-RTO: 0 /100 WBC (ref 0–0.2)
PLATELET # BLD AUTO: 235 10*3/MM3 (ref 140–450)
PMV BLD AUTO: 9.3 FL (ref 6–12)
POTASSIUM SERPL-SCNC: 4.4 MMOL/L (ref 3.5–5.2)
RBC # BLD AUTO: 5.06 10*6/MM3 (ref 3.77–5.28)
SODIUM SERPL-SCNC: 142 MMOL/L (ref 136–145)
WBC NRBC COR # BLD AUTO: 14.09 10*3/MM3 (ref 3.4–10.8)

## 2024-11-14 PROCEDURE — 94761 N-INVAS EAR/PLS OXIMETRY MLT: CPT

## 2024-11-14 PROCEDURE — 99222 1ST HOSP IP/OBS MODERATE 55: CPT | Performed by: INTERNAL MEDICINE

## 2024-11-14 PROCEDURE — 25010000002 HYDRALAZINE PER 20 MG: Performed by: INTERNAL MEDICINE

## 2024-11-14 PROCEDURE — 93010 ELECTROCARDIOGRAM REPORT: CPT | Performed by: INTERNAL MEDICINE

## 2024-11-14 PROCEDURE — 94799 UNLISTED PULMONARY SVC/PX: CPT

## 2024-11-14 PROCEDURE — 93005 ELECTROCARDIOGRAM TRACING: CPT | Performed by: INTERNAL MEDICINE

## 2024-11-14 PROCEDURE — 25010000002 METHYLPREDNISOLONE PER 40 MG

## 2024-11-14 PROCEDURE — 94664 DEMO&/EVAL PT USE INHALER: CPT

## 2024-11-14 PROCEDURE — 99233 SBSQ HOSP IP/OBS HIGH 50: CPT | Performed by: INTERNAL MEDICINE

## 2024-11-14 PROCEDURE — 25010000002 CEFTRIAXONE PER 250 MG: Performed by: NURSE PRACTITIONER

## 2024-11-14 PROCEDURE — 85025 COMPLETE CBC W/AUTO DIFF WBC: CPT

## 2024-11-14 PROCEDURE — 80048 BASIC METABOLIC PNL TOTAL CA: CPT

## 2024-11-14 RX ORDER — METHYLPREDNISOLONE SODIUM SUCCINATE 40 MG/ML
40 INJECTION, POWDER, LYOPHILIZED, FOR SOLUTION INTRAMUSCULAR; INTRAVENOUS DAILY
Status: DISCONTINUED | OUTPATIENT
Start: 2024-11-15 | End: 2024-11-14

## 2024-11-14 RX ORDER — ACETYLCYSTEINE 200 MG/ML
3 SOLUTION ORAL; RESPIRATORY (INHALATION)
Status: DISPENSED | OUTPATIENT
Start: 2024-11-14 | End: 2024-11-19

## 2024-11-14 RX ORDER — DILTIAZEM HCL 60 MG
60 TABLET ORAL EVERY 8 HOURS SCHEDULED
Status: DISCONTINUED | OUTPATIENT
Start: 2024-11-14 | End: 2024-11-15

## 2024-11-14 RX ORDER — CODEINE PHOSPHATE/GUAIFENESIN 10-100MG/5
5 LIQUID (ML) ORAL EVERY 4 HOURS PRN
Status: DISCONTINUED | OUTPATIENT
Start: 2024-11-14 | End: 2024-11-22 | Stop reason: HOSPADM

## 2024-11-14 RX ORDER — PREDNISONE 20 MG/1
40 TABLET ORAL
Status: DISCONTINUED | OUTPATIENT
Start: 2024-11-15 | End: 2024-11-20

## 2024-11-14 RX ADMIN — IPRATROPIUM BROMIDE AND ALBUTEROL SULFATE 3 ML: .5; 3 SOLUTION RESPIRATORY (INHALATION) at 07:00

## 2024-11-14 RX ADMIN — LEVOTHYROXINE SODIUM 50 MCG: 0.05 TABLET ORAL at 08:58

## 2024-11-14 RX ADMIN — APIXABAN 5 MG: 5 TABLET, FILM COATED ORAL at 08:58

## 2024-11-14 RX ADMIN — IPRATROPIUM BROMIDE AND ALBUTEROL SULFATE 3 ML: .5; 3 SOLUTION RESPIRATORY (INHALATION) at 15:31

## 2024-11-14 RX ADMIN — APIXABAN 5 MG: 5 TABLET, FILM COATED ORAL at 20:26

## 2024-11-14 RX ADMIN — GUAIFENESIN AND CODEINE PHOSPHATE 5 ML: 100; 10 SOLUTION ORAL at 20:26

## 2024-11-14 RX ADMIN — ALBUTEROL SULFATE 2.5 MG: 2.5 SOLUTION RESPIRATORY (INHALATION) at 04:51

## 2024-11-14 RX ADMIN — Medication 1 TABLET: at 08:58

## 2024-11-14 RX ADMIN — PANTOPRAZOLE SODIUM 40 MG: 40 TABLET, DELAYED RELEASE ORAL at 08:58

## 2024-11-14 RX ADMIN — SOTALOL HYDROCHLORIDE 80 MG: 80 TABLET ORAL at 08:58

## 2024-11-14 RX ADMIN — DILTIAZEM HYDROCHLORIDE 30 MG: 30 TABLET, FILM COATED ORAL at 05:28

## 2024-11-14 RX ADMIN — GUAIFENESIN AND CODEINE PHOSPHATE 5 ML: 100; 10 SOLUTION ORAL at 13:57

## 2024-11-14 RX ADMIN — SOTALOL HYDROCHLORIDE 80 MG: 80 TABLET ORAL at 20:26

## 2024-11-14 RX ADMIN — IPRATROPIUM BROMIDE AND ALBUTEROL SULFATE 3 ML: .5; 3 SOLUTION RESPIRATORY (INHALATION) at 20:30

## 2024-11-14 RX ADMIN — Medication 10 ML: at 08:58

## 2024-11-14 RX ADMIN — HYDRALAZINE HYDROCHLORIDE 10 MG: 20 INJECTION INTRAMUSCULAR; INTRAVENOUS at 05:34

## 2024-11-14 RX ADMIN — IPRATROPIUM BROMIDE AND ALBUTEROL SULFATE 3 ML: .5; 3 SOLUTION RESPIRATORY (INHALATION) at 11:09

## 2024-11-14 RX ADMIN — ACETYLCYSTEINE 3 ML: 200 SOLUTION ORAL; RESPIRATORY (INHALATION) at 20:34

## 2024-11-14 RX ADMIN — GUAIFENESIN AND DEXTROMETHORPHAN 10 ML: 100; 10 SYRUP ORAL at 04:47

## 2024-11-14 RX ADMIN — CEFTRIAXONE 2000 MG: 2 INJECTION, POWDER, FOR SOLUTION INTRAMUSCULAR; INTRAVENOUS at 13:56

## 2024-11-14 RX ADMIN — DILTIAZEM HYDROCHLORIDE 60 MG: 60 TABLET, FILM COATED ORAL at 13:57

## 2024-11-14 RX ADMIN — METHYLPREDNISOLONE SODIUM SUCCINATE 40 MG: 40 INJECTION, POWDER, FOR SOLUTION INTRAMUSCULAR; INTRAVENOUS at 05:28

## 2024-11-14 NOTE — PROGRESS NOTES
[facility] Hospitalist Team    [facility] MEDICINE SERVICE  DAILY PROGRESS NOTE    NAME: Camelia Gray  : 1945  MRN: [id number]      LOS: 1 day     PROVIDER OF SERVICE: DEE Erazo    Chief Complaint: Shortness of breath    Subjective:     Interval History:  History taken from: patient chart    Patient seen and examined at bedside.   States patient her shortness of breath is improving.  Patient is still having forced productive cough.    Review of Systems:   Review of Systems   Constitutional:  Negative for chills, diaphoresis, fatigue and fever.   HENT:  Positive for congestion.    Respiratory:  Positive for cough and shortness of breath.    Cardiovascular:  Negative for chest pain.   Gastrointestinal:  Negative for abdominal pain.   Neurological:  Negative for dizziness and headaches.    Objective:     Vital Signs  Temp:  [98 °F (36.7 °C)-98.5 °F (36.9 °C)] 98 °F (36.7 °C)  Heart Rate:  [] 85  Resp:  [16-30] 18  BP: (122-162)/() 147/97  Flow (L/min) (Oxygen Therapy):  [2-3] 2   Body mass index is 48.26 kg/m².    Physical Exam  PHYSICAL EXAM  Constitutional:  Well-developed, well-nourished, no acute distress, nontoxic appearance   Eyes:  PERRL, conjunctivae normal, EOMI   HENT:  Atraumatic, external ears normal, nose normal, oropharynx moist, no pharyngeal exudates. Neck-normal range of motion, no tenderness, supple, trachea midline  Respiratory: Diminished with expiratory wheezes, nonlabored respirations without accessory muscle use patient on 3 L nasal cannula.  Cardiovascular:  Normal rate, normal rhythm, no murmurs, no gallops, no rubs   GI:  Soft, nondistended, normal bowel sounds, nontender, no organomegaly, no mass, no rebound, no guarding.  Mild BLE edema noted.  :  No costovertebral angle tenderness   Musculoskeletal:  No edema, no tenderness, no deformities  Integument:  Well hydrated, no rash.  Edema noted to bilateral lower extremities.  Lymphatic:  No  lymphadenopathy noted   Neurologic:  Alert & oriented x 3, CN 2-12 normal, normal motor function, normal sensory function, no focal deficits noted   Psychiatric:  Speech and behavior appropriate          Diagnostic Data    Results from last 7 days   Lab Units 11/14/24  0445 11/13/24  0522 11/12/24  1321   WBC 10*3/mm3 14.09*   < > 6.89   HEMOGLOBIN g/dL 14.0   < > 14.2   HEMATOCRIT % 45.7   < > 45.3   PLATELETS 10*3/mm3 235   < > 212   GLUCOSE mg/dL 161*   < > 113*   CREATININE mg/dL 0.82   < > 0.73   BUN mg/dL 30*   < > 22   SODIUM mmol/L 142   < > 142   POTASSIUM mmol/L 4.4   < > 3.8   AST (SGOT) U/L  --   --  28   ALT (SGPT) U/L  --   --  23   ALK PHOS U/L  --   --  128*   BILIRUBIN mg/dL  --   --  0.4   ANION GAP mmol/L 9.6   < > 9.8    < > = values in this interval not displayed.       CT Chest Without Contrast Diagnostic    Result Date: 11/13/2024  Impression: 1. Right lower lobe endobronchial opacification is present, new since the prior study. 2. Consolidative change in the right lower lobe may present atelectasis or postobstructive pneumonia. 3. Mild subsegmental atelectasis in the left upper lobe. 4. Stable appearance of moderate to large esophageal hiatal hernia. 5. Stable appearance of pulmonary artery dilation. Correlate for pulmonary hypertension. 6. Stable appearance of the left adrenal adenoma. 7. Nonobstructing left renal stone. Electronically Signed: Marie Toledo MD  11/13/2024 11:43 AM EST  Workstation ID: [id number]       I reviewed the patient's new clinical results.    Assessment/Plan:     Active and Resolved Problems  Active Hospital Problems    Diagnosis  POA    **Shortness of breath [R06.02]  Yes      Resolved Hospital Problems   No resolved problems to display.       Shortness of breath  RVP negative  In ED patient received IV steroids and DuoNeb  Patient on room air at this time  DuoNebs q6h and Albuterol prn  IV steroids every 8 hours-patient will need to be transition to oral prior to  discharge  Continue ceftriaxone  Increased Cardizem  As needed oxygen supplementation  CT chest noted right lower lobe endobronchial opacification, consolidative change in the right lower lobe may present atelectasis or postobstructive pneumonia.  Mild segmental atelectasis of left lung.  Stable appearance of moderate to large esophageal hiatal hernia.  Stable appearance of pulmonary artery dilation.  Echo echo showed EF of 56 to 60%  Cardiology following  Pulmonology following     Hypertension   BP stable elevated most recent blood pressure 145/89  PRN Hydralazine  Continue usual home medication     Atrial fibrillation  Patient has a history of A-fib, on Eliquis at home  EKG showing A-fib with rate of 105  Continue home medicines including sotalol and Eliquis  Cardiology recommends EP to evaluate this patient.     Hypothyroidism  Continue Levothyroxine     GERD  Continue PPI    VTE Prophylaxis:  Pharmacologic & mechanical VTE prophylaxis orders are present.       Patient seen and evaluated at bedside.  WBC elevated to 14 today.  Likely due to steroid use.  Will continue to monitor.   Due to patient's habitus nature, obtained a CT scan.  Which showed possible pneumonia.  Will place patient on broad-spectrum antibiotics.  Patient is still requiring oxygen supplementation.  Possibly will need this at discharge.  Walk test will be ordered prior to discharge    Disposition Planning:     Barriers to Discharge: General requirement, diagnostic testing  Anticipated Date of Discharge: 11/16/2024  Place of Discharge: Home      Time: 35 minutes     Code Status and Medical Interventions: CPR (Attempt to Resuscitate); Full Support   Ordered at: 11/12/24 7875     Code Status (Patient has no pulse and is not breathing):    CPR (Attempt to Resuscitate)     Medical Interventions (Patient has pulse or is breathing):    Full Support       Signature: Electronically signed by DEE Erazo, 11/14/24, 13:00 EST.  [facility]udd[facility] Chris  Hospitalist Team

## 2024-11-14 NOTE — PLAN OF CARE
Goal Outcome Evaluation:         Patient rested well during the night.  C/o shortness of air at times.  Productive cough.  BP high this morning.  PRN hydralazine given.  IV solu-medrol.  2L O2 and sats have maintained around 92%.

## 2024-11-14 NOTE — CONSULTS
Group: [facility]         CONSULT NOTE    Patient Identification:  Camelia Gray  79 y.o.  female  1945  [id number]            Requesting physician: Attending physician    Reason for Consultation: Shortness of breath and pneumonia      History of Present Illness:  79-year-old female with history of large hiatal hernia, atrial fibrillation, hypothyroidism, hypertension, lymphedema and immobility syndrome who presented 11/12/2024 with complaints of 4-5 days of progressive shortness of breath, cough, nasal congestion.    Assessment:  Shortness of breath  Multifocal pneumonia   Multiple mucous plugs    possible aspiration due to large hiatal hernia  Localized right upper lobe wheezing, CT chest showed right upper lobe endobronchial opacity     Bronchoscopy 12/27/2023  Copious amount mucopurulent secretions suctioned therapeutically  Right lung washing was obtained  Moderate inflammatory changes     Hypoxia currently on 4 L     Pulmonary edema  Respiratory viral panel negative on 12/24/2023  History of pulmonary embolism 2018  Quit smoking 1992      Recommendations:  Start Mucomyst nebulized to assist with the airway clearance, if no improvement in couple days consider bronchoscopy    Antibiotics: Rocephin  Wean IV steroids  Mucinex  Encourage use of incentive spirometer    HR control: Diltiazem, sotalol    Oxygen supplement and titration to maintain saturation 90 to 95%: on 2 L  Bronchodilators    Chronic anticoagulation: Eliquis  Thyroid hormone replacement    I personally reviewed the radiological studies      Review of Sytems:  Constitutional: Negative for chills, and fever and positive for malaise/fatigue.   HENT: Negative.    Eyes: Negative.    Cardiovascular: Negative.    Respiratory: Positive for cough and shortness of breath.    Skin: Negative.    Musculoskeletal: Chronic lymphedema  Gastrointestinal: Negative.    Genitourinary: Negative.    Neurological: Generalized weakness  Past Medical  History:  Past Medical History:   Diagnosis Date    A-fib 12/2023    Arthritis     Cellulitis of both feet 10/13/2019    Decubitus ulcer of buttock, stage 2 10/14/2019    HEALED    Dermatitis associated with moisture 10/14/2019    Disease of thyroid gland     HYPOTHYROID    Edema of lower extremity 05/31/2017    Hypertension     Hypothyroidism     Immobility syndrome 10/13/2019    Lymphedema 10/14/2019    LEGS    Lymphedema     Muscle weakness (generalized)     Non-pressure chronic ulcer of other part of left foot with fat layer exposed 10/14/2019    Non-pressure chronic ulcer of other part of right foot with fat layer exposed 10/14/2019    Obesity     Pulmonary embolism     Stasis dermatitis 07/08/2013    Ureteral calculi     Urinary tract infection 07/2020    Hosp. 7/2020    Venous stasis 10/14/2019    Yeast infection of the skin        Past Surgical History:  Past Surgical History:   Procedure Laterality Date    BRONCHOSCOPY N/A 12/27/2023    Procedure: BRONCHOSCOPY with right lung washing;  Surgeon: Mayra Ramachandran MD;  Location: [facility] ENDOSCOPY;  Service: Pulmonary;  Laterality: N/A;  Post- Pneumonia    CATARACT EXTRACTION Bilateral     CHOLECYSTECTOMY N/A 11/11/2020    Procedure: CHOLECYSTECTOMY LAPAROSCOPIC;  Surgeon: George Crocker DO;  Location: [facility] MAIN OR;  Service: General;  Laterality: N/A;    CORRECTION HAMMER TOE Bilateral     CYSTOSCOPY, URETEROSCOPY, RETROGRADE PYELOGRAM, STENT INSERTION Bilateral 08/18/2020    Procedure: CYSTOSCOPY BILATERAL RETROGRADE PYELOGRAM,;  Surgeon: Shawn Hernandez MD;  Location: [facility] MAIN OR;  Service: Urology;  Laterality: Bilateral;    DENTAL EXAMINATION UNDER ANESTHESIA      ENDOSCOPY N/A 09/13/2020    Procedure: ESOPHAGOGASTRODUODENOSCOPY;  Surgeon: Torsten Johnson MD;  Location: [facility] ENDOSCOPY;  Service: Gastroenterology;  Laterality: N/A;  post: esophageal stricture, reflux esophagitis, hiatal hernia    ENDOSCOPY N/A 02/01/2024    Procedure:  ESOPHAGOGASTRODUODENOSCOPY, esophageal dilation (40-46 Fr non-guided Bougie);  Surgeon: Torsten Johnson MD;  Location: [facility] ENDOSCOPY;  Service: Gastroenterology;  Laterality: N/A;  post: hiatal hernia, esophageal stricture    JOINT REPLACEMENT Bilateral     Bilateral knees    TOE SURGERY      TONSILLECTOMY      TOTAL HIP ARTHROPLASTY Left 2020    Procedure: TOTAL HIP ARTHROPLASTY;  Surgeon: Baljit Hutchins II, MD;  Location: [facility] MAIN OR;  Service: Orthopedics;  Laterality: Left;        Home Meds:  Medications Prior to Admission   Medication Sig Dispense Refill Last Dose/Taking    apixaban (ELIQUIS) 5 MG tablet tablet Take 1 tablet by mouth 2 (Two) Times a Day. 60 tablet 11 2024    B Complex Vitamins (vitamin b complex) capsule capsule Take 1 capsule by mouth Daily. Indications: Vitamin Deficiency   2024    levothyroxine (SYNTHROID, LEVOTHROID) 50 MCG tablet Take 1 tablet by mouth Daily. Indications: Underactive Thyroid 90 tablet 1 2024    Multiple Vitamins-Minerals (multivitamin with minerals) tablet tablet Take 1 tablet by mouth Daily. Indications: Vitamin Deficiency   2024    pantoprazole (PROTONIX) 40 MG EC tablet TAKE ONE TABLET BY MOUTH DAILY 90 tablet 0 2024    sotalol (BETAPACE) 80 MG tablet Take 1 tablet by mouth 2 (Two) Times a Day. 60 tablet 5 2024       Allergies:  Allergies   Allergen Reactions    Maxzide [Hydrochlorothiazide W-Triamterene] Arrhythmia       Social History:   Social History     Socioeconomic History    Marital status: Single   Tobacco Use    Smoking status: Former     Current packs/day: 0.00     Average packs/day: 0.3 packs/day for 28.2 years (7.1 ttl pk-yrs)     Types: Cigarettes     Start date: 10/1/1964     Quit date: 1992     Years since quittin.9     Passive exposure: Past    Smokeless tobacco: Never   Vaping Use    Vaping status: Never Used   Substance and Sexual Activity    Alcohol use: No    Drug use:  "No    Sexual activity: Never       Family History:  Family History   Problem Relation Age of Onset    Heart disease Mother     Hypertension Mother     Heart disease Father     Hypertension Father     Kidney disease Father     COPD Father        Physical Exam:  /88 (BP Location: Right arm, Patient Position: Lying)   Pulse 77   Temp 98.2 °F (36.8 °C) (Oral)   Resp 18   Ht 180.3 cm (71\")   Wt (!) 157 kg (346 lb)   SpO2 95%   BMI 48.26 kg/m²  Body mass index is 48.26 kg/m². 95% (!) 157 kg (346 lb)  General Appearance:  Alert   HEENT:  Normocephalic, without obvious abnormality, Conjunctiva/corneas clear,.   Nares normal, no drainage     Neck:  Supple, symmetrical, trachea midline. No JVD.  Lungs /Chest wall:   Rate controlled, respirations unlabored, symmetrical wall movement.     Heart:  Regular rate and rhythm, S1 S2 normal  Abdomen: Soft, non-tender, no masses, no organomegaly.    Extremities: + Edema, no clubbing or cyanosis    LABS:  Lab Results   Component Value Date    CALCIUM 9.1 11/14/2024    PHOS 3.5 10/13/2019     Results from last 7 days   Lab Units 11/14/24  0445 11/13/24  0522 11/12/24  1321   SODIUM mmol/L 142 142 142   POTASSIUM mmol/L 4.4 4.2 3.8   CHLORIDE mmol/L 103 105 104   CO2 mmol/L 29.4* 26.1 28.2   BUN mg/dL 30* 18 22   CREATININE mg/dL 0.82 0.75 0.73   GLUCOSE mg/dL 161* 165* 113*   CALCIUM mg/dL 9.1 9.4 9.8   WBC 10*3/mm3 14.09* 7.43 6.89   HEMOGLOBIN g/dL 14.0 14.8 14.2   PLATELETS 10*3/mm3 235 224 212   ALT (SGPT) U/L  --   --  23   AST (SGOT) U/L  --   --  28   PROBNP pg/mL  --   --  1,757.0     Lab Results   Component Value Date    CKTOTAL 35 (L) 10/13/2019    TROPONINI <0.030 10/13/2019    TROPONINT 15 (H) 11/12/2024     Results from last 7 days   Lab Units 11/12/24  1427 11/12/24  1321   HSTROP T ng/L 15* 15*     Results from last 7 days   Lab Units 11/12/24  1321 11/12/24  1236   BLOODCX  No growth at 24 hours No growth at 24 hours     Results from last 7 days   Lab Units " 11/13/24  2206 11/13/24  1534 11/12/24  1327   LACTATE mmol/L 1.9 2.8* 0.9         Results from last 7 days   Lab Units 11/12/24  1406   ADENOVIRUS DETECTION BY PCR  Not Detected   CORONAVIRUS 229E  Not Detected   CORONAVIRUS HKU1  Not Detected   CORONAVIRUS NL63  Not Detected   CORONAVIRUS OC43  Not Detected   HUMAN METAPNEUMOVIRUS  Not Detected   HUMAN RHINOVIRUS/ENTEROVIRUS  Not Detected   INFLUENZA B PCR  Not Detected   PARAINFLUENZA 1  Not Detected   PARAINFLUENZA VIRUS 2  Not Detected   PARAINFLUENZA VIRUS 3  Not Detected   PARAINFLUENZA VIRUS 4  Not Detected   BORDETELLA PERTUSSIS PCR  Not Detected   CHLAMYDOPHILA PNEUMONIAE PCR  Not Detected   MYCOPLAMA PNEUMO PCR  Not Detected   INFLUENZA A PCR  Not Detected   RSV, PCR  Not Detected         Results from last 7 days   Lab Units 11/12/24  1321 11/12/24  1236   BLOODCX  No growth at 24 hours No growth at 24 hours     Lab Results   Component Value Date    TSH 12.400 (H) 07/09/2024     Estimated Creatinine Clearance: 93.7 mL/min (by C-G formula based on SCr of 0.82 mg/dL).         Imaging:  Imaging Results (Last 24 Hours)       ** No results found for the last 24 hours. **              Current Meds:   SCHEDULE  apixaban, 5 mg, Oral, BID  cefTRIAXone, 2,000 mg, Intravenous, Q24H  dilTIAZem, 60 mg, Oral, Q8H  ipratropium-albuterol, 3 mL, Nebulization, 4x Daily - RT  levothyroxine, 50 mcg, Oral, Daily  methylPREDNISolone sodium succinate, 40 mg, Intravenous, Q8H  multivitamin with minerals, 1 tablet, Oral, Daily  pantoprazole, 40 mg, Oral, Daily  sotalol, 80 mg, Oral, BID      Infusions     PRNs    albuterol    senna-docusate sodium **AND** polyethylene glycol **AND** bisacodyl **AND** bisacodyl    Calcium Replacement - Follow Nurse / BPA Driven Protocol    guaiFENesin-codeine    hydrALAZINE    Magnesium Standard Dose Replacement - Follow Nurse / BPA Driven Protocol    Phosphorus Replacement - Follow Nurse / BPA Driven Protocol    Potassium Replacement - Follow  Nurse / BPA Driven Protocol    sodium chloride        Reji Burch MD  11/14/2024  11:47 EST      Much of this encounter note is an electronic transcription/translation of spoken language to printed text using Dragon Software.

## 2024-11-14 NOTE — SIGNIFICANT NOTE
11/14/24 1331   OTHER   Discipline physical therapist   Rehab Time/Intention   Session Not Performed patient/family declined treatment  (Pt requests f/u after lunch. Encouraged to get OOB to chair for meal but pt refuses. PT will f/u as time allows.)   Therapy Assessment/Plan (PT)   Criteria for Skilled Interventions Met (PT) yes   Recommendation   PT - Next Appointment 11/15/24

## 2024-11-14 NOTE — CONSULTS
HP      Name: Camelia Gray ADMIT: 2024   : 1945  PCP: Brinda Tony APRN    MRN: [id number] LOS: 1 days   AGE/SEX: 79 y.o. female  ROOM: 253/A     Chief Complaint   Patient presents with    Shortness of Breath     SOA, feels like something in lungs, Pt is coughing with congestion,  NO pain, no swelling.         Subjective        History of present illness  Camelia Gray is a 79-year-old female patient who has hypertension, hypothyroidism, no documented CAD.  Patient was diagnosed with atrial fibrillation in 2023 and JAHAIRA guided cardioversion was done on 2024.  Patient felt better after cardioversion however in 2024 A-fib recurred and since then she was placed on sotalol.  Unfortunately however she did not self convert to sinus rhythm and she remains in A-fib.  Patient is admitted to the hospital due to shortness of breath and pneumonia.  EP was consulted for rhythm management options.    Past Medical History:   Diagnosis Date    A-fib 2023    Arthritis     Cellulitis of both feet 10/13/2019    Decubitus ulcer of buttock, stage 2 10/14/2019    HEALED    Dermatitis associated with moisture 10/14/2019    Disease of thyroid gland     HYPOTHYROID    Edema of lower extremity 2017    Hypertension     Hypothyroidism     Immobility syndrome 10/13/2019    Lymphedema 10/14/2019    LEGS    Lymphedema     Muscle weakness (generalized)     Non-pressure chronic ulcer of other part of left foot with fat layer exposed 10/14/2019    Non-pressure chronic ulcer of other part of right foot with fat layer exposed 10/14/2019    Obesity     Pulmonary embolism     Stasis dermatitis 2013    Ureteral calculi     Urinary tract infection 2020    Hosp. 2020    Venous stasis 10/14/2019    Yeast infection of the skin      Past Surgical History:   Procedure Laterality Date    BRONCHOSCOPY N/A 2023    Procedure: BRONCHOSCOPY with right lung washing;  Surgeon: Mayra Ramachandran MD;  Location:   [facility] ENDOSCOPY;  Service: Pulmonary;  Laterality: N/A;  Post- Pneumonia    CATARACT EXTRACTION Bilateral     CHOLECYSTECTOMY N/A 2020    Procedure: CHOLECYSTECTOMY LAPAROSCOPIC;  Surgeon: George Crocker DO;  Location: [facility] MAIN OR;  Service: General;  Laterality: N/A;    CORRECTION HAMMER TOE Bilateral     CYSTOSCOPY, URETEROSCOPY, RETROGRADE PYELOGRAM, STENT INSERTION Bilateral 2020    Procedure: CYSTOSCOPY BILATERAL RETROGRADE PYELOGRAM,;  Surgeon: Shawn Hernandez MD;  Location: [facility] MAIN OR;  Service: Urology;  Laterality: Bilateral;    DENTAL EXAMINATION UNDER ANESTHESIA      ENDOSCOPY N/A 2020    Procedure: ESOPHAGOGASTRODUODENOSCOPY;  Surgeon: Torsten Johnson MD;  Location: [facility] ENDOSCOPY;  Service: Gastroenterology;  Laterality: N/A;  post: esophageal stricture, reflux esophagitis, hiatal hernia    ENDOSCOPY N/A 2024    Procedure: ESOPHAGOGASTRODUODENOSCOPY, esophageal dilation (40-46 Fr non-guided Bougie);  Surgeon: Torsten Johnson MD;  Location: [facility] ENDOSCOPY;  Service: Gastroenterology;  Laterality: N/A;  post: hiatal hernia, esophageal stricture    JOINT REPLACEMENT Bilateral     Bilateral knees    TOE SURGERY      TONSILLECTOMY      TOTAL HIP ARTHROPLASTY Left 2020    Procedure: TOTAL HIP ARTHROPLASTY;  Surgeon: Baljit Hutchins II, MD;  Location: [facility] MAIN OR;  Service: Orthopedics;  Laterality: Left;     Family History   Problem Relation Age of Onset    Heart disease Mother     Hypertension Mother     Heart disease Father     Hypertension Father     Kidney disease Father     COPD Father      Social History     Tobacco Use    Smoking status: Former     Current packs/day: 0.00     Average packs/day: 0.3 packs/day for 28.2 years (7.1 ttl pk-yrs)     Types: Cigarettes     Start date: 10/1/1964     Quit date: 1992     Years since quittin.9     Passive exposure: Past    Smokeless tobacco: Never   Vaping Use    Vaping status:  Never Used   Substance Use Topics    Alcohol use: No    Drug use: No     Medications Prior to Admission   Medication Sig Dispense Refill Last Dose/Taking    apixaban (ELIQUIS) 5 MG tablet tablet Take 1 tablet by mouth 2 (Two) Times a Day. 60 tablet 11 11/12/2024    B Complex Vitamins (vitamin b complex) capsule capsule Take 1 capsule by mouth Daily. Indications: Vitamin Deficiency   11/12/2024    levothyroxine (SYNTHROID, LEVOTHROID) 50 MCG tablet Take 1 tablet by mouth Daily. Indications: Underactive Thyroid 90 tablet 1 11/12/2024    Multiple Vitamins-Minerals (multivitamin with minerals) tablet tablet Take 1 tablet by mouth Daily. Indications: Vitamin Deficiency   11/12/2024    pantoprazole (PROTONIX) 40 MG EC tablet TAKE ONE TABLET BY MOUTH DAILY 90 tablet 0 11/12/2024    sotalol (BETAPACE) 80 MG tablet Take 1 tablet by mouth 2 (Two) Times a Day. 60 tablet 5 11/12/2024     Allergies:  Maxzide [hydrochlorothiazide w-triamterene]    Review of systems    Constitutional: Negative.    Respiratory and cardiovascular: As detailed in HPI section.  Gastrointestinal: Negative for constipation, nausea and vomiting negative for abdominal distention, abdominal pain and diarrhea.   Genitourinary: Negative for difficulty urinating and flank pain.   Musculoskeletal: Negative for arthralgias, joint swelling and myalgias.   Skin: Negative for color change, rash and wound.   Neurological: Negative for dizziness, syncope, weakness and headaches.   Hematological: Negative for adenopathy.   Psychiatric/Behavioral: Negative for confusion.   All other systems reviewed and are negative.       Physical Exam  VITALS REVIEWED    General:      well developed, in no acute distress.    Head:      normocephalic and atraumatic.    Eyes:      PERRL/EOM intact, conjunctiva and sclera clear with out nystagmus.    Neck:      no masses, thyromegaly,  trachea central with normal respiratory effort and PMI displaced laterally  Lungs:      Clear to  auscultation bilaterally  Heart:       Irregular rhythm  Msk:      no deformity or scoliosis noted of thoracic or lumbar spine.    Pulses:      pulses normal in all 4 extremities.    Extremities:       No lower extremity edema  Neurologic:      no focal deficits.   alert oriented x3  Skin:      intact without lesions or rashes.    Psych:      alert and cooperative; normal mood and affect; normal attention span and concentration.      Result Review :               Pertinent cardiac workup    EKG 12/27/2023 atrial fibrillation rate 143 bpm  EKG 11/12/2024 atrial fibrillation.  Echo 11/13/2024 ejection fraction 55 to 60%.      Assessment and Plan         Shortness of breath    Persistent atrial fibrillation      Camelia Gray is a 79-year-old female patient who was first diagnosed with A-fib in December 2023, she had cardioversion and she remained in sinus rhythm Optil June 2024.  A-fib recurred and she was placed on sotalol 80 mg every 12 hours.  Patient was also chronically anticoagulated with Eliquis since 2018.  Patient however remained in A-fib despite adding sotalol.  She is now admitted to the hospital for shortness of breath and pneumonia.  Since her A-fib is not chronic, which should direct our efforts towards rhythm restoration.  I think it is best to go ahead and perform cardioversion while she is here, hopefully with her being on sotalol this will maintain her in normal rhythm.  We can also go ahead and schedule her for A-fib ablation to avoid recurrences.  Risks indications and benefits for both cardioversion and A-fib ablation were discussed at length with her and patient is agreeable.  Plan for cardioversion tomorrow around 10:30 am.        No follow-ups on file.  Patient was given instructions and counseling regarding her condition or for health maintenance advice. Please see specific information pulled into the AVS if appropriate.        Electronically signed by Bertin Stone MD, 11/14/24, 5:09 PM  EST.

## 2024-11-14 NOTE — PLAN OF CARE
Problem: Adult Inpatient Plan of Care  Goal: Plan of Care Review  Outcome: Progressing  Goal: Patient-Specific Goal (Individualized)  Outcome: Progressing  Goal: Absence of Hospital-Acquired Illness or Injury  Outcome: Progressing  Intervention: Identify and Manage Fall Risk  Recent Flowsheet Documentation  Taken 11/14/2024 1032 by Katherine Espino LPN  Safety Promotion/Fall Prevention:   activity supervised   clutter free environment maintained   assistive device/personal items within reach   lighting adjusted   room organization consistent   safety round/check completed  Taken 11/14/2024 0858 by Katherine Espino LPN  Safety Promotion/Fall Prevention:   safety round/check completed   room organization consistent   assistive device/personal items within reach   activity supervised   clutter free environment maintained   lighting adjusted  Intervention: Prevent Skin Injury  Recent Flowsheet Documentation  Taken 11/14/2024 0858 by Katherine Espino LPN  Skin Protection: incontinence pads utilized  Intervention: Prevent and Manage VTE (Venous Thromboembolism) Risk  Recent Flowsheet Documentation  Taken 11/14/2024 0858 by Katherine Espino LPN  VTE Prevention/Management:   bilateral   SCDs (sequential compression devices) off   patient refused intervention  Intervention: Prevent Infection  Recent Flowsheet Documentation  Taken 11/14/2024 1032 by Katherine Espino LPN  Infection Prevention:   cohorting utilized   environmental surveillance performed   hand hygiene promoted   personal protective equipment utilized   rest/sleep promoted   single patient room provided   visitors restricted/screened  Taken 11/14/2024 0858 by Katherine Espino LPN  Infection Prevention:   cohorting utilized   environmental surveillance performed   hand hygiene promoted   personal protective equipment utilized   rest/sleep promoted   single patient room provided   visitors restricted/screened  Goal: Optimal Comfort and Wellbeing  Outcome:  Progressing  Intervention: Monitor Pain and Promote Comfort  Recent Flowsheet Documentation  Taken 11/14/2024 0858 by Katherine Espino LPN  Pain Management Interventions:   care clustered   diversional activity provided   position adjusted   pillow support provided  Intervention: Provide Person-Centered Care  Recent Flowsheet Documentation  Taken 11/14/2024 0858 by Katherine Espino LPN  Trust Relationship/Rapport:   care explained   choices provided   thoughts/feelings acknowledged  Goal: Readiness for Transition of Care  Outcome: Progressing   Goal Outcome Evaluation:         Patient continues to run in A-Fib this shift. Patient is on speciality bed with no complaints at this time. Patient has no c/o pain. O2 in place per nasal cannula. Patient has nonproductive cough noted. Refuses turns but moves self around in bed. Call light within reach.

## 2024-11-14 NOTE — CONSULTS
Referring Provider: Clemente Bowens MD    Reason for Consultation:      Atrial fibrillation with RVR      Patient Care Team:  Brinda Tony APRN as PCP - General (Nurse Practitioner)  Kevin Angel MD as Consulting Physician (Cardiology)    Seen and examined agree with narrative as discussed with nurse practitioner after face-to-face encounter with scribed findings below, greater than 50% of total encounter time in medical decision making performed by me    SUBJECTIVE     Chief Complaint: Shortness of breath    History of present illness:  Camelia Gray is a 79 y.o. female with a history of paroxysmal atrial fibrillation who presented to [facility] with complaint of shortness of breath.     Patient presented to the emergency room at [facility] on November 12, 2024 with complaints of shortness of breath.  Evaluation in the emergency room shows atrial fibrillation with rate of 105.  She was given IV steroids and DuoNebs.High-sensitivity troponin was 15, 15.  proBNP 1757.  BUN and creatinine 18 and 0.7 blood culture pending respiratory virus panel is negative CT of her chest showed right lower lobe endobronchial opacification.  Consolidation changes in the right lower lobe.  Large hiatal hernia.  Echocardiogram was ordered and is pending.    Patient is a 79-year-old female who is previously been followed in our office.  She has a history of previous PE and is anticoagulated with Eliquis.  Earlier this year she was hospitalized and found to be in atrial fibrillation with RVR which was new for the patient.  She was started on amiodarone and beta-blocker.  Eliquis dose was increased to therapeutic range for A-fib.    She is followed up in the office with our providers.In February 2024 the patient underwent JAHAIRA and cardioversion.  Successfully converting atrial fibrillation to sinus rhythm.  JAHAIRA showed normal LV and RV size and function.  Biatrial dilatation.  There was aortic valve  sclerosis but no stenosis.    Patient was last seen in our office in September 2024.  She was found to be in atrial fibrillation at that time.  She was started on sotalol.    She reports over the last week she has had an upper respiratory infection and had progressive shortness of breath.  She reports compliance with Eliquis and has not missed any doses    EKG on admission this time demonstrates rate controlled atrial fibrillation in the 80s with no ischemic findings, QT interval of 4 60-4 80 acceptable on sotalol with narrow complex rhythm    Historical data copied forward from previous encounters in EMR is unchanged    =====================================================================    Seen, still short of breath, being treated for pneumonia agree  Rate controlled atrial fibrillation on sotalol and diltiazem  Hydralazine given this morning for blood pressure  Increase diltiazem gently 60 every 8 hours, if becomes bradycardic will need to dial back on this and start low-dose losartan or scheduled hydralazine twice daily    As discussed with patient we will try cardioversion prior to discharge or shortly after discharge as an outpatient, will ask EP to see the patient prior to discharge as well for follow-up of refractory atrial fibrillation    Most recent stress evaluation December 2023 less than 12 months ago, negative for myocardial ischemia with EF of 74%, normal stress and rest perfusion, no ECG changes      Most recent 2D echo February 2024, bubble study was negative, normal LV and RV size and function, biatrial enlargement, aortic valve sclerosis without stenosis was seen, grade 1 plaque in the aorta, mild mitral and tricuspid valve regurgitation      Review of systems:    Constitutional: C/o weakness, fatigue, denies fever, rigors, chills   Eyes: No vision changes, eye pain   ENT/oropharynx: No difficulty swallowing, sore throat, epistaxis, changes in hearing   Cardiovascular: No chest pain, chest  tightness, palpitations, paroxysmal nocturnal dyspnea, orthopnea, diaphoresis, dizziness / syncopal episode   Respiratory: C/o shortness of breath, dyspnea on exertion, denies cough, wheezing, hemoptysis   Gastrointestinal: No abdominal pain, nausea, vomiting, diarrhea, bloody stools   Genitourinary: No hematuria, dysuria   Neurological: No headache, tremors, numbness, one-sided weakness    Musculoskeletal: No cramps, myalgias, joint pain, joint swelling   Integument: No rash, edema        Personal History:      Past Medical History:   Diagnosis Date    A-fib 12/2023    Arthritis     Cellulitis of both feet 10/13/2019    Decubitus ulcer of buttock, stage 2 10/14/2019    HEALED    Dermatitis associated with moisture 10/14/2019    Disease of thyroid gland     HYPOTHYROID    Edema of lower extremity 05/31/2017    Hypertension     Hypothyroidism     Immobility syndrome 10/13/2019    Lymphedema 10/14/2019    LEGS    Lymphedema     Muscle weakness (generalized)     Non-pressure chronic ulcer of other part of left foot with fat layer exposed 10/14/2019    Non-pressure chronic ulcer of other part of right foot with fat layer exposed 10/14/2019    Obesity     Pulmonary embolism     Stasis dermatitis 07/08/2013    Ureteral calculi     Urinary tract infection 07/2020    Hosp. 7/2020    Venous stasis 10/14/2019    Yeast infection of the skin        Past Surgical History:   Procedure Laterality Date    BRONCHOSCOPY N/A 12/27/2023    Procedure: BRONCHOSCOPY with right lung washing;  Surgeon: Mayra Ramachandran MD;  Location: [facility] ENDOSCOPY;  Service: Pulmonary;  Laterality: N/A;  Post- Pneumonia    CATARACT EXTRACTION Bilateral     CHOLECYSTECTOMY N/A 11/11/2020    Procedure: CHOLECYSTECTOMY LAPAROSCOPIC;  Surgeon: George Crocker DO;  Location: [facility] MAIN OR;  Service: General;  Laterality: N/A;    CORRECTION HAMMER TOE Bilateral     CYSTOSCOPY, URETEROSCOPY, RETROGRADE PYELOGRAM, STENT INSERTION Bilateral 08/18/2020    Procedure:  CYSTOSCOPY BILATERAL RETROGRADE PYELOGRAM,;  Surgeon: Shawn Hernandez MD;  Location: [facility] MAIN OR;  Service: Urology;  Laterality: Bilateral;    DENTAL EXAMINATION UNDER ANESTHESIA      ENDOSCOPY N/A 2020    Procedure: ESOPHAGOGASTRODUODENOSCOPY;  Surgeon: Torsten Johnson MD;  Location: [facility] ENDOSCOPY;  Service: Gastroenterology;  Laterality: N/A;  post: esophageal stricture, reflux esophagitis, hiatal hernia    ENDOSCOPY N/A 2024    Procedure: ESOPHAGOGASTRODUODENOSCOPY, esophageal dilation (40-46 Fr non-guided Bougie);  Surgeon: Torsten Johnson MD;  Location: [facility] ENDOSCOPY;  Service: Gastroenterology;  Laterality: N/A;  post: hiatal hernia, esophageal stricture    JOINT REPLACEMENT Bilateral     Bilateral knees    TOE SURGERY      TONSILLECTOMY      TOTAL HIP ARTHROPLASTY Left 2020    Procedure: TOTAL HIP ARTHROPLASTY;  Surgeon: Baljit Hutchins II, MD;  Location: [facility] MAIN OR;  Service: Orthopedics;  Laterality: Left;       Family History   Problem Relation Age of Onset    Heart disease Mother     Hypertension Mother     Heart disease Father     Hypertension Father     Kidney disease Father     COPD Father        Social History     Tobacco Use    Smoking status: Former     Current packs/day: 0.00     Average packs/day: 0.3 packs/day for 28.2 years (7.1 ttl pk-yrs)     Types: Cigarettes     Start date: 10/1/1964     Quit date: 1992     Years since quittin.9     Passive exposure: Past    Smokeless tobacco: Never   Vaping Use    Vaping status: Never Used   Substance Use Topics    Alcohol use: No    Drug use: No        Home meds:  Prior to Admission medications    Medication Sig Start Date End Date Taking? Authorizing Provider   apixaban (ELIQUIS) 5 MG tablet tablet Take 1 tablet by mouth 2 (Two) Times a Day. 10/29/24  Yes Kevin Angel MD   B Complex Vitamins (vitamin b complex) capsule capsule Take 1 capsule by mouth Daily. Indications:  "Vitamin Deficiency 1/11/24  Yes Provider, MD Karli   levothyroxine (SYNTHROID, LEVOTHROID) 50 MCG tablet Take 1 tablet by mouth Daily. Indications: Underactive Thyroid 7/11/24  Yes Brinda Tony APRN   Multiple Vitamins-Minerals (multivitamin with minerals) tablet tablet Take 1 tablet by mouth Daily. Indications: Vitamin Deficiency 1/11/24  Yes ProviderKarli MD   pantoprazole (PROTONIX) 40 MG EC tablet TAKE ONE TABLET BY MOUTH DAILY 9/27/24  Yes Brinda Tony APRN   sotalol (BETAPACE) 80 MG tablet Take 1 tablet by mouth 2 (Two) Times a Day. 9/10/24  Yes Kevin Angel MD       Allergies:     Maxzide [hydrochlorothiazide w-triamterene]    Scheduled Meds:apixaban, 5 mg, Oral, BID  cefTRIAXone, 2,000 mg, Intravenous, Q24H  dilTIAZem, 60 mg, Oral, Q8H  ipratropium-albuterol, 3 mL, Nebulization, 4x Daily - RT  levothyroxine, 50 mcg, Oral, Daily  methylPREDNISolone sodium succinate, 40 mg, Intravenous, Q8H  multivitamin with minerals, 1 tablet, Oral, Daily  pantoprazole, 40 mg, Oral, Daily  sotalol, 80 mg, Oral, BID      Continuous Infusions:     PRN Meds:  albuterol    senna-docusate sodium **AND** polyethylene glycol **AND** bisacodyl **AND** bisacodyl    Calcium Replacement - Follow Nurse / BPA Driven Protocol    guaiFENesin-dextromethorphan    hydrALAZINE    Magnesium Standard Dose Replacement - Follow Nurse / BPA Driven Protocol    Phosphorus Replacement - Follow Nurse / BPA Driven Protocol    Potassium Replacement - Follow Nurse / BPA Driven Protocol    sodium chloride      OBJECTIVE    Vital Signs  Vitals:    11/14/24 0457 11/14/24 0530 11/14/24 0700 11/14/24 0705   BP:  (!) 162/113     BP Location:  Right arm     Patient Position:  Lying     Pulse: 108 87 90 84   Resp: 16 16 18 18   Temp:  98 °F (36.7 °C)     TempSrc:  Oral     SpO2: 96% 90% 91% 96%   Weight:       Height:           Flowsheet Rows      Flowsheet Row First Filed Value   Admission Height 180.3 cm (71\") Documented at " 11/12/2024 1159   Admission Weight 150 kg (330 lb) Documented at 11/12/2024 1159              Intake/Output Summary (Last 24 hours) at 11/14/2024 0719  Last data filed at 11/14/2024 0530  Gross per 24 hour   Intake --   Output 1400 ml   Net -1400 ml        Telemetry:  af    Physical Exam:  The patient is alert, oriented and in no distress.  Vital signs as noted above.  Head and neck revealed no carotid bruits or jugular venous distention.   Lungs diminished in the bases no wheezing.  Breath sounds are normal bilaterally.  Heart: Irregularly irregular rhythm normal first and second heart sounds. No murmur.    Abdomen: Soft and nontender.  Rates are controlled  Extremities with good peripheral pulses bilateral lower extremities wrapped   skin: Warm and dry.  Musculoskeletal system is grossly normal.  CNS grossly normal.       Results Review:  I have personally reviewed the results from the time of this admission to 11/14/2024 07:19 EST and agree with these findings:  []  Laboratory  []  Microbiology  []  Radiology  []  EKG/Telemetry   []  Cardiology/Vascular   []  Pathology  []  Old records  []  Other:    Most notable findings include:     Lab Results (last 24 hours)       Procedure Component Value Units Date/Time    Basic Metabolic Panel [[id number]]  (Abnormal) Collected: 11/14/24 0445    Specimen: Blood Updated: 11/14/24 0547     Glucose 161 mg/dL      BUN 30 mg/dL      Creatinine 0.82 mg/dL      Sodium 142 mmol/L      Potassium 4.4 mmol/L      Chloride 103 mmol/L      CO2 29.4 mmol/L      Calcium 9.1 mg/dL      BUN/Creatinine Ratio 36.6     Anion Gap 9.6 mmol/L      eGFR 73.3 mL/min/1.73     Narrative:      GFR Normal >60  Chronic Kidney Disease <60  Kidney Failure <15    The GFR formula is only valid for adults with stable renal function between ages 18 and 70.    CBC & Differential [[id number]]  (Abnormal) Collected: 11/14/24 0445    Specimen: Blood Updated: 11/14/24 0524    Narrative:      The following orders  were created for panel order CBC & Differential.  Procedure                               Abnormality         Status                     ---------                               -----------         ------                     CBC Auto Differential[[id number]]        Abnormal            Final result                 Please view results for these tests on the individual orders.    CBC Auto Differential [[id number]]  (Abnormal) Collected: 11/14/24 0445    Specimen: Blood Updated: 11/14/24 0524     WBC 14.09 10*3/mm3      RBC 5.06 10*6/mm3      Hemoglobin 14.0 g/dL      Hematocrit 45.7 %      MCV 90.3 fL      MCH 27.7 pg      MCHC 30.6 g/dL      RDW 15.6 %      RDW-SD 50.9 fl      MPV 9.3 fL      Platelets 235 10*3/mm3      Neutrophil % 91.9 %      Lymphocyte % 5.9 %      Monocyte % 1.5 %      Eosinophil % 0.0 %      Basophil % 0.1 %      Immature Grans % 0.6 %      Neutrophils, Absolute 12.96 10*3/mm3      Lymphocytes, Absolute 0.83 10*3/mm3      Monocytes, Absolute 0.21 10*3/mm3      Eosinophils, Absolute 0.00 10*3/mm3      Basophils, Absolute 0.01 10*3/mm3      Immature Grans, Absolute 0.08 10*3/mm3      nRBC 0.0 /100 WBC     STAT Lactic Acid, Reflex [[id number]]  (Normal) Collected: 11/13/24 2206    Specimen: Blood Updated: 11/13/24 2249     Lactate 1.9 mmol/L     Lactic Acid, Plasma [[id number]]  (Abnormal) Collected: 11/13/24 1534    Specimen: Blood from Arm, Left Updated: 11/13/24 1634     Lactate 2.8 mmol/L     Blood Culture - Blood, Hand, Left [[id number]]  (Normal) Collected: 11/12/24 1321    Specimen: Blood from Hand, Left Updated: 11/13/24 1330     Blood Culture No growth at 24 hours    Narrative:      Less than seven (7) mL's of blood was collected.  Insufficient quantity may yield false negative results.    Blood Culture - Blood, Arm, Right [[id number]]  (Normal) Collected: 11/12/24 1236    Specimen: Blood from Arm, Right Updated: 11/13/24 1245     Blood Culture No growth at 24 hours    Narrative:      Less than  seven (7) mL's of blood was collected.  Insufficient quantity may yield false negative results.            Imaging Results (Last 24 Hours)       Procedure Component Value Units Date/Time    CT Chest Without Contrast Diagnostic [[id number]] Collected: 11/13/24 1128     Updated: 11/13/24 1145    Narrative:      CT CHEST WO CONTRAST DIAGNOSTIC    Date of Exam: 11/13/2024 11:00 AM EST    Indication: dyspnea.    Comparison: AP chest 11/12/2024, CT chest 12/25/2023, 11/10/2018    Technique: Axial CT images were obtained of the chest without contrast administration.  Sagittal and coronal reconstructions were performed.  Automated exposure control and iterative reconstruction methods were used.      Findings:  Moderate to large hiatal hernia projects right of midline in the lower thorax, similar to the prior study. There is endobronchial opacification within the right lower lobe with right lower lobe airspace disease, represent atelectasis or pneumonia. The   airspace disease in the right lower lobe is greater than that on the 12/25/2023 examination. There is mild subsegmental atelectasis posteriorly in the left upper lobe. Benign calcified nodule is seen in the left upper lobe.    Heart size is within normal limits. Coronary artery calcifications are present. Main pulmonary artery remains dilated, as before. Benign calcified mediastinal lymph nodes are present. No pericardial effusion. Trace right basilar pleural fluid.    Stable 3.2 cm low-density left adrenal nodule (noncontrast Hounsfield units 4.6), consistent with a benign adenoma. Nonobstructing left renal stone measures 6 mm. Slightly hyperdense lesion projecting laterally from the left mid kidney measuring 7 mm is   unchanged, favoring benign etiology such as a small hemorrhagic or proteinaceous cyst. Cholecystectomy changes are present. Remainder of imaged upper abdominal organs have a normal noncontrast appearance.    Advanced degenerative changes within both  shoulders. No acute or suspicious osseous abnormalities are identified.            Impression:      Impression:  1. Right lower lobe endobronchial opacification is present, new since the prior study.  2. Consolidative change in the right lower lobe may present atelectasis or postobstructive pneumonia.  3. Mild subsegmental atelectasis in the left upper lobe.  4. Stable appearance of moderate to large esophageal hiatal hernia.  5. Stable appearance of pulmonary artery dilation. Correlate for pulmonary hypertension.  6. Stable appearance of the left adrenal adenoma.  7. Nonobstructing left renal stone.      Electronically Signed: Marie Toledo MD    11/13/2024 11:43 AM EST    Workstation ID: [id number]            LAB RESULTS (LAST 7 DAYS)    CBC  Results from last 7 days   Lab Units 11/14/24 0445 11/13/24 0522 11/12/24  1321   WBC 10*3/mm3 14.09* 7.43 6.89   RBC 10*6/mm3 5.06 5.23 5.00   HEMOGLOBIN g/dL 14.0 14.8 14.2   HEMATOCRIT % 45.7 46.8* 45.3   MCV fL 90.3 89.5 90.6   PLATELETS 10*3/mm3 235 224 212       BMP  Results from last 7 days   Lab Units 11/14/24 0445 11/13/24  0522 11/12/24  1321   SODIUM mmol/L 142 142 142   POTASSIUM mmol/L 4.4 4.2 3.8   CHLORIDE mmol/L 103 105 104   CO2 mmol/L 29.4* 26.1 28.2   BUN mg/dL 30* 18 22   CREATININE mg/dL 0.82 0.75 0.73   GLUCOSE mg/dL 161* 165* 113*       CMP   Results from last 7 days   Lab Units 11/14/24 0445 11/13/24  0522 11/12/24  1321   SODIUM mmol/L 142 142 142   POTASSIUM mmol/L 4.4 4.2 3.8   CHLORIDE mmol/L 103 105 104   CO2 mmol/L 29.4* 26.1 28.2   BUN mg/dL 30* 18 22   CREATININE mg/dL 0.82 0.75 0.73   GLUCOSE mg/dL 161* 165* 113*   ALBUMIN g/dL  --   --  3.8   BILIRUBIN mg/dL  --   --  0.4   ALK PHOS U/L  --   --  128*   AST (SGOT) U/L  --   --  28   ALT (SGPT) U/L  --   --  23       BNP        TROPONIN  Results from last 7 days   Lab Units 11/12/24  1427   HSTROP T ng/L 15*       CoAg        Creatinine Clearance  Estimated Creatinine Clearance: 93.7 mL/min  (by C-G formula based on SCr of 0.82 mg/dL).    ABG          Radiology  CT Chest Without Contrast Diagnostic    Result Date: 11/13/2024  Impression: 1. Right lower lobe endobronchial opacification is present, new since the prior study. 2. Consolidative change in the right lower lobe may present atelectasis or postobstructive pneumonia. 3. Mild subsegmental atelectasis in the left upper lobe. 4. Stable appearance of moderate to large esophageal hiatal hernia. 5. Stable appearance of pulmonary artery dilation. Correlate for pulmonary hypertension. 6. Stable appearance of the left adrenal adenoma. 7. Nonobstructing left renal stone. Electronically Signed: Marie Toledo MD  11/13/2024 11:43 AM EST  Workstation ID: [id number]    XR Chest 1 View    Result Date: 11/12/2024  Impression: 1.Cardiomegaly 2.Evidence for prior granulomatous exposure 3.Possible atelectasis or scarring in the right lower lobe adjacent to shadow which could relate to known hiatal hernia Electronically Signed: Gee Meraz MD  11/12/2024 1:21 PM EST  Workstation ID: [id number]       EKG  I personally viewed and interpreted the patient's EKG/Telemetry data:  ECG 12 Lead Drug Monitoring   Preliminary Result   HEART RATE=87  bpm   RR Xshzpxbk=680  ms   HI Interval=  ms   P Horizontal Axis=  deg   P Front Axis=  deg   QRSD Interval=91  ms   QT Pubhrsop=313  ms   QLrO=048  ms   QRS Axis=39  deg   T Wave Axis=42  deg   - ABNORMAL ECG -   Atrial fibrillation   Low voltage, precordial leads   Date and Time of Study:2024-11-14 02:58:53      ECG 12 Lead Dyspnea   Final Result   HEART UOBB=299  bpm   RR Iguttmap=725  ms   HI Interval=  ms   P Horizontal Axis=  deg   P Front Axis=  deg   QRSD Interval=87  ms   QT Ydpyggzx=269  ms   SLhH=776  ms   QRS Axis=45  deg   T Wave Axis=8  deg   - ABNORMAL ECG -   Atrial fibrillation   Low voltage, precordial leads   When compared with ECG of 08-Feb-2024 12:03:26,   Significant change in rhythm: previously sinus    Electronically Signed By: Eduardo Anderson (VERONICA) 2024-11-13 06:01:52   Date and Time of Study:2024-11-12 12:18:10      Telemetry Scan   Final Result                  Echocardiogram:    Results for orders placed during the hospital encounter of 11/12/24    Adult Transthoracic Echo Complete W/ Cont if Necessary Per Protocol    Interpretation Summary    Left ventricular systolic function is normal. Left ventricular ejection fraction appears to be 56 - 60%.    Left ventricular wall thickness is consistent with borderline concentric hypertrophy.    Left ventricular diastolic function was indeterminate.    The left atrial cavity is mildly dilated.    Left atrial volume is mildly increased.    There is moderate calcification of the aortic valve.    Estimated right ventricular systolic pressure from tricuspid regurgitation is normal (<35 mmHg).        Stress Test:  Results for orders placed during the hospital encounter of 12/24/23    Stress Test With Myocardial Perfusion One Day    Interpretation Summary  Indications  Chest pain  Shortness of breath    This study was performed under the direct supervision of Radha HERNANDEZ.    Resting ECG  Sinus rhythm    The patient was injected with Lexiscan intravenously while constantly monitoring electrocardiogram and vital signs.  Patient did not have any chest discomfort ST abnormalities or ectopy with injection of Lexiscan.    Cardiolite was used as an imaging agent.    Cardiolite images showed uniform distribution of radionuclide without any evidence for myocardial ischemia.    Gated SPECT images revealed normal left ventricle size and contractility with ejection fraction of 74%.    Impression  ========  Lexiscan Cardiolite test is negative for myocardial ischemia.    Gated SPECT images revealed normal left ventricular size and contractility with ejection fraction of 74%.        Cardiac Catheterization:  No results found for this or any previous visit.        Other:      ASSESSMENT &  PLAN:    Principal Problem:    Shortness of breath    Persistent atrial fibrillation  Intermittent RVR  Currently on sotalol 80 mg twice daily  Consider cardioversion after overall condition stabilizes  Uninterrupted anticoagulation on Eliquis, continue in house or if needed can change to Lovenox therapeutic dose      Dyspnea with exertion  Being treated for pneumonia, also COPD with Solu-Medrol and DuoNebs  Chronic lower extremity edema, BNP is normal  EF preserved 55 to 60%, normal estimated right-sided pressures  No evidence of pulmonary edema on imaging.  Mild diastolic dysfunction chronically as assessed in sinus rhythm previously, indeterminate with underlying atrial fibrillation, known blcm-iv-gbde variability  CT with right lower lobe endobronchial opacification and consolidative changes in the right lower lobe    History of pulmonary embolism  Currently anticoagulated with Eliquis therapeutic dose uninterrupted    Hypertension  Currently on IV Cardene, taper off as allowed  We will add oral Cardizem to help with better rate control    Off Cardene  Optimize diltiazem  Continue treatment for pneumonia and COPD from a pulmonary standpoint  EF is preserved with xidr-bn-yqde variability 65% EF, indeterminate diastolic function  BUN/creatinine ratio was elevated, creatinine 0.8  CO2 of 29 likely with intravascular contraction alkalosis, can confirm with ABG of whether she is retaining CO2, she is not hyponatremic, no significant pulmonary edema seen on imaging  Continue Eliquis uninterrupted twice daily therapeutic dose, history of both atrial fibrillation and thromboembolic disease with DVT PE  Continue uninterrupted telemetry  Consider cardioversion as she has had uninterrupted anticoagulation prior to discharge, if she has recurrence will need EP follow-up for consideration of ablation to maintain sinus rhythm versus changing her antiarrhythmic therapy, possibly Tikosyn  Will wait until pulmonary status  stabilizes    Supportive care at this time from a cardiac standpoint    Further recommendations to follow findings and clinical course    Kevin Angel MD, PhD    Kevin Angel MD  11/14/24  07:19 EST

## 2024-11-14 NOTE — CASE MANAGEMENT/SOCIAL WORK
Continued Stay Note  [facility]     Patient Name: Camelia Gray  MRN: [id number]  Today's Date: 11/14/2024    Admit Date: 11/12/2024    Plan: Return home alone with intermittent caregivers. No home oxygen-currently on 3 liters   Discharge Plan       Row Name 11/14/24 1051       Plan    Plan Comments Barriers: IV antibiotics, IV Steroids, Oxygen 2 liters which she does  not use at home. CM explained the IMM and gave her a copy after signing                          Yamini BUNCH,RN Case Manager  [facility]  Phone: Desk- [phone] cell- [phone]

## 2024-11-15 ENCOUNTER — ANESTHESIA (OUTPATIENT)
Dept: CARDIOLOGY | Facility: HOSPITAL | Age: 79
End: 2024-11-15
Payer: MEDICARE

## 2024-11-15 ENCOUNTER — HOSPITAL ENCOUNTER (OUTPATIENT)
Dept: CARDIOLOGY | Facility: HOSPITAL | Age: 79
Discharge: HOME | End: 2024-11-15
Payer: MEDICARE

## 2024-11-15 ENCOUNTER — ANESTHESIA EVENT (OUTPATIENT)
Dept: CARDIOLOGY | Facility: HOSPITAL | Age: 79
End: 2024-11-15
Payer: MEDICARE

## 2024-11-15 VITALS
SYSTOLIC BLOOD PRESSURE: 124 MMHG | DIASTOLIC BLOOD PRESSURE: 69 MMHG | HEART RATE: 40 BPM | RESPIRATION RATE: 18 BRPM | OXYGEN SATURATION: 98 %

## 2024-11-15 DIAGNOSIS — I48.0 PAROXYSMAL ATRIAL FIBRILLATION: ICD-10-CM

## 2024-11-15 LAB
ANION GAP SERPL CALCULATED.3IONS-SCNC: 7.9 MMOL/L (ref 5–15)
BASOPHILS # BLD AUTO: 0.01 10*3/MM3 (ref 0–0.2)
BASOPHILS NFR BLD AUTO: 0.1 % (ref 0–1.5)
BUN SERPL-MCNC: 34 MG/DL (ref 8–23)
BUN/CREAT SERPL: 42.5 (ref 7–25)
CALCIUM SPEC-SCNC: 8.7 MG/DL (ref 8.6–10.5)
CHLORIDE SERPL-SCNC: 105 MMOL/L (ref 98–107)
CO2 SERPL-SCNC: 30.1 MMOL/L (ref 22–29)
CREAT SERPL-MCNC: 0.8 MG/DL (ref 0.57–1)
DEPRECATED RDW RBC AUTO: 52.1 FL (ref 37–54)
EGFRCR SERPLBLD CKD-EPI 2021: 75.5 ML/MIN/1.73
EOSINOPHIL # BLD AUTO: 0 10*3/MM3 (ref 0–0.4)
EOSINOPHIL NFR BLD AUTO: 0 % (ref 0.3–6.2)
ERYTHROCYTE [DISTWIDTH] IN BLOOD BY AUTOMATED COUNT: 15.5 % (ref 12.3–15.4)
GLUCOSE SERPL-MCNC: 130 MG/DL (ref 65–99)
HCT VFR BLD AUTO: 43.4 % (ref 34–46.6)
HGB BLD-MCNC: 13.5 G/DL (ref 12–15.9)
IMM GRANULOCYTES # BLD AUTO: 0.05 10*3/MM3 (ref 0–0.05)
IMM GRANULOCYTES NFR BLD AUTO: 0.4 % (ref 0–0.5)
LYMPHOCYTES # BLD AUTO: 0.73 10*3/MM3 (ref 0.7–3.1)
LYMPHOCYTES NFR BLD AUTO: 6.3 % (ref 19.6–45.3)
MCH RBC QN AUTO: 28.2 PG (ref 26.6–33)
MCHC RBC AUTO-ENTMCNC: 31.1 G/DL (ref 31.5–35.7)
MCV RBC AUTO: 90.6 FL (ref 79–97)
MONOCYTES # BLD AUTO: 0.62 10*3/MM3 (ref 0.1–0.9)
MONOCYTES NFR BLD AUTO: 5.3 % (ref 5–12)
NEUTROPHILS NFR BLD AUTO: 10.24 10*3/MM3 (ref 1.7–7)
NEUTROPHILS NFR BLD AUTO: 87.9 % (ref 42.7–76)
NRBC BLD AUTO-RTO: 0 /100 WBC (ref 0–0.2)
PLATELET # BLD AUTO: 205 10*3/MM3 (ref 140–450)
PMV BLD AUTO: 9.3 FL (ref 6–12)
POTASSIUM SERPL-SCNC: 4.4 MMOL/L (ref 3.5–5.2)
QT INTERVAL: 500 MS
QTC INTERVAL: 383 MS
RBC # BLD AUTO: 4.79 10*6/MM3 (ref 3.77–5.28)
SODIUM SERPL-SCNC: 143 MMOL/L (ref 136–145)
WBC NRBC COR # BLD AUTO: 11.65 10*3/MM3 (ref 3.4–10.8)

## 2024-11-15 PROCEDURE — 25010000002 PROPOFOL 10 MG/ML EMULSION

## 2024-11-15 PROCEDURE — 94799 UNLISTED PULMONARY SVC/PX: CPT

## 2024-11-15 PROCEDURE — 99233 SBSQ HOSP IP/OBS HIGH 50: CPT | Performed by: INTERNAL MEDICINE

## 2024-11-15 PROCEDURE — 63710000001 PREDNISONE PER 1 MG: Performed by: INTERNAL MEDICINE

## 2024-11-15 PROCEDURE — 25810000003 SODIUM CHLORIDE 0.9 % SOLUTION

## 2024-11-15 PROCEDURE — 99232 SBSQ HOSP IP/OBS MODERATE 35: CPT | Performed by: INTERNAL MEDICINE

## 2024-11-15 PROCEDURE — 25010000002 CEFTRIAXONE PER 250 MG: Performed by: NURSE PRACTITIONER

## 2024-11-15 PROCEDURE — 85025 COMPLETE CBC W/AUTO DIFF WBC: CPT

## 2024-11-15 PROCEDURE — 92960 CARDIOVERSION ELECTRIC EXT: CPT

## 2024-11-15 PROCEDURE — 94761 N-INVAS EAR/PLS OXIMETRY MLT: CPT

## 2024-11-15 PROCEDURE — 5A2204Z RESTORATION OF CARDIAC RHYTHM, SINGLE: ICD-10-PCS | Performed by: INTERNAL MEDICINE

## 2024-11-15 PROCEDURE — 94664 DEMO&/EVAL PT USE INHALER: CPT

## 2024-11-15 PROCEDURE — 25010000002 HYDRALAZINE PER 20 MG: Performed by: INTERNAL MEDICINE

## 2024-11-15 PROCEDURE — 80048 BASIC METABOLIC PNL TOTAL CA: CPT

## 2024-11-15 PROCEDURE — 93005 ELECTROCARDIOGRAM TRACING: CPT | Performed by: INTERNAL MEDICINE

## 2024-11-15 RX ORDER — SOTALOL HYDROCHLORIDE 80 MG/1
40 TABLET ORAL 2 TIMES DAILY
Status: DISCONTINUED | OUTPATIENT
Start: 2024-11-15 | End: 2024-11-16

## 2024-11-15 RX ORDER — PROPOFOL 10 MG/ML
VIAL (ML) INTRAVENOUS AS NEEDED
Status: DISCONTINUED | OUTPATIENT
Start: 2024-11-15 | End: 2024-11-15 | Stop reason: SURG

## 2024-11-15 RX ORDER — SODIUM CHLORIDE 9 MG/ML
INJECTION, SOLUTION INTRAVENOUS CONTINUOUS PRN
Status: DISCONTINUED | OUTPATIENT
Start: 2024-11-15 | End: 2024-11-15 | Stop reason: SURG

## 2024-11-15 RX ADMIN — ACETYLCYSTEINE 3 ML: 200 SOLUTION ORAL; RESPIRATORY (INHALATION) at 20:30

## 2024-11-15 RX ADMIN — Medication 10 ML: at 08:28

## 2024-11-15 RX ADMIN — IPRATROPIUM BROMIDE AND ALBUTEROL SULFATE 3 ML: .5; 3 SOLUTION RESPIRATORY (INHALATION) at 14:40

## 2024-11-15 RX ADMIN — HYDRALAZINE HYDROCHLORIDE 10 MG: 20 INJECTION INTRAMUSCULAR; INTRAVENOUS at 08:50

## 2024-11-15 RX ADMIN — Medication 1 TABLET: at 08:27

## 2024-11-15 RX ADMIN — APIXABAN 5 MG: 5 TABLET, FILM COATED ORAL at 21:00

## 2024-11-15 RX ADMIN — SODIUM CHLORIDE: 9 INJECTION, SOLUTION INTRAVENOUS at 11:09

## 2024-11-15 RX ADMIN — DILTIAZEM HYDROCHLORIDE 60 MG: 60 TABLET, FILM COATED ORAL at 05:49

## 2024-11-15 RX ADMIN — IPRATROPIUM BROMIDE AND ALBUTEROL SULFATE 3 ML: .5; 3 SOLUTION RESPIRATORY (INHALATION) at 07:01

## 2024-11-15 RX ADMIN — PROPOFOL 80 MG: 10 INJECTION, EMULSION INTRAVENOUS at 11:12

## 2024-11-15 RX ADMIN — LEVOTHYROXINE SODIUM 50 MCG: 0.05 TABLET ORAL at 08:28

## 2024-11-15 RX ADMIN — SOTALOL HYDROCHLORIDE 40 MG: 80 TABLET ORAL at 21:00

## 2024-11-15 RX ADMIN — SOTALOL HYDROCHLORIDE 80 MG: 80 TABLET ORAL at 08:28

## 2024-11-15 RX ADMIN — CEFTRIAXONE 2000 MG: 2 INJECTION, POWDER, FOR SOLUTION INTRAMUSCULAR; INTRAVENOUS at 14:06

## 2024-11-15 RX ADMIN — GUAIFENESIN AND CODEINE PHOSPHATE 5 ML: 100; 10 SOLUTION ORAL at 14:07

## 2024-11-15 RX ADMIN — ACETYLCYSTEINE 3 ML: 200 SOLUTION ORAL; RESPIRATORY (INHALATION) at 07:01

## 2024-11-15 RX ADMIN — APIXABAN 5 MG: 5 TABLET, FILM COATED ORAL at 08:28

## 2024-11-15 RX ADMIN — PANTOPRAZOLE SODIUM 40 MG: 40 TABLET, DELAYED RELEASE ORAL at 08:27

## 2024-11-15 RX ADMIN — PREDNISONE 40 MG: 20 TABLET ORAL at 08:27

## 2024-11-15 RX ADMIN — IPRATROPIUM BROMIDE AND ALBUTEROL SULFATE 3 ML: .5; 3 SOLUTION RESPIRATORY (INHALATION) at 20:30

## 2024-11-15 NOTE — PROGRESS NOTES
"    Reason for follow-up: A-fib     Patient Care Team:  Brinda Tony APRN as PCP - General (Nurse Practitioner)  Kevin Angel MD as Consulting Physician (Cardiology)    Subjective .   Camelia Gray doing well today     ROS    Maxzide [hydrochlorothiazide w-triamterene]    Scheduled Meds:acetylcysteine, 3 mL, Nebulization, BID - RT  apixaban, 5 mg, Oral, BID  cefTRIAXone, 2,000 mg, Intravenous, Q24H  ipratropium-albuterol, 3 mL, Nebulization, 4x Daily - RT  levothyroxine, 50 mcg, Oral, Daily  multivitamin with minerals, 1 tablet, Oral, Daily  pantoprazole, 40 mg, Oral, Daily  predniSONE, 40 mg, Oral, Daily With Breakfast  sotalol, 80 mg, Oral, BID      Continuous Infusions:   PRN Meds:.  albuterol    senna-docusate sodium **AND** polyethylene glycol **AND** bisacodyl **AND** bisacodyl    Calcium Replacement - Follow Nurse / BPA Driven Protocol    guaiFENesin-codeine    hydrALAZINE    Magnesium Standard Dose Replacement - Follow Nurse / BPA Driven Protocol    Phosphorus Replacement - Follow Nurse / BPA Driven Protocol    Potassium Replacement - Follow Nurse / BPA Driven Protocol    sodium chloride      VITAL SIGNS  Vitals:    11/15/24 0701 11/15/24 0708 11/15/24 0825 11/15/24 0850   BP:   (!) 168/108 (!) 178/104   BP Location:   Right arm    Patient Position:   Lying    Pulse: 87 84  73   Resp: 13 16 22    Temp:   97.6 °F (36.4 °C)    TempSrc:   Oral    SpO2: 91% 94% 95%    Weight:       Height:           Flowsheet Rows      Flowsheet Row First Filed Value   Admission Height 180.3 cm (71\") Documented at 11/12/2024 1159   Admission Weight 150 kg (330 lb) Documented at 11/12/2024 1159               Physical Exam  VITALS REVIEWED    General:      well developed, in no acute distress.    Head:      normocephalic and atraumatic.    Eyes:      PERRL/EOM intact, conjunctiva and sclera clear with out nystagmus.    Neck:      no masses, thyromegaly,  trachea central with normal respiratory effort and PMI " displaced laterally  Lungs:      Clear  Heart:       Regular rate and rhythm  Msk:      no deformity or scoliosis noted of thoracic or lumbar spine.    Pulses:      pulses normal in all 4 extremities.    Extremities:       No lower extremity edema  Neurologic:      no focal deficits.   alert oriented x3  Skin:      intact without lesions or rashes.    Psych:      alert and cooperative; normal mood and affect; normal attention span and concentration.          LAB RESULTS (LAST 7 DAYS)    CBC  Results from last 7 days   Lab Units 11/15/24  0505 11/14/24 0445 11/13/24 0522 11/12/24  1321   WBC 10*3/mm3 11.65* 14.09* 7.43 6.89   RBC 10*6/mm3 4.79 5.06 5.23 5.00   HEMOGLOBIN g/dL 13.5 14.0 14.8 14.2   HEMATOCRIT % 43.4 45.7 46.8* 45.3   MCV fL 90.6 90.3 89.5 90.6   PLATELETS 10*3/mm3 205 235 224 212       BMP  Results from last 7 days   Lab Units 11/15/24  0505 11/14/24 0445 11/13/24  0522 11/12/24  1321   SODIUM mmol/L 143 142 142 142   POTASSIUM mmol/L 4.4 4.4 4.2 3.8   CHLORIDE mmol/L 105 103 105 104   CO2 mmol/L 30.1* 29.4* 26.1 28.2   BUN mg/dL 34* 30* 18 22   CREATININE mg/dL 0.80 0.82 0.75 0.73   GLUCOSE mg/dL 130* 161* 165* 113*       CMP   Results from last 7 days   Lab Units 11/15/24  0505 11/14/24 0445 11/13/24 0522 11/12/24  1321   SODIUM mmol/L 143 142 142 142   POTASSIUM mmol/L 4.4 4.4 4.2 3.8   CHLORIDE mmol/L 105 103 105 104   CO2 mmol/L 30.1* 29.4* 26.1 28.2   BUN mg/dL 34* 30* 18 22   CREATININE mg/dL 0.80 0.82 0.75 0.73   GLUCOSE mg/dL 130* 161* 165* 113*   ALBUMIN g/dL  --   --   --  3.8   BILIRUBIN mg/dL  --   --   --  0.4   ALK PHOS U/L  --   --   --  128*   AST (SGOT) U/L  --   --   --  28   ALT (SGPT) U/L  --   --   --  23         BNP        TROPONIN  Results from last 7 days   Lab Units 11/12/24  1427   HSTROP T ng/L 15*       CoAg        Creatinine Clearance  Estimated Creatinine Clearance: 96.1 mL/min (by C-G formula based on SCr of 0.8 mg/dL).    ABG          EKG    I personally reviewed  the patient's EKG/Telemetry data: Sinus rhythm      Assessment & Plan       Shortness of breath    Persistent atrial fibrillation      Camelia Gray is a 79-year-old female patient who was first diagnosed with A-fib in December 2023, she had cardioversion and she remained in sinus rhythm Optil June 2024.  A-fib recurred and she was placed on sotalol 80 mg every 12 hours.  Patient was also chronically anticoagulated with Eliquis since 2018.  Patient however remained in A-fib despite adding sotalol.  She is now admitted to the hospital for shortness of breath and pneumonia.  Since her A-fib is not chronic, which should direct our efforts towards rhythm restoration.  I think it is best to go ahead and perform cardioversion while she is here, hopefully with her being on sotalol this will maintain her in normal rhythm.  We can also go ahead and schedule her for A-fib ablation to avoid recurrences.  Risks indications and benefits for both cardioversion and A-fib ablation were discussed at length with her and patient is agreeable.  Plan for cardioversion tomorrow around 10:30 am.    11/15/2024  Cardioversion was performed with return to sinus rhythm.  She was bradycardic in the low 40s post cardioversion.  Will discontinue diltiazem.  Continue sotalol 80 mg every 12 hours for now.  If A-fib recurs, next option is to perform ablation.  I did give her the option of preemptively schedule her for outpatient ablation, but the patient would like to hold off on it and see if sotalol helps suppress it.  Also advised her to monitor her heart rate at home and if it is consistently staying in the 40s, then she might need dose adjustment on the sotalol.  Possible pacemaker if needed      I discussed the patients findings and my recommendations with patient and agrees with outlined plan.    Bertin Stone MD  11/15/24  12:00 EST        Electronically signed by Bertin Stone MD, 11/15/24, 12:02 PM EST.

## 2024-11-15 NOTE — PROGRESS NOTES
Daily Progress Note          Assessment  Shortness of breath  Multifocal pneumonia   Multiple mucous plugs     possible aspiration due to large hiatal hernia  Localized right upper lobe wheezing, CT chest showed right upper lobe endobronchial opacity     Bronchoscopy 12/27/2023  Copious amount mucopurulent secretions suctioned therapeutically  Right lung washing was obtained  Moderate inflammatory changes     Hypoxia currently on 4 L     Pulmonary edema  Respiratory viral panel negative on 12/24/2023  History of pulmonary embolism 2018  Quit smoking 1992        Recommendations:  Bronchial rhonchi are improving with Mucomyst, continue the same    Cardiology planning cardioversion today     Antibiotics: Rocephin    Prednisone    Mucinex  Encourage use of incentive spirometer     HR control: Diltiazem, sotalol     Oxygen supplement and titration to maintain saturation 90 to 95%: on 2 L  Bronchodilators     Chronic anticoagulation: Eliquis  Thyroid hormone replacement     I personally reviewed the radiological studies               LOS: 2 days     Subjective     Patient reports cough and shortness of breath    Objective     Vital signs for last 24 hours:  Vitals:    11/15/24 0701 11/15/24 0708 11/15/24 0825 11/15/24 0850   BP:   (!) 168/108 (!) 178/104   BP Location:   Right arm    Patient Position:   Lying    Pulse: 87 84  73   Resp: 13 16 22    Temp:   97.6 °F (36.4 °C)    TempSrc:   Oral    SpO2: 91% 94% 95%    Weight:       Height:           Intake/Output last 3 shifts:  I/O last 3 completed shifts:  In: 840 [P.O.:840]  Out: 2500 [Urine:2500]  Intake/Output this shift:  No intake/output data recorded.      Radiology  Imaging Results (Last 24 Hours)       ** No results found for the last 24 hours. **            Labs:  Results from last 7 days   Lab Units 11/15/24  0505   WBC 10*3/mm3 11.65*   HEMOGLOBIN g/dL 13.5   HEMATOCRIT % 43.4   PLATELETS 10*3/mm3 205     Results from last 7 days   Lab Units 11/15/24  0505  11/13/24  0522 11/12/24  1321   SODIUM mmol/L 143   < > 142   POTASSIUM mmol/L 4.4   < > 3.8   CHLORIDE mmol/L 105   < > 104   CO2 mmol/L 30.1*   < > 28.2   BUN mg/dL 34*   < > 22   CREATININE mg/dL 0.80   < > 0.73   CALCIUM mg/dL 8.7   < > 9.8   BILIRUBIN mg/dL  --   --  0.4   ALK PHOS U/L  --   --  128*   ALT (SGPT) U/L  --   --  23   AST (SGOT) U/L  --   --  28   GLUCOSE mg/dL 130*   < > 113*    < > = values in this interval not displayed.         Results from last 7 days   Lab Units 11/12/24  1321   ALBUMIN g/dL 3.8     Results from last 7 days   Lab Units 11/12/24  1427 11/12/24  1321   HSTROP T ng/L 15* 15*                           Meds:   SCHEDULE  acetylcysteine, 3 mL, Nebulization, BID - RT  apixaban, 5 mg, Oral, BID  cefTRIAXone, 2,000 mg, Intravenous, Q24H  dilTIAZem, 60 mg, Oral, Q8H  ipratropium-albuterol, 3 mL, Nebulization, 4x Daily - RT  levothyroxine, 50 mcg, Oral, Daily  multivitamin with minerals, 1 tablet, Oral, Daily  pantoprazole, 40 mg, Oral, Daily  predniSONE, 40 mg, Oral, Daily With Breakfast  sotalol, 80 mg, Oral, BID      Infusions     PRNs    albuterol    senna-docusate sodium **AND** polyethylene glycol **AND** bisacodyl **AND** bisacodyl    Calcium Replacement - Follow Nurse / BPA Driven Protocol    guaiFENesin-codeine    hydrALAZINE    Magnesium Standard Dose Replacement - Follow Nurse / BPA Driven Protocol    Phosphorus Replacement - Follow Nurse / BPA Driven Protocol    Potassium Replacement - Follow Nurse / BPA Driven Protocol    sodium chloride    Physical Exam:  General Appearance:  Alert   HEENT:  Normocephalic, without obvious abnormality, Conjunctiva/corneas clear,.   Nares normal, no drainage     Neck:  Supple, symmetrical, trachea midline.   Lungs /Chest wall:   Bilateral basal rhonchi, respirations unlabored, symmetrical wall movement.     Heart: Irregular tachycardia, S1 S2 normal  Abdomen: Soft, non-tender, no masses, no organomegaly.    Extremities: No edema, no clubbing  or cyanosis     ROS  Constitutional: Negative for chills, fever and malaise/fatigue.   HENT: Negative.    Eyes: Negative.    Cardiovascular: Negative.    Respiratory: Positive for cough and shortness of breath.    Skin: Negative.    Musculoskeletal: Negative.    Gastrointestinal: Negative.    Genitourinary: Negative.    Neurological: Negative.    Psychiatric/Behavioral: Negative.      I reviewed the recent clinical results  I personally reviewed the latest radiological studies    Part of this note may be an electronic transcription/translation of spoken language to printed text using the Dragon Dictation System.

## 2024-11-15 NOTE — PROGRESS NOTES
"[facility]      Patient Care Team:  Brinda Tony APRN as PCP - General (Nurse Practitioner)  Kevin Angel MD as Consulting Physician (Cardiology)    Chief Complaint: As of breath    Subjective    Interval History and ROS:   Seen and examined on date of service  No acute events  2 L nasal cannula  Chest pain-free         History taken from: patient chart RN    Review of Systems   Constitutional: Negative for diaphoresis and malaise/fatigue.   Cardiovascular:  Negative for chest pain, irregular heartbeat, leg swelling, near-syncope, orthopnea, palpitations, paroxysmal nocturnal dyspnea and syncope.   Respiratory:  Positive for shortness of breath. Negative for cough.    Musculoskeletal:  Negative for myalgias.   Neurological:  Negative for dizziness, focal weakness, headaches and light-headedness.   Psychiatric/Behavioral:  Negative for altered mental status.    All other systems reviewed and are negative.      Objective  Blood pressure elevated today, A-fib with controlled rate    Vital Signs  Temp:  [97.2 °F (36.2 °C)-99.1 °F (37.3 °C)] 97.6 °F (36.4 °C)  Heart Rate:  [73-94] 73  Resp:  [13-22] 22  BP: (146-178)/() 178/104  Oxygen Therapy  SpO2: 95 %  Pulse Oximetry Type: Continuous  Device (Oxygen Therapy): humidified, nasal cannula  Flow (L/min) (Oxygen Therapy): 2}  Flowsheet Rows      Flowsheet Row First Filed Value   Admission Height 180.3 cm (71\") Documented at 11/12/2024 1159   Admission Weight 150 kg (330 lb) Documented at 11/12/2024 1159            Physical Exam:    Physical Exam  Constitutional:       General: She is not in acute distress.     Appearance: She is obese. She is not diaphoretic.   HENT:      Head: Normocephalic and atraumatic.   Eyes:      Extraocular Movements: Extraocular movements intact.      Pupils: Pupils are equal, round, and reactive to light.   Neck:      Vascular: No carotid bruit.   Cardiovascular:      Rate and Rhythm: Normal rate. Rhythm " irregular.      Heart sounds: Normal heart sounds.   Pulmonary:      Effort: Pulmonary effort is normal. No respiratory distress.      Breath sounds: Normal breath sounds.      Comments: 2 L nasal cannula  Abdominal:      Palpations: Abdomen is soft.   Musculoskeletal:      Comments: Bilateral lower extremities wrapped with Ace   Skin:     Findings: No erythema.   Neurological:      Mental Status: She is alert and oriented to person, place, and time.   Psychiatric:         Mood and Affect: Mood normal.         Behavior: Behavior normal.         Thought Content: Thought content normal.         Judgment: Judgment normal.         Results Review:     I reviewed the patient's new clinical results.    Lab Results (last 24 hours)       Procedure Component Value Units Date/Time    Basic Metabolic Panel [[id number]]  (Abnormal) Collected: 11/15/24 0505    Specimen: Blood Updated: 11/15/24 0542     Glucose 130 mg/dL      BUN 34 mg/dL      Creatinine 0.80 mg/dL      Sodium 143 mmol/L      Potassium 4.4 mmol/L      Chloride 105 mmol/L      CO2 30.1 mmol/L      Calcium 8.7 mg/dL      BUN/Creatinine Ratio 42.5     Anion Gap 7.9 mmol/L      eGFR 75.5 mL/min/1.73     Narrative:      GFR Normal >60  Chronic Kidney Disease <60  Kidney Failure <15    The GFR formula is only valid for adults with stable renal function between ages 18 and 70.    CBC & Differential [[id number]]  (Abnormal) Collected: 11/15/24 0505    Specimen: Blood Updated: 11/15/24 0516    Narrative:      The following orders were created for panel order CBC & Differential.  Procedure                               Abnormality         Status                     ---------                               -----------         ------                     CBC Auto Differential[[id number]]        Abnormal            Final result                 Please view results for these tests on the individual orders.    CBC Auto Differential [[id number]]  (Abnormal) Collected: 11/15/24 0505     Specimen: Blood Updated: 11/15/24 0516     WBC 11.65 10*3/mm3      RBC 4.79 10*6/mm3      Hemoglobin 13.5 g/dL      Hematocrit 43.4 %      MCV 90.6 fL      MCH 28.2 pg      MCHC 31.1 g/dL      RDW 15.5 %      RDW-SD 52.1 fl      MPV 9.3 fL      Platelets 205 10*3/mm3      Neutrophil % 87.9 %      Lymphocyte % 6.3 %      Monocyte % 5.3 %      Eosinophil % 0.0 %      Basophil % 0.1 %      Immature Grans % 0.4 %      Neutrophils, Absolute 10.24 10*3/mm3      Lymphocytes, Absolute 0.73 10*3/mm3      Monocytes, Absolute 0.62 10*3/mm3      Eosinophils, Absolute 0.00 10*3/mm3      Basophils, Absolute 0.01 10*3/mm3      Immature Grans, Absolute 0.05 10*3/mm3      nRBC 0.0 /100 WBC     Blood Culture - Blood, Hand, Left [[id number]]  (Normal) Collected: 11/12/24 1321    Specimen: Blood from Hand, Left Updated: 11/14/24 1330     Blood Culture No growth at 2 days    Narrative:      Less than seven (7) mL's of blood was collected.  Insufficient quantity may yield false negative results.    Blood Culture - Blood, Arm, Right [[id number]]  (Normal) Collected: 11/12/24 1236    Specimen: Blood from Arm, Right Updated: 11/14/24 1245     Blood Culture No growth at 2 days    Narrative:      Less than seven (7) mL's of blood was collected.  Insufficient quantity may yield false negative results.            Imaging Results (Last 24 Hours)       ** No results found for the last 24 hours. **            ECG/EMG Results (most recent)       Procedure Component Value Units Date/Time    ECG 12 Lead Dyspnea [[id number]] Collected: 11/12/24 1218     Updated: 11/13/24 0602     QT Interval 353 ms      QTC Interval 467 ms     Narrative:      HEART LADI=664  bpm  RR Ujmwuyab=147  ms  NH Interval=  ms  P Horizontal Axis=  deg  P Front Axis=  deg  QRSD Interval=87  ms  QT Ddpxvuny=778  ms  HIcO=316  ms  QRS Axis=45  deg  T Wave Axis=8  deg  - ABNORMAL ECG -  Atrial fibrillation  Low voltage, precordial leads  When compared with ECG of 08-Feb-2024  12:03:26,  Significant change in rhythm: previously sinus  Electronically Signed By: Eduardo Anderson (VERONICA) 2024-11-13 06:01:52  Date and Time of Study:2024-11-12 12:18:10    Telemetry Scan [[id number]] Resulted: 11/12/24     Updated: 11/13/24 1432    Adult Transthoracic Echo Complete W/ Cont if Necessary Per Protocol [[id number]] Resulted: 11/13/24 1823     Updated: 11/13/24 1823     LVIDd 4.5 cm      LVIDs 3.0 cm      IVSd 1.10 cm      LVPWd 1.00 cm      FS 33.3 %      IVS/LVPW 1.10 cm      ESV(cubed) 27.0 ml      LV Sys Vol (BSA corrected) 11.3 cm2      EDV(cubed) 91.1 ml      LV [place] Vol (BSA corrected) 36.5 cm2      LV mass(C)d 164.0 grams      LVOT area 2.8 cm2      LVOT diam 1.90 cm      EDV(MOD-sp4) 97.3 ml      ESV(MOD-sp4) 30.0 ml      SV(MOD-sp4) 67.3 ml      SVi(MOD-SP4) 25.3 ml/m2      SVi (LVOT) 21.6 ml/m2      EF(MOD-sp4) 69.2 %      MV E max christiano 96.7 cm/sec      LA ESV Index (BP) 19.2 ml/m2      Lat Peak E' Christiano 10.4 cm/sec      TR max christiano 163.0 cm/sec      SV(LVOT) 57.6 ml      RVIDd 3.3 cm      RV S' 9.9 cm/sec      LA dimension (2D)  5.0 cm      LV V1 max 113.0 cm/sec      LV V1 max PG 5.1 mmHg      LV V1 mean PG 3.0 mmHg      LV V1 VTI 20.3 cm      Ao pk christiano 163.0 cm/sec      Ao max PG 10.6 mmHg      Ao mean PG 7.0 mmHg      Ao V2 VTI 32.1 cm      FEDERICO(I,D) 1.79 cm2      MV max PG 8.2 mmHg      MV mean PG 3.0 mmHg      MV V2 VTI 26.0 cm      MV P1/2t 66.6 msec      MVA(P1/2t) 3.3 cm2      MVA(VTI) 2.21 cm2      MV dec slope 616.0 cm/sec2      MR max christiano 390.0 cm/sec      MR max PG 60.8 mmHg      TR max PG 10.6 mmHg      RVSP(TR) 25.6 mmHg      RAP systole 15.0 mmHg      RV V1 max PG 2.9 mmHg      RV V1 max 85.4 cm/sec      RV V1 VTI 13.8 cm      PA V2 max 96.3 cm/sec      PA acc time 0.08 sec      ACS 1.80 cm      Sinus 2.7 cm      EF(MOD-bp) 69.0 %      Dimensionless Index 0.69 (DI)     Narrative:        Left ventricular systolic function is normal. Left ventricular ejection   fraction appears to be  56 - 60%.    Left ventricular wall thickness is consistent with borderline   concentric hypertrophy.    Left ventricular diastolic function was indeterminate.    The left atrial cavity is mildly dilated.    Left atrial volume is mildly increased.    There is moderate calcification of the aortic valve.    Estimated right ventricular systolic pressure from tricuspid   regurgitation is normal (<35 mmHg).      ECG 12 Lead Drug Monitoring [[id number]] Collected: 11/14/24 0258     Updated: 11/14/24 0259     QT Interval 402 ms      QTC Interval 484 ms     Narrative:      HEART RATE=87  bpm  RR Lrcqfniu=797  ms  IA Interval=  ms  P Horizontal Axis=  deg  P Front Axis=  deg  QRSD Interval=91  ms  QT Bsbheorf=552  ms  ETkU=134  ms  QRS Axis=39  deg  T Wave Axis=42  deg  - ABNORMAL ECG -  Atrial fibrillation  Low voltage, precordial leads  Date and Time of Study:2024-11-14 02:58:53    Telemetry Scan [[id number]] Resulted: 11/12/24     Updated: 11/14/24 1333    Telemetry Scan [[id number]] Resulted: 11/12/24     Updated: 11/14/24 1416    Telemetry Scan [[id number]] Resulted: 11/12/24     Updated: 11/14/24 1449    Telemetry Scan [[id number]] Resulted: 11/12/24     Updated: 11/14/24 1519            Medication Review:   I have reviewed the patient's current medication list    Current Facility-Administered Medications:     acetylcysteine (MUCOMYST) 20 % nebulizer solution 3 mL, 3 mL, Nebulization, BID - RT, Reji Burch MD, 3 mL at 11/15/24 0701    albuterol (PROVENTIL) nebulizer solution 0.083% 2.5 mg/3mL, 2.5 mg, Nebulization, Q6H PRN, Jessika Hall, APRN, 2.5 mg at 11/14/24 0451    apixaban (ELIQUIS) tablet 5 mg, 5 mg, Oral, BID, RafaelJessika delacruz K, APRN, 5 mg at 11/15/24 0828    sennosides-docusate (PERICOLACE) 8.6-50 MG per tablet 2 tablet, 2 tablet, Oral, BID PRN **AND** polyethylene glycol (MIRALAX) packet 17 g, 17 g, Oral, Daily PRN **AND** bisacodyl (DULCOLAX) EC tablet 5 mg, 5 mg, Oral, Daily PRN **AND** bisacodyl (DULCOLAX)  suppository 10 mg, 10 mg, Rectal, Daily PRN, Jessika Hall, APRN    Calcium Replacement - Follow Nurse / BPA Driven Protocol, , Not Applicable, PRN, Jessika Hall APRN    cefTRIAXone (ROCEPHIN) 2,000 mg in sodium chloride 0.9 % 100 mL MBP, 2,000 mg, Intravenous, Q24H, Jessi Kirk APRN, Last Rate: 200 mL/hr at 11/14/24 1356, 2,000 mg at 11/14/24 1356    dilTIAZem (CARDIZEM) tablet 60 mg, 60 mg, Oral, Q8H, Kevin Angel MD, 60 mg at 11/15/24 0549    guaiFENesin-codeine (GUAIFENESIN AC) 100-10 MG/5ML liquid 5 mL, 5 mL, Oral, Q4H PRN, Clemente Bowens MD, 5 mL at 11/14/24 2026    hydrALAZINE (APRESOLINE) injection 10 mg, 10 mg, Intravenous, Q6H PRN, Gabby Morris DO, 10 mg at 11/15/24 0850    ipratropium-albuterol (DUO-NEB) nebulizer solution 3 mL, 3 mL, Nebulization, 4x Daily - RT, Jessika Hall APRN, 3 mL at 11/15/24 0701    levothyroxine (SYNTHROID, LEVOTHROID) tablet 50 mcg, 50 mcg, Oral, Daily, Jessika Hall APRN, 50 mcg at 11/15/24 0828    Magnesium Standard Dose Replacement - Follow Nurse / BPA Driven Protocol, , Not Applicable, PRN, Jessika Hall, APRN    multivitamin with minerals 1 tablet, 1 tablet, Oral, Daily, Jessika Hall APRN, 1 tablet at 11/15/24 0827    pantoprazole (PROTONIX) EC tablet 40 mg, 40 mg, Oral, Daily, Jessika Hall APRN, 40 mg at 11/15/24 0827    Phosphorus Replacement - Follow Nurse / BPA Driven Protocol, , Not Applicable, PRN, Jessika Hall, APRN    Potassium Replacement - Follow Nurse / BPA Driven Protocol, , Not Applicable, PRN, Jessika Hall APRN    predniSONE (DELTASONE) tablet 40 mg, 40 mg, Oral, Daily With Breakfast, Clemente Bowens MD, 40 mg at 11/15/24 0827    sodium chloride 0.9 % flush 10 mL, 10 mL, Intravenous, PRN, Eduardo Anderson MD, 10 mL at 11/15/24 0828    sotalol (BETAPACE) tablet 80 mg, 80 mg, Oral, BID, Jessika Hall APRN, 80 mg at 11/15/24 0828    Assessment & Plan     Principal Problem:    Shortness of breath      Persistent atrial fibrillation  Has remained in controlled rate overnight  Cardioversion with Dr. Javed this a.m.  Currently on sotalol 80 mg twice daily, diltiazem 60 mg every 8 hours  Uninterrupted anticoagulation on Eliquis, continue in house or if needed can change to Lovenox therapeutic dose     Dyspnea with exertion  Being treated for pneumonia, also COPD with Solu-Medrol and DuoNebs  Chronic lower extremity edema, BNP is normal  EF preserved 55 to 60%, normal estimated right-sided pressures  No evidence of pulmonary edema on imaging.  Mild diastolic dysfunction chronically as assessed in sinus rhythm previously, indeterminate with underlying atrial fibrillation, known vtpm-he-avie variability  CT with right lower lobe endobronchial opacification and consolidative changes in right lower lobe  On nebs, steroids, and antibiotics     history of pulmonary embolism  Currently anticoagulated with Eliquis therapeutic dose uninterrupted     Hypertension  BP this a.m. 178/104   diltiazem added with benefit of rate control, currently 60 mg 3 times daily  Dose was not given last night  Will increase diltiazem  Advised bedside nursing to utilize as needed labetalol now  Reassess today after cardioversion for possibly increasing diltiazem dose      Continue treatment for pneumonia and COPD from a pulmonary standpoint  EF is preserved with lkxs-dx-gnil variability 65% EF, indeterminate diastolic function  BUN/creatinine ratio elevated at 42.5, BUN 34, creatinine 0.8, EGFR 75.5  CO2 30.1, Na+ 143, no significant pulmonary edema seen on imaging  Continue Eliquis uninterrupted twice daily therapeutic dose, history of both atrial fibrillation and thromboembolic disease with DVT PE  Continue uninterrupted telemetry  Cardioversion today   EP considering ablation to maintain sinus rhythm versus changing her antiarrhythmic therapy, possibly Tikosyn       Scribed findings above  Supportive care from cardiac  standpoint  Please call with any questions or concerns    Kevin Angel MD, PhD    Gale Donaldson, DEE  11/15/24  08:59 EST

## 2024-11-15 NOTE — PLAN OF CARE
Problem: Adult Inpatient Plan of Care  Goal: Plan of Care Review  Outcome: Progressing  Goal: Patient-Specific Goal (Individualized)  Outcome: Progressing  Goal: Absence of Hospital-Acquired Illness or Injury  Outcome: Progressing  Intervention: Identify and Manage Fall Risk  Recent Flowsheet Documentation  Taken 11/15/2024 1420 by Katherine Espino LPN  Safety Promotion/Fall Prevention:   safety round/check completed   room organization consistent   lighting adjusted   activity supervised   assistive device/personal items within reach   clutter free environment maintained  Taken 11/15/2024 1300 by Katherine Espino LPN  Safety Promotion/Fall Prevention: patient off unit  Taken 11/15/2024 1012 by Katherine Espino LPN  Safety Promotion/Fall Prevention: patient off unit  Intervention: Prevent Infection  Recent Flowsheet Documentation  Taken 11/15/2024 1420 by Katherine Espino LPN  Infection Prevention:   cohorting utilized   environmental surveillance performed   hand hygiene promoted   rest/sleep promoted   personal protective equipment utilized   single patient room provided   visitors restricted/screened  Goal: Optimal Comfort and Wellbeing  Outcome: Progressing  Goal: Readiness for Transition of Care  Outcome: Progressing   Goal Outcome Evaluation:        Patient went down this shift for a cardioversion. Patient returned with low heart rate running 30-40's with doctor aware. Patient has no c/o pain or discomfort noted. O2 in place per nasal cannula. Patient has nonproductive cough with PRN medication given. Call light within reach.

## 2024-11-15 NOTE — PROGRESS NOTES
[facility] MEDICINE SERVICE  DAILY PROGRESS NOTE    NAME: Camelia Gray  : 1945  MRN: [id number]      LOS: 2 days     PROVIDER OF SERVICE: DEE Erazo    Chief Complaint: Shortness of breath    Subjective:     Interval History:  History taken from: patient chart    Patient seen and examined at bedside.   No acute overnight events.  Patient this morning has more of a dry irritant cough.      Review of Systems:   Review of Systems   Constitutional:  Negative for chills, diaphoresis, fatigue and fever.   HENT:  Positive for congestion.    Respiratory:  Positive for cough and shortness of breath.    Cardiovascular:  Negative for chest pain.   Gastrointestinal:  Negative for abdominal pain.   Neurological:  Negative for dizziness and headaches.    Objective:     Vital Signs  Temp:  [97.2 °F (36.2 °C)-99.1 °F (37.3 °C)] 97.6 °F (36.4 °C)  Heart Rate:  [35-94] 41  Resp:  [12-22] 18  BP: ()/() 131/71  Flow (L/min) (Oxygen Therapy):  [2-10] 3   Body mass index is 48.26 kg/m².    Physical Exam  PHYSICAL EXAM  Constitutional:  Well-developed, well-nourished, no acute distress, nontoxic appearance   Eyes:  PERRL, conjunctivae normal, EOMI   HENT:  Atraumatic, external ears normal, nose normal, oropharynx moist, no pharyngeal exudates. Neck-normal range of motion, no tenderness, supple, trachea midline  Respiratory: Diminished with expiratory wheezes, nonlabored respirations without accessory muscle use patient on 2 L nasal cannula.  Cardiovascular:  Normal rate, normal rhythm, no murmurs, no gallops, no rubs   GI:  Soft, nondistended, normal bowel sounds, nontender, no organomegaly, no mass, no rebound, no guarding.  Mild BLE edema noted.  :  No costovertebral angle tenderness   Musculoskeletal:  No edema, no tenderness, no deformities  Integument:  Well hydrated, no rash.  Edema noted to bilateral lower extremities.  Lymphatic:  No lymphadenopathy noted   Neurologic:  Alert & oriented  x 3, CN 2-12 normal, normal motor function, normal sensory function, no focal deficits noted   Psychiatric:  Speech and behavior appropriate          Diagnostic Data    Results from last 7 days   Lab Units 11/15/24  0505 11/13/24  0522 11/12/24  1321   WBC 10*3/mm3 11.65*   < > 6.89   HEMOGLOBIN g/dL 13.5   < > 14.2   HEMATOCRIT % 43.4   < > 45.3   PLATELETS 10*3/mm3 205   < > 212   GLUCOSE mg/dL 130*   < > 113*   CREATININE mg/dL 0.80   < > 0.73   BUN mg/dL 34*   < > 22   SODIUM mmol/L 143   < > 142   POTASSIUM mmol/L 4.4   < > 3.8   AST (SGOT) U/L  --   --  28   ALT (SGPT) U/L  --   --  23   ALK PHOS U/L  --   --  128*   BILIRUBIN mg/dL  --   --  0.4   ANION GAP mmol/L 7.9   < > 9.8    < > = values in this interval not displayed.       No radiology results for the last day      I reviewed the patient's new clinical results.    Assessment/Plan:     Active and Resolved Problems  Active Hospital Problems    Diagnosis  POA    **Shortness of breath [R06.02]  Yes    Persistent atrial fibrillation [I48.19]  Unknown      Resolved Hospital Problems   No resolved problems to display.       Shortness of breath  RVP negative  In ED patient received IV steroids and DuoNeb  Patient on room air at this time  DuoNebs q6h and Albuterol prn  IV steroids every 8 hours-patient will need to be transition to oral prior to discharge  Continue ceftriaxone  As needed oxygen supplementation  CT chest noted right lower lobe endobronchial opacification, consolidative change in the right lower lobe may present atelectasis or postobstructive pneumonia.  Mild segmental atelectasis of left lung.  Stable appearance of moderate to large esophageal hiatal hernia.  Stable appearance of pulmonary artery dilation.  Echo echo showed EF of 56 to 60%  Cardiology following  Pulmonology following     Hypertension   BP stable elevated most recent blood pressure 145/89  PRN Hydralazine  Continue usual home medication     Atrial fibrillation  Patient has a  history of A-fib, on Eliquis at home  EKG showing A-fib with rate of 105  Continue home medicines including sotalol and Eliquis  Cardiology following  EP following.     Hypothyroidism  Continue Levothyroxine     GERD  Continue PPI    VTE Prophylaxis:  Pharmacologic & mechanical VTE prophylaxis orders are present.       Patient seen and evaluated at bedside.  Patient underwent cardioversion today patient reverted back to NSR.  Patient was bradycardic to the 40s.  Detail exams discontinued.  WBC improving today WBC 11.65.  Patient has increased air exchange, however continues with mild expiratory wheezing.  Continue to encourage patient with the use of I-S and flutter valve for pulmonary toileting.  Patient is still requiring oxygen supplementation.  Possibly will need this at discharge.  Walk test will be ordered prior to discharge    Disposition Planning:     Barriers to Discharge: Improved clinical status.  Anticipated Date of Discharge: 11/16/2024  Place of Discharge: Home      Time: 35 minutes     Code Status and Medical Interventions: CPR (Attempt to Resuscitate); Full Support   Ordered at: 11/12/24 4263     Code Status (Patient has no pulse and is not breathing):    CPR (Attempt to Resuscitate)     Medical Interventions (Patient has pulse or is breathing):    Full Support       Signature: Electronically signed by DEE Erazo, 11/15/24, 12:04 EST.  [facility] Hospitalist Team

## 2024-11-15 NOTE — SIGNIFICANT NOTE
11/15/24 1006   Rehab Time/Intention   Session Not Performed patient unavailable for treatment  (off the floor for cardioversion)   Therapy Assessment/Plan (PT)   Criteria for Skilled Interventions Met (PT) yes;meets criteria   Recommendation   PT - Next Appointment 11/16/24

## 2024-11-15 NOTE — CASE MANAGEMENT/SOCIAL WORK
Continued Stay Note  [facility]     Patient Name: Camelia Gray  MRN: [id number]  Today's Date: 11/15/2024    Admit Date: 11/12/2024    Plan: Return home alone with intermittent caregivers. No home oxygen-currently on 3 liters   Discharge Plan       Row Name 11/15/24 1047       Plan    Plan Comments Barriers: Cardiology and ElectroCardiology following with plans for Cardioversion today.  Currently on 2 liters of oxygen with no home oxygen. AT discharge she plans to return home alone with caregivers                          Yamini BUNCH,RN Case Manager  [facility]  Phone: Desk- [phone] cell- [phone]

## 2024-11-15 NOTE — ANESTHESIA POSTPROCEDURE EVALUATION
Patient: Camelia Gray    Procedure Summary       Date: 11/15/24 Room / Location: [facility]    Anesthesia Start: 1109 Anesthesia Stop: 1119    Procedure: CARDIOVERSION EXTERNAL IN CARDIOLOGY DEPARTMENT Diagnosis:       Paroxysmal atrial fibrillation      (afib)    Scheduled Providers: Bertin Stone MD Provider: Tawny Kuhn MD    Anesthesia Type: general ASA Status: 3            Anesthesia Type: general    Vitals  Vitals Value Taken Time   /70 11/15/24 1242   Temp     Pulse 40 11/15/24 1244   Resp 18 11/15/24 1200   SpO2 98 % 11/15/24 1244   Vitals shown include unfiled device data.        Post Anesthesia Care and Evaluation    Patient location during evaluation: PACU  Patient participation: complete - patient participated  Level of consciousness: awake and alert  Pain management: satisfactory to patient    Airway patency: patent  Anesthetic complications: No anesthetic complications  PONV Status: none  Cardiovascular status: acceptable  Respiratory status: acceptable  Hydration status: acceptable

## 2024-11-15 NOTE — ANESTHESIA PREPROCEDURE EVALUATION
Anesthesia Evaluation     Patient summary reviewed and Nursing notes reviewed   no history of anesthetic complications:   NPO Solid Status: > 8 hours  NPO Liquid Status: > 2 hours           Airway   Dental      Pulmonary    (+) pulmonary embolism, a smoker Former,shortness of breath  Cardiovascular     ECG reviewed  PT is on anticoagulation therapy  Patient on routine beta blocker    (+) hypertension, valvular problems/murmurs AI, dysrhythmias Atrial Fib, DVT      Neuro/Psych  (+) weakness  GI/Hepatic/Renal/Endo    (+) morbid obesity, hiatal hernia, GERD, renal disease- stones, thyroid problem hypothyroidism    Musculoskeletal     Abdominal    Substance History      OB/GYN          Other   arthritis,     ROS/Med Hx Other: Additional History:  Afib/RVR, edema, immobility, cellulitis    Echo:  ·  Left ventricular ejection fraction appears to be 56 - 60%.     Indications  Chest pain  Shortness of breath     Technically satisfactory study.  Mitral valve is structurally normal.  Tricuspid valve is structurally normal.  Aortic valve is thickened with decreased opening motion.  Gradient across the aortic valve is 20/10 mmHg.  Pulmonic valve could not be well visualized.  No evidence for mitral tricuspid or aortic regurgitation is seen by Doppler study.  Left atrium is normal in size.  Right atrium is normal in size.  Left ventricle is normal in size and contractility with ejection fraction of 60%.  Diastolic dysfunction.  Right ventricle is normal in size.  IVC 2.06 cm.  Atrial septum is intact.  Aorta is normal.  No pericardial effusion or intracardiac thrombus is seen.     Impression  Mild aortic valve stenosis with gradient of 20/10 mmHg.  Otherwise, structurally and functionally normal cardiac valves.  Mild diastolic dysfunction is  Left ventricular size and contractility is normal with ejection fraction of 60%.      Stress Test:  Lexiscan Cardiolite test is negative for myocardial ischemia.     Gated SPECT images  revealed normal left ventricular size and contractility with ejection fraction of 74%.      PSH:  TOE SURGERY DENTAL EXAMINATION UNDER ANESTHESIA  TOTAL HIP ARTHROPLASTY CATARACT EXTRACTION  JOINT REPLACEMENT CORRECTION HAMMER TOE  CYSTOSCOPY, URETEROSCOPY, RETROGRADE PYELOGRAM, STENT INSERTION ENDOSCOPY  CHOLECYSTECTOMY TONSILLECTOMY  BRONCHOSCOPY ENDOSCOPY                Anesthesia Plan    ASA 3     general   total IV anesthesia  (Patient identified; pre-operative vital signs, all relevant labs/studies, complete medical/surgical/anesthetic history, full medication list, full allergy list, and NPO status obtained/reviewed; physical assessment performed; anesthetic options, side effects, potential complications, risks, and benefits discussed; questions answered; written anesthesia consent obtained; patient cleared for procedure; anesthesia machine and equipment checked and functioning)  intravenous induction     Anesthetic plan, risks, benefits, and alternatives have been provided, discussed and informed consent has been obtained with: patient.    Plan discussed with CRNA and CAA.    CODE STATUS:

## 2024-11-16 PROBLEM — J98.11 ATELECTASIS: Status: ACTIVE | Noted: 2024-11-12

## 2024-11-16 PROBLEM — T17.800A MULTIPLE TRACHEOBRONCHIAL MUCUS PLUGS: Status: ACTIVE | Noted: 2024-11-12

## 2024-11-16 PROCEDURE — 99233 SBSQ HOSP IP/OBS HIGH 50: CPT | Performed by: INTERNAL MEDICINE

## 2024-11-16 PROCEDURE — 94799 UNLISTED PULMONARY SVC/PX: CPT

## 2024-11-16 PROCEDURE — 93010 ELECTROCARDIOGRAM REPORT: CPT | Performed by: INTERNAL MEDICINE

## 2024-11-16 PROCEDURE — 25010000002 CEFTRIAXONE PER 250 MG: Performed by: NURSE PRACTITIONER

## 2024-11-16 PROCEDURE — 63710000001 PREDNISONE PER 1 MG: Performed by: INTERNAL MEDICINE

## 2024-11-16 PROCEDURE — 93005 ELECTROCARDIOGRAM TRACING: CPT | Performed by: INTERNAL MEDICINE

## 2024-11-16 PROCEDURE — 94761 N-INVAS EAR/PLS OXIMETRY MLT: CPT

## 2024-11-16 PROCEDURE — 94664 DEMO&/EVAL PT USE INHALER: CPT

## 2024-11-16 PROCEDURE — 25010000002 HYDRALAZINE PER 20 MG: Performed by: INTERNAL MEDICINE

## 2024-11-16 RX ORDER — FLECAINIDE ACETATE 50 MG/1
50 TABLET ORAL EVERY 12 HOURS SCHEDULED
Status: DISCONTINUED | OUTPATIENT
Start: 2024-11-16 | End: 2024-11-17

## 2024-11-16 RX ADMIN — PREDNISONE 40 MG: 20 TABLET ORAL at 08:57

## 2024-11-16 RX ADMIN — PANTOPRAZOLE SODIUM 40 MG: 40 TABLET, DELAYED RELEASE ORAL at 08:57

## 2024-11-16 RX ADMIN — Medication 1 TABLET: at 08:57

## 2024-11-16 RX ADMIN — CEFTRIAXONE 2000 MG: 2 INJECTION, POWDER, FOR SOLUTION INTRAMUSCULAR; INTRAVENOUS at 17:25

## 2024-11-16 RX ADMIN — APIXABAN 5 MG: 5 TABLET, FILM COATED ORAL at 08:57

## 2024-11-16 RX ADMIN — HYDRALAZINE HYDROCHLORIDE 10 MG: 20 INJECTION INTRAMUSCULAR; INTRAVENOUS at 08:57

## 2024-11-16 RX ADMIN — IPRATROPIUM BROMIDE AND ALBUTEROL SULFATE 3 ML: .5; 3 SOLUTION RESPIRATORY (INHALATION) at 15:17

## 2024-11-16 RX ADMIN — IPRATROPIUM BROMIDE AND ALBUTEROL SULFATE 3 ML: .5; 3 SOLUTION RESPIRATORY (INHALATION) at 06:54

## 2024-11-16 RX ADMIN — APIXABAN 5 MG: 5 TABLET, FILM COATED ORAL at 20:07

## 2024-11-16 RX ADMIN — IPRATROPIUM BROMIDE AND ALBUTEROL SULFATE 3 ML: .5; 3 SOLUTION RESPIRATORY (INHALATION) at 11:14

## 2024-11-16 RX ADMIN — FLECAINIDE ACETATE 50 MG: 50 TABLET ORAL at 20:07

## 2024-11-16 RX ADMIN — ACETYLCYSTEINE 3 ML: 200 SOLUTION ORAL; RESPIRATORY (INHALATION) at 18:50

## 2024-11-16 RX ADMIN — FLECAINIDE ACETATE 50 MG: 50 TABLET ORAL at 08:57

## 2024-11-16 RX ADMIN — LEVOTHYROXINE SODIUM 50 MCG: 0.05 TABLET ORAL at 08:57

## 2024-11-16 RX ADMIN — GUAIFENESIN AND CODEINE PHOSPHATE 5 ML: 100; 10 SOLUTION ORAL at 17:48

## 2024-11-16 RX ADMIN — HYDRALAZINE HYDROCHLORIDE 10 MG: 20 INJECTION INTRAMUSCULAR; INTRAVENOUS at 17:35

## 2024-11-16 RX ADMIN — ACETYLCYSTEINE 3 ML: 200 SOLUTION ORAL; RESPIRATORY (INHALATION) at 06:54

## 2024-11-16 RX ADMIN — GUAIFENESIN AND CODEINE PHOSPHATE 5 ML: 100; 10 SOLUTION ORAL at 22:23

## 2024-11-16 RX ADMIN — IPRATROPIUM BROMIDE AND ALBUTEROL SULFATE 3 ML: .5; 3 SOLUTION RESPIRATORY (INHALATION) at 18:50

## 2024-11-16 RX ADMIN — SENNOSIDES AND DOCUSATE SODIUM 2 TABLET: 50; 8.6 TABLET ORAL at 20:15

## 2024-11-16 RX ADMIN — GUAIFENESIN AND CODEINE PHOSPHATE 5 ML: 100; 10 SOLUTION ORAL at 05:29

## 2024-11-16 NOTE — PROGRESS NOTES
Patient has one time order for NT suction but is refusing at this time. States she has a bronchoscopy scheduled for tomorrow. Said she would rather wait till tomorrow to be suctioned because she will be under sedation for procedure. Did not want NT suction done while she's awake.   Radha Harper, CRT

## 2024-11-16 NOTE — PLAN OF CARE
Goal Outcome Evaluation:           Progress: no change  Outcome Evaluation: Pt rested well in bed through the shift. Pt has been sinus marcos since nurse came on shift. No signs of distress noted at this time

## 2024-11-16 NOTE — PROGRESS NOTES
Daily Progress Note          Assessment  Shortness of breath  Multifocal pneumonia   Multiple mucous plugs     possible aspiration due to large hiatal hernia  Localized right upper lobe wheezing, CT chest showed right upper lobe endobronchial opacity     Bronchoscopy 12/27/2023  Copious amount mucopurulent secretions suctioned therapeutically  Right lung washing was obtained  Moderate inflammatory changes     Hypoxia currently on 4 L     Pulmonary edema  Respiratory viral panel negative on 12/24/2023  History of pulmonary embolism 2018  Quit smoking 1992        Recommendations:  Patient has increased bronchial secretions and she is not able to clear it by herself, schedule bronchoscopy tomorrow    Continue Mucomyst    HR control as per cardiology: Diltiazem, sotalol, status post cardioversion 11/15/2024: Currently in sinus rhythm     Antibiotics: Rocephin    Prednisone    Mucinex  Encourage use of incentive spirometer          Oxygen supplement and titration to maintain saturation 90 to 95%: on 2 L  Bronchodilators     Chronic anticoagulation: Eliquis  Thyroid hormone replacement     I personally reviewed the radiological studies               LOS: 3 days     Subjective     Patient reports cough and shortness of breath    Objective     Vital signs for last 24 hours:  Vitals:    11/16/24 0854 11/16/24 1114 11/16/24 1117 11/16/24 1227   BP: 170/81   163/82   BP Location: Right arm   Right arm   Patient Position: Sitting   Lying   Pulse: 51 51 54 58   Resp: 18 12 15 16   Temp: 97.1 °F (36.2 °C)   97 °F (36.1 °C)   TempSrc: Infrared   Temporal   SpO2: 92% 95% 94% (!) 86%   Weight:       Height:           Intake/Output last 3 shifts:  I/O last 3 completed shifts:  In: 820 [P.O.:720; I.V.:100]  Out: 1850 [Urine:1850]  Intake/Output this shift:  No intake/output data recorded.      Radiology  Imaging Results (Last 24 Hours)       ** No results found for the last 24 hours. **            Labs:  Results from last 7 days   Lab  Units 11/15/24  0505   WBC 10*3/mm3 11.65*   HEMOGLOBIN g/dL 13.5   HEMATOCRIT % 43.4   PLATELETS 10*3/mm3 205     Results from last 7 days   Lab Units 11/15/24  0505 11/13/24  0522 11/12/24  1321   SODIUM mmol/L 143   < > 142   POTASSIUM mmol/L 4.4   < > 3.8   CHLORIDE mmol/L 105   < > 104   CO2 mmol/L 30.1*   < > 28.2   BUN mg/dL 34*   < > 22   CREATININE mg/dL 0.80   < > 0.73   CALCIUM mg/dL 8.7   < > 9.8   BILIRUBIN mg/dL  --   --  0.4   ALK PHOS U/L  --   --  128*   ALT (SGPT) U/L  --   --  23   AST (SGOT) U/L  --   --  28   GLUCOSE mg/dL 130*   < > 113*    < > = values in this interval not displayed.         Results from last 7 days   Lab Units 11/12/24  1321   ALBUMIN g/dL 3.8     Results from last 7 days   Lab Units 11/12/24  1427 11/12/24  1321   HSTROP T ng/L 15* 15*                           Meds:   SCHEDULE  acetylcysteine, 3 mL, Nebulization, BID - RT  apixaban, 5 mg, Oral, BID  cefTRIAXone, 2,000 mg, Intravenous, Q24H  flecainide, 50 mg, Oral, Q12H  ipratropium-albuterol, 3 mL, Nebulization, 4x Daily - RT  levothyroxine, 50 mcg, Oral, Daily  multivitamin with minerals, 1 tablet, Oral, Daily  pantoprazole, 40 mg, Oral, Daily  predniSONE, 40 mg, Oral, Daily With Breakfast      Infusions     PRNs    albuterol    senna-docusate sodium **AND** polyethylene glycol **AND** bisacodyl **AND** bisacodyl    Calcium Replacement - Follow Nurse / BPA Driven Protocol    guaiFENesin-codeine    hydrALAZINE    Magnesium Standard Dose Replacement - Follow Nurse / BPA Driven Protocol    Phosphorus Replacement - Follow Nurse / BPA Driven Protocol    Potassium Replacement - Follow Nurse / BPA Driven Protocol    sodium chloride    Physical Exam:  General Appearance:  Alert   HEENT:  Normocephalic, without obvious abnormality, Conjunctiva/corneas clear,.   Nares normal, no drainage     Neck:  Supple, symmetrical, trachea midline.   Lungs /Chest wall:   Bilateral basal rhonchi, respirations unlabored, symmetrical wall  movement.     Heart: Rate controlled, S1 S2 normal  Abdomen: Soft, non-tender, no masses, no organomegaly.    Extremities: No edema, no clubbing or cyanosis     ROS  Constitutional: Negative for chills, fever and malaise/fatigue.   HENT: Negative.    Eyes: Negative.    Cardiovascular: Negative.    Respiratory: Positive for cough and shortness of breath.    Skin: Negative.    Musculoskeletal: Negative.    Gastrointestinal: Negative.    Genitourinary: Negative.    Neurological: Generalized weakness    I reviewed the recent clinical results  I personally reviewed the latest radiological studies    Part of this note may be an electronic transcription/translation of spoken language to printed text using the Dragon Dictation System.

## 2024-11-16 NOTE — PROGRESS NOTES
[facility] MEDICINE SERVICE  DAILY PROGRESS NOTE    NAME: Camelia Gray  : 1945  MRN: [id number]      LOS: 3 days     PROVIDER OF SERVICE: Clemente Bowens MD    Chief Complaint: Shortness of breath    Subjective:     Interval History:  History taken from: patient chart    Patient states that she feels like she has a large amount of mucus in her upper airways.  She also complains of constipation and has not had a bowel movement in several days.    Review of Systems:   Review of Systems   Constitutional:  Negative for chills, diaphoresis, fatigue and fever.   HENT:  Positive for congestion.    Respiratory:  Positive for cough and shortness of breath.    Cardiovascular:  Negative for chest pain.   Gastrointestinal:  Negative for abdominal pain.   Neurological:  Negative for dizziness and headaches.    Objective:     Vital Signs  Temp:  [97 °F (36.1 °C)-98.1 °F (36.7 °C)] 97 °F (36.1 °C)  Heart Rate:  [32-58] 58  Resp:  [10-18] 16  BP: (135-170)/(68-91) 163/82  Flow (L/min) (Oxygen Therapy):  [2] 2   Body mass index is 48.49 kg/m².    Physical Exam  PHYSICAL EXAM  Constitutional:  Well-developed, well-nourished, no acute distress, nontoxic appearance   Eyes:  PERRL, conjunctivae normal, EOMI   HENT:  Atraumatic, external ears normal, nose normal, oropharynx moist, no pharyngeal exudates. Neck-normal range of motion, no tenderness, supple, trachea midline  Respiratory: Diminished with expiratory wheezes, nonlabored respirations without accessory muscle use patient on 2 L nasal cannula.  Cardiovascular:  Normal rate, normal rhythm, no murmurs, no gallops, no rubs   GI:  Soft, nondistended, normal bowel sounds, nontender, no organomegaly, no mass, no rebound, no guarding.  Mild BLE edema noted.  :  No costovertebral angle tenderness   Musculoskeletal:  No edema, no tenderness, no deformities  Integument:  Well hydrated, no rash.  Edema noted to bilateral lower extremities.  Lymphatic:  No lymphadenopathy  noted   Neurologic:  Alert & oriented x 3, CN 2-12 normal, normal motor function, normal sensory function, no focal deficits noted   Psychiatric:  Speech and behavior appropriate          Diagnostic Data    Results from last 7 days   Lab Units 11/15/24  0505 11/13/24  0522 11/12/24  1321   WBC 10*3/mm3 11.65*   < > 6.89   HEMOGLOBIN g/dL 13.5   < > 14.2   HEMATOCRIT % 43.4   < > 45.3   PLATELETS 10*3/mm3 205   < > 212   GLUCOSE mg/dL 130*   < > 113*   CREATININE mg/dL 0.80   < > 0.73   BUN mg/dL 34*   < > 22   SODIUM mmol/L 143   < > 142   POTASSIUM mmol/L 4.4   < > 3.8   AST (SGOT) U/L  --   --  28   ALT (SGPT) U/L  --   --  23   ALK PHOS U/L  --   --  128*   BILIRUBIN mg/dL  --   --  0.4   ANION GAP mmol/L 7.9   < > 9.8    < > = values in this interval not displayed.       No radiology results for the last day      I reviewed the patient's new clinical results.    Assessment/Plan:     Active and Resolved Problems  Active Hospital Problems    Diagnosis  POA    **Shortness of breath [R06.02]  Yes    Persistent atrial fibrillation [I48.19]  Unknown      Resolved Hospital Problems   No resolved problems to display.       Pneumonia with acute respiratory failure with hypoxia, unspecified organism  -Likely pneumonia with acute bronchospasms  -Imaging with CT does show right lower lobe infiltrate  -Continue IV antibiotics    -Encourage pulmonary toileting with I-S, flutter valve  -NT suction as needed  -Pulmonology consulted; patient may require bronchoscopy    Hypertension   -BP stable elevated most recent blood pressure 145/89  -PRN Hydralazine  -Continue usual home medication     Atrial fibrillation with RVR  -Patient has a history of A-fib, on Eliquis at home  -Home medications with sotalol and diltiazem were continued however patient still remained tachycardic  -Underwent DCCV on 11/15 but then developed bradycardia afterwards  -Sotalol discontinued and flecainide initiated by cardiology  -If patient remains  bradycardic she may require PPM placement     Hypothyroidism  -Continue Levothyroxine     GERD  -Continue PPI    VTE Prophylaxis:  Pharmacologic & mechanical VTE prophylaxis orders are present.      Disposition Planning:     Barriers to Discharge: Improved clinical status.  Anticipated Date of Discharge: 11/18/2024  Place of Discharge: Home      Time: 35 minutes     Code Status and Medical Interventions: CPR (Attempt to Resuscitate); Full Support   Ordered at: 11/12/24 1653     Code Status (Patient has no pulse and is not breathing):    CPR (Attempt to Resuscitate)     Medical Interventions (Patient has pulse or is breathing):    Full Support       Signature: Electronically signed by Clemente Bowens MD, 11/16/24, 12:34 EST.  [facility] Hospitalist Team

## 2024-11-16 NOTE — PROGRESS NOTES
"Cardiology [place]      Patient Care Team:  Brinda Tony APRN as PCP - General (Nurse Practitioner)  Kevin Angel MD as Consulting Physician (Cardiology)    Chief Complaint: As of breath    Subjective    Interval History and ROS:   Seen and examined on date of service  No acute events  2 L nasal cannula  Chest pain-free  Status post cardioversion, heart rates remain in the 40s overnight         History taken from: patient chart RN    Review of Systems   Constitutional: Negative for diaphoresis and malaise/fatigue.   Cardiovascular:  Negative for chest pain, irregular heartbeat, leg swelling, near-syncope, orthopnea, palpitations, paroxysmal nocturnal dyspnea and syncope.   Respiratory:  Positive for shortness of breath. Negative for cough.    Musculoskeletal:  Negative for myalgias.   Neurological:  Negative for dizziness, focal weakness, headaches and light-headedness.   Psychiatric/Behavioral:  Negative for altered mental status.    All other systems reviewed and are negative.      Objective  Blood pressure elevated today, A-fib with controlled rate    Vital Signs  Temp:  [97.6 °F (36.4 °C)-98.1 °F (36.7 °C)] 98.1 °F (36.7 °C)  Heart Rate:  [32-73] 45  Resp:  [10-22] 13  BP: ()/() 168/91  Oxygen Therapy  SpO2: 96 %  Pulse Oximetry Type: Continuous  Device (Oxygen Therapy): humidified, nasal cannula  Flow (L/min) (Oxygen Therapy): 2}  Flowsheet Rows      Flowsheet Row First Filed Value   Admission Height 180.3 cm (71\") Documented at 11/12/2024 1159   Admission Weight 150 kg (330 lb) Documented at 11/12/2024 1159            Physical Exam:    Physical Exam  Constitutional:       General: She is not in acute distress.     Appearance: She is obese. She is not diaphoretic.   HENT:      Head: Normocephalic and atraumatic.   Eyes:      Extraocular Movements: Extraocular movements intact.      Pupils: Pupils are equal, round, and reactive to light.   Neck:      Vascular: No carotid bruit. "   Cardiovascular:      Rate and Rhythm: Bradycardia present. Rhythm irregular.      Heart sounds: Normal heart sounds.   Pulmonary:      Effort: Pulmonary effort is normal. No respiratory distress.      Breath sounds: Normal breath sounds.      Comments: 2 L nasal cannula  Abdominal:      Palpations: Abdomen is soft.   Musculoskeletal:      Comments: Bilateral lower extremities wrapped with Ace   Skin:     Findings: No erythema.   Neurological:      Mental Status: She is alert and oriented to person, place, and time.   Psychiatric:         Mood and Affect: Mood normal.         Behavior: Behavior normal.         Thought Content: Thought content normal.         Judgment: Judgment normal.         Results Review:     I reviewed the patient's new clinical results.    Lab Results (last 24 hours)       Procedure Component Value Units Date/Time    Blood Culture - Blood, Hand, Left [[id number]]  (Normal) Collected: 11/12/24 1321    Specimen: Blood from Hand, Left Updated: 11/15/24 1330     Blood Culture No growth at 3 days    Narrative:      Less than seven (7) mL's of blood was collected.  Insufficient quantity may yield false negative results.    Blood Culture - Blood, Arm, Right [[id number]]  (Normal) Collected: 11/12/24 1236    Specimen: Blood from Arm, Right Updated: 11/15/24 1245     Blood Culture No growth at 3 days    Narrative:      Less than seven (7) mL's of blood was collected.  Insufficient quantity may yield false negative results.            Imaging Results (Last 24 Hours)       ** No results found for the last 24 hours. **            ECG/EMG Results (most recent)       Procedure Component Value Units Date/Time    ECG 12 Lead Dyspnea [[id number]] Collected: 11/12/24 1218     Updated: 11/13/24 0602     QT Interval 353 ms      QTC Interval 467 ms     Narrative:      HEART DICR=983  bpm  RR Plyyvmnk=553  ms  TN Interval=  ms  P Horizontal Axis=  deg  P Front Axis=  deg  QRSD Interval=87  ms  QT Otxilsvi=070   ms  HFzI=257  ms  QRS Axis=45  deg  T Wave Axis=8  deg  - ABNORMAL ECG -  Atrial fibrillation  Low voltage, precordial leads  When compared with ECG of 08-Feb-2024 12:03:26,  Significant change in rhythm: previously sinus  Electronically Signed By: Eduardo Anderson (VERONICA) 2024-11-13 06:01:52  Date and Time of Study:2024-11-12 12:18:10    Telemetry Scan [[id number]] Resulted: 11/12/24     Updated: 11/13/24 1432    Adult Transthoracic Echo Complete W/ Cont if Necessary Per Protocol [[id number]] Resulted: 11/13/24 1823     Updated: 11/13/24 1823     LVIDd 4.5 cm      LVIDs 3.0 cm      IVSd 1.10 cm      LVPWd 1.00 cm      FS 33.3 %      IVS/LVPW 1.10 cm      ESV(cubed) 27.0 ml      LV Sys Vol (BSA corrected) 11.3 cm2      EDV(cubed) 91.1 ml      LV [place] Vol (BSA corrected) 36.5 cm2      LV mass(C)d 164.0 grams      LVOT area 2.8 cm2      LVOT diam 1.90 cm      EDV(MOD-sp4) 97.3 ml      ESV(MOD-sp4) 30.0 ml      SV(MOD-sp4) 67.3 ml      SVi(MOD-SP4) 25.3 ml/m2      SVi (LVOT) 21.6 ml/m2      EF(MOD-sp4) 69.2 %      MV E max christiano 96.7 cm/sec      LA ESV Index (BP) 19.2 ml/m2      Lat Peak E' Christiano 10.4 cm/sec      TR max christiano 163.0 cm/sec      SV(LVOT) 57.6 ml      RVIDd 3.3 cm      RV S' 9.9 cm/sec      LA dimension (2D)  5.0 cm      LV V1 max 113.0 cm/sec      LV V1 max PG 5.1 mmHg      LV V1 mean PG 3.0 mmHg      LV V1 VTI 20.3 cm      Ao pk christiano 163.0 cm/sec      Ao max PG 10.6 mmHg      Ao mean PG 7.0 mmHg      Ao V2 VTI 32.1 cm      FEDERICO(I,D) 1.79 cm2      MV max PG 8.2 mmHg      MV mean PG 3.0 mmHg      MV V2 VTI 26.0 cm      MV P1/2t 66.6 msec      MVA(P1/2t) 3.3 cm2      MVA(VTI) 2.21 cm2      MV dec slope 616.0 cm/sec2      MR max christiano 390.0 cm/sec      MR max PG 60.8 mmHg      TR max PG 10.6 mmHg      RVSP(TR) 25.6 mmHg      RAP systole 15.0 mmHg      RV V1 max PG 2.9 mmHg      RV V1 max 85.4 cm/sec      RV V1 VTI 13.8 cm      PA V2 max 96.3 cm/sec      PA acc time 0.08 sec      ACS 1.80 cm      Sinus 2.7 cm       EF(MOD-bp) 69.0 %      Dimensionless Index 0.69 (DI)     Narrative:        Left ventricular systolic function is normal. Left ventricular ejection   fraction appears to be 56 - 60%.    Left ventricular wall thickness is consistent with borderline   concentric hypertrophy.    Left ventricular diastolic function was indeterminate.    The left atrial cavity is mildly dilated.    Left atrial volume is mildly increased.    There is moderate calcification of the aortic valve.    Estimated right ventricular systolic pressure from tricuspid   regurgitation is normal (<35 mmHg).      ECG 12 Lead Drug Monitoring [[id number]] Collected: 11/14/24 0258     Updated: 11/14/24 0259     QT Interval 402 ms      QTC Interval 484 ms     Narrative:      HEART RATE=87  bpm  RR Dwrdvjol=531  ms  GA Interval=  ms  P Horizontal Axis=  deg  P Front Axis=  deg  QRSD Interval=91  ms  QT Kwygarfl=129  ms  DKoN=155  ms  QRS Axis=39  deg  T Wave Axis=42  deg  - ABNORMAL ECG -  Atrial fibrillation  Low voltage, precordial leads  Date and Time of Study:2024-11-14 02:58:53    Telemetry Scan [[id number]] Resulted: 11/12/24     Updated: 11/14/24 1333    Telemetry Scan [[id number]] Resulted: 11/12/24     Updated: 11/14/24 1416    Telemetry Scan [[id number]] Resulted: 11/12/24     Updated: 11/14/24 1449    Telemetry Scan [[id number]] Resulted: 11/12/24     Updated: 11/14/24 1519    Telemetry Scan [[id number]] Resulted: 11/12/24     Updated: 11/15/24 1658    Telemetry Scan [[id number]] Resulted: 11/12/24     Updated: 11/15/24 1743            Medication Review:   I have reviewed the patient's current medication list    Current Facility-Administered Medications:     acetylcysteine (MUCOMYST) 20 % nebulizer solution 3 mL, 3 mL, Nebulization, BID - RT, Reji Burch MD, 3 mL at 11/16/24 0654    albuterol (PROVENTIL) nebulizer solution 0.083% 2.5 mg/3mL, 2.5 mg, Nebulization, Q6H PRN, Jessika Hall APRN, 2.5 mg at 11/14/24 0451    apixaban (ELIQUIS) tablet 5  mg, 5 mg, Oral, BID, Jessika Hall APRN, 5 mg at 11/15/24 2100    sennosides-docusate (PERICOLACE) 8.6-50 MG per tablet 2 tablet, 2 tablet, Oral, BID PRN **AND** polyethylene glycol (MIRALAX) packet 17 g, 17 g, Oral, Daily PRN **AND** bisacodyl (DULCOLAX) EC tablet 5 mg, 5 mg, Oral, Daily PRN **AND** bisacodyl (DULCOLAX) suppository 10 mg, 10 mg, Rectal, Daily PRN, Jessika Hall, APRN    Calcium Replacement - Follow Nurse / BPA Driven Protocol, , Not Applicable, PRN, Jessika Hall APRN    cefTRIAXone (ROCEPHIN) 2,000 mg in sodium chloride 0.9 % 100 mL MBP, 2,000 mg, Intravenous, Q24H, Jessi Kirk APRN, Last Rate: 200 mL/hr at 11/15/24 1406, 2,000 mg at 11/15/24 1406    guaiFENesin-codeine (GUAIFENESIN AC) 100-10 MG/5ML liquid 5 mL, 5 mL, Oral, Q4H PRN, Clemente Bowens MD, 5 mL at 11/16/24 0529    hydrALAZINE (APRESOLINE) injection 10 mg, 10 mg, Intravenous, Q6H PRN, Gabby Morris DO, 10 mg at 11/15/24 0850    ipratropium-albuterol (DUO-NEB) nebulizer solution 3 mL, 3 mL, Nebulization, 4x Daily - RT, Jessika Hall APRN, 3 mL at 11/16/24 0654    levothyroxine (SYNTHROID, LEVOTHROID) tablet 50 mcg, 50 mcg, Oral, Daily, Jessika Hall APRN, 50 mcg at 11/15/24 0828    Magnesium Standard Dose Replacement - Follow Nurse / BPA Driven Protocol, , Not Applicable, PRN, Jessika Hall, APRN    multivitamin with minerals 1 tablet, 1 tablet, Oral, Daily, Jessika Hall APRN, 1 tablet at 11/15/24 0827    pantoprazole (PROTONIX) EC tablet 40 mg, 40 mg, Oral, Daily, Jessika Hall APRN, 40 mg at 11/15/24 0827    Phosphorus Replacement - Follow Nurse / BPA Driven Protocol, , Not Applicable, PRN, Jessika Hall APRN    Potassium Replacement - Follow Nurse / BPA Driven Protocol, , Not Applicable, PRN, Jessika Hall APRN    predniSONE (DELTASONE) tablet 40 mg, 40 mg, Oral, Daily With Breakfast, Clemente Bowens MD, 40 mg at 11/15/24 0827    sodium chloride 0.9 % flush 10 mL, 10 mL, Intravenous, PRN, Monica  Eduardo PEREZ MD, 10 mL at 11/15/24 0828    sotalol (BETAPACE) tablet 40 mg, 40 mg, Oral, BID, Bertin Stone MD, 40 mg at 11/15/24 2100    Assessment & Plan     Principal Problem:    Shortness of breath     Persistent atrial fibrillation  Has remained in controlled rate overnight  Cardioversion with Dr. Javed this a.m.  Currently on sotalol 80 mg twice daily, diltiazem 60 mg every 8 hours  Uninterrupted anticoagulation on Eliquis, continue in house or if needed can change to Lovenox therapeutic dose     Dyspnea with exertion  Being treated for pneumonia, also COPD with Solu-Medrol and DuoNebs  Chronic lower extremity edema, BNP is normal  EF preserved 55 to 60%, normal estimated right-sided pressures  No evidence of pulmonary edema on imaging.  Mild diastolic dysfunction chronically as assessed in sinus rhythm previously, indeterminate with underlying atrial fibrillation, known pzik-sg-euch variability  CT with right lower lobe endobronchial opacification and consolidative changes in right lower lobe  On nebs, steroids, and antibiotics     history of pulmonary embolism  Currently anticoagulated with Eliquis therapeutic dose uninterrupted     Hypertension  BP this a.m. 178/104   diltiazem added with benefit of rate control, currently 60 mg 3 times daily  Dose was not given last night  Will increase diltiazem  Advised bedside nursing to utilize as needed labetalol now  Reassess today after cardioversion for possibly increasing diltiazem dose      Continue treatment for pneumonia and COPD from a pulmonary standpoint  EF is preserved with okcj-ku-luse variability 65% EF, indeterminate diastolic function  BUN/creatinine ratio elevated at 42.5, BUN 34, creatinine 0.8, EGFR 75.5  CO2 30.1, Na+ 143, no significant pulmonary edema seen on imaging  Continue Eliquis uninterrupted twice daily therapeutic dose, history of both atrial fibrillation and thromboembolic disease with DVT PE  Continue uninterrupted  telemetry  Cardioversion today     EP considering ablation to maintain sinus rhythm versus changing her antiarrhythmic therapy, will consider flecainide with normal EF, negative stress test, no history of any coronary artery disease or structural heart disease       Remains bradycardic, stop sotalol,   Start flecainide 50 twice daily  Get EKG to check QT      Further recommendations to follow findings and clinical course  Kevin Angel MD, PhD    Kevin Angel MD, PhD    Kevin Angel MD  11/16/24  07:39 EST

## 2024-11-17 ENCOUNTER — ANESTHESIA (OUTPATIENT)
Dept: GASTROENTEROLOGY | Facility: HOSPITAL | Age: 79
End: 2024-11-17
Payer: MEDICARE

## 2024-11-17 ENCOUNTER — ANESTHESIA EVENT (OUTPATIENT)
Dept: GASTROENTEROLOGY | Facility: HOSPITAL | Age: 79
End: 2024-11-17
Payer: MEDICARE

## 2024-11-17 LAB
ANION GAP SERPL CALCULATED.3IONS-SCNC: 7.7 MMOL/L (ref 5–15)
BACTERIA SPEC AEROBE CULT: NORMAL
BACTERIA SPEC AEROBE CULT: NORMAL
BASOPHILS # BLD AUTO: 0.02 10*3/MM3 (ref 0–0.2)
BASOPHILS NFR BLD AUTO: 0.2 % (ref 0–1.5)
BUN SERPL-MCNC: 30 MG/DL (ref 8–23)
BUN/CREAT SERPL: 43.5 (ref 7–25)
CALCIUM SPEC-SCNC: 8.6 MG/DL (ref 8.6–10.5)
CHLORIDE SERPL-SCNC: 104 MMOL/L (ref 98–107)
CO2 SERPL-SCNC: 30.3 MMOL/L (ref 22–29)
CREAT SERPL-MCNC: 0.69 MG/DL (ref 0.57–1)
DEPRECATED RDW RBC AUTO: 51.9 FL (ref 37–54)
EGFRCR SERPLBLD CKD-EPI 2021: 89 ML/MIN/1.73
EOSINOPHIL # BLD AUTO: 0.01 10*3/MM3 (ref 0–0.4)
EOSINOPHIL NFR BLD AUTO: 0.1 % (ref 0.3–6.2)
ERYTHROCYTE [DISTWIDTH] IN BLOOD BY AUTOMATED COUNT: 15.8 % (ref 12.3–15.4)
GLUCOSE SERPL-MCNC: 107 MG/DL (ref 65–99)
HCT VFR BLD AUTO: 44.8 % (ref 34–46.6)
HGB BLD-MCNC: 14 G/DL (ref 12–15.9)
IMM GRANULOCYTES # BLD AUTO: 0.13 10*3/MM3 (ref 0–0.05)
IMM GRANULOCYTES NFR BLD AUTO: 1.1 % (ref 0–0.5)
LYMPHOCYTES # BLD AUTO: 0.82 10*3/MM3 (ref 0.7–3.1)
LYMPHOCYTES NFR BLD AUTO: 7.2 % (ref 19.6–45.3)
MCH RBC QN AUTO: 28.3 PG (ref 26.6–33)
MCHC RBC AUTO-ENTMCNC: 31.3 G/DL (ref 31.5–35.7)
MCV RBC AUTO: 90.5 FL (ref 79–97)
MONOCYTES # BLD AUTO: 0.83 10*3/MM3 (ref 0.1–0.9)
MONOCYTES NFR BLD AUTO: 7.2 % (ref 5–12)
NEUTROPHILS NFR BLD AUTO: 84.2 % (ref 42.7–76)
NEUTROPHILS NFR BLD AUTO: 9.64 10*3/MM3 (ref 1.7–7)
NRBC BLD AUTO-RTO: 0 /100 WBC (ref 0–0.2)
PLATELET # BLD AUTO: 202 10*3/MM3 (ref 140–450)
PMV BLD AUTO: 9.4 FL (ref 6–12)
POTASSIUM SERPL-SCNC: 4.6 MMOL/L (ref 3.5–5.2)
QT INTERVAL: 402 MS
QT INTERVAL: 456 MS
QTC INTERVAL: 414 MS
QTC INTERVAL: 484 MS
RBC # BLD AUTO: 4.95 10*6/MM3 (ref 3.77–5.28)
SODIUM SERPL-SCNC: 142 MMOL/L (ref 136–145)
WBC NRBC COR # BLD AUTO: 11.45 10*3/MM3 (ref 3.4–10.8)

## 2024-11-17 PROCEDURE — 94761 N-INVAS EAR/PLS OXIMETRY MLT: CPT

## 2024-11-17 PROCEDURE — 0B9J8ZX DRAINAGE OF LEFT LOWER LUNG LOBE, VIA NATURAL OR ARTIFICIAL OPENING ENDOSCOPIC, DIAGNOSTIC: ICD-10-PCS | Performed by: INTERNAL MEDICINE

## 2024-11-17 PROCEDURE — 87071 CULTURE AEROBIC QUANT OTHER: CPT | Performed by: INTERNAL MEDICINE

## 2024-11-17 PROCEDURE — 88108 CYTOPATH CONCENTRATE TECH: CPT | Performed by: INTERNAL MEDICINE

## 2024-11-17 PROCEDURE — 87102 FUNGUS ISOLATION CULTURE: CPT | Performed by: INTERNAL MEDICINE

## 2024-11-17 PROCEDURE — 94664 DEMO&/EVAL PT USE INHALER: CPT

## 2024-11-17 PROCEDURE — 99233 SBSQ HOSP IP/OBS HIGH 50: CPT | Performed by: INTERNAL MEDICINE

## 2024-11-17 PROCEDURE — 87798 DETECT AGENT NOS DNA AMP: CPT | Performed by: INTERNAL MEDICINE

## 2024-11-17 PROCEDURE — 25010000002 PROPOFOL 10 MG/ML EMULSION: Performed by: NURSE ANESTHETIST, CERTIFIED REGISTERED

## 2024-11-17 PROCEDURE — 87206 SMEAR FLUORESCENT/ACID STAI: CPT | Performed by: INTERNAL MEDICINE

## 2024-11-17 PROCEDURE — 25010000002 LIDOCAINE 2% SOLUTION: Performed by: INTERNAL MEDICINE

## 2024-11-17 PROCEDURE — 3E1F88Z IRRIGATION OF RESPIRATORY TRACT USING IRRIGATING SUBSTANCE, VIA NATURAL OR ARTIFICIAL OPENING ENDOSCOPIC: ICD-10-PCS | Performed by: INTERNAL MEDICINE

## 2024-11-17 PROCEDURE — 94799 UNLISTED PULMONARY SVC/PX: CPT

## 2024-11-17 PROCEDURE — 87116 MYCOBACTERIA CULTURE: CPT | Performed by: INTERNAL MEDICINE

## 2024-11-17 PROCEDURE — 25010000002 LIDOCAINE PF 2% 2 % SOLUTION: Performed by: NURSE ANESTHETIST, CERTIFIED REGISTERED

## 2024-11-17 PROCEDURE — 80048 BASIC METABOLIC PNL TOTAL CA: CPT | Performed by: INTERNAL MEDICINE

## 2024-11-17 PROCEDURE — 85025 COMPLETE CBC W/AUTO DIFF WBC: CPT | Performed by: INTERNAL MEDICINE

## 2024-11-17 PROCEDURE — 87205 SMEAR GRAM STAIN: CPT | Performed by: INTERNAL MEDICINE

## 2024-11-17 PROCEDURE — 25010000002 CEFTRIAXONE PER 250 MG: Performed by: NURSE PRACTITIONER

## 2024-11-17 PROCEDURE — 25010000002 FUROSEMIDE PER 20 MG: Performed by: INTERNAL MEDICINE

## 2024-11-17 PROCEDURE — 63710000001 PREDNISONE PER 1 MG: Performed by: INTERNAL MEDICINE

## 2024-11-17 PROCEDURE — 25010000002 NICARDIPINE 2.5 MG/ML SOLUTION: Performed by: NURSE ANESTHETIST, CERTIFIED REGISTERED

## 2024-11-17 RX ORDER — ONDANSETRON 2 MG/ML
4 INJECTION INTRAMUSCULAR; INTRAVENOUS ONCE AS NEEDED
Status: DISCONTINUED | OUTPATIENT
Start: 2024-11-17 | End: 2024-11-17 | Stop reason: HOSPADM

## 2024-11-17 RX ORDER — EPHEDRINE SULFATE 5 MG/ML
5 INJECTION INTRAVENOUS ONCE AS NEEDED
Status: DISCONTINUED | OUTPATIENT
Start: 2024-11-17 | End: 2024-11-17 | Stop reason: HOSPADM

## 2024-11-17 RX ORDER — FUROSEMIDE 40 MG/1
40 TABLET ORAL DAILY
Status: DISCONTINUED | OUTPATIENT
Start: 2024-11-18 | End: 2024-11-22 | Stop reason: HOSPADM

## 2024-11-17 RX ORDER — HYDRALAZINE HYDROCHLORIDE 20 MG/ML
5 INJECTION INTRAMUSCULAR; INTRAVENOUS
Status: DISCONTINUED | OUTPATIENT
Start: 2024-11-17 | End: 2024-11-17 | Stop reason: HOSPADM

## 2024-11-17 RX ORDER — FUROSEMIDE 10 MG/ML
40 INJECTION INTRAMUSCULAR; INTRAVENOUS ONCE
Status: COMPLETED | OUTPATIENT
Start: 2024-11-17 | End: 2024-11-17

## 2024-11-17 RX ORDER — FLECAINIDE ACETATE 50 MG/1
100 TABLET ORAL EVERY 12 HOURS SCHEDULED
Status: DISCONTINUED | OUTPATIENT
Start: 2024-11-17 | End: 2024-11-19

## 2024-11-17 RX ORDER — PROPOFOL 10 MG/ML
VIAL (ML) INTRAVENOUS AS NEEDED
Status: DISCONTINUED | OUTPATIENT
Start: 2024-11-17 | End: 2024-11-17 | Stop reason: SURG

## 2024-11-17 RX ORDER — LABETALOL HYDROCHLORIDE 5 MG/ML
5 INJECTION, SOLUTION INTRAVENOUS
Status: DISCONTINUED | OUTPATIENT
Start: 2024-11-17 | End: 2024-11-17 | Stop reason: HOSPADM

## 2024-11-17 RX ORDER — SODIUM CHLORIDE 9 MG/ML
INJECTION, SOLUTION INTRAVENOUS CONTINUOUS PRN
Status: DISCONTINUED | OUTPATIENT
Start: 2024-11-17 | End: 2024-11-17 | Stop reason: SURG

## 2024-11-17 RX ORDER — NICARDIPINE HYDROCHLORIDE 2.5 MG/ML
INJECTION INTRAVENOUS AS NEEDED
Status: DISCONTINUED | OUTPATIENT
Start: 2024-11-17 | End: 2024-11-17 | Stop reason: SURG

## 2024-11-17 RX ORDER — LIDOCAINE HYDROCHLORIDE 20 MG/ML
INJECTION, SOLUTION EPIDURAL; INFILTRATION; INTRACAUDAL; PERINEURAL AS NEEDED
Status: DISCONTINUED | OUTPATIENT
Start: 2024-11-17 | End: 2024-11-17 | Stop reason: SURG

## 2024-11-17 RX ORDER — LIDOCAINE HYDROCHLORIDE 20 MG/ML
INJECTION, SOLUTION INFILTRATION; PERINEURAL AS NEEDED
Status: DISCONTINUED | OUTPATIENT
Start: 2024-11-17 | End: 2024-11-17 | Stop reason: HOSPADM

## 2024-11-17 RX ORDER — LIDOCAINE HYDROCHLORIDE 20 MG/ML
JELLY TOPICAL AS NEEDED
Status: DISCONTINUED | OUTPATIENT
Start: 2024-11-17 | End: 2024-11-17 | Stop reason: HOSPADM

## 2024-11-17 RX ORDER — HYDRALAZINE HYDROCHLORIDE 25 MG/1
25 TABLET, FILM COATED ORAL EVERY 8 HOURS SCHEDULED
Status: DISCONTINUED | OUTPATIENT
Start: 2024-11-17 | End: 2024-11-18

## 2024-11-17 RX ORDER — IPRATROPIUM BROMIDE AND ALBUTEROL SULFATE 2.5; .5 MG/3ML; MG/3ML
3 SOLUTION RESPIRATORY (INHALATION) ONCE AS NEEDED
Status: DISCONTINUED | OUTPATIENT
Start: 2024-11-17 | End: 2024-11-17 | Stop reason: HOSPADM

## 2024-11-17 RX ADMIN — SODIUM CHLORIDE: 9 INJECTION, SOLUTION INTRAVENOUS at 09:09

## 2024-11-17 RX ADMIN — IPRATROPIUM BROMIDE AND ALBUTEROL SULFATE 3 ML: .5; 3 SOLUTION RESPIRATORY (INHALATION) at 11:34

## 2024-11-17 RX ADMIN — NICARDIPINE HYDROCHLORIDE 250 MCG: 25 INJECTION, SOLUTION INTRAVENOUS at 09:23

## 2024-11-17 RX ADMIN — HYDRALAZINE HYDROCHLORIDE 25 MG: 25 TABLET ORAL at 20:56

## 2024-11-17 RX ADMIN — CEFTRIAXONE 2000 MG: 2 INJECTION, POWDER, FOR SOLUTION INTRAMUSCULAR; INTRAVENOUS at 12:45

## 2024-11-17 RX ADMIN — IPRATROPIUM BROMIDE AND ALBUTEROL SULFATE 3 ML: .5; 3 SOLUTION RESPIRATORY (INHALATION) at 06:50

## 2024-11-17 RX ADMIN — Medication 1 TABLET: at 12:42

## 2024-11-17 RX ADMIN — ACETYLCYSTEINE 3 ML: 200 SOLUTION ORAL; RESPIRATORY (INHALATION) at 06:50

## 2024-11-17 RX ADMIN — PROPOFOL 80 MG: 10 INJECTION, EMULSION INTRAVENOUS at 09:16

## 2024-11-17 RX ADMIN — PREDNISONE 40 MG: 20 TABLET ORAL at 12:42

## 2024-11-17 RX ADMIN — APIXABAN 5 MG: 5 TABLET, FILM COATED ORAL at 20:56

## 2024-11-17 RX ADMIN — PANTOPRAZOLE SODIUM 40 MG: 40 TABLET, DELAYED RELEASE ORAL at 12:42

## 2024-11-17 RX ADMIN — NICARDIPINE HYDROCHLORIDE 250 MCG: 25 INJECTION, SOLUTION INTRAVENOUS at 09:17

## 2024-11-17 RX ADMIN — FLECAINIDE ACETATE 50 MG: 50 TABLET ORAL at 12:42

## 2024-11-17 RX ADMIN — FLECAINIDE ACETATE 100 MG: 50 TABLET ORAL at 20:56

## 2024-11-17 RX ADMIN — SENNOSIDES AND DOCUSATE SODIUM 2 TABLET: 50; 8.6 TABLET ORAL at 20:56

## 2024-11-17 RX ADMIN — FUROSEMIDE 40 MG: 10 INJECTION, SOLUTION INTRAMUSCULAR; INTRAVENOUS at 17:44

## 2024-11-17 RX ADMIN — Medication 10 ML: at 20:57

## 2024-11-17 RX ADMIN — IPRATROPIUM BROMIDE AND ALBUTEROL SULFATE 3 ML: .5; 3 SOLUTION RESPIRATORY (INHALATION) at 14:34

## 2024-11-17 RX ADMIN — LEVOTHYROXINE SODIUM 50 MCG: 0.05 TABLET ORAL at 12:42

## 2024-11-17 RX ADMIN — IPRATROPIUM BROMIDE AND ALBUTEROL SULFATE 3 ML: .5; 3 SOLUTION RESPIRATORY (INHALATION) at 19:50

## 2024-11-17 RX ADMIN — LIDOCAINE HYDROCHLORIDE 100 MG: 20 INJECTION, SOLUTION EPIDURAL; INFILTRATION; INTRACAUDAL; PERINEURAL at 09:16

## 2024-11-17 NOTE — PROGRESS NOTES
[facility] MEDICINE SERVICE  DAILY PROGRESS NOTE    NAME: Camelia Gray  : 1945  MRN: [id number]      LOS: 4 days     PROVIDER OF SERVICE: Clemente Bowens MD    Chief Complaint: Shortness of breath    Subjective:     Interval History:  History taken from: patient chart    Patient underwent bronchoscopy this morning.  She states that overall she feels less congested.    Review of Systems:   Review of Systems   Constitutional:  Negative for chills, diaphoresis, fatigue and fever.   HENT:  Positive for congestion.    Respiratory:  Positive for cough and shortness of breath.    Cardiovascular:  Negative for chest pain.   Gastrointestinal:  Negative for abdominal pain.   Neurological:  Negative for dizziness and headaches.    Objective:     Vital Signs  Temp:  [97 °F (36.1 °C)-98.4 °F (36.9 °C)] 98.4 °F (36.9 °C)  Heart Rate:  [43-71] 59  Resp:  [12-21] 15  BP: (138-194)/(65-97) 163/75  Flow (L/min) (Oxygen Therapy):  [2-6] 2   Body mass index is 48.74 kg/m².    Physical Exam  PHYSICAL EXAM  Constitutional:  Well-developed, well-nourished, no acute distress, nontoxic appearance   Eyes:  PERRL, conjunctivae normal, EOMI   HENT:  Atraumatic, external ears normal, nose normal, oropharynx moist, no pharyngeal exudates. Neck-normal range of motion, no tenderness, supple, trachea midline  Respiratory: Improved expiratory wheezing and improved air entry bilaterally  Cardiovascular:  Normal rate, normal rhythm, no murmurs, no gallops, no rubs   GI:  Soft, nondistended, normal bowel sounds, nontender, no organomegaly, no mass, no rebound, no guarding.  Mild BLE edema noted.  :  No costovertebral angle tenderness   Musculoskeletal:  No edema, no tenderness, no deformities  Integument:  Well hydrated, no rash.  Edema noted to bilateral lower extremities.  Lymphatic:  No lymphadenopathy noted   Neurologic:  Alert & oriented x 3, CN 2-12 normal, normal motor function, normal sensory function, no focal deficits noted    Psychiatric:  Speech and behavior appropriate          Diagnostic Data    Results from last 7 days   Lab Units 11/17/24  0258 11/13/24  0522 11/12/24  1321   WBC 10*3/mm3 11.45*   < > 6.89   HEMOGLOBIN g/dL 14.0   < > 14.2   HEMATOCRIT % 44.8   < > 45.3   PLATELETS 10*3/mm3 202   < > 212   GLUCOSE mg/dL 107*   < > 113*   CREATININE mg/dL 0.69   < > 0.73   BUN mg/dL 30*   < > 22   SODIUM mmol/L 142   < > 142   POTASSIUM mmol/L 4.6   < > 3.8   AST (SGOT) U/L  --   --  28   ALT (SGPT) U/L  --   --  23   ALK PHOS U/L  --   --  128*   BILIRUBIN mg/dL  --   --  0.4   ANION GAP mmol/L 7.7   < > 9.8    < > = values in this interval not displayed.       No radiology results for the last day      I reviewed the patient's new clinical results.    Assessment/Plan:     Active and Resolved Problems  Active Hospital Problems    Diagnosis  POA    **Shortness of breath [R06.02]  Yes    Persistent atrial fibrillation [I48.19]  Unknown    Multiple tracheobronchial mucus plugs [T17.800A]  Unknown    Atelectasis [J98.11]  Unknown      Resolved Hospital Problems   No resolved problems to display.       Pneumonia with acute respiratory failure with hypoxia, unspecified organism  -Likely pneumonia with acute bronchospasms  -Imaging with CT does show right lower lobe infiltrate  -Continue IV antibiotics    -Encourage pulmonary toileting with I-S, flutter valve  -NT suction as needed  -Pulmonology consulted; patient underwent bronchoscopy on 11/17 with multiple mucous plugs removed  -Follow-up BAL cultures    Hypertension   -BP stable elevated most recent blood pressure 145/89  -PRN Hydralazine  -Continue usual home medication     Atrial fibrillation with RVR  -Patient has a history of A-fib, on Eliquis at home  -Home medications with sotalol and diltiazem were continued however patient still remained tachycardic  -Underwent DCCV on 11/15 but then developed bradycardia afterwards  -Sotalol discontinued and flecainide initiated by  cardiology  -If patient remains bradycardic she may require PPM placement     Hypothyroidism  -Continue Levothyroxine     GERD  -Continue PPI    VTE Prophylaxis:  Pharmacologic & mechanical VTE prophylaxis orders are present.      Disposition Planning:     Barriers to Discharge: Improved clinical status.  Anticipated Date of Discharge: 11/20/2024  Place of Discharge: Home      Time: 35 minutes     Code Status and Medical Interventions: CPR (Attempt to Resuscitate); Full Support   Ordered at: 11/12/24 1923     Code Status (Patient has no pulse and is not breathing):    CPR (Attempt to Resuscitate)     Medical Interventions (Patient has pulse or is breathing):    Full Support       Signature: Electronically signed by Clemente Bowens MD, 11/17/24, 12:18 EST.  [facility] Hospitalist Team

## 2024-11-17 NOTE — ANESTHESIA POSTPROCEDURE EVALUATION
Patient: Camelia Gray    Procedure Summary       Date: 11/17/24 Room / Location: [facility] ENDOSCOPY 2 / [facility] ENDOSCOPY    Anesthesia Start: 0909 Anesthesia Stop: 0934    Procedure: BRONCHOSCOPY WITH BILATERAL LUNG LAVAGE (Bronchus) Diagnosis:       Multiple tracheobronchial mucus plugs      Atelectasis      (Multiple tracheobronchial mucus plugs [T17.800A])      (Atelectasis [J98.11])    Surgeons: Reji Burch MD Provider: Eulalia Gallagher CRNA    Anesthesia Type: general ASA Status: 4            Anesthesia Type: general    Vitals  Vitals Value Taken Time   /91 11/17/24 0946   Temp     Pulse 61 11/17/24 0948   Resp 16 11/17/24 0938   SpO2 94 % 11/17/24 0948   Vitals shown include unfiled device data.        Post Anesthesia Care and Evaluation    Patient location during evaluation: bedside  Patient participation: complete - patient participated  Level of consciousness: awake  Pain score: 0  Pain management: adequate    Airway patency: patent  Anesthetic complications: No anesthetic complications  PONV Status: none  Cardiovascular status: hypertensive  Respiratory status: acceptable, nasal cannula and spontaneous ventilation  Hydration status: acceptable

## 2024-11-17 NOTE — PLAN OF CARE
Goal Outcome Evaluation:  Plan of Care Reviewed With: patient        Progress: improving     Pt. Maintained oxygenation, sinus bradycardia. Maintained on NPO post midnight for bronchoscopy today, consent was secured.

## 2024-11-17 NOTE — SIGNIFICANT NOTE
11/17/24 1302   OTHER   Discipline physical therapy assistant   Rehab Time/Intention   Session Not Performed other (see comments)  (pt had bronch and now bradycardia, may be getting PPM tomorrow)   Recommendation   PT - Next Appointment 11/18/24         08/22/18 0800   General Information   Type of Visit Initial OP Ortho PT Evaluation   Start of Care Date 08/22/18   Referring Physician BEN More CNP   Orders Evaluate and Treat   Date of Order 08/14/18   Insurance Type Medicare   Medical Diagnosis Neck Pain   Surgical/Medical history reviewed Yes   Precautions/Limitations no known precautions/limitations   Body Part(s)   Body Part(s) Cervical Spine   Presentation and Etiology   Pertinent history of current problem (include personal factors and/or comorbidities that impact the POC) Pt reports that in February he had an ear infection. Over the course of a couple of treatments of antibiotics, his ear cleared up ,but, he had this residual L neck pain and stiffness.    Impairments A. Pain;D. Decreased ROM;E. Decreased flexibility;N. Headaches   Functional Limitations perform activities of daily living   Symptom Location Base of L skull and into L side of neck   How/Where did it occur Other   Onset date of current episode/exacerbation 02/14/18   Chronicity Chronic   Pain rating (0-10 point scale) Best (/10);Worst (/10)   Best (/10) 0   Worst (/10) 3   Pain quality B. Dull   Frequency of pain/symptoms B. Intermittent   Pain/symptoms are: Other   Pain symptoms comment Pain just comes and goes   Pain/symptoms exacerbated by M. Other   Pain exacerbation comment Turning head to the L, working on [employer] trails, cutting fire wood   Pain/symptoms eased by D. Nothing   Progression of symptoms since onset: Unchanged   Prior Level of Function   Prior Level of Function-Mobility Independent   Prior Level of Function-ADLs Independent   Current Level of Function   Patient role/employment history F. Retired   Living environment [place]   Fall Risk Screen   Fall screen completed by PT   Have you fallen 2 or more times in the past year? No   Have you fallen and had an injury in the past year? No   Is patient a fall risk? No   Cervical Spine   Observation General Listening:  posterior L; Loading: decrease L/L shoulder   Posture FH&N, rounded shoulders, elevated R shoulder girdle, localized CT kyphosis, increase thoracic kyphosis, flattening of the lumbar spine   Cervical Flexion ROM 52 degrees   Cervical Extension ROM 42 degrees   Cervical Right Side Bending ROM 20 degrees   Cervical Left Side Bending ROM 24 degrees   Cervical Right Rotation ROM 62 degrees   Cervical Left Rotation ROM 58 degrees   Segmental Mobility-Cervical Decrease C2-3 bilateral SB, L rot-flexion , and R rot-extnsion   Segmental Mobility-Thoracic FRS-L T3   Palpation Flexibility: + pectoralis, latisimus dorsi , and levator scapula bilaterally; Myofascial assessment: + clavipectoral fibers   Neurological Testing Comments + C5-6-7 brachial plexus   Planned Therapy Interventions   Planned Therapy Interventions joint mobilization;manual therapy;neuromuscular re-education;ROM;stretching;strengthening   Clinical Impression   Criteria for Skilled Therapeutic Interventions Met yes, treatment indicated   PT Diagnosis Cervical and thoracic segmental dysfunctions and myofascial and dural tightness   Clinical Presentation Evolving/Changing   Clinical Decision Making (Complexity) Moderate complexity   Therapy Frequency 2 times/Week   Predicted Duration of Therapy Intervention (days/wks) 12 weeks   Risk & Benefits of therapy have been explained Yes   Patient, Family & other staff in agreement with plan of care Yes   Education Assessment   Preferred Learning Style Listening;Reading;Demonstration;Pictures/video   Barriers to Learning No barriers   ORTHO GOALS   PT Ortho Eval Goals 1;2;3   Ortho Goal 1   Goal Identifier Driving   Goal Description Pt will tolerate turning head to the L without increasing pain > 0-2/10   Target Date 11/20/18   Ortho Goal 2   Goal Identifier Headaches   Goal Description Pt will resolve headaches   Target Date 11/20/18   Ortho Goal 3   Goal Identifier Outdoor's Work   Goal Description Pt will tolerate  working on [place] trails and cutting wood without increasing pain> 0-2/10   Target Date 11/20/18   Total Evaluation Time   Total Evaluation Time 30   Therapy Certification   Certification date from 08/22/18   Certification date to 11/20/18   Medical Diagnosis Neck Pain

## 2024-11-17 NOTE — OP NOTE
Bronchoscopy Procedure Note    Procedure:  Bronchoscopy, Diagnostic  Bronchoscopy, Therapeutic lavage of multiple mucous plugs  Bronchoalveolar lavage, BAL from left lower lobe    Pre-Operative Diagnosis: Atelectasis, multiple mucous plugs    Post-Operative Diagnosis: Same    Indication: Atelectasis, multiple mucous plugs and patient unable to clear secretions    Anesthesia: Monitored Anesthesia Care (MAC)    Procedure Details: Patient was consented for the procedure with all risk and benefit of the procedure explained in detail.  Patient was given the opportunity to ask questions and all concerns were answered.    Timeout was done in the standard manner   the bronchoscope was inserted into the main airway via the oropharynx. An anatomical survey was done of the main airways and the subsegmental bronchus of the 5 lobes.  The findings are consistent of multiple thick white mucous plugs from 5 lobes.  A therapeutic lavage was performed using aliquots of normal saline instilled into the airways then aspirated back until clear.    Estimated Blood Loss: None           Specimens: BAL from left lower lobe was obtained by instilling 90 mL of sterile normal saline and return of 45 mL                Complications:  None; patient tolerated the procedure well.           Disposition: PACU - hemodynamically stable.    Post op plan:  Resume p.o. after 2 hours  Follow-up results    Patient tolerated the procedure well.

## 2024-11-17 NOTE — PROGRESS NOTES
Daily Progress Note          Assessment  Shortness of breath  Multifocal pneumonia   Multiple mucous plugs     possible aspiration due to large hiatal hernia  Localized right upper lobe wheezing, CT chest showed right upper lobe endobronchial opacity     Bronchoscopy 12/27/2023  Copious amount mucopurulent secretions suctioned therapeutically  Right lung washing was obtained  Moderate inflammatory changes     Hypoxia currently on 4 L     Pulmonary edema  Respiratory viral panel negative on 12/24/2023  History of pulmonary embolism 2018  Quit smoking 1992        Recommendations:  Patient has increased bronchial secretions and she is not able to clear it by herself, schedule bronchoscopy today    Continue Mucomyst    HR control as per cardiology:  status post cardioversion 11/15/2024: Currently in sinus rhythm but bradycardic, noted plans for pacemaker placement     Antibiotics: Rocephin    Prednisone    Mucinex  Encourage use of incentive spirometer          Oxygen supplement and titration to maintain saturation 90 to 95%: on 2 L  Bronchodilators     Chronic anticoagulation: Eliquis  Thyroid hormone replacement     I personally reviewed the radiological studies               LOS: 4 days     Subjective     Patient reports cough and shortness of breath    Objective     Vital signs for last 24 hours:  Vitals:    11/17/24 0500 11/17/24 0650 11/17/24 0657 11/17/24 0835   BP: 157/90   177/97   BP Location:    Right arm   Patient Position:    Sitting   Pulse: (!) 43 57 (!) 47 (!) 48   Resp:  12 12 13   Temp:       TempSrc:       SpO2: 94% 94% 93% 95%   Weight:       Height:           Intake/Output last 3 shifts:  I/O last 3 completed shifts:  In: 960 [P.O.:960]  Out: 1650 [Urine:1650]  Intake/Output this shift:  No intake/output data recorded.      Radiology  Imaging Results (Last 24 Hours)       ** No results found for the last 24 hours. **            Labs:  Results from last 7 days   Lab Units 11/17/24  0258   WBC  10*3/mm3 11.45*   HEMOGLOBIN g/dL 14.0   HEMATOCRIT % 44.8   PLATELETS 10*3/mm3 202     Results from last 7 days   Lab Units 11/17/24  0258 11/13/24  0522 11/12/24  1321   SODIUM mmol/L 142   < > 142   POTASSIUM mmol/L 4.6   < > 3.8   CHLORIDE mmol/L 104   < > 104   CO2 mmol/L 30.3*   < > 28.2   BUN mg/dL 30*   < > 22   CREATININE mg/dL 0.69   < > 0.73   CALCIUM mg/dL 8.6   < > 9.8   BILIRUBIN mg/dL  --   --  0.4   ALK PHOS U/L  --   --  128*   ALT (SGPT) U/L  --   --  23   AST (SGOT) U/L  --   --  28   GLUCOSE mg/dL 107*   < > 113*    < > = values in this interval not displayed.         Results from last 7 days   Lab Units 11/12/24  1321   ALBUMIN g/dL 3.8     Results from last 7 days   Lab Units 11/12/24  1427 11/12/24  1321   HSTROP T ng/L 15* 15*                           Meds:   SCHEDULE  acetylcysteine, 3 mL, Nebulization, BID - RT  apixaban, 5 mg, Oral, BID  cefTRIAXone, 2,000 mg, Intravenous, Q24H  flecainide, 50 mg, Oral, Q12H  ipratropium-albuterol, 3 mL, Nebulization, 4x Daily - RT  levothyroxine, 50 mcg, Oral, Daily  multivitamin with minerals, 1 tablet, Oral, Daily  pantoprazole, 40 mg, Oral, Daily  predniSONE, 40 mg, Oral, Daily With Breakfast      Infusions     PRNs    albuterol    senna-docusate sodium **AND** polyethylene glycol **AND** bisacodyl **AND** bisacodyl    Calcium Replacement - Follow Nurse / BPA Driven Protocol    guaiFENesin-codeine    hydrALAZINE    Magnesium Standard Dose Replacement - Follow Nurse / BPA Driven Protocol    Phosphorus Replacement - Follow Nurse / BPA Driven Protocol    Potassium Replacement - Follow Nurse / BPA Driven Protocol    sodium chloride    Physical Exam:  General Appearance:  Alert   HEENT:  Normocephalic, without obvious abnormality, Conjunctiva/corneas clear,.   Nares normal, no drainage     Neck:  Supple, symmetrical, trachea midline.   Lungs /Chest wall:   Bilateral basal rhonchi, respirations unlabored, symmetrical wall movement.     Heart: Rate  controlled, S1 S2 normal  Abdomen: Soft, non-tender, no masses, no organomegaly.    Extremities: No edema, no clubbing or cyanosis     ROS  Constitutional: Negative for chills, fever and malaise/fatigue.   HENT: Negative.    Eyes: Negative.    Cardiovascular: Negative.    Respiratory: Positive for cough and shortness of breath.    Skin: Negative.    Musculoskeletal: Negative.    Gastrointestinal: Negative.    Genitourinary: Negative.    Neurological: Generalized weakness    I reviewed the recent clinical results  I personally reviewed the latest radiological studies    Part of this note may be an electronic transcription/translation of spoken language to printed text using the Dragon Dictation System.

## 2024-11-18 LAB
ANION GAP SERPL CALCULATED.3IONS-SCNC: 4.8 MMOL/L (ref 5–15)
BASOPHILS # BLD AUTO: 0.02 10*3/MM3 (ref 0–0.2)
BASOPHILS NFR BLD AUTO: 0.2 % (ref 0–1.5)
BUN SERPL-MCNC: 24 MG/DL (ref 8–23)
BUN/CREAT SERPL: 35.8 (ref 7–25)
CALCIUM SPEC-SCNC: 8.5 MG/DL (ref 8.6–10.5)
CHLORIDE SERPL-SCNC: 103 MMOL/L (ref 98–107)
CO2 SERPL-SCNC: 33.2 MMOL/L (ref 22–29)
CREAT SERPL-MCNC: 0.67 MG/DL (ref 0.57–1)
DEPRECATED RDW RBC AUTO: 51 FL (ref 37–54)
EGFRCR SERPLBLD CKD-EPI 2021: 89.6 ML/MIN/1.73
EOSINOPHIL # BLD AUTO: 0 10*3/MM3 (ref 0–0.4)
EOSINOPHIL NFR BLD AUTO: 0 % (ref 0.3–6.2)
ERYTHROCYTE [DISTWIDTH] IN BLOOD BY AUTOMATED COUNT: 15.4 % (ref 12.3–15.4)
GLUCOSE SERPL-MCNC: 135 MG/DL (ref 65–99)
HCT VFR BLD AUTO: 45.6 % (ref 34–46.6)
HGB BLD-MCNC: 14.2 G/DL (ref 12–15.9)
IMM GRANULOCYTES # BLD AUTO: 0.08 10*3/MM3 (ref 0–0.05)
IMM GRANULOCYTES NFR BLD AUTO: 0.7 % (ref 0–0.5)
LYMPHOCYTES # BLD AUTO: 0.51 10*3/MM3 (ref 0.7–3.1)
LYMPHOCYTES NFR BLD AUTO: 4.3 % (ref 19.6–45.3)
MCH RBC QN AUTO: 28.3 PG (ref 26.6–33)
MCHC RBC AUTO-ENTMCNC: 31.1 G/DL (ref 31.5–35.7)
MCV RBC AUTO: 90.8 FL (ref 79–97)
MONOCYTES # BLD AUTO: 0.44 10*3/MM3 (ref 0.1–0.9)
MONOCYTES NFR BLD AUTO: 3.7 % (ref 5–12)
NEUTROPHILS NFR BLD AUTO: 10.9 10*3/MM3 (ref 1.7–7)
NEUTROPHILS NFR BLD AUTO: 91.1 % (ref 42.7–76)
NRBC BLD AUTO-RTO: 0 /100 WBC (ref 0–0.2)
PLATELET # BLD AUTO: 188 10*3/MM3 (ref 140–450)
PMV BLD AUTO: 9.3 FL (ref 6–12)
POTASSIUM SERPL-SCNC: 4.6 MMOL/L (ref 3.5–5.2)
RBC # BLD AUTO: 5.02 10*6/MM3 (ref 3.77–5.28)
SODIUM SERPL-SCNC: 141 MMOL/L (ref 136–145)
WBC NRBC COR # BLD AUTO: 11.95 10*3/MM3 (ref 3.4–10.8)

## 2024-11-18 PROCEDURE — 97535 SELF CARE MNGMENT TRAINING: CPT

## 2024-11-18 PROCEDURE — 94799 UNLISTED PULMONARY SVC/PX: CPT

## 2024-11-18 PROCEDURE — 85025 COMPLETE CBC W/AUTO DIFF WBC: CPT | Performed by: INTERNAL MEDICINE

## 2024-11-18 PROCEDURE — 99232 SBSQ HOSP IP/OBS MODERATE 35: CPT | Performed by: INTERNAL MEDICINE

## 2024-11-18 PROCEDURE — 97530 THERAPEUTIC ACTIVITIES: CPT

## 2024-11-18 PROCEDURE — 99232 SBSQ HOSP IP/OBS MODERATE 35: CPT | Performed by: NURSE PRACTITIONER

## 2024-11-18 PROCEDURE — 25010000002 FUROSEMIDE PER 20 MG: Performed by: INTERNAL MEDICINE

## 2024-11-18 PROCEDURE — 94761 N-INVAS EAR/PLS OXIMETRY MLT: CPT

## 2024-11-18 PROCEDURE — 63710000001 PREDNISONE PER 1 MG: Performed by: INTERNAL MEDICINE

## 2024-11-18 PROCEDURE — 80048 BASIC METABOLIC PNL TOTAL CA: CPT | Performed by: INTERNAL MEDICINE

## 2024-11-18 PROCEDURE — 25010000002 LABETALOL 5 MG/ML SOLUTION

## 2024-11-18 RX ORDER — FUROSEMIDE 10 MG/ML
40 INJECTION INTRAMUSCULAR; INTRAVENOUS ONCE
Status: DISCONTINUED | OUTPATIENT
Start: 2024-11-18 | End: 2024-11-18

## 2024-11-18 RX ORDER — LABETALOL HYDROCHLORIDE 5 MG/ML
5 INJECTION, SOLUTION INTRAVENOUS ONCE
Status: COMPLETED | OUTPATIENT
Start: 2024-11-18 | End: 2024-11-18

## 2024-11-18 RX ORDER — HYDRALAZINE HYDROCHLORIDE 25 MG/1
50 TABLET, FILM COATED ORAL EVERY 12 HOURS SCHEDULED
Status: DISCONTINUED | OUTPATIENT
Start: 2024-11-18 | End: 2024-11-22 | Stop reason: HOSPADM

## 2024-11-18 RX ORDER — FUROSEMIDE 10 MG/ML
20 INJECTION INTRAMUSCULAR; INTRAVENOUS ONCE
Status: COMPLETED | OUTPATIENT
Start: 2024-11-18 | End: 2024-11-18

## 2024-11-18 RX ORDER — AMLODIPINE BESYLATE 5 MG/1
5 TABLET ORAL
Status: DISCONTINUED | OUTPATIENT
Start: 2024-11-18 | End: 2024-11-19

## 2024-11-18 RX ORDER — LOSARTAN POTASSIUM 50 MG/1
50 TABLET ORAL
Status: DISCONTINUED | OUTPATIENT
Start: 2024-11-18 | End: 2024-11-22 | Stop reason: HOSPADM

## 2024-11-18 RX ORDER — HYDRALAZINE HYDROCHLORIDE 25 MG/1
50 TABLET, FILM COATED ORAL EVERY 8 HOURS SCHEDULED
Status: DISCONTINUED | OUTPATIENT
Start: 2024-11-18 | End: 2024-11-18

## 2024-11-18 RX ADMIN — APIXABAN 5 MG: 5 TABLET, FILM COATED ORAL at 08:37

## 2024-11-18 RX ADMIN — AMLODIPINE BESYLATE 5 MG: 5 TABLET ORAL at 08:48

## 2024-11-18 RX ADMIN — FLECAINIDE ACETATE 100 MG: 50 TABLET ORAL at 20:25

## 2024-11-18 RX ADMIN — SENNOSIDES AND DOCUSATE SODIUM 2 TABLET: 50; 8.6 TABLET ORAL at 20:48

## 2024-11-18 RX ADMIN — FUROSEMIDE 40 MG: 40 TABLET ORAL at 08:37

## 2024-11-18 RX ADMIN — IPRATROPIUM BROMIDE AND ALBUTEROL SULFATE 3 ML: .5; 3 SOLUTION RESPIRATORY (INHALATION) at 06:40

## 2024-11-18 RX ADMIN — Medication 1 TABLET: at 08:37

## 2024-11-18 RX ADMIN — APIXABAN 5 MG: 5 TABLET, FILM COATED ORAL at 20:25

## 2024-11-18 RX ADMIN — FUROSEMIDE 20 MG: 10 INJECTION, SOLUTION INTRAMUSCULAR; INTRAVENOUS at 15:10

## 2024-11-18 RX ADMIN — IPRATROPIUM BROMIDE AND ALBUTEROL SULFATE 3 ML: .5; 3 SOLUTION RESPIRATORY (INHALATION) at 19:31

## 2024-11-18 RX ADMIN — LABETALOL HYDROCHLORIDE 5 MG: 5 INJECTION, SOLUTION INTRAVENOUS at 10:50

## 2024-11-18 RX ADMIN — FLECAINIDE ACETATE 100 MG: 50 TABLET ORAL at 08:37

## 2024-11-18 RX ADMIN — Medication 10 ML: at 20:25

## 2024-11-18 RX ADMIN — LEVOTHYROXINE SODIUM 50 MCG: 0.05 TABLET ORAL at 08:37

## 2024-11-18 RX ADMIN — IPRATROPIUM BROMIDE AND ALBUTEROL SULFATE 3 ML: .5; 3 SOLUTION RESPIRATORY (INHALATION) at 10:55

## 2024-11-18 RX ADMIN — LOSARTAN POTASSIUM 50 MG: 50 TABLET, FILM COATED ORAL at 08:48

## 2024-11-18 RX ADMIN — HYDRALAZINE HYDROCHLORIDE 50 MG: 25 TABLET ORAL at 20:48

## 2024-11-18 RX ADMIN — HYDRALAZINE HYDROCHLORIDE 25 MG: 25 TABLET ORAL at 05:39

## 2024-11-18 RX ADMIN — HYDRALAZINE HYDROCHLORIDE 50 MG: 25 TABLET ORAL at 15:10

## 2024-11-18 RX ADMIN — PREDNISONE 40 MG: 20 TABLET ORAL at 08:37

## 2024-11-18 RX ADMIN — PANTOPRAZOLE SODIUM 40 MG: 40 TABLET, DELAYED RELEASE ORAL at 08:37

## 2024-11-18 NOTE — PLAN OF CARE
"Goal Outcome Evaluation:  Plan of Care Reviewed With: patient      Assessment: Camelia Gray is requiring very high assist for bed mobility. Pt was able to stand surprisingly well but still appears weak. Pt is very high risk for falls. Discussed possible SNF at d/c, but pt is adamant that she has arranged 24 hr care for home and will do better and have more help at home. PT emphasized importance of using roho cushion in chair at all times, as well as importance of performing standing at rw for about a minute at least q 1.5 hrs. She is also to change angle of reclining frequently to change pressure on gluteal tissue. Pt presents with functional mobility impairments which indicate the need for skilled intervention. Tolerating session today without incident. Will continue to follow and progress as tolerated.     Plan/Recommendations:   If medically appropriate, Low Intensity Therapy recommended post-acute care - This is recommended as therapy feels this patient would require 2-3 visits per week. OP or HH would be the best option depending on patient's home bound status. Consider, if the patient has other  \"skilled\" needs such as wounds, IV antibiotics, etc. Combined with \"low intensity\" could also equate to a SNF. If patient is medically complex, consider LTAC. Pt requires no DME at discharge.     Pt desires Home with Home Health with 24 hr care at discharge. Pt cooperative; agreeable to therapeutic recommendations and plan of care.             Anticipated Discharge Disposition (PT): home with 24/7 care, home with home health                        "

## 2024-11-18 NOTE — PROGRESS NOTES
"Cardiology [place]      Patient Care Team:  Brinda Tony APRN as PCP - General (Nurse Practitioner)  Kevin Angel MD as Consulting Physician (Cardiology)    Chief Complaint: As of breath    Subjective    Interval History and ROS:   Seen and examined on date of service  No acute events  2 L nasal cannula  Volume is better on Lasix, extra dose IV x 1 today, creatinine is stable  O2 saturation is 92 to 95%  Lungs are clear  Remains on flecainide 100 twice daily heart rates 55-60, off sotalol, QT is okay no arrhythmias overnight on telemetry review  Status post cardioversion this admission, remains in sinus rhythm         History taken from: patient chart RN    Review of Systems   Constitutional: Negative for diaphoresis and malaise/fatigue.   Cardiovascular:  Negative for chest pain, irregular heartbeat, leg swelling, near-syncope, orthopnea, palpitations, paroxysmal nocturnal dyspnea and syncope.   Respiratory:  Positive for shortness of breath. Negative for cough.    Musculoskeletal:  Negative for myalgias.   Neurological:  Negative for dizziness, focal weakness, headaches and light-headedness.   Psychiatric/Behavioral:  Negative for altered mental status.    All other systems reviewed and are negative.      Objective  Blood pressure elevated today, A-fib with controlled rate    Vital Signs  Temp:  [96.8 °F (36 °C)-98.6 °F (37 °C)] 97 °F (36.1 °C)  Heart Rate:  [49-76] 53  Resp:  [12-24] 14  BP: (142-201)/() 149/82  Oxygen Therapy  SpO2: 90 %  Pulse Oximetry Type: Continuous  Device (Oxygen Therapy): nasal cannula  Flow (L/min) (Oxygen Therapy): 3}  Flowsheet Rows      Flowsheet Row First Filed Value   Admission Height 180.3 cm (71\") Documented at 11/12/2024 1159   Admission Weight 150 kg (330 lb) Documented at 11/12/2024 1159            Physical Exam:    Physical Exam  Constitutional:       General: She is not in acute distress.     Appearance: She is obese. She is not diaphoretic.   HENT: "      Head: Normocephalic and atraumatic.   Eyes:      Extraocular Movements: Extraocular movements intact.      Pupils: Pupils are equal, round, and reactive to light.   Neck:      Vascular: No carotid bruit.   Cardiovascular:      Rate and Rhythm: Regular rhythm. Bradycardia present.      Heart sounds: Normal heart sounds.   Pulmonary:      Effort: Pulmonary effort is normal. No respiratory distress.      Breath sounds: Normal breath sounds.      Comments: 2 L nasal cannula  Abdominal:      Palpations: Abdomen is soft.   Musculoskeletal:      Comments: Bilateral lower extremities wrapped with Ace   Skin:     Findings: No erythema.   Neurological:      Mental Status: She is alert and oriented to person, place, and time.   Psychiatric:         Mood and Affect: Mood normal.         Behavior: Behavior normal.         Thought Content: Thought content normal.         Judgment: Judgment normal.         Results Review:     I reviewed the patient's new clinical results.    Lab Results (last 24 hours)       Procedure Component Value Units Date/Time    AFB Culture - Lavage, Lung, L [[id number]] Collected: 11/17/24 0918    Specimen: Lavage from Lung, L Updated: 11/18/24 1107     AFB Stain No acid fast bacilli seen on concentrated smear    Non-gynecologic Cytology [[id number]] Collected: 11/17/24 0918    Specimen: Lavage from Lung, L Updated: 11/18/24 1021    BAL Culture, Quantitative - Lavage, Lung, L [[id number]]  (Abnormal) Collected: 11/17/24 0918    Specimen: Lavage from Lung, L Updated: 11/18/24 0844     BAL Culture 50,000 CFU/mL Candida albicans      50,000 CFU/mL The culture consists of normal respiratory gerardo. This is a preliminary report; final report to follow.     Gram Stain Moderate (3+) WBCs per low power field      Moderate (3+) Epithelial cells per low power field      Few (2+) Budding yeast with pseudohyphae    Basic Metabolic Panel [[id number]]  (Abnormal) Collected: 11/18/24 0304    Specimen: Blood from Arm,  Left Updated: 11/18/24 0413     Glucose 135 mg/dL      BUN 24 mg/dL      Creatinine 0.67 mg/dL      Sodium 141 mmol/L      Potassium 4.6 mmol/L      Chloride 103 mmol/L      CO2 33.2 mmol/L      Calcium 8.5 mg/dL      BUN/Creatinine Ratio 35.8     Anion Gap 4.8 mmol/L      eGFR 89.6 mL/min/1.73     Narrative:      GFR Normal >60  Chronic Kidney Disease <60  Kidney Failure <15    The GFR formula is only valid for adults with stable renal function between ages 18 and 70.    CBC & Differential [[id number]]  (Abnormal) Collected: 11/18/24 0304    Specimen: Blood from Arm, Left Updated: 11/18/24 0337    Narrative:      The following orders were created for panel order CBC & Differential.  Procedure                               Abnormality         Status                     ---------                               -----------         ------                     CBC Auto Differential[[id number]]        Abnormal            Final result                 Please view results for these tests on the individual orders.    CBC Auto Differential [[id number]]  (Abnormal) Collected: 11/18/24 0304    Specimen: Blood from Arm, Left Updated: 11/18/24 0337     WBC 11.95 10*3/mm3      RBC 5.02 10*6/mm3      Hemoglobin 14.2 g/dL      Hematocrit 45.6 %      MCV 90.8 fL      MCH 28.3 pg      MCHC 31.1 g/dL      RDW 15.4 %      RDW-SD 51.0 fl      MPV 9.3 fL      Platelets 188 10*3/mm3      Neutrophil % 91.1 %      Lymphocyte % 4.3 %      Monocyte % 3.7 %      Eosinophil % 0.0 %      Basophil % 0.2 %      Immature Grans % 0.7 %      Neutrophils, Absolute 10.90 10*3/mm3      Lymphocytes, Absolute 0.51 10*3/mm3      Monocytes, Absolute 0.44 10*3/mm3      Eosinophils, Absolute 0.00 10*3/mm3      Basophils, Absolute 0.02 10*3/mm3      Immature Grans, Absolute 0.08 10*3/mm3      nRBC 0.0 /100 WBC             Imaging Results (Last 24 Hours)       ** No results found for the last 24 hours. **            ECG/EMG Results (most recent)       Procedure  Component Value Units Date/Time    ECG 12 Lead Dyspnea [[id number]] Collected: 11/12/24 1218     Updated: 11/13/24 0602     QT Interval 353 ms      QTC Interval 467 ms     Narrative:      HEART LOLD=180  bpm  RR Mliuxcjp=860  ms  NM Interval=  ms  P Horizontal Axis=  deg  P Front Axis=  deg  QRSD Interval=87  ms  QT Mjbeznzq=086  ms  WYhA=949  ms  QRS Axis=45  deg  T Wave Axis=8  deg  - ABNORMAL ECG -  Atrial fibrillation  Low voltage, precordial leads  When compared with ECG of 08-Feb-2024 12:03:26,  Significant change in rhythm: previously sinus  Electronically Signed By: Eduardo Anderson ([facility]) 2024-11-13 06:01:52  Date and Time of Study:2024-11-12 12:18:10    Telemetry Scan [[id number]] Resulted: 11/12/24     Updated: 11/13/24 1432    Adult Transthoracic Echo Complete W/ Cont if Necessary Per Protocol [[id number]] Resulted: 11/13/24 1823     Updated: 11/13/24 1823     LVIDd 4.5 cm      LVIDs 3.0 cm      IVSd 1.10 cm      LVPWd 1.00 cm      FS 33.3 %      IVS/LVPW 1.10 cm      ESV(cubed) 27.0 ml      LV Sys Vol (BSA corrected) 11.3 cm2      EDV(cubed) 91.1 ml      LV [place] Vol (BSA corrected) 36.5 cm2      LV mass(C)d 164.0 grams      LVOT area 2.8 cm2      LVOT diam 1.90 cm      EDV(MOD-sp4) 97.3 ml      ESV(MOD-sp4) 30.0 ml      SV(MOD-sp4) 67.3 ml      SVi(MOD-SP4) 25.3 ml/m2      SVi (LVOT) 21.6 ml/m2      EF(MOD-sp4) 69.2 %      MV E max christiano 96.7 cm/sec      LA ESV Index (BP) 19.2 ml/m2      Lat Peak E' Christiano 10.4 cm/sec      TR max christiano 163.0 cm/sec      SV(LVOT) 57.6 ml      RVIDd 3.3 cm      RV S' 9.9 cm/sec      LA dimension (2D)  5.0 cm      LV V1 max 113.0 cm/sec      LV V1 max PG 5.1 mmHg      LV V1 mean PG 3.0 mmHg      LV V1 VTI 20.3 cm      Ao pk christiano 163.0 cm/sec      Ao max PG 10.6 mmHg      Ao mean PG 7.0 mmHg      Ao V2 VTI 32.1 cm      FEDERICO(I,D) 1.79 cm2      MV max PG 8.2 mmHg      MV mean PG 3.0 mmHg      MV V2 VTI 26.0 cm      MV P1/2t 66.6 msec      MVA(P1/2t) 3.3 cm2      MVA(VTI) 2.21 cm2      MV  dec slope 616.0 cm/sec2      MR max tyler 390.0 cm/sec      MR max PG 60.8 mmHg      TR max PG 10.6 mmHg      RVSP(TR) 25.6 mmHg      RAP systole 15.0 mmHg      RV V1 max PG 2.9 mmHg      RV V1 max 85.4 cm/sec      RV V1 VTI 13.8 cm      PA V2 max 96.3 cm/sec      PA acc time 0.08 sec      ACS 1.80 cm      Sinus 2.7 cm      EF(MOD-bp) 69.0 %      Dimensionless Index 0.69 (DI)     Narrative:        Left ventricular systolic function is normal. Left ventricular ejection   fraction appears to be 56 - 60%.    Left ventricular wall thickness is consistent with borderline   concentric hypertrophy.    Left ventricular diastolic function was indeterminate.    The left atrial cavity is mildly dilated.    Left atrial volume is mildly increased.    There is moderate calcification of the aortic valve.    Estimated right ventricular systolic pressure from tricuspid   regurgitation is normal (<35 mmHg).      Telemetry Scan [[id number]] Resulted: 11/12/24     Updated: 11/14/24 1333    Telemetry Scan [[id number]] Resulted: 11/12/24     Updated: 11/14/24 1416    Telemetry Scan [[id number]] Resulted: 11/12/24     Updated: 11/14/24 1449    Telemetry Scan [[id number]] Resulted: 11/12/24     Updated: 11/14/24 1519    Telemetry Scan [[id number]] Resulted: 11/12/24     Updated: 11/15/24 1658    Telemetry Scan [[id number]] Resulted: 11/12/24     Updated: 11/15/24 1743    ECG 12 Lead QT Measurement [[id number]] Collected: 11/16/24 0805     Updated: 11/17/24 1834     QT Interval 456 ms      QTC Interval 414 ms     Narrative:      HEART RATE=49  bpm  RR Buwpkrwa=2167  ms  ND Cozyaehm=507  ms  P Horizontal Axis=21  deg  P Front Axis=27  deg  QRSD Interval=86  ms  QT Ymexmadu=535  ms  TTyV=867  ms  QRS Axis=50  deg  T Wave Axis=56  deg  - BORDERLINE ECG -  Bradycardia with irregular rate  Low voltage, precordial leads  When compared with ECG of 15-Nov-2024 11:21:41,  Significant change in rhythm: previously sinus  Significant axis, voltage or  hypertrophy change  Electronically Signed By: Miguel Egan ([facility]) 2024-11-17 18:34:48  Date and Time of Study:2024-11-16 08:05:11    ECG 12 Lead Drug Monitoring [[id number]] Collected: 11/14/24 0258     Updated: 11/17/24 1835     QT Interval 402 ms      QTC Interval 484 ms     Narrative:      HEART RATE=87  bpm  RR Tvcctxen=009  ms  NV Interval=  ms  P Horizontal Axis=  deg  P Front Axis=  deg  QRSD Interval=91  ms  QT Yfvrqihi=789  ms  MUfA=172  ms  QRS Axis=39  deg  T Wave Axis=42  deg  - ABNORMAL ECG -  Atrial fibrillation  Low voltage, precordial leads  When compared with ECG of 12-Nov-2024 12:18:10,  No significant change  Electronically Signed By: Miguel Egan ([facility]) 2024-11-17 18:34:55  Date and Time of Study:2024-11-14 02:58:53    Telemetry Scan [[id number]] Resulted: 11/12/24     Updated: 11/18/24 1306    Telemetry Scan [[id number]] Resulted: 11/12/24     Updated: 11/18/24 1320            Medication Review:   I have reviewed the patient's current medication list    Current Facility-Administered Medications:     [Transfer Hold] acetylcysteine (MUCOMYST) 20 % nebulizer solution 3 mL, 3 mL, Nebulization, BID - RT, Reji Burch MD, 3 mL at 11/17/24 0650    albuterol (PROVENTIL) nebulizer solution 0.083% 2.5 mg/3mL, 2.5 mg, Nebulization, Q6H PRN, Reji Burch MD, 2.5 mg at 11/14/24 0451    amLODIPine (NORVASC) tablet 5 mg, 5 mg, Oral, Q24H, Hussein Manzanares MD, 5 mg at 11/18/24 0848    apixaban (ELIQUIS) tablet 5 mg, 5 mg, Oral, BID, Reji Burch MD, 5 mg at 11/18/24 0837    sennosides-docusate (PERICOLACE) 8.6-50 MG per tablet 2 tablet, 2 tablet, Oral, BID PRN, 2 tablet at 11/17/24 2056 **AND** polyethylene glycol (MIRALAX) packet 17 g, 17 g, Oral, Daily PRN **AND** bisacodyl (DULCOLAX) EC tablet 5 mg, 5 mg, Oral, Daily PRN **AND** bisacodyl (DULCOLAX) suppository 10 mg, 10 mg, Rectal, Daily PRN, Reji Burch MD    Calcium Replacement - Follow Nurse / BPA Driven Protocol, , Not Applicable, PRN,  Reji Burch MD    flecainide (TAMBOCOR) tablet 100 mg, 100 mg, Oral, Q12H, Kevin Angel MD, 100 mg at 11/18/24 0837    furosemide (LASIX) injection 20 mg, 20 mg, Intravenous, Once, Kevin Angel MD    furosemide (LASIX) tablet 40 mg, 40 mg, Oral, Daily, Kevin Angel MD, 40 mg at 11/18/24 0837    guaiFENesin-codeine (GUAIFENESIN AC) 100-10 MG/5ML liquid 5 mL, 5 mL, Oral, Q4H PRN, Reji Burch MD, 5 mL at 11/16/24 2223    hydrALAZINE (APRESOLINE) injection 10 mg, 10 mg, Intravenous, Q6H PRN, Reji Burch MD, 10 mg at 11/16/24 1735    hydrALAZINE (APRESOLINE) tablet 50 mg, 50 mg, Oral, Q12H, Kevin Angel MD    ipratropium-albuterol (DUO-NEB) nebulizer solution 3 mL, 3 mL, Nebulization, 4x Daily - RT, Reji Burch MD, 3 mL at 11/18/24 1055    levothyroxine (SYNTHROID, LEVOTHROID) tablet 50 mcg, 50 mcg, Oral, Daily, Reji Burch MD, 50 mcg at 11/18/24 0837    losartan (COZAAR) tablet 50 mg, 50 mg, Oral, Q24H, Hussein Manzanares MD, 50 mg at 11/18/24 0848    Magnesium Standard Dose Replacement - Follow Nurse / BPA Driven Protocol, , Not Applicable, PRN, eRji Burch MD    multivitamin with minerals 1 tablet, 1 tablet, Oral, Daily, Reji Burch MD, 1 tablet at 11/18/24 0837    pantoprazole (PROTONIX) EC tablet 40 mg, 40 mg, Oral, Daily, Reji Burch MD, 40 mg at 11/18/24 0837    Phosphorus Replacement - Follow Nurse / BPA Driven Protocol, , Not Applicable, PRN, Reji Burch MD    Potassium Replacement - Follow Nurse / BPA Driven Protocol, , Not Applicable, PRN, Reji Burch MD    predniSONE (DELTASONE) tablet 40 mg, 40 mg, Oral, Daily With Breakfast, Reji Burch MD, 40 mg at 11/18/24 0837    sodium chloride 0.9 % flush 10 mL, 10 mL, Intravenous, PRN, Reji Burch MD, 10 mL at 11/17/24 9763    Assessment & Plan     Principal Problem:    Shortness of breath, multifactorial with pneumonia COPD, diastolic dysfunction, obesity, immobility  deconditioning     Paroxysmal atrial fibrillation  Status post cardioversion  Off sotalol secondary to severe bradycardia  Off diltiazem as well  Flecainide 100 twice daily with preserved EF no history of any coronary disease or infarct  Eliquis twice daily     Dyspnea with exertion  Being treated for pneumonia, also COPD with Solu-Medrol and DuoNebs  Chronic lower extremity edema, BNP is normal  EF preserved 55 to 60%, normal estimated right-sided pressures  Mild diastolic dysfunction chronically as assessed in sinus rhythm previously, indeterminate with underlying atrial fibrillation, known [id number]-[id number] variability  CT with right lower lobe endobronchial opacification and consolidative changes in right lower lobe  On nebs, steroids, and antibiotics     history of pulmonary embolism  Currently anticoagulated with Eliquis therapeutic dose uninterrupted     Hypertension  Stable  Off diltiazem  Hydralazine 3 times daily      Today  Continue treatment for pneumonia and COPD from a pulmonary standpoint  EF is preserved with [id number]-[id number]-gnap variability 65% EF, diastolic dysfunction known  Lasix 40 oral daily, 20 mg additionally this afternoon x 1  Volumes improving  Continue flecainide 100 twice daily  Hydralazine changed to 50 twice daily  Low-dose amlodipine 5 daily in addition to losartan 50 daily  Off sotalol and diltiazem at this time secondary to severe bradycardia in the low 40s    Continue Eliquis uninterrupted twice daily therapeutic dose, history of both atrial fibrillation and thromboembolic disease with DVT PE  Continue uninterrupted telemetry    EP considering ablation to maintain sinus rhythm versus changing her antiarrhythmic therapy, now on flecainide with normal EF, negative stress test, no history of any coronary artery disease or structural heart disease       Further recommendations to follow findings and clinical course  Kevin Angel MD, PhD    Kevin Angel MD, PhD    Kevin Angel,  MD  11/18/24  13:50 EST

## 2024-11-18 NOTE — PROGRESS NOTES
"    [facility] MEDICINE SERVICE  DAILY PROGRESS NOTE    NAME: Camelia Gray  : 1945  MRN: [id number]      LOS: 5 days     PROVIDER OF SERVICE: Hussein Manzanares MD    Chief Complaint: Shortness of breath    Subjective:     History of Present Illness: Camelia Gray is a 79 y.o. female with a CMH of atrial fibrillation, arthritis, hypothyroidism, hypertension, lymphedema, immobility syndrome who presented to [facility] on 2024 with shortness of breath.  Patient reports symptoms have been going on for the past 4 to 5 days and are progressively worsening.  Patient states that she did have a cold within the last week and reports she continues to have nasal congestion.  Patient denies fever, chills, pain.  Denies new edema in legs and reports her legs continue to stay wrapped due to lymphedema.  Patient does continue to have mild cough at this time.     On ED evaluation, patient vital signs show tachycardia and hypertension on arrival as well as tachypnea.  Patient required IV labetalol for blood pressure control in ED.  Labs essentially unremarkable.  Respiratory viral panel negative.  Chest x-ray showed \"1.  Cardiomegaly. 2.  Evidence of prior granulomatous exposure. 3.  Possible atelectasis or scarring in the right lower lobe adjacent to shadow which could relate to known hiatal hernia\".  EKG does show atrial fibrillation however seems rate is controlled at 105. patient received IV steroids and DuoNeb in ED however shortness of breath persisted therefore hospitalist group was asked for admission.    Interval History:  History taken from: patient chart  -Patient underwent bronchoscopy this morning.  She states that overall she feels less congested.  -seen and examined in bed no acute distress, blood pressure has been elevated this morning,  Apparently patient lives at home.  Presently on 3 L of oxygen, saturation 92% to 94%.Over weekend started on diltiazem for rate control. " Sotalol discontinued and flecainide started for heart rate. HR now in 50-60s. Blood pressure still elevated. Norvasc and losartan added. Received lasix IV x 1 for fluid volume. Remains in sinus rhythm s/p cardioversion..     Review of Systems:   Constitutional:  Negative for chills, diaphoresis, fatigue and fever.   HENT:  Positive for congestion.    Respiratory:  Positive for cough and shortness of breath.    Cardiovascular:  Negative for chest pain.   Gastrointestinal:  Negative for abdominal pain.   Neurological:  Negative for dizziness and headaches.    Objective:     Vital Signs  Temp:  [97.4 °F (36.3 °C)-98.6 °F (37 °C)] 97.5 °F (36.4 °C)  Heart Rate:  [48-76] 57  Resp:  [12-24] 17  BP: (156-193)/(65-98) 164/81  Flow (L/min) (Oxygen Therapy):  [2-6] 3   Body mass index is 48.86 kg/m².    Physical Exam  PHYSICAL EXAM  Constitutional:  Well-developed, well-nourished, no acute distress, nontoxic appearance   Eyes:  PERRL, conjunctivae normal, EOMI   HENT:  Atraumatic, external ears normal, nose normal, oropharynx moist, no pharyngeal exudates. Neck-normal range of motion, no tenderness, supple, trachea midline  Respiratory: Improved expiratory wheezing and improved air entry bilaterally  Cardiovascular:  Normal rate, normal rhythm, no murmurs, no gallops, no rubs   GI:  Soft, nondistended, normal bowel sounds, nontender, no organomegaly, no mass, no rebound, no guarding.  Mild BLE edema noted.  :  No costovertebral angle tenderness   Musculoskeletal:  No edema, no tenderness, no deformities  Integument:  Well hydrated, no rash.  Edema noted to bilateral lower extremities.  Lymphatic:  No lymphadenopathy noted   Neurologic:  Alert & oriented x 3, CN 2-12 normal, normal motor function, normal sensory function, no focal deficits noted   Psychiatric:  Speech and behavior appropriate          Diagnostic Data    Results from last 7 days   Lab Units 11/18/24  0304 11/13/24  0522 11/12/24  1321   WBC 10*3/mm3 11.95*    < > 6.89   HEMOGLOBIN g/dL 14.2   < > 14.2   HEMATOCRIT % 45.6   < > 45.3   PLATELETS 10*3/mm3 188   < > 212   GLUCOSE mg/dL 135*   < > 113*   CREATININE mg/dL 0.67   < > 0.73   BUN mg/dL 24*   < > 22   SODIUM mmol/L 141   < > 142   POTASSIUM mmol/L 4.6   < > 3.8   AST (SGOT) U/L  --   --  28   ALT (SGPT) U/L  --   --  23   ALK PHOS U/L  --   --  128*   BILIRUBIN mg/dL  --   --  0.4   ANION GAP mmol/L 4.8*   < > 9.8    < > = values in this interval not displayed.       No radiology results for the last day      I reviewed the patient's new clinical results.    Assessment/Plan:     Active and Resolved Problems  Active Hospital Problems    Diagnosis  POA    **Shortness of breath [R06.02]  Yes    Persistent atrial fibrillation [I48.19]  Unknown    Multiple tracheobronchial mucus plugs [T17.800A]  Unknown    Atelectasis [J98.11]  Unknown      Resolved Hospital Problems   No resolved problems to display.       Pneumonia with acute respiratory failure with hypoxia, unspecified organism  -Likely pneumonia with acute bronchospasms  -Imaging with CT does show right lower lobe infiltrate  -Continue IV antibiotics    -Encourage pulmonary toileting with I-S, flutter valve  -NT suction as needed  -Pulmonology consulted; patient underwent bronchoscopy on 11/17 with multiple mucous plugs removed  -Follow-up BAL cultures    Hypertension   -BP stable elevated most recent blood pressure 145/89  -PRN Hydralazine  -Continue usual home medication     Atrial fibrillation with RVR  -Patient has a history of A-fib, on Eliquis at home  -Home medications with sotalol and diltiazem were continued however patient still remained tachycardic  -Underwent DCCV on 11/15 but then developed bradycardia afterwards  -Sotalol discontinued and flecainide initiated  -Cardiology following>Over weekend started on diltiazem for rate control. Sotalol discontinued and flecainide started for heart rate. HR now in 50-60s. Blood pressure still elevated. Norvasc  and losartan added. Received lasix IV x 1 for fluid volume. Remains in sinus rhythm s/p cardioversion. Will continue to monitor BP.   -If patient remains bradycardic she may require PPM placement     Hypothyroidism  -Continue Levothyroxine     GERD  -Continue PPI    VTE Prophylaxis:  Pharmacologic & mechanical VTE prophylaxis orders are present.      Disposition Planning:     Barriers to Discharge: Improved clinical status.  Anticipated Date of Discharge: 11/20/2024  Place of Discharge: Home      Time: 35 minutes     Code Status and Medical Interventions: CPR (Attempt to Resuscitate); Full Support   Ordered at: 11/12/24 1653     Code Status (Patient has no pulse and is not breathing):    CPR (Attempt to Resuscitate)     Medical Interventions (Patient has pulse or is breathing):    Full Support       Signature: Electronically signed by Hussein Manzanares MD, 11/18/24, 07:18 EST.  [facility] Hospitalist Team

## 2024-11-18 NOTE — CASE MANAGEMENT/SOCIAL WORK
Continued Stay Note  [facility]     Patient Name: Camelia Gray  MRN: [id number]  Today's Date: 11/18/2024    Admit Date: 11/12/2024    Plan: Return home alone with intermittent caregivers. No home oxygen-currently on 3 liters   Discharge Plan       Row Name 11/18/24 1106       Plan    Plan Comments Barriers: Pulmonology following and bronchoscopy yesterday but nursing documented on 3 Liters of oxygen.  At discharge Ms. Gray plans to return home with hired intermittent caregivers                      Yamini BUNCH,RN Case Manager  [facility]  Phone: Desk- [phone] cell- [phone]

## 2024-11-18 NOTE — PLAN OF CARE
Goal Outcome Evaluation:  Plan of Care Reviewed With: patient        Progress: improving  Outcome Evaluation: Slept at intervals. O2 at 3L in use. States breathing better since Bronchoscopy yesterday. No c/o discomfort. Skin care completed to BLE. Will continue to monitor.

## 2024-11-18 NOTE — PROGRESS NOTES
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     possible aspiration due to large hiatal hernia  Localized right upper lobe wheezing, CT chest showed right upper lobe endobronchial opacity     Bronchoscopy 12/27/2023  Copious amount mucopurulent secretions suctioned therapeutically  Right lung washing was obtained  Moderate inflammatory changes     Hypoxia    Pulmonary edema  Respiratory viral panel negative on 12/24/2023  History of pulmonary embolism 2018  Quit smoking 1992        Recommendations:  Respiratory status is improving, patient can be discharged from pulmonary standpoint  6-minute walking to assess need for home oxygen  Patient will need outpatient sleep study to assess need for CPAP    Status post bronchoscopy 11/17/2024 showing multiple mucous plugs and tracheobronchomalacia    Continue Mucomyst    HR control as per cardiology:  status post cardioversion 11/15/2024: Currently in sinus rhythm      Antibiotics: Rocephin    Prednisone    Mucinex  Encourage use of incentive spirometer          Oxygen supplement and titration to maintain saturation 90 to 95%: on 2 L  Bronchodilators     Chronic anticoagulation: Eliquis  Thyroid hormone replacement     I personally reviewed the radiological studies               LOS: 5 days     Subjective     Patient reports improvement in the cough and shortness of breath    Objective     Vital signs for last 24 hours:  Vitals:    11/18/24 0336 11/18/24 0640 11/18/24 0643 11/18/24 0831   BP: 164/81   172/90   BP Location: Left arm   Left arm   Patient Position: Lying   Sitting   Pulse: 58 58 57 (!) 49   Resp: 14 12 17 16   Temp: 97.5 °F (36.4 °C)   96.8 °F (36 °C)   TempSrc: Axillary   Infrared   SpO2: 94% 96% 97% 96%   Weight: (!) 159 kg (350 lb 4.8 oz)      Height:           Intake/Output last 3 shifts:  I/O last 3 completed shifts:  In: 580 [P.O.:480;  I.V.:100]  Out: 3450 [Urine:3450]  Intake/Output this shift:  No intake/output data recorded.      Radiology  Imaging Results (Last 24 Hours)       ** No results found for the last 24 hours. **            Labs:  Results from last 7 days   Lab Units 11/18/24  0304   WBC 10*3/mm3 11.95*   HEMOGLOBIN g/dL 14.2   HEMATOCRIT % 45.6   PLATELETS 10*3/mm3 188     Results from last 7 days   Lab Units 11/18/24  0304 11/13/24  0522 11/12/24  1321   SODIUM mmol/L 141   < > 142   POTASSIUM mmol/L 4.6   < > 3.8   CHLORIDE mmol/L 103   < > 104   CO2 mmol/L 33.2*   < > 28.2   BUN mg/dL 24*   < > 22   CREATININE mg/dL 0.67   < > 0.73   CALCIUM mg/dL 8.5*   < > 9.8   BILIRUBIN mg/dL  --   --  0.4   ALK PHOS U/L  --   --  128*   ALT (SGPT) U/L  --   --  23   AST (SGOT) U/L  --   --  28   GLUCOSE mg/dL 135*   < > 113*    < > = values in this interval not displayed.         Results from last 7 days   Lab Units 11/12/24  1321   ALBUMIN g/dL 3.8     Results from last 7 days   Lab Units 11/12/24  1427 11/12/24  1321   HSTROP T ng/L 15* 15*                           Meds:   SCHEDULE  [Transfer Hold] acetylcysteine, 3 mL, Nebulization, BID - RT  amLODIPine, 5 mg, Oral, Q24H  apixaban, 5 mg, Oral, BID  flecainide, 100 mg, Oral, Q12H  furosemide, 40 mg, Oral, Daily  hydrALAZINE, 25 mg, Oral, Q8H  ipratropium-albuterol, 3 mL, Nebulization, 4x Daily - RT  levothyroxine, 50 mcg, Oral, Daily  losartan, 50 mg, Oral, Q24H  multivitamin with minerals, 1 tablet, Oral, Daily  pantoprazole, 40 mg, Oral, Daily  predniSONE, 40 mg, Oral, Daily With Breakfast      Infusions     PRNs    albuterol    senna-docusate sodium **AND** polyethylene glycol **AND** bisacodyl **AND** bisacodyl    Calcium Replacement - Follow Nurse / BPA Driven Protocol    guaiFENesin-codeine    hydrALAZINE    Magnesium Standard Dose Replacement - Follow Nurse / BPA Driven Protocol    Phosphorus Replacement - Follow Nurse / BPA Driven Protocol    Potassium Replacement - Follow Nurse /  BPA Driven Protocol    sodium chloride    Physical Exam:  General Appearance:  Alert   HEENT:  Normocephalic, without obvious abnormality, Conjunctiva/corneas clear,.   Nares normal, no drainage     Neck:  Supple, symmetrical, trachea midline.   Lungs /Chest wall:   Bilateral basal rhonchi, respirations unlabored, symmetrical wall movement.     Heart: Rate controlled, S1 S2 normal  Abdomen: Soft, non-tender, no masses, no organomegaly.    Extremities: No edema, no clubbing or cyanosis     ROS  Constitutional: Negative for chills, fever and malaise/fatigue.   HENT: Negative.    Eyes: Negative.    Cardiovascular: Negative.    Respiratory: Positive for cough and shortness of breath.    Skin: Negative.    Musculoskeletal: Negative.    Gastrointestinal: Negative.    Genitourinary: Negative.    Neurological: Generalized weakness    I reviewed the recent clinical results  I personally reviewed the latest radiological studies    Part of this note may be an electronic transcription/translation of spoken language to printed text using the Dragon Dictation System.

## 2024-11-18 NOTE — PROGRESS NOTES
"[facility]      Patient Care Team:  Brinda Tony APRN as PCP - General (Nurse Practitioner)  Kevin Angel MD as Consulting Physician (Cardiology)    Chief Complaint: As of breath    Subjective    Interval History and ROS:   Seen and examined on date of service  No acute events  2 L nasal cannula  Started on flecainide, heart rates better off sotalol now at least in the upper 50s low 60s  Volume eladio up, single dose Lasix tonight  Chest pain-free  Status post cardioversion this admission      Increasing flecainide 100 twice daily       History taken from: patient chart RN    Review of Systems   Constitutional: Negative for diaphoresis and malaise/fatigue.   Cardiovascular:  Negative for chest pain, irregular heartbeat, leg swelling, near-syncope, orthopnea, palpitations, paroxysmal nocturnal dyspnea and syncope.   Respiratory:  Positive for shortness of breath. Negative for cough.    Musculoskeletal:  Negative for myalgias.   Neurological:  Negative for dizziness, focal weakness, headaches and light-headedness.   Psychiatric/Behavioral:  Negative for altered mental status.    All other systems reviewed and are negative.      Objective  Blood pressure elevated today, A-fib with controlled rate    Vital Signs  Temp:  [97.4 °F (36.3 °C)-98.6 °F (37 °C)] 98.6 °F (37 °C)  Heart Rate:  [43-76] 72  Resp:  [12-24] 22  BP: (138-193)/(65-98) 166/80  Oxygen Therapy  SpO2: 93 %  Pulse Oximetry Type: Continuous  Device (Oxygen Therapy): nasal cannula, humidified  Flow (L/min) (Oxygen Therapy): 3}  Flowsheet Rows      Flowsheet Row First Filed Value   Admission Height 180.3 cm (71\") Documented at 11/12/2024 1159   Admission Weight 150 kg (330 lb) Documented at 11/12/2024 1159            Physical Exam:    Physical Exam  Constitutional:       General: She is not in acute distress.     Appearance: She is obese. She is not diaphoretic.   HENT:      Head: Normocephalic and atraumatic.   Eyes:      " Extraocular Movements: Extraocular movements intact.      Pupils: Pupils are equal, round, and reactive to light.   Neck:      Vascular: No carotid bruit.   Cardiovascular:      Rate and Rhythm: Bradycardia present. Rhythm irregular.      Heart sounds: Normal heart sounds.   Pulmonary:      Effort: Pulmonary effort is normal. No respiratory distress.      Breath sounds: Normal breath sounds.      Comments: 2 L nasal cannula  Abdominal:      Palpations: Abdomen is soft.   Musculoskeletal:      Comments: Bilateral lower extremities wrapped with Ace   Skin:     Findings: No erythema.   Neurological:      Mental Status: She is alert and oriented to person, place, and time.   Psychiatric:         Mood and Affect: Mood normal.         Behavior: Behavior normal.         Thought Content: Thought content normal.         Judgment: Judgment normal.         Results Review:     I reviewed the patient's new clinical results.    Lab Results (last 24 hours)       Procedure Component Value Units Date/Time    Blood Culture - Blood, Hand, Left [[id number]]  (Normal) Collected: 11/12/24 1321    Specimen: Blood from Hand, Left Updated: 11/17/24 1330     Blood Culture No growth at 5 days    Narrative:      Less than seven (7) mL's of blood was collected.  Insufficient quantity may yield false negative results.    Blood Culture - Blood, Arm, Right [[id number]]  (Normal) Collected: 11/12/24 1236    Specimen: Blood from Arm, Right Updated: 11/17/24 1245     Blood Culture No growth at 5 days    Narrative:      Less than seven (7) mL's of blood was collected.  Insufficient quantity may yield false negative results.    BAL Culture, Quantitative - Lavage, Lung, L [[id number]] Collected: 11/17/24 0918    Specimen: Lavage from Lung, L Updated: 11/17/24 1127     Gram Stain Moderate (3+) WBCs per low power field      Moderate (3+) Epithelial cells per low power field      Few (2+) Budding yeast with pseudohyphae    Pneumocystis PCR - Lavage, Lung, L  [[id number]] Collected: 11/17/24 0918    Specimen: Lavage from Lung, L Updated: 11/17/24 1030    Fungus Culture - Lavage, Lung, L [[id number]] Collected: 11/17/24 0918    Specimen: Lavage from Lung, L Updated: 11/17/24 1030    AFB Culture - Lavage, Lung, L [[id number]] Collected: 11/17/24 0918    Specimen: Lavage from Lung, L Updated: 11/17/24 1030    Non-gynecologic Cytology [[id number]] Collected: 11/17/24 0918    Specimen: Lavage from Lung, L Updated: 11/17/24 1022    Basic Metabolic Panel [[id number]]  (Abnormal) Collected: 11/17/24 0258    Specimen: Blood from Arm, Left Updated: 11/17/24 0353     Glucose 107 mg/dL      BUN 30 mg/dL      Creatinine 0.69 mg/dL      Sodium 142 mmol/L      Potassium 4.6 mmol/L      Chloride 104 mmol/L      CO2 30.3 mmol/L      Calcium 8.6 mg/dL      BUN/Creatinine Ratio 43.5     Anion Gap 7.7 mmol/L      eGFR 89.0 mL/min/1.73     Narrative:      GFR Normal >60  Chronic Kidney Disease <60  Kidney Failure <15    The GFR formula is only valid for adults with stable renal function between ages 18 and 70.    CBC & Differential [[id number]]  (Abnormal) Collected: 11/17/24 0258    Specimen: Blood from Arm, Left Updated: 11/17/24 0309    Narrative:      The following orders were created for panel order CBC & Differential.  Procedure                               Abnormality         Status                     ---------                               -----------         ------                     CBC Auto Differential[[id number]]        Abnormal            Final result                 Please view results for these tests on the individual orders.    CBC Auto Differential [[id number]]  (Abnormal) Collected: 11/17/24 0258    Specimen: Blood from Arm, Left Updated: 11/17/24 0309     WBC 11.45 10*3/mm3      RBC 4.95 10*6/mm3      Hemoglobin 14.0 g/dL      Hematocrit 44.8 %      MCV 90.5 fL      MCH 28.3 pg      MCHC 31.3 g/dL      RDW 15.8 %      RDW-SD 51.9 fl      MPV 9.4 fL      Platelets 202 10*3/mm3       Neutrophil % 84.2 %      Lymphocyte % 7.2 %      Monocyte % 7.2 %      Eosinophil % 0.1 %      Basophil % 0.2 %      Immature Grans % 1.1 %      Neutrophils, Absolute 9.64 10*3/mm3      Lymphocytes, Absolute 0.82 10*3/mm3      Monocytes, Absolute 0.83 10*3/mm3      Eosinophils, Absolute 0.01 10*3/mm3      Basophils, Absolute 0.02 10*3/mm3      Immature Grans, Absolute 0.13 10*3/mm3      nRBC 0.0 /100 WBC             Imaging Results (Last 24 Hours)       ** No results found for the last 24 hours. **            ECG/EMG Results (most recent)       Procedure Component Value Units Date/Time    ECG 12 Lead Dyspnea [[id number]] Collected: 11/12/24 1218     Updated: 11/13/24 0602     QT Interval 353 ms      QTC Interval 467 ms     Narrative:      HEART TCAF=785  bpm  RR Igzhuvti=171  ms  WY Interval=  ms  P Horizontal Axis=  deg  P Front Axis=  deg  QRSD Interval=87  ms  QT Nclnmmvz=412  ms  YQiH=473  ms  QRS Axis=45  deg  T Wave Axis=8  deg  - ABNORMAL ECG -  Atrial fibrillation  Low voltage, precordial leads  When compared with ECG of 08-Feb-2024 12:03:26,  Significant change in rhythm: previously sinus  Electronically Signed By: Eduardo Anderson ([facility]) 2024-11-13 06:01:52  Date and Time of Study:2024-11-12 12:18:10    Telemetry Scan [[id number]] Resulted: 11/12/24     Updated: 11/13/24 1432    Adult Transthoracic Echo Complete W/ Cont if Necessary Per Protocol [[id number]] Resulted: 11/13/24 1823     Updated: 11/13/24 1823     LVIDd 4.5 cm      LVIDs 3.0 cm      IVSd 1.10 cm      LVPWd 1.00 cm      FS 33.3 %      IVS/LVPW 1.10 cm      ESV(cubed) 27.0 ml      LV Sys Vol (BSA corrected) 11.3 cm2      EDV(cubed) 91.1 ml      LV [place] Vol (BSA corrected) 36.5 cm2      LV mass(C)d 164.0 grams      LVOT area 2.8 cm2      LVOT diam 1.90 cm      EDV(MOD-sp4) 97.3 ml      ESV(MOD-sp4) 30.0 ml      SV(MOD-sp4) 67.3 ml      SVi(MOD-SP4) 25.3 ml/m2      SVi (LVOT) 21.6 ml/m2      EF(MOD-sp4) 69.2 %      MV E max tyler 96.7 cm/sec      LA  ESV Index (BP) 19.2 ml/m2      Lat Peak E' Christiano 10.4 cm/sec      TR max christiano 163.0 cm/sec      SV(LVOT) 57.6 ml      RVIDd 3.3 cm      RV S' 9.9 cm/sec      LA dimension (2D)  5.0 cm      LV V1 max 113.0 cm/sec      LV V1 max PG 5.1 mmHg      LV V1 mean PG 3.0 mmHg      LV V1 VTI 20.3 cm      Ao pk christiano 163.0 cm/sec      Ao max PG 10.6 mmHg      Ao mean PG 7.0 mmHg      Ao V2 VTI 32.1 cm      FEDERICO(I,D) 1.79 cm2      MV max PG 8.2 mmHg      MV mean PG 3.0 mmHg      MV V2 VTI 26.0 cm      MV P1/2t 66.6 msec      MVA(P1/2t) 3.3 cm2      MVA(VTI) 2.21 cm2      MV dec slope 616.0 cm/sec2      MR max christiano 390.0 cm/sec      MR max PG 60.8 mmHg      TR max PG 10.6 mmHg      RVSP(TR) 25.6 mmHg      RAP systole 15.0 mmHg      RV V1 max PG 2.9 mmHg      RV V1 max 85.4 cm/sec      RV V1 VTI 13.8 cm      PA V2 max 96.3 cm/sec      PA acc time 0.08 sec      ACS 1.80 cm      Sinus 2.7 cm      EF(MOD-bp) 69.0 %      Dimensionless Index 0.69 (DI)     Narrative:        Left ventricular systolic function is normal. Left ventricular ejection   fraction appears to be 56 - 60%.    Left ventricular wall thickness is consistent with borderline   concentric hypertrophy.    Left ventricular diastolic function was indeterminate.    The left atrial cavity is mildly dilated.    Left atrial volume is mildly increased.    There is moderate calcification of the aortic valve.    Estimated right ventricular systolic pressure from tricuspid   regurgitation is normal (<35 mmHg).      Telemetry Scan [[id number]] Resulted: 11/12/24     Updated: 11/14/24 1333    Telemetry Scan [[id number]] Resulted: 11/12/24     Updated: 11/14/24 1416    Telemetry Scan [[id number]] Resulted: 11/12/24     Updated: 11/14/24 1449    Telemetry Scan [[id number]] Resulted: 11/12/24     Updated: 11/14/24 1519    Telemetry Scan [[id number]] Resulted: 11/12/24     Updated: 11/15/24 1658    Telemetry Scan [[id number]] Resulted: 11/12/24     Updated: 11/15/24 1743    ECG 12 Lead QT  Measurement [[id number]] Collected: 11/16/24 0805     Updated: 11/17/24 1834     QT Interval 456 ms      QTC Interval 414 ms     Narrative:      HEART RATE=49  bpm  RR Kbdzcump=1091  ms  MA Lckkafft=404  ms  P Horizontal Axis=21  deg  P Front Axis=27  deg  QRSD Interval=86  ms  QT Aikbuhwv=108  ms  AFhU=548  ms  QRS Axis=50  deg  T Wave Axis=56  deg  - BORDERLINE ECG -  Bradycardia with irregular rate  Low voltage, precordial leads  When compared with ECG of 15-Nov-2024 11:21:41,  Significant change in rhythm: previously sinus  Significant axis, voltage or hypertrophy change  Electronically Signed By: Miguel Egan ([facility]) 2024-11-17 18:34:48  Date and Time of Study:2024-11-16 08:05:11    ECG 12 Lead Drug Monitoring [[id number]] Collected: 11/14/24 0258     Updated: 11/17/24 1835     QT Interval 402 ms      QTC Interval 484 ms     Narrative:      HEART RATE=87  bpm  RR Hdtbwaqs=358  ms  MA Interval=  ms  P Horizontal Axis=  deg  P Front Axis=  deg  QRSD Interval=91  ms  QT Ihdlhaod=316  ms  UFjT=783  ms  QRS Axis=39  deg  T Wave Axis=42  deg  - ABNORMAL ECG -  Atrial fibrillation  Low voltage, precordial leads  When compared with ECG of 12-Nov-2024 12:18:10,  No significant change  Electronically Signed By: Miguel Egan ([facility]) 2024-11-17 18:34:55  Date and Time of Study:2024-11-14 02:58:53            Medication Review:   I have reviewed the patient's current medication list    Current Facility-Administered Medications:     [Transfer Hold] acetylcysteine (MUCOMYST) 20 % nebulizer solution 3 mL, 3 mL, Nebulization, BID - RT, Reji Burch MD, 3 mL at 11/17/24 0650    albuterol (PROVENTIL) nebulizer solution 0.083% 2.5 mg/3mL, 2.5 mg, Nebulization, Q6H PRN, Reji Burch MD, 2.5 mg at 11/14/24 0451    apixaban (ELIQUIS) tablet 5 mg, 5 mg, Oral, BID, Reji Burch MD, 5 mg at 11/16/24 2007    sennosides-docusate (PERICOLACE) 8.6-50 MG per tablet 2 tablet, 2 tablet, Oral, BID PRN, 2 tablet at 11/16/24 2015 **AND**  polyethylene glycol (MIRALAX) packet 17 g, 17 g, Oral, Daily PRN **AND** bisacodyl (DULCOLAX) EC tablet 5 mg, 5 mg, Oral, Daily PRN **AND** bisacodyl (DULCOLAX) suppository 10 mg, 10 mg, Rectal, Daily PRN, Reji Burch MD    Calcium Replacement - Follow Nurse / BPA Driven Protocol, , Not Applicable, PRN, Reji Burch MD    flecainide (TAMBOCOR) tablet 50 mg, 50 mg, Oral, Q12H, Reji Burch MD, 50 mg at 11/17/24 1242    [START ON 11/18/2024] furosemide (LASIX) tablet 40 mg, 40 mg, Oral, Daily, Kevin Angel MD    guaiFENesin-codeine (GUAIFENESIN AC) 100-10 MG/5ML liquid 5 mL, 5 mL, Oral, Q4H PRN, Reji Burch MD, 5 mL at 11/16/24 2223    hydrALAZINE (APRESOLINE) injection 10 mg, 10 mg, Intravenous, Q6H PRN, Reji Burch MD, 10 mg at 11/16/24 1735    hydrALAZINE (APRESOLINE) tablet 25 mg, 25 mg, Oral, Q8H, Kevin Angel MD    ipratropium-albuterol (DUO-NEB) nebulizer solution 3 mL, 3 mL, Nebulization, 4x Daily - RT, Reji Burch MD, 3 mL at 11/17/24 1950    levothyroxine (SYNTHROID, LEVOTHROID) tablet 50 mcg, 50 mcg, Oral, Daily, Reji Burch MD, 50 mcg at 11/17/24 1242    Magnesium Standard Dose Replacement - Follow Nurse / BPA Driven Protocol, , Not Applicable, PRN, Reji Burch MD    multivitamin with minerals 1 tablet, 1 tablet, Oral, Daily, Reji Burch MD, 1 tablet at 11/17/24 1242    pantoprazole (PROTONIX) EC tablet 40 mg, 40 mg, Oral, Daily, Reji Burch MD, 40 mg at 11/17/24 1242    Phosphorus Replacement - Follow Nurse / BPA Driven Protocol, , Not Applicable, PRNMarcin Youssef, MD    Potassium Replacement - Follow Nurse / BPA Driven Protocol, , Not Applicable, Marcin LOU Youssef, MD    predniSONE (DELTASONE) tablet 40 mg, 40 mg, Oral, Daily With Breakfast, Reji Burch MD, 40 mg at 11/17/24 1242    sodium chloride 0.9 % flush 10 mL, 10 mL, Intravenous, PRNMarcin Youssef, MD, 10 mL at 11/15/24 0828    Assessment & Plan     Principal Problem:     Shortness of breath, multifactorial with pneumonia COPD, diastolic dysfunction, obesity, immobility deconditioning     Paroxysmal atrial fibrillation  Status post cardioversion  Off sotalol secondary to severe bradycardia  Off diltiazem as well  Flecainide 100 twice daily with preserved EF no history of any coronary disease or infarct  Eliquis twice daily     Dyspnea with exertion  Being treated for pneumonia, also COPD with Solu-Medrol and DuoNebs  Chronic lower extremity edema, BNP is normal  EF preserved 55 to 60%, normal estimated right-sided pressures  Mild diastolic dysfunction chronically as assessed in sinus rhythm previously, indeterminate with underlying atrial fibrillation, known [id number] variability  CT with right lower lobe endobronchial opacification and consolidative changes in right lower lobe  On nebs, steroids, and antibiotics     history of pulmonary embolism  Currently anticoagulated with Eliquis therapeutic dose uninterrupted     Hypertension  Stable  Off diltiazem  Hydralazine 3 times daily      Today  Continue treatment for pneumonia and COPD from a pulmonary standpoint  EF is preserved with [id number] variability 65% EF, diastolic dysfunction known  Restart Lasix today, 40 IV x 1 then 40 p.o. daily tomorrow  Increase flecainide 100 twice daily  Hydralazine 3 times daily for blood pressure control, extra dose as indicated  Off sotalol and diltiazem at this time secondary to severe bradycardia in the low 40s  Continue Eliquis uninterrupted twice daily therapeutic dose, history of both atrial fibrillation and thromboembolic disease with DVT PE  Continue uninterrupted telemetry      EP considering ablation to maintain sinus rhythm versus changing her antiarrhythmic therapy, now on flecainide with normal EF, negative stress test, no history of any coronary artery disease or structural heart disease     Greater than 50 minutes on totality of care today    Further recommendations to follow  findings and clinical course  Kevin Angel MD, PhD    Kevin Angel MD, PhD    Kevin Angel MD  11/17/24  20:15 EST

## 2024-11-18 NOTE — THERAPY TREATMENT NOTE
Subjective: Pt agreeable to therapeutic plan of care.    Objective:     Pt has now not been out of bed for 5 days. Was seen 11/13; refused 11/14; 11/15 had cardioversion; 11/17 had bradycardia. Pt noted to have severe deep tissue injuries . Sleeps in lift recliner at home. Has hi profile roho cushion at home but admits she hasn't been using it because she didn't really think it was that important. PT had long discussion w/ pt about deep tissue injuries and RN was present and educated pt about importance of pressure relief as well.     Precautions - falls; O2 dependent.    Bed mobility - Mod-A, Max-A, and Assist x 2  Transfers - CGA and with rolling walker; sit to stand and stand to sit.   Ambulation - 2 feet CGA and with rolling walker. Not safe for amb for distance due to very high urine output into purewick cath.  Self care - pt has purewick in place, but noted to be saturated in urine when PT arrived. RN and PT changed pt's gown and linens when she stood. PT provided safe guarding of pt while in standing at rw. RN cleaned pt's skin. Pt voided > 300 ml into purewick cath during the 31 minutes PT was present.     Vitals: WNL    Pain: 0 VAS   Location: n/a  Intervention for pain: Repositioned, RN notified, Increased Activity, and Therapeutic Presence    Education: Provided education on the importance of mobility in the acute care setting, Verbal/Tactile Cues, ADL training, Transfer Training, Gait Training, and Energy conservation strategies    Assessment: Camelia Gray is requiring very high assist for bed mobility. Pt was able to stand surprisingly well but still appears weak. Pt is very high risk for falls. Discussed possible SNF at d/c, but pt is adamant that she has arranged 24 hr care for home and will do better and have more help at home. PT emphasized importance of using roho cushion in chair at all times, as well as importance of performing standing at rw for about a minute at least q 1.5 hrs. She is also to  "change angle of reclining frequently to change pressure on gluteal tissue. Pt presents with functional mobility impairments which indicate the need for skilled intervention. Tolerating session today without incident. Will continue to follow and progress as tolerated.     Plan/Recommendations:   If medically appropriate, Low Intensity Therapy recommended post-acute care - This is recommended as therapy feels this patient would require 2-3 visits per week. OP or HH would be the best option depending on patient's home bound status. Consider, if the patient has other  \"skilled\" needs such as wounds, IV antibiotics, etc. Combined with \"low intensity\" could also equate to a SNF. If patient is medically complex, consider LTAC. Pt requires no DME at discharge.     Pt desires Home with Home Health with 24 hr care at discharge. Pt cooperative; agreeable to therapeutic recommendations and plan of care.         Basic Mobility 6-click:  Rollin = Total, A lot = 2, A little = 3; 4 = None  Supine>Sit:   1 = Total, A lot = 2, A little = 3; 4 = None   Sit>Stand with arms:  1 = Total, A lot = 2, A little = 3; 4 = None  Bed>Chair:   1 = Total, A lot = 2, A little = 3; 4 = None  Ambulate in room:  1 = Total, A lot = 2, A little = 3; 4 = None  3-5 Steps with railin = Total, A lot = 2, A little = 3; 4 = None  Score: 15    Modified [place]: N/A = No pre-op stroke/TIA    Post-Tx Position: Supine with HOB Elevated, Staff Present, Alarms activated, and Call light and personal items within reach  PPE: gloves    Therapy Charges for Today       Code Description Service Date Service Provider Modifiers Qty    [id number]  PT SELF CARE/MGMT/TRAIN EA 15 MIN 2024 Gale Soares, PT GP 1    92389464133 HC PT THERAPEUTIC ACT EA 15 MIN 2024 Gale Soares, PT GP 2           PT Charges       Row Name 24 1609 24 1011          Time Calculation    Start Time 1529  -CM --     Stop Time 1600  -CM --     Time " Calculation (min) 31 min  -CM --     PT Received On 11/18/24  -CM --     PT - Next Appointment 11/19/24  -CM 11/19/24  -CC        Time Calculation- PT    Total Timed Code Minutes- PT 31 minute(s)  -CM --               User Key  (r) = Recorded By, (t) = Taken By, (c) = Cosigned By      Initials Name Provider Type    Gale Carrillo, PT Physical Therapist    CC Lena Luther, SHAKIRA Physical Therapist Assistant

## 2024-11-18 NOTE — PROGRESS NOTES
"[facility]      Patient Care Team:  Brinda Tony APRN as PCP - General (Nurse Practitioner)  Kevin Angel MD as Consulting Physician (Cardiology)      Reason for follow-up: Shortness of breath    Subjective  Patient seen as she is lying in bed, in no acute distress, with RN at bedside.  Patient has no CV complaints this a.m.    Interval History and ROS:   No acute adverse overnight events reported by patient, RN, or noted on bedside telemetry.  Patient remains on 2 L nasal cannula.  Blood pressures remaining elevated, and bedside RN reports that hospitalist added amlodipine 5 mg daily and losartan 50 mg daily this a.m., which patient has taken.  She also got Lasix 40 mg p.o. which is ordered daily.  Hydralazine 25 mg every 8hrs and flecainide 100 mg every 12 hours remains.      History taken from: patient chart RN    Review of Systems   Constitutional: Negative for chills, diaphoresis, fever and malaise/fatigue.   Cardiovascular:  Positive for leg swelling. Negative for chest pain, dyspnea on exertion, irregular heartbeat, near-syncope, orthopnea, palpitations, paroxysmal nocturnal dyspnea and syncope.   Respiratory:  Negative for shortness of breath and sleep disturbances due to breathing.    Gastrointestinal:  Negative for nausea and vomiting.   Genitourinary:  Negative for dysuria.   Neurological:  Negative for dizziness, focal weakness, headaches, light-headedness, sensory change and weakness.   Psychiatric/Behavioral:  Negative for altered mental status.        Objective    Vital Signs  Temp:  [96.8 °F (36 °C)-98.6 °F (37 °C)] 96.8 °F (36 °C)  Heart Rate:  [49-76] 49  Resp:  [12-24] 16  BP: (156-193)/(65-98) 172/90  Oxygen Therapy  SpO2: 96 %  Pulse Oximetry Type: Continuous  Device (Oxygen Therapy): nasal cannula  Flow (L/min) (Oxygen Therapy): 3}  Flowsheet Rows      Flowsheet Row First Filed Value   Admission Height 180.3 cm (71\") Documented at 11/12/2024 1159   Admission Weight " 150 kg (330 lb) Documented at 11/12/2024 1159            Physical Exam:    Physical Exam  Vitals and nursing note reviewed.   Constitutional:       General: She is not in acute distress.     Appearance: She is obese. She is not diaphoretic.   HENT:      Head: Normocephalic and atraumatic.   Cardiovascular:      Rate and Rhythm: Regular rhythm. Bradycardia present.      Pulses: Normal pulses.      Heart sounds: Normal heart sounds.   Pulmonary:      Effort: Pulmonary effort is normal. No respiratory distress.      Breath sounds: Examination of the right-lower field reveals decreased breath sounds. Examination of the left-lower field reveals decreased breath sounds. No wheezing, rhonchi or rales.      Comments: 2 L nasal cannula  Abdominal:      Palpations: Abdomen is soft.   Musculoskeletal:      Right lower leg: Edema present.      Left lower leg: Edema present.   Skin:     General: Skin is warm and dry.   Neurological:      General: No focal deficit present.      Mental Status: She is alert and oriented to person, place, and time.   Psychiatric:         Mood and Affect: Mood normal.         Behavior: Behavior normal.         Thought Content: Thought content normal.         Judgment: Judgment normal.         Results Review:     I reviewed the patient's new clinical results.    Lab Results (last 24 hours)       Procedure Component Value Units Date/Time    BAL Culture, Quantitative - Lavage, Lung, L [[id number]]  (Abnormal) Collected: 11/17/24 0918    Specimen: Lavage from Lung, L Updated: 11/18/24 0844     BAL Culture 50,000 CFU/mL Candida albicans      50,000 CFU/mL The culture consists of normal respiratory gerardo. This is a preliminary report; final report to follow.     Gram Stain Moderate (3+) WBCs per low power field      Moderate (3+) Epithelial cells per low power field      Few (2+) Budding yeast with pseudohyphae    Basic Metabolic Panel [[id number]]  (Abnormal) Collected: 11/18/24 0304    Specimen: Blood from  Arm, Left Updated: 11/18/24 0413     Glucose 135 mg/dL      BUN 24 mg/dL      Creatinine 0.67 mg/dL      Sodium 141 mmol/L      Potassium 4.6 mmol/L      Chloride 103 mmol/L      CO2 33.2 mmol/L      Calcium 8.5 mg/dL      BUN/Creatinine Ratio 35.8     Anion Gap 4.8 mmol/L      eGFR 89.6 mL/min/1.73     Narrative:      GFR Normal >60  Chronic Kidney Disease <60  Kidney Failure <15    The GFR formula is only valid for adults with stable renal function between ages 18 and 70.    CBC & Differential [[id number]]  (Abnormal) Collected: 11/18/24 0304    Specimen: Blood from Arm, Left Updated: 11/18/24 0337    Narrative:      The following orders were created for panel order CBC & Differential.  Procedure                               Abnormality         Status                     ---------                               -----------         ------                     CBC Auto Differential[[id number]]        Abnormal            Final result                 Please view results for these tests on the individual orders.    CBC Auto Differential [[id number]]  (Abnormal) Collected: 11/18/24 0304    Specimen: Blood from Arm, Left Updated: 11/18/24 0337     WBC 11.95 10*3/mm3      RBC 5.02 10*6/mm3      Hemoglobin 14.2 g/dL      Hematocrit 45.6 %      MCV 90.8 fL      MCH 28.3 pg      MCHC 31.1 g/dL      RDW 15.4 %      RDW-SD 51.0 fl      MPV 9.3 fL      Platelets 188 10*3/mm3      Neutrophil % 91.1 %      Lymphocyte % 4.3 %      Monocyte % 3.7 %      Eosinophil % 0.0 %      Basophil % 0.2 %      Immature Grans % 0.7 %      Neutrophils, Absolute 10.90 10*3/mm3      Lymphocytes, Absolute 0.51 10*3/mm3      Monocytes, Absolute 0.44 10*3/mm3      Eosinophils, Absolute 0.00 10*3/mm3      Basophils, Absolute 0.02 10*3/mm3      Immature Grans, Absolute 0.08 10*3/mm3      nRBC 0.0 /100 WBC     Blood Culture - Blood, Hand, Left [[id number]]  (Normal) Collected: 11/12/24 1321    Specimen: Blood from Hand, Left Updated: 11/17/24 1330     Blood  Culture No growth at 5 days    Narrative:      Less than seven (7) mL's of blood was collected.  Insufficient quantity may yield false negative results.    Blood Culture - Blood, Arm, Right [[id number]]  (Normal) Collected: 11/12/24 1236    Specimen: Blood from Arm, Right Updated: 11/17/24 1245     Blood Culture No growth at 5 days    Narrative:      Less than seven (7) mL's of blood was collected.  Insufficient quantity may yield false negative results.    Pneumocystis PCR - Lavage, Lung, L [[id number]] Collected: 11/17/24 0918    Specimen: Lavage from Lung, L Updated: 11/17/24 1030    Fungus Culture - Lavage, Lung, L [[id number]] Collected: 11/17/24 0918    Specimen: Lavage from Lung, L Updated: 11/17/24 1030    AFB Culture - Lavage, Lung, L [[id number]] Collected: 11/17/24 0918    Specimen: Lavage from Lung, L Updated: 11/17/24 1030    Non-gynecologic Cytology [[id number]] Collected: 11/17/24 0918    Specimen: Lavage from Lung, L Updated: 11/17/24 1022            Imaging Results (Last 24 Hours)       ** No results found for the last 24 hours. **          ECG/EMG Results (most recent)       Procedure Component Value Units Date/Time    ECG 12 Lead Dyspnea [[id number]] Collected: 11/12/24 1218     Updated: 11/13/24 0602     QT Interval 353 ms      QTC Interval 467 ms     Narrative:      HEART SFCB=684  bpm  RR Ewpepcqj=619  ms  FL Interval=  ms  P Horizontal Axis=  deg  P Front Axis=  deg  QRSD Interval=87  ms  QT Mckvwyit=843  ms  JHoL=876  ms  QRS Axis=45  deg  T Wave Axis=8  deg  - ABNORMAL ECG -  Atrial fibrillation  Low voltage, precordial leads  When compared with ECG of 08-Feb-2024 12:03:26,  Significant change in rhythm: previously sinus  Electronically Signed By: Eduardo Anderson ([facility]) 2024-11-13 06:01:52  Date and Time of Study:2024-11-12 12:18:10    Telemetry Scan [[id number]] Resulted: 11/12/24     Updated: 11/13/24 1432    Adult Transthoracic Echo Complete W/ Cont if Necessary Per Protocol [[id number]] Resulted:  11/13/24 1823     Updated: 11/13/24 1823     LVIDd 4.5 cm      LVIDs 3.0 cm      IVSd 1.10 cm      LVPWd 1.00 cm      FS 33.3 %      IVS/LVPW 1.10 cm      ESV(cubed) 27.0 ml      LV Sys Vol (BSA corrected) 11.3 cm2      EDV(cubed) 91.1 ml      LV [place] Vol (BSA corrected) 36.5 cm2      LV mass(C)d 164.0 grams      LVOT area 2.8 cm2      LVOT diam 1.90 cm      EDV(MOD-sp4) 97.3 ml      ESV(MOD-sp4) 30.0 ml      SV(MOD-sp4) 67.3 ml      SVi(MOD-SP4) 25.3 ml/m2      SVi (LVOT) 21.6 ml/m2      EF(MOD-sp4) 69.2 %      MV E max christiano 96.7 cm/sec      LA ESV Index (BP) 19.2 ml/m2      Lat Peak E' Christiano 10.4 cm/sec      TR max christiano 163.0 cm/sec      SV(LVOT) 57.6 ml      RVIDd 3.3 cm      RV S' 9.9 cm/sec      LA dimension (2D)  5.0 cm      LV V1 max 113.0 cm/sec      LV V1 max PG 5.1 mmHg      LV V1 mean PG 3.0 mmHg      LV V1 VTI 20.3 cm      Ao pk christiano 163.0 cm/sec      Ao max PG 10.6 mmHg      Ao mean PG 7.0 mmHg      Ao V2 VTI 32.1 cm      FEDERICO(I,D) 1.79 cm2      MV max PG 8.2 mmHg      MV mean PG 3.0 mmHg      MV V2 VTI 26.0 cm      MV P1/2t 66.6 msec      MVA(P1/2t) 3.3 cm2      MVA(VTI) 2.21 cm2      MV dec slope 616.0 cm/sec2      MR max christiano 390.0 cm/sec      MR max PG 60.8 mmHg      TR max PG 10.6 mmHg      RVSP(TR) 25.6 mmHg      RAP systole 15.0 mmHg      RV V1 max PG 2.9 mmHg      RV V1 max 85.4 cm/sec      RV V1 VTI 13.8 cm      PA V2 max 96.3 cm/sec      PA acc time 0.08 sec      ACS 1.80 cm      Sinus 2.7 cm      EF(MOD-bp) 69.0 %      Dimensionless Index 0.69 (DI)     Narrative:        Left ventricular systolic function is normal. Left ventricular ejection   fraction appears to be 56 - 60%.    Left ventricular wall thickness is consistent with borderline   concentric hypertrophy.    Left ventricular diastolic function was indeterminate.    The left atrial cavity is mildly dilated.    Left atrial volume is mildly increased.    There is moderate calcification of the aortic valve.    Estimated right ventricular  systolic pressure from tricuspid   regurgitation is normal (<35 mmHg).      Telemetry Scan [[id number]] Resulted: 11/12/24     Updated: 11/14/24 1333    Telemetry Scan [[id number]] Resulted: 11/12/24     Updated: 11/14/24 1416    Telemetry Scan [[id number]] Resulted: 11/12/24     Updated: 11/14/24 1449    Telemetry Scan [[id number]] Resulted: 11/12/24     Updated: 11/14/24 1519    Telemetry Scan [[id number]] Resulted: 11/12/24     Updated: 11/15/24 1658    Telemetry Scan [[id number]] Resulted: 11/12/24     Updated: 11/15/24 1743    ECG 12 Lead QT Measurement [[id number]] Collected: 11/16/24 0805     Updated: 11/17/24 1834     QT Interval 456 ms      QTC Interval 414 ms     Narrative:      HEART RATE=49  bpm  RR Nndpzjuz=7788  ms  ID Zbnaumvx=541  ms  P Horizontal Axis=21  deg  P Front Axis=27  deg  QRSD Interval=86  ms  QT Rjddgsre=043  ms  JYsC=363  ms  QRS Axis=50  deg  T Wave Axis=56  deg  - BORDERLINE ECG -  Bradycardia with irregular rate  Low voltage, precordial leads  When compared with ECG of 15-Nov-2024 11:21:41,  Significant change in rhythm: previously sinus  Significant axis, voltage or hypertrophy change  Electronically Signed By: Miguel Egan ([facility]) 2024-11-17 18:34:48  Date and Time of Study:2024-11-16 08:05:11    ECG 12 Lead Drug Monitoring [[id number]] Collected: 11/14/24 0258     Updated: 11/17/24 1835     QT Interval 402 ms      QTC Interval 484 ms     Narrative:      HEART RATE=87  bpm  RR Pgfifbud=479  ms  ID Interval=  ms  P Horizontal Axis=  deg  P Front Axis=  deg  QRSD Interval=91  ms  QT Islghhpo=067  ms  EWkF=781  ms  QRS Axis=39  deg  T Wave Axis=42  deg  - ABNORMAL ECG -  Atrial fibrillation  Low voltage, precordial leads  When compared with ECG of 12-Nov-2024 12:18:10,  No significant change  Electronically Signed By: Miguel Egan ([facility]) 2024-11-17 18:34:55  Date and Time of Study:2024-11-14 02:58:53            Medication Review:   I have reviewed the patient's current medication list    Current  Facility-Administered Medications:     [Transfer Hold] acetylcysteine (MUCOMYST) 20 % nebulizer solution 3 mL, 3 mL, Nebulization, BID - RT, Reji Burch MD, 3 mL at 11/17/24 0650    albuterol (PROVENTIL) nebulizer solution 0.083% 2.5 mg/3mL, 2.5 mg, Nebulization, Q6H PRN, Reji Burch MD, 2.5 mg at 11/14/24 0451    amLODIPine (NORVASC) tablet 5 mg, 5 mg, Oral, Q24H, Hussein Manzanares MD, 5 mg at 11/18/24 0848    apixaban (ELIQUIS) tablet 5 mg, 5 mg, Oral, BID, Reji Burch MD, 5 mg at 11/18/24 0837    sennosides-docusate (PERICOLACE) 8.6-50 MG per tablet 2 tablet, 2 tablet, Oral, BID PRN, 2 tablet at 11/17/24 2056 **AND** polyethylene glycol (MIRALAX) packet 17 g, 17 g, Oral, Daily PRN **AND** bisacodyl (DULCOLAX) EC tablet 5 mg, 5 mg, Oral, Daily PRN **AND** bisacodyl (DULCOLAX) suppository 10 mg, 10 mg, Rectal, Daily PRN, Reji Burch MD    Calcium Replacement - Follow Nurse / BPA Driven Protocol, , Not Applicable, PRN, Reji Burch MD    flecainide (TAMBOCOR) tablet 100 mg, 100 mg, Oral, Q12H, Kevin Angel MD, 100 mg at 11/18/24 0837    furosemide (LASIX) tablet 40 mg, 40 mg, Oral, Daily, Kevin Angel MD, 40 mg at 11/18/24 0837    guaiFENesin-codeine (GUAIFENESIN AC) 100-10 MG/5ML liquid 5 mL, 5 mL, Oral, Q4H PRN, Reji Burch MD, 5 mL at 11/16/24 2223    hydrALAZINE (APRESOLINE) injection 10 mg, 10 mg, Intravenous, Q6H PRN, Reji Burch MD, 10 mg at 11/16/24 1735    hydrALAZINE (APRESOLINE) tablet 25 mg, 25 mg, Oral, Q8H, Kevin Angel MD, 25 mg at 11/18/24 0539    ipratropium-albuterol (DUO-NEB) nebulizer solution 3 mL, 3 mL, Nebulization, 4x Daily - RT, Reji Burch MD, 3 mL at 11/18/24 0640    levothyroxine (SYNTHROID, LEVOTHROID) tablet 50 mcg, 50 mcg, Oral, Daily, Reji Burch MD, 50 mcg at 11/18/24 0837    losartan (COZAAR) tablet 50 mg, 50 mg, Oral, Q24H, Hussein Manzanares MD, 50 mg at 11/18/24 0848    Magnesium Standard Dose Replacement  - Follow Nurse / BPA Driven Protocol, , Not Applicable, Marcin LOU Youssef, MD    multivitamin with minerals 1 tablet, 1 tablet, Oral, Daily, Reji Burch MD, 1 tablet at 11/18/24 0837    pantoprazole (PROTONIX) EC tablet 40 mg, 40 mg, Oral, Daily, Reji Burch MD, 40 mg at 11/18/24 0837    Phosphorus Replacement - Follow Nurse / BPA Driven Protocol, , Not Applicable, PRMarcin BAILEY Youssef, MD    Potassium Replacement - Follow Nurse / BPA Driven Protocol, , Not Applicable, PRMarcin BAILEY Youssef, MD    predniSONE (DELTASONE) tablet 40 mg, 40 mg, Oral, Daily With Breakfast, Reji Burch MD, 40 mg at 11/18/24 0837    sodium chloride 0.9 % flush 10 mL, 10 mL, Intravenous, PRMarcin BAILEY Youssef, MD, 10 mL at 11/17/24 2057    Assessment & Plan       Paroxysmal atrial fibrillation  Status post cardioversion  sotalol was changed to flecainide 100 twice daily secondary to severe bradycardia and diltiazem DC'd  Eliquis twice daily    Bradycardia  Medication changes made  Patient states she has largely remained in the 50s, 58 this a.m., low of 50 documented during the night     Dyspnea with exertion/pneumonia/COPD  solu-Medrol and DuoNebs continue    Chronic lower extremity edema  BNP is normal  Legs remain wrapped    history of pulmonary embolism  Currently anticoagulated with Eliquis therapeutic dose, continue uninterrupted     Hypertension  Was 172/90 and this a.m.   losartan and Norvasc added by hospitalist this a.m.  Hydralazine 3 times daily continues    Diastolic dysfunction  diastolic dysfunction known, indeterminate with underlying atrial fibrillation, EF is preserved with ieth-tz-ivum variability 65% EF, normal estimated right-sided pressures    Lasix 40 mg p.o. daily  Patient got dose of IV Lasix yesterday with net -2010  Today BUN 24, creatinine 0.67      Further orders and recommendations per Dr. Stanley Donaldson, APRN  11/18/24  09:28 EST

## 2024-11-18 NOTE — PROGRESS NOTES
"    Reason for follow-up: A-fib     Patient Care Team:  Brinda Tony APRN as PCP - General (Nurse Practitioner)  Kevin Angel MD as Consulting Physician (Cardiology)    Subjective .   Camelia Gray doing well today. Having difficulty controlling hypertension.     ROS    Maxzide [hydrochlorothiazide w-triamterene]    Scheduled Meds:[Transfer Hold] acetylcysteine, 3 mL, Nebulization, BID - RT  amLODIPine, 5 mg, Oral, Q24H  apixaban, 5 mg, Oral, BID  flecainide, 100 mg, Oral, Q12H  furosemide, 40 mg, Oral, Daily  hydrALAZINE, 25 mg, Oral, Q8H  ipratropium-albuterol, 3 mL, Nebulization, 4x Daily - RT  levothyroxine, 50 mcg, Oral, Daily  losartan, 50 mg, Oral, Q24H  multivitamin with minerals, 1 tablet, Oral, Daily  pantoprazole, 40 mg, Oral, Daily  predniSONE, 40 mg, Oral, Daily With Breakfast      Continuous Infusions:   PRN Meds:.  albuterol    senna-docusate sodium **AND** polyethylene glycol **AND** bisacodyl **AND** bisacodyl    Calcium Replacement - Follow Nurse / BPA Driven Protocol    guaiFENesin-codeine    hydrALAZINE    Magnesium Standard Dose Replacement - Follow Nurse / BPA Driven Protocol    Phosphorus Replacement - Follow Nurse / BPA Driven Protocol    Potassium Replacement - Follow Nurse / BPA Driven Protocol    sodium chloride      VITAL SIGNS  Vitals:    11/18/24 0643 11/18/24 0831 11/18/24 0915 11/18/24 1000   BP:  172/90 (!) 184/94 (!) 183/89   BP Location:  Left arm     Patient Position:  Sitting     Pulse: 57 (!) 49 57 50   Resp: 17 16     Temp:  96.8 °F (36 °C)     TempSrc:  Infrared     SpO2: 97% 96% 94% 94%   Weight:       Height:           Flowsheet Rows      Flowsheet Row First Filed Value   Admission Height 180.3 cm (71\") Documented at 11/12/2024 1159   Admission Weight 150 kg (330 lb) Documented at 11/12/2024 1159               Physical Exam  VITALS REVIEWED    General:      well developed, in no acute distress.    Head:      normocephalic and atraumatic.    Eyes:      " PERRL/EOM intact, conjunctiva and sclera clear with out nystagmus.    Neck:      no masses, thyromegaly,  trachea central with normal respiratory effort and PMI displaced laterally  Lungs:      Clear, 2L NC  Heart:       Regular rate and rhythm  Msk:      no deformity or scoliosis noted of thoracic or lumbar spine.    Pulses:      pulses normal in all 4 extremities.    Extremities:       Bilateral lower extremities wrapped with Ace  Neurologic:      no focal deficits.   alert oriented x3  Skin:      intact without lesions or rashes.    Psych:      alert and cooperative; normal mood and affect; normal attention span and concentration.          LAB RESULTS (LAST 7 DAYS)    CBC  Results from last 7 days   Lab Units 11/18/24  0304 11/17/24  0258 11/15/24  0505 11/14/24  0445 11/13/24  0522 11/12/24  1321   WBC 10*3/mm3 11.95* 11.45* 11.65* 14.09* 7.43 6.89   RBC 10*6/mm3 5.02 4.95 4.79 5.06 5.23 5.00   HEMOGLOBIN g/dL 14.2 14.0 13.5 14.0 14.8 14.2   HEMATOCRIT % 45.6 44.8 43.4 45.7 46.8* 45.3   MCV fL 90.8 90.5 90.6 90.3 89.5 90.6   PLATELETS 10*3/mm3 188 202 205 235 224 212       BMP  Results from last 7 days   Lab Units 11/18/24  0304 11/17/24  0258 11/15/24  0505 11/14/24  0445 11/13/24  0522 11/12/24  1321   SODIUM mmol/L 141 142 143 142 142 142   POTASSIUM mmol/L 4.6 4.6 4.4 4.4 4.2 3.8   CHLORIDE mmol/L 103 104 105 103 105 104   CO2 mmol/L 33.2* 30.3* 30.1* 29.4* 26.1 28.2   BUN mg/dL 24* 30* 34* 30* 18 22   CREATININE mg/dL 0.67 0.69 0.80 0.82 0.75 0.73   GLUCOSE mg/dL 135* 107* 130* 161* 165* 113*       CMP   Results from last 7 days   Lab Units 11/18/24  0304 11/17/24  0258 11/15/24  0505 11/14/24 0445 11/13/24  0522 11/12/24  1321   SODIUM mmol/L 141 142 143 142 142 142   POTASSIUM mmol/L 4.6 4.6 4.4 4.4 4.2 3.8   CHLORIDE mmol/L 103 104 105 103 105 104   CO2 mmol/L 33.2* 30.3* 30.1* 29.4* 26.1 28.2   BUN mg/dL 24* 30* 34* 30* 18 22   CREATININE mg/dL 0.67 0.69 0.80 0.82 0.75 0.73   GLUCOSE mg/dL 135* 107*  130* 161* 165* 113*   ALBUMIN g/dL  --   --   --   --   --  3.8   BILIRUBIN mg/dL  --   --   --   --   --  0.4   ALK PHOS U/L  --   --   --   --   --  128*   AST (SGOT) U/L  --   --   --   --   --  28   ALT (SGPT) U/L  --   --   --   --   --  23         BNP        TROPONIN  Results from last 7 days   Lab Units 11/12/24  1427   HSTROP T ng/L 15*       CoAg        Creatinine Clearance  Estimated Creatinine Clearance: 115.8 mL/min (by C-G formula based on SCr of 0.67 mg/dL).    ABG          EKG    I personally reviewed the patient's EKG/Telemetry data: Sinus rhythm      Assessment & Plan       Shortness of breath    Persistent atrial fibrillation    Multiple tracheobronchial mucus plugs    Atelectasis      Camelia Gray is a 79-year-old female patient who was first diagnosed with A-fib in December 2023, she had cardioversion and she remained in sinus rhythm Optil June 2024.  A-fib recurred and she was placed on sotalol 80 mg every 12 hours.  Patient was also chronically anticoagulated with Eliquis since 2018.  Patient however remained in A-fib despite adding sotalol.  She is now admitted to the hospital for shortness of breath and pneumonia.  Since her A-fib is not chronic, which should direct our efforts towards rhythm restoration.  I think it is best to go ahead and perform cardioversion while she is here, hopefully with her being on sotalol this will maintain her in normal rhythm.  We can also go ahead and schedule her for A-fib ablation to avoid recurrences.  Risks indications and benefits for both cardioversion and A-fib ablation were discussed at length with her and patient is agreeable.  Plan for cardioversion tomorrow around 10:30 am.    11/15/2024  Cardioversion was performed with return to sinus rhythm.  She was bradycardic in the low 40s post cardioversion.  Will discontinue diltiazem.  Continue sotalol 80 mg every 12 hours for now.  If A-fib recurs, next option is to perform ablation.  I did give her the  option of preemptively schedule her for outpatient ablation, but the patient would like to hold off on it and see if sotalol helps suppress it.  Also advised her to monitor her heart rate at home and if it is consistently staying in the 40s, then she might need dose adjustment on the sotalol.  Possible pacemaker if needed    11/18/2024  Over weekend started on diltiazem for rate control. Sotalol discontinued and flecainide started for heart rate. HR now in 50-60s. Blood pressure still elevated. Norvasc and losartan added. Received lasix IV x 1 for fluid volume. Remains in sinus rhythm s/p cardioversion. Will continue to monitor BP.      I discussed the patients findings and my recommendations with patient and agrees with outlined plan.    DEE Agrawal  11/18/24  10:22 EST    Electronically signed by DEE Agrawal, 11/18/24, 10:44 AM EST.

## 2024-11-18 NOTE — SIGNIFICANT NOTE
11/18/24 1011   OTHER   Discipline physical therapy assistant   Rehab Time/Intention   Session Not Performed other (see comments)  (/[phone])   Recommendation   PT - Next Appointment 11/19/24

## 2024-11-19 PROBLEM — R00.1 SYMPTOMATIC BRADYCARDIA: Status: ACTIVE | Noted: 2024-11-12

## 2024-11-19 LAB
ANION GAP SERPL CALCULATED.3IONS-SCNC: 6.2 MMOL/L (ref 5–15)
BACTERIA SPEC AEROBE CULT: ABNORMAL
BACTERIA SPEC AEROBE CULT: ABNORMAL
BASOPHILS # BLD AUTO: 0.01 10*3/MM3 (ref 0–0.2)
BASOPHILS NFR BLD AUTO: 0.1 % (ref 0–1.5)
BUN SERPL-MCNC: 27 MG/DL (ref 8–23)
BUN/CREAT SERPL: 38.6 (ref 7–25)
CALCIUM SPEC-SCNC: 8.7 MG/DL (ref 8.6–10.5)
CHLORIDE SERPL-SCNC: 100 MMOL/L (ref 98–107)
CO2 SERPL-SCNC: 33.8 MMOL/L (ref 22–29)
CREAT SERPL-MCNC: 0.7 MG/DL (ref 0.57–1)
DEPRECATED RDW RBC AUTO: 50 FL (ref 37–54)
EGFRCR SERPLBLD CKD-EPI 2021: 88.7 ML/MIN/1.73
EOSINOPHIL # BLD AUTO: 0.04 10*3/MM3 (ref 0–0.4)
EOSINOPHIL NFR BLD AUTO: 0.3 % (ref 0.3–6.2)
ERYTHROCYTE [DISTWIDTH] IN BLOOD BY AUTOMATED COUNT: 15.4 % (ref 12.3–15.4)
GLUCOSE SERPL-MCNC: 124 MG/DL (ref 65–99)
GRAM STN SPEC: ABNORMAL
HCT VFR BLD AUTO: 45.9 % (ref 34–46.6)
HGB BLD-MCNC: 14.3 G/DL (ref 12–15.9)
IMM GRANULOCYTES # BLD AUTO: 0.11 10*3/MM3 (ref 0–0.05)
IMM GRANULOCYTES NFR BLD AUTO: 0.8 % (ref 0–0.5)
LAB AP CASE REPORT: NORMAL
LYMPHOCYTES # BLD AUTO: 0.83 10*3/MM3 (ref 0.7–3.1)
LYMPHOCYTES NFR BLD AUTO: 5.7 % (ref 19.6–45.3)
MCH RBC QN AUTO: 27.8 PG (ref 26.6–33)
MCHC RBC AUTO-ENTMCNC: 31.2 G/DL (ref 31.5–35.7)
MCV RBC AUTO: 89.1 FL (ref 79–97)
MONOCYTES # BLD AUTO: 0.83 10*3/MM3 (ref 0.1–0.9)
MONOCYTES NFR BLD AUTO: 5.7 % (ref 5–12)
NEUTROPHILS NFR BLD AUTO: 12.83 10*3/MM3 (ref 1.7–7)
NEUTROPHILS NFR BLD AUTO: 87.4 % (ref 42.7–76)
NRBC BLD AUTO-RTO: 0 /100 WBC (ref 0–0.2)
PATH REPORT.FINAL DX SPEC: NORMAL
PATH REPORT.GROSS SPEC: NORMAL
PLATELET # BLD AUTO: 203 10*3/MM3 (ref 140–450)
PMV BLD AUTO: 9.5 FL (ref 6–12)
POTASSIUM SERPL-SCNC: 4.5 MMOL/L (ref 3.5–5.2)
RBC # BLD AUTO: 5.15 10*6/MM3 (ref 3.77–5.28)
SODIUM SERPL-SCNC: 140 MMOL/L (ref 136–145)
WBC NRBC COR # BLD AUTO: 14.65 10*3/MM3 (ref 3.4–10.8)

## 2024-11-19 PROCEDURE — 94799 UNLISTED PULMONARY SVC/PX: CPT

## 2024-11-19 PROCEDURE — 25010000002 HYDRALAZINE PER 20 MG: Performed by: INTERNAL MEDICINE

## 2024-11-19 PROCEDURE — 99233 SBSQ HOSP IP/OBS HIGH 50: CPT | Performed by: INTERNAL MEDICINE

## 2024-11-19 PROCEDURE — 85025 COMPLETE CBC W/AUTO DIFF WBC: CPT | Performed by: INTERNAL MEDICINE

## 2024-11-19 PROCEDURE — 80048 BASIC METABOLIC PNL TOTAL CA: CPT | Performed by: INTERNAL MEDICINE

## 2024-11-19 PROCEDURE — 94761 N-INVAS EAR/PLS OXIMETRY MLT: CPT

## 2024-11-19 PROCEDURE — 93010 ELECTROCARDIOGRAM REPORT: CPT | Performed by: INTERNAL MEDICINE

## 2024-11-19 PROCEDURE — 94618 PULMONARY STRESS TESTING: CPT

## 2024-11-19 PROCEDURE — 94664 DEMO&/EVAL PT USE INHALER: CPT

## 2024-11-19 PROCEDURE — 25010000002 FUROSEMIDE PER 20 MG: Performed by: INTERNAL MEDICINE

## 2024-11-19 PROCEDURE — 93005 ELECTROCARDIOGRAM TRACING: CPT | Performed by: INTERNAL MEDICINE

## 2024-11-19 PROCEDURE — 63710000001 PREDNISONE PER 1 MG: Performed by: INTERNAL MEDICINE

## 2024-11-19 RX ORDER — DILTIAZEM HCL/D5W 125 MG/125
5-15 PLASTIC BAG, INJECTION (ML) INTRAVENOUS
Status: DISCONTINUED | OUTPATIENT
Start: 2024-11-19 | End: 2024-11-21

## 2024-11-19 RX ORDER — FUROSEMIDE 40 MG/1
40 TABLET ORAL DAILY
Qty: 30 TABLET | Refills: 0 | Status: SHIPPED | OUTPATIENT
Start: 2024-11-20 | End: 2024-11-21

## 2024-11-19 RX ORDER — LOSARTAN POTASSIUM 50 MG/1
50 TABLET ORAL
Qty: 30 TABLET | Refills: 0 | Status: SHIPPED | OUTPATIENT
Start: 2024-11-20 | End: 2024-11-21

## 2024-11-19 RX ORDER — HYDRALAZINE HYDROCHLORIDE 50 MG/1
50 TABLET, FILM COATED ORAL EVERY 12 HOURS SCHEDULED
Qty: 60 TABLET | Refills: 0 | Status: SHIPPED | OUTPATIENT
Start: 2024-11-19 | End: 2024-11-21

## 2024-11-19 RX ORDER — PREDNISONE 10 MG/1
TABLET ORAL
Qty: 22 TABLET | Refills: 0 | Status: SHIPPED | OUTPATIENT
Start: 2024-11-20 | End: 2024-11-21

## 2024-11-19 RX ORDER — DILTIAZEM HCL 60 MG
60 TABLET ORAL EVERY 6 HOURS SCHEDULED
Status: DISCONTINUED | OUTPATIENT
Start: 2024-11-19 | End: 2024-11-21

## 2024-11-19 RX ORDER — ALBUTEROL SULFATE 90 UG/1
2 INHALANT RESPIRATORY (INHALATION) 4 TIMES DAILY PRN
Qty: 18 G | Refills: 11 | Status: SHIPPED | OUTPATIENT
Start: 2024-11-19 | End: 2024-11-21

## 2024-11-19 RX ORDER — AMLODIPINE BESYLATE 5 MG/1
5 TABLET ORAL
Qty: 30 TABLET | Refills: 0 | Status: SHIPPED | OUTPATIENT
Start: 2024-11-20 | End: 2024-11-21 | Stop reason: HOSPADM

## 2024-11-19 RX ORDER — SOTALOL HYDROCHLORIDE 80 MG/1
80 TABLET ORAL EVERY 12 HOURS SCHEDULED
Status: DISCONTINUED | OUTPATIENT
Start: 2024-11-19 | End: 2024-11-22 | Stop reason: HOSPADM

## 2024-11-19 RX ORDER — FUROSEMIDE 10 MG/ML
40 INJECTION INTRAMUSCULAR; INTRAVENOUS ONCE
Status: COMPLETED | OUTPATIENT
Start: 2024-11-19 | End: 2024-11-19

## 2024-11-19 RX ORDER — SOTALOL HYDROCHLORIDE 80 MG/1
80 TABLET ORAL EVERY 12 HOURS SCHEDULED
Status: DISCONTINUED | OUTPATIENT
Start: 2024-11-19 | End: 2024-11-19

## 2024-11-19 RX ORDER — BUDESONIDE AND FORMOTEROL FUMARATE DIHYDRATE 160; 4.5 UG/1; UG/1
2 AEROSOL RESPIRATORY (INHALATION)
Qty: 10.2 G | Refills: 1 | Status: SHIPPED | OUTPATIENT
Start: 2024-11-19 | End: 2024-11-21

## 2024-11-19 RX ORDER — AMOXICILLIN 250 MG
2 CAPSULE ORAL 2 TIMES DAILY PRN
Qty: 30 TABLET | Refills: 0 | Status: SHIPPED | OUTPATIENT
Start: 2024-11-19 | End: 2024-11-21

## 2024-11-19 RX ORDER — FLECAINIDE ACETATE 100 MG/1
100 TABLET ORAL EVERY 12 HOURS SCHEDULED
Qty: 60 TABLET | Refills: 0 | Status: SHIPPED | OUTPATIENT
Start: 2024-11-19 | End: 2024-11-21 | Stop reason: HOSPADM

## 2024-11-19 RX ADMIN — IPRATROPIUM BROMIDE AND ALBUTEROL SULFATE 3 ML: .5; 3 SOLUTION RESPIRATORY (INHALATION) at 07:32

## 2024-11-19 RX ADMIN — APIXABAN 5 MG: 5 TABLET, FILM COATED ORAL at 08:04

## 2024-11-19 RX ADMIN — FUROSEMIDE 40 MG: 10 INJECTION, SOLUTION INTRAMUSCULAR; INTRAVENOUS at 09:39

## 2024-11-19 RX ADMIN — FLECAINIDE ACETATE 100 MG: 50 TABLET ORAL at 08:03

## 2024-11-19 RX ADMIN — Medication 1 TABLET: at 08:03

## 2024-11-19 RX ADMIN — LOSARTAN POTASSIUM 50 MG: 50 TABLET, FILM COATED ORAL at 08:03

## 2024-11-19 RX ADMIN — Medication 15 MG/HR: at 18:53

## 2024-11-19 RX ADMIN — Medication 10 ML: at 08:03

## 2024-11-19 RX ADMIN — APIXABAN 5 MG: 5 TABLET, FILM COATED ORAL at 20:41

## 2024-11-19 RX ADMIN — DILTIAZEM HYDROCHLORIDE 60 MG: 60 TABLET, FILM COATED ORAL at 11:51

## 2024-11-19 RX ADMIN — Medication 10 ML: at 20:41

## 2024-11-19 RX ADMIN — AMLODIPINE BESYLATE 5 MG: 5 TABLET ORAL at 08:03

## 2024-11-19 RX ADMIN — Medication 15 MG/HR: at 11:52

## 2024-11-19 RX ADMIN — Medication 10 MG/HR: at 10:23

## 2024-11-19 RX ADMIN — DILTIAZEM HYDROCHLORIDE 60 MG: 60 TABLET, FILM COATED ORAL at 16:25

## 2024-11-19 RX ADMIN — HYDRALAZINE HYDROCHLORIDE 50 MG: 25 TABLET ORAL at 08:04

## 2024-11-19 RX ADMIN — LEVOTHYROXINE SODIUM 50 MCG: 0.05 TABLET ORAL at 08:04

## 2024-11-19 RX ADMIN — FUROSEMIDE 40 MG: 40 TABLET ORAL at 08:03

## 2024-11-19 RX ADMIN — IPRATROPIUM BROMIDE AND ALBUTEROL SULFATE 3 ML: .5; 3 SOLUTION RESPIRATORY (INHALATION) at 20:00

## 2024-11-19 RX ADMIN — PANTOPRAZOLE SODIUM 40 MG: 40 TABLET, DELAYED RELEASE ORAL at 08:03

## 2024-11-19 RX ADMIN — DILTIAZEM HYDROCHLORIDE 60 MG: 60 TABLET, FILM COATED ORAL at 23:58

## 2024-11-19 RX ADMIN — PREDNISONE 40 MG: 20 TABLET ORAL at 08:03

## 2024-11-19 RX ADMIN — HYDRALAZINE HYDROCHLORIDE 10 MG: 20 INJECTION INTRAMUSCULAR; INTRAVENOUS at 10:43

## 2024-11-19 RX ADMIN — SOTALOL HYDROCHLORIDE 80 MG: 80 TABLET ORAL at 20:41

## 2024-11-19 RX ADMIN — Medication 5 MG/HR: at 09:40

## 2024-11-19 NOTE — PROGRESS NOTES
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     possible aspiration due to large hiatal hernia  Localized right upper lobe wheezing, CT chest showed right upper lobe endobronchial opacity     Bronchoscopy 12/27/2023  Copious amount mucopurulent secretions suctioned therapeutically  Right lung washing was obtained  Moderate inflammatory changes     Hypoxia    Pulmonary edema  Respiratory viral panel negative on 12/24/2023  History of pulmonary embolism 2018  Quit smoking 1992        Recommendations:  Respiratory status is improving, patient can be discharged from pulmonary standpoint  6-minute walking to assess need for home oxygen  Patient will need outpatient sleep study to assess need for CPAP    Status post bronchoscopy 11/17/2024 showing multiple mucous plugs and tracheobronchomalacia    Continue Mucomyst    HR control as per cardiology:  status post cardioversion 11/15/2024: Currently in sinus rhythm      Antibiotics: Rocephin    Prednisone    Mucinex  Encourage use of incentive spirometer          Oxygen supplement and titration to maintain saturation 90 to 95%: on 2 L  Bronchodilators     Chronic anticoagulation: Eliquis  Thyroid hormone replacement     I personally reviewed the radiological studies               LOS: 6 days     Subjective     Patient reports improvement in the cough and shortness of breath    Objective     Vital signs for last 24 hours:  Vitals:    11/19/24 0300 11/19/24 0732 11/19/24 0736 11/19/24 0808   BP: 164/86   174/84   BP Location: Right arm   Left arm   Patient Position: Lying   Lying   Pulse: 53 51 54 60   Resp: 18 18 18 22   Temp: 98.4 °F (36.9 °C)   97.8 °F (36.6 °C)   TempSrc: Oral   Oral   SpO2: 93% 92% 93% 92%   Weight: (!) 160 kg (352 lb 8.3 oz)      Height:           Intake/Output last 3 shifts:  I/O last 3 completed shifts:  In: 720 [P.O.:720]  Out: 1735  [Urine:5250]  Intake/Output this shift:  I/O this shift:  In: -   Out: 700 [Urine:700]      Radiology  Imaging Results (Last 24 Hours)       ** No results found for the last 24 hours. **            Labs:  Results from last 7 days   Lab Units 11/19/24  0330   WBC 10*3/mm3 14.65*   HEMOGLOBIN g/dL 14.3   HEMATOCRIT % 45.9   PLATELETS 10*3/mm3 203     Results from last 7 days   Lab Units 11/19/24  0330 11/13/24  0522 11/12/24  1321   SODIUM mmol/L 140   < > 142   POTASSIUM mmol/L 4.5   < > 3.8   CHLORIDE mmol/L 100   < > 104   CO2 mmol/L 33.8*   < > 28.2   BUN mg/dL 27*   < > 22   CREATININE mg/dL 0.70   < > 0.73   CALCIUM mg/dL 8.7   < > 9.8   BILIRUBIN mg/dL  --   --  0.4   ALK PHOS U/L  --   --  128*   ALT (SGPT) U/L  --   --  23   AST (SGOT) U/L  --   --  28   GLUCOSE mg/dL 124*   < > 113*    < > = values in this interval not displayed.         Results from last 7 days   Lab Units 11/12/24  1321   ALBUMIN g/dL 3.8     Results from last 7 days   Lab Units 11/12/24  1427 11/12/24  1321   HSTROP T ng/L 15* 15*                           Meds:   SCHEDULE  apixaban, 5 mg, Oral, BID  dilTIAZem, 60 mg, Oral, Q6H  flecainide, 100 mg, Oral, Q12H  furosemide, 40 mg, Intravenous, Once  furosemide, 40 mg, Oral, Daily  hydrALAZINE, 50 mg, Oral, Q12H  ipratropium-albuterol, 3 mL, Nebulization, 4x Daily - RT  levothyroxine, 50 mcg, Oral, Daily  losartan, 50 mg, Oral, Q24H  multivitamin with minerals, 1 tablet, Oral, Daily  pantoprazole, 40 mg, Oral, Daily  predniSONE, 40 mg, Oral, Daily With Breakfast      Infusions  dilTIAZem, 5-15 mg/hr      PRNs    albuterol    senna-docusate sodium **AND** polyethylene glycol **AND** bisacodyl **AND** bisacodyl    Calcium Replacement - Follow Nurse / BPA Driven Protocol    guaiFENesin-codeine    hydrALAZINE    Magnesium Standard Dose Replacement - Follow Nurse / BPA Driven Protocol    Phosphorus Replacement - Follow Nurse / BPA Driven Protocol    Potassium Replacement - Follow Nurse / BPA  Driven Protocol    sodium chloride    Physical Exam:  General Appearance:  Alert   HEENT:  Normocephalic, without obvious abnormality, Conjunctiva/corneas clear,.   Nares normal, no drainage     Neck:  Supple, symmetrical, trachea midline.   Lungs /Chest wall: Improved air entry with mild bilateral basal rhonchi, respirations unlabored, symmetrical wall movement.     Heart: Rate controlled, S1 S2 normal  Abdomen: Soft, non-tender, no masses, no organomegaly.    Extremities: No edema, no clubbing or cyanosis     ROS  Constitutional: Negative for chills, fever and malaise/fatigue.   HENT: Negative.    Eyes: Negative.    Cardiovascular: Negative.    Respiratory: Positive for improving cough and shortness of breath.    Skin: Negative.    Musculoskeletal: Negative.    Gastrointestinal: Negative.    Genitourinary: Negative.    Neurological: Generalized weakness    I reviewed the recent clinical results  I personally reviewed the latest radiological studies    Part of this note may be an electronic transcription/translation of spoken language to printed text using the Dragon Dictation System.     fingers/toes warm to touch

## 2024-11-19 NOTE — PROGRESS NOTES
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   Cardiovascular:  Negative for chest pain, irregular heartbeat, leg swelling, near-syncope, orthopnea, palpitations, paroxysmal nocturnal dyspnea and syncope.   Respiratory:  Positive for shortness of breath. Negative for cough.    Musculoskeletal:  Negative for myalgias.   Neurological:  Negative for dizziness, focal weakness, headaches and light-headedness.   Psychiatric/Behavioral:  Negative for altered mental status.    All other systems reviewed and are negative.      Objective  Blood pressure elevated today, A-fib with controlled rate    Vital Signs  Temp:  [97.2 °F (36.2 °C)-98.4 °F (36.9 °C)] 97.8 °F (36.6 °C)  Heart Rate:  [] 128  Resp:  [16-25] 21  BP: (132-174)/() 136/99  Oxygen Therapy  SpO2: 90 %  Pulse Oximetry Type: Continuous  Device (Oxygen Therapy): humidified, nasal cannula  Flow (L/min) (Oxygen Therapy): 2}  Flowsheet Rows      Flowsheet Row First Filed Value   Admission Height 180.3 cm (71\") Documented at 11/12/2024 1159   Admission Weight 150 kg (330 lb) Documented at 11/12/2024 1159            Physical Exam:    Physical Exam  Constitutional:       General: She is not in acute distress.     Appearance: " She is obese. She is not diaphoretic.   HENT:      Head: Normocephalic and atraumatic.   Eyes:      Extraocular Movements: Extraocular movements intact.      Pupils: Pupils are equal, round, and reactive to light.   Neck:      Vascular: No carotid bruit.   Cardiovascular:      Rate and Rhythm: Tachycardia present. Rhythm irregular.      Heart sounds: Normal heart sounds.   Pulmonary:      Effort: Pulmonary effort is normal. No respiratory distress.      Breath sounds: Normal breath sounds.      Comments: 2 L nasal cannula  Abdominal:      Palpations: Abdomen is soft.   Musculoskeletal:      Comments: Bilateral lower extremities wrapped with Ace   Skin:     Findings: No erythema.   Neurological:      Mental Status: She is alert and oriented to person, place, and time.   Psychiatric:         Mood and Affect: Mood normal.         Behavior: Behavior normal.         Thought Content: Thought content normal.         Judgment: Judgment normal.     A-fib RVR    Results Review:     I reviewed the patient's new clinical results.    Lab Results (last 24 hours)       Procedure Component Value Units Date/Time    Non-gynecologic Cytology [[id number]] Collected: 11/17/24 0918    Specimen: Lavage from Lung, L Updated: 11/19/24 1240     Case Report --     Medical Cytology Report                           Case: [id number]                                  Authorizing Provider:  Reji Burch MD         Collected:           11/17/2024 09:18 AM          Ordering Location:     [facility]  Received:            11/17/2024 10:22 AM                                 SUITES                                                                       Pathologist:           Kishan Grider MD                                                            Specimen:    Lung, L                                                                                     Final Diagnosis --     Left lung, bronchoalveolar lavage with smears and  "cytospin:  Acute inflammation, reactive but mature squamous cells, pulmonary macrophages, bronchial epithelial cells and Candida  No malignancy identified    FRANK       Gross Description --     1. Lung, L.  Received in [place] and designated \"Left lung BAL\" are 45 mL of clear green fluid. Particulate matter is present. This specimen is processed as per protocol.          BAL Culture, Quantitative - Lavage, Lung, L [[id number]]  (Abnormal) Collected: 11/17/24 0918    Specimen: Lavage from Lung, L Updated: 11/19/24 0918     BAL Culture 50,000 CFU/mL Candida albicans      50,000 CFU/mL Normal Respiratory Shanae     Gram Stain Moderate (3+) WBCs per low power field      Moderate (3+) Epithelial cells per low power field      Few (2+) Budding yeast with pseudohyphae    Basic Metabolic Panel [[id number]]  (Abnormal) Collected: 11/19/24 0330    Specimen: Blood from Arm, Left Updated: 11/19/24 0428     Glucose 124 mg/dL      BUN 27 mg/dL      Creatinine 0.70 mg/dL      Sodium 140 mmol/L      Potassium 4.5 mmol/L      Chloride 100 mmol/L      CO2 33.8 mmol/L      Calcium 8.7 mg/dL      BUN/Creatinine Ratio 38.6     Anion Gap 6.2 mmol/L      eGFR 88.7 mL/min/1.73     Narrative:      GFR Normal >60  Chronic Kidney Disease <60  Kidney Failure <15    The GFR formula is only valid for adults with stable renal function between ages 18 and 70.    CBC & Differential [[id number]]  (Abnormal) Collected: 11/19/24 0330    Specimen: Blood from Arm, Left Updated: 11/19/24 0358    Narrative:      The following orders were created for panel order CBC & Differential.  Procedure                               Abnormality         Status                     ---------                               -----------         ------                     CBC Auto Differential[[id number]]        Abnormal            Final result                 Please view results for these tests on the individual orders.    CBC Auto Differential [[id number]]  (Abnormal) Collected: " 11/19/24 0330    Specimen: Blood from Arm, Left Updated: 11/19/24 0358     WBC 14.65 10*3/mm3      RBC 5.15 10*6/mm3      Hemoglobin 14.3 g/dL      Hematocrit 45.9 %      MCV 89.1 fL      MCH 27.8 pg      MCHC 31.2 g/dL      RDW 15.4 %      RDW-SD 50.0 fl      MPV 9.5 fL      Platelets 203 10*3/mm3      Neutrophil % 87.4 %      Lymphocyte % 5.7 %      Monocyte % 5.7 %      Eosinophil % 0.3 %      Basophil % 0.1 %      Immature Grans % 0.8 %      Neutrophils, Absolute 12.83 10*3/mm3      Lymphocytes, Absolute 0.83 10*3/mm3      Monocytes, Absolute 0.83 10*3/mm3      Eosinophils, Absolute 0.04 10*3/mm3      Basophils, Absolute 0.01 10*3/mm3      Immature Grans, Absolute 0.11 10*3/mm3      nRBC 0.0 /100 WBC             Imaging Results (Last 24 Hours)       ** No results found for the last 24 hours. **            ECG/EMG Results (most recent)       Procedure Component Value Units Date/Time    ECG 12 Lead Dyspnea [[id number]] Collected: 11/12/24 1218     Updated: 11/13/24 0602     QT Interval 353 ms      QTC Interval 467 ms     Narrative:      HEART FPJL=717  bpm  RR Sistvkik=986  ms  NV Interval=  ms  P Horizontal Axis=  deg  P Front Axis=  deg  QRSD Interval=87  ms  QT Jxqhyfdo=145  ms  MXmB=908  ms  QRS Axis=45  deg  T Wave Axis=8  deg  - ABNORMAL ECG -  Atrial fibrillation  Low voltage, precordial leads  When compared with ECG of 08-Feb-2024 12:03:26,  Significant change in rhythm: previously sinus  Electronically Signed By: Eduardo Anderson ([facility]) 2024-11-13 06:01:52  Date and Time of Study:2024-11-12 12:18:10    Telemetry Scan [[id number]] Resulted: 11/12/24     Updated: 11/13/24 1432    Adult Transthoracic Echo Complete W/ Cont if Necessary Per Protocol [[id number]] Resulted: 11/13/24 1823     Updated: 11/13/24 1823     LVIDd 4.5 cm      LVIDs 3.0 cm      IVSd 1.10 cm      LVPWd 1.00 cm      FS 33.3 %      IVS/LVPW 1.10 cm      ESV(cubed) 27.0 ml      LV Sys Vol (BSA corrected) 11.3 cm2      EDV(cubed) 91.1 ml      LV  [place] Vol (BSA corrected) 36.5 cm2      LV mass(C)d 164.0 grams      LVOT area 2.8 cm2      LVOT diam 1.90 cm      EDV(MOD-sp4) 97.3 ml      ESV(MOD-sp4) 30.0 ml      SV(MOD-sp4) 67.3 ml      SVi(MOD-SP4) 25.3 ml/m2      SVi (LVOT) 21.6 ml/m2      EF(MOD-sp4) 69.2 %      MV E max christiano 96.7 cm/sec      LA ESV Index (BP) 19.2 ml/m2      Lat Peak E' Christiano 10.4 cm/sec      TR max christiano 163.0 cm/sec      SV(LVOT) 57.6 ml      RVIDd 3.3 cm      RV S' 9.9 cm/sec      LA dimension (2D)  5.0 cm      LV V1 max 113.0 cm/sec      LV V1 max PG 5.1 mmHg      LV V1 mean PG 3.0 mmHg      LV V1 VTI 20.3 cm      Ao pk christiano 163.0 cm/sec      Ao max PG 10.6 mmHg      Ao mean PG 7.0 mmHg      Ao V2 VTI 32.1 cm      FEDERICO(I,D) 1.79 cm2      MV max PG 8.2 mmHg      MV mean PG 3.0 mmHg      MV V2 VTI 26.0 cm      MV P1/2t 66.6 msec      MVA(P1/2t) 3.3 cm2      MVA(VTI) 2.21 cm2      MV dec slope 616.0 cm/sec2      MR max christiano 390.0 cm/sec      MR max PG 60.8 mmHg      TR max PG 10.6 mmHg      RVSP(TR) 25.6 mmHg      RAP systole 15.0 mmHg      RV V1 max PG 2.9 mmHg      RV V1 max 85.4 cm/sec      RV V1 VTI 13.8 cm      PA V2 max 96.3 cm/sec      PA acc time 0.08 sec      ACS 1.80 cm      Sinus 2.7 cm      EF(MOD-bp) 69.0 %      Dimensionless Index 0.69 (DI)     Narrative:        Left ventricular systolic function is normal. Left ventricular ejection   fraction appears to be 56 - 60%.    Left ventricular wall thickness is consistent with borderline   concentric hypertrophy.    Left ventricular diastolic function was indeterminate.    The left atrial cavity is mildly dilated.    Left atrial volume is mildly increased.    There is moderate calcification of the aortic valve.    Estimated right ventricular systolic pressure from tricuspid   regurgitation is normal (<35 mmHg).      Telemetry Scan [[id number]] Resulted: 11/12/24     Updated: 11/14/24 1333    Telemetry Scan [[id number]] Resulted: 11/12/24     Updated: 11/14/24 1416    Telemetry Scan  [[id number]] Resulted: 11/12/24     Updated: 11/14/24 1449    Telemetry Scan [[id number]] Resulted: 11/12/24     Updated: 11/14/24 1519    Telemetry Scan [[id number]] Resulted: 11/12/24     Updated: 11/15/24 1658    Telemetry Scan [[id number]] Resulted: 11/12/24     Updated: 11/15/24 1743    ECG 12 Lead QT Measurement [[id number]] Collected: 11/16/24 0805     Updated: 11/17/24 1834     QT Interval 456 ms      QTC Interval 414 ms     Narrative:      HEART RATE=49  bpm  RR Qayadvzm=7101  ms  MD Unphhztl=161  ms  P Horizontal Axis=21  deg  P Front Axis=27  deg  QRSD Interval=86  ms  QT Bfshxxxo=658  ms  JQzX=242  ms  QRS Axis=50  deg  T Wave Axis=56  deg  - BORDERLINE ECG -  Bradycardia with irregular rate  Low voltage, precordial leads  When compared with ECG of 15-Nov-2024 11:21:41,  Significant change in rhythm: previously sinus  Significant axis, voltage or hypertrophy change  Electronically Signed By: Miguel Egan ([facility]) 2024-11-17 18:34:48  Date and Time of Study:2024-11-16 08:05:11    ECG 12 Lead Drug Monitoring [[id number]] Collected: 11/14/24 0258     Updated: 11/17/24 1835     QT Interval 402 ms      QTC Interval 484 ms     Narrative:      HEART RATE=87  bpm  RR Bizlijuo=662  ms  MD Interval=  ms  P Horizontal Axis=  deg  P Front Axis=  deg  QRSD Interval=91  ms  QT Pwrrflkk=918  ms  LIpU=409  ms  QRS Axis=39  deg  T Wave Axis=42  deg  - ABNORMAL ECG -  Atrial fibrillation  Low voltage, precordial leads  When compared with ECG of 12-Nov-2024 12:18:10,  No significant change  Electronically Signed By: Miguel Egan ([facility]) 2024-11-17 18:34:55  Date and Time of Study:2024-11-14 02:58:53    Telemetry Scan [[id number]] Resulted: 11/12/24     Updated: 11/18/24 1306    Telemetry Scan [[id number]] Resulted: 11/12/24     Updated: 11/18/24 1320    Telemetry Scan [[id number]] Resulted: 11/12/24     Updated: 11/18/24 1435    Telemetry Scan [[id number]] Resulted: 11/12/24     Updated: 11/19/24 0729    Telemetry Scan [[id number]]  Resulted: 11/12/24     Updated: 11/19/24 0946    Telemetry Scan [[id number]] Resulted: 11/12/24     Updated: 11/19/24 1043    Telemetry Scan [[id number]] Resulted: 11/12/24     Updated: 11/19/24 1154    ECG 12 Lead Drug Monitoring; sotalol [[id number]] Collected: 11/19/24 1210     Updated: 11/19/24 1210     QT Interval 345 ms      QTC Interval 508 ms     Narrative:      HEART QPCU=446  bpm  RR Ntaunwnh=817  ms  FL Interval=  ms  P Horizontal Axis=  deg  P Front Axis=  deg  QRSD Ygtzpqlj=074  ms  QT Mnxtxxpt=363  ms  JAwX=274  ms  QRS Axis=74  deg  T Wave Axis=-37  deg  - ABNORMAL ECG -  Atrial fibrillation  Right bundle branch block  Date and Time of Study:2024-11-19 12:10:00    Telemetry Scan [[id number]] Resulted: 11/12/24     Updated: 11/19/24 1221    Telemetry Scan [[id number]] Resulted: 11/12/24     Updated: 11/19/24 1226            Medication Review:   I have reviewed the patient's current medication list    Current Facility-Administered Medications:     albuterol (PROVENTIL) nebulizer solution 0.083% 2.5 mg/3mL, 2.5 mg, Nebulization, Q6H PRN, Reji Burch MD, 2.5 mg at 11/14/24 0451    apixaban (ELIQUIS) tablet 5 mg, 5 mg, Oral, BID, Reji Burch MD, 5 mg at 11/19/24 0804    sennosides-docusate (PERICOLACE) 8.6-50 MG per tablet 2 tablet, 2 tablet, Oral, BID PRN, 2 tablet at 11/18/24 2048 **AND** polyethylene glycol (MIRALAX) packet 17 g, 17 g, Oral, Daily PRN **AND** bisacodyl (DULCOLAX) EC tablet 5 mg, 5 mg, Oral, Daily PRN **AND** bisacodyl (DULCOLAX) suppository 10 mg, 10 mg, Rectal, Daily PRN, Reji Burch MD    Calcium Replacement - Follow Nurse / BPA Driven Protocol, , Not Applicable, PRN, Reji Burch MD    dilTIAZem (CARDIZEM) 125 mg in 125 mL D5W infusion, 5-15 mg/hr, Intravenous, Titrated, Kevin Angel MD, Last Rate: 15 mL/hr at 11/19/24 1152, 15 mg/hr at 11/19/24 1152    dilTIAZem (CARDIZEM) tablet 60 mg, 60 mg, Oral, Q6H, Kevin Angel MD, 60 mg at 11/19/24 1151     furosemide (LASIX) tablet 40 mg, 40 mg, Oral, Daily, Kevin Angel MD, 40 mg at 11/19/24 0803    guaiFENesin-codeine (GUAIFENESIN AC) 100-10 MG/5ML liquid 5 mL, 5 mL, Oral, Q4H PRN, Reji Burch MD, 5 mL at 11/16/24 2223    hydrALAZINE (APRESOLINE) injection 10 mg, 10 mg, Intravenous, Q6H PRN, Reji Burch MD, 10 mg at 11/19/24 1043    hydrALAZINE (APRESOLINE) tablet 50 mg, 50 mg, Oral, Q12H, Kevin Angel MD, 50 mg at 11/19/24 0804    ipratropium-albuterol (DUO-NEB) nebulizer solution 3 mL, 3 mL, Nebulization, 4x Daily - RT, Reji Burch MD, 3 mL at 11/19/24 0732    levothyroxine (SYNTHROID, LEVOTHROID) tablet 50 mcg, 50 mcg, Oral, Daily, Reji Burch MD, 50 mcg at 11/19/24 0804    losartan (COZAAR) tablet 50 mg, 50 mg, Oral, Q24H, Hussein Manzanares MD, 50 mg at 11/19/24 0803    Magnesium Standard Dose Replacement - Follow Nurse / BPA Driven Protocol, , Not Applicable, Marcin LOU Youssef, MD    multivitamin with minerals 1 tablet, 1 tablet, Oral, Daily, Reji Burch MD, 1 tablet at 11/19/24 0803    pantoprazole (PROTONIX) EC tablet 40 mg, 40 mg, Oral, Daily, Reji Burch MD, 40 mg at 11/19/24 0803    Phosphorus Replacement - Follow Nurse / BPA Driven Protocol, , Not Applicable, PRMarcin BAILEY Youssef, MD    Potassium Replacement - Follow Nurse / BPA Driven Protocol, , Not Applicable, Marcin LOU Youssef, MD    predniSONE (DELTASONE) tablet 40 mg, 40 mg, Oral, Daily With Breakfast, Reji Burch MD, 40 mg at 11/19/24 0803    sodium chloride 0.9 % flush 10 mL, 10 mL, Intravenous, PRN, Reji Burch MD, 10 mL at 11/19/24 0803    sotalol (BETAPACE) tablet 80 mg, 80 mg, Oral, Q12H, ChemchirianBertin MD    Assessment & Plan     Principal Problem:    Shortness of breath, multifactorial with pneumonia COPD, diastolic dysfunction, obesity, immobility deconditioning     Paroxysmal atrial fibrillation  As below, recurrent RVR     Dyspnea with exertion  Being treated for pneumonia,  also COPD with Solu-Medrol and DuoNebs  Chronic lower extremity edema, BNP is normal  EF preserved 55 to 60%, normal estimated right-sided pressures  Mild diastolic dysfunction chronically as assessed in sinus rhythm previously, indeterminate with underlying atrial fibrillation, known jvvh-xe-xauz variability  CT with right lower lobe endobronchial opacification and consolidative changes in right lower lobe  On nebs, steroids, and antibiotics     history of pulmonary embolism  Currently anticoagulated with Eliquis therapeutic dose uninterrupted     Hypertension  As below      Today  Recurrent A-fib RVR    Continue treatment for pneumonia and COPD from a pulmonary standpoint  EF is preserved with wjnn-dl-ltgo variability 65% EF, diastolic dysfunction known  Lasix 40 oral daily, 20 mg additionally this afternoon x 1    Discontinue flecainide  Restart sotalol 80 twice daily  Diltiazem drip, taper off as allowed  Diltiazem 60 p.o. every 6 hours  Hydralazine 50 twice daily, decrease this if needed to compensate for diltiazem  Discontinue amlodipine  Continue losartan 50 daily  \  Continue Eliquis uninterrupted twice daily therapeutic dose, history of both atrial fibrillation and thromboembolic disease with DVT PE  Continue uninterrupted telemetry    EP considering ablation/pacemaker for tachybradycardia syndrome   normal EF, negative stress test, no history of any coronary artery disease or structural heart disease    Further recommendations to follow findings and clinical course       Further recommendations to follow findings and clinical course  Kevin Angel MD, PhD    Kevin Angel MD, PhD    Kevin Angel MD  11/19/24  12:57 EST

## 2024-11-19 NOTE — NURSING NOTE
Cardiology notified of heart rate in the 120's. Stated to go ahead & give next dose of oral cardizem now. No other new orders.

## 2024-11-19 NOTE — PLAN OF CARE
Goal Outcome Evaluation:  Plan of Care Reviewed With: patient        Progress: no change  Outcome Evaluation: Pt. was to d/c home today & then went into A-fib again. Started on Cardizem drip. To have pacemaker placed tomorrow

## 2024-11-19 NOTE — PROGRESS NOTES
"Transitions of Care (test claim):    Patient insurance type: Medicare - this means they are NOT eligible for a monthly discount card in the future (see \"free month\" note below)  Is patient signed up for M2B? yes    Drug Combivent:   Covered/PA Required: Covered without PA  Copay Per Month: $115  Is the medication eligible for a trial card/copay card? N/A       Drug SYMBICORT:   Covered/PA Required: Covered without PA  Copay Per Month: $55  Is the medication eligible for a trial card/copay card? N/A    For billing questions please call [facility] Pharmacist at ext: [phone]  For M2B questions please call [facility] Pharm[facility] at ext: [phone]     Mariana EllingtonD   "

## 2024-11-19 NOTE — PROGRESS NOTES
"    Reason for follow-up: A-fib/bradycardia     Patient Care Team:  Brinda Tony APRN as PCP - General (Nurse Practitioner)  Kevin Angel MD as Consulting Physician (Cardiology)    Subjective .   Camelia Gray is doing fair today     ROS    Maxzide [hydrochlorothiazide w-triamterene]    Scheduled Meds:apixaban, 5 mg, Oral, BID  dilTIAZem, 60 mg, Oral, Q6H  furosemide, 40 mg, Oral, Daily  hydrALAZINE, 50 mg, Oral, Q12H  ipratropium-albuterol, 3 mL, Nebulization, 4x Daily - RT  levothyroxine, 50 mcg, Oral, Daily  losartan, 50 mg, Oral, Q24H  multivitamin with minerals, 1 tablet, Oral, Daily  pantoprazole, 40 mg, Oral, Daily  predniSONE, 40 mg, Oral, Daily With Breakfast  sotalol, 80 mg, Oral, Q12H      Continuous Infusions:dilTIAZem, 5-15 mg/hr, Last Rate: 15 mg/hr (11/19/24 1718)      PRN Meds:.  albuterol    senna-docusate sodium **AND** polyethylene glycol **AND** bisacodyl **AND** bisacodyl    Calcium Replacement - Follow Nurse / BPA Driven Protocol    guaiFENesin-codeine    hydrALAZINE    Magnesium Standard Dose Replacement - Follow Nurse / BPA Driven Protocol    Phosphorus Replacement - Follow Nurse / BPA Driven Protocol    Potassium Replacement - Follow Nurse / BPA Driven Protocol    sodium chloride      VITAL SIGNS  Vitals:    11/19/24 1630 11/19/24 1645 11/19/24 1700 11/19/24 1716   BP: 120/79 111/87 127/93 139/97   BP Location:       Patient Position:       Pulse: 101 103 114 113   Resp:       Temp:       TempSrc:       SpO2: 92% 92% 91% 92%   Weight:       Height:           Flowsheet Rows      Flowsheet Row First Filed Value   Admission Height 180.3 cm (71\") Documented at 11/12/2024 1159   Admission Weight 150 kg (330 lb) Documented at 11/12/2024 1159               Physical Exam  VITALS REVIEWED    General:      well developed, in no acute distress.    Head:      normocephalic and atraumatic.    Eyes:      PERRL/EOM intact, conjunctiva and sclera clear with out nystagmus.    Neck:      no " masses, thyromegaly,  trachea central with normal respiratory effort and PMI displaced laterally  Lungs:      Clear  Heart:       Irregular rhythm  Msk:      no deformity or scoliosis noted of thoracic or lumbar spine.    Pulses:      pulses normal in all 4 extremities.    Extremities:       No lower extremity edema  Neurologic:      no focal deficits.   alert oriented x3  Skin:      intact without lesions or rashes.    Psych:      alert and cooperative; normal mood and affect; normal attention span and concentration.          LAB RESULTS (LAST 7 DAYS)    CBC  Results from last 7 days   Lab Units 11/19/24  0330 11/18/24  0304 11/17/24  0258 11/15/24  0505 11/14/24  0445 11/13/24  0522   WBC 10*3/mm3 14.65* 11.95* 11.45* 11.65* 14.09* 7.43   RBC 10*6/mm3 5.15 5.02 4.95 4.79 5.06 5.23   HEMOGLOBIN g/dL 14.3 14.2 14.0 13.5 14.0 14.8   HEMATOCRIT % 45.9 45.6 44.8 43.4 45.7 46.8*   MCV fL 89.1 90.8 90.5 90.6 90.3 89.5   PLATELETS 10*3/mm3 203 188 202 205 235 224       BMP  Results from last 7 days   Lab Units 11/19/24  0330 11/18/24  0304 11/17/24  0258 11/15/24  0505 11/14/24  0445 11/13/24  0522   SODIUM mmol/L 140 141 142 143 142 142   POTASSIUM mmol/L 4.5 4.6 4.6 4.4 4.4 4.2   CHLORIDE mmol/L 100 103 104 105 103 105   CO2 mmol/L 33.8* 33.2* 30.3* 30.1* 29.4* 26.1   BUN mg/dL 27* 24* 30* 34* 30* 18   CREATININE mg/dL 0.70 0.67 0.69 0.80 0.82 0.75   GLUCOSE mg/dL 124* 135* 107* 130* 161* 165*       CMP   Results from last 7 days   Lab Units 11/19/24  0330 11/18/24  0304 11/17/24  0258 11/15/24  0505 11/14/24  0445 11/13/24  0522   SODIUM mmol/L 140 141 142 143 142 142   POTASSIUM mmol/L 4.5 4.6 4.6 4.4 4.4 4.2   CHLORIDE mmol/L 100 103 104 105 103 105   CO2 mmol/L 33.8* 33.2* 30.3* 30.1* 29.4* 26.1   BUN mg/dL 27* 24* 30* 34* 30* 18   CREATININE mg/dL 0.70 0.67 0.69 0.80 0.82 0.75   GLUCOSE mg/dL 124* 135* 107* 130* 161* 165*         BNP        TROPONIN        CoAg        Creatinine Clearance  Estimated Creatinine  Clearance: 110.8 mL/min (by C-G formula based on SCr of 0.7 mg/dL).    ABG          EKG    I personally reviewed the patient's EKG/Telemetry data: A-fib      Assessment & Plan       Shortness of breath    Persistent atrial fibrillation    Multiple tracheobronchial mucus plugs    Atelectasis    Symptomatic bradycardia      Camelia Gray is a 79-year-old female patient who was first diagnosed with A-fib in December 2023, she had cardioversion and she remained in sinus rhythm Optil June 2024.  A-fib recurred and she was placed on sotalol 80 mg every 12 hours.  Patient was also chronically anticoagulated with Eliquis since 2018.  Patient however remained in A-fib despite adding sotalol.  She is now admitted to the hospital for shortness of breath and pneumonia.  Since her A-fib is not chronic, which should direct our efforts towards rhythm restoration.  I think it is best to go ahead and perform cardioversion while she is here, hopefully with her being on sotalol this will maintain her in normal rhythm.  We can also go ahead and schedule her for A-fib ablation to avoid recurrences.  Risks indications and benefits for both cardioversion and A-fib ablation were discussed at length with her and patient is agreeable.  Plan for cardioversion tomorrow around 10:30 am.      11/19/2024  Cardioversion was done on 11/14/2024.  Sotalol had to be decreased and eventually switched to flecainide due to bradycardia.  A-fib however recurred.  Plan for pacemaker implantation tomorrow with cardioversion.  We will restart sotalol at 80 mg every 12 hours hopefully she will remain in sinus rhythm, otherwise ablation in the next few months.    I discussed the patients findings and my recommendations with patient and agrees with outlined plan.    Bertin Stone MD  11/19/24  17:23 EST      Electronically signed by Bertin Stone MD, 11/19/24, 5:25 PM EST.

## 2024-11-19 NOTE — PROCEDURES
Exercise Oximetry    Patient Name:Camelia Gray   MRN: [id number]   Date: 11/19/24             ROOM AIR BASELINE   SpO2% 87   Heart Rate 140   Blood Pressure      EXERCISE ON ROOM AIR SpO2% EXERCISE ON O2 @ 1 LPM SpO2%   1 MINUTE  1 MINUTE    86    2 MINUTES  2 MINUTES   @2L   89   3 MINUTES  3 MINUTES   @2L    90   4 MINUTES  4 MINUTES   @2L    91   5 MINUTES  5 MINUTES   @2L    91   6 MINUTES  6 MINUTES   @2L     91              Distance Walked   Distance Walked   6 min   Dyspnea (Carmen Scale)   Dyspnea (Carmen Scale)   Fatigue (Carmen Scale)   Fatigue (Carmen Scale)   SpO2% Post Exercise   SpO2% Post Exercise   91 @2L   HR Post Exercise   HR Post Exercise     122   Time to Recovery   Time to Recovery      Comments: Pt on ra saturation of 87%. Increased to 1L sat of 86%. Increased to 2L sat of 89%. Pt continued saturation of 90-91% on 2L. Pt did not tolerate ambulation well. Pt had difficulty getting in and out of bed. Pt's heart rate increased to 175 before and during ambulation. Had to stop several times during ambulation. Physician was notified of elevated heart rate.

## 2024-11-19 NOTE — PLAN OF CARE
Goal Outcome Evaluation:  Plan of Care Reviewed With: patient        Progress: improving  Outcome Evaluation: Slept at short intervals. O2 at 2L in use. No c/o discomfort. Possible discharge home today. Will continue to monitor.

## 2024-11-19 NOTE — PROGRESS NOTES
"    [facility] MEDICINE SERVICE  DAILY PROGRESS NOTE    NAME: Camelia Gray  : 1945  MRN: [id number]      LOS: 6 days     PROVIDER OF SERVICE: Hussein Manzanares MD    Chief Complaint: Shortness of breath    Subjective:     History of Present Illness: Camelia Gray is a 79 y.o. female with a CMH of atrial fibrillation, arthritis, hypothyroidism, hypertension, lymphedema, immobility syndrome who presented to [facility] on 2024 with shortness of breath.  Patient reports symptoms have been going on for the past 4 to 5 days and are progressively worsening.  Patient states that she did have a cold within the last week and reports she continues to have nasal congestion.  Patient denies fever, chills, pain.  Denies new edema in legs and reports her legs continue to stay wrapped due to lymphedema.  Patient does continue to have mild cough at this time.     On ED evaluation, patient vital signs show tachycardia and hypertension on arrival as well as tachypnea.  Patient required IV labetalol for blood pressure control in ED.  Labs essentially unremarkable.  Respiratory viral panel negative.  Chest x-ray showed \"1.  Cardiomegaly. 2.  Evidence of prior granulomatous exposure. 3.  Possible atelectasis or scarring in the right lower lobe adjacent to shadow which could relate to known hiatal hernia\".  EKG does show atrial fibrillation however seems rate is controlled at 105. patient received IV steroids and DuoNeb in ED however shortness of breath persisted therefore hospitalist group was asked for admission.    Interval History:  History taken from: patient chart  -Patient underwent bronchoscopy this morning.  She states that overall she feels less congested.  -seen and examined in bed no acute distress, blood pressure has been elevated this morning,  Apparently patient lives at home.  Presently on 3 L of oxygen, saturation 92% to 94%.Over weekend started on diltiazem for rate control. " Sotalol discontinued and flecainide started for heart rate. HR now in 50-60s. Blood pressure still elevated. Norvasc and losartan added. Received lasix IV x 1 for fluid volume. Remains in sinus rhythm s/p cardioversion..   11/19/24--went back into a-fib. Starting drip & consorted EP to do a pacemaker   Slept at short intervals. O2 at 2L in use. No c/o discomfort.     Review of Systems:   Constitutional:  Negative for chills, diaphoresis, fatigue and fever.   HENT:  Positive for congestion.    Respiratory:  Positive for cough and shortness of breath.    Cardiovascular:  Negative for chest pain.   Gastrointestinal:  Negative for abdominal pain.   Neurological:  Negative for dizziness and headaches.    Objective:     Vital Signs  Temp:  [96.8 °F (36 °C)-98.4 °F (36.9 °C)] 97.8 °F (36.6 °C)  Heart Rate:  [43-80] 60  Resp:  [13-23] 22  BP: (134-201)/() 174/84  Flow (L/min) (Oxygen Therapy):  [2-3] 2   Body mass index is 49.17 kg/m².    Physical Exam  PHYSICAL EXAM  Constitutional:  Well-developed, well-nourished, no acute distress, nontoxic appearance   Eyes:  PERRL, conjunctivae normal, EOMI   HENT:  Atraumatic, external ears normal, nose normal, oropharynx moist, no pharyngeal exudates. Neck-normal range of motion, no tenderness, supple, trachea midline  Respiratory: Improved expiratory wheezing and improved air entry bilaterally  Cardiovascular:  Normal rate, normal rhythm, no murmurs, no gallops, no rubs   GI:  Soft, nondistended, normal bowel sounds, nontender, no organomegaly, no mass, no rebound, no guarding.  Mild BLE edema noted.  :  No costovertebral angle tenderness   Musculoskeletal:  No edema, no tenderness, no deformities  Integument:  Well hydrated, no rash.  Edema noted to bilateral lower extremities.  Lymphatic:  No lymphadenopathy noted   Neurologic:  Alert & oriented x 3, CN 2-12 normal, normal motor function, normal sensory function, no focal deficits noted   Psychiatric:  Speech and behavior  appropriate          Diagnostic Data    Results from last 7 days   Lab Units 11/19/24  0330 11/13/24  0522 11/12/24  1321   WBC 10*3/mm3 14.65*   < > 6.89   HEMOGLOBIN g/dL 14.3   < > 14.2   HEMATOCRIT % 45.9   < > 45.3   PLATELETS 10*3/mm3 203   < > 212   GLUCOSE mg/dL 124*   < > 113*   CREATININE mg/dL 0.70   < > 0.73   BUN mg/dL 27*   < > 22   SODIUM mmol/L 140   < > 142   POTASSIUM mmol/L 4.5   < > 3.8   AST (SGOT) U/L  --   --  28   ALT (SGPT) U/L  --   --  23   ALK PHOS U/L  --   --  128*   BILIRUBIN mg/dL  --   --  0.4   ANION GAP mmol/L 6.2   < > 9.8    < > = values in this interval not displayed.       No radiology results for the last day      I reviewed the patient's new clinical results.    Assessment/Plan:     Active and Resolved Problems  Active Hospital Problems    Diagnosis  POA    **Shortness of breath [R06.02]  Yes    Persistent atrial fibrillation [I48.19]  Unknown    Multiple tracheobronchial mucus plugs [T17.800A]  Unknown    Atelectasis [J98.11]  Unknown      Resolved Hospital Problems   No resolved problems to display.       Pneumonia with acute respiratory failure with hypoxia, unspecified organism  -Likely pneumonia with acute bronchospasms  -Imaging with CT does show right lower lobe infiltrate  -Continue IV antibiotics    -Encourage pulmonary toileting with I-S, flutter valve  -NT suction as needed  -Pulmonology consulted; patient underwent bronchoscopy on 11/17 with multiple mucous plugs removed  -Follow-up BAL cultures    Hypertension   -BP stable elevated most recent blood pressure 145/89  -PRN Hydralazine  -Continue usual home medication     Atrial fibrillation with RVR  -Patient has a history of A-fib, on Eliquis at home  -Home medications with sotalol and diltiazem were continued however patient still remained tachycardic  -Underwent DCCV on 11/15 but then developed bradycardia afterwards  -Sotalol discontinued and flecainide initiated  -Cardiology following>Over weekend started on  diltiazem for rate control. Sotalol discontinued and flecainide started for heart rate. HR now in 50-60s. Blood pressure still elevated. Norvasc and losartan added. Received lasix IV x 1 for fluid volume. Remains in sinus rhythm s/p cardioversion. Will continue to monitor BP.   -If patient remains bradycardic she may require PPM placement     Hypothyroidism  -Continue Levothyroxine     GERD  -Continue PPI    VTE Prophylaxis:  Pharmacologic & mechanical VTE prophylaxis orders are present.      Disposition Planning:     Barriers to Discharge: Improved clinical status.  Anticipated Date of Discharge: 11/20/2024  Place of Discharge: Home      Time: 35 minutes     Code Status and Medical Interventions: CPR (Attempt to Resuscitate); Full Support   Ordered at: 11/12/24 1507     Code Status (Patient has no pulse and is not breathing):    CPR (Attempt to Resuscitate)     Medical Interventions (Patient has pulse or is breathing):    Full Support       Signature: Electronically signed by Hussein Manzanares MD, 11/19/24, 08:20 EST.  [facility] Hospitalist Team

## 2024-11-20 ENCOUNTER — APPOINTMENT (OUTPATIENT)
Dept: GENERAL RADIOLOGY | Facility: HOSPITAL | Age: 79
End: 2024-11-20
Payer: MEDICARE

## 2024-11-20 LAB
ANION GAP SERPL CALCULATED.3IONS-SCNC: 7.5 MMOL/L (ref 5–15)
BASOPHILS # BLD AUTO: 0.03 10*3/MM3 (ref 0–0.2)
BASOPHILS NFR BLD AUTO: 0.2 % (ref 0–1.5)
BUN SERPL-MCNC: 28 MG/DL (ref 8–23)
BUN/CREAT SERPL: 37.3 (ref 7–25)
CALCIUM SPEC-SCNC: 8.9 MG/DL (ref 8.6–10.5)
CHLORIDE SERPL-SCNC: 96 MMOL/L (ref 98–107)
CO2 SERPL-SCNC: 35.5 MMOL/L (ref 22–29)
CREAT SERPL-MCNC: 0.75 MG/DL (ref 0.57–1)
DEPRECATED RDW RBC AUTO: 49.7 FL (ref 37–54)
EGFRCR SERPLBLD CKD-EPI 2021: 81.6 ML/MIN/1.73
EOSINOPHIL # BLD AUTO: 0.05 10*3/MM3 (ref 0–0.4)
EOSINOPHIL NFR BLD AUTO: 0.4 % (ref 0.3–6.2)
ERYTHROCYTE [DISTWIDTH] IN BLOOD BY AUTOMATED COUNT: 15.5 % (ref 12.3–15.4)
GLUCOSE SERPL-MCNC: 118 MG/DL (ref 65–99)
HCT VFR BLD AUTO: 49.1 % (ref 34–46.6)
HGB BLD-MCNC: 15.7 G/DL (ref 12–15.9)
IMM GRANULOCYTES # BLD AUTO: 0.1 10*3/MM3 (ref 0–0.05)
IMM GRANULOCYTES NFR BLD AUTO: 0.7 % (ref 0–0.5)
LYMPHOCYTES # BLD AUTO: 1.01 10*3/MM3 (ref 0.7–3.1)
LYMPHOCYTES NFR BLD AUTO: 7.1 % (ref 19.6–45.3)
MCH RBC QN AUTO: 28.2 PG (ref 26.6–33)
MCHC RBC AUTO-ENTMCNC: 32 G/DL (ref 31.5–35.7)
MCV RBC AUTO: 88.2 FL (ref 79–97)
MONOCYTES # BLD AUTO: 0.93 10*3/MM3 (ref 0.1–0.9)
MONOCYTES NFR BLD AUTO: 6.6 % (ref 5–12)
NEUTROPHILS NFR BLD AUTO: 12.07 10*3/MM3 (ref 1.7–7)
NEUTROPHILS NFR BLD AUTO: 85 % (ref 42.7–76)
NRBC BLD AUTO-RTO: 0 /100 WBC (ref 0–0.2)
PLATELET # BLD AUTO: 259 10*3/MM3 (ref 140–450)
PMV BLD AUTO: 9.4 FL (ref 6–12)
POTASSIUM SERPL-SCNC: 4.2 MMOL/L (ref 3.5–5.2)
QT INTERVAL: 345 MS
QT INTERVAL: 383 MS
QT INTERVAL: 389 MS
QTC INTERVAL: 458 MS
QTC INTERVAL: 460 MS
QTC INTERVAL: 508 MS
RBC # BLD AUTO: 5.57 10*6/MM3 (ref 3.77–5.28)
SODIUM SERPL-SCNC: 139 MMOL/L (ref 136–145)
WBC NRBC COR # BLD AUTO: 14.19 10*3/MM3 (ref 3.4–10.8)

## 2024-11-20 PROCEDURE — 02H63JZ INSERTION OF PACEMAKER LEAD INTO RIGHT ATRIUM, PERCUTANEOUS APPROACH: ICD-10-PCS | Performed by: INTERNAL MEDICINE

## 2024-11-20 PROCEDURE — 94761 N-INVAS EAR/PLS OXIMETRY MLT: CPT

## 2024-11-20 PROCEDURE — C1894 INTRO/SHEATH, NON-LASER: HCPCS | Performed by: INTERNAL MEDICINE

## 2024-11-20 PROCEDURE — 93010 ELECTROCARDIOGRAM REPORT: CPT | Performed by: INTERNAL MEDICINE

## 2024-11-20 PROCEDURE — 99152 MOD SED SAME PHYS/QHP 5/>YRS: CPT | Performed by: INTERNAL MEDICINE

## 2024-11-20 PROCEDURE — 33208 INSRT HEART PM ATRIAL & VENT: CPT | Performed by: INTERNAL MEDICINE

## 2024-11-20 PROCEDURE — 99153 MOD SED SAME PHYS/QHP EA: CPT | Performed by: INTERNAL MEDICINE

## 2024-11-20 PROCEDURE — 94799 UNLISTED PULMONARY SVC/PX: CPT

## 2024-11-20 PROCEDURE — 0JH606Z INSERTION OF PACEMAKER, DUAL CHAMBER INTO CHEST SUBCUTANEOUS TISSUE AND FASCIA, OPEN APPROACH: ICD-10-PCS | Performed by: INTERNAL MEDICINE

## 2024-11-20 PROCEDURE — 25810000003 SODIUM CHLORIDE 0.9 % SOLUTION 250 ML FLEX CONT: Performed by: INTERNAL MEDICINE

## 2024-11-20 PROCEDURE — 25010000002 MIDAZOLAM PER 1 MG: Performed by: INTERNAL MEDICINE

## 2024-11-20 PROCEDURE — 85025 COMPLETE CBC W/AUTO DIFF WBC: CPT | Performed by: INTERNAL MEDICINE

## 2024-11-20 PROCEDURE — 25010000002 VANCOMYCIN 1 G RECONSTITUTED SOLUTION 1 EACH VIAL: Performed by: INTERNAL MEDICINE

## 2024-11-20 PROCEDURE — 71045 X-RAY EXAM CHEST 1 VIEW: CPT

## 2024-11-20 PROCEDURE — 25010000002 FENTANYL CITRATE (PF) 100 MCG/2ML SOLUTION: Performed by: INTERNAL MEDICINE

## 2024-11-20 PROCEDURE — C1785 PMKR, DUAL, RATE-RESP: HCPCS | Performed by: INTERNAL MEDICINE

## 2024-11-20 PROCEDURE — 99233 SBSQ HOSP IP/OBS HIGH 50: CPT | Performed by: INTERNAL MEDICINE

## 2024-11-20 PROCEDURE — 02HK3JZ INSERTION OF PACEMAKER LEAD INTO RIGHT VENTRICLE, PERCUTANEOUS APPROACH: ICD-10-PCS | Performed by: INTERNAL MEDICINE

## 2024-11-20 PROCEDURE — C1898 LEAD, PMKR, OTHER THAN TRANS: HCPCS | Performed by: INTERNAL MEDICINE

## 2024-11-20 PROCEDURE — 94664 DEMO&/EVAL PT USE INHALER: CPT

## 2024-11-20 PROCEDURE — C1887 CATHETER, GUIDING: HCPCS | Performed by: INTERNAL MEDICINE

## 2024-11-20 PROCEDURE — 93005 ELECTROCARDIOGRAM TRACING: CPT | Performed by: INTERNAL MEDICINE

## 2024-11-20 PROCEDURE — 80048 BASIC METABOLIC PNL TOTAL CA: CPT | Performed by: INTERNAL MEDICINE

## 2024-11-20 PROCEDURE — 25010000002 LIDOCAINE-EPINEPHRINE 2 %-1:100000 SOLUTION: Performed by: INTERNAL MEDICINE

## 2024-11-20 PROCEDURE — C1769 GUIDE WIRE: HCPCS | Performed by: INTERNAL MEDICINE

## 2024-11-20 PROCEDURE — 25510000001 IOPAMIDOL PER 1 ML: Performed by: INTERNAL MEDICINE

## 2024-11-20 DEVICE — IMPLANTABLE DEVICE: Type: IMPLANTABLE DEVICE | Site: CHEST | Status: FUNCTIONAL

## 2024-11-20 DEVICE — LD PM CAPSUREFIX NOVUS IS1 MEDTR 407652: Type: IMPLANTABLE DEVICE | Site: SUBCLAVIAN | Status: FUNCTIONAL

## 2024-11-20 DEVICE — SEAL HEMO SURG ARISTA/AH ABS/PWDR 3GM: Type: IMPLANTABLE DEVICE | Site: CHEST | Status: FUNCTIONAL

## 2024-11-20 DEVICE — GEN PM AZURE XT SURESCAN DR MRI: Type: IMPLANTABLE DEVICE | Site: SUBCLAVIAN | Status: FUNCTIONAL

## 2024-11-20 RX ORDER — IOPAMIDOL 755 MG/ML
INJECTION, SOLUTION INTRAVASCULAR
Status: DISCONTINUED | OUTPATIENT
Start: 2024-11-20 | End: 2024-11-20 | Stop reason: HOSPADM

## 2024-11-20 RX ORDER — PREDNISONE 20 MG/1
20 TABLET ORAL
Status: DISCONTINUED | OUTPATIENT
Start: 2024-11-21 | End: 2024-11-22 | Stop reason: HOSPADM

## 2024-11-20 RX ORDER — MIDAZOLAM HYDROCHLORIDE 1 MG/ML
INJECTION, SOLUTION INTRAMUSCULAR; INTRAVENOUS
Status: DISCONTINUED | OUTPATIENT
Start: 2024-11-20 | End: 2024-11-20 | Stop reason: HOSPADM

## 2024-11-20 RX ORDER — LIDOCAINE HYDROCHLORIDE AND EPINEPHRINE BITARTRATE 20; .01 MG/ML; MG/ML
INJECTION, SOLUTION SUBCUTANEOUS
Status: DISCONTINUED | OUTPATIENT
Start: 2024-11-20 | End: 2024-11-20 | Stop reason: HOSPADM

## 2024-11-20 RX ORDER — FENTANYL CITRATE 50 UG/ML
INJECTION, SOLUTION INTRAMUSCULAR; INTRAVENOUS
Status: DISCONTINUED | OUTPATIENT
Start: 2024-11-20 | End: 2024-11-20 | Stop reason: HOSPADM

## 2024-11-20 RX ORDER — ARGININE/GLUTAMINE/CALCIUM BMB 7G-7G-1.5G
1 POWDER IN PACKET (EA) ORAL 2 TIMES DAILY
Status: DISCONTINUED | OUTPATIENT
Start: 2024-11-20 | End: 2024-11-22 | Stop reason: HOSPADM

## 2024-11-20 RX ADMIN — IPRATROPIUM BROMIDE AND ALBUTEROL SULFATE 3 ML: .5; 3 SOLUTION RESPIRATORY (INHALATION) at 15:10

## 2024-11-20 RX ADMIN — Medication 5 MG/HR: at 10:53

## 2024-11-20 RX ADMIN — IPRATROPIUM BROMIDE AND ALBUTEROL SULFATE 3 ML: .5; 3 SOLUTION RESPIRATORY (INHALATION) at 06:30

## 2024-11-20 RX ADMIN — SOTALOL HYDROCHLORIDE 80 MG: 80 TABLET ORAL at 10:23

## 2024-11-20 RX ADMIN — HYDRALAZINE HYDROCHLORIDE 50 MG: 25 TABLET ORAL at 10:23

## 2024-11-20 RX ADMIN — DILTIAZEM HYDROCHLORIDE 60 MG: 60 TABLET, FILM COATED ORAL at 05:52

## 2024-11-20 RX ADMIN — SOTALOL HYDROCHLORIDE 80 MG: 80 TABLET ORAL at 21:45

## 2024-11-20 RX ADMIN — HYDRALAZINE HYDROCHLORIDE 50 MG: 25 TABLET ORAL at 21:40

## 2024-11-20 RX ADMIN — Medication 1 PACKET: at 21:41

## 2024-11-20 RX ADMIN — LOSARTAN POTASSIUM 50 MG: 50 TABLET, FILM COATED ORAL at 10:23

## 2024-11-20 NOTE — PROGRESS NOTES
"    [facility] MEDICINE SERVICE  DAILY PROGRESS NOTE    NAME: Camelia Gray  : 1945  MRN: [id number]      LOS: 7 days     PROVIDER OF SERVICE: Hussein Manzanares MD    Chief Complaint: Shortness of breath    Subjective:     History of Present Illness: Camelia Gray is a 79 y.o. female with a CMH of atrial fibrillation, arthritis, hypothyroidism, hypertension, lymphedema, immobility syndrome who presented to [facility] on 2024 with shortness of breath.  Patient reports symptoms have been going on for the past 4 to 5 days and are progressively worsening.  Patient states that she did have a cold within the last week and reports she continues to have nasal congestion.  Patient denies fever, chills, pain.  Denies new edema in legs and reports her legs continue to stay wrapped due to lymphedema.  Patient does continue to have mild cough at this time.     On ED evaluation, patient vital signs show tachycardia and hypertension on arrival as well as tachypnea.  Patient required IV labetalol for blood pressure control in ED.  Labs essentially unremarkable.  Respiratory viral panel negative.  Chest x-ray showed \"1.  Cardiomegaly. 2.  Evidence of prior granulomatous exposure. 3.  Possible atelectasis or scarring in the right lower lobe adjacent to shadow which could relate to known hiatal hernia\".  EKG does show atrial fibrillation however seems rate is controlled at 105. patient received IV steroids and DuoNeb in ED however shortness of breath persisted therefore hospitalist group was asked for admission.    Interval History:  History taken from: patient chart  -Patient underwent bronchoscopy this morning.  She states that overall she feels less congested.  -seen and examined in bed no acute distress, blood pressure has been elevated this morning,  Apparently patient lives at home.  Presently on 3 L of oxygen, saturation 92% to 94%.Over weekend started on diltiazem for rate control. " Sotalol discontinued and flecainide started for heart rate. HR now in 50-60s. Blood pressure still elevated. Norvasc and losartan added. Received lasix IV x 1 for fluid volume. Remains in sinus rhythm s/p cardioversion..   11/19/24--went back into a-fib. Starting drip & consorted EP to do a pacemaker   Slept at short intervals. O2 at 2L in use. No c/o discomfort.   11/20/2024 patient seen and examined in bed no acute distress, vital signs stable, discussed with RN, for pacemaker today.      Review of Systems:   Constitutional:  Negative for chills, diaphoresis, fatigue and fever.   HENT:  Positive for congestion.    Respiratory:  Positive for cough and shortness of breath.    Cardiovascular:  Negative for chest pain.   Gastrointestinal:  Negative for abdominal pain.   Neurological:  Negative for dizziness and headaches.    Objective:     Vital Signs  Temp:  [97.1 °F (36.2 °C)-98.2 °F (36.8 °C)] 98.2 °F (36.8 °C)  Heart Rate:  [] 82  Resp:  [12-25] 13  BP: (101-172)/(1-117) 133/86  Flow (L/min) (Oxygen Therapy):  [2] 2   Body mass index is 49.17 kg/m².    Physical Exam  PHYSICAL EXAM  Constitutional:  Well-developed, well-nourished, no acute distress, nontoxic appearance   Eyes:  PERRL, conjunctivae normal, EOMI   HENT:  Atraumatic, external ears normal, nose normal, oropharynx moist, no pharyngeal exudates. Neck-normal range of motion, no tenderness, supple, trachea midline  Respiratory: Improved expiratory wheezing and improved air entry bilaterally  Cardiovascular:  Normal rate, normal rhythm, no murmurs, no gallops, no rubs   GI:  Soft, nondistended, normal bowel sounds, nontender, no organomegaly, no mass, no rebound, no guarding.  Mild BLE edema noted.  :  No costovertebral angle tenderness   Musculoskeletal:  No edema, no tenderness, no deformities  Integument:  Well hydrated, no rash.  Edema noted to bilateral lower extremities.  Lymphatic:  No lymphadenopathy noted   Neurologic:  Alert & oriented x  3, CN 2-12 normal, normal motor function, normal sensory function, no focal deficits noted   Psychiatric:  Speech and behavior appropriate          Diagnostic Data    Results from last 7 days   Lab Units 11/20/24  0412   WBC 10*3/mm3 14.19*   HEMOGLOBIN g/dL 15.7   HEMATOCRIT % 49.1*   PLATELETS 10*3/mm3 259   GLUCOSE mg/dL 118*   CREATININE mg/dL 0.75   BUN mg/dL 28*   SODIUM mmol/L 139   POTASSIUM mmol/L 4.2   ANION GAP mmol/L 7.5       No radiology results for the last day      I reviewed the patient's new clinical results.    Assessment/Plan:     Active and Resolved Problems  Active Hospital Problems    Diagnosis  POA    **Shortness of breath [R06.02]  Yes    Persistent atrial fibrillation [I48.19]  Unknown    Multiple tracheobronchial mucus plugs [T17.800A]  Unknown    Atelectasis [J98.11]  Unknown    Symptomatic bradycardia [R00.1]  Unknown      Resolved Hospital Problems   No resolved problems to display.       Pneumonia with acute respiratory failure with hypoxia, unspecified organism  -Likely pneumonia with acute bronchospasms  -Imaging with CT does show right lower lobe infiltrate  -Continue IV antibiotics    -Encourage pulmonary toileting with I-S, flutter valve  -NT suction as needed  -Pulmonology consulted; patient underwent bronchoscopy on 11/17 with multiple mucous plugs removed  -Follow-up BAL cultures    Hypertension   -BP stable elevated most recent blood pressure 145/89  -PRN Hydralazine  -Continue usual home medication     Atrial fibrillation with RVR  -Patient has a history of A-fib, on Eliquis at home  -Home medications with sotalol and diltiazem were continued however patient still remained tachycardic  -Underwent DCCV on 11/15 but then developed bradycardia afterwards  -Sotalol discontinued and flecainide initiated  -Cardiology following>Over weekend started on diltiazem for rate control. Sotalol discontinued and flecainide started for heart rate. HR now in 50-60s. Blood pressure still  elevated. Norvasc and losartan added. Received lasix IV x 1 for fluid volume. Remains in sinus rhythm s/p cardioversion. Will continue to monitor BP.   -If patient remains bradycardic she may require PPM placement     Hypothyroidism  -Continue Levothyroxine     GERD  -Continue PPI    VTE Prophylaxis:  Pharmacologic & mechanical VTE prophylaxis orders are present.    Disposition Planning:   Barriers to Discharge: Improved clinical status.  Anticipated Date of Discharge: 11/20/2024  Place of Discharge: Home    Time: 35 minutes     Code Status and Medical Interventions: CPR (Attempt to Resuscitate); Full Support   Ordered at: 11/12/24 1653     Code Status (Patient has no pulse and is not breathing):    CPR (Attempt to Resuscitate)     Medical Interventions (Patient has pulse or is breathing):    Full Support     Signature: Electronically signed by Hussein Manzanares MD, 11/20/24, 08:22 EST.  [facility] Hospitalist Team

## 2024-11-20 NOTE — CASE MANAGEMENT/SOCIAL WORK
Continued Stay Note   [facility]     Patient Name: Camelia Gray  MRN: [id number]  Today's Date: 11/20/2024    Admit Date: 11/12/2024    Plan: Return home alone with caregivers. NO home oxygen- currently on 2 liters   Discharge Plan       Row Name 11/20/24 1037       Plan    Plan Return home alone with caregivers. NO home oxygen- currently on 2 liters    Plan Comments Barriers: Pacemaker placement today. CM will follow up with MsEdilberto Isaac for DME company choice for home oxygen and home health company choice                          Yamini BUNCH,RN Case Manager  Psychiatric  Phone: Desk- [phone] cell- [phone]

## 2024-11-20 NOTE — SIGNIFICANT NOTE
11/20/24 1543   OTHER   Discipline physical therapist   Rehab Time/Intention   Session Not Performed other (see comments)  (Scheduled for pacemaker today. Will f/u tomorrow.)   Therapy Assessment/Plan (PT)   Criteria for Skilled Interventions Met (PT) yes;meets criteria   Recommendation   PT - Next Appointment 11/21/24

## 2024-11-20 NOTE — PROGRESS NOTES
"[facility]      Patient Care Team:  Brinda Tony APRN as PCP - General (Nurse Practitioner)  Kevin Angel MD as Consulting Physician (Cardiology)    Chief Complaint: As of breath    Subjective    Interval History and ROS:   Seen and examined on date of service  Ongoing Cardizem drip  Pending pacemaker today for tachybradycardia syndrome, possible cardioversion during this procedure to restore sinus rhythm  Restarted on sotalol doing okay    Already had cardioversion this admission       History taken from: patient chart RN    Review of Systems   Constitutional: Negative for diaphoresis and malaise/fatigue.   Cardiovascular:  Negative for chest pain, irregular heartbeat, leg swelling, near-syncope, orthopnea, palpitations, paroxysmal nocturnal dyspnea and syncope.   Respiratory:  Positive for shortness of breath. Negative for cough.    Musculoskeletal:  Negative for myalgias.   Neurological:  Negative for dizziness, focal weakness, headaches and light-headedness.   Psychiatric/Behavioral:  Negative for altered mental status.    All other systems reviewed and are negative.      Objective  Blood pressure elevated today, A-fib with controlled rate    Vital Signs  Temp:  [97.1 °F (36.2 °C)-98.2 °F (36.8 °C)] 98.2 °F (36.8 °C)  Heart Rate:  [] 82  Resp:  [12-25] 13  BP: (101-172)/(1-117) 133/86  Oxygen Therapy  SpO2: 93 %  Pulse Oximetry Type: Continuous  Device (Oxygen Therapy): humidified, nasal cannula  Flow (L/min) (Oxygen Therapy): 2}  Flowsheet Rows      Flowsheet Row First Filed Value   Admission Height 180.3 cm (71\") Documented at 11/12/2024 1159   Admission Weight 150 kg (330 lb) Documented at 11/12/2024 1159            Physical Exam:    Physical Exam  Constitutional:       General: She is not in acute distress.     Appearance: She is obese. She is not diaphoretic.   HENT:      Head: Normocephalic and atraumatic.   Eyes:      Extraocular Movements: Extraocular movements intact.    "   Pupils: Pupils are equal, round, and reactive to light.   Neck:      Vascular: No carotid bruit.   Cardiovascular:      Rate and Rhythm: Tachycardia present. Rhythm irregular.      Heart sounds: Normal heart sounds.   Pulmonary:      Effort: Pulmonary effort is normal. No respiratory distress.      Breath sounds: Normal breath sounds.      Comments: 2 L nasal cannula  Abdominal:      Palpations: Abdomen is soft.   Musculoskeletal:      Comments: Bilateral lower extremities wrapped with Ace   Skin:     Findings: No erythema.   Neurological:      Mental Status: She is alert and oriented to person, place, and time.   Psychiatric:         Mood and Affect: Mood normal.         Behavior: Behavior normal.         Thought Content: Thought content normal.         Judgment: Judgment normal.     A-fib RVR    Results Review:     I reviewed the patient's new clinical results.    Lab Results (last 24 hours)       Procedure Component Value Units Date/Time    Basic Metabolic Panel [[id number]]  (Abnormal) Collected: 11/20/24 0412    Specimen: Blood from Arm, Left Updated: 11/20/24 0529     Glucose 118 mg/dL      BUN 28 mg/dL      Creatinine 0.75 mg/dL      Sodium 139 mmol/L      Potassium 4.2 mmol/L      Comment: Specimen hemolyzed.  Result may be falsely elevated.        Chloride 96 mmol/L      CO2 35.5 mmol/L      Calcium 8.9 mg/dL      BUN/Creatinine Ratio 37.3     Anion Gap 7.5 mmol/L      eGFR 81.6 mL/min/1.73     Narrative:      GFR Normal >60  Chronic Kidney Disease <60  Kidney Failure <15    The GFR formula is only valid for adults with stable renal function between ages 18 and 70.    CBC & Differential [[id number]]  (Abnormal) Collected: 11/20/24 0412    Specimen: Blood from Arm, Left Updated: 11/20/24 0505    Narrative:      The following orders were created for panel order CBC & Differential.  Procedure                               Abnormality         Status                     ---------                                -----------         ------                     CBC Auto Differential[[id number]]        Abnormal            Final result                 Please view results for these tests on the individual orders.    CBC Auto Differential [[id number]]  (Abnormal) Collected: 11/20/24 0412    Specimen: Blood from Arm, Left Updated: 11/20/24 0509     WBC 14.19 10*3/mm3      RBC 5.57 10*6/mm3      Hemoglobin 15.7 g/dL      Hematocrit 49.1 %      MCV 88.2 fL      MCH 28.2 pg      MCHC 32.0 g/dL      RDW 15.5 %      RDW-SD 49.7 fl      MPV 9.4 fL      Platelets 259 10*3/mm3      Neutrophil % 85.0 %      Lymphocyte % 7.1 %      Monocyte % 6.6 %      Eosinophil % 0.4 %      Basophil % 0.2 %      Immature Grans % 0.7 %      Neutrophils, Absolute 12.07 10*3/mm3      Lymphocytes, Absolute 1.01 10*3/mm3      Monocytes, Absolute 0.93 10*3/mm3      Eosinophils, Absolute 0.05 10*3/mm3      Basophils, Absolute 0.03 10*3/mm3      Immature Grans, Absolute 0.10 10*3/mm3      nRBC 0.0 /100 WBC     Non-gynecologic Cytology [[id number]] Collected: 11/17/24 0918    Specimen: Lavage from Lung, L Updated: 11/19/24 1240     Case Report --     Medical Cytology Report                           Case: [id number]                                  Authorizing Provider:  Reji Burch MD         Collected:           11/17/2024 09:18 AM          Ordering Location:     [facility]  Received:            11/17/2024 10:22 AM                                 SUITES                                                                       Pathologist:           Kishan Grider MD                                                            Specimen:    Lung, L                                                                                     Final Diagnosis --     Left lung, bronchoalveolar lavage with smears and cytospin:  Acute inflammation, reactive but mature squamous cells, pulmonary macrophages, bronchial epithelial cells and Candida  No malignancy  "identified    FRANK       Gross Description --     1. Lung, L.  Received in [place] and designated \"Left lung BAL\" are 45 mL of clear green fluid. Particulate matter is present. This specimen is processed as per protocol.          BAL Culture, Quantitative - Lavage, Lung, L [[id number]]  (Abnormal) Collected: 11/17/24 0918    Specimen: Lavage from Lung, L Updated: 11/19/24 0918     BAL Culture 50,000 CFU/mL Candida albicans      50,000 CFU/mL Normal Respiratory Shanae     Gram Stain Moderate (3+) WBCs per low power field      Moderate (3+) Epithelial cells per low power field      Few (2+) Budding yeast with pseudohyphae            Imaging Results (Last 24 Hours)       ** No results found for the last 24 hours. **            ECG/EMG Results (most recent)       Procedure Component Value Units Date/Time    ECG 12 Lead Dyspnea [[id number]] Collected: 11/12/24 1218     Updated: 11/13/24 0602     QT Interval 353 ms      QTC Interval 467 ms     Narrative:      HEART DYKL=483  bpm  RR Kfhydvrv=707  ms  NH Interval=  ms  P Horizontal Axis=  deg  P Front Axis=  deg  QRSD Interval=87  ms  QT Fhofcnor=126  ms  LTxY=018  ms  QRS Axis=45  deg  T Wave Axis=8  deg  - ABNORMAL ECG -  Atrial fibrillation  Low voltage, precordial leads  When compared with ECG of 08-Feb-2024 12:03:26,  Significant change in rhythm: previously sinus  Electronically Signed By: Eduardo Anderson ([facility]) 2024-11-13 06:01:52  Date and Time of Study:2024-11-12 12:18:10    Telemetry Scan [[id number]] Resulted: 11/12/24     Updated: 11/13/24 1432    Adult Transthoracic Echo Complete W/ Cont if Necessary Per Protocol [[id number]] Resulted: 11/13/24 1823     Updated: 11/13/24 1823     LVIDd 4.5 cm      LVIDs 3.0 cm      IVSd 1.10 cm      LVPWd 1.00 cm      FS 33.3 %      IVS/LVPW 1.10 cm      ESV(cubed) 27.0 ml      LV Sys Vol (BSA corrected) 11.3 cm2      EDV(cubed) 91.1 ml      LV [place] Vol (BSA corrected) 36.5 cm2      LV mass(C)d 164.0 grams      LVOT area 2.8 cm2      " LVOT diam 1.90 cm      EDV(MOD-sp4) 97.3 ml      ESV(MOD-sp4) 30.0 ml      SV(MOD-sp4) 67.3 ml      SVi(MOD-SP4) 25.3 ml/m2      SVi (LVOT) 21.6 ml/m2      EF(MOD-sp4) 69.2 %      MV E max christiano 96.7 cm/sec      LA ESV Index (BP) 19.2 ml/m2      Lat Peak E' Christiano 10.4 cm/sec      TR max christiano 163.0 cm/sec      SV(LVOT) 57.6 ml      RVIDd 3.3 cm      RV S' 9.9 cm/sec      LA dimension (2D)  5.0 cm      LV V1 max 113.0 cm/sec      LV V1 max PG 5.1 mmHg      LV V1 mean PG 3.0 mmHg      LV V1 VTI 20.3 cm      Ao pk christiano 163.0 cm/sec      Ao max PG 10.6 mmHg      Ao mean PG 7.0 mmHg      Ao V2 VTI 32.1 cm      FEDERICO(I,D) 1.79 cm2      MV max PG 8.2 mmHg      MV mean PG 3.0 mmHg      MV V2 VTI 26.0 cm      MV P1/2t 66.6 msec      MVA(P1/2t) 3.3 cm2      MVA(VTI) 2.21 cm2      MV dec slope 616.0 cm/sec2      MR max christiano 390.0 cm/sec      MR max PG 60.8 mmHg      TR max PG 10.6 mmHg      RVSP(TR) 25.6 mmHg      RAP systole 15.0 mmHg      RV V1 max PG 2.9 mmHg      RV V1 max 85.4 cm/sec      RV V1 VTI 13.8 cm      PA V2 max 96.3 cm/sec      PA acc time 0.08 sec      ACS 1.80 cm      Sinus 2.7 cm      EF(MOD-bp) 69.0 %      Dimensionless Index 0.69 (DI)     Narrative:        Left ventricular systolic function is normal. Left ventricular ejection   fraction appears to be 56 - 60%.    Left ventricular wall thickness is consistent with borderline   concentric hypertrophy.    Left ventricular diastolic function was indeterminate.    The left atrial cavity is mildly dilated.    Left atrial volume is mildly increased.    There is moderate calcification of the aortic valve.    Estimated right ventricular systolic pressure from tricuspid   regurgitation is normal (<35 mmHg).      Telemetry Scan [[id number]] Resulted: 11/12/24     Updated: 11/14/24 1333    Telemetry Scan [[id number]] Resulted: 11/12/24     Updated: 11/14/24 1416    Telemetry Scan [[id number]] Resulted: 11/12/24     Updated: 11/14/24 1449    Telemetry Scan [[id number]] Resulted:  11/12/24     Updated: 11/14/24 1519    Telemetry Scan [[id number]] Resulted: 11/12/24     Updated: 11/15/24 1658    Telemetry Scan [[id number]] Resulted: 11/12/24     Updated: 11/15/24 1743    ECG 12 Lead QT Measurement [[id number]] Collected: 11/16/24 0805     Updated: 11/17/24 1834     QT Interval 456 ms      QTC Interval 414 ms     Narrative:      HEART RATE=49  bpm  RR Kxeudown=0724  ms  MT Kpbrikww=546  ms  P Horizontal Axis=21  deg  P Front Axis=27  deg  QRSD Interval=86  ms  QT Xztmafwt=571  ms  IReK=524  ms  QRS Axis=50  deg  T Wave Axis=56  deg  - BORDERLINE ECG -  Bradycardia with irregular rate  Low voltage, precordial leads  When compared with ECG of 15-Nov-2024 11:21:41,  Significant change in rhythm: previously sinus  Significant axis, voltage or hypertrophy change  Electronically Signed By: Miguel Egan ([facility]) 2024-11-17 18:34:48  Date and Time of Study:2024-11-16 08:05:11    ECG 12 Lead Drug Monitoring [[id number]] Collected: 11/14/24 0258     Updated: 11/17/24 1835     QT Interval 402 ms      QTC Interval 484 ms     Narrative:      HEART RATE=87  bpm  RR Dxditdnr=982  ms  MT Interval=  ms  P Horizontal Axis=  deg  P Front Axis=  deg  QRSD Interval=91  ms  QT Klswkkbg=353  ms  ANsJ=962  ms  QRS Axis=39  deg  T Wave Axis=42  deg  - ABNORMAL ECG -  Atrial fibrillation  Low voltage, precordial leads  When compared with ECG of 12-Nov-2024 12:18:10,  No significant change  Electronically Signed By: Miguel Egan ([facility]) 2024-11-17 18:34:55  Date and Time of Study:2024-11-14 02:58:53    Telemetry Scan [[id number]] Resulted: 11/12/24     Updated: 11/18/24 1306    Telemetry Scan [[id number]] Resulted: 11/12/24     Updated: 11/18/24 1320    Telemetry Scan [[id number]] Resulted: 11/12/24     Updated: 11/18/24 1435    Telemetry Scan [[id number]] Resulted: 11/12/24     Updated: 11/19/24 0729    Telemetry Scan [[id number]] Resulted: 11/12/24     Updated: 11/19/24 0946    Telemetry Scan [[id number]] Resulted: 11/12/24      Updated: 11/19/24 1043    Telemetry Scan [[id number]] Resulted: 11/12/24     Updated: 11/19/24 1154    ECG 12 Lead Drug Monitoring; sotalol [[id number]] Collected: 11/19/24 1210     Updated: 11/19/24 1210     QT Interval 345 ms      QTC Interval 508 ms     Narrative:      HEART ZHOP=289  bpm  RR Fzgfmfeg=359  ms  WI Interval=  ms  P Horizontal Axis=  deg  P Front Axis=  deg  QRSD Llevovsf=124  ms  QT Jxeirbbl=915  ms  WWwG=803  ms  QRS Axis=74  deg  T Wave Axis=-37  deg  - ABNORMAL ECG -  Atrial fibrillation  Right bundle branch block  Date and Time of Study:2024-11-19 12:10:00    Telemetry Scan [[id number]] Resulted: 11/12/24     Updated: 11/19/24 1221    Telemetry Scan [[id number]] Resulted: 11/12/24     Updated: 11/19/24 1226    Telemetry Scan [[id number]] Resulted: 11/12/24     Updated: 11/19/24 1316    Telemetry Scan [[id number]] Resulted: 11/12/24     Updated: 11/19/24 1427    Telemetry Scan [[id number]] Resulted: 11/12/24     Updated: 11/19/24 1439    Telemetry Scan [[id number]] Resulted: 11/12/24     Updated: 11/19/24 1509    Telemetry Scan [[id number]] Resulted: 11/12/24     Updated: 11/19/24 1701    Telemetry Scan [[id number]] Resulted: 11/12/24     Updated: 11/19/24 1740    Telemetry Scan [[id number]] Resulted: 11/12/24     Updated: 11/19/24 1848    Telemetry Scan [[id number]] Resulted: 11/12/24     Updated: 11/19/24 1851    ECG 12 Lead QT Measurement [[id number]] Collected: 11/19/24 2030     Updated: 11/19/24 2032     QT Interval 389 ms      QTC Interval 458 ms     Narrative:      HEART RATE=83  bpm  RR Pyanpitq=196  ms  WI Interval=  ms  P Horizontal Axis=  deg  P Front Axis=  deg  QRSD Dehdplzj=491  ms  QT Oujqkvhw=511  ms  XYlB=874  ms  QRS Axis=57  deg  T Wave Axis=-28  deg  - ABNORMAL ECG -  Atrial fibrillation  Right bundle branch block  ST elevation secondary to IVCD  Date and Time of Study:2024-11-19 20:30:29    ECG 12 Lead QT Measurement [[id number]] Collected: 11/19/24 2306     Updated: 11/19/24 2307     QT  Interval 383 ms      QTC Interval 460 ms     Narrative:      HEART RATE=86  bpm  RR Rmpoxdmm=791  ms  MS Interval=  ms  P Horizontal Axis=  deg  P Front Axis=  deg  QRSD Interval=90  ms  QT Hstlcbkd=250  ms  CImO=875  ms  QRS Axis=28  deg  T Wave Axis=37  deg  - ABNORMAL ECG -  Atrial fibrillation  Ventricular bigeminy  Low voltage, precordial leads  Date and Time of Study:2024-11-19 23:06:06            Medication Review:   I have reviewed the patient's current medication list    Current Facility-Administered Medications:     albuterol (PROVENTIL) nebulizer solution 0.083% 2.5 mg/3mL, 2.5 mg, Nebulization, Q6H PRN, Reji Burch MD, 2.5 mg at 11/14/24 0451    apixaban (ELIQUIS) tablet 5 mg, 5 mg, Oral, BID, Reji Burch MD, 5 mg at 11/19/24 2041    sennosides-docusate (PERICOLACE) 8.6-50 MG per tablet 2 tablet, 2 tablet, Oral, BID PRN, 2 tablet at 11/18/24 2048 **AND** polyethylene glycol (MIRALAX) packet 17 g, 17 g, Oral, Daily PRN **AND** bisacodyl (DULCOLAX) EC tablet 5 mg, 5 mg, Oral, Daily PRN **AND** bisacodyl (DULCOLAX) suppository 10 mg, 10 mg, Rectal, Daily PRN, Reji Burch MD    Calcium Replacement - Follow Nurse / BPA Driven Protocol, , Not Applicable, PRN, Reji Burch MD    dilTIAZem (CARDIZEM) 125 mg in 125 mL D5W infusion, 5-15 mg/hr, Intravenous, Titrated, Kevin Angel MD, Last Rate: 5 mL/hr at 11/19/24 2346, 5 mg/hr at 11/19/24 2346    dilTIAZem (CARDIZEM) tablet 60 mg, 60 mg, Oral, Q6H, Kevin Angel MD, 60 mg at 11/20/24 0552    furosemide (LASIX) tablet 40 mg, 40 mg, Oral, Daily, Kevin Angel MD, 40 mg at 11/19/24 0803    guaiFENesin-codeine (GUAIFENESIN AC) 100-10 MG/5ML liquid 5 mL, 5 mL, Oral, Q4H PRN, Reji Burch MD, 5 mL at 11/16/24 2223    hydrALAZINE (APRESOLINE) injection 10 mg, 10 mg, Intravenous, Q6H PRN, Reji Burch MD, 10 mg at 11/19/24 1043    hydrALAZINE (APRESOLINE) tablet 50 mg, 50 mg, Oral, Q12H, Kevin Angel MD,  50 mg at 11/19/24 0804    ipratropium-albuterol (DUO-NEB) nebulizer solution 3 mL, 3 mL, Nebulization, 4x Daily - RT, Reji Burch MD, 3 mL at 11/20/24 0630    levothyroxine (SYNTHROID, LEVOTHROID) tablet 50 mcg, 50 mcg, Oral, Daily, Reji Burch MD, 50 mcg at 11/19/24 0804    losartan (COZAAR) tablet 50 mg, 50 mg, Oral, Q24H, Hussein Manzanares MD, 50 mg at 11/19/24 0803    Magnesium Standard Dose Replacement - Follow Nurse / BPA Driven Protocol, , Not Applicable, PRN, Reji Burch MD    multivitamin with minerals 1 tablet, 1 tablet, Oral, Daily, Reji Burch MD, 1 tablet at 11/19/24 0803    pantoprazole (PROTONIX) EC tablet 40 mg, 40 mg, Oral, Daily, Reji Burch MD, 40 mg at 11/19/24 0803    Phosphorus Replacement - Follow Nurse / BPA Driven Protocol, , Not Applicable, PRN, Reji Burch MD    Potassium Replacement - Follow Nurse / BPA Driven Protocol, , Not Applicable, PRN, Reji Burch MD    predniSONE (DELTASONE) tablet 40 mg, 40 mg, Oral, Daily With Breakfast, Reji Burch MD, 40 mg at 11/19/24 0803    sodium chloride 0.9 % flush 10 mL, 10 mL, Intravenous, PRN, Reji Burch MD, 10 mL at 11/19/24 2041    sotalol (BETAPACE) tablet 80 mg, 80 mg, Oral, Q12H, JohnchiBertin grossman MD, 80 mg at 11/19/24 2041    Assessment & Plan     Principal Problem:    Shortness of breath, multifactorial with pneumonia COPD, diastolic dysfunction, obesity, immobility deconditioning     Paroxysmal atrial fibrillation  As below, recurrent RVR     Dyspnea with exertion  Being treated for pneumonia, also COPD with Solu-Medrol and DuoNebs  Chronic lower extremity edema, BNP is normal  EF preserved 55 to 60%, normal estimated right-sided pressures  Mild diastolic dysfunction chronically as assessed in sinus rhythm previously, indeterminate with underlying atrial fibrillation, known kbul-tk-famc variability  CT with right lower lobe endobronchial opacification and consolidative changes in right lower lobe  On  nebs, steroids, and antibiotics     history of pulmonary embolism  Currently anticoagulated with Eliquis therapeutic dose uninterrupted     Hypertension  As below      Today  Recurrent A-fib RVR    Continue treatment for pneumonia and COPD from a pulmonary standpoint  EF is preserved with gqnj-ht-iwbr variability 65% EF, diastolic dysfunction known  Lasix 40 oral daily      Off flecainide  Restart sotalol 80 twice daily  Pacemaker today for tachybradycardia syndrome, escalation pharmacotherapy as allowed thereafter  Potential cardioversion if remains in atrial fibrillation, has had uninterrupted anticoagulation during hospital stay  Diltiazem 60 p.o. every 6 hours, transition to long-acting when able  Hydralazine 50 twice daily, decrease this if needed to compensate for diltiazem  Off amlodipine  Continue losartan 50 daily  \  Continue Eliquis uninterrupted twice daily therapeutic dose, history of both atrial fibrillation and thromboembolic disease with DVT PE  Continue uninterrupted telemetry     normal EF, negative stress test, no history of any coronary artery disease or structural heart disease    Further recommendations to follow findings and clinical course       Further recommendations to follow findings and clinical course  Kevin Angel MD, PhD    Kevin Angel MD, PhD    Kevin Angel MD  11/20/24  08:52 EST

## 2024-11-20 NOTE — PROGRESS NOTES
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     possible aspiration due to large hiatal hernia  Localized right upper lobe wheezing, CT chest showed right upper lobe endobronchial opacity     Bronchoscopy 12/27/2023  Copious amount mucopurulent secretions suctioned therapeutically  Right lung washing was obtained  Moderate inflammatory changes     Hypoxia    Pulmonary edema  Respiratory viral panel negative on 12/24/2023  History of pulmonary embolism 2018  Quit smoking 1992        Recommendations:  Respiratory status is improving, patient can be discharged from pulmonary standpoint after the pacemaker placement  6-minute walking to assess need for home oxygen  Patient will need outpatient sleep study to assess need for CPAP    Status post bronchoscopy 11/17/2024 showing multiple mucous plugs and tracheobronchomalacia    Continue Mucomyst    HR control as per cardiology:  status post cardioversion 11/15/2024: Cardiology planning cardioversion and pacemaker placement antibiotics: Rocephin    Prednisone    Mucinex  Encourage use of incentive spirometer          Oxygen supplement and titration to maintain saturation 90 to 95%: on 2 L  Bronchodilators     Chronic anticoagulation: Eliquis  Thyroid hormone replacement     I personally reviewed the radiological studies               LOS: 7 days     Subjective     Patient reports improvement in the cough and shortness of breath    Objective     Vital signs for last 24 hours:  Vitals:    11/20/24 0600 11/20/24 0630 11/20/24 0634 11/20/24 0701   BP: 124/94 146/94  133/86   Pulse: 83 86 79 82   Resp:  12 13    Temp:       TempSrc:       SpO2: 94% 95% 98% 93%   Weight:       Height:           Intake/Output last 3 shifts:  I/O last 3 completed shifts:  In: 960 [P.O.:960]  Out: 4800 [Urine:4800]  Intake/Output this shift:  No intake/output data  recorded.      Radiology  Imaging Results (Last 24 Hours)       ** No results found for the last 24 hours. **            Labs:  Results from last 7 days   Lab Units 11/20/24  0412   WBC 10*3/mm3 14.19*   HEMOGLOBIN g/dL 15.7   HEMATOCRIT % 49.1*   PLATELETS 10*3/mm3 259     Results from last 7 days   Lab Units 11/20/24  0412   SODIUM mmol/L 139   POTASSIUM mmol/L 4.2   CHLORIDE mmol/L 96*   CO2 mmol/L 35.5*   BUN mg/dL 28*   CREATININE mg/dL 0.75   CALCIUM mg/dL 8.9   GLUCOSE mg/dL 118*                                           Meds:   SCHEDULE  apixaban, 5 mg, Oral, BID  dilTIAZem, 60 mg, Oral, Q6H  furosemide, 40 mg, Oral, Daily  hydrALAZINE, 50 mg, Oral, Q12H  ipratropium-albuterol, 3 mL, Nebulization, 4x Daily - RT  levothyroxine, 50 mcg, Oral, Daily  losartan, 50 mg, Oral, Q24H  multivitamin with minerals, 1 tablet, Oral, Daily  pantoprazole, 40 mg, Oral, Daily  predniSONE, 40 mg, Oral, Daily With Breakfast  sotalol, 80 mg, Oral, Q12H      Infusions  dilTIAZem, 5-15 mg/hr, Last Rate: 5 mg/hr (11/19/24 8186)      PRNs    albuterol    senna-docusate sodium **AND** polyethylene glycol **AND** bisacodyl **AND** bisacodyl    Calcium Replacement - Follow Nurse / BPA Driven Protocol    guaiFENesin-codeine    hydrALAZINE    Magnesium Standard Dose Replacement - Follow Nurse / BPA Driven Protocol    Phosphorus Replacement - Follow Nurse / BPA Driven Protocol    Potassium Replacement - Follow Nurse / BPA Driven Protocol    sodium chloride    Physical Exam:  General Appearance:  Alert   HEENT:  Normocephalic, without obvious abnormality, Conjunctiva/corneas clear,.   Nares normal, no drainage     Neck:  Supple, symmetrical, trachea midline.   Lungs /Chest wall: Improved air entry with mild bilateral basal rhonchi, respirations unlabored, symmetrical wall movement.     Heart: Rate controlled, S1 S2 normal  Abdomen: Soft, non-tender, no masses, no organomegaly.    Extremities: No edema, no clubbing or cyanosis      ROS  Constitutional: Negative for chills, fever and malaise/fatigue.   HENT: Negative.    Eyes: Negative.    Cardiovascular: Negative.    Respiratory: Positive for improving cough and shortness of breath.    Skin: Negative.    Musculoskeletal: Negative.    Gastrointestinal: Negative.    Genitourinary: Negative.    Neurological: Generalized weakness    I reviewed the recent clinical results  I personally reviewed the latest radiological studies    Part of this note may be an electronic transcription/translation of spoken language to printed text using the Dragon Dictation System.

## 2024-11-20 NOTE — PLAN OF CARE
Goal Outcome Evaluation:  Plan of Care Reviewed With: patient        Progress: improving  Outcome Evaluation: Patient alert and oriented, VSS. Cardizem gtt infusing, titrated to 5mg, HR below 100. NPO since midnight for pacemaker placement today. Consent signed in chart.

## 2024-11-20 NOTE — PLAN OF CARE
Problem: Adult Inpatient Plan of Care  Goal: Patient-Specific Goal (Individualized)  11/20/2024 1514 by Richa Mena, RN  Outcome: Progressing  11/20/2024 1513 by Richa Mena, RN  Outcome: Progressing   Goal Outcome Evaluation:

## 2024-11-20 NOTE — CONSULTS
Nutrition Services    Patient Name: Camelia Gray  YOB: 1945  MRN: [id number]  Admission date: 11/12/2024    Comment:  -- Diet advancement back to Heart Healthy post procedure    -- Add Bandar BID to provide 90 calories, 7 grams L-Arginine, 7 grams L-Glutamine and 2.5 grams Protein (Collagen) per serving.       CLINICAL NUTRITION ASSESSMENT      Reason for Assessment 11/20: LOS x 8 days      H&P      Past Medical History:   Diagnosis Date    A-fib 12/2023    Arthritis     Cellulitis of both feet 10/13/2019    Decubitus ulcer of buttock, stage 2 10/14/2019    HEALED    Dermatitis associated with moisture 10/14/2019    Disease of thyroid gland     HYPOTHYROID    Edema of lower extremity 05/31/2017    Hypertension     Hypothyroidism     Immobility syndrome 10/13/2019    Lymphedema 10/14/2019    LEGS    Lymphedema     Muscle weakness (generalized)     Non-pressure chronic ulcer of other part of left foot with fat layer exposed 10/14/2019    Non-pressure chronic ulcer of other part of right foot with fat layer exposed 10/14/2019    Obesity     Pulmonary embolism     Stasis dermatitis 07/08/2013    Ureteral calculi     Urinary tract infection 07/2020    Hosp. 7/2020    Venous stasis 10/14/2019    Yeast infection of the skin        Past Surgical History:   Procedure Laterality Date    BRONCHOSCOPY N/A 12/27/2023    Procedure: BRONCHOSCOPY with right lung washing;  Surgeon: Mayra Ramachandran MD;  Location: [facility] ENDOSCOPY;  Service: Pulmonary;  Laterality: N/A;  Post- Pneumonia    BRONCHOSCOPY N/A 11/17/2024    Procedure: BRONCHOSCOPY WITH BILATERAL LUNG LAVAGE;  Surgeon: Reji Burch MD;  Location: [facility] ENDOSCOPY;  Service: Pulmonary;  Laterality: N/A;    CATARACT EXTRACTION Bilateral     CHOLECYSTECTOMY N/A 11/11/2020    Procedure: CHOLECYSTECTOMY LAPAROSCOPIC;  Surgeon: George Crocker DO;  Location: [facility] MAIN OR;  Service: General;  Laterality: N/A;    CORRECTION HAMMER TOE Bilateral     CYSTOSCOPY,  "URETEROSCOPY, RETROGRADE PYELOGRAM, STENT INSERTION Bilateral 08/18/2020    Procedure: CYSTOSCOPY BILATERAL RETROGRADE PYELOGRAM,;  Surgeon: Shawn Hernandez MD;  Location: [facility] MAIN OR;  Service: Urology;  Laterality: Bilateral;    DENTAL EXAMINATION UNDER ANESTHESIA      ENDOSCOPY N/A 09/13/2020    Procedure: ESOPHAGOGASTRODUODENOSCOPY;  Surgeon: Torsten Johnson MD;  Location: [facility] ENDOSCOPY;  Service: Gastroenterology;  Laterality: N/A;  post: esophageal stricture, reflux esophagitis, hiatal hernia    ENDOSCOPY N/A 02/01/2024    Procedure: ESOPHAGOGASTRODUODENOSCOPY, esophageal dilation (40-46 Fr non-guided Bougie);  Surgeon: Torsten Johnson MD;  Location: [facility] ENDOSCOPY;  Service: Gastroenterology;  Laterality: N/A;  post: hiatal hernia, esophageal stricture    JOINT REPLACEMENT Bilateral     Bilateral knees    TOE SURGERY      TONSILLECTOMY  1952    TOTAL HIP ARTHROPLASTY Left 06/12/2020    Procedure: TOTAL HIP ARTHROPLASTY;  Surgeon: Baljit Hutchins II, MD;  Location: [facility] MAIN OR;  Service: Orthopedics;  Laterality: Left;        Current Problems   Shortness of breath  - cardiology following   - working with PT/OT  - pulmonology following  - s/p cardioversion 11/15  - s/p bronch 11/17    Hypertension      Atrial fibrillation     Hypothyroidism     GERD       Encounter Information        Trending Narrative     11/20: Admitted for SOB.  RD visited patient at bedside.  Patient NPO for scheduled pacemaker placement today.  RD visited patient at bedside.  Patient reports good PO intakes, no N/V, no chewing/swallowing issues noted, no recent weight loss.  NFPE completed and not consistent with nutrition diagnosis of malnutrition at this time using AND/ASPEN criteria.         Anthropometrics        Current Height, Weight Height: 180.3 cm (71\")  Weight: (!) 160 kg (352 lb 8.3 oz) (11/19/24 0300)       Usual Body Weight (UBW) Unable to obtain from patient        Trending Weight Hx   "   This admission: 11/20: 352#             PTA: No recent weight loss    Wt Readings from Last 30 Encounters:   11/19/24 0300 (!) 160 kg (352 lb 8.3 oz)   11/18/24 0336 (!) 159 kg (350 lb 4.8 oz)   11/17/24 0300 (!) 159 kg (349 lb 6.9 oz)   11/16/24 0345 (!) 158 kg (347 lb 10.7 oz)   11/13/24 1156 (!) 157 kg (346 lb)   11/13/24 0255 (!) 157 kg (346 lb 5.5 oz)   11/12/24 1159 (!) 150 kg (330 lb)   09/10/24 1140 (!) 150 kg (330 lb)   06/17/24 1037 (!) 150 kg (330 lb)   06/11/24 1001 (!) 150 kg (330 lb 1.6 oz)   05/14/24 1613 (!) 150 kg (330 lb)   03/04/24 0956 (!) 145 kg (320 lb)   02/27/24 1054 (!) 145 kg (320 lb)   02/08/24 1015 (!) 146 kg (320 lb 15.8 oz)   02/01/24 0831 (!) 152 kg (336 lb)   01/24/24 1058 (!) 152 kg (335 lb)   01/22/24 0958 (!) 152 kg (335 lb)   01/11/24 1345 (!) 152 kg (335 lb)   12/26/23 1022 (!) 159 kg (350 lb)   12/24/23 1139 (!) 159 kg (350 lb)   12/12/23 1036 (!) 159 kg (350 lb)   06/13/23 1254 (!) 151 kg (332 lb)   01/17/23 0947 (!) 151 kg (332 lb)   01/10/23 1013 (!) 151 kg (332 lb)   11/01/22 1109 (!) 151 kg (332 lb)   04/25/22 1114 127 kg (280 lb)   10/25/21 1130 127 kg (280 lb)   04/23/21 1041 127 kg (280 lb)   11/25/20 1412 127 kg (280 lb)   11/11/20 0644 (!) 137 kg (302 lb 0.5 oz)   10/30/20 1704 125 kg (275 lb)   09/29/20 0957 125 kg (275 lb)   09/14/20 1317 126 kg (277 lb)   09/12/20 2303 126 kg (277 lb)   08/18/20 0940 127 kg (281 lb)   08/03/20 0929 134 kg (295 lb)   07/21/20 0602 133 kg (292 lb 8.8 oz)   07/20/20 0400 132 kg (291 lb 14.2 oz)   07/19/20 0400 132 kg (291 lb 14.2 oz)   07/18/20 0341 132 kg (291 lb 14.4 oz)   07/16/20 2154 133 kg (293 lb 3.4 oz)   06/14/20 0500 (!) 141 kg (310 lb 13.6 oz)   06/13/20 1313 (!) 138 kg (304 lb 10.8 oz)   06/12/20 1322 118 kg (259 lb 7.7 oz)   06/10/20 1354 134 kg (295 lb)      BMI kg/m2 Body mass index is 49.17 kg/m².       Labs        Pertinent Labs    Results from last 7 days   Lab Units 11/20/24  0412 11/19/24  0330 11/18/24  5462    SODIUM mmol/L 139 140 141   POTASSIUM mmol/L 4.2 4.5 4.6   CHLORIDE mmol/L 96* 100 103   CO2 mmol/L 35.5* 33.8* 33.2*   BUN mg/dL 28* 27* 24*   CREATININE mg/dL 0.75 0.70 0.67   CALCIUM mg/dL 8.9 8.7 8.5*   GLUCOSE mg/dL 118* 124* 135*     Results from last 7 days   Lab Units 11/20/24  0412   HEMOGLOBIN g/dL 15.7   HEMATOCRIT % 49.1*     Lab Results   Component Value Date    HGBA1C 5.90 (H) 12/25/2023        Medications    Scheduled Medications apixaban, 5 mg, Oral, BID  dilTIAZem, 60 mg, Oral, Q6H  furosemide, 40 mg, Oral, Daily  hydrALAZINE, 50 mg, Oral, Q12H  ipratropium-albuterol, 3 mL, Nebulization, 4x Daily - RT  levothyroxine, 50 mcg, Oral, Daily  losartan, 50 mg, Oral, Q24H  multivitamin with minerals, 1 tablet, Oral, Daily  pantoprazole, 40 mg, Oral, Daily  [START ON 11/21/2024] predniSONE, 20 mg, Oral, Daily With Breakfast  sotalol, 80 mg, Oral, Q12H        Infusions dilTIAZem, 5-15 mg/hr, Last Rate: 5 mg/hr (11/20/24 1053)        PRN Medications   albuterol    senna-docusate sodium **AND** polyethylene glycol **AND** bisacodyl **AND** bisacodyl    Calcium Replacement - Follow Nurse / BPA Driven Protocol    guaiFENesin-codeine    hydrALAZINE    Magnesium Standard Dose Replacement - Follow Nurse / BPA Driven Protocol    Phosphorus Replacement - Follow Nurse / BPA Driven Protocol    Potassium Replacement - Follow Nurse / BPA Driven Protocol    sodium chloride     Physical Findings        Trending Physical   Appearance, NFPE 11/20: NFPE completed and not consistent with nutrition diagnosis of malnutrition at this time using AND/ASPEN criteria      --  Edema  No edema documented      Bowel Function Last documented BM 11/19 (yesterday)     Tubes No feeding tube      Chewing/Swallowing Patient denies issues      Skin WOCN note 11/13  - blanchable ecchymosis to her buttocks and posterior thighs consistent with chronic pressure   - both legs wrapped with ACE wraps from the ankle to the bend of the knee       --  Protein Requirements    EST Needs, Method, Wt used  g/day (1.2-1.5 g/kg of IBW 70.4 kg)     Current Nutrition Orders & Evaluation of Intake       Oral Nutrition     Food Allergies NKFA   Current PO Diet NPO Diet NPO Type: Sips with Meds   Supplement None ordered    PO Evaluation     Trending % PO Intake 11/20: 95% average PO intakes x 15 documented meals since admission    --  Nutritional Risk Screening        NRS-2002 Score          Nutrition Diagnosis         Nutrition Dx Problem 1 Increased nutrient needs (protein, arginine) related to increased needs for healing as evidenced by wound.      Nutrition Dx Problem 2        Intervention Goal         Intervention Goal(s) Diet advancement per MD post procedure  PO intakes continue at least 75%  Improve skin integrity      Nutrition Intervention        RD Action Completed NFPE     Nutrition Prescription          Diet Prescription NPO, Heart Healthy prior   Supplement Prescription Bandar BID   --  Monitor/Evaluation        Monitor Per protocol, I&O, PO intake, Supplement intake, Pertinent labs, Weight       Electronically signed by:  Mariana Hauser RD  11/20/24 13:47 EST

## 2024-11-20 NOTE — PLAN OF CARE
Problem: Adult Inpatient Plan of Care  Goal: Patient-Specific Goal (Individualized)  Outcome: Progressing   Goal Outcome Evaluation:

## 2024-11-21 ENCOUNTER — DOCUMENTATION (OUTPATIENT)
Dept: HOME HEALTH SERVICES | Facility: HOME HEALTHCARE | Age: 79
End: 2024-11-21
Payer: MEDICARE

## 2024-11-21 ENCOUNTER — TRANSCRIBE ORDERS (OUTPATIENT)
Dept: HOME HEALTH SERVICES | Facility: HOME HEALTHCARE | Age: 79
End: 2024-11-21
Payer: MEDICARE

## 2024-11-21 DIAGNOSIS — R00.1 SEVERE SINUS BRADYCARDIA: Primary | ICD-10-CM

## 2024-11-21 DIAGNOSIS — I48.19 PERSISTENT ATRIAL FIBRILLATION: ICD-10-CM

## 2024-11-21 LAB
ANION GAP SERPL CALCULATED.3IONS-SCNC: 7.3 MMOL/L (ref 5–15)
BASOPHILS # BLD AUTO: 0.03 10*3/MM3 (ref 0–0.2)
BASOPHILS NFR BLD AUTO: 0.2 % (ref 0–1.5)
BUN SERPL-MCNC: 41 MG/DL (ref 8–23)
BUN/CREAT SERPL: 42.7 (ref 7–25)
CALCIUM SPEC-SCNC: 9 MG/DL (ref 8.6–10.5)
CHLORIDE SERPL-SCNC: 99 MMOL/L (ref 98–107)
CO2 SERPL-SCNC: 32.7 MMOL/L (ref 22–29)
CREAT SERPL-MCNC: 0.96 MG/DL (ref 0.57–1)
DEPRECATED RDW RBC AUTO: 50.6 FL (ref 37–54)
EGFRCR SERPLBLD CKD-EPI 2021: 60.7 ML/MIN/1.73
EOSINOPHIL # BLD AUTO: 0.31 10*3/MM3 (ref 0–0.4)
EOSINOPHIL NFR BLD AUTO: 2.1 % (ref 0.3–6.2)
ERYTHROCYTE [DISTWIDTH] IN BLOOD BY AUTOMATED COUNT: 15.7 % (ref 12.3–15.4)
GLUCOSE SERPL-MCNC: 104 MG/DL (ref 65–99)
HCT VFR BLD AUTO: 48.2 % (ref 34–46.6)
HGB BLD-MCNC: 15.1 G/DL (ref 12–15.9)
IMM GRANULOCYTES # BLD AUTO: 0.11 10*3/MM3 (ref 0–0.05)
IMM GRANULOCYTES NFR BLD AUTO: 0.8 % (ref 0–0.5)
LYMPHOCYTES # BLD AUTO: 1.16 10*3/MM3 (ref 0.7–3.1)
LYMPHOCYTES NFR BLD AUTO: 7.9 % (ref 19.6–45.3)
MCH RBC QN AUTO: 28 PG (ref 26.6–33)
MCHC RBC AUTO-ENTMCNC: 31.3 G/DL (ref 31.5–35.7)
MCV RBC AUTO: 89.4 FL (ref 79–97)
MONOCYTES # BLD AUTO: 1.21 10*3/MM3 (ref 0.1–0.9)
MONOCYTES NFR BLD AUTO: 8.3 % (ref 5–12)
NEUTROPHILS NFR BLD AUTO: 11.8 10*3/MM3 (ref 1.7–7)
NEUTROPHILS NFR BLD AUTO: 80.7 % (ref 42.7–76)
NRBC BLD AUTO-RTO: 0 /100 WBC (ref 0–0.2)
PLATELET # BLD AUTO: 248 10*3/MM3 (ref 140–450)
PMV BLD AUTO: 9.2 FL (ref 6–12)
POTASSIUM SERPL-SCNC: 4.6 MMOL/L (ref 3.5–5.2)
RBC # BLD AUTO: 5.39 10*6/MM3 (ref 3.77–5.28)
SODIUM SERPL-SCNC: 139 MMOL/L (ref 136–145)
WBC NRBC COR # BLD AUTO: 14.62 10*3/MM3 (ref 3.4–10.8)

## 2024-11-21 PROCEDURE — 97164 PT RE-EVAL EST PLAN CARE: CPT

## 2024-11-21 PROCEDURE — 99232 SBSQ HOSP IP/OBS MODERATE 35: CPT | Performed by: INTERNAL MEDICINE

## 2024-11-21 PROCEDURE — 94799 UNLISTED PULMONARY SVC/PX: CPT

## 2024-11-21 PROCEDURE — 94618 PULMONARY STRESS TESTING: CPT

## 2024-11-21 PROCEDURE — 97530 THERAPEUTIC ACTIVITIES: CPT

## 2024-11-21 PROCEDURE — 85025 COMPLETE CBC W/AUTO DIFF WBC: CPT | Performed by: INTERNAL MEDICINE

## 2024-11-21 PROCEDURE — 97535 SELF CARE MNGMENT TRAINING: CPT

## 2024-11-21 PROCEDURE — 80048 BASIC METABOLIC PNL TOTAL CA: CPT | Performed by: INTERNAL MEDICINE

## 2024-11-21 PROCEDURE — 97166 OT EVAL MOD COMPLEX 45 MIN: CPT

## 2024-11-21 PROCEDURE — 93010 ELECTROCARDIOGRAM REPORT: CPT | Performed by: INTERNAL MEDICINE

## 2024-11-21 PROCEDURE — 93005 ELECTROCARDIOGRAM TRACING: CPT | Performed by: INTERNAL MEDICINE

## 2024-11-21 PROCEDURE — 25010000002 VANCOMYCIN 1.75-0.9 GM/500ML-% SOLUTION: Performed by: INTERNAL MEDICINE

## 2024-11-21 RX ORDER — HYDRALAZINE HYDROCHLORIDE 50 MG/1
25 TABLET, FILM COATED ORAL 2 TIMES DAILY PRN
Qty: 60 TABLET | Refills: 0 | Status: SHIPPED | OUTPATIENT
Start: 2024-11-21 | End: 2024-11-21

## 2024-11-21 RX ORDER — LOSARTAN POTASSIUM 50 MG/1
25 TABLET ORAL
Qty: 30 TABLET | Refills: 0 | Status: SHIPPED | OUTPATIENT
Start: 2024-11-21 | End: 2024-11-21

## 2024-11-21 RX ORDER — SOTALOL HYDROCHLORIDE 80 MG/1
80 TABLET ORAL EVERY 12 HOURS SCHEDULED
Qty: 60 TABLET | Refills: 0 | Status: SHIPPED | OUTPATIENT
Start: 2024-11-21

## 2024-11-21 RX ORDER — SOTALOL HYDROCHLORIDE 80 MG/1
80 TABLET ORAL EVERY 12 HOURS SCHEDULED
Qty: 60 TABLET | Refills: 0 | Status: SHIPPED | OUTPATIENT
Start: 2024-11-21 | End: 2024-11-21

## 2024-11-21 RX ORDER — DILTIAZEM HYDROCHLORIDE 180 MG/1
180 CAPSULE, COATED, EXTENDED RELEASE ORAL
Status: DISCONTINUED | OUTPATIENT
Start: 2024-11-21 | End: 2024-11-22 | Stop reason: HOSPADM

## 2024-11-21 RX ORDER — CEPHALEXIN 500 MG/1
500 CAPSULE ORAL 2 TIMES DAILY
Qty: 10 CAPSULE | Refills: 0 | Status: SHIPPED | OUTPATIENT
Start: 2024-11-21 | End: 2024-11-21

## 2024-11-21 RX ORDER — CEPHALEXIN 500 MG/1
500 CAPSULE ORAL 2 TIMES DAILY
Qty: 10 CAPSULE | Refills: 0 | Status: SHIPPED | OUTPATIENT
Start: 2024-11-21 | End: 2024-11-26

## 2024-11-21 RX ORDER — LOSARTAN POTASSIUM 50 MG/1
25 TABLET ORAL
Qty: 30 TABLET | Refills: 0 | Status: SHIPPED | OUTPATIENT
Start: 2024-11-21

## 2024-11-21 RX ORDER — DILTIAZEM HYDROCHLORIDE 180 MG/1
180 CAPSULE, COATED, EXTENDED RELEASE ORAL
Qty: 30 CAPSULE | Refills: 0 | Status: SHIPPED | OUTPATIENT
Start: 2024-11-22 | End: 2024-11-21

## 2024-11-21 RX ORDER — HYDROCODONE BITARTRATE AND ACETAMINOPHEN 5; 325 MG/1; MG/1
1 TABLET ORAL EVERY 6 HOURS PRN
Status: DISCONTINUED | OUTPATIENT
Start: 2024-11-21 | End: 2024-11-22 | Stop reason: HOSPADM

## 2024-11-21 RX ORDER — ACETAMINOPHEN 650 MG/1
650 SUPPOSITORY RECTAL EVERY 4 HOURS PRN
Status: DISCONTINUED | OUTPATIENT
Start: 2024-11-21 | End: 2024-11-22 | Stop reason: HOSPADM

## 2024-11-21 RX ORDER — FUROSEMIDE 40 MG/1
40 TABLET ORAL DAILY
Qty: 30 TABLET | Refills: 0 | Status: SHIPPED | OUTPATIENT
Start: 2024-11-21

## 2024-11-21 RX ORDER — PREDNISONE 10 MG/1
TABLET ORAL
Qty: 22 TABLET | Refills: 0 | Status: SHIPPED | OUTPATIENT
Start: 2024-11-21 | End: 2024-12-01

## 2024-11-21 RX ORDER — HYDRALAZINE HYDROCHLORIDE 50 MG/1
25 TABLET, FILM COATED ORAL 2 TIMES DAILY PRN
Qty: 60 TABLET | Refills: 0 | Status: SHIPPED | OUTPATIENT
Start: 2024-11-21

## 2024-11-21 RX ORDER — VANCOMYCIN 1.75 GRAM/500 ML IN 0.9 % SODIUM CHLORIDE INTRAVENOUS
1750 ONCE
Status: COMPLETED | OUTPATIENT
Start: 2024-11-21 | End: 2024-11-21

## 2024-11-21 RX ORDER — DILTIAZEM HYDROCHLORIDE 180 MG/1
180 CAPSULE, COATED, EXTENDED RELEASE ORAL
Qty: 30 CAPSULE | Refills: 0 | Status: SHIPPED | OUTPATIENT
Start: 2024-11-22

## 2024-11-21 RX ORDER — ACETAMINOPHEN 325 MG/1
650 TABLET ORAL EVERY 4 HOURS PRN
Status: DISCONTINUED | OUTPATIENT
Start: 2024-11-21 | End: 2024-11-22 | Stop reason: HOSPADM

## 2024-11-21 RX ORDER — NITROGLYCERIN 0.4 MG/1
0.4 TABLET SUBLINGUAL
Status: DISCONTINUED | OUTPATIENT
Start: 2024-11-21 | End: 2024-11-22 | Stop reason: HOSPADM

## 2024-11-21 RX ORDER — ALBUTEROL SULFATE 90 UG/1
2 INHALANT RESPIRATORY (INHALATION) 4 TIMES DAILY PRN
Qty: 18 G | Refills: 11 | Status: SHIPPED | OUTPATIENT
Start: 2024-11-21

## 2024-11-21 RX ORDER — BUDESONIDE AND FORMOTEROL FUMARATE DIHYDRATE 160; 4.5 UG/1; UG/1
2 AEROSOL RESPIRATORY (INHALATION)
Qty: 10.2 G | Refills: 1 | Status: SHIPPED | OUTPATIENT
Start: 2024-11-21

## 2024-11-21 RX ORDER — TRAMADOL HYDROCHLORIDE 50 MG/1
50 TABLET ORAL EVERY 6 HOURS PRN
Qty: 30 TABLET | Refills: 0 | Status: SHIPPED | OUTPATIENT
Start: 2024-11-21

## 2024-11-21 RX ORDER — AMOXICILLIN 250 MG
2 CAPSULE ORAL 2 TIMES DAILY PRN
Qty: 30 TABLET | Refills: 0 | Status: SHIPPED | OUTPATIENT
Start: 2024-11-21

## 2024-11-21 RX ORDER — ACETAMINOPHEN 160 MG/5ML
650 SOLUTION ORAL EVERY 4 HOURS PRN
Status: DISCONTINUED | OUTPATIENT
Start: 2024-11-21 | End: 2024-11-22 | Stop reason: HOSPADM

## 2024-11-21 RX ADMIN — Medication 1 PACKET: at 20:29

## 2024-11-21 RX ADMIN — Medication 1 PACKET: at 08:58

## 2024-11-21 RX ADMIN — LOSARTAN POTASSIUM 50 MG: 50 TABLET, FILM COATED ORAL at 08:57

## 2024-11-21 RX ADMIN — HYDRALAZINE HYDROCHLORIDE 50 MG: 25 TABLET ORAL at 08:57

## 2024-11-21 RX ADMIN — DILTIAZEM HYDROCHLORIDE 60 MG: 60 TABLET, FILM COATED ORAL at 05:47

## 2024-11-21 RX ADMIN — SOTALOL HYDROCHLORIDE 80 MG: 80 TABLET ORAL at 20:27

## 2024-11-21 RX ADMIN — DILTIAZEM HYDROCHLORIDE 180 MG: 180 CAPSULE, COATED, EXTENDED RELEASE ORAL at 08:57

## 2024-11-21 RX ADMIN — Medication 1750 MG: at 08:58

## 2024-11-21 RX ADMIN — SOTALOL HYDROCHLORIDE 80 MG: 80 TABLET ORAL at 08:57

## 2024-11-21 RX ADMIN — IPRATROPIUM BROMIDE AND ALBUTEROL SULFATE 3 ML: .5; 3 SOLUTION RESPIRATORY (INHALATION) at 18:28

## 2024-11-21 RX ADMIN — DILTIAZEM HYDROCHLORIDE 60 MG: 60 TABLET, FILM COATED ORAL at 00:24

## 2024-11-21 NOTE — PROGRESS NOTES
Reason for follow-up: A-fib, sick sinus syndrome     Patient Care Team:  Brinda Tony APRN as PCP - General (Nurse Practitioner)  Kevin Angel MD as Consulting Physician (Cardiology)    Nba .   Camelia Gray is doing well today     ROS    Maxzide [hydrochlorothiazide w-triamterene]    Scheduled Meds:[Transfer Hold] apixaban, 5 mg, Oral, BID  dilTIAZem, 60 mg, Oral, Q6H  [Transfer Hold] furosemide, 40 mg, Oral, Daily  hydrALAZINE, 50 mg, Oral, Q12H  [Transfer Hold] ipratropium-albuterol, 3 mL, Nebulization, 4x Daily - RT  Bandar, 1 packet, Oral, BID  [Transfer Hold] levothyroxine, 50 mcg, Oral, Daily  losartan, 50 mg, Oral, Q24H  [Transfer Hold] multivitamin with minerals, 1 tablet, Oral, Daily  [Transfer Hold] pantoprazole, 40 mg, Oral, Daily  [Transfer Hold] predniSONE, 20 mg, Oral, Daily With Breakfast  sotalol, 80 mg, Oral, Q12H  vancomycin, 1,750 mg, Intravenous, Once      Continuous Infusions:dilTIAZem, 5-15 mg/hr, Last Rate: 5 mg/hr (11/20/24 1053)      PRN Meds:.  acetaminophen **OR** acetaminophen **OR** acetaminophen    [Transfer Hold] albuterol    [Transfer Hold] senna-docusate sodium **AND** [Transfer Hold] polyethylene glycol **AND** [Transfer Hold] bisacodyl **AND** [Transfer Hold] bisacodyl    [Transfer Hold] Calcium Replacement - Follow Nurse / BPA Driven Protocol    [Transfer Hold] guaiFENesin-codeine    hydrALAZINE    HYDROcodone-acetaminophen    [Transfer Hold] Magnesium Standard Dose Replacement - Follow Nurse / BPA Driven Protocol    nitroglycerin    [Transfer Hold] Phosphorus Replacement - Follow Nurse / BPA Driven Protocol    Potassium Replacement - Follow Nurse / BPA Driven Protocol    [Transfer Hold] sodium chloride      VITAL SIGNS  Vitals:    11/20/24 2300 11/21/24 0024 11/21/24 0250 11/21/24 0547   BP:  114/66 125/75 107/64   BP Location:   Left arm    Patient Position:   Lying    Pulse:  60 60 60   Resp: 20  18    Temp: 97.9 °F (36.6 °C)  97.9 °F (36.6 °C)   "  TempSrc: Oral  Oral    SpO2:   94%    Weight:   (!) 160 kg (352 lb 15.3 oz)    Height:           Flowsheet Rows      Flowsheet Row First Filed Value   Admission Height 180.3 cm (71\") Documented at 11/12/2024 1159   Admission Weight 150 kg (330 lb) Documented at 11/12/2024 1159               Physical Exam  VITALS REVIEWED    General:      well developed, in no acute distress.    Head:      normocephalic and atraumatic.    Eyes:      PERRL/EOM intact, conjunctiva and sclera clear with out nystagmus.    Neck:      no masses, thyromegaly,  trachea central with normal respiratory effort and PMI displaced laterally  Lungs:      Clear  Heart:       Regular rate and rhythm  Msk:      no deformity or scoliosis noted of thoracic or lumbar spine.    Pulses:      pulses normal in all 4 extremities.    Extremities:       No lower extremity edema  Neurologic:      no focal deficits.   alert oriented x3  Skin:      intact without lesions or rashes.    Psych:      alert and cooperative; normal mood and affect; normal attention span and concentration.          LAB RESULTS (LAST 7 DAYS)    CBC  Results from last 7 days   Lab Units 11/21/24  0416 11/20/24  0412 11/19/24  0330 11/18/24  0304 11/17/24  0258 11/15/24  0505   WBC 10*3/mm3 14.62* 14.19* 14.65* 11.95* 11.45* 11.65*   RBC 10*6/mm3 5.39* 5.57* 5.15 5.02 4.95 4.79   HEMOGLOBIN g/dL 15.1 15.7 14.3 14.2 14.0 13.5   HEMATOCRIT % 48.2* 49.1* 45.9 45.6 44.8 43.4   MCV fL 89.4 88.2 89.1 90.8 90.5 90.6   PLATELETS 10*3/mm3 248 259 203 188 202 205       BMP  Results from last 7 days   Lab Units 11/21/24  0416 11/20/24  0412 11/19/24  0330 11/18/24  0304 11/17/24  0258 11/15/24  0505   SODIUM mmol/L 139 139 140 141 142 143   POTASSIUM mmol/L 4.6 4.2 4.5 4.6 4.6 4.4   CHLORIDE mmol/L 99 96* 100 103 104 105   CO2 mmol/L 32.7* 35.5* 33.8* 33.2* 30.3* 30.1*   BUN mg/dL 41* 28* 27* 24* 30* 34*   CREATININE mg/dL 0.96 0.75 0.70 0.67 0.69 0.80   GLUCOSE mg/dL 104* 118* 124* 135* 107* 130* "       CMP   Results from last 7 days   Lab Units 11/21/24  0416 11/20/24  0412 11/19/24  0330 11/18/24  0304 11/17/24  0258 11/15/24  0505   SODIUM mmol/L 139 139 140 141 142 143   POTASSIUM mmol/L 4.6 4.2 4.5 4.6 4.6 4.4   CHLORIDE mmol/L 99 96* 100 103 104 105   CO2 mmol/L 32.7* 35.5* 33.8* 33.2* 30.3* 30.1*   BUN mg/dL 41* 28* 27* 24* 30* 34*   CREATININE mg/dL 0.96 0.75 0.70 0.67 0.69 0.80   GLUCOSE mg/dL 104* 118* 124* 135* 107* 130*         BNP        TROPONIN        CoAg        Creatinine Clearance  Estimated Creatinine Clearance: 80.8 mL/min (by C-G formula based on SCr of 0.96 mg/dL).    ABG          EKG    I personally reviewed the patient's EKG/Telemetry data: Atrial paced rhythm      Assessment & Plan       Shortness of breath    Persistent atrial fibrillation    Multiple tracheobronchial mucus plugs    Atelectasis    Symptomatic bradycardia      Camelia Gray is a 79-year-old female patient who was first diagnosed with A-fib in December 2023, she had cardioversion and she remained in sinus rhythm Optil June 2024.  A-fib recurred and she was placed on sotalol 80 mg every 12 hours.  Patient was also chronically anticoagulated with Eliquis since 2018.  Patient however remained in A-fib despite adding sotalol.  She is now admitted to the hospital for shortness of breath and pneumonia.  Since her A-fib is not chronic, which should direct our efforts towards rhythm restoration.  I think it is best to go ahead and perform cardioversion while she is here, hopefully with her being on sotalol this will maintain her in normal rhythm.  We can also go ahead and schedule her for A-fib ablation to avoid recurrences.  Risks indications and benefits for both cardioversion and A-fib ablation were discussed at length with her and patient is agreeable.  Plan for cardioversion tomorrow around 10:30 am.        11/19/2024  Cardioversion was done on 11/14/2024.  Sotalol had to be decreased and eventually switched to flecainide  due to bradycardia.  A-fib however recurred.  Plan for pacemaker implantation tomorrow with cardioversion.  We will restart sotalol at 80 mg every 12 hours hopefully she will remain in sinus rhythm, otherwise ablation in the next few months.    11/21/2024    Patient received a pacemaker on 11/20/2024 and cardioversion was performed with addition of sotalol.  She had very severe sinus bradycardia/atrial standstill post cardioversion and therefore she is atrial paced.  If she has recurrence of A-fib in the future then she will need ablation.  Anticoagulation can be resumed tomorrow.    I discussed the patients findings and my recommendations with patient and agrees with outlined plan.    Bertin Stone MD  11/21/24  08:19 EST      Electronically signed by Bertin Stone MD, 11/21/24, 8:22 AM EST.

## 2024-11-21 NOTE — CASE MANAGEMENT/SOCIAL WORK
Continued Stay Note  [facility]     Patient Name: Camelia Gray  MRN: [id number]  Today's Date: 11/21/2024    Admit Date: 11/12/2024    Plan: Return home alone with intermitten caregivers. RT did 6 MWT and did not qualify.  Referred to [facility] and accepted. Will need wheelchair van transport home   Discharge Plan       Row Name 11/21/24 1531       Plan    Plan Comments CM placed request for [facility] to transport home via wheelchair van as Will Call. DAMIAN met with Ms. Gray with Jessika floor nurse and Liz charge nurse and discussed that Dr. Manzanares  had placed discharge orders. CM offered SNF referral but she declines and her main concern is that she will not have enough caregiver help  at home. She private pays for her caregivers and plans to call them. [facility] and Medicare appeal process explained to her again with [employer] contact info and she has decided to call and appeal.  DAMIAN notified nursing and Dr. Manzanares and asked him to place his discharge summary      Row Name 11/21/24 1419       Plan    Plan Return home alone with intermitten caregivers. RT did 6 MWT and did not qualify.  Referred to [facility] and accepted. Will need wheelchair van transport home    Plan Comments DAMIAN asked Dr. Manzanares to speak to Ms. Gray about discharge and offered to go with him.  DAMIAN spoke to  her and she was expecting to  be here until tomorrow.  [facility], accepted for home health PT.  DAMIAN asked Jenny Esposito to review 6 MWT and she had their Respiratory/ Oxygen Team and they said she does not qualify for oxygen. To qualify for oxygen,  saturations must be equal or less than 88 % per [employer] and Medicare guidelines.  She will need wheelchair van home at discharge                      Yamini BUNCH,RN Case Manager  [facility]  Phone: Desk- [phone] cell- [phone]

## 2024-11-21 NOTE — PROGRESS NOTES
"    [facility] MEDICINE SERVICE  DAILY PROGRESS NOTE    NAME: Camelia Gray  : 1945  MRN: [id number]      LOS: 8 days     PROVIDER OF SERVICE: Hussein Manzanares MD    Chief Complaint: Shortness of breath    Subjective:     History of Present Illness: Camelia Gray is a 79 y.o. female with a CMH of atrial fibrillation, arthritis, hypothyroidism, hypertension, lymphedema, immobility syndrome who presented to [facility] on 2024 with shortness of breath.  Patient reports symptoms have been going on for the past 4 to 5 days and are progressively worsening.  Patient states that she did have a cold within the last week and reports she continues to have nasal congestion.  Patient denies fever, chills, pain.  Denies new edema in legs and reports her legs continue to stay wrapped due to lymphedema.  Patient does continue to have mild cough at this time.     On ED evaluation, patient vital signs show tachycardia and hypertension on arrival as well as tachypnea.  Patient required IV labetalol for blood pressure control in ED.  Labs essentially unremarkable.  Respiratory viral panel negative.  Chest x-ray showed \"1.  Cardiomegaly. 2.  Evidence of prior granulomatous exposure. 3.  Possible atelectasis or scarring in the right lower lobe adjacent to shadow which could relate to known hiatal hernia\".  EKG does show atrial fibrillation however seems rate is controlled at 105. patient received IV steroids and DuoNeb in ED however shortness of breath persisted therefore hospitalist group was asked for admission.    Interval History:  History taken from: patient chart  -Patient underwent bronchoscopy this morning.  She states that overall she feels less congested.  -seen and examined in bed no acute distress, blood pressure has been elevated this morning,  Apparently patient lives at home.  Presently on 3 L of oxygen, saturation 92% to 94%.Over weekend started on diltiazem for rate control. " Sotalol discontinued and flecainide started for heart rate. HR now in 50-60s. Blood pressure still elevated. Norvasc and losartan added. Received lasix IV x 1 for fluid volume. Remains in sinus rhythm s/p cardioversion..   11/19/24--went back into a-fib. Starting drip & consorted EP to do a pacemaker   Slept at short intervals. O2 at 2L in use. No c/o discomfort.   11/20/2024 patient seen and examined in bed no acute distress, vital signs stable, discussed with RN, for pacemaker today.  11/21/24 seen in bed NAD, no new complaints,  KRISTI RN, says wants to talked to , wants home health      Review of Systems:   Constitutional:  Negative for chills, diaphoresis, fatigue and fever.   HENT:  Positive for congestion.    Respiratory:  Positive for cough and shortness of breath.    Cardiovascular:  Negative for chest pain.   Gastrointestinal:  Negative for abdominal pain.   Neurological:  Negative for dizziness and headaches.    Objective:     Vital Signs  Temp:  [97 °F (36.1 °C)-97.9 °F (36.6 °C)] 97.9 °F (36.6 °C)  Heart Rate:  [60-90] 60  Resp:  [13-21] 18  BP: (104-160)/() 107/64  Flow (L/min) (Oxygen Therapy):  [2-3] 3   Body mass index is 49.23 kg/m².    Physical Exam  PHYSICAL EXAM  Constitutional:  Well-developed, well-nourished, no acute distress, nontoxic appearance   Eyes:  PERRL, conjunctivae normal, EOMI   HENT:  Atraumatic, external ears normal, nose normal, oropharynx moist, no pharyngeal exudates. Neck-normal range of motion, no tenderness, supple, trachea midline  Respiratory: Improved expiratory wheezing and improved air entry bilaterally  Cardiovascular:  Normal rate, normal rhythm, no murmurs, no gallops, no rubs   GI:  Soft, nondistended, normal bowel sounds, nontender, no organomegaly, no mass, no rebound, no guarding.  Mild BLE edema noted.  :  No costovertebral angle tenderness   Musculoskeletal:  No edema, no tenderness, no deformities  Integument:  Well hydrated, no rash.  Edema  noted to bilateral lower extremities.  Lymphatic:  No lymphadenopathy noted   Neurologic:  Alert & oriented x 3, CN 2-12 normal, normal motor function, normal sensory function, no focal deficits noted   Psychiatric:  Speech and behavior appropriate          Diagnostic Data    Results from last 7 days   Lab Units 11/21/24  0416   WBC 10*3/mm3 14.62*   HEMOGLOBIN g/dL 15.1   HEMATOCRIT % 48.2*   PLATELETS 10*3/mm3 248   GLUCOSE mg/dL 104*   CREATININE mg/dL 0.96   BUN mg/dL 41*   SODIUM mmol/L 139   POTASSIUM mmol/L 4.6   ANION GAP mmol/L 7.3       XR Chest 1 View    Result Date: 11/20/2024  Impression: 1. Left chest wall pacemaker with leads in expected position. No evidence of pneumothorax. 2. Prominence of the main pulmonary artery which can be associated with pulmonary hypertension. Electronically Signed: Sinan Moustapha  11/20/2024 8:34 PM EST  Workstation ID: [id number]       I reviewed the patient's new clinical results.    Assessment/Plan:     Active and Resolved Problems  Active Hospital Problems    Diagnosis  POA    **Shortness of breath [R06.02]  Yes    Persistent atrial fibrillation [I48.19]  Unknown    Multiple tracheobronchial mucus plugs [T17.800A]  Unknown    Atelectasis [J98.11]  Unknown    Symptomatic bradycardia [R00.1]  Unknown      Resolved Hospital Problems   No resolved problems to display.       Pneumonia with acute respiratory failure with hypoxia, unspecified organism  -Likely pneumonia with acute bronchospasms  -Imaging with CT does show right lower lobe infiltrate  -Continue IV antibiotics    -Encourage pulmonary toileting with I-S, flutter valve  -NT suction as needed  -Pulmonology consulted; patient underwent bronchoscopy on 11/17 with multiple mucous plugs removed  -Follow-up BAL cultures    Hypertension   -BP stable elevated most recent blood pressure 145/89  -PRN Hydralazine  -Continue usual home medication     Atrial fibrillation with RVR  -Patient has a history of A-fib, on Eliquis at  home  -Home medications with sotalol and diltiazem were continued however patient still remained tachycardic  -Underwent DCCV on 11/15 but then developed bradycardia afterwards  -Sotalol discontinued and flecainide initiated  -Cardiology following>Over weekend started on diltiazem for rate control. Sotalol discontinued and flecainide started for heart rate. HR now in 50-60s. Blood pressure still elevated. Norvasc and losartan added. Received lasix IV x 1 for fluid volume. Remains in sinus rhythm s/p cardioversion. Will continue to monitor BP.   -Patient received a pacemaker on 11/20/2024 and cardioversion was performed with addition of sotalol.  She had very severe sinus bradycardia/atrial standstill post cardioversion and therefore she is atrial paced.  If she has recurrence of A-fib in the future then she will need ablation.  Anticoagulation can be resumed tomorrow.     Hypothyroidism  -Continue Levothyroxine     GERD  -Continue PPI    VTE Prophylaxis:  Pharmacologic & mechanical VTE prophylaxis orders are present.    Disposition Planning:   Barriers to Discharge: Improved clinical status.  Anticipated Date of Discharge: 11/20/2024  Place of Discharge: Home    Time: 35 minutes     Code Status and Medical Interventions: CPR (Attempt to Resuscitate); Full Support   Ordered at: 11/12/24 5133     Code Status (Patient has no pulse and is not breathing):    CPR (Attempt to Resuscitate)     Medical Interventions (Patient has pulse or is breathing):    Full Support     Signature: Electronically signed by Hussein Manzanares MD, 11/21/24, 08:28 EST.  [facility] Hospitalist Team

## 2024-11-21 NOTE — THERAPY EVALUATION
Patient Name: Camelia Gray  : 1945    MRN: [id number]                              Today's Date: 2024       Admit Date: 2024    Visit Dx:     ICD-10-CM ICD-9-CM   1. Symptomatic bradycardia  R00.1 427.89   2. Shortness of breath  R06.02 786.05   3. Moderate persistent asthmatic bronchitis with acute exacerbation  J45.41 493.92   4. Hypertensive urgency  I16.0 401.9   5. Multiple tracheobronchial mucus plugs  T17.800A 519.19   6. Atelectasis  J98.11 518.0   7. Paroxysmal atrial fibrillation  I48.0 427.31   8. Persistent atrial fibrillation  I48.19 427.31   9. Primary osteoarthritis of left hip  M16.12 715.15   10. Hospital discharge follow-up  Z09 V67.59     Patient Active Problem List   Diagnosis    Class 3 severe obesity due to excess calories with serious comorbidity and body mass index (BMI) of 45.0 to 49.9 in adult    Arthritis of left hip    Deep vein thrombosis (DVT)    Edema of lower extremity    Hypertension    Pulmonary embolism    Stasis dermatitis    Immobility syndrome    Lymphedema    Skin ulcer of toe of right foot with fat layer exposed    Primary osteoarthritis of left hip    Hypothyroidism    Continuous leakage of urine    Breast cancer screening by mammogram    Status post total replacement of hip    Thrombophilia    Primary osteoarthritis involving multiple joints    Gastroesophageal reflux disease without esophagitis    Obstructive uropathy    Wound discharge    Need for hepatitis C screening test    Hyperglycemia    Dyspnea    Hiatal hernia    Chronic atrial fibrillation with rapid ventricular response    Oral phase dysphagia    Hospital discharge follow-up    Shortness of breath    Persistent atrial fibrillation    Multiple tracheobronchial mucus plugs    Atelectasis    Symptomatic bradycardia     Past Medical History:   Diagnosis Date    A-fib 2023    Arthritis     Cellulitis of both feet 10/13/2019    Decubitus ulcer of buttock, stage 2 10/14/2019    HEALED     Dermatitis associated with moisture 10/14/2019    Disease of thyroid gland     HYPOTHYROID    Edema of lower extremity 05/31/2017    Hypertension     Hypothyroidism     Immobility syndrome 10/13/2019    Lymphedema 10/14/2019    LEGS    Lymphedema     Muscle weakness (generalized)     Non-pressure chronic ulcer of other part of left foot with fat layer exposed 10/14/2019    Non-pressure chronic ulcer of other part of right foot with fat layer exposed 10/14/2019    Obesity     Pulmonary embolism     Stasis dermatitis 07/08/2013    Ureteral calculi     Urinary tract infection 07/2020    Hosp. 7/2020    Venous stasis 10/14/2019    Yeast infection of the skin      Past Surgical History:   Procedure Laterality Date    BRONCHOSCOPY N/A 12/27/2023    Procedure: BRONCHOSCOPY with right lung washing;  Surgeon: Mayra Ramachandran MD;  Location: [facility] ENDOSCOPY;  Service: Pulmonary;  Laterality: N/A;  Post- Pneumonia    BRONCHOSCOPY N/A 11/17/2024    Procedure: BRONCHOSCOPY WITH BILATERAL LUNG LAVAGE;  Surgeon: Reji Burch MD;  Location: [facility] ENDOSCOPY;  Service: Pulmonary;  Laterality: N/A;    CARDIAC ELECTROPHYSIOLOGY PROCEDURE Left 11/20/2024    Procedure: Pacemaker DC new - [employer]onic 3830;  Surgeon: Bertin Stone MD;  Location: [facility] CATH INVASIVE LOCATION;  Service: Cardiovascular;  Laterality: Left;    CARDIAC ELECTROPHYSIOLOGY PROCEDURE N/A 11/20/2024    Procedure: Cardioversion/Defibrillation;  Surgeon: Bertin Stone MD;  Location: [facility] CATH INVASIVE LOCATION;  Service: Cardiovascular;  Laterality: N/A;    CATARACT EXTRACTION Bilateral     CHOLECYSTECTOMY N/A 11/11/2020    Procedure: CHOLECYSTECTOMY LAPAROSCOPIC;  Surgeon: George Crocker DO;  Location: [facility] MAIN OR;  Service: General;  Laterality: N/A;    CORRECTION HAMMER TOE Bilateral     CYSTOSCOPY, URETEROSCOPY, RETROGRADE PYELOGRAM, STENT INSERTION Bilateral 08/18/2020    Procedure: CYSTOSCOPY BILATERAL RETROGRADE PYELOGRAM,;  Surgeon: David  Shawn ALEJO MD;  Location: [facility] MAIN OR;  Service: Urology;  Laterality: Bilateral;    DENTAL EXAMINATION UNDER ANESTHESIA      ENDOSCOPY N/A 09/13/2020    Procedure: ESOPHAGOGASTRODUODENOSCOPY;  Surgeon: Torsten Johnson MD;  Location: [facility] ENDOSCOPY;  Service: Gastroenterology;  Laterality: N/A;  post: esophageal stricture, reflux esophagitis, hiatal hernia    ENDOSCOPY N/A 02/01/2024    Procedure: ESOPHAGOGASTRODUODENOSCOPY, esophageal dilation (40-46 Fr non-guided Bougie);  Surgeon: Torsten Johnson MD;  Location: [facility] ENDOSCOPY;  Service: Gastroenterology;  Laterality: N/A;  post: hiatal hernia, esophageal stricture    JOINT REPLACEMENT Bilateral     Bilateral knees    TOE SURGERY      TONSILLECTOMY  1952    TOTAL HIP ARTHROPLASTY Left 06/12/2020    Procedure: TOTAL HIP ARTHROPLASTY;  Surgeon: Baljit Hutchins II, MD;  Location: [facility] MAIN OR;  Service: Orthopedics;  Laterality: Left;      General Information       Row Name 11/21/24 1659 11/21/24 1623       OT Time and Intention    Document Type evaluation  -SR evaluation  -SR    Mode of Treatment occupational therapy  -SR --      Row Name 11/21/24 1659          General Information    Existing Precautions/Restrictions fall;pacemaker  -SR       Row Name 11/21/24 1659          Occupational Profile    Reason for Services/Referral (Occupational Profile) Camelia Gray is a 79 y.o. female with a CMH of atrial fibrillation, arthritis, hypothyroidism, hypertension, lymphedema, immobility syndrome who presented to [facility] on 11/12/2024 with shortness of breath.  Pt found to be in atrial fibrillation with RVR.  Underwent pacemaker placement on 11/20.  Per previous PT notes, pt was transferring with CGA when able to use BUE to push herself up. Pt now with pacemaker precautions.  Pt lives home alone with intermittent caregivers.  She uses rwx for mobility and has elevated toilet seat with grab bars.  She sleeps in lift chair. She does  not have shower on main level and takes sponge baths at sink.  -SR       Row Name 11/21/24 1659          Cognition    Orientation Status (Cognition) oriented x 3  -SR       Row Name 11/21/24 1659          Safety Issues/Impairments Affecting Functional Mobility    Impairments Affecting Function (Mobility) balance;endurance/activity tolerance;strength;pain  -SR               User Key  (r) = Recorded By, (t) = Taken By, (c) = Cosigned By      Initials Name Provider Type    Oma Hdez OT Occupational Therapist                     Mobility/ADL's       Row Name 11/21/24 1700          Bed Mobility    Bed Mobility scooting/bridging;supine-sit;sit-supine  -SR     Scooting/Bridging [place] (Bed Mobility) dependent (less than 25% patient effort);2 person assist  -       Row Name 11/21/24 1700          Transfers    Comment, (Transfers) Attempted sit to stand with RUE support on bedrail. Increasingly limited due to inability to push/pull with LUE, though pt attempting to pulling LUE with frequent cues to prevent this. Pt was unable to clear buttocks from bed surface and began sliding over EOB.  Total assist required to lie patient back on bed and pivot into correct position.  -SR               User Key  (r) = Recorded By, (t) = Taken By, (c) = Cosigned By      Initials Name Provider Type    Oma Hdez OT Occupational Therapist                   Obj/Interventions       Row Name 11/21/24 1702          Range of Motion Comprehensive    Comment, General Range of Motion RUE WFL, LUE impaired due to pacemaker precautions.  -SR       Row Name 11/21/24 1702          Strength Comprehensive (MMT)    General Manual Muscle Testing (MMT) Assessment no strength deficits identified  -SR     Comment, General Manual Muscle Testing (MMT) Assessment RUE grossly 4-/5, did not test LUE due to pacemaker precautions.  -SR       Row Name 11/21/24 1702          Balance    Balance Interventions sitting;standing;sit  to stand;supported;static;dynamic;minimal challenge  -SR               User Key  (r) = Recorded By, (t) = Taken By, (c) = Cosigned By      Initials Name Provider Type    Oma Hdez OT Occupational Therapist                   Goals/Plan       Row Name 11/21/24 1706          Dressing Goal 1 (OT)    Activity/Device (Dressing Goal 1, OT) lower body dressing  -SR     [place]/Cues Needed (Dressing Goal 1, OT) moderate assist (50-74% patient effort)  -SR     Time Frame (Dressing Goal 1, OT) 2 weeks  -SR       Row Name 11/21/24 1706          Toileting Goal 1 (OT)    Activity/Device (Toileting Goal 1, OT) toileting skills, all  -SR     [place] Level/Cues Needed (Toileting Goal 1, OT) minimum assist (75% or more patient effort)  -SR     Time Frame (Toileting Goal 1, OT) 2 weeks  -SR       Row Name 11/21/24 1706          Therapy Assessment/Plan (OT)    Planned Therapy Interventions (OT) activity tolerance training;BADL retraining;IADL retraining;functional balance retraining;neuromuscular control/coordination retraining;ROM/therapeutic exercise;transfer/mobility retraining;strengthening exercise;occupation/activity based interventions  -SR               User Key  (r) = Recorded By, (t) = Taken By, (c) = Cosigned By      Initials Name Provider Type    Oma Hdez OT Occupational Therapist                   Clinical Impression       Row Name 11/21/24 1704          Pain Assessment    Additional Documentation Pain Scale: FACES Pre/Post-Treatment (Group)  -SR       Row Name 11/21/24 1704          Pain Scale: FACES Pre/Post-Treatment    Pain: FACES Scale, Pretreatment 4-->hurts little more  -SR     Posttreatment Pain Rating 4-->hurts little more  -SR       Row Name 11/21/24 1704          Plan of Care Review    Outcome Evaluation Camelia Gray is a 79 y.o. female with a CMH of atrial fibrillation, arthritis, hypothyroidism, hypertension, lymphedema, immobility syndrome who presented to [facility] on 11/12/2024 with shortness of breath.  Pt found to be in atrial fibrillation with RVR.  Underwent pacemaker placement on 11/20.  Per previous PT notes, pt was transferring with CGA when able to use BUE to push herself up. Pt now with pacemaker precautions.  Pt lives home alone with intermittent caregivers.  She uses rwx for mobility and has elevated toilet seat with grab bars.  She sleeps in lift chair. She does not have shower on main level and takes sponge baths at sink.    Attempted sit to stand with RUE support on bed rail though unable to clear buttocks from bed surface and began sliding over EOB.  Pt demos significant difficulty without using LUE.  Unable to complete any ADLs this date due to abrupt return to bed.  She is not safe to return home and mobilize due to new precautions involving LUE. Recommend SNF at discharge.  -SR       Row Name 11/21/24 1704          Therapy Assessment/Plan (OT)    Rehab Potential (OT) good  -SR     Criteria for Skilled Therapeutic Interventions Met (OT) yes  -SR     Therapy Frequency (OT) 3 times/wk  -SR     Predicted Duration of Therapy Intervention (OT) Until discharge  -SR       Row Name 11/21/24 1704          Therapy Plan Review/Discharge Plan (OT)    Anticipated Discharge Disposition (OT) skilled nursing facility  -SR       Row Name 11/21/24 1704          Positioning and Restraints    Pre-Treatment Position in bed  -SR     Post Treatment Position bed  -SR     In Bed call light within reach;encouraged to call for assist;exit alarm on  -SR               User Key  (r) = Recorded By, (t) = Taken By, (c) = Cosigned By      Initials Name Provider Type    SR Oma Justin, OT Occupational Therapist                   Outcome Measures       Row Name 11/21/24 1655 11/21/24 0800       How much help from another person do you currently need...    Turning from your back to your side while in flat bed without using bedrails? 2  -AH 2  -SS    Moving from lying on  back to sitting on the side of a flat bed without bedrails? 4  -AH 1  -SS    Moving to and from a bed to a chair (including a wheelchair)? 1  -AH 1  -SS    Standing up from a chair using your arms (e.g., wheelchair, bedside chair)? 1  -AH 1  -SS    Climbing 3-5 steps with a railing? 1  -AH 1  -SS    To walk in hospital room? 1  -AH 2  -SS    AM-PAC 6 Clicks Score (PT) 10  - 8  -SS    Highest Level of Mobility Goal 4 --> Transfer to chair/commode  - 3 --> Sit at edge of bed  -SS      Row Name 11/21/24 0220          Functional Assessment    Outcome Measure Options AM-PAC 6 Clicks Basic Mobility (PT)  -               User Key  (r) = Recorded By, (t) = Taken By, (c) = Cosigned By      Initials Name Provider Type     Zaida Guthrie, PT Physical Therapist    Jessika Herrera, RN Registered Nurse                    Occupational Therapy Education       Title: PT OT SLP Therapies (In Progress)       Topic: Occupational Therapy (In Progress)       Point: ADL training (In Progress)       Description:   Instruct learner(s) on proper safety adaptation and remediation techniques during self care or transfers.   Instruct in proper use of assistive devices.                  Learning Progress Summary            Patient Acceptance, E,TB, NR by  at 11/21/2024 7169                      Point: Home exercise program (Not Started)       Description:   Instruct learner(s) on appropriate technique for monitoring, assisting and/or progressing therapeutic exercises/activities.                  Learner Progress:  Not documented in this visit.              Point: Precautions (Not Started)       Description:   Instruct learner(s) on prescribed precautions during self-care and functional transfers.                  Learner Progress:  Not documented in this visit.              Point: Body mechanics (In Progress)       Description:   Instruct learner(s) on proper positioning and spine alignment during self-care, functional mobility  activities and/or exercises.                  Learning Progress Summary            Patient Acceptance, E,TB, NR by SR at 11/21/2024 1707                                      User Key       Initials Effective Dates Name Provider Type Discipline     06/16/21 -  Oma Justin OT Occupational Therapist OT                  OT Recommendation and Plan  Planned Therapy Interventions (OT): activity tolerance training, BADL retraining, IADL retraining, functional balance retraining, neuromuscular control/coordination retraining, ROM/therapeutic exercise, transfer/mobility retraining, strengthening exercise, occupation/activity based interventions  Therapy Frequency (OT): 3 times/wk  Plan of Care Review  Outcome Evaluation: Camelia Gray is a 79 y.o. female with a CMH of atrial fibrillation, arthritis, hypothyroidism, hypertension, lymphedema, immobility syndrome who presented to [facility] on 11/12/2024 with shortness of breath.  Pt found to be in atrial fibrillation with RVR.  Underwent pacemaker placement on 11/20.  Per previous PT notes, pt was transferring with CGA when able to use BUE to push herself up. Pt now with pacemaker precautions.  Pt lives home alone with intermittent caregivers.  She uses rwx for mobility and has elevated toilet seat with grab bars.  She sleeps in lift chair. She does not have shower on main level and takes sponge baths at sink.    Attempted sit to stand with RUE support on bed rail though unable to clear buttocks from bed surface and began sliding over EOB.  Pt demos significant difficulty without using LUE.  Unable to complete any ADLs this date due to abrupt return to bed.  She is not safe to return home and mobilize due to new precautions involving LUE. Recommend SNF at discharge.     Time Calculation:         Time Calculation- OT       Row Name 11/21/24 1707             Time Calculation- OT    OT Start Time 1407  -SR      OT Stop Time 1440  -SR      OT Time  Calculation (min) 33 min  -SR      Total Timed Code Minutes- OT 10 minute(s)  -SR      OT Received On 11/21/24  -SR      OT - Next Appointment 11/22/24  -SR      OT Goal Re-Cert Due Date 12/05/24  -SR                User Key  (r) = Recorded By, (t) = Taken By, (c) = Cosigned By      Initials Name Provider Type    SR Oma Justin, OT Occupational Therapist                  Therapy Charges for Today       Code Description Service Date Service Provider Modifiers Qty    [id number]  OT EVAL MOD COMPLEXITY 4 11/21/2024 Oma Justin, OT GO 1    [id number]  OT SELF CARE/MGMT/TRAIN EA 15 MIN 11/21/2024 Oma Justin OT GO 1                 Oma Justin OT  11/21/2024

## 2024-11-21 NOTE — PROGRESS NOTES
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     possible aspiration due to large hiatal hernia  Localized right upper lobe wheezing, CT chest showed right upper lobe endobronchial opacity     Bronchoscopy 12/27/2023  Copious amount mucopurulent secretions suctioned therapeutically  Right lung washing was obtained  Moderate inflammatory changes     Hypoxia    Pulmonary edema  Respiratory viral panel negative on 12/24/2023  History of pulmonary embolism 2018  Quit smoking 1992     Status post permanent pacemaker placement 11/20/2024     Recommendations:  Respiratory status is improving, patient can be discharged from pulmonary standpoint after the pacemaker placement  6-minute walking 11/19/2024 showed need for home oxygen 2 L    Patient will need outpatient sleep study to assess need for CPAP    Status post bronchoscopy 11/17/2024 showing multiple mucous plugs and tracheobronchomalacia    Continue Mucomyst    HR control as per cardiology:  status post cardioversion 11/15/2024: Status post pacemaker placement 11/20/2024      antibiotics: Completed Rocephin    Prednisone tapering    Mucinex  Encourage use of incentive spirometer          Oxygen supplement and titration to maintain saturation 90 to 95%: on 2 L  Bronchodilators     Chronic anticoagulation: Eliquis  Thyroid hormone replacement     I personally reviewed the radiological studies               LOS: 8 days     Subjective     Patient reports improvement in the cough and shortness of breath    Objective     Vital signs for last 24 hours:  Vitals:    11/21/24 0024 11/21/24 0250 11/21/24 0547 11/21/24 0854   BP: 114/66 125/75 107/64 121/69   BP Location:  Left arm  Right arm   Patient Position:  Lying  Lying   Pulse: 60 60 60 60   Resp:  18  17   Temp:  97.9 °F (36.6 °C)  98 °F (36.7 °C)   TempSrc:  Oral  Tympanic   SpO2:  94%  93%   Weight:  (!) 160  kg (352 lb 15.3 oz)     Height:           Intake/Output last 3 shifts:  I/O last 3 completed shifts:  In: 480 [P.O.:480]  Out: 1700 [Urine:1700]  Intake/Output this shift:  No intake/output data recorded.      Radiology  Imaging Results (Last 24 Hours)       Procedure Component Value Units Date/Time    XR Chest 1 View [[id number]] Collected: 11/20/24 2033     Updated: 11/20/24 2036    Narrative:      XR CHEST 1 VW    Date of Exam: 11/20/2024 7:20 PM EST    Indication: Post ICD / Pacer Implant    Comparison: Chest radiograph dated 11/12/2024    Findings:  There is a left chest wall pacemaker leads in expected position. There is cardiomegaly with prominence of the main pulmonary artery which can be associated with pulmonary hypertension. There are calcified left AP window lymph nodes. There is no visible   pneumothorax. The lung bases are not well evaluated due to underpenetration of the radiograph. There are degenerative changes of the thoracic spine.      Impression:      Impression:  1. Left chest wall pacemaker with leads in expected position. No evidence of pneumothorax.  2. Prominence of the main pulmonary artery which can be associated with pulmonary hypertension.      Electronically Signed: Sinan Gerard    11/20/2024 8:34 PM EST    Workstation ID: [id number]            Labs:  Results from last 7 days   Lab Units 11/21/24  0416   WBC 10*3/mm3 14.62*   HEMOGLOBIN g/dL 15.1   HEMATOCRIT % 48.2*   PLATELETS 10*3/mm3 248     Results from last 7 days   Lab Units 11/21/24  0416   SODIUM mmol/L 139   POTASSIUM mmol/L 4.6   CHLORIDE mmol/L 99   CO2 mmol/L 32.7*   BUN mg/dL 41*   CREATININE mg/dL 0.96   CALCIUM mg/dL 9.0   GLUCOSE mg/dL 104*                                           Meds:   SCHEDULE  [Transfer Hold] apixaban, 5 mg, Oral, BID  dilTIAZem CD, 180 mg, Oral, Q24H  [Transfer Hold] furosemide, 40 mg, Oral, Daily  hydrALAZINE, 50 mg, Oral, Q12H  [Transfer Hold] ipratropium-albuterol, 3 mL, Nebulization, 4x  Daily - RT  Bandar, 1 packet, Oral, BID  [Transfer Hold] levothyroxine, 50 mcg, Oral, Daily  losartan, 50 mg, Oral, Q24H  [Transfer Hold] multivitamin with minerals, 1 tablet, Oral, Daily  [Transfer Hold] pantoprazole, 40 mg, Oral, Daily  [Transfer Hold] predniSONE, 20 mg, Oral, Daily With Breakfast  sotalol, 80 mg, Oral, Q12H  vancomycin, 1,750 mg, Intravenous, Once      Infusions       PRNs    acetaminophen **OR** acetaminophen **OR** acetaminophen    [Transfer Hold] albuterol    [Transfer Hold] senna-docusate sodium **AND** [Transfer Hold] polyethylene glycol **AND** [Transfer Hold] bisacodyl **AND** [Transfer Hold] bisacodyl    [Transfer Hold] Calcium Replacement - Follow Nurse / BPA Driven Protocol    [Transfer Hold] guaiFENesin-codeine    hydrALAZINE    HYDROcodone-acetaminophen    [Transfer Hold] Magnesium Standard Dose Replacement - Follow Nurse / BPA Driven Protocol    nitroglycerin    [Transfer Hold] Phosphorus Replacement - Follow Nurse / BPA Driven Protocol    Potassium Replacement - Follow Nurse / BPA Driven Protocol    [Transfer Hold] sodium chloride    Physical Exam:  General Appearance:  Alert   HEENT:  Normocephalic, without obvious abnormality, Conjunctiva/corneas clear,.   Nares normal, no drainage     Neck:  Supple, symmetrical, trachea midline.   Lungs /Chest wall: mild bilateral basal rhonchi, respirations unlabored, symmetrical wall movement.     Heart: Rate controlled, S1 S2 normal  Abdomen: Soft, non-tender, no masses, no organomegaly.    Extremities: No edema, no clubbing or cyanosis     ROS  Constitutional: Negative for chills, fever and malaise/fatigue.   HENT: Negative.    Eyes: Negative.    Cardiovascular: Negative.    Respiratory: Positive for improving cough and shortness of breath.    Skin: Negative.    Musculoskeletal: Negative.    Gastrointestinal: Negative.    Genitourinary: Negative.    Neurological: Generalized weakness    I reviewed the recent clinical results  I personally  reviewed the latest radiological studies    Part of this note may be an electronic transcription/translation of spoken language to printed text using the Dragon Dictation System.

## 2024-11-21 NOTE — PROCEDURES
Exercise Oximetry    Patient Name:Camelia Gray   MRN: [id number]   Date: 11/21/24             ROOM AIR BASELINE   SpO2% 89   Heart Rate 60   Blood Pressure      EXERCISE ON ROOM AIR SpO2% EXERCISE ON O2 @ 1 LPM SpO2%   1 MINUTE  1 MINUTE 90%   2 MINUTES  2 MINUTES @ 2 L 89%   3 MINUTES  3 MINUTES @ 3 L 93%   4 MINUTES  4 MINUTES 94%   5 MINUTES  5 MINUTES 94%   6 MINUTES  6 MINUTES 94%              Distance Walked   Distance Walked   Dyspnea (Carmen Scale)   Dyspnea (Carmen Scale)   Fatigue (Carmen Scale)   Fatigue (Carmen Scale)   SpO2% Post Exercise   SpO2% Post Exercise 93   HR Post Exercise   HR Post Exercise 62   Time to Recovery   Time to Recovery     Comments: Pt's SpO2 was 89% on room air at rest.  Pt was started on 1 L and SpO2 was 90%.  On 2 L of O2, pt's SpO2 remained at 93% at rest.  Pt then started ambulating on 2 L and ranged from 89%-90%.  Titrated O2 to 3 L while ambulating and pt's SpO2 angela to 93%-94% and stayed in that range for the remainder of ambulation.  Pt will require 2L of O2 at rest and 3L of O2 with ambulation and activity.

## 2024-11-21 NOTE — DISCHARGE PLACEMENT REQUEST
"Camelia Gray (79 y.o. Female)       Date of Birth   1945    Social Security Number       Address   [address] [place] [address]    Home Phone   [phone]    MRN   [id number]       Oriental orthodox   Zoroastrian    Marital Status   Single                            Admission Date   11/12/24    Admission Type   Emergency    Admitting Provider   Clemente Bowens MD    Attending Provider   Hussein Manzanares MD    Department, Room/Bed   [facility] 2D, 253/A       Discharge Date       Discharge Disposition   Home or Self Care    Discharge Destination                                 Attending Provider: Hussein Manzanares MD    Allergies: Maxzide [Hydrochlorothiazide W-triamterene]    Isolation: None   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)   Code Status: CPR    Ht: 180.3 cm (71\")   Wt: 160 kg (352 lb 15.3 oz)    Admission Cmt: None   Principal Problem: Shortness of breath [R06.02]                   Active Insurance as of 11/12/2024       Primary Coverage       Payor Plan Insurance Group Employer/Plan Group    MEDICARE MEDICARE A & B        Payor Plan Address Payor Plan Phone Number Payor Plan Fax Number Effective Dates    [address] [phone]  7/1/2011 - None Entered    [facility] [address]         Subscriber Name Subscriber Birth Date Member ID       CAMELIA GRAY 1945 [id number]               Secondary Coverage       Payor Plan Insurance Group Employer/Plan Group    [facility] SUPP [id number]       Payor Plan Address Payor Plan Phone Number Payor Plan Fax Number Effective Dates    [address]   12/1/2016 - None Entered    [facility] [address]         Subscriber Name Subscriber Birth Date Member ID       CAMELIA GRAY 1945 [id number]                     Emergency Contacts        (Rel.) Home Phone Work Phone Mobile Phone    ([employer])Arabella (Care Giver) -- -- [phone]    YKUNGLEAH (Relative) -- -- [phone]              Insurance " Information                  MEDICARE/MEDICARE A & B Phone: [phone]    Subscriber: Camelia Gray Subscriber#: [id number]    Group#: -- Precert#: --    Authorization#: NPN Medicare Effective Date: --        [employer]NC[facility] [employer] [facility] Phone: --    Subscriber: Camelia Gray Subscriber#: [id number]    Group#: [id number] Precert#: --    Authorization#: npn [facility] to medicare Effective Date: --

## 2024-11-21 NOTE — CASE MANAGEMENT/SOCIAL WORK
Continued Stay Note  [facility]     Patient Name: Camelia Gray  MRN: [id number]  Today's Date: 11/21/2024    Admit Date: 11/12/2024    Plan: Return home alone with caregivers. Refused SNF. Referred to [facility] for possible Oxygen. Referred to [facility]; acceptance pending. Will need wheelchair van transport home   Discharge Plan       Row Name 11/21/24 1035       Plan    Plan Return home alone with caregivers. Refused SNF. Referred to [facility] for possible Oxygen. Referred to [facility]; acceptance pending. Will need wheelchair van transport home    Patient/Family in Agreement with Plan yes    Plan Comments DC orders. CM met with Ms. Gray and reviewed IMM and she signed. CM discussed home health and she chose [facility] who she has had  in the past. CM placed referral in Epic; acceptance pending. CM discussed possible oxygen and she chose [facility]. CM made referral to Jenny with [facility] for home oxygen pending RT 6MWT                      Yamini BUNCH,RN Case Manager  [facility]  Phone: Desk- [phone] cell- [phone]

## 2024-11-21 NOTE — PLAN OF CARE
Goal Outcome Evaluation:         Pt appealed discharge. On on 2L o2. Pt no complaints at this time. Pt q2 turn. Pt safety precautions maintained.

## 2024-11-21 NOTE — PLAN OF CARE
Problem: Adult Inpatient Plan of Care  Goal: Absence of Hospital-Acquired Illness or Injury  Intervention: Identify and Manage Fall Risk  Recent Flowsheet Documentation  Taken 11/21/2024 0405 by Fracisco Trujillo RN  Safety Promotion/Fall Prevention:   assistive device/personal items within reach   clutter free environment maintained   fall prevention program maintained   nonskid shoes/slippers when out of bed   room organization consistent   safety round/check completed  Taken 11/21/2024 0203 by Fracisco Trujillo RN  Safety Promotion/Fall Prevention:   assistive device/personal items within reach   clutter free environment maintained   fall prevention program maintained   nonskid shoes/slippers when out of bed   room organization consistent   safety round/check completed  Taken 11/21/2024 0004 by Fracisco Trujillo RN  Safety Promotion/Fall Prevention:   assistive device/personal items within reach   clutter free environment maintained   fall prevention program maintained   nonskid shoes/slippers when out of bed   room organization consistent   safety round/check completed  Taken 11/20/2024 2211 by Fracisco Trujillo RN  Safety Promotion/Fall Prevention:   assistive device/personal items within reach   clutter free environment maintained   fall prevention program maintained   nonskid shoes/slippers when out of bed   room organization consistent   safety round/check completed  Taken 11/20/2024 2100 by Fracisco Trujillo RN  Safety Promotion/Fall Prevention:   assistive device/personal items within reach   clutter free environment maintained   fall prevention program maintained   nonskid shoes/slippers when out of bed   room organization consistent   safety round/check completed  Intervention: Prevent Skin Injury  Recent Flowsheet Documentation  Taken 11/21/2024 0405 by Fracisco Trujillo RN  Body Position: weight shifting  Taken 11/21/2024 0203 by Fracisco Trujillo RN  Body Position:   turned   right  Taken 11/21/2024  0004 by Fracisco Trujillo RN  Body Position: weight shifting  Taken 11/20/2024 2211 by Fracisco Trujillo RN  Body Position:   turned   right  Intervention: Prevent Infection  Recent Flowsheet Documentation  Taken 11/21/2024 0405 by Fracisco Trujillo RN  Infection Prevention:   environmental surveillance performed   equipment surfaces disinfected   hand hygiene promoted   personal protective equipment utilized   rest/sleep promoted   single patient room provided   visitors restricted/screened  Taken 11/21/2024 0203 by Fracisco Trujillo RN  Infection Prevention:   environmental surveillance performed   equipment surfaces disinfected   hand hygiene promoted   personal protective equipment utilized   rest/sleep promoted   single patient room provided   visitors restricted/screened  Taken 11/21/2024 0004 by Fracisco Trujillo RN  Infection Prevention:   environmental surveillance performed   equipment surfaces disinfected   hand hygiene promoted   personal protective equipment utilized   rest/sleep promoted   single patient room provided   visitors restricted/screened  Taken 11/20/2024 2211 by Fracisco Trujillo RN  Infection Prevention:   environmental surveillance performed   equipment surfaces disinfected   hand hygiene promoted   personal protective equipment utilized   rest/sleep promoted   single patient room provided   visitors restricted/screened  Taken 11/20/2024 2100 by Fracisco Trujillo RN  Infection Prevention:   environmental surveillance performed   equipment surfaces disinfected   hand hygiene promoted   personal protective equipment utilized   rest/sleep promoted   single patient room provided   visitors restricted/screened  Goal: Optimal Comfort and Wellbeing  Intervention: Monitor Pain and Promote Comfort  Recent Flowsheet Documentation  Taken 11/20/2024 2100 by Fracisco Trujillo RN  Pain Management Interventions: care clustered  Intervention: Provide Person-Centered Care  Recent Flowsheet  Documentation  Taken 11/20/2024 2100 by Fracisco Trujillo, RN  Trust Relationship/Rapport: care explained   Goal Outcome Evaluation:   Patient had dual chamber pacer implant done and has been doing well since after procedure 11/20/24. Patient is doing well and had a good night rest. Call light within reach

## 2024-11-21 NOTE — CASE MANAGEMENT/SOCIAL WORK
Continued Stay Note  [facility]     Patient Name: Camelia Gray  MRN: [id number]  Today's Date: 11/21/2024    Admit Date: 11/12/2024  CM explained Medicare appeal process and gave her IMM and she signed a copy  Plan: Return home alone with intermitten caregivers. RT did 6 MWT and did not qualify.  Referred to [facility] and accepted. Will need wheelchair van transport home   Discharge Plan       Row Name 11/21/24 1419       Plan    Plan Return home alone with intermitten caregivers. RT did 6 MWT and did not qualify.  Referred to [facility] and accepted. Will need wheelchair van transport home    Plan Comments CM asked Dr. Manzanares to speak to Ms. Gray about discharge and offered to go with him.  CM spoke to  her and she was expecting to  be here until tomorrow.  [facility], accepted for home health PT.  DAMIAN asked Jenny Esposito to review 6 MWT and she had their Respiratory/ Oxygen Team and they said she does not qualify for oxygen. To qualify for oxygen,  saturations must be equal or less than 88 % per [place] and Medicare guidelines.  She will need wheelchair van home at discharge                          Yamini BUNCH,RN Case Manager  [facility]  Phone: Desk- [phone] cell- [phone]

## 2024-11-21 NOTE — PLAN OF CARE
Goal Outcome Evaluation:              Outcome Evaluation: Camelia Gray is a 79 y.o. female with a CMH of atrial fibrillation, arthritis, hypothyroidism, hypertension, lymphedema, immobility syndrome who [facility] to [facility] with shortness of breath.  Pt found to be in atrial fibrillation with RVR.  Underwent pacemaker placement on 11/20.  Per previous PT notes, pt was transferring with CGA when able to use BUE to push herself up. Pt now with pacemaker precautions.  Pt lives home alone with intermittent caregivers.  She uses rwx for mobility and has elevated toilet seat with grab bars.  She sleeps in lift chair. She does not have shower on main level and takes sponge baths at sink.    Attempted sit to stand with RUE support on bed rail though unable to clear buttocks from bed surface and began sliding over EOB.  Pt demos significant difficulty without using LUE.  Unable to complete any ADLs this date due to abrupt return to bed.  She is not safe to return home and mobilize due to new precautions involving LUE. Recommend SNF at discharge.    Anticipated Discharge Disposition (OT): skilled nursing facility

## 2024-11-21 NOTE — THERAPY RE-EVALUATION
Patient Name: Camelia Gray  : 1945    MRN: [id number]                              Today's Date: 2024       Admit Date: 2024    Visit Dx:     ICD-10-CM ICD-9-CM   1. Symptomatic bradycardia  R00.1 427.89   2. Shortness of breath  R06.02 786.05   3. Moderate persistent asthmatic bronchitis with acute exacerbation  J45.41 493.92   4. Hypertensive urgency  I16.0 401.9   5. Multiple tracheobronchial mucus plugs  T17.800A 519.19   6. Atelectasis  J98.11 518.0   7. Paroxysmal atrial fibrillation  I48.0 427.31   8. Persistent atrial fibrillation  I48.19 427.31   9. Primary osteoarthritis of left hip  M16.12 715.15   10. Hospital discharge follow-up  Z09 V67.59     Patient Active Problem List   Diagnosis    Class 3 severe obesity due to excess calories with serious comorbidity and body mass index (BMI) of 45.0 to 49.9 in adult    Arthritis of left hip    Deep vein thrombosis (DVT)    Edema of lower extremity    Hypertension    Pulmonary embolism    Stasis dermatitis    Immobility syndrome    Lymphedema    Skin ulcer of toe of right foot with fat layer exposed    Primary osteoarthritis of left hip    Hypothyroidism    Continuous leakage of urine    Breast cancer screening by mammogram    Status post total replacement of hip    Thrombophilia    Primary osteoarthritis involving multiple joints    Gastroesophageal reflux disease without esophagitis    Obstructive uropathy    Wound discharge    Need for hepatitis C screening test    Hyperglycemia    Dyspnea    Hiatal hernia    Chronic atrial fibrillation with rapid ventricular response    Oral phase dysphagia    Hospital discharge follow-up    Shortness of breath    Persistent atrial fibrillation    Multiple tracheobronchial mucus plugs    Atelectasis    Symptomatic bradycardia     Past Medical History:   Diagnosis Date    A-fib 2023    Arthritis     Cellulitis of both feet 10/13/2019    Decubitus ulcer of buttock, stage 2 10/14/2019    HEALED     Dermatitis associated with moisture 10/14/2019    Disease of thyroid gland     HYPOTHYROID    Edema of lower extremity 05/31/2017    Hypertension     Hypothyroidism     Immobility syndrome 10/13/2019    Lymphedema 10/14/2019    LEGS    Lymphedema     Muscle weakness (generalized)     Non-pressure chronic ulcer of other part of left foot with fat layer exposed 10/14/2019    Non-pressure chronic ulcer of other part of right foot with fat layer exposed 10/14/2019    Obesity     Pulmonary embolism     Stasis dermatitis 07/08/2013    Ureteral calculi     Urinary tract infection 07/2020    Hosp. 7/2020    Venous stasis 10/14/2019    Yeast infection of the skin      Past Surgical History:   Procedure Laterality Date    BRONCHOSCOPY N/A 12/27/2023    Procedure: BRONCHOSCOPY with right lung washing;  Surgeon: Mayra Ramachandran MD;  Location: [facility] ENDOSCOPY;  Service: Pulmonary;  Laterality: N/A;  Post- Pneumonia    BRONCHOSCOPY N/A 11/17/2024    Procedure: BRONCHOSCOPY WITH BILATERAL LUNG LAVAGE;  Surgeon: Reji Burch MD;  Location: [facility] ENDOSCOPY;  Service: Pulmonary;  Laterality: N/A;    CARDIAC ELECTROPHYSIOLOGY PROCEDURE Left 11/20/2024    Procedure: Pacemaker DC new - [employer]tronic 3830;  Surgeon: Bertin Stone MD;  Location: [facility] CATH INVASIVE LOCATION;  Service: Cardiovascular;  Laterality: Left;    CARDIAC ELECTROPHYSIOLOGY PROCEDURE N/A 11/20/2024    Procedure: Cardioversion/Defibrillation;  Surgeon: Bertin Stone MD;  Location: [facility] CATH INVASIVE LOCATION;  Service: Cardiovascular;  Laterality: N/A;    CATARACT EXTRACTION Bilateral     CHOLECYSTECTOMY N/A 11/11/2020    Procedure: CHOLECYSTECTOMY LAPAROSCOPIC;  Surgeon: George Crocker DO;  Location: [facility] MAIN OR;  Service: General;  Laterality: N/A;    CORRECTION HAMMER TOE Bilateral     CYSTOSCOPY, URETEROSCOPY, RETROGRADE PYELOGRAM, STENT INSERTION Bilateral 08/18/2020    Procedure: CYSTOSCOPY BILATERAL RETROGRADE PYELOGRAM,;  Surgeon: David  Shawn ALEJO MD;  Location: [facility] MAIN OR;  Service: Urology;  Laterality: Bilateral;    DENTAL EXAMINATION UNDER ANESTHESIA      ENDOSCOPY N/A 09/13/2020    Procedure: ESOPHAGOGASTRODUODENOSCOPY;  Surgeon: Torsten Johnson MD;  Location: [facility] ENDOSCOPY;  Service: Gastroenterology;  Laterality: N/A;  post: esophageal stricture, reflux esophagitis, hiatal hernia    ENDOSCOPY N/A 02/01/2024    Procedure: ESOPHAGOGASTRODUODENOSCOPY, esophageal dilation (40-46 Fr non-guided Bougie);  Surgeon: Torsten Johnson MD;  Location: [facility] ENDOSCOPY;  Service: Gastroenterology;  Laterality: N/A;  post: hiatal hernia, esophageal stricture    JOINT REPLACEMENT Bilateral     Bilateral knees    TOE SURGERY      TONSILLECTOMY  1952    TOTAL HIP ARTHROPLASTY Left 06/12/2020    Procedure: TOTAL HIP ARTHROPLASTY;  Surgeon: Baljit Hutchins II, MD;  Location: [facility] MAIN OR;  Service: Orthopedics;  Laterality: Left;      General Information       [facility] Name 11/21/24 1631          Physical Therapy Time and Intention    Document Type re-evaluation  -     Mode of Treatment physical therapy  -[facility] Name 11/21/24 1631          General Information    Patient Profile Reviewed yes  -     Prior Level of Function independent:;ADL's;gait  uses RW  -     Existing Precautions/Restrictions fall;pacemaker  -     Barriers to Rehab medically complex;previous functional deficit  -[facility] Name 11/21/24 1631          Living Environment    People in Home alone  -[facility] Name 11/21/24 1631          Home Main Entrance    Number of Stairs, Main Entrance none  -[facility] Name 11/21/24 1631          Cognition    Orientation Status (Cognition) oriented x 3  -[facility] Name 11/21/24 1631          Safety Issues/Impairments Affecting Functional Mobility    Impairments Affecting Function (Mobility) balance;endurance/activity tolerance;strength;pain  -               User Key  (r) = Recorded By, (t) = Taken By, (c) =  Cosigned By      Initials Name Provider Type     Zaida Guthrie, PT Physical Therapist                   Mobility       Row Name 11/21/24 1632          Bed Mobility    Bed Mobility scooting/bridging;supine-sit;sit-supine  -     Scooting/Bridging [place] (Bed Mobility) dependent (less than 25% patient effort);2 person assist  -     Supine-Sit [place] (Bed Mobility) standby assist  heavy reliance on HOB elevation and RUE use on bedrail. increased time and effort needed but pt refuses assistance.  -     Sit-Supine [place] (Bed Mobility) dependent (less than 25% patient effort);2 person assist  pt was sliding forward over edge of air mattress, resulting in need for urgent and maximal assistance to return to bed.  -     Assistive Device (Bed Mobility) repositioning sheet  -       Row Name 11/21/24 1632          Transfers    Comment, (Transfers) attempted sit to stand with RUE support on bedrail. Increasingly limited due to inability to push/pull with LUE. Pt was unable to clear buttocks from bed surface and began sliding over EOB.  -       Row Name 11/21/24 1632          Gait/Stairs (Locomotion)    [place] Level (Gait) not tested  -     Patient was able to Ambulate no, other medical factors prevent ambulation  -     Reason Patient was unable to Ambulate Excessive Weakness  -               User Key  (r) = Recorded By, (t) = Taken By, (c) = Cosigned By      Initials Name Provider Type     Zaida Guthrie PT Physical Therapist                   Obj/Interventions       Row Name 11/21/24 1641          Range of Motion Comprehensive    General Range of Motion bilateral lower extremity ROM WFL  -[facility] Name 11/21/24 1641          Balance    Balance Assessment sitting static balance;sitting dynamic balance  -     Static Sitting Balance contact guard  -     Dynamic Sitting Balance maximum assist;2-person assist  -     Position, Sitting Balance sitting edge of bed  -      Comment, Balance balance completed by unstable and slippery mattress surface  -[facility] Name 11/21/24 1641          Sensory Assessment (Somatosensory)    Sensory Assessment (Somatosensory) --  hypersensitive LE  -               User Key  (r) = Recorded By, (t) = Taken By, (c) = Cosigned By      Initials Name Provider Type     Zaida Guthrie, PT Physical Therapist                   Goals/Plan       [facility] Name 11/21/24 1653          Bed Mobility Goal 1 (PT)    Activity/Assistive Device (Bed Mobility Goal 1, PT) bed mobility activities, all  -     [place] Level/Cues Needed (Bed Mobility Goal 1, PT) modified independence  -AH     Time Frame (Bed Mobility Goal 1, PT) long term goal (LTG);2 weeks  -[facility] Name 11/21/24 1653          Transfer Goal 1 (PT)    Activity/Assistive Device (Transfer Goal 1, PT) transfers, all  -     [place] Level/Cues Needed (Transfer Goal 1, PT) modified independence  -     Strategies/Barriers (Transfers Goal 1, PT) with RW or luann wx  -[facility] Name 11/21/24 1653          Gait Training Goal 1 (PT)    Activity/Assistive Device (Gait Training Goal 1, PT) gait (walking locomotion)  RW vs. luann wx depending on pacemaker prec timeframe.  -     [place] Level (Gait Training Goal 1, PT) modified independence  -     Distance (Gait Training Goal 1, PT) 50'  -[facility] Name 11/21/24 1653          Patient Education Goal (PT)    Activity (Patient Education Goal, PT) pt will verbalize and demo understanding of pacemaker precautions.  -     Time Frame (Patient Education Goal, PT) long term goal (LTG);2 weeks  -[facility] Name 11/21/24 1653          Therapy Assessment/Plan (PT)    Planned Therapy Interventions (PT) balance training;bed mobility training;gait training;transfer training;strengthening;patient/family education;home exercise program  -               User Key  (r) = Recorded By, (t) = Taken By, (c) = Cosigned By      Initials Name Provider Type      Zaida Guthrie, PT Physical Therapist                   Clinical Impression       [facility] Name 11/21/24 1646          Pain    Pain Location extremity  -     Pain Side/Orientation left;posterior  -     Pre/Posttreatment Pain Comment worsening pain after blocking knee in sitting and therapist holding legs for return to supine.  -[facility] Name 11/21/24 1646          Plan of Care Review    Plan of Care Reviewed With patient  -     Outcome Evaluation ReEval due to change/decline in status due to new pacemaker. Pt now has pacemaker precautions and is in a swathe/sling. Without use of her LUE pt is now presenting with more impaired mobility. She demos heavy reliance on UE for mobility tasks. Pacemaker prec handout provided and reviewed with patient. Emphasis on not pushing, pulling, or lifting overhead. Pt is unable to stand from EOB today, partially due to unstable bed surface and partially due to inability to use LUE. Pt insists that her home set up and equipment are better adapted to what she needs and she will be okay at home (with lift chair, walker, WC, elevated toilet seat). She reports hiring [employer] CGs. She will not have 24/7 assist.  PT is strongly recommending SNF for 2 weeks until pacemaker precautions are lifted and pt can resume prior mobility techniques. pt is resistant and hopeful to return home. Home health recommended if pt continues to refuse SNF.  -[facility] Name 11/21/24 1646          Therapy Assessment/Plan (PT)    Rehab Potential (PT) fair  -     Criteria for Skilled Interventions Met (PT) yes;meets criteria  [facility]     Therapy Frequency (PT) 5 times/wk  -[facility] Name 11/21/24 1646          Vital Signs    Intra Systolic BP Rehab 141  -     Intra Treatment Diastolic BP 69  -[facility] Name 11/21/24 1646          Positioning and Restraints    Post Treatment Position bed  -     In Bed notified nsg;supine;encouraged to call for assist;call light within reach;exit alarm on  [facility]                User Key  (r) = Recorded By, (t) = Taken By, (c) = Cosigned By      Initials Name Provider Type     Zaida Guthrie, PT Physical Therapist                   Outcome Measures       Row Name 11/21/24 1655 11/21/24 0800       How much help from another person do you currently need...    Turning from your back to your side while in flat bed without using bedrails? 2  -AH 2  -SS    Moving from lying on back to sitting on the side of a flat bed without bedrails? 4  -AH 1  -SS    Moving to and from a bed to a chair (including a wheelchair)? 1  -AH 1  -SS    Standing up from a chair using your arms (e.g., wheelchair, bedside chair)? 1  - 1  -SS    Climbing 3-5 steps with a railing? 1  - 1  -SS    To walk in hospital room? 1  -AH 2  -SS    AM-PAC 6 Clicks Score (PT) 10  - 8  -SS    Highest Level of Mobility Goal 4 --> Transfer to chair/commode  - 3 --> Sit at edge of bed  -      Row Name 11/21/24 1655          Functional Assessment    Outcome Measure Options AM-PAC 6 Clicks Basic Mobility (PT)  -               User Key  (r) = Recorded By, (t) = Taken By, (c) = Cosigned By      Initials Name Provider Type    Zaida Babcock, DIANA Physical Therapist    Jessika Herrera RN Registered Nurse                                 Physical Therapy Education       Title: PT OT SLP Therapies (Done)       Topic: Physical Therapy (Done)       Point: Mobility training (Done)       Learning Progress Summary            Patient Acceptance, E, NR,VU by  at 11/21/2024 1656    Acceptance, E,TB, VU by  at 11/18/2024 1617    Acceptance, E,TB, VU by  at 11/15/2024 1514    Acceptance, E,TB, VU by  at 11/14/2024 1707    Acceptance, E, VU by AN at 11/13/2024 1648                      Point: Home exercise program (Done)       Learning Progress Summary            Patient Acceptance, E, NR,VU by  at 11/21/2024 1656                      Point: Body mechanics (Done)       Learning Progress Summary            Patient  Acceptance, E, NR,VU by  at 11/21/2024 1656                      Point: Precautions (Done)       Learning Progress Summary            Patient Acceptance, E, NR,VU by  at 11/21/2024 1656                                      User Key       Initials Effective Dates Name Provider Type Discipline     06/16/21 -  Larissa Neely LPN Licensed Nurse Nurse     08/31/21 -  Katherine Espino LPN Licensed Nurse Nurse     12/04/23 -  Zaida Guthrie, PT Physical Therapist PT    AN 06/27/24 -  Carolyn Goode PT Student PT Student PT                  PT Recommendation and Plan  Planned Therapy Interventions (PT): balance training, bed mobility training, gait training, transfer training, strengthening, patient/family education, home exercise program  Outcome Evaluation: ReEval due to change/decline in status due to new pacemaker. Pt now has pacemaker precautions and is in a swathe/sling. Without use of her LUE pt is now presenting with more impaired mobility. She demos heavy reliance on UE for mobility tasks. Pacemaker prec handout provided and reviewed with patient. Emphasis on not pushing, pulling, or lifting overhead. Pt is unable to stand from EOB today, partially due to unstable bed surface and partially due to inability to use LUE. Pt insists that her home set up and equipment are better adapted to what she needs and she will be okay at home (with lift chair, walker, WC, elevated toilet seat). She reports hiring [employer] CGs. She will not have 24/7 assist.  PT is strongly recommending SNF for 2 weeks until pacemaker precautions are lifted and pt can resume prior mobility techniques. pt is resistant and hopeful to return home. Home health recommended if pt continues to refuse SNF.     Time Calculation:         PT Charges       Row Name 11/21/24 1656             Time Calculation    Start Time 1407  -      Stop Time 1440  -      Time Calculation (min) 33 min  -      PT Non-Billable Time (min) 0 min  -       PT Received On 11/21/24  -      PT - Next Appointment 11/22/24  -         Time Calculation- PT    Total Timed Code Minutes- PT 12 minute(s)  -                User Key  (r) = Recorded By, (t) = Taken By, (c) = Cosigned By      Initials Name Provider Type     Zaida Guthrie, DIANA Physical Therapist                  Therapy Charges for Today       Code Description Service Date Service Provider Modifiers Qty    [id number]  PT RE-EVAL ESTABLISHED PLAN 2 11/21/2024 Zaida Guthrie, PT GP 1    [id number]  PT THERAPEUTIC ACT EA 15 MIN 11/21/2024 Zaida Guthrie, PT GP 1            PT G-Codes  Outcome Measure Options: AM-PAC 6 Clicks Basic Mobility (PT)  AM-PAC 6 Clicks Score (PT): 10  PT Discharge Summary  Anticipated Discharge Disposition (PT): skilled nursing facility    Zaida Guthrie PT  11/21/2024

## 2024-11-21 NOTE — PLAN OF CARE
Goal Outcome Evaluation:  Plan of Care Reviewed With: patient           Outcome Evaluation: ReEval due to change/decline in status due to new pacemaker. Pt now has pacemaker precautions and is in a swathe/sling. Without use of her LUE pt is now presenting with more impaired mobility. She demos heavy reliance on UE for mobility tasks. Pacemaker prec handout provided and reviewed with patient. Emphasis on not pushing, pulling, or lifting overhead. Pt is unable to stand from EOB today, partially due to unstable bed surface and partially due to inability to use LUE. Pt insists that her home set up and equipment are better adapted to what she needs and she will be okay at home (with lift chair, walker, WC, elevated toilet seat). She reports hiring [employer] CGs. She will not have 24/7 assist.  PT is strongly recommending SNF for 2 weeks until pacemaker precautions are lifted and pt can resume prior mobility techniques. pt is resistant and hopeful to return home. Home health recommended if pt continues to refuse SNF.    Anticipated Discharge Disposition (PT): skilled nursing facility

## 2024-11-21 NOTE — PROGRESS NOTES
"[facility] [place]      Patient Care Team:  Brinda Tony APRN as PCP - General (Nurse Practitioner)  Kevin Angel MD as Consulting Physician (Cardiology)    Chief Complaint:   Dyspnea  Subjective  Seen, status post pacemaker site clean and dry not saturated  Atrially paced rhythm today  Out of atrial fibrillation now on sotalol  Oxygen saturations remain 87 to 88% on room air         History taken from: patient chart RN    Review of Systems   Constitutional: Negative for diaphoresis and malaise/fatigue.   Cardiovascular:  Negative for chest pain, irregular heartbeat, leg swelling, near-syncope, orthopnea, palpitations, paroxysmal nocturnal dyspnea and syncope.   Respiratory:  Positive for shortness of breath. Negative for cough.    Musculoskeletal:  Negative for myalgias.   Neurological:  Negative for dizziness, focal weakness, headaches and light-headedness.   Psychiatric/Behavioral:  Negative for altered mental status.    All other systems reviewed and are negative.      Objective  Blood pressure elevated today, A-fib with controlled rate    Vital Signs  Temp:  [97 °F (36.1 °C)-98 °F (36.7 °C)] 98 °F (36.7 °C)  Heart Rate:  [60-89] 60  Resp:  [13-21] 17  BP: (104-146)/(55-85) 121/69  Oxygen Therapy  SpO2: 93 %  Pulse Oximetry Type: Continuous  Device (Oxygen Therapy): humidified, high-flow nasal cannula  Flow (L/min) (Oxygen Therapy): 3}  Flowsheet Rows      Flowsheet Row First Filed Value   Admission Height 180.3 cm (71\") Documented at 11/12/2024 1159   Admission Weight 150 kg (330 lb) Documented at 11/12/2024 1159            Physical Exam:    Physical Exam  Constitutional:       General: She is not in acute distress.     Appearance: She is obese. She is not diaphoretic.   HENT:      Head: Normocephalic and atraumatic.   Eyes:      Extraocular Movements: Extraocular movements intact.      Pupils: Pupils are equal, round, and reactive to light.   Neck:      Vascular: No carotid bruit. "   Cardiovascular:      Heart sounds: Normal heart sounds.      Comments: Paced at 60  Pulmonary:      Effort: Pulmonary effort is normal. No respiratory distress.      Breath sounds: Normal breath sounds.      Comments: 2.5 L nasal cannula  Abdominal:      Palpations: Abdomen is soft.   Musculoskeletal:         General: Swelling present.      Right lower leg: Edema present.      Left lower leg: Edema present.      Comments: Patient has lymphedema   Skin:     Findings: No erythema.   Neurological:      Mental Status: She is alert and oriented to person, place, and time.   Psychiatric:         Mood and Affect: Mood normal.         Behavior: Behavior normal.         Thought Content: Thought content normal.         Judgment: Judgment normal.         Results Review:     I reviewed the patient's new clinical results.    Lab Results (last 24 hours)       Procedure Component Value Units Date/Time    Basic Metabolic Panel [[id number]]  (Abnormal) Collected: 11/21/24 0416    Specimen: Blood from Arm, Right Updated: 11/21/24 0451     Glucose 104 mg/dL      BUN 41 mg/dL      Creatinine 0.96 mg/dL      Sodium 139 mmol/L      Potassium 4.6 mmol/L      Comment: Specimen hemolyzed.  Result may be falsely elevated.        Chloride 99 mmol/L      CO2 32.7 mmol/L      Calcium 9.0 mg/dL      BUN/Creatinine Ratio 42.7     Anion Gap 7.3 mmol/L      eGFR 60.7 mL/min/1.73     Narrative:      GFR Normal >60  Chronic Kidney Disease <60  Kidney Failure <15    The GFR formula is only valid for adults with stable renal function between ages 18 and 70.    CBC & Differential [[id number]]  (Abnormal) Collected: 11/21/24 0416    Specimen: Blood from Arm, Right Updated: 11/21/24 0427    Narrative:      The following orders were created for panel order CBC & Differential.  Procedure                               Abnormality         Status                     ---------                               -----------         ------                     CBC Auto  Differential[[id number]]        Abnormal            Final result                 Please view results for these tests on the individual orders.    CBC Auto Differential [[id number]]  (Abnormal) Collected: 11/21/24 0416    Specimen: Blood from Arm, Right Updated: 11/21/24 0427     WBC 14.62 10*3/mm3      RBC 5.39 10*6/mm3      Hemoglobin 15.1 g/dL      Hematocrit 48.2 %      MCV 89.4 fL      MCH 28.0 pg      MCHC 31.3 g/dL      RDW 15.7 %      RDW-SD 50.6 fl      MPV 9.2 fL      Platelets 248 10*3/mm3      Neutrophil % 80.7 %      Lymphocyte % 7.9 %      Monocyte % 8.3 %      Eosinophil % 2.1 %      Basophil % 0.2 %      Immature Grans % 0.8 %      Neutrophils, Absolute 11.80 10*3/mm3      Lymphocytes, Absolute 1.16 10*3/mm3      Monocytes, Absolute 1.21 10*3/mm3      Eosinophils, Absolute 0.31 10*3/mm3      Basophils, Absolute 0.03 10*3/mm3      Immature Grans, Absolute 0.11 10*3/mm3      nRBC 0.0 /100 WBC             Imaging Results (Last 24 Hours)       Procedure Component Value Units Date/Time    XR Chest 1 View [[id number]] Collected: 11/20/24 2033     Updated: 11/20/24 2036    Narrative:      XR CHEST 1 VW    Date of Exam: 11/20/2024 7:20 PM EST    Indication: Post ICD / Pacer Implant    Comparison: Chest radiograph dated 11/12/2024    Findings:  There is a left chest wall pacemaker leads in expected position. There is cardiomegaly with prominence of the main pulmonary artery which can be associated with pulmonary hypertension. There are calcified left AP window lymph nodes. There is no visible   pneumothorax. The lung bases are not well evaluated due to underpenetration of the radiograph. There are degenerative changes of the thoracic spine.      Impression:      Impression:  1. Left chest wall pacemaker with leads in expected position. No evidence of pneumothorax.  2. Prominence of the main pulmonary artery which can be associated with pulmonary hypertension.      Electronically Signed: Sinan Gerard     11/20/2024 8:34 PM EST    Workstation ID: [id number]            ECG/EMG Results (most recent)       Procedure Component Value Units Date/Time    ECG 12 Lead Dyspnea [[id number]] Collected: 11/12/24 1218     Updated: 11/13/24 0602     QT Interval 353 ms      QTC Interval 467 ms     Narrative:      HEART BCDA=276  bpm  RR Uutvnzqy=448  ms  UT Interval=  ms  P Horizontal Axis=  deg  P Front Axis=  deg  QRSD Interval=87  ms  QT Qehamovk=437  ms  TRwH=778  ms  QRS Axis=45  deg  T Wave Axis=8  deg  - ABNORMAL ECG -  Atrial fibrillation  Low voltage, precordial leads  When compared with ECG of 08-Feb-2024 12:03:26,  Significant change in rhythm: previously sinus  Electronically Signed By: Eduardo Anderson ([facility]) 2024-11-13 06:01:52  Date and Time of Study:2024-11-12 12:18:10    Telemetry Scan [[id number]] Resulted: 11/12/24     Updated: 11/13/24 1432    Adult Transthoracic Echo Complete W/ Cont if Necessary Per Protocol [[id number]] Resulted: 11/13/24 1823     Updated: 11/13/24 1823     LVIDd 4.5 cm      LVIDs 3.0 cm      IVSd 1.10 cm      LVPWd 1.00 cm      FS 33.3 %      IVS/LVPW 1.10 cm      ESV(cubed) 27.0 ml      LV Sys Vol (BSA corrected) 11.3 cm2      EDV(cubed) 91.1 ml      LV [place] Vol (BSA corrected) 36.5 cm2      LV mass(C)d 164.0 grams      LVOT area 2.8 cm2      LVOT diam 1.90 cm      EDV(MOD-sp4) 97.3 ml      ESV(MOD-sp4) 30.0 ml      SV(MOD-sp4) 67.3 ml      SVi(MOD-SP4) 25.3 ml/m2      SVi (LVOT) 21.6 ml/m2      EF(MOD-sp4) 69.2 %      MV E max christiano 96.7 cm/sec      LA ESV Index (BP) 19.2 ml/m2      Lat Peak E' Christiano 10.4 cm/sec      TR max christiano 163.0 cm/sec      SV(LVOT) 57.6 ml      RVIDd 3.3 cm      RV S' 9.9 cm/sec      LA dimension (2D)  5.0 cm      LV V1 max 113.0 cm/sec      LV V1 max PG 5.1 mmHg      LV V1 mean PG 3.0 mmHg      LV V1 VTI 20.3 cm      Ao pk christiano 163.0 cm/sec      Ao max PG 10.6 mmHg      Ao mean PG 7.0 mmHg      Ao V2 VTI 32.1 cm      FEDERICO(I,D) 1.79 cm2      MV max PG 8.2 mmHg      MV mean PG 3.0  mmHg      MV V2 VTI 26.0 cm      MV P1/2t 66.6 msec      MVA(P1/2t) 3.3 cm2      MVA(VTI) 2.21 cm2      MV dec slope 616.0 cm/sec2      MR max tyler 390.0 cm/sec      MR max PG 60.8 mmHg      TR max PG 10.6 mmHg      RVSP(TR) 25.6 mmHg      RAP systole 15.0 mmHg      RV V1 max PG 2.9 mmHg      RV V1 max 85.4 cm/sec      RV V1 VTI 13.8 cm      PA V2 max 96.3 cm/sec      PA acc time 0.08 sec      ACS 1.80 cm      Sinus 2.7 cm      EF(MOD-bp) 69.0 %      Dimensionless Index 0.69 (DI)     Narrative:        Left ventricular systolic function is normal. Left ventricular ejection   fraction appears to be 56 - 60%.    Left ventricular wall thickness is consistent with borderline   concentric hypertrophy.    Left ventricular diastolic function was indeterminate.    The left atrial cavity is mildly dilated.    Left atrial volume is mildly increased.    There is moderate calcification of the aortic valve.    Estimated right ventricular systolic pressure from tricuspid   regurgitation is normal (<35 mmHg).      Telemetry Scan [[id number]] Resulted: 11/12/24     Updated: 11/14/24 1333    Telemetry Scan [[id number]] Resulted: 11/12/24     Updated: 11/14/24 1416    Telemetry Scan [[id number]] Resulted: 11/12/24     Updated: 11/14/24 1449    Telemetry Scan [[id number]] Resulted: 11/12/24     Updated: 11/14/24 1519    Telemetry Scan [[id number]] Resulted: 11/12/24     Updated: 11/15/24 1658    Telemetry Scan [[id number]] Resulted: 11/12/24     Updated: 11/15/24 1743    ECG 12 Lead QT Measurement [[id number]] Collected: 11/16/24 0805     Updated: 11/17/24 1834     QT Interval 456 ms      QTC Interval 414 ms     Narrative:      HEART RATE=49  bpm  RR Xmxgmcei=2581  ms  MO Bctbjprk=840  ms  P Horizontal Axis=21  deg  P Front Axis=27  deg  QRSD Interval=86  ms  QT Eafhwyhi=699  ms  PSoU=841  ms  QRS Axis=50  deg  T Wave Axis=56  deg  - BORDERLINE ECG -  Bradycardia with irregular rate  Low voltage, precordial leads  When compared with ECG of  15-Nov-2024 11:21:41,  Significant change in rhythm: previously sinus  Significant axis, voltage or hypertrophy change  Electronically Signed By: Miguel Egan ([facility]) 2024-11-17 18:34:48  Date and Time of Study:2024-11-16 08:05:11    ECG 12 Lead Drug Monitoring [[id number]] Collected: 11/14/24 0258     Updated: 11/17/24 1835     QT Interval 402 ms      QTC Interval 484 ms     Narrative:      HEART RATE=87  bpm  RR Xooscsgw=662  ms  [place] Interval=  ms  P Horizontal Axis=  deg  P Front Axis=  deg  QRSD Interval=91  ms  QT Apmeduyv=937  ms  ENdF=987  ms  QRS Axis=39  deg  T Wave Axis=42  deg  - ABNORMAL ECG -  Atrial fibrillation  Low voltage, precordial leads  When compared with ECG of 12-Nov-2024 12:18:10,  No significant change  Electronically Signed By: Miguel Egan ([facility]) 2024-11-17 18:34:55  Date and Time of Study:2024-11-14 02:58:53    Telemetry Scan [[id number]] Resulted: 11/12/24     Updated: 11/18/24 1306    Telemetry Scan [[id number]] Resulted: 11/12/24     Updated: 11/18/24 1320    Telemetry Scan [[id number]] Resulted: 11/12/24     Updated: 11/18/24 1435    Telemetry Scan [[id number]] Resulted: 11/12/24     Updated: 11/19/24 0729    Telemetry Scan [[id number]] Resulted: 11/12/24     Updated: 11/19/24 0946    Telemetry Scan [[id number]] Resulted: 11/12/24     Updated: 11/19/24 1043    Telemetry Scan [[id number]] Resulted: 11/12/24     Updated: 11/19/24 1154    Telemetry Scan [[id number]] Resulted: 11/12/24     Updated: 11/19/24 1221    Telemetry Scan [[id number]] Resulted: 11/12/24     Updated: 11/19/24 1226    Telemetry Scan [[id number]] Resulted: 11/12/24     Updated: 11/19/24 1316    Telemetry Scan [[id number]] Resulted: 11/12/24     Updated: 11/19/24 1427    Telemetry Scan [[id number]] Resulted: 11/12/24     Updated: 11/19/24 1439    Telemetry Scan [[id number]] Resulted: 11/12/24     Updated: 11/19/24 1509    Telemetry Scan [[id number]] Resulted: 11/12/24     Updated: 11/19/24 1701    Telemetry Scan [[id number]] Resulted:  11/12/24     Updated: 11/19/24 1740    Telemetry Scan [[id number]] Resulted: 11/12/24     Updated: 11/19/24 1848    Telemetry Scan [[id number]] Resulted: 11/12/24     Updated: 11/19/24 1851    Telemetry Scan [[id number]] Resulted: 11/12/24     Updated: 11/20/24 1044    ECG 12 Lead QT Measurement [[id number]] Collected: 11/19/24 2306     Updated: 11/20/24 1052     QT Interval 383 ms      QTC Interval 460 ms     Narrative:      HEART RATE=86  bpm  RR Duoskbiz=776  ms  [place] Interval=  ms  P Horizontal Axis=  deg  P Front Axis=  deg  QRSD Interval=90  ms  QT Uwjbzttg=908  ms  DOpO=890  ms  QRS Axis=28  deg  T Wave Axis=37  deg  - ABNORMAL ECG -  Atrial fibrillation  Ventricular bigeminy  Low voltage, precordial leads  When compared with ECG of 19-Nov-2024 20:30:29,  Significant repolarization change  Significant axis, voltage or hypertrophy change  Electronically Signed By: Miguel Egan ([facility]) 2024-11-20 10:52:14  Date and Time of Study:2024-11-19 23:06:06    ECG 12 Lead QT Measurement [[id number]] Collected: 11/19/24 2030     Updated: 11/20/24 1053     QT Interval 389 ms      QTC Interval 458 ms     Narrative:      HEART RATE=83  bpm  RR Oqygnsuw=004  ms  KS Interval=  ms  P Horizontal Axis=  deg  P Front Axis=  deg  QRSD Aruoyuem=662  ms  QT Epkwwelt=586  ms  PJkY=118  ms  QRS Axis=57  deg  T Wave Axis=-28  deg  - ABNORMAL ECG -  Atrial fibrillation  Right bundle branch block  ST elevation secondary to IVCD  When compared with ECG of 19-Nov-2024 12:10:00,  Significant rate decrease  Electronically Signed By: Miguel Egan ([facility]) 2024-11-20 10:52:21  Date and Time of Study:2024-11-19 20:30:29    ECG 12 Lead Drug Monitoring; sotalol [[id number]] Collected: 11/19/24 1210     Updated: 11/20/24 1053     QT Interval 345 ms      QTC Interval 508 ms     Narrative:      HEART ZGYA=426  bpm  RR Wksssfvq=428  ms  KS Interval=  ms  P Horizontal Axis=  deg  P Front Axis=  deg  QRSD Iwjhoqtd=850  ms  QT Osmyigdz=620  ms  QIqF=154  ms  QRS  Axis=74  deg  T Wave Axis=-37  deg  - ABNORMAL ECG -  Atrial fibrillation  Right bundle branch block  When compared with ECG of 16-Nov-2024 08:05:11,  Significant change in rhythm  New conduction abnormality  Significant axis, voltage or hypertrophy change  Electronically Signed By: Miguel Egan ([facility]) 2024-11-20 10:53:09  Date and Time of Study:2024-11-19 12:10:00    Telemetry Scan [[id number]] Resulted: 11/12/24     Updated: 11/20/24 1446    Telemetry Scan [[id number]] Resulted: 11/12/24     Updated: 11/20/24 1607    Telemetry Scan [[id number]] Resulted: 11/12/24     Updated: 11/20/24 1654    Telemetry Scan [[id number]] Resulted: 11/12/24     Updated: 11/20/24 1705    Telemetry Scan [[id number]] Resulted: 11/12/24     Updated: 11/20/24 1711    ECG 12 Lead QT Measurement [[id number]] Collected: 11/20/24 2226     Updated: 11/20/24 2229     QT Interval 413 ms      QTC Interval 413 ms     Narrative:      HEART RATE=60  bpm  RR Rivpjgxo=5375  ms  TN Autubkpi=495  ms  P Horizontal Axis=-88  deg  P Front Axis=  deg  QRSD Interval=84  ms  QT Thwqtmqe=867  ms  SMjU=840  ms  QRS Axis=12  deg  T Wave Axis=28  deg  - ABNORMAL ECG -  Atrial-paced complexes  Borderline prolonged TN interval  Date and Time of Study:2024-11-20 22:26:43    Telemetry Scan [[id number]] Resulted: 11/12/24     Updated: 11/20/24 2250    Telemetry Scan [[id number]] Resulted: 11/12/24     Updated: 11/20/24 2250    EP/CRM Study [[id number]] Resulted: 11/20/24 1852     Updated: 11/21/24 0953    Narrative:      Patient Care Team:  Brinda Tony APRN as PCP - General (Nurse Practitioner)  Kevin Angel MD as Consulting Physician (Cardiology)    : Bertin Stone MD    Date of procedure: 11/20/2024    Procedure indications: Paroxysmal atrial fibrillation, very slow sinus   rate when in sinus rhythm.  Sick sinus syndrome    Procedure performed: Implantation of a dual-chamber/direct conduction   system pacemaker and cardioversion under conscious  "sedation.    Procedure details    The patient was taken to the EP lab in a fasting state, the left shoulder   area was prepped and draped in the usual sterile fashion.  Sedation was   achieved with IV Versed and fentanyl under my direct supervision, total   sedation time was 90 minutes.  Patient had been on uninterrupted   anticoagulation with Eliquis and therefore cardioversion was performed   with 200 J biphasic current synchronized with R waves x 2.  Post   cardioversion her sinus rate was extremely low.  Incision was made in the left subclavian area and carried down with the   help of blunt and sharp dissection.  Axillary vein was accessed with a   micropuncture needle under venographic guidance, and 2 wires were placed   in the SVC.  A preformed HIS sheath (Medtronic, C315) was advanced into the RV over a   Glidewire and positioned approximately mid septum.  Contrast was injected   to ensure appropriate positioning of the sheath against the septum.  The   Medtronic 3830 lead was then advanced through the sheath and manually   rotated and anchored to the RV septum. Pacing was performed which showed a   characteristic \"W\" sign on V1 precordial leads. The lead was then further   advanced deep into the septum.  Contrast was injected through the sheath   to assess the depth of the lead tip into the septum.  Pacing through the   lead showed evidence of left bundle capture with a terminal R wave in V1   and V2 and a stim to QRS of 80 ms, and a paced QRS duration of 130 ms. The   sheath was then split using the appropriate splitter.  Of note that despite repositioning the lead several times, a narrow QRS   could not be obtained in her case.  The right atrial lead was advanced under fluoroscopic guidance and was   actively fixated to the right atrial appendage, it was tested with   satisfactory performance.  Both leads were then sewn down to the   pectoralis fascia using Ethibond sutures.  A pocket was then made large "   enough to accommodate the pacemaker generator, and it was irrigated with   antibiotic solution.  Both leads were then connected to the pulse   generator and the assembly was introduced in the pocket.  The incision was   then closed in layers of absorbable sutures and Steri-Strips and   appropriate bandaging was applied. The patient was transferred to the   recovery area in a stable condition.    Device information  Medtronic [id number], serial number [id number]    Lead information  RV (left bundle) lead: Medtronic 3830/69, serial number [id number]  RA lead: Medtronic 4076/52, serial number [id number]    Parameters  RV (left bundle): Threshold 0.75 V@0.4 ms    Sensin.4 mV       Impedance: 874 ohms  RA:                     Threshold 0.75 V@0.4 ms    Sensing: N/A mV       Impedance: 570 ohms    Device settings: MVP-R 60/130    MRI: Conditional    Complications: None    Blood loss: 20 cc    Conclusion: Successful implantation of a direct conduction system,   dual-chamber pacemaker and cardioversion with return to sinus rhythm.     Recommendations    Continue sotalol 80 mg every 12 hours.        Electronically signed by Bertin Stone MD, 24, 6:51 PM EST.            Medication Review:   I have reviewed the patient's current medication list    Current Facility-Administered Medications:     acetaminophen (TYLENOL) tablet 650 mg, 650 mg, Oral, Q4H PRN **OR** acetaminophen (TYLENOL) 160 MG/5ML oral solution 650 mg, 650 mg, Oral, Q4H PRN **OR** acetaminophen (TYLENOL) suppository 650 mg, 650 mg, Rectal, Q4H PRN, Bertin Stone MD    albuterol (PROVENTIL) nebulizer solution 0.083% 2.5 mg/3mL, 2.5 mg, Nebulization, Q6H PRN, Bertin Stone MD, 2.5 mg at 24 0451    sennosides-docusate (PERICOLACE) 8.6-50 MG per tablet 2 tablet, 2 tablet, Oral, BID PRN, 2 tablet at 24 2046 **AND** polyethylene glycol (MIRALAX) packet 17 g, 17 g, Oral, Daily PRN **AND** bisacodyl (DULCOLAX) EC tablet 5 mg, 5 mg,  Oral, Daily PRN **AND** bisacodyl (DULCOLAX) suppository 10 mg, 10 mg, Rectal, Daily PRN, Bertin Stone MD    Calcium Replacement - Follow Nurse / BPA Driven Protocol, , Not Applicable, PRN, Bertin Stone MD    dilTIAZem CD (CARDIZEM CD) 24 hr capsule 180 mg, 180 mg, Oral, Q24H, Bertin Stone MD, 180 mg at 11/21/24 0857    furosemide (LASIX) tablet 40 mg, 40 mg, Oral, Daily, Betrin Stone MD, 40 mg at 11/19/24 0803    guaiFENesin-codeine (GUAIFENESIN AC) 100-10 MG/5ML liquid 5 mL, 5 mL, Oral, Q4H PRN, Bertin Stone MD, 5 mL at 11/16/24 2223    hydrALAZINE (APRESOLINE) injection 10 mg, 10 mg, Intravenous, Q6H PRN, Bertin Stone MD, 10 mg at 11/19/24 1043    hydrALAZINE (APRESOLINE) tablet 50 mg, 50 mg, Oral, Q12H, Bertin Stone MD, 50 mg at 11/21/24 0857    HYDROcodone-acetaminophen (NORCO) 5-325 MG per tablet 1 tablet, 1 tablet, Oral, Q6H PRN, Bertin Stone MD    ipratropium-albuterol (DUO-NEB) nebulizer solution 3 mL, 3 mL, Nebulization, 4x Daily - RT, Bertin Stone MD, 3 mL at 11/20/24 1510    Bandar pack 1 packet, 1 packet, Oral, BID, Mariana Hauser, RD, 1 packet at 11/21/24 0858    levothyroxine (SYNTHROID, LEVOTHROID) tablet 50 mcg, 50 mcg, Oral, Daily, Bertin Stone MD, 50 mcg at 11/19/24 0804    losartan (COZAAR) tablet 50 mg, 50 mg, Oral, Q24H, Bertin Stone MD, 50 mg at 11/21/24 0857    Magnesium Standard Dose Replacement - Follow Nurse / BPA Driven Protocol, , Not Applicable, PRN, Bertin Stone MD    multivitamin with minerals 1 tablet, 1 tablet, Oral, Daily, Bertin Stone MD, 1 tablet at 11/19/24 0803    nitroglycerin (NITROSTAT) SL tablet 0.4 mg, 0.4 mg, Sublingual, Q5 Min PRN, Bertin Stone MD    pantoprazole (PROTONIX) EC tablet 40 mg, 40 mg, Oral, Daily, Bertin Stone MD, 40 mg at 11/19/24 0803    Phosphorus Replacement - Follow Nurse / BPA Driven Protocol, , Not Applicable, PRN, Bertin Stone MD    Potassium  Replacement - Follow Nurse / BPA Driven Protocol, , Not Applicable, PRN, Bertin Stone MD    predniSONE (DELTASONE) tablet 20 mg, 20 mg, Oral, Daily With Breakfast, Bertin Stone MD    sodium chloride 0.9 % flush 10 mL, 10 mL, Intravenous, PRN, Bertin Stone MD, 10 mL at 11/19/24 2041    sotalol (BETAPACE) tablet 80 mg, 80 mg, Oral, Q12H, Bertin Stone MD, 80 mg at 11/21/24 0857    Assessment & Plan     Principal Problem:   Shortness of breath, multifactorial with pneumonia COPD, diastolic dysfunction, obesity, immobility deconditioning     Paroxysmal atrial fibrillation  Now paced rhythm at 60  Cardioverted yesterday during surgery     Dyspnea with exertion  Being treated for pneumonia, also COPD with Solu-Medrol and DuoNebs  Chronic lower extremity edema secondary to lymphedema   BNP is normal  EF preserved 55 to 60%, normal estimated right-sided pressures  Mild diastolic dysfunction chronically as assessed in sinus rhythm previously, indeterminate with underlying atrial fibrillation, known krzp-ge-wefu variability  CT with right lower lobe endobronchial opacification and consolidative changes in right lower lobe  On nebs, steroids, and antibiotics     history of pulmonary embolism  Currently anticoagulated with Eliquis therapeutic dose uninterrupted     Hypertension  As below      Today  Paced rhythm at 60 with stable blood pressure  Continue sotalol, follow QT interval per protocol, continue to monitor for toxicity and high risk medicines  Oxygen saturations remain low, continue supplemental oxygen, will likely need this on discharge  Continue Lasix 40 daily  Interrogate device as indicated as outpatient  Continue Cardizem hydralazine losartan  Eliquis twice daily, indication lifelong  Continue treatment for pneumonia and COPD from a pulmonary standpoint-pulmonary okay to discharge with 4 outpatient sleep study    EF is preserved with waxu-yc-odpi variability 65% EF, diastolic dysfunction  known    No complaints of chest pain, normal EF, negative stress test, no history of any coronary artery disease or structural heart disease      Further recommendations follow findings and clinical course    Kevin Angel MD, PhD         Gale Donaldson, APRN  11/21/24  10:49 EST

## 2024-11-22 ENCOUNTER — READMISSION MANAGEMENT (OUTPATIENT)
Dept: CALL CENTER | Facility: HOSPITAL | Age: 79
End: 2024-11-22
Payer: MEDICARE

## 2024-11-22 VITALS
DIASTOLIC BLOOD PRESSURE: 63 MMHG | WEIGHT: 293 LBS | TEMPERATURE: 98.2 F | SYSTOLIC BLOOD PRESSURE: 107 MMHG | HEIGHT: 71 IN | HEART RATE: 60 BPM | RESPIRATION RATE: 14 BRPM | OXYGEN SATURATION: 91 % | BODY MASS INDEX: 41.02 KG/M2

## 2024-11-22 LAB
ANION GAP SERPL CALCULATED.3IONS-SCNC: 9 MMOL/L (ref 5–15)
BASOPHILS # BLD AUTO: 0.01 10*3/MM3 (ref 0–0.2)
BASOPHILS NFR BLD AUTO: 0.1 % (ref 0–1.5)
BUN SERPL-MCNC: 45 MG/DL (ref 8–23)
BUN/CREAT SERPL: 49.5 (ref 7–25)
CALCIUM SPEC-SCNC: 8.8 MG/DL (ref 8.6–10.5)
CHLORIDE SERPL-SCNC: 101 MMOL/L (ref 98–107)
CO2 SERPL-SCNC: 30 MMOL/L (ref 22–29)
CREAT SERPL-MCNC: 0.91 MG/DL (ref 0.57–1)
DEPRECATED RDW RBC AUTO: 50.7 FL (ref 37–54)
EGFRCR SERPLBLD CKD-EPI 2021: 64.7 ML/MIN/1.73
EOSINOPHIL # BLD AUTO: 0.32 10*3/MM3 (ref 0–0.4)
EOSINOPHIL NFR BLD AUTO: 3.1 % (ref 0.3–6.2)
ERYTHROCYTE [DISTWIDTH] IN BLOOD BY AUTOMATED COUNT: 15.8 % (ref 12.3–15.4)
GLUCOSE SERPL-MCNC: 101 MG/DL (ref 65–99)
HCT VFR BLD AUTO: 46 % (ref 34–46.6)
HGB BLD-MCNC: 14.3 G/DL (ref 12–15.9)
IMM GRANULOCYTES # BLD AUTO: 0.09 10*3/MM3 (ref 0–0.05)
IMM GRANULOCYTES NFR BLD AUTO: 0.9 % (ref 0–0.5)
LYMPHOCYTES # BLD AUTO: 1.34 10*3/MM3 (ref 0.7–3.1)
LYMPHOCYTES NFR BLD AUTO: 12.8 % (ref 19.6–45.3)
MCH RBC QN AUTO: 27.8 PG (ref 26.6–33)
MCHC RBC AUTO-ENTMCNC: 31.1 G/DL (ref 31.5–35.7)
MCV RBC AUTO: 89.5 FL (ref 79–97)
MONOCYTES # BLD AUTO: 1.08 10*3/MM3 (ref 0.1–0.9)
MONOCYTES NFR BLD AUTO: 10.3 % (ref 5–12)
NEUTROPHILS NFR BLD AUTO: 7.65 10*3/MM3 (ref 1.7–7)
NEUTROPHILS NFR BLD AUTO: 72.8 % (ref 42.7–76)
NRBC BLD AUTO-RTO: 0 /100 WBC (ref 0–0.2)
P JIROVECII DNA L RESP QL NAA+NON-PROBE: NEGATIVE
PLATELET # BLD AUTO: 240 10*3/MM3 (ref 140–450)
PMV BLD AUTO: 9.2 FL (ref 6–12)
POTASSIUM SERPL-SCNC: 4.4 MMOL/L (ref 3.5–5.2)
QT INTERVAL: 413 MS
QTC INTERVAL: 413 MS
RBC # BLD AUTO: 5.14 10*6/MM3 (ref 3.77–5.28)
REF LAB TEST METHOD: NORMAL
SODIUM SERPL-SCNC: 140 MMOL/L (ref 136–145)
WBC NRBC COR # BLD AUTO: 10.49 10*3/MM3 (ref 3.4–10.8)

## 2024-11-22 PROCEDURE — 97535 SELF CARE MNGMENT TRAINING: CPT

## 2024-11-22 PROCEDURE — 99232 SBSQ HOSP IP/OBS MODERATE 35: CPT | Performed by: INTERNAL MEDICINE

## 2024-11-22 PROCEDURE — 80048 BASIC METABOLIC PNL TOTAL CA: CPT | Performed by: INTERNAL MEDICINE

## 2024-11-22 PROCEDURE — 63710000001 PREDNISONE PER 1 MG: Performed by: INTERNAL MEDICINE

## 2024-11-22 PROCEDURE — 94799 UNLISTED PULMONARY SVC/PX: CPT

## 2024-11-22 PROCEDURE — 94761 N-INVAS EAR/PLS OXIMETRY MLT: CPT

## 2024-11-22 PROCEDURE — 85025 COMPLETE CBC W/AUTO DIFF WBC: CPT | Performed by: INTERNAL MEDICINE

## 2024-11-22 PROCEDURE — 94664 DEMO&/EVAL PT USE INHALER: CPT

## 2024-11-22 PROCEDURE — 97530 THERAPEUTIC ACTIVITIES: CPT

## 2024-11-22 RX ADMIN — LOSARTAN POTASSIUM 50 MG: 50 TABLET, FILM COATED ORAL at 08:42

## 2024-11-22 RX ADMIN — Medication 1 TABLET: at 08:41

## 2024-11-22 RX ADMIN — PANTOPRAZOLE SODIUM 40 MG: 40 TABLET, DELAYED RELEASE ORAL at 08:42

## 2024-11-22 RX ADMIN — IPRATROPIUM BROMIDE AND ALBUTEROL SULFATE 3 ML: .5; 3 SOLUTION RESPIRATORY (INHALATION) at 06:50

## 2024-11-22 RX ADMIN — PREDNISONE 20 MG: 20 TABLET ORAL at 08:41

## 2024-11-22 RX ADMIN — IPRATROPIUM BROMIDE AND ALBUTEROL SULFATE 3 ML: .5; 3 SOLUTION RESPIRATORY (INHALATION) at 10:40

## 2024-11-22 RX ADMIN — SOTALOL HYDROCHLORIDE 80 MG: 80 TABLET ORAL at 08:42

## 2024-11-22 RX ADMIN — HYDRALAZINE HYDROCHLORIDE 50 MG: 25 TABLET ORAL at 08:41

## 2024-11-22 RX ADMIN — APIXABAN 5 MG: 5 TABLET, FILM COATED ORAL at 08:41

## 2024-11-22 RX ADMIN — LEVOTHYROXINE SODIUM 50 MCG: 0.05 TABLET ORAL at 08:41

## 2024-11-22 RX ADMIN — FUROSEMIDE 40 MG: 40 TABLET ORAL at 08:41

## 2024-11-22 RX ADMIN — DILTIAZEM HYDROCHLORIDE 180 MG: 180 CAPSULE, COATED, EXTENDED RELEASE ORAL at 08:41

## 2024-11-22 NOTE — OUTREACH NOTE
Prep Survey      Flowsheet Row Responses   [facility] discharged [facility]? [facility]   Is LACE score < 7 ? No   Eligibility [facility]   Date of Admission 11/12/24   Date of Discharge 11/22/24   Discharge Disposition Home-Health Care Svc   Discharge diagnosis Pacemaker DC new - Medtronic 3830   Does the patient have one of the following disease processes/diagnoses(primary or secondary)? General Surgery   Does the patient have Home health ordered? Yes   What is the Home health agency?  [facility]   Prep survey completed? Yes            Olivia BARBOZA - Registered Nurse

## 2024-11-22 NOTE — THERAPY TREATMENT NOTE
"Subjective: Pt agreeable to therapeutic plan of care.  Cognition: arousal/alertness: Alert and Attentive  Per Dr. Stone pt is okay to use left arm as long as she does not raise it above shoulder height.   Objective:     Bed Mobility: Supervision   Functional Transfers: CGA and with rolling walker     Balance: sitting EOB Supervision  Functional Ambulation: CGA and with rolling walker    Lower Body Dressing: Dependent  ADL Position: supported standing and unsupported sitting  ADL Comments: Donning brief and pants    Upper Body Dressing: Max-A  ADL Position: supported sitting  ADL Comments: Donning shirt, verbal cues to dress LUE first    Pain: 2 VAS  Location: Incisional   Interventions for pain: N/A  Education: Provided education on the importance of mobility in the acute care setting      Assessment: Camelia Gray presents with ADL impairments affecting function including balance, endurance / activity tolerance, pain, shortness of breath, and strength. Pt reports she will have 24 hour assist at home.  Educated to use gait belt for mobility.  Demonstrated functioning below baseline abilities indicate the need for continued skilled intervention while inpatient. Tolerating session today without incident. Will continue to follow and progress as tolerated.     Plan/Recommendations:   Moderate Intensity Therapy recommended post-acute care. This is recommended as therapy feels the patient would require 3-4 days per week and wouldn't tolerate \"3 hour daily\" rehab intensity. SNF would be the preferred choice. If the patient does not agree to SNF, arrange HH or OP depending on home bound status. If patient is medically complex, consider LTACH.. Pt requires no DME at discharge.     Pt desires Home with Home Health at discharge. Pt cooperative; agreeable to therapeutic recommendations and plan of care.     Modified [place]: N/A = No pre-op stroke/TIA    Post-Tx Position: Up in Chair and Call light and personal items " within reach  PPE: gloves    Therapy Charges for Today       Code Description Service Date Service Provider Modifiers Qty    20284448148 HC OT EVAL MOD COMPLEXITY 4 11/21/2024 Oma Justin, OT GO 1    48271171167 HC OT SELF CARE/MGMT/TRAIN EA 15 MIN 11/21/2024 Oma Justin, OT GO 1    19658949558 HC OT SELF CARE/MGMT/TRAIN EA 15 MIN 11/22/2024 Oma Justin OT GO 2    72016773140 HC OT THERAPEUTIC ACT EA 15 MIN 11/22/2024 mOa Justin, OT GO 1           Time Calculation- OT       Row Name 11/22/24 1624             Time Calculation- OT    OT Start Time 1537  -SR      OT Stop Time 1615  -SR      OT Time Calculation (min) 38 min  -SR      Total Timed Code Minutes- OT 38 minute(s)  -SR      OT Received On 11/22/24  -SR      OT - Next Appointment 11/25/24  -SR                User Key  (r) = Recorded By, (t) = Taken By, (c) = Cosigned By      Initials Name Provider Type    SR Oma Justin, OT Occupational Therapist

## 2024-11-22 NOTE — DISCHARGE SUMMARY
[facility] Medicine Services  Discharge Summary    Date of Service: 2024  Patient Name: Camelia Gray  : 1945  MRN: [id number]    Date of Admission: 2024  Discharge Diagnosis: Shortness of breath  Date of Discharge: 2024  Primary Care Physician: Brinda Tony APRN      Presenting Problem:   Shortness of breath [R06.02]  Hypertensive urgency [I16.0]  Moderate persistent asthmatic bronchitis with acute exacerbation [J45.41]    Active and Resolved Hospital Problems:  Active Hospital Problems    Diagnosis POA    **Shortness of breath [R06.02] Yes    Persistent atrial fibrillation [I48.19] Unknown    Multiple tracheobronchial mucus plugs [T17.800A] Unknown    Atelectasis [J98.11] Unknown    Symptomatic bradycardia [R00.1] Unknown      Resolved Hospital Problems   No resolved problems to display.     Pneumonia with acute respiratory failure with hypoxia, unspecified organism  -Likely pneumonia with acute bronchospasms  -Imaging with CT does show right lower lobe infiltrate  -Continue IV antibiotics    -Encourage pulmonary toileting with I-S, flutter valve  -NT suction as needed  -Pulmonology consulted; patient underwent bronchoscopy on  with multiple mucous plugs removed  -Follow-up BAL cultures     Hypertension   -BP stable elevated most recent blood pressure 145/89  -PRN Hydralazine  -Continue usual home medication     Atrial fibrillation with RVR  -Patient has a history of A-fib, on Eliquis at home  -Home medications with sotalol and diltiazem were continued however patient still remained tachycardic  -Underwent DCCV on 11/15 but then developed bradycardia afterwards  -Sotalol discontinued and flecainide initiated  -Cardiology following>Over weekend started on diltiazem for rate control. Sotalol discontinued and flecainide started for heart rate. HR now in 50-60s. Blood pressure still elevated. Norvasc and losartan added. Received lasix IV x 1 for fluid volume.  "Remains in sinus rhythm s/p cardioversion. Will continue to monitor BP.   -Patient received a pacemaker on 11/20/2024 and cardioversion was performed with addition of sotalol.  She had very severe sinus bradycardia/atrial standstill post cardioversion and therefore she is atrial paced.  If she has recurrence of A-fib in the future then she will need ablation.  Anticoagulation can be resumed tomorrow.     Hypothyroidism  -Continue Levothyroxine     GERD  -Continue PPI       Hospital Course     History of Present Illness: Camelia Gray is a 79 y.o. female with a CMH of atrial fibrillation, arthritis, hypothyroidism, hypertension, lymphedema, immobility syndrome who presented to [facility] on 11/12/2024 with shortness of breath.  Patient reports symptoms have been going on for the past 4 to 5 days and are progressively worsening.  Patient states that she did have a cold within the last week and reports she continues to have nasal congestion.  Patient denies fever, chills, pain.  Denies new edema in legs and reports her legs continue to stay wrapped due to lymphedema.  Patient does continue to have mild cough at this time.     On ED evaluation, patient vital signs show tachycardia and hypertension on arrival as well as tachypnea.  Patient required IV labetalol for blood pressure control in ED.  Labs essentially unremarkable.  Respiratory viral panel negative.  Chest x-ray showed \"1.  Cardiomegaly. 2.  Evidence of prior granulomatous exposure. 3.  Possible atelectasis or scarring in the right lower lobe adjacent to shadow which could relate to known hiatal hernia\".  EKG does show atrial fibrillation however seems rate is controlled at 105. patient received IV steroids and DuoNeb in ED however shortness of breath persisted therefore hospitalist group was asked for admission.     Interval History:  History taken from: patient chart  11/17-Patient underwent bronchoscopy this morning.  She states that overall she " feels less congested.  11/18-seen and examined in bed no acute distress, blood pressure has been elevated this morning,  Apparently patient lives at home.  Presently on 3 L of oxygen, saturation 92% to 94%.Over weekend started on diltiazem for rate control. Sotalol discontinued and flecainide started for heart rate. HR now in 50-60s. Blood pressure still elevated. Norvasc and losartan added. Received lasix IV x 1 for fluid volume. Remains in sinus rhythm s/p cardioversion..   11/19/24--went back into a-fib. Starting drip & consorted EP to do a pacemaker   Slept at short intervals. O2 at 2L in use. No c/o discomfort.   11/20/2024 patient seen and examined in bed no acute distress, vital signs stable, discussed with RN, for pacemaker today.  11/21/24 seen in bed NAD, no new complaints,  DW RN, says wants to talked to , wants home health  11/22/24 doing better today, will dc home condition on dc stable    DISCHARGE Follow Up Recommendations for labs and diagnostics: follow with pcp in one week  Day of Discharge     Vital Signs:  Temp:  [97 °F (36.1 °C)-98 °F (36.7 °C)] 97.8 °F (36.6 °C)  Heart Rate:  [60-61] 60  Resp:  [15-21] 21  BP: ()/(50-69) 102/54  Flow (L/min) (Oxygen Therapy):  [2-3] 2    Physical Exam:  Physical Exam   Constitutional:  Well-developed, well-nourished, no acute distress, nontoxic appearance   Eyes:  PERRL, conjunctivae normal, EOMI   HENT:  Atraumatic, external ears normal, nose normal, oropharynx moist, no pharyngeal exudates. Neck-normal range of motion, no tenderness, supple, trachea midline  Respiratory: Improved expiratory wheezing and improved air entry bilaterally  Cardiovascular:  Normal rate, normal rhythm, no murmurs, no gallops, no rubs   GI:  Soft, nondistended, normal bowel sounds, nontender, no organomegaly, no mass, no rebound, no guarding.  Mild BLE edema noted.  :  No costovertebral angle tenderness   Musculoskeletal:  No edema, no tenderness, no  deformities  Integument:  Well hydrated, no rash.  Edema noted to bilateral lower extremities.  Lymphatic:  No lymphadenopathy noted   Neurologic:  Alert & oriented x 3, CN 2-12 normal, normal motor function, normal sensory function, no focal deficits noted   Psychiatric:  Speech and behavior appropriate       Pertinent  and/or Most Recent Results     LAB RESULTS:      Lab 11/22/24  0110 11/21/24 0416 11/20/24 0412 11/19/24 0330 11/18/24  0304   WBC 10.49 14.62* 14.19* 14.65* 11.95*   HEMOGLOBIN 14.3 15.1 15.7 14.3 14.2   HEMATOCRIT 46.0 48.2* 49.1* 45.9 45.6   PLATELETS 240 248 259 203 188   NEUTROS ABS 7.65* 11.80* 12.07* 12.83* 10.90*   IMMATURE GRANS (ABS) 0.09* 0.11* 0.10* 0.11* 0.08*   LYMPHS ABS 1.34 1.16 1.01 0.83 0.51*   MONOS ABS 1.08* 1.21* 0.93* 0.83 0.44   EOS ABS 0.32 0.31 0.05 0.04 0.00   MCV 89.5 89.4 88.2 89.1 90.8         Lab 11/22/24  0110 11/21/24 0416 11/20/24 0412 11/19/24  0330 11/18/24  0304   SODIUM 140 139 139 140 141   POTASSIUM 4.4 4.6 4.2 4.5 4.6   CHLORIDE 101 99 96* 100 103   CO2 30.0* 32.7* 35.5* 33.8* 33.2*   ANION GAP 9.0 7.3 7.5 6.2 4.8*   BUN 45* 41* 28* 27* 24*   CREATININE 0.91 0.96 0.75 0.70 0.67   EGFR 64.7 60.7 81.6 88.7 89.6   GLUCOSE 101* 104* 118* 124* 135*   CALCIUM 8.8 9.0 8.9 8.7 8.5*                         Brief Urine Lab Results       None          Microbiology Results (last 10 days)       Procedure Component Value - Date/Time    AFB Culture - Lavage, Lung, L [[id number]] Collected: 11/17/24 0918    Lab Status: Preliminary result Specimen: Lavage from Lung, L Updated: 11/18/24 1107     AFB Stain No acid fast bacilli seen on concentrated smear    BAL Culture, Quantitative - Lavage, Lung, L [[id number]]  (Abnormal) Collected: 11/17/24 0918    Lab Status: Final result Specimen: Lavage from Lung, L Updated: 11/19/24 0918     BAL Culture 50,000 CFU/mL Candida albicans      50,000 CFU/mL Normal Respiratory Shanae     Gram Stain Moderate (3+) WBCs per low power field       Moderate (3+) Epithelial cells per low power field      Few (2+) Budding yeast with pseudohyphae    Pneumocystis PCR - Lavage, Lung, L [[id number]] Collected: 11/17/24 0918    Lab Status: Final result Specimen: Lavage from Lung, L Updated: 11/22/24 0745     Reference Lab Report See Attached Report     Pneumocystis Negative    Respiratory Panel PCR w/COVID-19(SARS-CoV-2) PR[facility]A/BRIANNA/[facility]/COR/CARLOS In-House, NP Swab in UTM/VTM, 2 HR TAT - Swab, Nasopharynx [[id number]]  (Normal) Collected: 11/12/24 1406    Lab Status: Final result Specimen: Swab from Nasopharynx Updated: 11/12/24 1500     ADENOVIRUS, PCR Not Detected     Coronavirus 229E Not Detected     Coronavirus HKU1 Not Detected     Coronavirus NL63 Not Detected     Coronavirus OC43 Not Detected     COVID19 Not Detected     Human Metapneumovirus Not Detected     Human Rhinovirus/Enterovirus Not Detected     Influenza A PCR Not Detected     Influenza B PCR Not Detected     Parainfluenza Virus 1 Not Detected     Parainfluenza Virus 2 Not Detected     Parainfluenza Virus 3 Not Detected     Parainfluenza Virus 4 Not Detected     RSV, PCR Not Detected     Bordetella pertussis pcr Not Detected     Bordetella parapertussis PCR Not Detected     Chlamydophila pneumoniae PCR Not Detected     Mycoplasma pneumo by PCR Not Detected    Narrative:      In the setting of a positive respiratory panel with a viral infection PLUS a negative procalcitonin without other underlying concern for bacterial infection, consider observing off antibiotics or discontinuation of antibiotics and continue supportive care. If the respiratory panel is positive for atypical bacterial infection (Bordetella pertussis, Chlamydophila pneumoniae, or Mycoplasma pneumoniae), consider antibiotic de-escalation to target atypical bacterial infection.    Blood Culture - Blood, Hand, Left [[id number]]  (Normal) Collected: 11/12/24 1321    Lab Status: Final result Specimen: Blood from Hand, Left Updated: 11/17/24  1330     Blood Culture No growth at 5 days    Narrative:      Less than seven (7) mL's of blood was collected.  Insufficient quantity may yield false negative results.    Blood Culture - Blood, Arm, Right [[id number]]  (Normal) Collected: 11/12/24 1236    Lab Status: Final result Specimen: Blood from Arm, Right Updated: 11/17/24 1245     Blood Culture No growth at 5 days    Narrative:      Less than seven (7) mL's of blood was collected.  Insufficient quantity may yield false negative results.            XR Chest 1 View    Result Date: 11/20/2024  Impression: Impression: 1. Left chest wall pacemaker with leads in expected position. No evidence of pneumothorax. 2. Prominence of the main pulmonary artery which can be associated with pulmonary hypertension. Electronically Signed: Sinan Gerard  11/20/2024 8:34 PM EST  Workstation ID: [id number]    CT Chest Without Contrast Diagnostic    Result Date: 11/13/2024  Impression: Impression: 1. Right lower lobe endobronchial opacification is present, new since the prior study. 2. Consolidative change in the right lower lobe may present atelectasis or postobstructive pneumonia. 3. Mild subsegmental atelectasis in the left upper lobe. 4. Stable appearance of moderate to large esophageal hiatal hernia. 5. Stable appearance of pulmonary artery dilation. Correlate for pulmonary hypertension. 6. Stable appearance of the left adrenal adenoma. 7. Nonobstructing left renal stone. Electronically Signed: Marie Toledo MD  11/13/2024 11:43 AM EST  Workstation ID: [id number]    XR Chest 1 View    Result Date: 11/12/2024  Impression: Impression: 1.Cardiomegaly 2.Evidence for prior granulomatous exposure 3.Possible atelectasis or scarring in the right lower lobe adjacent to shadow which could relate to known hiatal hernia Electronically Signed: Gee Meraz MD  11/12/2024 1:21 PM EST  Workstation ID: [id number]     Results for orders placed during the hospital encounter of 10/13/19    Duplex  Venous Lower Extremity - Bilateral CAR    Interpretation Summary  · Normal bilateral lower extremity venous duplex scan.      Results for orders placed during the hospital encounter of 10/13/19    Duplex Venous Lower Extremity - Bilateral CAR    Interpretation Summary  · Normal bilateral lower extremity venous duplex scan.      Results for orders placed during the hospital encounter of 11/12/24    Adult Transthoracic Echo Complete W/ Cont if Necessary Per Protocol    Interpretation Summary    Left ventricular systolic function is normal. Left ventricular ejection fraction appears to be 56 - 60%.    Left ventricular wall thickness is consistent with borderline concentric hypertrophy.    Left ventricular diastolic function was indeterminate.    The left atrial cavity is mildly dilated.    Left atrial volume is mildly increased.    There is moderate calcification of the aortic valve.    Estimated right ventricular systolic pressure from tricuspid regurgitation is normal (<35 mmHg).      Labs Pending at Discharge:  Pending Results       Procedure [Order ID] Specimen - Date/Time    Cardiac Event Monitor (EBENEZER) or Mobile Cardiac Outpatient Telemetry (MCT) [[id number]]     Fungus Culture - Lavage, Lung, L [[id number]] Collected: 11/17/24 0918    Specimen: Lavage from Lung, L Updated: 11/17/24 1030            Procedures Performed  Procedure(s):  Pacemaker DC new - Medtronic 3830  Cardioversion/Defibrillation  11/21 1205 Note By: Lisa Solares, RRT  11/19 0948 Walking Oximetry      Consults:   Consults       Date and Time Order Name Status Description    11/14/2024  3:11 PM Inpatient Cardiac Electrophysiology Consult      11/14/2024  9:38 AM Inpatient Pulmonology Consult Completed     11/13/2024 11:17 AM Inpatient Cardiology Consult Completed     11/13/2024 10:46 AM Inpatient Cardiology Consult Completed     11/12/2024  4:13 PM Hospitalist (on-call MD unless specified)              Assessment & Plan  Principal Problem:  Shortness  of breath: multifactorial with pneumonia COPD, diastolic dysfunction, obesity, immobility deconditioning     Paroxysmal atrial fibrillation  Now paced rhythm at 60     Dyspnea with exertion  Being treated for pneumonia, also COPD with Solu-Medrol and DuoNebs  Chronic lower extremity edema secondary to lymphedema   BNP is normal  EF preserved 55 to 60%, normal estimated right-sided pressures  Mild diastolic dysfunction chronically as assessed in sinus rhythm previously, indeterminate with underlying atrial fibrillation, known [id number] variability  CT with right lower lobe endobronchial opacification and consolidative changes in right lower lobe  On nebs, steroids     history of pulmonary embolism  Currently anticoagulated with Eliquis therapeutic dose uninterrupted     Hypertension  As below        Today  Paced rhythm at 60 with stable blood pressure  Continue sotalol, follow QT interval per protocol, continue to monitor for toxicity and high risk medicines  Oxygen saturations remain low, continue supplemental oxygen, will likely need this on discharge  Continue Lasix 40 daily  Interrogate device as indicated as outpatient  Continue Cardizem hydralazine losartan  Eliquis twice daily, indication lifelong  Continue treatment for pneumonia and COPD from a pulmonary standpoint-pulmonary okay to discharge with 4 outpatient sleep study     EF is preserved with pfms-jf-vgis variability 65% EF, diastolic dysfunction known     No complaints of chest pain, normal EF, negative stress test, no history of any coronary artery disease or structural heart disease        Further recommendations follow findings and clinical course     Kevin Angel MD, PhD             Discharge Details        Discharge Medications        New Medications        Instructions Start Date   albuterol sulfate  (90 Base) MCG/ACT inhaler  Commonly known as: Ventolin HFA   2 puffs, Inhalation, 4 Times Daily PRN      budesonide-formoterol 160-4.5  MCG/ACT inhaler  Commonly known as: Symbicort   2 puffs, Inhalation, 2 Times Daily - RT      cephalexin 500 MG capsule  Commonly known as: Keflex   500 mg, Oral, 2 Times Daily      dilTIAZem  MG 24 hr capsule  Commonly known as: CARDIZEM CD   180 mg, Oral, Every 24 Hours Scheduled      furosemide 40 MG tablet  Commonly known as: LASIX   40 mg, Oral, Daily      hydrALAZINE 50 MG tablet  Commonly known as: APRESOLINE   25 mg, Oral, 2 Times Daily PRN      losartan 50 MG tablet  Commonly known as: COZAAR   25 mg, Oral, Every 24 Hours Scheduled      predniSONE 10 MG tablet  Commonly known as: DELTASONE   Take 4 tablets by mouth Daily With Breakfast for 1 day, THEN 3 tablets Daily for 3 days, THEN 2 tablets Daily for 3 days, THEN 1 tablet Daily for 3 days.   Start Date: November 21, 2024     sennosides-docusate 8.6-50 MG per tablet  Commonly known as: PERICOLACE   2 tablets, Oral, 2 Times Daily PRN      traMADol 50 MG tablet  Commonly known as: Ultram   50 mg, Oral, Every 6 Hours PRN             Changes to Medications        Instructions Start Date   sotalol 80 MG tablet  Commonly known as: BETAPACE  What changed: when to take this   80 mg, Oral, Every 12 Hours Scheduled             Continue These Medications        Instructions Start Date   apixaban 5 MG tablet tablet  Commonly known as: ELIQUIS   5 mg, Oral, 2 Times Daily      levothyroxine 50 MCG tablet  Commonly known as: SYNTHROID, LEVOTHROID   50 mcg, Oral, Daily      multivitamin with minerals tablet tablet   1 tablet, Daily      pantoprazole 40 MG EC tablet  Commonly known as: PROTONIX   40 mg, Oral, Daily      vitamin b complex capsule capsule   1 capsule, Daily               Allergies   Allergen Reactions    Maxzide [Hydrochlorothiazide W-Triamterene] Arrhythmia         Discharge Disposition:   Home or Self Care    Diet:  Hospital:  Diet Order   Procedures    Diet: Regular/House; Fluid Consistency: Thin (IDDSI 0)         Discharge Activity:   Activity  Instructions       Gradually Increase Activity Until at Pre-Hospitalization Level      Gradually Increase Activity Until at Pre-Hospitalization Level                CODE STATUS:  Code Status and Medical Interventions: CPR (Attempt to Resuscitate); Full Support   Ordered at: 11/12/24 1653     Code Status (Patient has no pulse and is not breathing):    CPR (Attempt to Resuscitate)     Medical Interventions (Patient has pulse or is breathing):    Full Support         Future Appointments   Date Time Provider Department Center   11/26/2024 To Be Determined Cecil Marsh, PT  [facility]   11/30/2024 To Be Determined Yenni Greco OT  [facility]   12/9/2024 10:30 AM [facility] [place] DEVICE CHECK [facility]   12/17/2024 10:00 AM Brinda Leyva APRN [facility]       Additional Instructions for the Follow-ups that You Need to Schedule       Ambulatory Referral to Home Health ([facility])   As directed      Face to Face Visit Date: 11/21/2024   Follow-up provider for Plan of Care?: I treated the patient in an acute care facility and will not continue treatment after discharge.   Follow-up provider: BRINDA LEYVA [[id number]]   Reason/Clinical Findings: weakness   Describe mobility limitations that make leaving home difficult: weakness   Nursing/Therapeutic Services Requested: Skilled Nursing Physical Therapy Occupational Therapy   Skilled nursing orders: Medication education   Frequency: 1 Week 1        Discharge Follow-up with PCP   As directed       Currently Documented PCP:    Brinda Leyva APRN    PCP Phone Number:    [phone]     Follow Up Details: 1 week        Discharge Follow-up with PCP   As directed       Currently Documented PCP:    Brinda Leyva APRN    PCP Phone Number:    [phone]     Follow Up Details: 1 week                Time spent on Discharge including face to face service:  35 minutes    Signature: Electronically signed by Hussein Manzanares MD, 11/22/24, 08:46  EST.  [facility] Hospitalist Team

## 2024-11-22 NOTE — CASE MANAGEMENT/SOCIAL WORK
Case Management/Social Work    Patient Name:  Camelia Gray  YOB: 1945  MRN: [id number]  Admit Date:  11/12/2024        Patient has active discharge appeal with [facility] and will remain inhouse until determination has been received from [facility].      Records sent to [facility] as requested.  There is a copy of appeal records bundle in media if patient requests copy of records sent as per patient rights.    Appeal information and confirmation of records upload follow.            Electronically signed by:  Chad Nolan CMA  11/22/24 09:10 EST    Chad Nolan  Case Management Associate  [address] [address]  P: [phone]  F: [phone]

## 2024-11-22 NOTE — CASE MANAGEMENT/SOCIAL WORK
"Continued Stay Note  [facility]     Patient Name: Camelia Gray  MRN: [id number]  Today's Date: 11/22/2024    Admit Date: 11/12/2024    Plan: Return home alone with intermittent caregivers. RT did 6 MWT and did not qualify for home oxygen. [facility] ; accepted. [facility] wheelchair van transport home   Discharge Plan       Row Name 11/22/24 1050       Plan    Plan Return home alone with intermittent caregivers. RT did 6 MWT and did not qualify for home oxygen. [facility] ; accepted. [facility] wheelchair van transport home    Plan Comments Per [facility] documentation was submitted to [facility] for Discharge appeal. CM met with Ms. Gray who said she has private caregivers set up this afternoon and evening and agrees to discharge. CM informed her of the need to contact [facility] to cancel the appeal. Nursing plans to call [facility] to set up the transport home. CM placed Epic request yesterday.  DAMIAN discussed the 6 MWT and that she did not have oxgyen saturations below or equal to 88 % and she said \" I know I don't need oxygen \".  CM discussed SNF recommendation again but she refused again.  CM discussed Pulmonary's recommendation for an OP Sleep study. DAMIAN gave her copy of Detailed Notice of Discharge                          Yamini BUNCH,RN Case Manager  [facility]  Phone: Desk- [phone] cell- [phone]   "

## 2024-11-22 NOTE — PROGRESS NOTES
"[facility]      Patient Care Team:  Brinda Tony APRN as PCP - General (Nurse Practitioner)  Kevin Angel MD as Consulting Physician (Cardiology)    Chief Complaint:   Dyspnea      Seen and examined agree with narrative as discussed with nurse practitioner after face-to-face encounter greater than 50% of total encounter time in medical decision making performed by me    Subjective  Less shortness of breath  Sitting up in bed  Pacer site clean and dry  No acute events overnight       History taken from: patient chart RN    Review of Systems   Constitutional: Negative for diaphoresis and malaise/fatigue.   Cardiovascular:  Negative for chest pain, irregular heartbeat, leg swelling, near-syncope, orthopnea, palpitations, paroxysmal nocturnal dyspnea and syncope.   Respiratory:  Negative for cough and shortness of breath.    Musculoskeletal:  Positive for stiffness. Negative for myalgias.   Neurological:  Negative for dizziness, focal weakness, headaches and light-headedness.   Psychiatric/Behavioral:  Negative for altered mental status.    All other systems reviewed and are negative.      Objective  Patient has status post pacemaker, incision is clean and dry with no drainage, Steri-Strips intact  Remains in atrially paced rhythm at 60  No further atrial fibrillation noted per review of bedside telemetry   on sotalol 80 mg every 12 hours for maintenance  Oxygen saturations mid 90s on 2 L, will have home O2    Vital Signs  Temp:  [97 °F (36.1 °C)-98 °F (36.7 °C)] 97.8 °F (36.6 °C)  Heart Rate:  [60-61] 60  Resp:  [15-21] 21  BP: ()/(50-69) 102/54  Oxygen Therapy  SpO2: 95 %  Pulse Oximetry Type: Continuous  Device (Oxygen Therapy): nasal cannula, humidified  Flow (L/min) (Oxygen Therapy): 2  Oxygen Concentration (%): 28}  Flowsheet Rows      Flowsheet Row First Filed Value   Admission Height 180.3 cm (71\") Documented at 11/12/2024 1159   Admission Weight 150 kg (330 lb) Documented at " 11/12/2024 1159            Physical Exam:    Physical Exam  Constitutional:       General: She is not in acute distress.     Appearance: She is obese. She is not diaphoretic.   HENT:      Head: Normocephalic and atraumatic.   Eyes:      Extraocular Movements: Extraocular movements intact.      Pupils: Pupils are equal, round, and reactive to light.   Neck:      Vascular: No carotid bruit.   Cardiovascular:      Heart sounds: Normal heart sounds.      Comments: Paced at 60  Pulmonary:      Effort: Pulmonary effort is normal. No respiratory distress.      Breath sounds: Normal breath sounds.      Comments: 2 L nasal cannula  Abdominal:      Palpations: Abdomen is soft.   Musculoskeletal:         General: Swelling present.      Right lower leg: Edema present.      Left lower leg: Edema present.      Comments: Patient has lymphedema   Skin:     Findings: No erythema.   Neurological:      Mental Status: She is alert and oriented to person, place, and time.   Psychiatric:         Mood and Affect: Mood normal.         Behavior: Behavior normal.         Thought Content: Thought content normal.         Judgment: Judgment normal.         Results Review:     I reviewed the patient's new clinical results.    Lab Results (last 24 hours)       Procedure Component Value Units Date/Time    Pneumocystis PCR - Lavage, Lung, L [[id number]] Collected: 11/17/24 0918    Specimen: Lavage from Lung, L Updated: 11/22/24 0745     Reference Lab Report See Attached Report     Pneumocystis Negative    Basic Metabolic Panel [[id number]]  (Abnormal) Collected: 11/22/24 0110    Specimen: Blood from Arm, Left Updated: 11/22/24 0215     Glucose 101 mg/dL      BUN 45 mg/dL      Creatinine 0.91 mg/dL      Sodium 140 mmol/L      Potassium 4.4 mmol/L      Comment: Specimen hemolyzed.  Result may be falsely elevated.        Chloride 101 mmol/L      CO2 30.0 mmol/L      Calcium 8.8 mg/dL      BUN/Creatinine Ratio 49.5     Anion Gap 9.0 mmol/L      eGFR 64.7  mL/min/1.73     Narrative:      GFR Normal >60  Chronic Kidney Disease <60  Kidney Failure <15    The GFR formula is only valid for adults with stable renal function between ages 18 and 70.    CBC & Differential [[id number]]  (Abnormal) Collected: 11/22/24 0110    Specimen: Blood from Arm, Left Updated: 11/22/24 0142    Narrative:      The following orders were created for panel order CBC & Differential.  Procedure                               Abnormality         Status                     ---------                               -----------         ------                     CBC Auto Differential[[id number]]        Abnormal            Final result                 Please view results for these tests on the individual orders.    CBC Auto Differential [[id number]]  (Abnormal) Collected: 11/22/24 0110    Specimen: Blood from Arm, Left Updated: 11/22/24 0142     WBC 10.49 10*3/mm3      RBC 5.14 10*6/mm3      Hemoglobin 14.3 g/dL      Hematocrit 46.0 %      MCV 89.5 fL      MCH 27.8 pg      MCHC 31.1 g/dL      RDW 15.8 %      RDW-SD 50.7 fl      MPV 9.2 fL      Platelets 240 10*3/mm3      Neutrophil % 72.8 %      Lymphocyte % 12.8 %      Monocyte % 10.3 %      Eosinophil % 3.1 %      Basophil % 0.1 %      Immature Grans % 0.9 %      Neutrophils, Absolute 7.65 10*3/mm3      Lymphocytes, Absolute 1.34 10*3/mm3      Monocytes, Absolute 1.08 10*3/mm3      Eosinophils, Absolute 0.32 10*3/mm3      Basophils, Absolute 0.01 10*3/mm3      Immature Grans, Absolute 0.09 10*3/mm3      nRBC 0.0 /100 WBC             Imaging Results (Last 24 Hours)       ** No results found for the last 24 hours. **            ECG/EMG Results (most recent)       Procedure Component Value Units Date/Time    ECG 12 Lead Dyspnea [[id number]] Collected: 11/12/24 1218     Updated: 11/13/24 0602     QT Interval 353 ms      QTC Interval 467 ms     Narrative:      HEART GPEY=989  bpm  RR Ssouawzm=309  ms  CT Interval=  ms  P Horizontal Axis=  deg  P Front Axis=   deg  QRSD Interval=87  ms  QT Xlhefdbe=022  ms  NDgN=399  ms  QRS Axis=45  deg  T Wave Axis=8  deg  - ABNORMAL ECG -  Atrial fibrillation  Low voltage, precordial leads  When compared with ECG of 08-Feb-2024 12:03:26,  Significant change in rhythm: previously sinus  Electronically Signed By: Eduardo Anderson ([facility]) 2024-11-13 06:01:52  Date and Time of Study:2024-11-12 12:18:10    Telemetry Scan [[id number]] Resulted: 11/12/24     Updated: 11/13/24 1432    Adult Transthoracic Echo Complete W/ Cont if Necessary Per Protocol [[id number]] Resulted: 11/13/24 1823     Updated: 11/13/24 1823     LVIDd 4.5 cm      LVIDs 3.0 cm      IVSd 1.10 cm      LVPWd 1.00 cm      FS 33.3 %      IVS/LVPW 1.10 cm      ESV(cubed) 27.0 ml      LV Sys Vol (BSA corrected) 11.3 cm2      EDV(cubed) 91.1 ml      LV [place] Vol (BSA corrected) 36.5 cm2      LV mass(C)d 164.0 grams      LVOT area 2.8 cm2      LVOT diam 1.90 cm      EDV(MOD-sp4) 97.3 ml      ESV(MOD-sp4) 30.0 ml      SV(MOD-sp4) 67.3 ml      SVi(MOD-SP4) 25.3 ml/m2      SVi (LVOT) 21.6 ml/m2      EF(MOD-sp4) 69.2 %      MV E max christiano 96.7 cm/sec      LA ESV Index (BP) 19.2 ml/m2      Lat Peak E' Christiano 10.4 cm/sec      TR max christiano 163.0 cm/sec      SV(LVOT) 57.6 ml      RVIDd 3.3 cm      RV S' 9.9 cm/sec      LA dimension (2D)  5.0 cm      LV V1 max 113.0 cm/sec      LV V1 max PG 5.1 mmHg      LV V1 mean PG 3.0 mmHg      LV V1 VTI 20.3 cm      Ao pk christiano 163.0 cm/sec      Ao max PG 10.6 mmHg      Ao mean PG 7.0 mmHg      Ao V2 VTI 32.1 cm      FEDERICO(I,D) 1.79 cm2      MV max PG 8.2 mmHg      MV mean PG 3.0 mmHg      MV V2 VTI 26.0 cm      MV P1/2t 66.6 msec      MVA(P1/2t) 3.3 cm2      MVA(VTI) 2.21 cm2      MV dec slope 616.0 cm/sec2      MR max christiano 390.0 cm/sec      MR max PG 60.8 mmHg      TR max PG 10.6 mmHg      RVSP(TR) 25.6 mmHg      RAP systole 15.0 mmHg      RV V1 max PG 2.9 mmHg      RV V1 max 85.4 cm/sec      RV V1 VTI 13.8 cm      PA V2 max 96.3 cm/sec      PA acc time 0.08 sec       ACS 1.80 cm      Sinus 2.7 cm      EF(MOD-bp) 69.0 %      Dimensionless Index 0.69 (DI)     Narrative:        Left ventricular systolic function is normal. Left ventricular ejection   fraction appears to be 56 - 60%.    Left ventricular wall thickness is consistent with borderline   concentric hypertrophy.    Left ventricular diastolic function was indeterminate.    The left atrial cavity is mildly dilated.    Left atrial volume is mildly increased.    There is moderate calcification of the aortic valve.    Estimated right ventricular systolic pressure from tricuspid   regurgitation is normal (<35 mmHg).      Telemetry Scan [[id number]] Resulted: 11/12/24     Updated: 11/14/24 1333    Telemetry Scan [[id number]] Resulted: 11/12/24     Updated: 11/14/24 1416    Telemetry Scan [[id number]] Resulted: 11/12/24     Updated: 11/14/24 1449    Telemetry Scan [[id number]] Resulted: 11/12/24     Updated: 11/14/24 1519    Telemetry Scan [[id number]] Resulted: 11/12/24     Updated: 11/15/24 1658    Telemetry Scan [[id number]] Resulted: 11/12/24     Updated: 11/15/24 1743    ECG 12 Lead QT Measurement [[id number]] Collected: 11/16/24 0805     Updated: 11/17/24 1834     QT Interval 456 ms      QTC Interval 414 ms     Narrative:      HEART RATE=49  bpm  RR Eudpmrep=6300  ms  WA Lzyuovnw=912  ms  P Horizontal Axis=21  deg  P Front Axis=27  deg  QRSD Interval=86  ms  QT Wggaecsz=699  ms  GSzE=941  ms  QRS Axis=50  deg  T Wave Axis=56  deg  - BORDERLINE ECG -  Bradycardia with irregular rate  Low voltage, precordial leads  When compared with ECG of 15-Nov-2024 11:21:41,  Significant change in rhythm: previously sinus  Significant axis, voltage or hypertrophy change  Electronically Signed By: Miguel Egan ([facility]) 2024-11-17 18:34:48  Date and Time of Study:2024-11-16 08:05:11    ECG 12 Lead Drug Monitoring [[id number]] Collected: 11/14/24 0258     Updated: 11/17/24 1835     QT Interval 402 ms      QTC Interval 484 ms     Narrative:      HEART  RATE=87  bpm  RR Vqlkdmpy=688  ms  OH Interval=  ms  P Horizontal Axis=  deg  P Front Axis=  deg  QRSD Interval=91  ms  QT Fvyjrbjo=604  ms  RJsL=426  ms  QRS Axis=39  deg  T Wave Axis=42  deg  - ABNORMAL ECG -  Atrial fibrillation  Low voltage, precordial leads  When compared with ECG of 12-Nov-2024 12:18:10,  No significant change  Electronically Signed By: Miguel Egan ([facility]) 2024-11-17 18:34:55  Date and Time of Study:2024-11-14 02:58:53    Telemetry Scan [[id number]] Resulted: 11/12/24     Updated: 11/18/24 1306    Telemetry Scan [[id number]] Resulted: 11/12/24     Updated: 11/18/24 1320    Telemetry Scan [[id number]] Resulted: 11/12/24     Updated: 11/18/24 1435    Telemetry Scan [[id number]] Resulted: 11/12/24     Updated: 11/19/24 0729    Telemetry Scan [[id number]] Resulted: 11/12/24     Updated: 11/19/24 0946    Telemetry Scan [[id number]] Resulted: 11/12/24     Updated: 11/19/24 1043    Telemetry Scan [[id number]] Resulted: 11/12/24     Updated: 11/19/24 1154    Telemetry Scan [[id number]] Resulted: 11/12/24     Updated: 11/19/24 1221    Telemetry Scan [[id number]] Resulted: 11/12/24     Updated: 11/19/24 1226    Telemetry Scan [[id number]] Resulted: 11/12/24     Updated: 11/19/24 1316    Telemetry Scan [[id number]] Resulted: 11/12/24     Updated: 11/19/24 1427    Telemetry Scan [[id number]] Resulted: 11/12/24     Updated: 11/19/24 1439    Telemetry Scan [[id number]] Resulted: 11/12/24     Updated: 11/19/24 1509    Telemetry Scan [[id number]] Resulted: 11/12/24     Updated: 11/19/24 1701    Telemetry Scan [[id number]] Resulted: 11/12/24     Updated: 11/19/24 1740    Telemetry Scan [[id number]] Resulted: 11/12/24     Updated: 11/19/24 1848    Telemetry Scan [[id number]] Resulted: 11/12/24     Updated: 11/19/24 1851    Telemetry Scan [[id number]] Resulted: 11/12/24     Updated: 11/20/24 1044    ECG 12 Lead QT Measurement [[id number]] Collected: 11/19/24 2306     Updated: 11/20/24 1052     QT Interval 383 ms      QTC Interval  460 ms     Narrative:      HEART RATE=86  bpm  RR Ipanaoqg=726  ms  KS Interval=  ms  P Horizontal Axis=  deg  P Front Axis=  deg  QRSD Interval=90  ms  QT Mloickwf=325  ms  DKwT=826  ms  QRS Axis=28  deg  T Wave Axis=37  deg  - ABNORMAL ECG -  Atrial fibrillation  Ventricular bigeminy  Low voltage, precordial leads  When compared with ECG of 19-Nov-2024 20:30:29,  Significant repolarization change  Significant axis, voltage or hypertrophy change  Electronically Signed By: Miguel Egan ([facility]) 2024-11-20 10:52:14  Date and Time of Study:2024-11-19 23:06:06    ECG 12 Lead QT Measurement [[id number]] Collected: 11/19/24 2030     Updated: 11/20/24 1053     QT Interval 389 ms      QTC Interval 458 ms     Narrative:      HEART RATE=83  bpm  RR Lqsrkpzt=966  ms  KS Interval=  ms  P Horizontal Axis=  deg  P Front Axis=  deg  QRSD Ghpvibyh=407  ms  QT Ilybdlxy=841  ms  YQdQ=361  ms  QRS Axis=57  deg  T Wave Axis=-28  deg  - ABNORMAL ECG -  Atrial fibrillation  Right bundle branch block  ST elevation secondary to IVCD  When compared with ECG of 19-Nov-2024 12:10:00,  Significant rate decrease  Electronically Signed By: Miguel Egan ([facility]) 2024-11-20 10:52:21  Date and Time of Study:2024-11-19 20:30:29    ECG 12 Lead Drug Monitoring; sotalol [[id number]] Collected: 11/19/24 1210     Updated: 11/20/24 1053     QT Interval 345 ms      QTC Interval 508 ms     Narrative:      HEART PDCI=120  bpm  RR Maghnijp=141  ms  KS Interval=  ms  P Horizontal Axis=  deg  P Front Axis=  deg  QRSD Ifyoccwh=292  ms  QT Oxwzevyf=766  ms  KQuX=953  ms  QRS Axis=74  deg  T Wave Axis=-37  deg  - ABNORMAL ECG -  Atrial fibrillation  Right bundle branch block  When compared with ECG of 16-Nov-2024 08:05:11,  Significant change in rhythm  New conduction abnormality  Significant axis, voltage or hypertrophy change  Electronically Signed By: Miguel Egan ([facility]) 2024-11-20 10:53:09  Date and Time of Study:2024-11-19 12:10:00    Telemetry Scan [[id number]]  Resulted: 11/12/24     Updated: 11/20/24 1446    Telemetry Scan [[id number]] Resulted: 11/12/24     Updated: 11/20/24 1607    Telemetry Scan [[id number]] Resulted: 11/12/24     Updated: 11/20/24 1654    Telemetry Scan [[id number]] Resulted: 11/12/24     Updated: 11/20/24 1705    Telemetry Scan [[id number]] Resulted: 11/12/24     Updated: 11/20/24 1711    ECG 12 Lead QT Measurement [[id number]] Collected: 11/20/24 2226     Updated: 11/20/24 2229     QT Interval 413 ms      QTC Interval 413 ms     Narrative:      HEART RATE=60  bpm  RR Vbfbyujh=7076  ms  NH Uqylkcdo=532  ms  P Horizontal Axis=-88  deg  P Front Axis=  deg  QRSD Interval=84  ms  QT Nfabyvut=320  ms  TWnA=723  ms  QRS Axis=12  deg  T Wave Axis=28  deg  - ABNORMAL ECG -  Atrial-paced complexes  Borderline prolonged NH interval  Date and Time of Study:2024-11-20 22:26:43    Telemetry Scan [[id number]] Resulted: 11/12/24     Updated: 11/20/24 2250    Telemetry Scan [[id number]] Resulted: 11/12/24     Updated: 11/20/24 2250    EP/CRM Study [[id number]] Resulted: 11/20/24 1852     Updated: 11/21/24 0953    Narrative:      Patient Care Team:  Brinda Tony APRN as PCP - General (Nurse Practitioner)  Kevin Angel MD as Consulting Physician (Cardiology)    : Bertin Stone MD    Date of procedure: 11/20/2024    Procedure indications: Paroxysmal atrial fibrillation, very slow sinus   rate when in sinus rhythm.  Sick sinus syndrome    Procedure performed: Implantation of a dual-chamber/direct conduction   system pacemaker and cardioversion under conscious sedation.    Procedure details    The patient was taken to the EP lab in a fasting state, the left shoulder   area was prepped and draped in the usual sterile fashion.  Sedation was   achieved with IV Versed and fentanyl under my direct supervision, total   sedation time was 90 minutes.  Patient had been on uninterrupted   anticoagulation with Eliquis and therefore cardioversion was performed  "  with 200 J biphasic current synchronized with R waves x 2.  Post   cardioversion her sinus rate was extremely low.  Incision was made in the left subclavian area and carried down with the   help of blunt and sharp dissection.  Axillary vein was accessed with a   micropuncture needle under venographic guidance, and 2 wires were placed   in the SVC.  A preformed HIS sheath (Medtronic, C315) was advanced into the RV over a   Glidewire and positioned approximately mid septum.  Contrast was injected   to ensure appropriate positioning of the sheath against the septum.  The   Medtronic 3830 lead was then advanced through the sheath and manually   rotated and anchored to the RV septum. Pacing was performed which showed a   characteristic \"W\" sign on V1 precordial leads. The lead was then further   advanced deep into the septum.  Contrast was injected through the sheath   to assess the depth of the lead tip into the septum.  Pacing through the   lead showed evidence of left bundle capture with a terminal R wave in V1   and V2 and a stim to QRS of 80 ms, and a paced QRS duration of 130 ms. The   sheath was then split using the appropriate splitter.  Of note that despite repositioning the lead several times, a narrow QRS   could not be obtained in her case.  The right atrial lead was advanced under fluoroscopic guidance and was   actively fixated to the right atrial appendage, it was tested with   satisfactory performance.  Both leads were then sewn down to the   pectoralis fascia using Ethibond sutures.  A pocket was then made large   enough to accommodate the pacemaker generator, and it was irrigated with   antibiotic solution.  Both leads were then connected to the pulse   generator and the assembly was introduced in the pocket.  The incision was   then closed in layers of absorbable sutures and Steri-Strips and   appropriate bandaging was applied. The patient was transferred to the   recovery area in a stable " condition.    Device information  Medtronic [id number], serial number [id number]    Lead information  RV (left bundle) lead: Medtronic 3830/69, serial number [id number]  RA lead: Medtronic 4076/52, serial number [id number]    Parameters  RV (left bundle): Threshold 0.75 V@0.4 ms    Sensin.4 mV       Impedance: 874 ohms  RA:                     Threshold 0.75 V@0.4 ms    Sensing: N/A mV       Impedance: 570 ohms    Device settings: MVP-R 60/130    MRI: Conditional    Complications: None    Blood loss: 20 cc    Conclusion: Successful implantation of a direct conduction system,   dual-chamber pacemaker and cardioversion with return to sinus rhythm.     Recommendations    Continue sotalol 80 mg every 12 hours.        Electronically signed by Bertin Stone MD, 24, 6:51 PM EST.    Telemetry Scan [[id number]] Resulted: 24     Updated: 24 1205    Telemetry Scan [[id number]] Resulted: 24     Updated: 246    ECG 12 Lead QT Measurement [[id number]] Collected: 240     Updated: 24 231     QT Interval 410 ms      QTC Interval 410 ms     Narrative:      HEART RATE=60  bpm  RR Wqvtrslu=4847  ms  UT Rrlwskhj=719  ms  P Horizontal Axis=269  deg  P Front Axis=  deg  QRSD Interval=81  ms  QT Xpfwzifp=371  ms  SXmE=658  ms  QRS Axis=11  deg  T Wave Axis=28  deg  - ABNORMAL ECG -  Atrial-paced rhythm  Date and Time of Study:2024 23:10:06    Telemetry Scan [[id number]] Resulted: 24     Updated: 24 0027    Telemetry Scan [[id number]] Resulted: 24     Updated: 24 0135    Telemetry Scan [[id number]] Resulted: 24     Updated: 24 0218    Telemetry Scan [[id number]] Resulted: 24     Updated: 24 0410    Telemetry Scan [[id number]] Resulted: 24     Updated: 24 0502    Telemetry Scan [[id number]] Resulted: 24     Updated: 24 0538    Telemetry Scan [[id number]] Resulted: 24     Updated: 24 0603    Telemetry Scan  [[id number]] Resulted: 11/12/24     Updated: 11/22/24 0613            Medication Review:   I have reviewed the patient's current medication list    Current Facility-Administered Medications:     acetaminophen (TYLENOL) tablet 650 mg, 650 mg, Oral, Q4H PRN **OR** acetaminophen (TYLENOL) 160 MG/5ML oral solution 650 mg, 650 mg, Oral, Q4H PRN **OR** acetaminophen (TYLENOL) suppository 650 mg, 650 mg, Rectal, Q4H PRN, Bertin Stone MD    albuterol (PROVENTIL) nebulizer solution 0.083% 2.5 mg/3mL, 2.5 mg, Nebulization, Q6H PRN, Bertin Stone MD, 2.5 mg at 11/14/24 0451    apixaban (ELIQUIS) tablet 5 mg, 5 mg, Oral, BID, Hussein Manzanares MD    sennosides-docusate (PERICOLACE) 8.6-50 MG per tablet 2 tablet, 2 tablet, Oral, BID PRN, 2 tablet at 11/18/24 2048 **AND** polyethylene glycol (MIRALAX) packet 17 g, 17 g, Oral, Daily PRN **AND** bisacodyl (DULCOLAX) EC tablet 5 mg, 5 mg, Oral, Daily PRN **AND** bisacodyl (DULCOLAX) suppository 10 mg, 10 mg, Rectal, Daily PRN, Bertin Stone MD    Calcium Replacement - Follow Nurse / BPA Driven Protocol, , Not Applicable, PRN, Bertin Stone MD    dilTIAZem CD (CARDIZEM CD) 24 hr capsule 180 mg, 180 mg, Oral, Q24H, Bertin Stone MD, 180 mg at 11/21/24 0857    furosemide (LASIX) tablet 40 mg, 40 mg, Oral, Daily, Bertin Stone MD, 40 mg at 11/19/24 0803    guaiFENesin-codeine (GUAIFENESIN AC) 100-10 MG/5ML liquid 5 mL, 5 mL, Oral, Q4H PRN, Bertin Stone MD, 5 mL at 11/16/24 2223    hydrALAZINE (APRESOLINE) injection 10 mg, 10 mg, Intravenous, Q6H PRN, Bertin Stone MD, 10 mg at 11/19/24 1043    hydrALAZINE (APRESOLINE) tablet 50 mg, 50 mg, Oral, Q12H, Bertin Stone MD, 50 mg at 11/21/24 0857    HYDROcodone-acetaminophen (NORCO) 5-325 MG per tablet 1 tablet, 1 tablet, Oral, Q6H PRN, Bertin Stone MD    ipratropium-albuterol (DUO-NEB) nebulizer solution 3 mL, 3 mL, Nebulization, 4x Daily - RT, Bertin Stone MD, 3 mL at 11/22/24  0650    Bandar pack 1 packet, 1 packet, Oral, BID, Mariana Hauser, RD, 1 packet at 11/21/24 2029    levothyroxine (SYNTHROID, LEVOTHROID) tablet 50 mcg, 50 mcg, Oral, Daily, Bertin Stone MD, 50 mcg at 11/19/24 0804    losartan (COZAAR) tablet 50 mg, 50 mg, Oral, Q24H, Bertin Stone MD, 50 mg at 11/21/24 0857    Magnesium Standard Dose Replacement - Follow Nurse / BPA Driven Protocol, , Not Applicable, PRN, Bertin Stone MD    multivitamin with minerals 1 tablet, 1 tablet, Oral, Daily, Bertin Stone MD, 1 tablet at 11/19/24 0803    nitroglycerin (NITROSTAT) SL tablet 0.4 mg, 0.4 mg, Sublingual, Q5 Min PRN, Bertin Stone MD    pantoprazole (PROTONIX) EC tablet 40 mg, 40 mg, Oral, Daily, Bertin Stone MD, 40 mg at 11/19/24 0803    Phosphorus Replacement - Follow Nurse / BPA Driven Protocol, , Not Applicable, PRN, Bertin Stone MD    Potassium Replacement - Follow Nurse / BPA Driven Protocol, , Not Applicable, PRN, Bertin Stone MD    predniSONE (DELTASONE) tablet 20 mg, 20 mg, Oral, Daily With Breakfast, Bertin Stone MD    sodium chloride 0.9 % flush 10 mL, 10 mL, Intravenous, PRN, Bertin Stone MD, 10 mL at 11/19/24 2041    sotalol (BETAPACE) tablet 80 mg, 80 mg, Oral, Q12H, Bertin Stone MD, 80 mg at 11/21/24 2027    Assessment & Plan     Principal Problem:  Shortness of breath: multifactorial with pneumonia COPD, diastolic dysfunction, obesity, immobility deconditioning     Paroxysmal atrial fibrillation  Now paced rhythm at 60     Dyspnea with exertion  Being treated for pneumonia, also COPD with Solu-Medrol and DuoNebs  Chronic lower extremity edema secondary to lymphedema   BNP is normal  EF preserved 55 to 60%, normal estimated right-sided pressures  Mild diastolic dysfunction chronically as assessed in sinus rhythm previously, indeterminate with underlying atrial fibrillation, known qjab-yb-oexb variability  CT with right lower lobe endobronchial  opacification and consolidative changes in right lower lobe  On nebs, steroids    history of pulmonary embolism  Currently anticoagulated with Eliquis therapeutic dose uninterrupted     Hypertension  As below      Today  Paced rhythm at 60   Continue sotalol, follow QT interval per protocol, has been steadily declining from previous of 508, yesterday was 410  continue to monitor for toxicity and high risk medicines  Oxygen saturations remain low, continue supplemental oxygen, plan to discharge on O2 2L  Continue Lasix 40 daily  Interrogate device as indicated as outpatient  Continues on Cardizem 180 mg Q4, hydralazine 50 every 12, losartan 50 daily  Through the night, blood pressures are on the lower side, 100 over 50s  Continue to monitor today to consider titration of antihypertensives  Patient with immobility, high falls risk, will be working with home health PT at home  Eliquis twice daily, indication lifelong  Continue treatment for pneumonia and COPD from a pulmonary standpoint  pulmonary has documented okay to discharge with outpatient sleep study    EF is preserved with [id number]-vj-awga variability 65% EF, diastolic dysfunction known    No complaints of chest pain, normal EF, negative stress test, no history of any coronary artery disease or structural heart disease    Will see as needed over the weekend if she remains in the hospital  Outpatient follow-up to EP for device site examination  Back to general cardiology 30 to 90 days  Tachybradycardia syndrome now on sotalol, continue to monitor for high risk medicine, QT stable remains in atrially paced rhythm        DEE Blair  11/22/24  08:38 EST

## 2024-11-22 NOTE — CASE MANAGEMENT/SOCIAL WORK
Case Management/Social Work    Patient Name:  Camelia Gray  YOB: 1945  MRN: [id number]  Admit Date:  11/12/2024        CONSUELO made aware that patient no longer wishes to appeal discharge. Instructed CM to have patient call [facility] and rescind discharge appeal for case ID [id number]. CONSUELO called [facility] ([phone]) and spoke with Nell to inform [facility] patient no longer wishes to appeal hospital discharge and is wishing to discharge from [facility].     Patient now safe to discharge per QIO.       Electronically signed by:  Chad Nolan CMA  11/22/24 16:10 EST    Chad Nolan  Case Management Associate  [address] [address]  P: [phone]  F: [phone]

## 2024-11-22 NOTE — PROGRESS NOTES
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     possible aspiration due to large hiatal hernia  Localized right upper lobe wheezing, CT chest showed right upper lobe endobronchial opacity     Bronchoscopy 12/27/2023  Copious amount mucopurulent secretions suctioned therapeutically  Right lung washing was obtained  Moderate inflammatory changes     Hypoxia    Pulmonary edema  Respiratory viral panel negative on 12/24/2023  History of pulmonary embolism 2018  Quit smoking 1992     Status post permanent pacemaker placement 11/20/2024     Recommendations:  Respiratory status is improving, patient can be discharged from pulmonary standpoint after the pacemaker placement  6-minute walking 11/19/2024 showed need for home oxygen 2 L    Patient will need outpatient sleep study to assess need for CPAP    Status post bronchoscopy 11/17/2024 showing multiple mucous plugs and tracheobronchomalacia    Completed Mucomyst    HR control as per cardiology:  status post cardioversion 11/15/2024: Status post pacemaker placement 11/20/2024      antibiotics: Completed Rocephin    Prednisone tapering    Mucinex  Encourage use of incentive spirometer          Oxygen supplement and titration to maintain saturation 90 to 95%: on 2 L  Bronchodilators     Chronic anticoagulation: Eliquis  Thyroid hormone replacement     I personally reviewed the radiological studies               LOS: 9 days     Subjective     Patient reports improvement in the cough and shortness of breath    Objective     Vital signs for last 24 hours:  Vitals:    11/22/24 0653 11/22/24 0841 11/22/24 1040 11/22/24 1043   BP:  144/70     BP Location:  Right arm     Patient Position:  Lying     Pulse: 60 60 60 60   Resp: 21 13 16 16   Temp:  98 °F (36.7 °C)     TempSrc:  Tympanic     SpO2: 95% 92% 92% 92%   Weight:       Height:           Intake/Output last 3  shifts:  I/O last 3 completed shifts:  In: 480 [P.O.:480]  Out: 200 [Urine:200]  Intake/Output this shift:  No intake/output data recorded.      Radiology  Imaging Results (Last 24 Hours)       ** No results found for the last 24 hours. **            Labs:  Results from last 7 days   Lab Units 11/22/24  0110   WBC 10*3/mm3 10.49   HEMOGLOBIN g/dL 14.3   HEMATOCRIT % 46.0   PLATELETS 10*3/mm3 240     Results from last 7 days   Lab Units 11/22/24  0110   SODIUM mmol/L 140   POTASSIUM mmol/L 4.4   CHLORIDE mmol/L 101   CO2 mmol/L 30.0*   BUN mg/dL 45*   CREATININE mg/dL 0.91   CALCIUM mg/dL 8.8   GLUCOSE mg/dL 101*                                           Meds:   SCHEDULE  apixaban, 5 mg, Oral, BID  dilTIAZem CD, 180 mg, Oral, Q24H  furosemide, 40 mg, Oral, Daily  hydrALAZINE, 50 mg, Oral, Q12H  ipratropium-albuterol, 3 mL, Nebulization, 4x Daily - RT  Bandar, 1 packet, Oral, BID  levothyroxine, 50 mcg, Oral, Daily  losartan, 50 mg, Oral, Q24H  multivitamin with minerals, 1 tablet, Oral, Daily  pantoprazole, 40 mg, Oral, Daily  predniSONE, 20 mg, Oral, Daily With Breakfast  sotalol, 80 mg, Oral, Q12H      Infusions       PRNs    acetaminophen **OR** acetaminophen **OR** acetaminophen    albuterol    senna-docusate sodium **AND** polyethylene glycol **AND** bisacodyl **AND** bisacodyl    Calcium Replacement - Follow Nurse / BPA Driven Protocol    guaiFENesin-codeine    hydrALAZINE    HYDROcodone-acetaminophen    Magnesium Standard Dose Replacement - Follow Nurse / BPA Driven Protocol    nitroglycerin    Phosphorus Replacement - Follow Nurse / BPA Driven Protocol    Potassium Replacement - Follow Nurse / BPA Driven Protocol    sodium chloride    Physical Exam:  General Appearance:  Alert   HEENT:  Normocephalic, without obvious abnormality, Conjunctiva/corneas clear,.   Nares normal, no drainage     Neck:  Supple, symmetrical, trachea midline.   Lungs /Chest wall: mild bilateral basal rhonchi, respirations unlabored,  symmetrical wall movement.     Heart: Rate controlled, S1 S2 normal  Abdomen: Soft, non-tender, no masses, no organomegaly.    Extremities: No edema, no clubbing or cyanosis     ROS  Constitutional: Negative for chills, fever and malaise/fatigue.   HENT: Negative.    Eyes: Negative.    Cardiovascular: Negative.    Respiratory: Positive for improving cough and shortness of breath.    Skin: Negative.    Musculoskeletal: Negative.    Gastrointestinal: Negative.    Genitourinary: Negative.    Neurological: Generalized weakness    I reviewed the recent clinical results  I personally reviewed the latest radiological studies    Part of this note may be an electronic transcription/translation of spoken language to printed text using the Dragon Dictation System.

## 2024-11-24 LAB
MYCOBACTERIUM SPEC CULT: NORMAL
NIGHT BLUE STAIN TISS: NORMAL
QT INTERVAL: 410 MS
QTC INTERVAL: 410 MS

## 2024-11-25 ENCOUNTER — HOME HEALTH ADMISSION (OUTPATIENT)
Dept: HOME HEALTH SERVICES | Facility: HOME HEALTHCARE | Age: 79
End: 2024-11-25
Payer: MEDICARE

## 2024-11-25 ENCOUNTER — TRANSCRIBE ORDERS (OUTPATIENT)
Dept: HOME HEALTH SERVICES | Facility: HOME HEALTHCARE | Age: 79
End: 2024-11-25
Payer: MEDICARE

## 2024-11-25 ENCOUNTER — TRANSITIONAL CARE MANAGEMENT TELEPHONE ENCOUNTER (OUTPATIENT)
Dept: CALL CENTER | Facility: HOSPITAL | Age: 79
End: 2024-11-25
Payer: MEDICARE

## 2024-11-25 DIAGNOSIS — R00.1 SYMPTOMATIC BRADYCARDIA: Primary | ICD-10-CM

## 2024-11-25 DIAGNOSIS — I48.19 PERSISTENT ATRIAL FIBRILLATION: ICD-10-CM

## 2024-11-25 LAB — FUNGUS WND CULT: ABNORMAL

## 2024-11-25 NOTE — CASE MANAGEMENT/SOCIAL WORK
Case Management Discharge Note      Final Note: Home with [facility]              Home Medical Care Coordination complete.      Service Provider Services Address Phone Fax Patient Preferred    [facility] [address] [place] [address] [phone] --                 Transportation Services  W/C Van: Edda Byers    Final Discharge Disposition Code: 06 - home with home health care

## 2024-11-25 NOTE — OUTREACH NOTE
Call Center TCM Note      Flowsheet Row Responses   [facility] patient discharged from? [facility]   Does the patient have one of the following disease processes/diagnoses(primary or secondary)? General Surgery   TCM attempt successful? No   Unsuccessful attempts Attempt 1   Call Status Left message  [Patient has a verbal release in 2023 with Lilliana and Leo]            Vaughn Leiva RN    11/25/2024, 14:49 EST

## 2024-11-25 NOTE — OUTREACH NOTE
Call Center TCM Note      Flowsheet Row Responses   [facility] patient discharged from? [facility]   Does the patient have one of the following disease processes/diagnoses(primary or secondary)? General Surgery   TCM attempt successful? Yes   Call start time 1544   Call end time 1549   Discharge diagnosis Pacemaker DC new - Med[employer]onic 3830   Person spoke with today (if not patient) and relationship patient   Meds reviewed with patient/caregiver? Yes   Is the patient having any side effects they believe may be caused by any medication additions or changes? No   Does the patient have all medications related to this admission filled (includes all antibiotics, pain medications, etc.) Yes   Is the patient taking all medications as directed (includes completed medication regime)? Yes   Comments Patient will followup with device check on 12/9. She also has a previously scheduled wellness exam on 12/17 with PCP   Does the patient have an appointment with their PCP within 7-14 days of discharge? Other   Nursing Interventions Patient desires to follow up with specialty only   What is the Home health agency?  [facility]   Has home health visited the patient within 72 hours of discharge? Call prior to 72 hours   Psychosocial issues? No   Did the patient receive a copy of their discharge instructions? No   Nursing interventions Reviewed instructions with patient   What is the patient's perception of their health status since discharge? Improving   Nursing interventions Nurse provided patient education   Is the patient/caregiver able to teach back signs and symptoms of incisional infection? Increased redness, swelling or pain at the incisonal site, Increased drainage or bleeding, Pus or odor from incision, Incisional warmth, Fever   Is the patient/caregiver able to teach back steps to recovery at home? Set small, achievable goals for return to baseline health, Rest and rebuild strength, gradually increase activity   If the patient  is a current smoker, are they able to teach back resources for cessation? Not a smoker   Is the patient/caregiver able to teach back the hierarchy of who to call/visit for symptoms/problems? PCP, Specialist, Home health nurse, Urgent Care, ED, 911 Yes   TCM call completed? Yes   Call end time [id number]   Would this patient benefit from a Referral to [facility] Social Work? No   Is the patient interested in additional calls from an ambulatory ? No            Vaughn Leiva RN    11/25/2024, 15:49 EST

## 2024-11-26 ENCOUNTER — HOME CARE VISIT (OUTPATIENT)
Dept: HOME HEALTH SERVICES | Facility: HOME HEALTHCARE | Age: 79
End: 2024-11-26
Payer: MEDICARE

## 2024-11-26 VITALS
SYSTOLIC BLOOD PRESSURE: 122 MMHG | RESPIRATION RATE: 18 BRPM | WEIGHT: 293 LBS | BODY MASS INDEX: 41.02 KG/M2 | TEMPERATURE: 97 F | HEIGHT: 71 IN | DIASTOLIC BLOOD PRESSURE: 60 MMHG | HEART RATE: 62 BPM | OXYGEN SATURATION: 97 %

## 2024-11-26 PROCEDURE — G0299 HHS/HOSPICE OF RN EA 15 MIN: HCPCS

## 2024-11-26 NOTE — Clinical Note
"SOC Note: pt. with discharge last week from [facility], pt. went into ER for SOB and bradycardia, pt. had a pacemaker placement performed, wound is covered with steri-strips, no s/s of infection.  Pt. has new medications from this hospital stay in which will need education.  Pt. lives alone but has an aid that comes during the week and day for assistance.  Pt. is a DNR and is to have paperwork for SN next visit or PT tomorrow.  Pt. uses wheelchair and walker in home.  Pt. is in agreeance with [facility].    Home Health ordered for: disciplines PT, OT, and SN    Reason for Hosp/Primary Dx/Co-morbidities: Atrial fibrillation, bradycardia, SOB, s/p pacemaker placement    Focus of Care: Atrial fibrillation    Patient's goal(s): \" To get my energy back\"    Current Functional status/mobility/DME: uses walker, and wheelchair at home    HB status/Living Arrangements: homebound, lives alone    Skin Integrity/wound status: at risk, pt. has moisture under bilateral breast, wound on buttocks, surgical wound    Code Status: DNR    Fall Risk/Safety concerns: at risk    Educated on Emergency Plan, steps to take prior to going to the ER and when to Call [facility]:  pt. aware     Medication issues/Concerns: needing education on new    Additional Problems/Concerns: n/a    SDOH Barriers (i.e. caregiver concerns, social isolation, transportation, food insecurity, environment, income etc.)/Need for MSW: no     2w2;1w4  "

## 2024-11-26 NOTE — HOME HEALTH
"SOC Note: pt. with discharge last week from [facility], pt. went into ER for SOB and bradycardia, pt. had a pacemaker placement performed, wound is covered with steri-strips, no s/s of infection.  Pt. has new medications from this hospital stay in which will need education.  Pt. lives alone but has an aid that comes during the week and day for assistance.  Pt. is a DNR and is to have paperwork for SN next visit or PT tomorrow.  Pt. uses wheelchair and walker in home.  Pt. is in agreeance with [facility].    Home Health ordered for: disciplines PT, OT, and SN    Reason for Hosp/Primary Dx/Co-morbidities: Atrial fibrillation, bradycardia, SOB, s/p pacemaker placement    Focus of Care: Atrial fibrillation    Patient's goal(s): \" To get my energy back\"    Current Functional status/mobility/DME: uses walker, and wheelchair at home    HB status/Living Arrangements: homebound, lives alone    Skin Integrity/wound status: at risk, pt. has moisture under bilateral breast, wound on buttocks, surgical wound    Code Status: DNR    Fall Risk/Safety concerns: at risk    Educated on Emergency Plan, steps to take prior to going to the ER and when to Call [facility]:  pt. aware     Medication issues/Concerns: needing education on new    Additional Problems/Concerns: n/a    SDOH Barriers (i.e. caregiver concerns, social isolation, transportation, food insecurity, environment, income etc.)/Need for MSW: no     2w2;1w4"

## 2024-11-26 NOTE — Clinical Note
Drug-Drug  <jscript:void(0)>  Drug-Drug: sotalol and albuterol sulfate HFA, budesonide-formoterol   Pharmacologic effects of beta-2 agonists may be decreased by beta-adrenergic blockers. Untoward physiologic effects, characterized by bronchospasm, may occur.   Details Major   sotalol (BETAPACE) 80 MG tablet   Long-term.     albuterol sulfate HFA (Ventolin HFA) 108 (90 Base) MCG/ACT inhaler     budesonide-formoterol (Symbicort) 160-4.5 MCG/ACT inhaler   Long-term.     sotalol (Betapace) 80 MG tablet   Long-term.

## 2024-11-27 ENCOUNTER — HOME CARE VISIT (OUTPATIENT)
Dept: HOME HEALTH SERVICES | Facility: HOME HEALTHCARE | Age: 79
End: 2024-11-27
Payer: MEDICARE

## 2024-11-27 VITALS
HEART RATE: 67 BPM | DIASTOLIC BLOOD PRESSURE: 84 MMHG | SYSTOLIC BLOOD PRESSURE: 140 MMHG | OXYGEN SATURATION: 93 % | TEMPERATURE: 98.1 F

## 2024-11-27 PROCEDURE — G0151 HHCP-SERV OF PT,EA 15 MIN: HCPCS

## 2024-12-01 LAB
MYCOBACTERIUM SPEC CULT: NORMAL
NIGHT BLUE STAIN TISS: NORMAL

## 2024-12-02 ENCOUNTER — HOME CARE VISIT (OUTPATIENT)
Dept: HOME HEALTH SERVICES | Facility: HOME HEALTHCARE | Age: 79
End: 2024-12-02
Payer: MEDICARE

## 2024-12-02 VITALS
HEART RATE: 72 BPM | TEMPERATURE: 97 F | DIASTOLIC BLOOD PRESSURE: 72 MMHG | RESPIRATION RATE: 18 BRPM | OXYGEN SATURATION: 96 % | SYSTOLIC BLOOD PRESSURE: 116 MMHG

## 2024-12-02 PROCEDURE — G0299 HHS/HOSPICE OF RN EA 15 MIN: HCPCS

## 2024-12-02 PROCEDURE — G0152 HHCP-SERV OF OT,EA 15 MIN: HCPCS

## 2024-12-03 VITALS
SYSTOLIC BLOOD PRESSURE: 130 MMHG | RESPIRATION RATE: 17 BRPM | HEART RATE: 60 BPM | OXYGEN SATURATION: 97 % | TEMPERATURE: 97.7 F | DIASTOLIC BLOOD PRESSURE: 70 MMHG

## 2024-12-06 ENCOUNTER — OFFICE VISIT (OUTPATIENT)
Dept: FAMILY MEDICINE CLINIC | Facility: CLINIC | Age: 79
End: 2024-12-06
Payer: MEDICARE

## 2024-12-06 ENCOUNTER — HOME CARE VISIT (OUTPATIENT)
Dept: HOME HEALTH SERVICES | Facility: HOME HEALTHCARE | Age: 79
End: 2024-12-06
Payer: MEDICARE

## 2024-12-06 VITALS
HEIGHT: 71 IN | HEART RATE: 61 BPM | RESPIRATION RATE: 16 BRPM | DIASTOLIC BLOOD PRESSURE: 79 MMHG | OXYGEN SATURATION: 94 % | SYSTOLIC BLOOD PRESSURE: 145 MMHG | BODY MASS INDEX: 46.05 KG/M2 | TEMPERATURE: 98.4 F

## 2024-12-06 VITALS
RESPIRATION RATE: 18 BRPM | OXYGEN SATURATION: 98 % | HEART RATE: 66 BPM | SYSTOLIC BLOOD PRESSURE: 135 MMHG | DIASTOLIC BLOOD PRESSURE: 74 MMHG | TEMPERATURE: 97 F

## 2024-12-06 DIAGNOSIS — Z09 HOSPITAL DISCHARGE FOLLOW-UP: ICD-10-CM

## 2024-12-06 DIAGNOSIS — I48.19 PERSISTENT ATRIAL FIBRILLATION: Chronic | ICD-10-CM

## 2024-12-06 DIAGNOSIS — R06.02 SHORTNESS OF BREATH: ICD-10-CM

## 2024-12-06 DIAGNOSIS — T17.800A MULTIPLE TRACHEOBRONCHIAL MUCUS PLUGS: ICD-10-CM

## 2024-12-06 DIAGNOSIS — K44.9 HIATAL HERNIA: Chronic | ICD-10-CM

## 2024-12-06 PROCEDURE — G0299 HHS/HOSPICE OF RN EA 15 MIN: HCPCS

## 2024-12-06 NOTE — PROGRESS NOTES
Subjective        Camelia Gray is a 79 y.o. female.     Chief Complaint   Patient presents with    Transitional Care Management     Hospital Follow Up       History of Present Illness  Patient is here for hospital follow up from her shortness of breath, hypertensive emergency, moderate persistent asthmatic bronchitis with acute exacerbation .  Admitted on 11/12/2024 thru 11/22/2024. Notes reviewed from discharge.   She presented to hospital with shortness of breath going on 4-5 days. She has chronic lymphedema and has legs wrapped.   She was tachycardic and hypertensive as well as tachypnea. Required IV labetalol for blood pressure. Resp viral panel neg.   Chest xray : cardiomegaly   Possible atelectasis or scarring right lower lobe adjacent to shadow which couldd relate to known hiatal hernia. EKG shows atrial fib. Rate controlled 105. Given IV steroids and and duoneb then admitted.   11/17 bronchoscopy she felt less congested.   Was on 3  L oxygen started diltiazem for rate control sotalol stopped flecainide started for heart rate. HR 50-60's. Blood pressure still high. Norvasc and losartan added, IV lasix  then on 11/19 a fib . Started drip and consulted EP for pacemaker. Pacemaker placed 11/20.  Stable to go home with home healthy.   She is here today by wheelchair for follow up.  WBC back to 10 on 11/22/ hgb hct normal.  Echo 11/12/2024 Left ventricular EF   Left ventricular wall thickness consistent with borderline concentric hypertrophy  Left atrial cavity mildly dilated left atrial volume mildly increased  Moderate calcification of aortic v  Right Ventricular systolic pressure from tricuspid <35  Current outpatient and discharge medications have been reconciled for the patient.  Reviewed by: DEE Conte     Respiratory status is improving, patient can be discharged from pulmonary standpoint after the pacemaker placement  6-minute walking 11/19/2024 showed need for home oxygen 2 L    She has  nursing and PT and OT at home.     She is here today with her blood pressure HR and and oxygen sats   [id number]  HR 60 sats 97   144/81  HR 60 sats 97  143/85 HR 60  sats 97  129/70 HR 60 sats 97%  130/78 HR 61 sats 97%    12/4/ 132/76 HR 60 sats 95  12/5/  132/72  HR 61 sats 97  142/84  HR 63 sats 98    Her one complaint is that she is incontinent of urine and the lasix makes it hard to not pee a lot. She also drinks more in am.       Non-gynecologic Cytology Final result 11/17/2024           Final Diagnosis   Left lung, bronchoalveolar lavage with smears and cytospin:  Acute inflammation, reactive but mature squamous cells, pulmonary macrophages, bronchial epithelial cells and Candida                  I sent message to dr Diallo. Pulmonology.       The following portions of the patient's history were reviewed and updated as appropriate: allergies, current medications, past family history, past medical history, past social history, past surgical history and problem list.      Current Outpatient Medications:     albuterol sulfate HFA (Ventolin HFA) 108 (90 Base) MCG/ACT inhaler, Inhale 2 puffs 4 (Four) Times a Day As Needed for Shortness of Air., Disp: 18 g, Rfl: 11    apixaban (ELIQUIS) 5 MG tablet tablet, Take 1 tablet by mouth 2 (Two) Times a Day., Disp: 60 tablet, Rfl: 11    B Complex Vitamins (vitamin b complex) capsule capsule, Take 1 capsule by mouth Daily. Indications: Vitamin Deficiency, Disp: , Rfl:     budesonide-formoterol (Symbicort) 160-4.5 MCG/ACT inhaler, Inhale 2 puffs 2 (Two) Times a Day., Disp: 10.2 g, Rfl: 1    dilTIAZem CD (CARDIZEM CD) 180 MG 24 hr capsule, Take 1 capsule by mouth Daily., Disp: 30 capsule, Rfl: 0    furosemide (LASIX) 40 MG tablet, Take 1 tablet by mouth Daily., Disp: 30 tablet, Rfl: 0    hydrALAZINE (APRESOLINE) 50 MG tablet, Take 0.5 tablets by mouth 2 (Two) Times a Day As Needed (sbp>160)., Disp: 60 tablet, Rfl: 0    levothyroxine (SYNTHROID, LEVOTHROID) 50 MCG tablet, Take 1  tablet by mouth Daily. Indications: Underactive Thyroid, Disp: 90 tablet, Rfl: 1    losartan (COZAAR) 50 MG tablet, Take 0.5 tablets by mouth Daily., Disp: 30 tablet, Rfl: 0    Multiple Vitamins-Minerals (multivitamin with minerals) tablet tablet, Take 1 tablet by mouth Daily. Indications: Vitamin Deficiency, Disp: , Rfl:     pantoprazole (PROTONIX) 40 MG EC tablet, TAKE ONE TABLET BY MOUTH DAILY, Disp: 90 tablet, Rfl: 0    sennosides-docusate (PERICOLACE) 8.6-50 MG per tablet, Take 2 tablets by mouth 2 (Two) Times a Day As Needed for Constipation., Disp: 30 tablet, Rfl: 0    sotalol (BETAPACE) 80 MG tablet, Take 1 tablet by mouth Every 12 (Twelve) Hours., Disp: 60 tablet, Rfl: 0    sotalol (Betapace) 80 MG tablet, Take 1 tablet by mouth 2 (Two) Times a Day. Indications: Irregularity of Ventricular Heart Rhythm, Disp: , Rfl:     Recent Results (from the past 24 weeks)   Lipid Panel    Collection Time: 07/09/24 11:39 AM    Specimen: Blood   Result Value Ref Range    Total Cholesterol 149 0 - 200 mg/dL    Triglycerides 83 0 - 150 mg/dL    HDL Cholesterol 69 (H) 40 - 60 mg/dL    LDL Cholesterol  64 0 - 100 mg/dL    VLDL Cholesterol 16 5 - 40 mg/dL    LDL/HDL Ratio 0.92    Comprehensive Metabolic Panel    Collection Time: 07/09/24 11:39 AM    Specimen: Blood   Result Value Ref Range    Glucose 110 (H) 65 - 99 mg/dL    BUN 24 (H) 8 - 23 mg/dL    Creatinine 0.89 0.57 - 1.00 mg/dL    Sodium 141 136 - 145 mmol/L    Potassium 4.3 3.5 - 5.2 mmol/L    Chloride 103 98 - 107 mmol/L    CO2 28.7 22.0 - 29.0 mmol/L    Calcium 9.7 8.6 - 10.5 mg/dL    Total Protein 7.3 6.0 - 8.5 g/dL    Albumin 4.2 3.5 - 5.2 g/dL    ALT (SGPT) 23 1 - 33 U/L    AST (SGOT) 23 1 - 32 U/L    Alkaline Phosphatase 124 (H) 39 - 117 U/L    Total Bilirubin 0.5 0.0 - 1.2 mg/dL    Globulin 3.1 gm/dL    A/G Ratio 1.4 g/dL    BUN/Creatinine Ratio 27.0 (H) 7.0 - 25.0    Anion Gap 9.3 5.0 - 15.0 mmol/L    eGFR 66.9 >60.0 mL/min/1.73   Hepatitis C antibody     Collection Time: 07/09/24 11:39 AM    Specimen: Blood   Result Value Ref Range    Hepatitis C Ab Non-Reactive Non-Reactive   TSH Rfx On Abnormal To Free T4    Collection Time: 07/09/24 11:39 AM    Specimen: Blood   Result Value Ref Range    TSH 12.400 (H) 0.270 - 4.200 uIU/mL   CBC Auto Differential    Collection Time: 07/09/24 11:39 AM    Specimen: Blood   Result Value Ref Range    WBC 6.13 3.40 - 10.80 10*3/mm3    RBC 5.03 3.77 - 5.28 10*6/mm3    Hemoglobin 14.5 12.0 - 15.9 g/dL    Hematocrit 44.7 34.0 - 46.6 %    MCV 88.9 79.0 - 97.0 fL    MCH 28.8 26.6 - 33.0 pg    MCHC 32.4 31.5 - 35.7 g/dL    RDW 14.3 12.3 - 15.4 %    RDW-SD 45.7 37.0 - 54.0 fl    MPV 10.4 6.0 - 12.0 fL    Platelets 215 140 - 450 10*3/mm3    Neutrophil % 67.9 42.7 - 76.0 %    Lymphocyte % 18.4 (L) 19.6 - 45.3 %    Monocyte % 9.5 5.0 - 12.0 %    Eosinophil % 3.1 0.3 - 6.2 %    Basophil % 0.3 0.0 - 1.5 %    Immature Grans % 0.8 (H) 0.0 - 0.5 %    Neutrophils, Absolute 4.16 1.70 - 7.00 10*3/mm3    Lymphocytes, Absolute 1.13 0.70 - 3.10 10*3/mm3    Monocytes, Absolute 0.58 0.10 - 0.90 10*3/mm3    Eosinophils, Absolute 0.19 0.00 - 0.40 10*3/mm3    Basophils, Absolute 0.02 0.00 - 0.20 10*3/mm3    Immature Grans, Absolute 0.05 0.00 - 0.05 10*3/mm3    nRBC 0.0 0.0 - 0.2 /100 WBC   T4, Free    Collection Time: 07/09/24 11:39 AM    Specimen: Blood   Result Value Ref Range    Free T4 0.98 0.93 - 1.70 ng/dL   ECG 12 Lead Dyspnea    Collection Time: 11/12/24 12:18 PM   Result Value Ref Range    QT Interval 353 ms    QTC Interval 467 ms   Blood Culture - Blood, Arm, Right    Collection Time: 11/12/24 12:36 PM    Specimen: Arm, Right; Blood   Result Value Ref Range    Blood Culture No growth at 5 days    Comprehensive Metabolic Panel    Collection Time: 11/12/24  1:21 PM    Specimen: Hand, Left; Blood   Result Value Ref Range    Glucose 113 (H) 65 - 99 mg/dL    BUN 22 8 - 23 mg/dL    Creatinine 0.73 0.57 - 1.00 mg/dL    Sodium 142 136 - 145 mmol/L     Potassium 3.8 3.5 - 5.2 mmol/L    Chloride 104 98 - 107 mmol/L    CO2 28.2 22.0 - 29.0 mmol/L    Calcium 9.8 8.6 - 10.5 mg/dL    Total Protein 7.3 6.0 - 8.5 g/dL    Albumin 3.8 3.5 - 5.2 g/dL    ALT (SGPT) 23 1 - 33 U/L    AST (SGOT) 28 1 - 32 U/L    Alkaline Phosphatase 128 (H) 39 - 117 U/L    Total Bilirubin 0.4 0.0 - 1.2 mg/dL    Globulin 3.5 gm/dL    A/G Ratio 1.1 g/dL    BUN/Creatinine Ratio 30.1 (H) 7.0 - 25.0    Anion Gap 9.8 5.0 - 15.0 mmol/L    eGFR 84.3 >60.0 mL/min/1.73   Blood Culture - Blood, Hand, Left    Collection Time: 11/12/24  1:21 PM    Specimen: Hand, Left; Blood   Result Value Ref Range    Blood Culture No growth at 5 days    High Sensitivity Troponin T    Collection Time: 11/12/24  1:21 PM    Specimen: Hand, Left; Blood   Result Value Ref Range    HS Troponin T 15 (H) <14 ng/L   CBC Auto Differential    Collection Time: 11/12/24  1:21 PM    Specimen: Hand, Left; Blood   Result Value Ref Range    WBC 6.89 3.40 - 10.80 10*3/mm3    RBC 5.00 3.77 - 5.28 10*6/mm3    Hemoglobin 14.2 12.0 - 15.9 g/dL    Hematocrit 45.3 34.0 - 46.6 %    MCV 90.6 79.0 - 97.0 fL    MCH 28.4 26.6 - 33.0 pg    MCHC 31.3 (L) 31.5 - 35.7 g/dL    RDW 15.2 12.3 - 15.4 %    RDW-SD 50.2 37.0 - 54.0 fl    MPV 9.0 6.0 - 12.0 fL    Platelets 212 140 - 450 10*3/mm3    Neutrophil % 71.9 42.7 - 76.0 %    Lymphocyte % 15.8 (L) 19.6 - 45.3 %    Monocyte % 8.1 5.0 - 12.0 %    Eosinophil % 3.5 0.3 - 6.2 %    Basophil % 0.3 0.0 - 1.5 %    Immature Grans % 0.4 0.0 - 0.5 %    Neutrophils, Absolute 4.95 1.70 - 7.00 10*3/mm3    Lymphocytes, Absolute 1.09 0.70 - 3.10 10*3/mm3    Monocytes, Absolute 0.56 0.10 - 0.90 10*3/mm3    Eosinophils, Absolute 0.24 0.00 - 0.40 10*3/mm3    Basophils, Absolute 0.02 0.00 - 0.20 10*3/mm3    Immature Grans, Absolute 0.03 0.00 - 0.05 10*3/mm3    nRBC 0.0 0.0 - 0.2 /100 WBC   Green Top (Gel)    Collection Time: 11/12/24  1:21 PM   Result Value Ref Range    Extra Tube Hold for add-ons.    Lavender Top     Collection Time: 11/12/24  1:21 PM   Result Value Ref Range    Extra Tube hold for add-on    BNP    Collection Time: 11/12/24  1:21 PM    Specimen: Hand, Left; Blood   Result Value Ref Range    proBNP 1,757.0 0.0 - 1,800.0 pg/mL   POC Lactate    Collection Time: 11/12/24  1:27 PM    Specimen: Blood   Result Value Ref Range    Lactate 0.9 0.3 - 2.0 mmol/L   Respiratory Panel PCR w/COVID-19(SARS-CoV-2) PRATIMA/BRIANNA/VERONICA/PAD/COR/CARLOS In-House, NP Swab in UTM/VTM, 2 HR TAT - Swab, Nasopharynx    Collection Time: 11/12/24  2:06 PM    Specimen: Nasopharynx; Swab   Result Value Ref Range    ADENOVIRUS, PCR Not Detected Not Detected    Coronavirus 229E Not Detected Not Detected    Coronavirus HKU1 Not Detected Not Detected    Coronavirus NL63 Not Detected Not Detected    Coronavirus OC43 Not Detected Not Detected    COVID19 Not Detected Not Detected - Ref. Range    Human Metapneumovirus Not Detected Not Detected    Human Rhinovirus/Enterovirus Not Detected Not Detected    Influenza A PCR Not Detected Not Detected    Influenza B PCR Not Detected Not Detected    Parainfluenza Virus 1 Not Detected Not Detected    Parainfluenza Virus 2 Not Detected Not Detected    Parainfluenza Virus 3 Not Detected Not Detected    Parainfluenza Virus 4 Not Detected Not Detected    RSV, PCR Not Detected Not Detected    Bordetella pertussis pcr Not Detected Not Detected    Bordetella parapertussis PCR Not Detected Not Detected    Chlamydophila pneumoniae PCR Not Detected Not Detected    Mycoplasma pneumo by PCR Not Detected Not Detected   Gold Top - SST    Collection Time: 11/12/24  2:27 PM   Result Value Ref Range    Extra Tube Hold for add-ons.    Light Blue Top    Collection Time: 11/12/24  2:27 PM   Result Value Ref Range    Extra Tube Hold for add-ons.    High Sensitivity Troponin T 2Hr    Collection Time: 11/12/24  2:27 PM    Specimen: Arm, Left; Blood   Result Value Ref Range    HS Troponin T 15 (H) <14 ng/L    Troponin T Delta 0 >=-4 - <+4 ng/L    Lavender Top    Collection Time: 11/12/24  2:27 PM   Result Value Ref Range    Extra Tube hold for add-on    Basic Metabolic Panel    Collection Time: 11/13/24  5:22 AM    Specimen: Arm, Right; Blood   Result Value Ref Range    Glucose 165 (H) 65 - 99 mg/dL    BUN 18 8 - 23 mg/dL    Creatinine 0.75 0.57 - 1.00 mg/dL    Sodium 142 136 - 145 mmol/L    Potassium 4.2 3.5 - 5.2 mmol/L    Chloride 105 98 - 107 mmol/L    CO2 26.1 22.0 - 29.0 mmol/L    Calcium 9.4 8.6 - 10.5 mg/dL    BUN/Creatinine Ratio 24.0 7.0 - 25.0    Anion Gap 10.9 5.0 - 15.0 mmol/L    eGFR 81.6 >60.0 mL/min/1.73   CBC Auto Differential    Collection Time: 11/13/24  5:22 AM    Specimen: Arm, Right; Blood   Result Value Ref Range    WBC 7.43 3.40 - 10.80 10*3/mm3    RBC 5.23 3.77 - 5.28 10*6/mm3    Hemoglobin 14.8 12.0 - 15.9 g/dL    Hematocrit 46.8 (H) 34.0 - 46.6 %    MCV 89.5 79.0 - 97.0 fL    MCH 28.3 26.6 - 33.0 pg    MCHC 31.6 31.5 - 35.7 g/dL    RDW 15.3 12.3 - 15.4 %    RDW-SD 50.4 37.0 - 54.0 fl    MPV 9.4 6.0 - 12.0 fL    Platelets 224 140 - 450 10*3/mm3    Neutrophil % 88.5 (H) 42.7 - 76.0 %    Lymphocyte % 9.4 (L) 19.6 - 45.3 %    Monocyte % 0.7 (L) 5.0 - 12.0 %    Eosinophil % 0.1 (L) 0.3 - 6.2 %    Basophil % 0.1 0.0 - 1.5 %    Immature Grans % 1.2 (H) 0.0 - 0.5 %    Neutrophils, Absolute 6.57 1.70 - 7.00 10*3/mm3    Lymphocytes, Absolute 0.70 0.70 - 3.10 10*3/mm3    Monocytes, Absolute 0.05 (L) 0.10 - 0.90 10*3/mm3    Eosinophils, Absolute 0.01 0.00 - 0.40 10*3/mm3    Basophils, Absolute 0.01 0.00 - 0.20 10*3/mm3    Immature Grans, Absolute 0.09 (H) 0.00 - 0.05 10*3/mm3    nRBC 0.0 0.0 - 0.2 /100 WBC   Adult Transthoracic Echo Complete W/ Cont if Necessary Per Protocol    Collection Time: 11/13/24 12:44 PM   Result Value Ref Range    LVIDd 4.5 cm    LVIDs 3.0 cm    IVSd 1.10 cm    LVPWd 1.00 cm    FS 33.3 %    IVS/LVPW 1.10 cm    ESV(cubed) 27.0 ml    LV Sys Vol (BSA corrected) 11.3 cm2    EDV(cubed) 91.1 ml    LV [place] Vol (BSA  corrected) 36.5 cm2    LV mass(C)d 164.0 grams    LVOT area 2.8 cm2    LVOT diam 1.90 cm    EDV(MOD-sp4) 97.3 ml    ESV(MOD-sp4) 30.0 ml    SV(MOD-sp4) 67.3 ml    SVi(MOD-SP4) 25.3 ml/m2    SVi (LVOT) 21.6 ml/m2    EF(MOD-sp4) 69.2 %    MV E max christiano 96.7 cm/sec    LA ESV Index (BP) 19.2 ml/m2    Lat Peak E' Christiano 10.4 cm/sec    TR max christiano 163.0 cm/sec    SV(LVOT) 57.6 ml    RVIDd 3.3 cm    RV S' 9.9 cm/sec    LA dimension (2D)  5.0 cm    LV V1 max 113.0 cm/sec    LV V1 max PG 5.1 mmHg    LV V1 mean PG 3.0 mmHg    LV V1 VTI 20.3 cm    Ao pk christiano 163.0 cm/sec    Ao max PG 10.6 mmHg    Ao mean PG 7.0 mmHg    Ao V2 VTI 32.1 cm    FEDERICO(I,D) 1.79 cm2    MV max PG 8.2 mmHg    MV mean PG 3.0 mmHg    MV V2 VTI 26.0 cm    MV P1/2t 66.6 msec    MVA(P1/2t) 3.3 cm2    MVA(VTI) 2.21 cm2    MV dec slope 616.0 cm/sec2    MR max christiano 390.0 cm/sec    MR max PG 60.8 mmHg    TR max PG 10.6 mmHg    RVSP(TR) 25.6 mmHg    RAP systole 15.0 mmHg    RV V1 max PG 2.9 mmHg    RV V1 max 85.4 cm/sec    RV V1 VTI 13.8 cm    PA V2 max 96.3 cm/sec    PA acc time 0.08 sec    ACS 1.80 cm    Sinus 2.7 cm    EF(MOD-bp) 69.0 %    Dimensionless Index 0.69 (DI)   Lactic Acid, Plasma    Collection Time: 11/13/24  3:34 PM    Specimen: Arm, Left; Blood   Result Value Ref Range    Lactate 2.8 (C) 0.5 - 2.0 mmol/L   STAT Lactic Acid, Reflex    Collection Time: 11/13/24 10:06 PM    Specimen: Blood   Result Value Ref Range    Lactate 1.9 0.5 - 2.0 mmol/L   ECG 12 Lead Drug Monitoring    Collection Time: 11/14/24  2:58 AM   Result Value Ref Range    QT Interval 402 ms    QTC Interval 484 ms   Basic Metabolic Panel    Collection Time: 11/14/24  4:45 AM    Specimen: Blood   Result Value Ref Range    Glucose 161 (H) 65 - 99 mg/dL    BUN 30 (H) 8 - 23 mg/dL    Creatinine 0.82 0.57 - 1.00 mg/dL    Sodium 142 136 - 145 mmol/L    Potassium 4.4 3.5 - 5.2 mmol/L    Chloride 103 98 - 107 mmol/L    CO2 29.4 (H) 22.0 - 29.0 mmol/L    Calcium 9.1 8.6 - 10.5 mg/dL    BUN/Creatinine  Ratio 36.6 (H) 7.0 - 25.0    Anion Gap 9.6 5.0 - 15.0 mmol/L    eGFR 73.3 >60.0 mL/min/1.73   CBC Auto Differential    Collection Time: 11/14/24  4:45 AM    Specimen: Blood   Result Value Ref Range    WBC 14.09 (H) 3.40 - 10.80 10*3/mm3    RBC 5.06 3.77 - 5.28 10*6/mm3    Hemoglobin 14.0 12.0 - 15.9 g/dL    Hematocrit 45.7 34.0 - 46.6 %    MCV 90.3 79.0 - 97.0 fL    MCH 27.7 26.6 - 33.0 pg    MCHC 30.6 (L) 31.5 - 35.7 g/dL    RDW 15.6 (H) 12.3 - 15.4 %    RDW-SD 50.9 37.0 - 54.0 fl    MPV 9.3 6.0 - 12.0 fL    Platelets 235 140 - 450 10*3/mm3    Neutrophil % 91.9 (H) 42.7 - 76.0 %    Lymphocyte % 5.9 (L) 19.6 - 45.3 %    Monocyte % 1.5 (L) 5.0 - 12.0 %    Eosinophil % 0.0 (L) 0.3 - 6.2 %    Basophil % 0.1 0.0 - 1.5 %    Immature Grans % 0.6 (H) 0.0 - 0.5 %    Neutrophils, Absolute 12.96 (H) 1.70 - 7.00 10*3/mm3    Lymphocytes, Absolute 0.83 0.70 - 3.10 10*3/mm3    Monocytes, Absolute 0.21 0.10 - 0.90 10*3/mm3    Eosinophils, Absolute 0.00 0.00 - 0.40 10*3/mm3    Basophils, Absolute 0.01 0.00 - 0.20 10*3/mm3    Immature Grans, Absolute 0.08 (H) 0.00 - 0.05 10*3/mm3    nRBC 0.0 0.0 - 0.2 /100 WBC   Basic Metabolic Panel    Collection Time: 11/15/24  5:05 AM    Specimen: Blood   Result Value Ref Range    Glucose 130 (H) 65 - 99 mg/dL    BUN 34 (H) 8 - 23 mg/dL    Creatinine 0.80 0.57 - 1.00 mg/dL    Sodium 143 136 - 145 mmol/L    Potassium 4.4 3.5 - 5.2 mmol/L    Chloride 105 98 - 107 mmol/L    CO2 30.1 (H) 22.0 - 29.0 mmol/L    Calcium 8.7 8.6 - 10.5 mg/dL    BUN/Creatinine Ratio 42.5 (H) 7.0 - 25.0    Anion Gap 7.9 5.0 - 15.0 mmol/L    eGFR 75.5 >60.0 mL/min/1.73   CBC Auto Differential    Collection Time: 11/15/24  5:05 AM    Specimen: Blood   Result Value Ref Range    WBC 11.65 (H) 3.40 - 10.80 10*3/mm3    RBC 4.79 3.77 - 5.28 10*6/mm3    Hemoglobin 13.5 12.0 - 15.9 g/dL    Hematocrit 43.4 34.0 - 46.6 %    MCV 90.6 79.0 - 97.0 fL    MCH 28.2 26.6 - 33.0 pg    MCHC 31.1 (L) 31.5 - 35.7 g/dL    RDW 15.5 (H) 12.3 -  15.4 %    RDW-SD 52.1 37.0 - 54.0 fl    MPV 9.3 6.0 - 12.0 fL    Platelets 205 140 - 450 10*3/mm3    Neutrophil % 87.9 (H) 42.7 - 76.0 %    Lymphocyte % 6.3 (L) 19.6 - 45.3 %    Monocyte % 5.3 5.0 - 12.0 %    Eosinophil % 0.0 (L) 0.3 - 6.2 %    Basophil % 0.1 0.0 - 1.5 %    Immature Grans % 0.4 0.0 - 0.5 %    Neutrophils, Absolute 10.24 (H) 1.70 - 7.00 10*3/mm3    Lymphocytes, Absolute 0.73 0.70 - 3.10 10*3/mm3    Monocytes, Absolute 0.62 0.10 - 0.90 10*3/mm3    Eosinophils, Absolute 0.00 0.00 - 0.40 10*3/mm3    Basophils, Absolute 0.01 0.00 - 0.20 10*3/mm3    Immature Grans, Absolute 0.05 0.00 - 0.05 10*3/mm3    nRBC 0.0 0.0 - 0.2 /100 WBC   ECG 12 Lead Other; cardioversion    Collection Time: 11/15/24 11:21 AM   Result Value Ref Range    QT Interval 500 ms    QTC Interval 383 ms   ECG 12 Lead QT Measurement    Collection Time: 11/16/24  8:05 AM   Result Value Ref Range    QT Interval 456 ms    QTC Interval 414 ms   Basic Metabolic Panel    Collection Time: 11/17/24  2:58 AM    Specimen: Arm, Left; Blood   Result Value Ref Range    Glucose 107 (H) 65 - 99 mg/dL    BUN 30 (H) 8 - 23 mg/dL    Creatinine 0.69 0.57 - 1.00 mg/dL    Sodium 142 136 - 145 mmol/L    Potassium 4.6 3.5 - 5.2 mmol/L    Chloride 104 98 - 107 mmol/L    CO2 30.3 (H) 22.0 - 29.0 mmol/L    Calcium 8.6 8.6 - 10.5 mg/dL    BUN/Creatinine Ratio 43.5 (H) 7.0 - 25.0    Anion Gap 7.7 5.0 - 15.0 mmol/L    eGFR 89.0 >60.0 mL/min/1.73   CBC Auto Differential    Collection Time: 11/17/24  2:58 AM    Specimen: Arm, Left; Blood   Result Value Ref Range    WBC 11.45 (H) 3.40 - 10.80 10*3/mm3    RBC 4.95 3.77 - 5.28 10*6/mm3    Hemoglobin 14.0 12.0 - 15.9 g/dL    Hematocrit 44.8 34.0 - 46.6 %    MCV 90.5 79.0 - 97.0 fL    MCH 28.3 26.6 - 33.0 pg    MCHC 31.3 (L) 31.5 - 35.7 g/dL    RDW 15.8 (H) 12.3 - 15.4 %    RDW-SD 51.9 37.0 - 54.0 fl    MPV 9.4 6.0 - 12.0 fL    Platelets 202 140 - 450 10*3/mm3    Neutrophil % 84.2 (H) 42.7 - 76.0 %    Lymphocyte % 7.2 (L)  "19.6 - 45.3 %    Monocyte % 7.2 5.0 - 12.0 %    Eosinophil % 0.1 (L) 0.3 - 6.2 %    Basophil % 0.2 0.0 - 1.5 %    Immature Grans % 1.1 (H) 0.0 - 0.5 %    Neutrophils, Absolute 9.64 (H) 1.70 - 7.00 10*3/mm3    Lymphocytes, Absolute 0.82 0.70 - 3.10 10*3/mm3    Monocytes, Absolute 0.83 0.10 - 0.90 10*3/mm3    Eosinophils, Absolute 0.01 0.00 - 0.40 10*3/mm3    Basophils, Absolute 0.02 0.00 - 0.20 10*3/mm3    Immature Grans, Absolute 0.13 (H) 0.00 - 0.05 10*3/mm3    nRBC 0.0 0.0 - 0.2 /100 WBC   Non-gynecologic Cytology    Collection Time: 11/17/24  9:18 AM    Specimen: Lung, L; Lavage   Result Value Ref Range    Case Report       Medical Cytology Report                           Case: [id number]                                  Authorizing Provider:  Reji Burch MD         Collected:           11/17/2024 09:18 AM          Ordering Location:     [facility]  Received:            11/17/2024 10:22 AM                                 SUITES                                                                       Pathologist:           Kishan Grider MD                                                            Specimen:    Lung, L                                                                                    Final Diagnosis       Left lung, bronchoalveolar lavage with smears and cytospin:  Acute inflammation, reactive but mature squamous cells, pulmonary macrophages, bronchial epithelial cells and Candida  No malignancy identified    FRANK      Gross Description       1. Lung, L.  Received in [place] and designated \"Left lung BAL\" are 45 mL of clear green fluid. Particulate matter is present. This specimen is processed as per protocol.         Fungus Culture - Lavage, Lung, L    Collection Time: 11/17/24  9:18 AM    Specimen: Lung, L; Lavage   Result Value Ref Range    Fungus Culture Candida species (A)    AFB Culture - Lavage, Lung, L    Collection Time: 11/17/24  9:18 AM    Specimen: Lung, L; Lavage "   Result Value Ref Range    AFB Culture No AFB isolated at 2 weeks     AFB Stain No acid fast bacilli seen on concentrated smear    BAL Culture, Quantitative - Lavage, Lung, L    Collection Time: 11/17/24  9:18 AM    Specimen: Lung, L; Lavage   Result Value Ref Range    BAL Culture 50,000 CFU/mL Candida albicans (A)     BAL Culture 50,000 CFU/mL Normal Respiratory Shanae     Gram Stain Moderate (3+) WBCs per low power field     Gram Stain Moderate (3+) Epithelial cells per low power field     Gram Stain Few (2+) Budding yeast with pseudohyphae    Pneumocystis PCR - Lavage, Lung, L    Collection Time: 11/17/24  9:18 AM    Specimen: Lung, L; Lavage   Result Value Ref Range    Reference Lab Report See Attached Report     Pneumocystis Negative Negative - Ref. Range   Basic Metabolic Panel    Collection Time: 11/18/24  3:04 AM    Specimen: Arm, Left; Blood   Result Value Ref Range    Glucose 135 (H) 65 - 99 mg/dL    BUN 24 (H) 8 - 23 mg/dL    Creatinine 0.67 0.57 - 1.00 mg/dL    Sodium 141 136 - 145 mmol/L    Potassium 4.6 3.5 - 5.2 mmol/L    Chloride 103 98 - 107 mmol/L    CO2 33.2 (H) 22.0 - 29.0 mmol/L    Calcium 8.5 (L) 8.6 - 10.5 mg/dL    BUN/Creatinine Ratio 35.8 (H) 7.0 - 25.0    Anion Gap 4.8 (L) 5.0 - 15.0 mmol/L    eGFR 89.6 >60.0 mL/min/1.73   CBC Auto Differential    Collection Time: 11/18/24  3:04 AM    Specimen: Arm, Left; Blood   Result Value Ref Range    WBC 11.95 (H) 3.40 - 10.80 10*3/mm3    RBC 5.02 3.77 - 5.28 10*6/mm3    Hemoglobin 14.2 12.0 - 15.9 g/dL    Hematocrit 45.6 34.0 - 46.6 %    MCV 90.8 79.0 - 97.0 fL    MCH 28.3 26.6 - 33.0 pg    MCHC 31.1 (L) 31.5 - 35.7 g/dL    RDW 15.4 12.3 - 15.4 %    RDW-SD 51.0 37.0 - 54.0 fl    MPV 9.3 6.0 - 12.0 fL    Platelets 188 140 - 450 10*3/mm3    Neutrophil % 91.1 (H) 42.7 - 76.0 %    Lymphocyte % 4.3 (L) 19.6 - 45.3 %    Monocyte % 3.7 (L) 5.0 - 12.0 %    Eosinophil % 0.0 (L) 0.3 - 6.2 %    Basophil % 0.2 0.0 - 1.5 %    Immature Grans % 0.7 (H) 0.0 - 0.5 %     Neutrophils, Absolute 10.90 (H) 1.70 - 7.00 10*3/mm3    Lymphocytes, Absolute 0.51 (L) 0.70 - 3.10 10*3/mm3    Monocytes, Absolute 0.44 0.10 - 0.90 10*3/mm3    Eosinophils, Absolute 0.00 0.00 - 0.40 10*3/mm3    Basophils, Absolute 0.02 0.00 - 0.20 10*3/mm3    Immature Grans, Absolute 0.08 (H) 0.00 - 0.05 10*3/mm3    nRBC 0.0 0.0 - 0.2 /100 WBC   Basic Metabolic Panel    Collection Time: 11/19/24  3:30 AM    Specimen: Arm, Left; Blood   Result Value Ref Range    Glucose 124 (H) 65 - 99 mg/dL    BUN 27 (H) 8 - 23 mg/dL    Creatinine 0.70 0.57 - 1.00 mg/dL    Sodium 140 136 - 145 mmol/L    Potassium 4.5 3.5 - 5.2 mmol/L    Chloride 100 98 - 107 mmol/L    CO2 33.8 (H) 22.0 - 29.0 mmol/L    Calcium 8.7 8.6 - 10.5 mg/dL    BUN/Creatinine Ratio 38.6 (H) 7.0 - 25.0    Anion Gap 6.2 5.0 - 15.0 mmol/L    eGFR 88.7 >60.0 mL/min/1.73   CBC Auto Differential    Collection Time: 11/19/24  3:30 AM    Specimen: Arm, Left; Blood   Result Value Ref Range    WBC 14.65 (H) 3.40 - 10.80 10*3/mm3    RBC 5.15 3.77 - 5.28 10*6/mm3    Hemoglobin 14.3 12.0 - 15.9 g/dL    Hematocrit 45.9 34.0 - 46.6 %    MCV 89.1 79.0 - 97.0 fL    MCH 27.8 26.6 - 33.0 pg    MCHC 31.2 (L) 31.5 - 35.7 g/dL    RDW 15.4 12.3 - 15.4 %    RDW-SD 50.0 37.0 - 54.0 fl    MPV 9.5 6.0 - 12.0 fL    Platelets 203 140 - 450 10*3/mm3    Neutrophil % 87.4 (H) 42.7 - 76.0 %    Lymphocyte % 5.7 (L) 19.6 - 45.3 %    Monocyte % 5.7 5.0 - 12.0 %    Eosinophil % 0.3 0.3 - 6.2 %    Basophil % 0.1 0.0 - 1.5 %    Immature Grans % 0.8 (H) 0.0 - 0.5 %    Neutrophils, Absolute 12.83 (H) 1.70 - 7.00 10*3/mm3    Lymphocytes, Absolute 0.83 0.70 - 3.10 10*3/mm3    Monocytes, Absolute 0.83 0.10 - 0.90 10*3/mm3    Eosinophils, Absolute 0.04 0.00 - 0.40 10*3/mm3    Basophils, Absolute 0.01 0.00 - 0.20 10*3/mm3    Immature Grans, Absolute 0.11 (H) 0.00 - 0.05 10*3/mm3    nRBC 0.0 0.0 - 0.2 /100 WBC   ECG 12 Lead Drug Monitoring; sotalol    Collection Time: 11/19/24 12:10 PM   Result Value  Ref Range    QT Interval 345 ms    QTC Interval 508 ms   ECG 12 Lead QT Measurement    Collection Time: 11/19/24  8:30 PM   Result Value Ref Range    QT Interval 389 ms    QTC Interval 458 ms   ECG 12 Lead QT Measurement    Collection Time: 11/19/24 11:06 PM   Result Value Ref Range    QT Interval 383 ms    QTC Interval 460 ms   Basic Metabolic Panel    Collection Time: 11/20/24  4:12 AM    Specimen: Arm, Left; Blood   Result Value Ref Range    Glucose 118 (H) 65 - 99 mg/dL    BUN 28 (H) 8 - 23 mg/dL    Creatinine 0.75 0.57 - 1.00 mg/dL    Sodium 139 136 - 145 mmol/L    Potassium 4.2 3.5 - 5.2 mmol/L    Chloride 96 (L) 98 - 107 mmol/L    CO2 35.5 (H) 22.0 - 29.0 mmol/L    Calcium 8.9 8.6 - 10.5 mg/dL    BUN/Creatinine Ratio 37.3 (H) 7.0 - 25.0    Anion Gap 7.5 5.0 - 15.0 mmol/L    eGFR 81.6 >60.0 mL/min/1.73   CBC Auto Differential    Collection Time: 11/20/24  4:12 AM    Specimen: Arm, Left; Blood   Result Value Ref Range    WBC 14.19 (H) 3.40 - 10.80 10*3/mm3    RBC 5.57 (H) 3.77 - 5.28 10*6/mm3    Hemoglobin 15.7 12.0 - 15.9 g/dL    Hematocrit 49.1 (H) 34.0 - 46.6 %    MCV 88.2 79.0 - 97.0 fL    MCH 28.2 26.6 - 33.0 pg    MCHC 32.0 31.5 - 35.7 g/dL    RDW 15.5 (H) 12.3 - 15.4 %    RDW-SD 49.7 37.0 - 54.0 fl    MPV 9.4 6.0 - 12.0 fL    Platelets 259 140 - 450 10*3/mm3    Neutrophil % 85.0 (H) 42.7 - 76.0 %    Lymphocyte % 7.1 (L) 19.6 - 45.3 %    Monocyte % 6.6 5.0 - 12.0 %    Eosinophil % 0.4 0.3 - 6.2 %    Basophil % 0.2 0.0 - 1.5 %    Immature Grans % 0.7 (H) 0.0 - 0.5 %    Neutrophils, Absolute 12.07 (H) 1.70 - 7.00 10*3/mm3    Lymphocytes, Absolute 1.01 0.70 - 3.10 10*3/mm3    Monocytes, Absolute 0.93 (H) 0.10 - 0.90 10*3/mm3    Eosinophils, Absolute 0.05 0.00 - 0.40 10*3/mm3    Basophils, Absolute 0.03 0.00 - 0.20 10*3/mm3    Immature Grans, Absolute 0.10 (H) 0.00 - 0.05 10*3/mm3    nRBC 0.0 0.0 - 0.2 /100 WBC   ECG 12 Lead QT Measurement    Collection Time: 11/20/24 10:26 PM   Result Value Ref Range    QT  Interval 413 ms    QTC Interval 413 ms   Basic Metabolic Panel    Collection Time: 11/21/24  4:16 AM    Specimen: Arm, Right; Blood   Result Value Ref Range    Glucose 104 (H) 65 - 99 mg/dL    BUN 41 (H) 8 - 23 mg/dL    Creatinine 0.96 0.57 - 1.00 mg/dL    Sodium 139 136 - 145 mmol/L    Potassium 4.6 3.5 - 5.2 mmol/L    Chloride 99 98 - 107 mmol/L    CO2 32.7 (H) 22.0 - 29.0 mmol/L    Calcium 9.0 8.6 - 10.5 mg/dL    BUN/Creatinine Ratio 42.7 (H) 7.0 - 25.0    Anion Gap 7.3 5.0 - 15.0 mmol/L    eGFR 60.7 >60.0 mL/min/1.73   CBC Auto Differential    Collection Time: 11/21/24  4:16 AM    Specimen: Arm, Right; Blood   Result Value Ref Range    WBC 14.62 (H) 3.40 - 10.80 10*3/mm3    RBC 5.39 (H) 3.77 - 5.28 10*6/mm3    Hemoglobin 15.1 12.0 - 15.9 g/dL    Hematocrit 48.2 (H) 34.0 - 46.6 %    MCV 89.4 79.0 - 97.0 fL    MCH 28.0 26.6 - 33.0 pg    MCHC 31.3 (L) 31.5 - 35.7 g/dL    RDW 15.7 (H) 12.3 - 15.4 %    RDW-SD 50.6 37.0 - 54.0 fl    MPV 9.2 6.0 - 12.0 fL    Platelets 248 140 - 450 10*3/mm3    Neutrophil % 80.7 (H) 42.7 - 76.0 %    Lymphocyte % 7.9 (L) 19.6 - 45.3 %    Monocyte % 8.3 5.0 - 12.0 %    Eosinophil % 2.1 0.3 - 6.2 %    Basophil % 0.2 0.0 - 1.5 %    Immature Grans % 0.8 (H) 0.0 - 0.5 %    Neutrophils, Absolute 11.80 (H) 1.70 - 7.00 10*3/mm3    Lymphocytes, Absolute 1.16 0.70 - 3.10 10*3/mm3    Monocytes, Absolute 1.21 (H) 0.10 - 0.90 10*3/mm3    Eosinophils, Absolute 0.31 0.00 - 0.40 10*3/mm3    Basophils, Absolute 0.03 0.00 - 0.20 10*3/mm3    Immature Grans, Absolute 0.11 (H) 0.00 - 0.05 10*3/mm3    nRBC 0.0 0.0 - 0.2 /100 WBC   ECG 12 Lead QT Measurement    Collection Time: 11/21/24 11:10 PM   Result Value Ref Range    QT Interval 410 ms    QTC Interval 410 ms   Basic Metabolic Panel    Collection Time: 11/22/24  1:10 AM    Specimen: Arm, Left; Blood   Result Value Ref Range    Glucose 101 (H) 65 - 99 mg/dL    BUN 45 (H) 8 - 23 mg/dL    Creatinine 0.91 0.57 - 1.00 mg/dL    Sodium 140 136 - 145 mmol/L     "Potassium 4.4 3.5 - 5.2 mmol/L    Chloride 101 98 - 107 mmol/L    CO2 30.0 (H) 22.0 - 29.0 mmol/L    Calcium 8.8 8.6 - 10.5 mg/dL    BUN/Creatinine Ratio 49.5 (H) 7.0 - 25.0    Anion Gap 9.0 5.0 - 15.0 mmol/L    eGFR 64.7 >60.0 mL/min/1.73   CBC Auto Differential    Collection Time: 11/22/24  1:10 AM    Specimen: Arm, Left; Blood   Result Value Ref Range    WBC 10.49 3.40 - 10.80 10*3/mm3    RBC 5.14 3.77 - 5.28 10*6/mm3    Hemoglobin 14.3 12.0 - 15.9 g/dL    Hematocrit 46.0 34.0 - 46.6 %    MCV 89.5 79.0 - 97.0 fL    MCH 27.8 26.6 - 33.0 pg    MCHC 31.1 (L) 31.5 - 35.7 g/dL    RDW 15.8 (H) 12.3 - 15.4 %    RDW-SD 50.7 37.0 - 54.0 fl    MPV 9.2 6.0 - 12.0 fL    Platelets 240 140 - 450 10*3/mm3    Neutrophil % 72.8 42.7 - 76.0 %    Lymphocyte % 12.8 (L) 19.6 - 45.3 %    Monocyte % 10.3 5.0 - 12.0 %    Eosinophil % 3.1 0.3 - 6.2 %    Basophil % 0.1 0.0 - 1.5 %    Immature Grans % 0.9 (H) 0.0 - 0.5 %    Neutrophils, Absolute 7.65 (H) 1.70 - 7.00 10*3/mm3    Lymphocytes, Absolute 1.34 0.70 - 3.10 10*3/mm3    Monocytes, Absolute 1.08 (H) 0.10 - 0.90 10*3/mm3    Eosinophils, Absolute 0.32 0.00 - 0.40 10*3/mm3    Basophils, Absolute 0.01 0.00 - 0.20 10*3/mm3    Immature Grans, Absolute 0.09 (H) 0.00 - 0.05 10*3/mm3    nRBC 0.0 0.0 - 0.2 /100 WBC         Review of Systems    Objective     /79 (BP Location: Right arm, Patient Position: Sitting, Cuff Size: Adult)   Pulse 61   Temp 98.4 °F (36.9 °C) (Oral)   Resp 16   Ht 180.3 cm (70.98\")   SpO2 94%   BMI 46.05 kg/m²     Physical Exam  Vitals and nursing note reviewed.   Constitutional:       Appearance: Normal appearance. She is obese.   HENT:      Head: Normocephalic.      Mouth/Throat:      Mouth: Mucous membranes are moist.   Cardiovascular:      Pulses: Normal pulses.      Heart sounds: Normal heart sounds.   Pulmonary:      Effort: Pulmonary effort is normal.      Breath sounds: Normal breath sounds.   Abdominal:      Palpations: Abdomen is soft.   Skin:     " General: Skin is warm.   Neurological:      General: No focal deficit present.      Mental Status: She is alert and oriented to person, place, and time.   Psychiatric:         Mood and Affect: Mood normal.         Thought Content: Thought content normal.         Result Review :                Assessment & Plan    Diagnoses and all orders for this visit:    1. Persistent atrial fibrillation  Comments:  contiue current medications pacemaker placed    2. Hiatal hernia  Comments:  possibly reason for pneumonia aspiration with recent hospitalization. this is noted in chart. stable otherwise    3. Hospital discharge follow-up  Comments:  notes reviewed patient stable at home with home health occupational health and PT    4. Multiple tracheobronchial mucus plugs  Comments:  stable followe dby pulmonology    5. Shortness of breath  Comments:  stable no oxygen needed completed 6 min walk.      There are no Patient Instructions on file for this visit.    Follow Up   No follow-ups on file.    Patient was given instructions and counseling regarding her condition or for health maintenance advice. Please see specific information pulled into the AVS if appropriate.     Brinda Tony, APRN    12/06/24

## 2024-12-08 LAB
MYCOBACTERIUM SPEC CULT: NORMAL
NIGHT BLUE STAIN TISS: NORMAL

## 2024-12-09 ENCOUNTER — HOME CARE VISIT (OUTPATIENT)
Dept: HOME HEALTH SERVICES | Facility: HOME HEALTHCARE | Age: 79
End: 2024-12-09
Payer: MEDICARE

## 2024-12-09 ENCOUNTER — TELEPHONE (OUTPATIENT)
Dept: CARDIOLOGY | Facility: CLINIC | Age: 79
End: 2024-12-09
Payer: MEDICARE

## 2024-12-09 ENCOUNTER — CLINICAL SUPPORT NO REQUIREMENTS (OUTPATIENT)
Dept: CARDIOLOGY | Facility: CLINIC | Age: 79
End: 2024-12-09
Payer: MEDICARE

## 2024-12-09 VITALS
OXYGEN SATURATION: 95 % | SYSTOLIC BLOOD PRESSURE: 122 MMHG | DIASTOLIC BLOOD PRESSURE: 70 MMHG | TEMPERATURE: 98 F | RESPIRATION RATE: 18 BRPM | HEART RATE: 60 BPM

## 2024-12-09 DIAGNOSIS — Z95.0 PACEMAKER: ICD-10-CM

## 2024-12-09 DIAGNOSIS — I49.5 SICK SINUS SYNDROME: ICD-10-CM

## 2024-12-09 DIAGNOSIS — R00.1 SYMPTOMATIC BRADYCARDIA: Primary | ICD-10-CM

## 2024-12-09 PROCEDURE — G0299 HHS/HOSPICE OF RN EA 15 MIN: HCPCS

## 2024-12-09 NOTE — TELEPHONE ENCOUNTER
Patient was seen today s/p pacemaker placement. Normal check and incision healed. She does not have an appt to see Dr Angel. Please call her to schedule appt. Thanks

## 2024-12-09 NOTE — PROGRESS NOTES
Patient is here today s/p pacemaker placement. Interrogation of device is normal, leads stable. Removed steri strips with no issues. Incision is healed, well approximated with no redness, swelling or drainage noted. Patient teaching for left arm precautions, incision care and follow up. Discussed remote transmitter and function. Patient to follow up with Dr Angel.

## 2024-12-12 LAB — FUNGUS WND CULT: ABNORMAL

## 2024-12-13 ENCOUNTER — HOME CARE VISIT (OUTPATIENT)
Dept: HOME HEALTH SERVICES | Facility: HOME HEALTHCARE | Age: 79
End: 2024-12-13
Payer: MEDICARE

## 2024-12-13 VITALS
TEMPERATURE: 97.3 F | RESPIRATION RATE: 16 BRPM | OXYGEN SATURATION: 91 % | SYSTOLIC BLOOD PRESSURE: 110 MMHG | DIASTOLIC BLOOD PRESSURE: 64 MMHG | HEART RATE: 62 BPM

## 2024-12-13 PROCEDURE — G0299 HHS/HOSPICE OF RN EA 15 MIN: HCPCS

## 2024-12-13 NOTE — HOME HEALTH
"Routine visit note: Medications reviewed. Apettite well. No issues with elimination. CP assessed. Pt pacemaker site on left chest healing DAYSI . Pt staed she f/u w/ Brinda Tony on 12-6 and it went well. Pt stated she had appt this past monday to check her pacemaker and that went well as well. Pt states Dr. Manzanares seen her in hospital but her actual cardiologist is Dr. Angel, she states she f/u with Dr. Angel on Feb 11th at 1130. Pt doing well in home, pt states shes been compliant with medications and denies any medical complaints. Pt compliant with all f/u appts. Pt has help from a caregiver though \"[facility]\", pt BLE wrapped with ace to help edema, pts caregiver assist w/ this as well. Pt is homebound and uses a w/c and asssistance from caregivers to remain in home. Pt requested to be discharged from  services on December 23rd.     Skill/education provided: Vital signs stable. Wound assessed. Cardiopulmonary assessment completed. Patient denies falls and pain. Pt educated on medication \"eliquis\" and return verbalized understanding of this medication.    Patient/caregiver response: Patient verbalized understanding.    Plam for next visit: Vital signs, review medications, cardiopulmonary assessment, assess falls and pain, assess cp, wound care, assess edema.    Other pertinent info: na"

## 2024-12-15 LAB
MYCOBACTERIUM SPEC CULT: NORMAL
NIGHT BLUE STAIN TISS: NORMAL

## 2024-12-16 ENCOUNTER — HOME CARE VISIT (OUTPATIENT)
Dept: HOME HEALTH SERVICES | Facility: HOME HEALTHCARE | Age: 79
End: 2024-12-16
Payer: MEDICARE

## 2024-12-16 VITALS
DIASTOLIC BLOOD PRESSURE: 78 MMHG | HEART RATE: 56 BPM | RESPIRATION RATE: 18 BRPM | SYSTOLIC BLOOD PRESSURE: 114 MMHG | TEMPERATURE: 97 F | OXYGEN SATURATION: 97 %

## 2024-12-16 PROCEDURE — G0299 HHS/HOSPICE OF RN EA 15 MIN: HCPCS

## 2024-12-20 RX ORDER — DILTIAZEM HYDROCHLORIDE 180 MG/1
180 CAPSULE, COATED, EXTENDED RELEASE ORAL
Qty: 90 CAPSULE | Refills: 1 | Status: SHIPPED | OUTPATIENT
Start: 2024-12-20

## 2024-12-22 LAB
MYCOBACTERIUM SPEC CULT: NORMAL
NIGHT BLUE STAIN TISS: NORMAL

## 2024-12-23 ENCOUNTER — HOME CARE VISIT (OUTPATIENT)
Dept: HOME HEALTH SERVICES | Facility: HOME HEALTHCARE | Age: 79
End: 2024-12-23
Payer: MEDICARE

## 2024-12-23 PROCEDURE — G0299 HHS/HOSPICE OF RN EA 15 MIN: HCPCS

## 2024-12-24 VITALS
HEART RATE: 64 BPM | DIASTOLIC BLOOD PRESSURE: 72 MMHG | TEMPERATURE: 97.9 F | OXYGEN SATURATION: 98 % | RESPIRATION RATE: 18 BRPM | SYSTOLIC BLOOD PRESSURE: 130 MMHG

## 2024-12-26 RX ORDER — LEVOTHYROXINE SODIUM 50 UG/1
TABLET ORAL
Qty: 90 TABLET | Refills: 0 | Status: SHIPPED | OUTPATIENT
Start: 2024-12-26

## 2024-12-26 RX ORDER — PANTOPRAZOLE SODIUM 40 MG/1
40 TABLET, DELAYED RELEASE ORAL DAILY
Qty: 90 TABLET | Refills: 0 | Status: SHIPPED | OUTPATIENT
Start: 2024-12-26

## 2024-12-29 LAB
MYCOBACTERIUM SPEC CULT: NORMAL
NIGHT BLUE STAIN TISS: NORMAL

## 2024-12-31 RX ORDER — FUROSEMIDE 40 MG/1
40 TABLET ORAL DAILY
Qty: 30 TABLET | Refills: 0 | Status: SHIPPED | OUTPATIENT
Start: 2024-12-31

## 2024-12-31 RX ORDER — LOSARTAN POTASSIUM 50 MG/1
25 TABLET ORAL DAILY
Qty: 30 TABLET | Refills: 0 | Status: SHIPPED | OUTPATIENT
Start: 2024-12-31

## 2024-12-31 NOTE — TELEPHONE ENCOUNTER
Rx Refill Note  Requested Prescriptions     Pending Prescriptions Disp Refills    furosemide (LASIX) 40 MG tablet [Pharmacy Med Name: FUROSEMIDE 40 MG TABLET] 30 tablet 0     Sig: TAKE 1 TABLET BY MOUTH DAILY    losartan (COZAAR) 50 MG tablet [Pharmacy Med Name: LOSARTAN POTASSIUM 50 MG TABLET] 30 tablet 0     Sig: TAKE 1/2 TABLET BY MOUTH DAILY      Last office visit with prescribing clinician: 9/10/2024   Last telemedicine visit with prescribing clinician: Visit date not found   Next office visit with prescribing clinician: Visit date not found                         Would you like a call back once the refill request has been completed: [] Yes [] No    If the office needs to give you a call back, can they leave a voicemail: [] Yes [] No    Claire Irwin MA  12/31/24, 09:23 EST

## 2025-01-28 ENCOUNTER — OFFICE VISIT (OUTPATIENT)
Dept: FAMILY MEDICINE CLINIC | Facility: CLINIC | Age: 80
End: 2025-01-28
Payer: MEDICARE

## 2025-01-28 VITALS
HEART RATE: 64 BPM | RESPIRATION RATE: 17 BRPM | BODY MASS INDEX: 41.02 KG/M2 | TEMPERATURE: 98.1 F | HEIGHT: 71 IN | OXYGEN SATURATION: 94 % | SYSTOLIC BLOOD PRESSURE: 142 MMHG | DIASTOLIC BLOOD PRESSURE: 85 MMHG | WEIGHT: 293 LBS

## 2025-01-28 DIAGNOSIS — I10 PRIMARY HYPERTENSION: Chronic | ICD-10-CM

## 2025-01-28 DIAGNOSIS — K21.9 GASTROESOPHAGEAL REFLUX DISEASE WITHOUT ESOPHAGITIS: Chronic | ICD-10-CM

## 2025-01-28 DIAGNOSIS — I89.0 LYMPHEDEMA: ICD-10-CM

## 2025-01-28 DIAGNOSIS — N39.45 CONTINUOUS LEAKAGE OF URINE: Chronic | ICD-10-CM

## 2025-01-28 DIAGNOSIS — E03.9 ACQUIRED HYPOTHYROIDISM: Chronic | ICD-10-CM

## 2025-01-28 DIAGNOSIS — R73.9 HYPERGLYCEMIA: ICD-10-CM

## 2025-01-28 DIAGNOSIS — I48.20 CHRONIC ATRIAL FIBRILLATION WITH RAPID VENTRICULAR RESPONSE: Primary | ICD-10-CM

## 2025-01-28 DIAGNOSIS — M62.3 IMMOBILITY SYNDROME: Chronic | ICD-10-CM

## 2025-01-28 DIAGNOSIS — I26.99 PULMONARY EMBOLISM, UNSPECIFIED CHRONICITY, UNSPECIFIED PULMONARY EMBOLISM TYPE, UNSPECIFIED WHETHER ACUTE COR PULMONALE PRESENT: ICD-10-CM

## 2025-01-28 DIAGNOSIS — Z95.0 PACEMAKER: ICD-10-CM

## 2025-01-28 PROBLEM — T17.800A MULTIPLE TRACHEOBRONCHIAL MUCUS PLUGS: Status: RESOLVED | Noted: 2024-11-12 | Resolved: 2025-01-28

## 2025-01-28 PROCEDURE — G2211 COMPLEX E/M VISIT ADD ON: HCPCS | Performed by: NURSE PRACTITIONER

## 2025-01-28 PROCEDURE — 3077F SYST BP >= 140 MM HG: CPT | Performed by: NURSE PRACTITIONER

## 2025-01-28 PROCEDURE — 1126F AMNT PAIN NOTED NONE PRSNT: CPT | Performed by: NURSE PRACTITIONER

## 2025-01-28 PROCEDURE — 3079F DIAST BP 80-89 MM HG: CPT | Performed by: NURSE PRACTITIONER

## 2025-01-28 PROCEDURE — 99214 OFFICE O/P EST MOD 30 MIN: CPT | Performed by: NURSE PRACTITIONER

## 2025-01-28 PROCEDURE — G0439 PPPS, SUBSEQ VISIT: HCPCS | Performed by: NURSE PRACTITIONER

## 2025-01-28 RX ORDER — BUDESONIDE AND FORMOTEROL FUMARATE DIHYDRATE 160; 4.5 UG/1; UG/1
2 AEROSOL RESPIRATORY (INHALATION)
Qty: 10.2 G | Refills: 1 | Status: SHIPPED | OUTPATIENT
Start: 2025-01-28

## 2025-01-28 NOTE — ASSESSMENT & PLAN NOTE
Hypertension is stable and controlled  Continue current treatment regimen.  Blood pressure will be reassessed in 6 months.  Stable on current medications.

## 2025-01-28 NOTE — ASSESSMENT & PLAN NOTE
Stable on current medications has pacemaker. Taking diltiazem 180 mg daily losartan 25 mg daily of a 50 mg dose.

## 2025-01-28 NOTE — ASSESSMENT & PLAN NOTE
ACUPUNCTURIST TREATMENT NOTE      Lisa Reyna, a 73 year old female, is here today for Follow - Up exam. Patient is referred by No ref. provider found.    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    Objective  Basic Exam Completed:   Lower Extremity (ies): Normal    TCM Exam Completed: Yes   Limbs/Back: Left Upper Extremity    Tongue/Pulse Exam Completed: No    Patient Assessment  Patient Type: Oncology  Patient Complaint: Chronic low back pain, neuropathy of (L) hand and bilateral feet    Acupuncture 9/28/2022 10/10/2022   Intervention Reason Pain; Neuropathy; Neuropathy 2 Pain; Neuropathy 2; Neuropathy   Pain Location low back Low back   Pre-session Pain Rating 0 0   Post-session Pain Rating 0 0   Neuropathy Location (L) hand (L) hand   Pre-session Neuropathy rating 2 2   Post-session Neuropathy rating 0 2   Neuropathy 2 Location Feet, bilateral Feet   Pre-session Neuropathy 2 rating 2 3   Post-session Neuropathy 2 rating 1 2       TCM Diagnosis: Qi Deficiency;Blood Deficiency;Yin Deficiency;Qi Stagnation;Kidney Qi Deficiency;Spleen Qi Deficiency;Damp and Phlegm Accumulation      Treatment Principle: tonify and nourish, move qi and blood    TCM / Acupuncture Treatment  Acupuncture Points:       Initial insertions: (L) Baxie, (L) Si 5, (L) Ht 7, (L) P 7       Second insertions: Paula 1, Horacio cardenas            Number of needles inserted: 17  Number of needles removed:  She is wearing ace wraps as discussed.         17    Accessory Techniques 9/28/2022 10/10/2022   Accessory Techniques TDP Heat Lamp TDP Heat Lamp   TDP Heat Lamp location used over feet over feet          Assessment and Plan  Treatment Observations:    Acupuncture Treatment Recommendations:         It is my recommendation that this patient seek advice from their Primary Care Provider about active symptoms not addressed during this visit. The risks and benefits of acupuncture were reviewed and the patient stated understanding. The patient's questions were answered to their satisfaction. Consent was provided for treatment. We thank you for the referral and opportunity to treat this patient.    Time Spent with Patient:   I spent a total of 30 minutes face-to-face with Lisa Reyna during today's office visit.     Woody Nielsen

## 2025-01-28 NOTE — PROGRESS NOTES
Subjective   The ABCs of the Annual Wellness Visit  Medicare Wellness Visit      Camelia Gray is a 79 y.o. patient who presents for a Medicare Wellness Visit.    The following portions of the patient's history were reviewed and   updated as appropriate: allergies, current medications, past family history, past medical history, past social history, past surgical history, and problem list.    Compared to one year ago, the patient's physical   health is better.  Compared to one year ago, the patient's mental   health is the same.    Recent Hospitalizations:  This patient has had a [facility] admission record on file within the last 365 days.  Current Medical Providers:  Patient Care Team:  Brinda Tony APRN as PCP - General (Nurse Practitioner)  Kevin Angel MD as Consulting Physician (Cardiology)  Jacqueline Banks APRN as Nurse Practitioner (Nurse Practitioner)  Reji Burch MD as Consulting Physician (Pulmonary Disease)    Outpatient Medications Prior to Visit   Medication Sig Dispense Refill    albuterol sulfate HFA (Ventolin HFA) 108 (90 Base) MCG/ACT inhaler Inhale 2 puffs 4 (Four) Times a Day As Needed for Shortness of Air. 18 g 11    apixaban (ELIQUIS) 5 MG tablet tablet Take 1 tablet by mouth 2 (Two) Times a Day. 60 tablet 11    B Complex Vitamins (vitamin b complex) capsule capsule Take 1 capsule by mouth Daily. Indications: Vitamin Deficiency      dilTIAZem CD (CARDIZEM CD) 180 MG 24 hr capsule TAKE 1 CAPSULE BY MOUTH DAILY 90 capsule 1    hydrALAZINE (APRESOLINE) 50 MG tablet Take 0.5 tablets by mouth 2 (Two) Times a Day As Needed (sbp>160). 60 tablet 0    levothyroxine (SYNTHROID, LEVOTHROID) 50 MCG tablet TAKE ONE TABLET BY MOUTH DAILY (INCREASE) 90 tablet 0    losartan (COZAAR) 50 MG tablet TAKE 1/2 TABLET BY MOUTH DAILY 30 tablet 0    Multiple Vitamins-Minerals (multivitamin with minerals) tablet tablet Take 1 tablet by mouth Daily. Indications: Vitamin Deficiency       pantoprazole (PROTONIX) 40 MG EC tablet TAKE ONE TABLET BY MOUTH DAILY 90 tablet 0    sennosides-docusate (PERICOLACE) 8.6-50 MG per tablet Take 2 tablets by mouth 2 (Two) Times a Day As Needed for Constipation. 30 tablet 0    sotalol (Betapace) 80 MG tablet Take 1 tablet by mouth 2 (Two) Times a Day. Indications: Irregularity of Ventricular Heart Rhythm      budesonide-formoterol (Symbicort) 160-4.5 MCG/ACT inhaler Inhale 2 puffs 2 (Two) Times a Day. 10.2 g 1    furosemide (LASIX) 40 MG tablet TAKE 1 TABLET BY MOUTH DAILY 30 tablet 0    sotalol (BETAPACE) 80 MG tablet Take 1 tablet by mouth Every 12 (Twelve) Hours. 60 tablet 0     No facility-administered medications prior to visit.     No opioid medication identified on active medication list. I have reviewed chart for other potential  high risk medication/s and harmful drug interactions in the elderly.      Aspirin is not on active medication list.  Aspirin use is not indicated based on review of current medical condition/s. Risk of harm outweighs potential benefits.  .    Patient Active Problem List   Diagnosis    Class 3 severe obesity due to excess calories with serious comorbidity and body mass index (BMI) of 45.0 to 49.9 in adult    Arthritis of left hip    Deep vein thrombosis (DVT)    Edema of lower extremity    Hypertension    Pulmonary embolism    Stasis dermatitis    Immobility syndrome    Lymphedema    Skin ulcer of toe of right foot with fat layer exposed    Primary osteoarthritis of left hip    Hypothyroidism    Continuous leakage of urine    Breast cancer screening by mammogram    Status post total replacement of hip    Thrombophilia    Primary osteoarthritis involving multiple joints    Gastroesophageal reflux disease without esophagitis    Obstructive uropathy    Wound discharge    Need for hepatitis C screening test    Hyperglycemia    Dyspnea    Hiatal hernia    Chronic atrial fibrillation with rapid ventricular response    Oral phase dysphagia  "   Hospital discharge follow-up    Shortness of breath    Persistent atrial fibrillation    Atelectasis    Symptomatic bradycardia    Sick sinus syndrome    Pacemaker     Advance Care Planning Advance Directive is on file.  ACP discussion was held with the patient during this visit. Patient has an advance directive in EMR which is still valid.             Objective   Vitals:    25 1324   BP: 142/85   BP Location: Left arm   Patient Position: Sitting   Cuff Size: Large Adult   Pulse: 64   Resp: 17   Temp: 98.1 °F (36.7 °C)   TempSrc: Oral   SpO2: 94%   Weight: (!) 150 kg (330 lb)   Height: 180.3 cm (71\")   PainSc: 0-No pain       Estimated body mass index is 46.03 kg/m² as calculated from the following:    Height as of this encounter: 180.3 cm (71\").    Weight as of this encounter: 150 kg (330 lb).    Class 3 Severe Obesity (BMI >=40). Obesity-related health conditions include the following: hypertension, coronary heart disease, dyslipidemias, and GERD. Obesity is unchanged. BMI is is above average; BMI management plan is completed. We discussed portion control and increasing exercise.           Does the patient have evidence of cognitive impairment? No                                                                                                Health  Risk Assessment    Smoking Status:  Social History     Tobacco Use   Smoking Status Former    Current packs/day: 0.00    Average packs/day: 0.3 packs/day for 42.2 years (14.1 ttl pk-yrs)    Types: Cigarettes    Start date: 10/1/1964    Quit date: 1992    Years since quittin.1    Passive exposure: Past   Smokeless Tobacco Never     Alcohol Consumption:  Social History     Substance and Sexual Activity   Alcohol Use No       Fall Risk Screen  STEADI Fall Risk Assessment was completed, and patient is at MODERATE risk for falls. Assessment completed on:2025    Depression Screening   Little interest or pleasure in doing things? Not at all   Feeling " down, depressed, or hopeless? Not at all   PHQ-2 Total Score 0      Health Habits and Functional and Cognitive Screenin/22/2025     8:56 AM   Functional & Cognitive Status   Do you have difficulty preparing food and eating? No    Do you have difficulty bathing yourself, getting dressed or grooming yourself? No    Do you have difficulty using the toilet? No    Do you have difficulty moving around from place to place? Yes    Do you have trouble with steps or getting out of a bed or a chair? Yes    Current Diet Well Balanced Diet    Dental Exam Up to date    Eye Exam Up to date    Exercise (times per week) 2 times per week    Current Exercises Include Home Exercise Program (TV, Computer, Etc.)    Do you need help using the phone?  No    Are you deaf or do you have serious difficulty hearing?  No    Do you need help to go to places out of walking distance? Yes    Do you need help shopping? Yes    Do you need help preparing meals?  No    Do you need help with housework?  Yes    Do you need help with laundry? No    Do you need help taking your medications? No    Do you need help managing money? No    Do you ever drive or ride in a car without wearing a seat belt? No    Have you felt unusual stress, anger or loneliness in the last month? No    Who do you live with? Alone    If you need help, do you have trouble finding someone available to you? No    Have you been bothered in the last four weeks by sexual problems? No    Do you have difficulty concentrating, remembering or making decisions? No        Patient-reported           Age-appropriate Screening Schedule:  Refer to the list below for future screening recommendations based on patient's age, sex and/or medical conditions. Orders for these recommended tests are listed in the plan section. The patient has been provided with a written plan.    Health Maintenance List  Health Maintenance   Topic Date Due    ZOSTER VACCINE (1 of 2) Never done    BMI FOLLOWUP   12/12/2024    DXA SCAN  01/28/2025 (Originally 12/6/2023)    RSV Vaccine - Adults (1 - 1-dose 75+ series) 01/28/2026 (Originally 7/27/2021)    ANNUAL WELLNESS VISIT  01/28/2026    TDAP/TD VACCINES (2 - Td or Tdap) 08/18/2027    HEPATITIS C SCREENING  Completed    COVID-19 Vaccine  Completed    INFLUENZA VACCINE  Completed    Pneumococcal Vaccine 65+  Completed    DIABETIC FOOT EXAM  Discontinued    MAMMOGRAM  Discontinued    HEMOGLOBIN A1C  Discontinued    DIABETIC EYE EXAM  Discontinued    URINE MICROALBUMIN  Discontinued                                                                                                                                                CMS Preventative Services Quick Reference  Risk Factors Identified During Encounter  Immunizations Discussed/Encouraged: Influenza, Shingrix, COVID19, and RSV (Respiratory Syncytial Virus)    The above risks/problems have been discussed with the patient.  Pertinent information has been shared with the patient in the After Visit Summary.  An After Visit Summary and PPPS were made available to the patient.    Follow Up:   Next Medicare Wellness visit to be scheduled in 1 year.         Additional E&M Note during same encounter follows:  Patient has additional, significant, and separately identifiable condition(s)/problem(s) that require work above and beyond the Medicare Wellness Visit     Chief Complaint  Medicare Wellness-subsequent    Subjective   HPI      Review of Systems   HENT:  Negative for trouble swallowing and voice change.         2022 last dental exam   No new problems.    Eyes:         2017 last eye exam. Had cataract surgery. No changes   Respiratory:  Negative for cough and wheezing.    Cardiovascular:  Positive for leg swelling. Negative for chest pain and palpitations.        Chronic lymphedema   Genitourinary:  Negative for difficulty urinating, urgency and vaginal discharge.   Musculoskeletal: Negative.    Allergic/Immunologic: Negative.   "  Hematological: Negative.    Psychiatric/Behavioral:  Negative for decreased concentration, dysphoric mood, hallucinations and suicidal ideas.               Objective   Vital Signs:  /85 (BP Location: Left arm, Patient Position: Sitting, Cuff Size: Large Adult)   Pulse 64   Temp 98.1 °F (36.7 °C) (Oral)   Resp 17   Ht 180.3 cm (71\")   Wt (!) 150 kg (330 lb)   SpO2 94%   BMI 46.03 kg/m²   Physical Exam  Vitals and nursing note reviewed.   Constitutional:       Appearance: She is obese.   HENT:      Head: Normocephalic.      Right Ear: Tympanic membrane normal.      Left Ear: Tympanic membrane normal.      Nose: Nose normal.      Mouth/Throat:      Mouth: Mucous membranes are moist.   Eyes:      Pupils: Pupils are equal, round, and reactive to light.   Cardiovascular:      Rate and Rhythm: Normal rate and regular rhythm.   Abdominal:      General: Bowel sounds are normal.      Palpations: Abdomen is soft.   Neurological:      Mental Status: She is alert.                       Assessment and Plan Additional age appropriate preventative wellness advice topics were discussed during today's preventative wellness exam(some topics already addressed during AWV portion of the note above):    Physical Activity: Advised cardiovascular activity 150 minutes per week as tolerated. (example brisk walk for 30 minutes, 5 days a week).           Chronic atrial fibrillation with rapid ventricular response  Stable on current medications has pacemaker. Taking diltiazem 180 mg daily losartan 25 mg daily of a 50 mg dose.        Primary hypertension  Hypertension is stable and controlled  Continue current treatment regimen.  Blood pressure will be reassessed in 6 months.  Stable on current medications.        Pacemaker  Stable monitored by cardiology       Pulmonary embolism, unspecified chronicity, unspecified pulmonary embolism type, unspecified whether acute cor pulmonale present  Stable on eliquis .        Acquired " hypothyroidism  Labs ordered stable on levothyroxine        Hyperglycemia  No chronic medications        Gastroesophageal reflux disease without esophagitis  Stable on protonix 40 mg daily       Immobility syndrome  Using wheelchair and or walker        Lymphedema  She is wearing ace wraps as discussed.        Continuous leakage of urine  Continue the pads and depends.                Follow Up   No follow-ups on file.  Patient was given instructions and counseling regarding her condition or for health maintenance advice. Please see specific information pulled into the AVS if appropriate.

## 2025-02-10 ENCOUNTER — TELEPHONE (OUTPATIENT)
Dept: CARDIOLOGY | Facility: CLINIC | Age: 80
End: 2025-02-10
Payer: MEDICARE

## 2025-02-10 NOTE — TELEPHONE ENCOUNTER
Hub staff attempted to follow warm transfer process and was unsuccessful     Caller: Camelia Gray    Relationship to patient: Self    Best call back number: [phone]    Patient is needing: PT IS CALLING TO SEE IF SHE CAN GET A TELEHEALTH VISIT FOR APPT TOMORROW. SPOKE WITH DAVIDSON AT THE OFFICE AND SHE ADVISED I PUT A MESSAGE IN BECAUSE EVERYONE WAS IN A MEETING. PLEASE CALL PT

## 2025-02-10 NOTE — TELEPHONE ENCOUNTER
Caller: Camelia Gray    Relationship: Self    Best call back number: [phone]    What is the best time to reach you: ANYTIME    Who are you requesting to speak with (clinical staff, provider,  specific staff member): ANYTIME    What was the call regarding: PT IS CALLING BACK TO SCHEDULE AN APPT PLEASE CALL PT

## 2025-02-24 RX ORDER — LOSARTAN POTASSIUM 50 MG/1
25 TABLET ORAL DAILY
Qty: 90 TABLET | Refills: 3 | Status: SHIPPED | OUTPATIENT
Start: 2025-02-24

## 2025-02-24 RX ORDER — FUROSEMIDE 40 MG/1
40 TABLET ORAL DAILY
Qty: 30 TABLET | Refills: 0 | OUTPATIENT
Start: 2025-02-24

## 2025-03-04 ENCOUNTER — LAB (OUTPATIENT)
Dept: LAB | Facility: HOSPITAL | Age: 80
End: 2025-03-04
Payer: MEDICARE

## 2025-03-04 DIAGNOSIS — E03.9 ACQUIRED HYPOTHYROIDISM: ICD-10-CM

## 2025-03-04 DIAGNOSIS — I10 PRIMARY HYPERTENSION: Chronic | ICD-10-CM

## 2025-03-04 LAB
ALBUMIN SERPL-MCNC: 4 G/DL (ref 3.5–5.2)
ALBUMIN/GLOB SERPL: 1.4 G/DL
ALP SERPL-CCNC: 128 U/L (ref 39–117)
ALT SERPL W P-5'-P-CCNC: 15 U/L (ref 1–33)
ANION GAP SERPL CALCULATED.3IONS-SCNC: 9 MMOL/L (ref 5–15)
AST SERPL-CCNC: 19 U/L (ref 1–32)
BILIRUB SERPL-MCNC: 0.4 MG/DL (ref 0–1.2)
BUN SERPL-MCNC: 25 MG/DL (ref 8–23)
BUN/CREAT SERPL: 31.6 (ref 7–25)
CALCIUM SPEC-SCNC: 9.6 MG/DL (ref 8.6–10.5)
CHLORIDE SERPL-SCNC: 103 MMOL/L (ref 98–107)
CHOLEST SERPL-MCNC: 153 MG/DL (ref 0–200)
CO2 SERPL-SCNC: 28 MMOL/L (ref 22–29)
CREAT SERPL-MCNC: 0.79 MG/DL (ref 0.57–1)
EGFRCR SERPLBLD CKD-EPI 2021: 76.7 ML/MIN/1.73
GLOBULIN UR ELPH-MCNC: 2.9 GM/DL
GLUCOSE SERPL-MCNC: 118 MG/DL (ref 65–99)
HDLC SERPL-MCNC: 73 MG/DL (ref 40–60)
LDLC SERPL CALC-MCNC: 66 MG/DL (ref 0–100)
LDLC/HDLC SERPL: 0.9 {RATIO}
POTASSIUM SERPL-SCNC: 4.4 MMOL/L (ref 3.5–5.2)
PROT SERPL-MCNC: 6.9 G/DL (ref 6–8.5)
SODIUM SERPL-SCNC: 140 MMOL/L (ref 136–145)
T4 FREE SERPL-MCNC: 1.14 NG/DL (ref 0.93–1.7)
TRIGL SERPL-MCNC: 72 MG/DL (ref 0–150)
TSH SERPL DL<=0.05 MIU/L-ACNC: 6.87 UIU/ML (ref 0.27–4.2)
VLDLC SERPL-MCNC: 14 MG/DL (ref 5–40)

## 2025-03-04 PROCEDURE — 84439 ASSAY OF FREE THYROXINE: CPT

## 2025-03-04 PROCEDURE — 84443 ASSAY THYROID STIM HORMONE: CPT

## 2025-03-04 PROCEDURE — 80053 COMPREHEN METABOLIC PANEL: CPT

## 2025-03-04 PROCEDURE — 80061 LIPID PANEL: CPT

## 2025-03-04 PROCEDURE — 36415 COLL VENOUS BLD VENIPUNCTURE: CPT

## 2025-03-05 ENCOUNTER — CLINICAL SUPPORT NO REQUIREMENTS (OUTPATIENT)
Dept: CARDIOLOGY | Facility: CLINIC | Age: 80
End: 2025-03-05
Payer: MEDICARE

## 2025-03-05 ENCOUNTER — OFFICE VISIT (OUTPATIENT)
Dept: CARDIOLOGY | Facility: CLINIC | Age: 80
End: 2025-03-05
Payer: MEDICARE

## 2025-03-05 VITALS
OXYGEN SATURATION: 94 % | BODY MASS INDEX: 41.02 KG/M2 | SYSTOLIC BLOOD PRESSURE: 147 MMHG | RESPIRATION RATE: 16 BRPM | DIASTOLIC BLOOD PRESSURE: 83 MMHG | WEIGHT: 293 LBS | HEIGHT: 71 IN | HEART RATE: 65 BPM

## 2025-03-05 DIAGNOSIS — I10 PRIMARY HYPERTENSION: Chronic | ICD-10-CM

## 2025-03-05 DIAGNOSIS — I48.0 PAF (PAROXYSMAL ATRIAL FIBRILLATION): Primary | ICD-10-CM

## 2025-03-05 DIAGNOSIS — Z95.0 PACEMAKER: ICD-10-CM

## 2025-03-05 DIAGNOSIS — I49.5 SICK SINUS SYNDROME: ICD-10-CM

## 2025-03-05 PROCEDURE — 3079F DIAST BP 80-89 MM HG: CPT | Performed by: NURSE PRACTITIONER

## 2025-03-05 PROCEDURE — 1159F MED LIST DOCD IN RCRD: CPT | Performed by: NURSE PRACTITIONER

## 2025-03-05 PROCEDURE — 93000 ELECTROCARDIOGRAM COMPLETE: CPT | Performed by: NURSE PRACTITIONER

## 2025-03-05 PROCEDURE — 99213 OFFICE O/P EST LOW 20 MIN: CPT | Performed by: NURSE PRACTITIONER

## 2025-03-05 PROCEDURE — 1160F RVW MEDS BY RX/DR IN RCRD: CPT | Performed by: NURSE PRACTITIONER

## 2025-03-05 PROCEDURE — 93280 PM DEVICE PROGR EVAL DUAL: CPT | Performed by: NURSE PRACTITIONER

## 2025-03-05 PROCEDURE — 3077F SYST BP >= 140 MM HG: CPT | Performed by: NURSE PRACTITIONER

## 2025-03-05 RX ORDER — FUROSEMIDE 40 MG/1
40 TABLET ORAL DAILY
COMMUNITY
Start: 2025-02-06

## 2025-03-05 NOTE — PROGRESS NOTES
Cardiology Office Follow Up Visit      Primary Care Provider:  Brinda Tony APRN    Reason for f/u:   Excisional atrial fibrillation  Dual-chamber pacemaker  Sick sinus syndrome      Subjective     CC:    Denies chest pain or dyspnea    History of Present Illness       Camelia Gray is a 79 y.o. female.  Patient is a 79-year-old female who has a history of paroxysmal atrial fibrillation.  She has undergone previous cardioversions.  She was previously treated on sotalol but had recurrent atrial fibrillation.    Patient underwent cardioversion in November 2024 at [facility].  In November 2024 she underwent implantation of a Medtronic dual-chamber direct conduction pacemaker    Echocardiogram in November 2024 showed her EF to be 55 to 60% borderline concentric LVH RVSP less than 35 mmHg.  No significant aortic stenosis.    Patient denies any current or recent chest pain, dyspnea, PND, orthopnea, palpitations, near syncope, lower extremity edema or feelings of her heart racing      ASSESSMENT/PLAN:      Diagnoses and all orders for this visit:    1. PAF (paroxysmal atrial fibrillation) (Primary)    2. Sick sinus syndrome    3. Pacemaker    4. Primary hypertension    Other orders  -     ECG 12 Lead            MEDICAL DECISION MAKING:      Patient appears well compensated.  Blood pressure stable.  We checked her pacemaker today in the office and device function is stable.  She has had no recurrent atrial fibrillation.  Overall she says she is at baseline.  She is in her wheelchair today.  She has no new complaints.  I made no changes in her medication.  Will continue the current treatment if she develops any new symptoms of asked her to contact the office sooner      Past Medical History:   Diagnosis Date    A-fib 12/2023    Arthritis     Asthma Nov. 2024    Pneumonia treated at hospital    Cataract 2016    Removal both eyes Oct.2016    Cellulitis of both feet 10/13/2019    Cholelithiasis 2020    Removed  Nov.2020    Decubitus ulcer of buttock, stage 2 10/14/2019    HEALED    Dermatitis associated with moisture 10/14/2019    Disease of thyroid gland     HYPOTHYROID    Edema of lower extremity 05/31/2017    GERD (gastroesophageal reflux disease) 2020    Treatments 2020, 2023, 2024    Hypertension     Hypothyroidism     Immobility syndrome 10/13/2019    Lymphedema 10/14/2019    LEGS    Lymphedema     Muscle weakness (generalized)     Non-pressure chronic ulcer of other part of left foot with fat layer exposed 10/14/2019    Non-pressure chronic ulcer of other part of right foot with fat layer exposed 10/14/2019    Obesity     Pulmonary embolism     Stasis dermatitis 07/08/2013    Ureteral calculi     Urinary tract infection 07/2020    Hosp. 7/2020    Venous stasis 10/14/2019    Yeast infection of the skin        Past Surgical History:   Procedure Laterality Date    BRONCHOSCOPY N/A 12/27/2023    Procedure: BRONCHOSCOPY with right lung washing;  Surgeon: Mayra Ramachandran MD;  Location: [facility] ENDOSCOPY;  Service: Pulmonary;  Laterality: N/A;  Post- Pneumonia    BRONCHOSCOPY N/A 11/17/2024    Procedure: BRONCHOSCOPY WITH BILATERAL LUNG LAVAGE;  Surgeon: Reji Burch MD;  Location: [facility] ENDOSCOPY;  Service: Pulmonary;  Laterality: N/A;    CARDIAC ELECTROPHYSIOLOGY PROCEDURE Left 11/20/2024    Procedure: Pacemaker DC new - Medtronic 3830;  Surgeon: Bertin Stone MD;  Location: [facility] CATH INVASIVE LOCATION;  Service: Cardiovascular;  Laterality: Left;    CARDIAC ELECTROPHYSIOLOGY PROCEDURE N/A 11/20/2024    Procedure: Cardioversion/Defibrillation;  Surgeon: Bertin Stone MD;  Location: [facility] CATH INVASIVE LOCATION;  Service: Cardiovascular;  Laterality: N/A;    CARDIAC SURGERY  Nov.2024    Pacemaker implant    CATARACT EXTRACTION Bilateral     CHOLECYSTECTOMY N/A 11/11/2020    Procedure: CHOLECYSTECTOMY LAPAROSCOPIC;  Surgeon: George Crocker DO;  Location: [facility] MAIN OR;  Service: General;  Laterality: N/A;     CORRECTION HAMMER TOE Bilateral     CYSTOSCOPY, URETEROSCOPY, RETROGRADE PYELOGRAM, STENT INSERTION Bilateral 08/18/2020    Procedure: CYSTOSCOPY BILATERAL RETROGRADE PYELOGRAM,;  Surgeon: Shawn Henrandez MD;  Location: [facility] MAIN OR;  Service: Urology;  Laterality: Bilateral;    DENTAL EXAMINATION UNDER ANESTHESIA      ENDOSCOPY N/A 09/13/2020    Procedure: ESOPHAGOGASTRODUODENOSCOPY;  Surgeon: Torsten Johnson MD;  Location: [facility] ENDOSCOPY;  Service: Gastroenterology;  Laterality: N/A;  post: esophageal stricture, reflux esophagitis, hiatal hernia    ENDOSCOPY N/A 02/01/2024    Procedure: ESOPHAGOGASTRODUODENOSCOPY, esophageal dilation (40-46 Fr non-guided Bougie);  Surgeon: Torsten Johnson MD;  Location: [facility] ENDOSCOPY;  Service: Gastroenterology;  Laterality: N/A;  post: hiatal hernia, esophageal stricture    INSERT / REPLACE / REMOVE PACEMAKER  11/20/2024    JOINT REPLACEMENT Bilateral     Bilateral knees    TOE SURGERY      TONSILLECTOMY  1952    TOTAL HIP ARTHROPLASTY Left 06/12/2020    Procedure: TOTAL HIP ARTHROPLASTY;  Surgeon: Baljit Hutchins II, MD;  Location: [facility] MAIN OR;  Service: Orthopedics;  Laterality: Left;         Current Outpatient Medications:     albuterol sulfate HFA (Ventolin HFA) 108 (90 Base) MCG/ACT inhaler, Inhale 2 puffs 4 (Four) Times a Day As Needed for Shortness of Air., Disp: 18 g, Rfl: 11    apixaban (ELIQUIS) 5 MG tablet tablet, Take 1 tablet by mouth 2 (Two) Times a Day., Disp: 60 tablet, Rfl: 11    B Complex Vitamins (vitamin b complex) capsule capsule, Take 1 capsule by mouth Daily. Indications: Vitamin Deficiency, Disp: , Rfl:     budesonide-formoterol (Symbicort) 160-4.5 MCG/ACT inhaler, Inhale 2 puffs 2 (Two) Times a Day. Indications: Chronic Obstructive Lung Disease, Disp: 10.2 g, Rfl: 1    dilTIAZem CD (CARDIZEM CD) 180 MG 24 hr capsule, TAKE 1 CAPSULE BY MOUTH DAILY, Disp: 90 capsule, Rfl: 1    furosemide (LASIX) 40 MG tablet, Take 1 tablet  by mouth Daily., Disp: , Rfl:     levothyroxine (SYNTHROID, LEVOTHROID) 50 MCG tablet, TAKE ONE TABLET BY MOUTH DAILY (INCREASE), Disp: 90 tablet, Rfl: 0    losartan (COZAAR) 50 MG tablet, TAKE 1/2 TABLET BY MOUTH DAILY, Disp: 90 tablet, Rfl: 3    Multiple Vitamins-Minerals (multivitamin with minerals) tablet tablet, Take 1 tablet by mouth Daily. Indications: Vitamin Deficiency, Disp: , Rfl:     pantoprazole (PROTONIX) 40 MG EC tablet, TAKE ONE TABLET BY MOUTH DAILY, Disp: 90 tablet, Rfl: 0    sennosides-docusate (PERICOLACE) 8.6-50 MG per tablet, Take 2 tablets by mouth 2 (Two) Times a Day As Needed for Constipation., Disp: 30 tablet, Rfl: 0    sotalol (Betapace) 80 MG tablet, Take 1 tablet by mouth 2 (Two) Times a Day. Indications: Irregularity of Ventricular Heart Rhythm, Disp: , Rfl:     Social History     Socioeconomic History    Marital status: Single   Tobacco Use    Smoking status: Former     Current packs/day: 0.00     Average packs/day: 0.3 packs/day for 42.2 years (14.1 ttl pk-yrs)     Types: Cigarettes     Start date: 10/1/1964     Quit date: 1992     Years since quittin.2     Passive exposure: Past    Smokeless tobacco: Never   Vaping Use    Vaping status: Never Used   Substance and Sexual Activity    Alcohol use: No    Drug use: No    Sexual activity: Defer       Family History   Problem Relation Age of Onset    Heart disease Mother         Pacemaker    Hypertension Mother     Heart disease Father         Pacemaker    Hypertension Father     Kidney disease Father     COPD Father        The following portions of the patient's history were reviewed and updated as appropriate: allergies, current medications, past family history, past medical history, past social history, past surgical history and problem list.    Review of Systems   All other systems reviewed and are negative.      Pertinent items are noted in HPI, all other systems reviewed and negative    /83 (BP Location: Right arm,  "Patient Position: Sitting, Cuff Size: Large Adult)   Pulse 65   Resp 16   Ht 180.3 cm (71\")   Wt (!) 150 kg (330 lb)   SpO2 94%   BMI 46.03 kg/m² .  Objective     Vitals reviewed.   Constitutional:       General: Not in acute distress.     Appearance: Normal appearance. Well-developed.   Eyes:      Pupils: Pupils are equal, round, and reactive to light.   HENT:      Head: Normocephalic and atraumatic.   Neck:      Vascular: No JVD.   Pulmonary:      Effort: Pulmonary effort is normal.      Breath sounds: Normal breath sounds.   Cardiovascular:      Normal rate. Regular rhythm.   Edema:     Peripheral edema absent.   Abdominal:      General: There is no distension.      Palpations: Abdomen is soft.      Tenderness: There is no abdominal tenderness.   Musculoskeletal: Normal range of motion.      Cervical back: Normal range of motion and neck supple. Skin:     General: Skin is warm and dry.   Neurological:      Mental Status: Alert and oriented to person, place, and time.             ECG 12 Lead    Date/Time: 3/5/2025 12:22 PM  Performed by: Jacqueline Banks APRN    Authorized by: Jacqueline Banks APRN  Rhythm: paced  Rate: normal  BPM: 65  QRS axis: normal  Comments: Atrial paced rhythm with intact ventricular conduction          EKG ordered by and reviewed by me in office                DEVICE INTERROGATION:  IN OFFICE    DEVICE TYPE:   Dual-chamber pacemaker    :   Mixpanel    BATTERY:  Stable    TIME TO ELECTIVE REPLACEMENT INDICATORS:   11.7 years    CHARGE TIME:   Not applicable        LEAD DATA:       DEVICE REPROGRAMMED FOR TESTING      Atrial:   1.6 mV, 475 ohms, 1.0 V@0.4 ms    Ventricular:     11.3 mV, 570 ohms, 1.125 V@0.4 ms    LV:      Pacemaker dependent:   No      Atrial pacing percentage: 98.5%    Ventricular pacing percentage: Less than 0.1%      Arrhythmia Logbook Reviewed: No A-fib        Summary:    Stable Device Function    No significant arhythmia burden.     Battery status " is stable.          NEXT IN OFFICE DEVICE CHECK DUE: 1 year    REMOTE DEVICE INTERROGATIONS: Ongoing

## 2025-03-06 ENCOUNTER — APPOINTMENT (OUTPATIENT)
Dept: GENERAL RADIOLOGY | Facility: HOSPITAL | Age: 80
DRG: 392 | End: 2025-03-06
Payer: MEDICARE

## 2025-03-06 ENCOUNTER — APPOINTMENT (OUTPATIENT)
Dept: CT IMAGING | Facility: HOSPITAL | Age: 80
DRG: 392 | End: 2025-03-06
Payer: MEDICARE

## 2025-03-06 ENCOUNTER — HOSPITAL ENCOUNTER (INPATIENT)
Facility: HOSPITAL | Age: 80
LOS: 1 days | Discharge: HOME | DRG: 392 | End: 2025-03-08
Attending: EMERGENCY MEDICINE | Admitting: INTERNAL MEDICINE
Payer: MEDICARE

## 2025-03-06 DIAGNOSIS — J96.01 ACUTE RESPIRATORY FAILURE WITH HYPOXEMIA: ICD-10-CM

## 2025-03-06 DIAGNOSIS — A08.11 NOROVIRUS: ICD-10-CM

## 2025-03-06 DIAGNOSIS — K52.9 GASTROENTERITIS: Primary | ICD-10-CM

## 2025-03-06 LAB
ADV 40+41 DNA STL QL NAA+NON-PROBE: NOT DETECTED
ANION GAP SERPL CALCULATED.3IONS-SCNC: 10.8 MMOL/L (ref 5–15)
ARTERIAL PATENCY WRIST A: POSITIVE
ASTRO TYP 1-8 RNA STL QL NAA+NON-PROBE: NOT DETECTED
ATMOSPHERIC PRESS: ABNORMAL MM[HG]
BASE EXCESS BLDA CALC-SCNC: -2.2 MMOL/L (ref 0–3)
BASOPHILS # BLD AUTO: 0.03 10*3/MM3 (ref 0–0.2)
BASOPHILS NFR BLD AUTO: 0.2 % (ref 0–1.5)
BDY SITE: ABNORMAL
BUN SERPL-MCNC: 27 MG/DL (ref 8–23)
BUN/CREAT SERPL: 31 (ref 7–25)
C CAYETANENSIS DNA STL QL NAA+NON-PROBE: NOT DETECTED
C COLI+JEJ+UPSA DNA STL QL NAA+NON-PROBE: NOT DETECTED
CALCIUM SPEC-SCNC: 9.4 MG/DL (ref 8.6–10.5)
CHLORIDE SERPL-SCNC: 104 MMOL/L (ref 98–107)
CO2 BLDA-SCNC: 25.2 MMOL/L (ref 22–29)
CO2 SERPL-SCNC: 26.2 MMOL/L (ref 22–29)
CREAT SERPL-MCNC: 0.87 MG/DL (ref 0.57–1)
CRYPTOSP DNA STL QL NAA+NON-PROBE: NOT DETECTED
D DIMER PPP FEU-MCNC: 0.8 MCGFEU/ML (ref 0–0.78)
DEPRECATED RDW RBC AUTO: 47.9 FL (ref 37–54)
E HISTOLYT DNA STL QL NAA+NON-PROBE: NOT DETECTED
EAEC PAA PLAS AGGR+AATA ST NAA+NON-PRB: NOT DETECTED
EC STX1+STX2 GENES STL QL NAA+NON-PROBE: NOT DETECTED
EGFRCR SERPLBLD CKD-EPI 2021: 68.3 ML/MIN/1.73
EOSINOPHIL # BLD AUTO: 0.07 10*3/MM3 (ref 0–0.4)
EOSINOPHIL NFR BLD AUTO: 0.5 % (ref 0.3–6.2)
EPEC EAE GENE STL QL NAA+NON-PROBE: NOT DETECTED
ERYTHROCYTE [DISTWIDTH] IN BLOOD BY AUTOMATED COUNT: 14.1 % (ref 12.3–15.4)
ETEC LTA+ST1A+ST1B TOX ST NAA+NON-PROBE: NOT DETECTED
FLUAV RNA RESP QL NAA+PROBE: NOT DETECTED
FLUBV RNA RESP QL NAA+PROBE: NOT DETECTED
G LAMBLIA DNA STL QL NAA+NON-PROBE: NOT DETECTED
GEN 5 1HR TROPONIN T REFLEX: 12 NG/L
GLUCOSE SERPL-MCNC: 181 MG/DL (ref 65–99)
HCO3 BLDA-SCNC: 23.9 MMOL/L (ref 21–28)
HCT VFR BLD AUTO: 49.4 % (ref 34–46.6)
HEMODILUTION: NO
HGB BLD-MCNC: 15.4 G/DL (ref 12–15.9)
HOLD SPECIMEN: NORMAL
IMM GRANULOCYTES # BLD AUTO: 0.04 10*3/MM3 (ref 0–0.05)
IMM GRANULOCYTES NFR BLD AUTO: 0.3 % (ref 0–0.5)
INHALED O2 CONCENTRATION: 21 %
LYMPHOCYTES # BLD AUTO: 0.21 10*3/MM3 (ref 0.7–3.1)
LYMPHOCYTES NFR BLD AUTO: 1.6 % (ref 19.6–45.3)
MCH RBC QN AUTO: 29.1 PG (ref 26.6–33)
MCHC RBC AUTO-ENTMCNC: 31.2 G/DL (ref 31.5–35.7)
MCV RBC AUTO: 93.2 FL (ref 79–97)
MODALITY: ABNORMAL
MONOCYTES # BLD AUTO: 0.71 10*3/MM3 (ref 0.1–0.9)
MONOCYTES NFR BLD AUTO: 5.4 % (ref 5–12)
NEUTROPHILS NFR BLD AUTO: 12.03 10*3/MM3 (ref 1.7–7)
NEUTROPHILS NFR BLD AUTO: 92 % (ref 42.7–76)
NOROVIRUS GI+II RNA STL QL NAA+NON-PROBE: DETECTED
NRBC BLD AUTO-RTO: 0 /100 WBC (ref 0–0.2)
NT-PROBNP SERPL-MCNC: 218 PG/ML (ref 0–1800)
P SHIGELLOIDES DNA STL QL NAA+NON-PROBE: NOT DETECTED
PCO2 BLDA: 44.4 MM HG (ref 35–48)
PH BLDA: 7.34 PH UNITS (ref 7.35–7.45)
PLATELET # BLD AUTO: 177 10*3/MM3 (ref 140–450)
PMV BLD AUTO: 9.2 FL (ref 6–12)
PO2 BLD: 254 MM[HG] (ref 0–500)
PO2 BLDA: 53.3 MM HG (ref 83–108)
POTASSIUM SERPL-SCNC: 4.8 MMOL/L (ref 3.5–5.2)
RBC # BLD AUTO: 5.3 10*6/MM3 (ref 3.77–5.28)
RSV RNA RESP QL NAA+PROBE: NOT DETECTED
RVA RNA STL QL NAA+NON-PROBE: NOT DETECTED
S ENT+BONG DNA STL QL NAA+NON-PROBE: NOT DETECTED
SAO2 % BLDCOA: 84.9 % (ref 94–98)
SAPO I+II+IV+V RNA STL QL NAA+NON-PROBE: NOT DETECTED
SARS-COV-2 RNA RESP QL NAA+PROBE: NOT DETECTED
SHIGELLA SP+EIEC IPAH ST NAA+NON-PROBE: NOT DETECTED
SODIUM SERPL-SCNC: 141 MMOL/L (ref 136–145)
TROPONIN T % DELTA: -14
TROPONIN T NUMERIC DELTA: -2 NG/L
TROPONIN T SERPL HS-MCNC: 14 NG/L
V CHOL+PARA+VUL DNA STL QL NAA+NON-PROBE: NOT DETECTED
V CHOLERAE DNA STL QL NAA+NON-PROBE: NOT DETECTED
WBC NRBC COR # BLD AUTO: 13.09 10*3/MM3 (ref 3.4–10.8)
WHOLE BLOOD HOLD COAG: NORMAL
Y ENTEROCOL DNA STL QL NAA+NON-PROBE: NOT DETECTED

## 2025-03-06 PROCEDURE — 25510000001 IOPAMIDOL PER 1 ML: Performed by: EMERGENCY MEDICINE

## 2025-03-06 PROCEDURE — 25810000003 LACTATED RINGERS PER 1000 ML: Performed by: INTERNAL MEDICINE

## 2025-03-06 PROCEDURE — 85025 COMPLETE CBC W/AUTO DIFF WBC: CPT | Performed by: EMERGENCY MEDICINE

## 2025-03-06 PROCEDURE — G0378 HOSPITAL OBSERVATION PER HR: HCPCS

## 2025-03-06 PROCEDURE — 87507 IADNA-DNA/RNA PROBE TQ 12-25: CPT | Performed by: EMERGENCY MEDICINE

## 2025-03-06 PROCEDURE — 85379 FIBRIN DEGRADATION QUANT: CPT | Performed by: EMERGENCY MEDICINE

## 2025-03-06 PROCEDURE — 25010000002 METOCLOPRAMIDE PER 10 MG: Performed by: EMERGENCY MEDICINE

## 2025-03-06 PROCEDURE — 93005 ELECTROCARDIOGRAM TRACING: CPT | Performed by: EMERGENCY MEDICINE

## 2025-03-06 PROCEDURE — 82803 BLOOD GASES ANY COMBINATION: CPT | Performed by: EMERGENCY MEDICINE

## 2025-03-06 PROCEDURE — 99285 EMERGENCY DEPT VISIT HI MDM: CPT

## 2025-03-06 PROCEDURE — 87637 SARSCOV2&INF A&B&RSV AMP PRB: CPT | Performed by: EMERGENCY MEDICINE

## 2025-03-06 PROCEDURE — 71275 CT ANGIOGRAPHY CHEST: CPT

## 2025-03-06 PROCEDURE — 84484 ASSAY OF TROPONIN QUANT: CPT | Performed by: EMERGENCY MEDICINE

## 2025-03-06 PROCEDURE — 36600 WITHDRAWAL OF ARTERIAL BLOOD: CPT | Performed by: EMERGENCY MEDICINE

## 2025-03-06 PROCEDURE — 80048 BASIC METABOLIC PNL TOTAL CA: CPT | Performed by: EMERGENCY MEDICINE

## 2025-03-06 PROCEDURE — 25810000003 SODIUM CHLORIDE 0.9 % SOLUTION: Performed by: EMERGENCY MEDICINE

## 2025-03-06 PROCEDURE — 71045 X-RAY EXAM CHEST 1 VIEW: CPT

## 2025-03-06 PROCEDURE — 83880 ASSAY OF NATRIURETIC PEPTIDE: CPT | Performed by: EMERGENCY MEDICINE

## 2025-03-06 RX ORDER — ONDANSETRON 4 MG/1
4 TABLET, ORALLY DISINTEGRATING ORAL EVERY 6 HOURS PRN
Status: DISCONTINUED | OUTPATIENT
Start: 2025-03-06 | End: 2025-03-08 | Stop reason: HOSPADM

## 2025-03-06 RX ORDER — ONDANSETRON 2 MG/ML
4 INJECTION INTRAMUSCULAR; INTRAVENOUS EVERY 6 HOURS PRN
Status: DISCONTINUED | OUTPATIENT
Start: 2025-03-06 | End: 2025-03-08 | Stop reason: HOSPADM

## 2025-03-06 RX ORDER — BUDESONIDE AND FORMOTEROL FUMARATE DIHYDRATE 160; 4.5 UG/1; UG/1
2 AEROSOL RESPIRATORY (INHALATION)
Status: DISCONTINUED | OUTPATIENT
Start: 2025-03-06 | End: 2025-03-08 | Stop reason: HOSPADM

## 2025-03-06 RX ORDER — NITROGLYCERIN 0.4 MG/1
0.4 TABLET SUBLINGUAL
Status: DISCONTINUED | OUTPATIENT
Start: 2025-03-06 | End: 2025-03-08 | Stop reason: HOSPADM

## 2025-03-06 RX ORDER — SODIUM CHLORIDE 9 MG/ML
40 INJECTION, SOLUTION INTRAVENOUS AS NEEDED
Status: DISCONTINUED | OUTPATIENT
Start: 2025-03-06 | End: 2025-03-08 | Stop reason: HOSPADM

## 2025-03-06 RX ORDER — MULTIPLE VITAMINS W/ MINERALS TAB 9MG-400MCG
1 TAB ORAL DAILY
Status: DISCONTINUED | OUTPATIENT
Start: 2025-03-06 | End: 2025-03-08 | Stop reason: HOSPADM

## 2025-03-06 RX ORDER — ACETAMINOPHEN 325 MG/1
650 TABLET ORAL EVERY 4 HOURS PRN
Status: DISCONTINUED | OUTPATIENT
Start: 2025-03-06 | End: 2025-03-08 | Stop reason: HOSPADM

## 2025-03-06 RX ORDER — FUROSEMIDE 40 MG/1
40 TABLET ORAL DAILY
Status: DISCONTINUED | OUTPATIENT
Start: 2025-03-06 | End: 2025-03-08 | Stop reason: HOSPADM

## 2025-03-06 RX ORDER — SODIUM CHLORIDE, SODIUM LACTATE, POTASSIUM CHLORIDE, CALCIUM CHLORIDE 600; 310; 30; 20 MG/100ML; MG/100ML; MG/100ML; MG/100ML
75 INJECTION, SOLUTION INTRAVENOUS CONTINUOUS
Status: DISPENSED | OUTPATIENT
Start: 2025-03-06 | End: 2025-03-07

## 2025-03-06 RX ORDER — SODIUM CHLORIDE 0.9 % (FLUSH) 0.9 %
10 SYRINGE (ML) INJECTION EVERY 12 HOURS SCHEDULED
Status: DISCONTINUED | OUTPATIENT
Start: 2025-03-06 | End: 2025-03-08 | Stop reason: HOSPADM

## 2025-03-06 RX ORDER — ACETAMINOPHEN 160 MG/5ML
650 SOLUTION ORAL EVERY 4 HOURS PRN
Status: DISCONTINUED | OUTPATIENT
Start: 2025-03-06 | End: 2025-03-08 | Stop reason: HOSPADM

## 2025-03-06 RX ORDER — LEVOTHYROXINE SODIUM 50 UG/1
50 TABLET ORAL
Status: DISCONTINUED | OUTPATIENT
Start: 2025-03-07 | End: 2025-03-06

## 2025-03-06 RX ORDER — IOPAMIDOL 755 MG/ML
100 INJECTION, SOLUTION INTRAVASCULAR
Status: COMPLETED | OUTPATIENT
Start: 2025-03-06 | End: 2025-03-06

## 2025-03-06 RX ORDER — ACETAMINOPHEN 650 MG/1
650 SUPPOSITORY RECTAL EVERY 4 HOURS PRN
Status: DISCONTINUED | OUTPATIENT
Start: 2025-03-06 | End: 2025-03-08 | Stop reason: HOSPADM

## 2025-03-06 RX ORDER — ALUMINA, MAGNESIA, AND SIMETHICONE 2400; 2400; 240 MG/30ML; MG/30ML; MG/30ML
15 SUSPENSION ORAL EVERY 6 HOURS PRN
Status: DISCONTINUED | OUTPATIENT
Start: 2025-03-06 | End: 2025-03-08 | Stop reason: HOSPADM

## 2025-03-06 RX ORDER — METOCLOPRAMIDE HYDROCHLORIDE 5 MG/ML
10 INJECTION INTRAMUSCULAR; INTRAVENOUS ONCE
Status: COMPLETED | OUTPATIENT
Start: 2025-03-06 | End: 2025-03-06

## 2025-03-06 RX ORDER — SOTALOL HYDROCHLORIDE 80 MG/1
80 TABLET ORAL 2 TIMES DAILY
Status: DISCONTINUED | OUTPATIENT
Start: 2025-03-06 | End: 2025-03-08 | Stop reason: HOSPADM

## 2025-03-06 RX ORDER — DILTIAZEM HYDROCHLORIDE 180 MG/1
180 CAPSULE, COATED, EXTENDED RELEASE ORAL
Status: DISCONTINUED | OUTPATIENT
Start: 2025-03-06 | End: 2025-03-08 | Stop reason: HOSPADM

## 2025-03-06 RX ORDER — ALBUTEROL SULFATE 0.83 MG/ML
2.5 SOLUTION RESPIRATORY (INHALATION) 4 TIMES DAILY PRN
Status: DISCONTINUED | OUTPATIENT
Start: 2025-03-06 | End: 2025-03-08 | Stop reason: HOSPADM

## 2025-03-06 RX ORDER — SODIUM CHLORIDE 0.9 % (FLUSH) 0.9 %
10 SYRINGE (ML) INJECTION AS NEEDED
Status: DISCONTINUED | OUTPATIENT
Start: 2025-03-06 | End: 2025-03-08 | Stop reason: HOSPADM

## 2025-03-06 RX ORDER — PANTOPRAZOLE SODIUM 40 MG/1
40 TABLET, DELAYED RELEASE ORAL
Status: DISCONTINUED | OUTPATIENT
Start: 2025-03-06 | End: 2025-03-08 | Stop reason: HOSPADM

## 2025-03-06 RX ORDER — IPRATROPIUM BROMIDE AND ALBUTEROL SULFATE 2.5; .5 MG/3ML; MG/3ML
3 SOLUTION RESPIRATORY (INHALATION) EVERY 4 HOURS PRN
Status: DISCONTINUED | OUTPATIENT
Start: 2025-03-06 | End: 2025-03-08 | Stop reason: HOSPADM

## 2025-03-06 RX ADMIN — METOCLOPRAMIDE 10 MG: 5 INJECTION, SOLUTION INTRAMUSCULAR; INTRAVENOUS at 01:27

## 2025-03-06 RX ADMIN — SODIUM CHLORIDE, SODIUM LACTATE, POTASSIUM CHLORIDE, AND CALCIUM CHLORIDE 75 ML/HR: 600; 310; 30; 20 INJECTION, SOLUTION INTRAVENOUS at 13:04

## 2025-03-06 RX ADMIN — DILTIAZEM HYDROCHLORIDE 180 MG: 180 CAPSULE, COATED, EXTENDED RELEASE ORAL at 16:33

## 2025-03-06 RX ADMIN — PANTOPRAZOLE SODIUM 40 MG: 40 TABLET, DELAYED RELEASE ORAL at 13:07

## 2025-03-06 RX ADMIN — Medication 10 ML: at 20:00

## 2025-03-06 RX ADMIN — IOPAMIDOL 100 ML: 755 INJECTION, SOLUTION INTRAVENOUS at 08:57

## 2025-03-06 RX ADMIN — SODIUM CHLORIDE 1000 ML: 9 INJECTION, SOLUTION INTRAVENOUS at 01:27

## 2025-03-06 RX ADMIN — Medication 10 ML: at 13:07

## 2025-03-06 RX ADMIN — SOTALOL HYDROCHLORIDE 80 MG: 80 TABLET ORAL at 16:33

## 2025-03-06 RX ADMIN — Medication 1 TABLET: at 16:33

## 2025-03-06 RX ADMIN — APIXABAN 5 MG: 5 TABLET, FILM COATED ORAL at 16:33

## 2025-03-06 NOTE — H&P
History and Physical   Camelia Gray : 1945 MRN:[id number] LOS:0     Reason for admission: <principal problem not specified>     Assessment / Plan     #Diarrhea 2/2 Norovirus infection  -Presented with nausea vomiting diarrhea for 1 day prior to admission sick exposure present.  -Leukocytosis of 13.  -D-dimer elevated.  CT angio chest with no pulmonary embolism.  -IV fluid mild hydration.  Antinausea medication.    #Mild hypoxia  #Asthma  -Oxygen supplement as needed.  -Respiratory treatment    #A fib  -Resume home medication     #Hypothyroidism   -Resume home medication once verified by pharmacy and clinically appropriate    #Code  -there is DNR status on 24  -pt mentioned that she wants FULL CODE         Nutrition: No diet orders on file     DVT Prophylaxis: No Active Pharmacologic or Mechanical VTE Prophylaxis AND No VTE Prophylaxis Contraindications Documented   - Select Appropriate VTE Prophylaxis      History of Present illness     A 79 y.o. old female patient with PMH of atrial fibrillation asthma hypertension hypothyroidism and immobility syndrome lymphedema pulmonary embolism and presents to the hospital with complaints of nausea vomiting diarrhea for 1 day prior to admission.  Sick exposure present.  Labs with leukocytosis of 13.  BUN 27 creatinine 0.8 troponin leakage of 14 D-dimer 0.8 GI panel positive for norovirus CT angio chest with no pulmonary embolism and but there is pulmonary hypertension and large hiatal hernia.  Patient is hypoxic in the ED needed 2 L oxygen.      Patient will be admitted as observation for norovirus.    Subjective / Review of systems     Review of Systems   Nausea but a lot better now     Past Medical/Surgical/Social/Family History & Allergies     Past Medical History:   Diagnosis Date    A-fib 2023    Arthritis     Asthma 2024    Pneumonia treated at hospital    Cataract 2016    Removal both eyes Oct.2016    Cellulitis of both feet 10/13/2019     Cholelithiasis 2020    Removed Nov.2020    Decubitus ulcer of buttock, stage 2 10/14/2019    HEALED    Dermatitis associated with moisture 10/14/2019    Disease of thyroid gland     HYPOTHYROID    Edema of lower extremity 05/31/2017    GERD (gastroesophageal reflux disease) 2020    Treatments 2020, 2023, 2024    Hypertension     Hypothyroidism     Immobility syndrome 10/13/2019    Lymphedema 10/14/2019    LEGS    Lymphedema     Muscle weakness (generalized)     Non-pressure chronic ulcer of other part of left foot with fat layer exposed 10/14/2019    Non-pressure chronic ulcer of other part of right foot with fat layer exposed 10/14/2019    Obesity     Pulmonary embolism     Stasis dermatitis 07/08/2013    Ureteral calculi     Urinary tract infection 07/2020    Hosp. 7/2020    Venous stasis 10/14/2019    Yeast infection of the skin       Past Surgical History:   Procedure Laterality Date    BRONCHOSCOPY N/A 12/27/2023    Procedure: BRONCHOSCOPY with right lung washing;  Surgeon: Mayra Ramachandran MD;  Location: [facility] ENDOSCOPY;  Service: Pulmonary;  Laterality: N/A;  Post- Pneumonia    BRONCHOSCOPY N/A 11/17/2024    Procedure: BRONCHOSCOPY WITH BILATERAL LUNG LAVAGE;  Surgeon: Reji Burch MD;  Location: [facility] ENDOSCOPY;  Service: Pulmonary;  Laterality: N/A;    CARDIAC ELECTROPHYSIOLOGY PROCEDURE Left 11/20/2024    Procedure: Pacemaker DC new - Medtronic 3830;  Surgeon: Bertin Stone MD;  Location: [facility] CATH INVASIVE LOCATION;  Service: Cardiovascular;  Laterality: Left;    CARDIAC ELECTROPHYSIOLOGY PROCEDURE N/A 11/20/2024    Procedure: Cardioversion/Defibrillation;  Surgeon: Bertin Stone MD;  Location: [facility] CATH INVASIVE LOCATION;  Service: Cardiovascular;  Laterality: N/A;    CARDIAC SURGERY  Nov.2024    Pacemaker implant    CATARACT EXTRACTION Bilateral     CHOLECYSTECTOMY N/A 11/11/2020    Procedure: CHOLECYSTECTOMY LAPAROSCOPIC;  Surgeon: George Crocker DO;  Location: [facility] MAIN OR;  Service:  General;  Laterality: N/A;    CORRECTION HAMMER TOE Bilateral     CYSTOSCOPY, URETEROSCOPY, RETROGRADE PYELOGRAM, STENT INSERTION Bilateral 2020    Procedure: CYSTOSCOPY BILATERAL RETROGRADE PYELOGRAM,;  Surgeon: Shawn Hernandez MD;  Location: [facility] MAIN OR;  Service: Urology;  Laterality: Bilateral;    DENTAL EXAMINATION UNDER ANESTHESIA      ENDOSCOPY N/A 2020    Procedure: ESOPHAGOGASTRODUODENOSCOPY;  Surgeon: Torsten Johnson MD;  Location: [facility] ENDOSCOPY;  Service: Gastroenterology;  Laterality: N/A;  post: esophageal stricture, reflux esophagitis, hiatal hernia    ENDOSCOPY N/A 2024    Procedure: ESOPHAGOGASTRODUODENOSCOPY, esophageal dilation (40-46 Fr non-guided Bougie);  Surgeon: Torsten Johnson MD;  Location: [facility] ENDOSCOPY;  Service: Gastroenterology;  Laterality: N/A;  post: hiatal hernia, esophageal stricture    INSERT / REPLACE / REMOVE PACEMAKER  2024    JOINT REPLACEMENT Bilateral     Bilateral knees    TOE SURGERY      TONSILLECTOMY      TOTAL HIP ARTHROPLASTY Left 2020    Procedure: TOTAL HIP ARTHROPLASTY;  Surgeon: Baljit Hutchins II, MD;  Location: [facility] MAIN OR;  Service: Orthopedics;  Laterality: Left;      Social History     Socioeconomic History    Marital status: Single   Tobacco Use    Smoking status: Former     Current packs/day: 0.00     Average packs/day: 0.3 packs/day for 42.2 years (14.1 ttl pk-yrs)     Types: Cigarettes     Start date: 10/1/1964     Quit date: 1992     Years since quittin.2     Passive exposure: Past    Smokeless tobacco: Never   Vaping Use    Vaping status: Never Used   Substance and Sexual Activity    Alcohol use: No    Drug use: No    Sexual activity: Never      Family History   Problem Relation Age of Onset    Heart disease Mother         Pacemaker    Hypertension Mother     Heart disease Father         Pacemaker    Hypertension Father     Kidney disease Father     COPD Father        Allergies   Allergen Reactions    Maxzide [Hydrochlorothiazide W-Triamterene] Arrhythmia        Home Medications     Prior to Admission medications    Medication Sig Start Date End Date Taking? Authorizing Provider   albuterol sulfate HFA (Ventolin HFA) 108 (90 Base) MCG/ACT inhaler Inhale 2 puffs 4 (Four) Times a Day As Needed for Shortness of Air. 11/21/24   Hussein Manzanares MD   apixaban (ELIQUIS) 5 MG tablet tablet Take 1 tablet by mouth 2 (Two) Times a Day. 10/29/24   Kevin Angel MD   B Complex Vitamins (vitamin b complex) capsule capsule Take 1 capsule by mouth Daily. Indications: Vitamin Deficiency 1/11/24   Karli Cheney MD   budesonide-formoterol (Symbicort) 160-4.5 MCG/ACT inhaler Inhale 2 puffs 2 (Two) Times a Day. Indications: Chronic Obstructive Lung Disease 1/28/25   Brinda Tony APRN   dilTIAZem CD (CARDIZEM CD) 180 MG 24 hr capsule TAKE 1 CAPSULE BY MOUTH DAILY 12/20/24   Kevin Angel MD   furosemide (LASIX) 40 MG tablet Take 1 tablet by mouth Daily. 2/6/25   Karli Cheney MD   hydrALAZINE (APRESOLINE) 50 MG tablet Take 0.5 tablets by mouth 2 (Two) Times a Day As Needed (sbp>160). 11/21/24   Hussein Manzanares MD   levothyroxine (SYNTHROID, LEVOTHROID) 50 MCG tablet TAKE ONE TABLET BY MOUTH DAILY (INCREASE) 12/26/24   Brinda Tony APRN   losartan (COZAAR) 50 MG tablet TAKE 1/2 TABLET BY MOUTH DAILY 2/24/25   Kevin Angel MD   Multiple Vitamins-Minerals (multivitamin with minerals) tablet tablet Take 1 tablet by mouth Daily. Indications: Vitamin Deficiency 1/11/24   Karli Cheney MD   pantoprazole (PROTONIX) 40 MG EC tablet TAKE ONE TABLET BY MOUTH DAILY 12/26/24   Brinda Tony APRN   sennosides-docusate (PERICOLACE) 8.6-50 MG per tablet Take 2 tablets by mouth 2 (Two) Times a Day As Needed for Constipation. 11/21/24   Hussein Manzanares MD   sotalol (Betapace) 80 MG tablet Take 1 tablet by mouth 2 (Two) Times a Day.  Indications: Irregularity of Ventricular Heart Rhythm 11/26/24   Provider, MD Karli      Objective / Physical Exam   Vital signs:  Temp: 97.8 °F (36.6 °C)  BP: 135/73  Heart Rate: 64  Resp: 18  SpO2: 96 %  Weight: (!) 150 kg (330 lb)    Admission Weight: Weight: (!) 150 kg (330 lb)    Physical Exam   Physical Exam  HENT:      Head: Normocephalic and atraumatic.      Nose: Nose normal.   Eyes:      Extraocular Movements: Extraocular movements intact.      Conjunctivae/sclerae: Conjunctivae normal.      Pupils: Pupils are equal, round, and reactive to light.   Cardiovascular:      Rate and Rhythm: Normal       Pulses: Normal pulses.      Heart sounds: Normal heart sounds.   Pulmonary:      Effort: normal      Breath sounds: normal   Abdominal:      General: Abdomen is flat. Bowel sounds are normal.      Palpations: Abdomen is soft.   Musculoskeletal:         General: Normal range of motion.      Cervical back: Normal range of motion and neck supple.   Skin:     General: Skin is dry.   Neurological:      General: No focal deficit present.      Mental Status: alert.   Psychiatric:         Mood and Affect: Mood normal.        Labs     Results from last 7 days   Lab Units 03/06/25  0119   WBC 10*3/mm3 13.09*   HEMATOCRIT % 49.4*   PLATELETS 10*3/mm3 177      Results from last 7 days   Lab Units 03/06/25  0119 03/04/25  1132   SODIUM mmol/L 141 140   POTASSIUM mmol/L 4.8 4.4   CHLORIDE mmol/L 104 103   CO2 mmol/L 26.2 28.0   BUN mg/dL 27* 25*   CREATININE mg/dL 0.87 0.79        Current Medications   Scheduled Meds:     Continuous Infusions:No current facility-administered medications for this encounter.       Biju Fernández MD  [facility] Medicine   03/06/25   10:20 EST

## 2025-03-06 NOTE — LETTER
EMS Transport Request  For use at [facility], [place], [place], [place], and [place] only   Patient Name: Camelia Gray : 1945   Weight:(!) 150 kg (330 lb) Pick-up Location: [facility] BLS/ALS: BLS/ALS: BLS   Insurance: MEDICARE    Pre-Cert #: D/C Summary complete:    Destination: Home How many stairs 0, Will the patient be on the main level yes, Is there a ramp available yes, Can the patient stand and pivot yes, Address [address] [facility], and Name/contact number for who will be present Patient confirming   Contact Precautions: Droplet/Spore   Equipment (O2, Fluids, etc.): O2, settings 4L NC   Arrive By Date/Time: 3/8/25, 1415 Stretcher/WC: Wheelchair   CM Requesting: Ethel Senior RN Ext: [phone]   Notes/Medical Necessity: WC van needed to return home.     ______________________________________________________________________    *Only 2 patient bags OR 1 carry-on size bag are permitted.  Wheelchairs and walkers CANNOT transported with the patient. Acknowledge: Yes

## 2025-03-06 NOTE — CASE MANAGEMENT/SOCIAL WORK
Discharge Planning Assessment   Chris     Patient Name: Camelia Gray  MRN: [id number]  Today's Date: 3/6/2025    Admit Date: 3/6/2025    Plan: From home with paid caregiver   Discharge Needs Assessment       Row Name 03/06/25 1536       Living Environment    People in Home alone    Current Living Arrangements home    Potentially Unsafe Housing Conditions none    In the past 12 months has the electric, gas, oil, or water company threatened to shut off services in your home? No    Primary Care Provided by self    Provides Primary Care For no one    Family Caregiver if Needed other (see comments)  Caregiver    Quality of Family Relationships helpful;involved;supportive    Able to Return to Prior Arrangements yes       Resource/Environmental Concerns    Resource/Environmental Concerns none    Transportation Concerns none       Transportation Needs    In the past 12 months, has lack of transportation kept you from medical appointments or from getting medications? no    In the past 12 months, has lack of transportation kept you from meetings, work, or from getting things needed for daily living? No       Food Insecurity    Within the past 12 months, you worried that your food would run out before you got the money to buy more. Never true    Within the past 12 months, the food you bought just didn't last and you didn't have money to get more. Never true       Transition Planning    Patient/Family Anticipates Transition to home    Patient/Family Anticipated Services at Transition none    Transportation Anticipated family or friend will provide       Discharge Needs Assessment    Readmission Within the Last 30 Days no previous admission in last 30 days    Equipment Currently Used at Home wheelchair;walker, standard;bp cuff    Concerns to be Addressed no discharge needs identified;denies needs/concerns at this time    Do you want help finding or keeping work or a job? I do not need or want help    Do you want help with  school or training? For example, starting or completing job training or getting a high school diploma, GED or equivalent No    Anticipated Changes Related to Illness none    Equipment Needed After Discharge none    Provided Post Acute Provider List? N/A    Provided Post Acute Provider Quality & Resource List? N/A    Offered/Gave Vendor List no                   Discharge Plan       Row Name 03/06/25 1549       Plan    Plan From home with paid caregiver    Patient/Family in Agreement with Plan yes    Provided Post Acute Provider List? N/A    Provided Post Acute Provider Quality & Resource List? N/A    Plan Comments CM met with patient at the bedside to review discharge planning. Patient lives at home alone, is IADLs but doesn't drive. Nephew Leo to transport patient at d/c. Confirmed PCP, insurance, and pharmacy. Patient declines M2B. Patient denies any difficulty affording food, utilities, or medications. Patient is not current with any HHC/PT services. Patient has a paid caregiver that comes 5 days/week for approximately 2-6 hours. DC Barriers: Wound care pending, PT/OT, IVF                  Continued Care and Services - Admitted Since 3/6/2025    No active coordination exists for this encounter.        Demographic Summary       Row Name 03/06/25 1525       General Information    Admission Type observation    Arrived From emergency department    Required Notices Provided Observation Status Notice    Referral Source admission list    Reason for Consult discharge planning    Preferred Language English       Contact Information    Permission Granted to Share Info With     Contact Information Obtained for                    Functional Status       Row Name 03/06/25 1534       Functional Status    Usual Activity Tolerance moderate    Current Activity Tolerance fair       Functional Status, IADL    Medications assistive equipment and person    Meal Preparation assistive equipment and person     Housekeeping assistive equipment and person    Laundry assistive equipment and person    Shopping assistive equipment and person    If for any reason you need help with day-to-day activities such as bathing, preparing meals, shopping, managing finances, etc., do you get the help you need? I get all the help I need       Mental Status    General Appearance WDL WDL       Mental Status Summary    Recent Changes in Mental Status/Cognitive Functioning no changes                Patient Forms       Row Name 03/06/25 6245       Patient Forms    Important Message from Medicare (IMM) Delivered  MOON per Reg 3/6               Dawn Berger RN     [phone]  [email or web address]

## 2025-03-06 NOTE — PLAN OF CARE
Problem: Adult Inpatient Plan of Care  Goal: Optimal Comfort and Wellbeing  Outcome: Progressing   Goal Outcome Evaluation:

## 2025-03-06 NOTE — ED PROVIDER NOTES
Subjective   History of Present Illness  79-year-old female with nonbloody vomiting and diarrhea for the past 4 hours transferred by EMS from home for treatment.  Patient was given 4 mg of Zofran IV without improvement, no abdominal pain, no fever, or symptoms.  Patient states her nephew was seen for the same symptoms who she has been around in the emergency department yesterday      Review of Systems   Gastrointestinal:  Positive for diarrhea, nausea and vomiting.   Neurological:  Positive for light-headedness.       Past Medical History:   Diagnosis Date    A-fib 12/2023    Arthritis     Asthma Nov. 2024    Pneumonia treated at hospital    Cataract 2016    Removal both eyes Oct.2016    Cellulitis of both feet 10/13/2019    Cholelithiasis 2020    Removed Nov.2020    Decubitus ulcer of buttock, stage 2 10/14/2019    HEALED    Dermatitis associated with moisture 10/14/2019    Disease of thyroid gland     HYPOTHYROID    Edema of lower extremity 05/31/2017    GERD (gastroesophageal reflux disease) 2020    Treatments 2020, 2023, 2024    Hypertension     Hypothyroidism     Immobility syndrome 10/13/2019    Lymphedema 10/14/2019    LEGS    Lymphedema     Muscle weakness (generalized)     Non-pressure chronic ulcer of other part of left foot with fat layer exposed 10/14/2019    Non-pressure chronic ulcer of other part of right foot with fat layer exposed 10/14/2019    Obesity     Pulmonary embolism     Stasis dermatitis 07/08/2013    Ureteral calculi     Urinary tract infection 07/2020    Hosp. 7/2020    Venous stasis 10/14/2019    Yeast infection of the skin        Allergies   Allergen Reactions    Maxzide [Hydrochlorothiazide W-Triamterene] Arrhythmia       Past Surgical History:   Procedure Laterality Date    BRONCHOSCOPY N/A 12/27/2023    Procedure: BRONCHOSCOPY with right lung washing;  Surgeon: Mayra Ramachandran MD;  Location: [facility] ENDOSCOPY;  Service: Pulmonary;  Laterality: N/A;  Post- Pneumonia    BRONCHOSCOPY N/A  11/17/2024    Procedure: BRONCHOSCOPY WITH BILATERAL LUNG LAVAGE;  Surgeon: Reji Burch MD;  Location: [facility] ENDOSCOPY;  Service: Pulmonary;  Laterality: N/A;    CARDIAC ELECTROPHYSIOLOGY PROCEDURE Left 11/20/2024    Procedure: Pacemaker DC new - Medtronic 3830;  Surgeon: Bertin Stone MD;  Location: [facility] CATH INVASIVE LOCATION;  Service: Cardiovascular;  Laterality: Left;    CARDIAC ELECTROPHYSIOLOGY PROCEDURE N/A 11/20/2024    Procedure: Cardioversion/Defibrillation;  Surgeon: Bertin Stone MD;  Location: [facility] CATH INVASIVE LOCATION;  Service: Cardiovascular;  Laterality: N/A;    CARDIAC SURGERY  Nov.2024    Pacemaker implant    CATARACT EXTRACTION Bilateral     CHOLECYSTECTOMY N/A 11/11/2020    Procedure: CHOLECYSTECTOMY LAPAROSCOPIC;  Surgeon: George Crcoker DO;  Location: [facility] MAIN OR;  Service: General;  Laterality: N/A;    CORRECTION HAMMER TOE Bilateral     CYSTOSCOPY, URETEROSCOPY, RETROGRADE PYELOGRAM, STENT INSERTION Bilateral 08/18/2020    Procedure: CYSTOSCOPY BILATERAL RETROGRADE PYELOGRAM,;  Surgeon: Shawn Hernandez MD;  Location: [facility] MAIN OR;  Service: Urology;  Laterality: Bilateral;    DENTAL EXAMINATION UNDER ANESTHESIA      ENDOSCOPY N/A 09/13/2020    Procedure: ESOPHAGOGASTRODUODENOSCOPY;  Surgeon: Torsten Johnson MD;  Location: [facility] ENDOSCOPY;  Service: Gastroenterology;  Laterality: N/A;  post: esophageal stricture, reflux esophagitis, hiatal hernia    ENDOSCOPY N/A 02/01/2024    Procedure: ESOPHAGOGASTRODUODENOSCOPY, esophageal dilation (40-46 Fr non-guided Bougie);  Surgeon: Torsten Johnson MD;  Location: [facility] ENDOSCOPY;  Service: Gastroenterology;  Laterality: N/A;  post: hiatal hernia, esophageal stricture    INSERT / REPLACE / REMOVE PACEMAKER  11/20/2024    JOINT REPLACEMENT Bilateral     Bilateral knees    TOE SURGERY      TONSILLECTOMY  1952    TOTAL HIP ARTHROPLASTY Left 06/12/2020    Procedure: TOTAL HIP ARTHROPLASTY;  Surgeon: Cuong  Baljit Emmanuel II, MD;  Location: [facility] MAIN OR;  Service: Orthopedics;  Laterality: Left;       Family History   Problem Relation Age of Onset    Heart disease Mother         Pacemaker    Hypertension Mother     Heart disease Father         Pacemaker    Hypertension Father     Kidney disease Father     COPD Father        Social History     Socioeconomic History    Marital status: Single   Tobacco Use    Smoking status: Former     Current packs/day: 0.00     Average packs/day: 0.3 packs/day for 42.2 years (14.1 ttl pk-yrs)     Types: Cigarettes     Start date: 10/1/1964     Quit date: 1992     Years since quittin.2     Passive exposure: Past    Smokeless tobacco: Never   Vaping Use    Vaping status: Never Used   Substance and Sexual Activity    Alcohol use: No    Drug use: No    Sexual activity: Never           Objective   Physical Exam  Constitutional:       Comments: Patient alert and appropriate, intermittently vomiting   HENT:      Head: Normocephalic and atraumatic.   Cardiovascular:      Rate and Rhythm: Normal rate and regular rhythm.   Pulmonary:      Effort: Pulmonary effort is normal.      Breath sounds: Normal breath sounds.   Abdominal:      General: Bowel sounds are normal. There is no distension.      Palpations: Abdomen is soft.      Tenderness: There is no abdominal tenderness.   Skin:     General: Skin is warm and dry.      Capillary Refill: Capillary refill takes less than 2 seconds.   Neurological:      General: No focal deficit present.      Mental Status: She is alert and oriented to person, place, and time.   Psychiatric:         Mood and Affect: Mood normal.         Behavior: Behavior normal.         Procedures           ED Course                                                       Medical Decision Making  Amount and/or Complexity of Data Reviewed  External Data Reviewed: radiology.     Details: Echo 2024 with normal LV function  Labs: ordered.  Radiology:  ordered.  ECG/medicine tests: ordered and independent interpretation performed.     Details: EKG interpretation: Atrially paced rhythm, rate 61    Risk  Prescription drug management.        Final diagnoses:   None       ED Disposition  ED Disposition       None            No follow-up provider specified.       Medication List      No changes were made to your prescriptions during this visit.

## 2025-03-06 NOTE — PLAN OF CARE
Problem: Adult Inpatient Plan of Care  Goal: Plan of Care Review  Outcome: Progressing  Flowsheets (Taken 3/6/2025 1033)  Progress: no change  Outcome Evaluation: new [facility]  Plan of Care Reviewed With: patient   Goal Outcome Evaluation:  Plan of Care Reviewed With: patient        Progress: no change  Outcome Evaluation: new [facility]

## 2025-03-07 LAB
ANION GAP SERPL CALCULATED.3IONS-SCNC: 8.9 MMOL/L (ref 5–15)
BASOPHILS # BLD AUTO: 0.01 10*3/MM3 (ref 0–0.2)
BASOPHILS NFR BLD AUTO: 0.1 % (ref 0–1.5)
BUN SERPL-MCNC: 25 MG/DL (ref 8–23)
BUN/CREAT SERPL: 34.2 (ref 7–25)
CALCIUM SPEC-SCNC: 8.5 MG/DL (ref 8.6–10.5)
CHLORIDE SERPL-SCNC: 108 MMOL/L (ref 98–107)
CO2 SERPL-SCNC: 23.1 MMOL/L (ref 22–29)
CREAT SERPL-MCNC: 0.73 MG/DL (ref 0.57–1)
DEPRECATED RDW RBC AUTO: 51.3 FL (ref 37–54)
EGFRCR SERPLBLD CKD-EPI 2021: 84.3 ML/MIN/1.73
EOSINOPHIL # BLD AUTO: 0.09 10*3/MM3 (ref 0–0.4)
EOSINOPHIL NFR BLD AUTO: 1.1 % (ref 0.3–6.2)
ERYTHROCYTE [DISTWIDTH] IN BLOOD BY AUTOMATED COUNT: 14.3 % (ref 12.3–15.4)
GLUCOSE SERPL-MCNC: 101 MG/DL (ref 65–99)
HCT VFR BLD AUTO: 44.3 % (ref 34–46.6)
HGB BLD-MCNC: 13.4 G/DL (ref 12–15.9)
IMM GRANULOCYTES # BLD AUTO: 0.03 10*3/MM3 (ref 0–0.05)
IMM GRANULOCYTES NFR BLD AUTO: 0.4 % (ref 0–0.5)
LYMPHOCYTES # BLD AUTO: 0.88 10*3/MM3 (ref 0.7–3.1)
LYMPHOCYTES NFR BLD AUTO: 10.3 % (ref 19.6–45.3)
MAGNESIUM SERPL-MCNC: 1.9 MG/DL (ref 1.6–2.4)
MCH RBC QN AUTO: 29.3 PG (ref 26.6–33)
MCHC RBC AUTO-ENTMCNC: 30.2 G/DL (ref 31.5–35.7)
MCV RBC AUTO: 96.7 FL (ref 79–97)
MONOCYTES # BLD AUTO: 0.69 10*3/MM3 (ref 0.1–0.9)
MONOCYTES NFR BLD AUTO: 8.1 % (ref 5–12)
NEUTROPHILS NFR BLD AUTO: 6.82 10*3/MM3 (ref 1.7–7)
NEUTROPHILS NFR BLD AUTO: 80 % (ref 42.7–76)
NRBC BLD AUTO-RTO: 0 /100 WBC (ref 0–0.2)
PHOSPHATE SERPL-MCNC: 2.9 MG/DL (ref 2.5–4.5)
PLATELET # BLD AUTO: 160 10*3/MM3 (ref 140–450)
PMV BLD AUTO: 9.5 FL (ref 6–12)
POTASSIUM SERPL-SCNC: 3.5 MMOL/L (ref 3.5–5.2)
QT INTERVAL: 397 MS
QT INTERVAL: 433 MS
QTC INTERVAL: 397 MS
QTC INTERVAL: 437 MS
RBC # BLD AUTO: 4.58 10*6/MM3 (ref 3.77–5.28)
SODIUM SERPL-SCNC: 140 MMOL/L (ref 136–145)
WBC NRBC COR # BLD AUTO: 8.52 10*3/MM3 (ref 3.4–10.8)

## 2025-03-07 PROCEDURE — 85025 COMPLETE CBC W/AUTO DIFF WBC: CPT | Performed by: INTERNAL MEDICINE

## 2025-03-07 PROCEDURE — 94799 UNLISTED PULMONARY SVC/PX: CPT

## 2025-03-07 PROCEDURE — 93005 ELECTROCARDIOGRAM TRACING: CPT | Performed by: INTERNAL MEDICINE

## 2025-03-07 PROCEDURE — 94640 AIRWAY INHALATION TREATMENT: CPT

## 2025-03-07 PROCEDURE — 84439 ASSAY OF FREE THYROXINE: CPT | Performed by: INTERNAL MEDICINE

## 2025-03-07 PROCEDURE — 83735 ASSAY OF MAGNESIUM: CPT | Performed by: INTERNAL MEDICINE

## 2025-03-07 PROCEDURE — 25010000002 ONDANSETRON PER 1 MG: Performed by: INTERNAL MEDICINE

## 2025-03-07 PROCEDURE — 84100 ASSAY OF PHOSPHORUS: CPT | Performed by: INTERNAL MEDICINE

## 2025-03-07 PROCEDURE — 80048 BASIC METABOLIC PNL TOTAL CA: CPT | Performed by: INTERNAL MEDICINE

## 2025-03-07 RX ORDER — LEVOTHYROXINE SODIUM 50 UG/1
50 TABLET ORAL
Status: DISCONTINUED | OUTPATIENT
Start: 2025-03-07 | End: 2025-03-08 | Stop reason: HOSPADM

## 2025-03-07 RX ORDER — POTASSIUM CHLORIDE 1500 MG/1
40 TABLET, EXTENDED RELEASE ORAL EVERY 4 HOURS
Status: COMPLETED | OUTPATIENT
Start: 2025-03-07 | End: 2025-03-07

## 2025-03-07 RX ADMIN — PANTOPRAZOLE SODIUM 40 MG: 40 TABLET, DELAYED RELEASE ORAL at 05:06

## 2025-03-07 RX ADMIN — POTASSIUM CHLORIDE 40 MEQ: 20 TABLET, EXTENDED RELEASE ORAL at 11:10

## 2025-03-07 RX ADMIN — SOTALOL HYDROCHLORIDE 80 MG: 80 TABLET ORAL at 21:49

## 2025-03-07 RX ADMIN — APIXABAN 5 MG: 5 TABLET, FILM COATED ORAL at 09:25

## 2025-03-07 RX ADMIN — Medication 10 ML: at 21:37

## 2025-03-07 RX ADMIN — ONDANSETRON 4 MG: 2 INJECTION, SOLUTION INTRAMUSCULAR; INTRAVENOUS at 18:35

## 2025-03-07 RX ADMIN — DILTIAZEM HYDROCHLORIDE 180 MG: 180 CAPSULE, COATED, EXTENDED RELEASE ORAL at 09:25

## 2025-03-07 RX ADMIN — SOTALOL HYDROCHLORIDE 80 MG: 80 TABLET ORAL at 09:26

## 2025-03-07 RX ADMIN — BUDESONIDE AND FORMOTEROL FUMARATE DIHYDRATE 2 PUFF: 160; 4.5 AEROSOL RESPIRATORY (INHALATION) at 18:52

## 2025-03-07 RX ADMIN — POTASSIUM CHLORIDE 40 MEQ: 20 TABLET, EXTENDED RELEASE ORAL at 09:25

## 2025-03-07 RX ADMIN — LEVOTHYROXINE SODIUM 50 MCG: 50 TABLET ORAL at 10:44

## 2025-03-07 RX ADMIN — APIXABAN 5 MG: 5 TABLET, FILM COATED ORAL at 21:37

## 2025-03-07 RX ADMIN — Medication 1 TABLET: at 09:25

## 2025-03-07 RX ADMIN — Medication 10 ML: at 09:25

## 2025-03-07 NOTE — NURSING NOTE
WOCN note:    79 yr old female admitted 3/6/25 with reports of nausea, vomiting and diarrhea. WOCN consult received for wounds noted upon admission. Chart and photos reviewed. Patient presents with discoloration to her buttocks consistent with chronic pressure. She is having frequent stools and recommend using zinc barrier paste to her ben-rectal and perineal areas. Will also order a low air loss pump for the Elizabeth bed to manage moisture.   There are also areas of intertrigo noted to her abdominal folds. Recommend using the Remedy anti-fungal cream and placing folded pillow cases in the folds to keep the skin surfaces . Will continue to follow as needed.

## 2025-03-07 NOTE — CASE MANAGEMENT/SOCIAL WORK
Continued Stay Note  DEAN Perez     Patient Name: Camelia Gray  MRN: [id number]  Today's Date: 3/7/2025    Admit Date: 3/6/2025    Plan: From home with paid caregiver   Discharge Plan       Row Name 03/07/25 1219       Plan    Plan Comments DC Barriers: IV fluids; diarrhea; 2L O2                  Gabrielle Kim RN, [facility]  Office: [phone]  Fax: [phone]  [email or web address]      Phone communication or documentation only - no physical contact with patient or family.    Gabrielle Kim RN

## 2025-03-07 NOTE — PROGRESS NOTES
[facility] MEDICINE SERVICE  DAILY PROGRESS NOTE    NAME: Camelia Gray  : 1945  MRN: [id number]      LOS: 0 days     PROVIDER OF SERVICE: Kerry Alston MD    Chief Complaint: Norovirus    Subjective:     Patient lying in bed.  With fecal system.  Having multiple bowel movements.    H&P, labs and imaging reviewed.  Denies any chest pain.  Vomiting or diarrhea has had vomiting yesterday, having multiple bowel movements  Interval History:    Patient seen and evaluated at bedside.     Treatment plan discussed with patient. All questions addressed.     Review of Systems:   All 21 ROS were negative except mentioned above.    Objective:     Vital Signs  Temp:  [97.5 °F (36.4 °C)-98.4 °F (36.9 °C)] 97.5 °F (36.4 °C)  Heart Rate:  [60-72] 60  Resp:  [10-19] 13  BP: (105-143)/(55-69) 105/69  Flow (L/min) (Oxygen Therapy):  [2] 2   Body mass index is 46.03 kg/m².    Physical Exam   General: No acute distress, appears stated age  Neuro: Awake and alert, oriented x3, no focal deficits appreciated  Head: Atraumatic, normocephalic  HEENT: EOMI, anicteric, normal sclerae and conjunctivae, moist mucus membranes  Neck: supple, no lymphadenopathy  CV: RRR, soft heart sounds, no murmurs appreciated, no peripheral edema  Pulm: Decreased breath sounds, no increased work of breathing, no adventitious sounds  Abd: Soft, nontender, nondistended  Skin: Warm, dry and wounds on lower extremities and bilateral hips  Psych: Appropriate mood and affect    Scheduled Meds   apixaban, 5 mg, Oral, BID  budesonide-formoterol, 2 puff, Inhalation, BID - RT  dilTIAZem CD, 180 mg, Oral, Q24H  [Held by provider] furosemide, 40 mg, Oral, Daily  multivitamin with minerals, 1 tablet, Oral, Daily  pantoprazole, 40 mg, Oral, Q AM  potassium chloride ER, 40 mEq, Oral, Q4H  sodium chloride, 10 mL, Intravenous, Q12H  sotalol, 80 mg, Oral, BID       PRN Meds     acetaminophen **OR** acetaminophen **OR** acetaminophen    albuterol     aluminum-magnesium hydroxide-simethicone    Calcium Replacement - Follow Nurse / BPA Driven Protocol    ipratropium-albuterol    Magnesium Standard Dose Replacement - Follow Nurse / BPA Driven Protocol    melatonin    nitroglycerin    ondansetron ODT **OR** ondansetron    Phosphorus Replacement - Follow Nurse / BPA Driven Protocol    Potassium Replacement - Follow Nurse / BPA Driven Protocol    sodium chloride    sodium chloride   Infusions  lactated ringers, 75 mL/hr, Last Rate: 75 mL/hr (03/06/25 1304)          Diagnostic Data    Results from last 7 days   Lab Units 03/07/25  0506 03/06/25  0119 03/04/25  1132   WBC 10*3/mm3 8.52   < >  --    HEMOGLOBIN g/dL 13.4   < >  --    HEMATOCRIT % 44.3   < >  --    PLATELETS 10*3/mm3 160   < >  --    GLUCOSE mg/dL 101*   < > 118*   CREATININE mg/dL 0.73   < > 0.79   BUN mg/dL 25*   < > 25*   SODIUM mmol/L 140   < > 140   POTASSIUM mmol/L 3.5   < > 4.4   AST (SGOT) U/L  --   --  19   ALT (SGPT) U/L  --   --  15   ALK PHOS U/L  --   --  128*   BILIRUBIN mg/dL  --   --  0.4   ANION GAP mmol/L 8.9   < > 9.0    < > = values in this interval not displayed.       CT Angiogram Chest Pulmonary Embolism    Result Date: 3/6/2025  Impression: 1. Limited exam but no evidence for pulmonary embolus in the main pulmonary arteries. 2. Prominent main pulmonary artery compatible with pulmonary hypertension. 3. Large hiatal hernia with bibasilar atelectasis versus scarring. Electronically Signed: Mame Puckett MD  3/6/2025 9:06 AM EST  Workstation ID: [id number]    XR Chest 1 View    Result Date: 3/6/2025  Impression: No acute cardiopulmonary process. Stable cardiomegaly. Electronically Signed: Amy Cano MD  3/6/2025 2:24 AM EST  Workstation ID: [id number]     Interval results reviewed.    Assessment/Plan:   Acute gastroenteritis secondary to norovirus  Acute hypoxic respiratory failure  Wounds on bilateral lower extremities and hips  Asthma  Atrial fibrillation  Hypothyroidism  Elevated  D-dimer with negative CT angiogram    Continue supportive treatment for norovirus continue IV fluids, antiemetics.  White blood count back to normal  Continue oxygen as needed and bronchodilators as needed  Blood pressure running low monitor blood pressure heart rate controlled with bisoprolol  Continue apixaban 5 mg twice daily for AC  Continue LT4, TSH slightly elevated.  Could be due to euthyroid sick syndrome patient needs to get TSH rechecked in 4 to 6 weeks after the illness  Awaiting wound nurse consult  Plan to discharge when diarrhea improves.        Treatment plan discussed with RN.     VTE Prophylaxis:  Pharmacologic & mechanical VTE prophylaxis orders are present.         Code status is   Code Status and Medical Interventions: CPR (Attempt to Resuscitate); Full Support   Ordered at: 03/06/25 1317     Code Status (Patient has no pulse and is not breathing):    CPR (Attempt to Resuscitate)     Medical Interventions (Patient has pulse or is breathing):    Full Support       Plan for disposition:     Barriers to Discharge: Diarrhea  Anticipated Date of Discharge: 3/8/2024  Place of Discharge: Home      Time: 40 minutes   Signature: Electronically signed by Kerry Alston MD, 03/07/25, 08:49 EST.  [facility] Hospitalist Team

## 2025-03-07 NOTE — PLAN OF CARE
Problem: Adult Inpatient Plan of Care  Goal: Plan of Care Review  Outcome: Progressing  Goal: Patient-Specific Goal (Individualized)  Outcome: Progressing  Goal: Absence of Hospital-Acquired Illness or Injury  Outcome: Progressing  Intervention: Identify and Manage Fall Risk  Description: Perform standard risk assessment on admission using a validated tool or comprehensive approach appropriate to the patient; reassess fall risk frequently, with change in status or transfer to another level of care.  Communicate risk to interprofessional healthcare team; ensure fall risk visible cue.  Determine need for increased observation, equipment and environmental modification, as well as use of supportive, nonskid footwear.  Adjust safety measures to individual needs and identified risk factors.  Reinforce the importance of active participation with fall risk prevention, safety, and physical activity with the patient and family.  Perform regular intentional rounding to assess need for position change, pain assessment and personal needs, including assistance with toileting.  Recent Flowsheet Documentation  Taken 3/7/2025 0000 by Yovana Torres RN  Safety Promotion/Fall Prevention: safety round/check completed  Taken 3/6/2025 2200 by Yovana Torres RN  Safety Promotion/Fall Prevention:   fall prevention program maintained   clutter free environment maintained   activity supervised   lighting adjusted   nonskid shoes/slippers when out of bed   room organization consistent   safety round/check completed  Taken 3/6/2025 2000 by Yovana Torres RN  Safety Promotion/Fall Prevention:   safety round/check completed   nonskid shoes/slippers when out of bed   fall prevention program maintained   clutter free environment maintained   activity supervised  Intervention: Prevent and Manage VTE (Venous Thromboembolism) Risk  Description: Assess for VTE (venous thromboembolism) risk.  Promote early mobilization; encourage both active  and passive leg exercises, if unable to ambulate.  Initiate and maintain compression or other therapy, as indicated, based on identified risk in accordance with organizational protocol and provider order.  Recognize the patient's individual risk for bleeding before initiating pharmacologic thromboprophylaxis.  Recent Flowsheet Documentation  Taken 3/6/2025 2000 by Yovana Torres RN  VTE Prevention/Management: (eliquis) other (see comments)  Intervention: Prevent Infection  Description: Maintain skin and mucous membrane integrity; promote hand, oral and pulmonary hygiene.  Optimize fluid balance, nutrition, sleep and glycemic control to maximize infection resistance.  Identify potential sources of infection early to prevent or mitigate progression of infection (e.g., wound, lines, devices).  Evaluate ongoing need for invasive devices; remove promptly when no longer indicated.  Review vaccination status.  Recent Flowsheet Documentation  Taken 3/7/2025 0000 by Yoavna Torres RN  Infection Prevention:   rest/sleep promoted   hand hygiene promoted  Taken 3/6/2025 2200 by Yovana Torres RN  Infection Prevention: rest/sleep promoted  Taken 3/6/2025 2000 by Yovana Torres RN  Infection Prevention:   rest/sleep promoted   hand hygiene promoted  Goal: Optimal Comfort and Wellbeing  Outcome: Progressing  Intervention: Provide Person-Centered Care  Description: Use a family-focused approach to care; encourage support system presence and participation.  Develop trust and rapport by proactively providing information, encouraging questions, addressing concerns and offering reassurance.  Acknowledge emotional response to hospitalization.  Recognize and utilize personal coping strategies and strengths; develop goals via shared decision-making.  Honor spiritual and cultural preferences.  Recent Flowsheet Documentation  Taken 3/7/2025 0000 by Yovana Torres RN  Trust Relationship/Rapport:   care explained   choices  provided  Taken 3/6/2025 2000 by Yovana Torres RN  Trust Relationship/Rapport:   care explained   choices provided  Goal: Readiness for Transition of Care  Outcome: Progressing     Problem: Nausea and Vomiting  Goal: Nausea and Vomiting Relief  Outcome: Progressing  Intervention: Prevent and Manage Nausea and Vomiting  Description: Adjust position to prevent aspiration.  Identify and address underlying cause.  Monitor intake, output and laboratory value trends; advocate for adjustment in treatment with imbalance.  Evaluate medication (addition, withdrawal, toxicity) as potential source or trigger, such as an opioid agent.  Administer antiemetic agent per prescribed regimen.  Assess fluid status and ability to take oral fluids; if unable to provide or achieve oral intake, provide intravenous fluid therapy and electrolyte replacement.  Minimize sight, smell and taste of foods or odors that trigger nausea.  Encourage oral fluids when able; minimize barriers to oral intake, such as providing food preferences and small, frequent meals.  Promote oral hygiene after each emesis episode and at routine intervals to provide comfort and protection for the mouth.  Consider complementary therapy, such as acupuncture, point P6 stimulation, aromatherapy or controlled breathing.  Provide comfort measures, such as use of a cool cloth and decreased environmental stimuli, including light, noise and odor.  Recent Flowsheet Documentation  Taken 3/6/2025 2000 by Yovana Torres, RN  Environmental Support: calm environment promoted     Problem: Diarrhea  Goal: Effective Diarrhea Management  Outcome: Progressing  Intervention: Manage Diarrhea  Description: Provide fluid and electrolyte replacement to correct any imbalance through use of oral rehydration solution, nasogastric tube or intravenous fluid therapy.  Utilize skin protectant barrier to maintain skin integrity; cleanse gently, thoroughly and promptly after stooling; avoid  alcohol-containing wipes.  Continue usual diet; encourage fluids (e.g., broth, soups, fruit juices).  Keep toilet, toileting devices and aids readily accessible and barrier-free to maintain safety.  Provide comfort measures and privacy.  Evaluate need for fecal-containment device to minimize skin exposure.  Implement contact precautions, if infection suspected.  Anticipate pharmacologic therapy, such as antidiarrheal, antiemetic, probiotic or antimicrobial agent, to decrease output.  If diarrhea persists, advocate for identification of underlying cause.  Recent Flowsheet Documentation  Taken 3/7/2025 0000 by Yovana Torres RN  Isolation Precautions:   contact   spore  Taken 3/6/2025 2200 by Yovana Torres RN  Isolation Precautions:   contact   spore  Taken 3/6/2025 2000 by Yovana Torres RN  Perineal Care:   absorbent brief/pad changed   perineal hygiene encouraged  Isolation Precautions:   contact   spore   Goal Outcome Evaluation:

## 2025-03-07 NOTE — SIGNIFICANT NOTE
03/07/25 1054   Rehab Time/Intention   Session Not Performed patient/family declined evaluation  (wants to wait until no longer needing FMS. is familiar with HEP and doing them independently.)   Recommendation   PT - Next Appointment 03/08/25        Patient called back looking for her results

## 2025-03-08 ENCOUNTER — READMISSION MANAGEMENT (OUTPATIENT)
Dept: CALL CENTER | Facility: HOSPITAL | Age: 80
End: 2025-03-08
Payer: MEDICARE

## 2025-03-08 VITALS
SYSTOLIC BLOOD PRESSURE: 147 MMHG | OXYGEN SATURATION: 92 % | RESPIRATION RATE: 14 BRPM | TEMPERATURE: 98.1 F | BODY MASS INDEX: 41.02 KG/M2 | DIASTOLIC BLOOD PRESSURE: 72 MMHG | WEIGHT: 293 LBS | HEIGHT: 71 IN | HEART RATE: 62 BPM

## 2025-03-08 LAB
ANION GAP SERPL CALCULATED.3IONS-SCNC: 6.5 MMOL/L (ref 5–15)
BASOPHILS # BLD AUTO: 0.01 10*3/MM3 (ref 0–0.2)
BASOPHILS NFR BLD AUTO: 0.1 % (ref 0–1.5)
BUN SERPL-MCNC: 16 MG/DL (ref 8–23)
BUN/CREAT SERPL: 23.9 (ref 7–25)
CALCIUM SPEC-SCNC: 8.6 MG/DL (ref 8.6–10.5)
CHLORIDE SERPL-SCNC: 108 MMOL/L (ref 98–107)
CO2 SERPL-SCNC: 25.5 MMOL/L (ref 22–29)
CREAT SERPL-MCNC: 0.67 MG/DL (ref 0.57–1)
DEPRECATED RDW RBC AUTO: 49 FL (ref 37–54)
EGFRCR SERPLBLD CKD-EPI 2021: 89.6 ML/MIN/1.73
EOSINOPHIL # BLD AUTO: 0.1 10*3/MM3 (ref 0–0.4)
EOSINOPHIL NFR BLD AUTO: 1.5 % (ref 0.3–6.2)
ERYTHROCYTE [DISTWIDTH] IN BLOOD BY AUTOMATED COUNT: 13.8 % (ref 12.3–15.4)
GLUCOSE SERPL-MCNC: 104 MG/DL (ref 65–99)
HCT VFR BLD AUTO: 43.1 % (ref 34–46.6)
HGB BLD-MCNC: 13.3 G/DL (ref 12–15.9)
IMM GRANULOCYTES # BLD AUTO: 0.03 10*3/MM3 (ref 0–0.05)
IMM GRANULOCYTES NFR BLD AUTO: 0.4 % (ref 0–0.5)
LYMPHOCYTES # BLD AUTO: 1.23 10*3/MM3 (ref 0.7–3.1)
LYMPHOCYTES NFR BLD AUTO: 18.3 % (ref 19.6–45.3)
MAGNESIUM SERPL-MCNC: 1.9 MG/DL (ref 1.6–2.4)
MCH RBC QN AUTO: 29.3 PG (ref 26.6–33)
MCHC RBC AUTO-ENTMCNC: 30.9 G/DL (ref 31.5–35.7)
MCV RBC AUTO: 94.9 FL (ref 79–97)
MONOCYTES # BLD AUTO: 0.71 10*3/MM3 (ref 0.1–0.9)
MONOCYTES NFR BLD AUTO: 10.6 % (ref 5–12)
NEUTROPHILS NFR BLD AUTO: 4.63 10*3/MM3 (ref 1.7–7)
NEUTROPHILS NFR BLD AUTO: 69.1 % (ref 42.7–76)
NRBC BLD AUTO-RTO: 0 /100 WBC (ref 0–0.2)
PHOSPHATE SERPL-MCNC: 2.5 MG/DL (ref 2.5–4.5)
PLATELET # BLD AUTO: 148 10*3/MM3 (ref 140–450)
PMV BLD AUTO: 9.2 FL (ref 6–12)
POTASSIUM SERPL-SCNC: 4.3 MMOL/L (ref 3.5–5.2)
RBC # BLD AUTO: 4.54 10*6/MM3 (ref 3.77–5.28)
SODIUM SERPL-SCNC: 140 MMOL/L (ref 136–145)
WBC NRBC COR # BLD AUTO: 6.71 10*3/MM3 (ref 3.4–10.8)

## 2025-03-08 PROCEDURE — 94799 UNLISTED PULMONARY SVC/PX: CPT

## 2025-03-08 PROCEDURE — 85025 COMPLETE CBC W/AUTO DIFF WBC: CPT | Performed by: INTERNAL MEDICINE

## 2025-03-08 PROCEDURE — 94761 N-INVAS EAR/PLS OXIMETRY MLT: CPT

## 2025-03-08 PROCEDURE — 94664 DEMO&/EVAL PT USE INHALER: CPT

## 2025-03-08 PROCEDURE — 80048 BASIC METABOLIC PNL TOTAL CA: CPT | Performed by: INTERNAL MEDICINE

## 2025-03-08 PROCEDURE — 97162 PT EVAL MOD COMPLEX 30 MIN: CPT

## 2025-03-08 PROCEDURE — 84100 ASSAY OF PHOSPHORUS: CPT | Performed by: INTERNAL MEDICINE

## 2025-03-08 PROCEDURE — 83735 ASSAY OF MAGNESIUM: CPT | Performed by: INTERNAL MEDICINE

## 2025-03-08 RX ADMIN — Medication 1 TABLET: at 08:54

## 2025-03-08 RX ADMIN — LEVOTHYROXINE SODIUM 50 MCG: 50 TABLET ORAL at 05:37

## 2025-03-08 RX ADMIN — APIXABAN 5 MG: 5 TABLET, FILM COATED ORAL at 08:54

## 2025-03-08 RX ADMIN — DILTIAZEM HYDROCHLORIDE 180 MG: 180 CAPSULE, COATED, EXTENDED RELEASE ORAL at 08:54

## 2025-03-08 RX ADMIN — SOTALOL HYDROCHLORIDE 80 MG: 80 TABLET ORAL at 08:54

## 2025-03-08 RX ADMIN — BUDESONIDE AND FORMOTEROL FUMARATE DIHYDRATE 2 PUFF: 160; 4.5 AEROSOL RESPIRATORY (INHALATION) at 08:22

## 2025-03-08 RX ADMIN — Medication 10 ML: at 08:54

## 2025-03-08 RX ADMIN — PANTOPRAZOLE SODIUM 40 MG: 40 TABLET, DELAYED RELEASE ORAL at 05:37

## 2025-03-08 NOTE — DISCHARGE SUMMARY
"             [facility] Medicine Services  Discharge Summary    Date of Service: 3/8/2025  Patient Name: Camelia Gray  : 1945  MRN: [id number]    Date of Admission: 3/6/2025  Discharge Diagnosis: Norovirus  Date of Discharge: 3/8/2025  Primary Care Physician: Brinda Tony APRN      Presenting Problem:   Gastroenteritis [K52.9]  Norovirus [A08.11]  Acute respiratory failure with hypoxemia [J96.01]    Active and Resolved Hospital Problems:  Active Hospital Problems    Diagnosis POA    **Norovirus [A08.11] Yes      Resolved Hospital Problems   No resolved problems to display.       #Diarrhea 2/2 Norovirus infection  -Presented with nausea vomiting diarrhea for 1 day prior to admission sick exposure present.  -Leukocytosis of 13.  -D-dimer elevated.  CT angio chest with no pulmonary embolism.  -IV fluid mild hydration.    Antinausea medication.     #Mild hypoxia  #Asthma  -Oxygen supplement as needed.  -Respiratory treatment     #A fib  -Resume home medication      #Hypothyroidism   -Resume home medication once verified by pharmacy and clinically appropriate     #Code  -there is DNR status on 24  -pt mentioned that she wants FULL CODE       Hospital Course     HPI:    \"79 y.o. old female patient with PMH of atrial fibrillation asthma hypertension hypothyroidism and immobility syndrome lymphedema pulmonary embolism and presents to the hospital with complaints of nausea vomiting diarrhea for 1 day prior to admission.  Sick exposure present.  Labs with leukocytosis of 13.  BUN 27 creatinine 0.8 troponin leakage of 14 D-dimer 0.8 GI panel positive for norovirus CT angio chest with no pulmonary embolism and but there is pulmonary hypertension and large hiatal hernia.  Patient is hypoxic in the ED needed 2 L oxygen.       Patient will be admitted as observation for norovirus.\"    3/8 doing better today, will dc home, condition on dc stable.    DISCHARGE Follow Up Recommendations for labs and " diagnostics: follow with pcp in one week        Day of Discharge     Vital Signs:  Temp:  [98.1 °F (36.7 °C)-98.4 °F (36.9 °C)] 98.1 °F (36.7 °C)  Heart Rate:  [60-64] 62  Resp:  [13-19] 14  BP: (131-147)/(60-78) 147/72  Flow (L/min) (Oxygen Therapy):  [2-4] 4      Physical Exam        Head: Normocephalic and atraumatic.      Nose: Nose normal.   Eyes:      Extraocular Movements: Extraocular movements intact.      Conjunctivae/sclerae: Conjunctivae normal.      Pupils: Pupils are equal, round, and reactive to light.   Cardiovascular:      Rate and Rhythm: Normal       Pulses: Normal pulses.      Heart sounds: Normal heart sounds.   Pulmonary:      Effort: normal      Breath sounds: normal   Abdominal:      General: Abdomen is flat. Bowel sounds are normal.      Palpations: Abdomen is soft.   Musculoskeletal:         General: Normal range of motion.      Cervical back: Normal range of motion and neck supple.   Skin:     General: Skin is dry.   Neurological:      General: No focal deficit present.      Mental Status: alert.   Psychiatric:         Mood and Affect: Mood normal.        Pertinent  and/or Most Recent Results     LAB RESULTS:      Lab 03/08/25  0426 03/07/25  0506 03/06/25  0119   WBC 6.71 8.52 13.09*   HEMOGLOBIN 13.3 13.4 15.4   HEMATOCRIT 43.1 44.3 49.4*   PLATELETS 148 160 177   NEUTROS ABS 4.63 6.82 12.03*   IMMATURE GRANS (ABS) 0.03 0.03 0.04   LYMPHS ABS 1.23 0.88 0.21*   MONOS ABS 0.71 0.69 0.71   EOS ABS 0.10 0.09 0.07   MCV 94.9 96.7 93.2   D DIMER QUANT  --   --  0.80*         Lab 03/08/25  0426 03/07/25  0506 03/06/25  0119 03/04/25  1132   SODIUM 140 140 141 140   POTASSIUM 4.3 3.5 4.8 4.4   CHLORIDE 108* 108* 104 103   CO2 25.5 23.1 26.2 28.0   ANION GAP 6.5 8.9 10.8 9.0   BUN 16 25* 27* 25*   CREATININE 0.67 0.73 0.87 0.79   EGFR 89.6 84.3 68.3 76.7   GLUCOSE 104* 101* 181* 118*   CALCIUM 8.6 8.5* 9.4 9.6   MAGNESIUM 1.9 1.9  --   --    PHOSPHORUS 2.5 2.9  --   --    TSH  --   --   --  6.870*          Lab 03/04/25  1132   TOTAL PROTEIN 6.9   ALBUMIN 4.0   GLOBULIN 2.9   ALT (SGPT) 15   AST (SGOT) 19   BILIRUBIN 0.4   ALK PHOS 128*         Lab 03/06/25  0458 03/06/25  0119   PROBNP  --  218.0   HSTROP T 12 14*         Lab 03/04/25  1132   CHOLESTEROL 153   LDL CHOL 66   HDL CHOL 73*   TRIGLYCERIDES 72             Lab 03/06/25  0359   PH, ARTERIAL 7.338*   PCO2, ARTERIAL 44.4   PO2 ART 53.3*   O2 SATURATION ART 84.9*   FIO2 21   HCO3 ART 23.9   BASE EXCESS ART -2.2*     Brief Urine Lab Results       None          Microbiology Results (last 10 days)       Procedure Component Value - Date/Time    Gastrointestinal Panel, PCR - Stool, Per Rectum [[id number]]  (Abnormal) Collected: 03/06/25 0330    Lab Status: Final result Specimen: Stool from Per Rectum Updated: 03/06/25 0531     Campylobacter Not Detected     Plesiomonas shigelloides Not Detected     Salmonella Not Detected     Vibrio Not Detected     Vibrio cholerae Not Detected     Yersinia enterocolitica Not Detected     Enteroaggregative E. coli (EAEC) Not Detected     Enteropathogenic E. coli (EPEC) Not Detected     Enterotoxigenic E. coli (ETEC) lt/st Not Detected     Shiga-like toxin-producing E. coli (STEC) stx1/stx2 Not Detected     Shigella/Enteroinvasive E. coli (EIEC) Not Detected     Cryptosporidium Not Detected     Cyclospora cayetanensis Not Detected     Entamoeba histolytica Not Detected     Giardia lamblia Not Detected     Adenovirus F40/41 Not Detected     Astrovirus Not Detected     Norovirus GI/GII Detected     Comment: If a positive Norovirus result is inconsistent with clinical presentation, the positive Norovirus result should be confirmed using another method.        Rotavirus A Not Detected     Sapovirus (I, II, IV or V) Not Detected    COVID-19, FLU A/B, RSV PCR 1 HR TAT - Swab, Nasopharynx [[id number]]  (Normal) Collected: 03/06/25 0119    Lab Status: Final result Specimen: Swab from Nasopharynx Updated: 03/06/25 0207     COVID19 Not  Detected     Influenza A PCR Not Detected     Influenza B PCR Not Detected     RSV, PCR Not Detected    Narrative:      Fact sheet for providers: https://www.fda.gov/media/517872/download    Fact sheet for patients: https://www.fda.gov/media/455558/download    Test performed by PCR.            CT Angiogram Chest Pulmonary Embolism  Result Date: 3/6/2025  Impression: Impression: 1. Limited exam but no evidence for pulmonary embolus in the main pulmonary arteries. 2. Prominent main pulmonary artery compatible with pulmonary hypertension. 3. Large hiatal hernia with bibasilar atelectasis versus scarring. Electronically Signed: Mame Puckett MD  3/6/2025 9:06 AM EST  Workstation ID: [id number]    XR Chest 1 View  Result Date: 3/6/2025  Impression: Impression: No acute cardiopulmonary process. Stable cardiomegaly. Electronically Signed: Amy Cano MD  3/6/2025 2:24 AM EST  Workstation ID: [id number]      Results for orders placed during the hospital encounter of 10/13/19    Duplex Venous Lower Extremity - Bilateral CAR    Interpretation Summary  · Normal bilateral lower extremity venous duplex scan.      Results for orders placed during the hospital encounter of 10/13/19    Duplex Venous Lower Extremity - Bilateral CAR    Interpretation Summary  · Normal bilateral lower extremity venous duplex scan.      Results for orders placed during the hospital encounter of 11/12/24    Adult Transthoracic Echo Complete W/ Cont if Necessary Per Protocol    Interpretation Summary    Left ventricular systolic function is normal. Left ventricular ejection fraction appears to be 56 - 60%.    Left ventricular wall thickness is consistent with borderline concentric hypertrophy.    Left ventricular diastolic function was indeterminate.    The left atrial cavity is mildly dilated.    Left atrial volume is mildly increased.    There is moderate calcification of the aortic valve.    Estimated right ventricular systolic pressure from tricuspid  regurgitation is normal (<35 mmHg).      Labs Pending at Discharge:  Pending Results       Procedure [Order ID] Specimen - Date/Time    Free T4 By Dialysis / Mass Spec [[id number]] Collected: 03/07/25 1013    Specimen: Blood from Arm, Left Updated: 03/07/25 1020            Procedures Performed           Consults:   Consults       Date and Time Order Name Status Description    3/6/2025  9:16 AM Hospitalist (on-call MD unless specified)                Discharge Details        Discharge Medications        ASK your doctor about these medications        Instructions Start Date   albuterol sulfate  (90 Base) MCG/ACT inhaler  Commonly known as: Ventolin HFA   2 puffs, Inhalation, 4 Times Daily PRN      apixaban 5 MG tablet tablet  Commonly known as: ELIQUIS   5 mg, Oral, 2 Times Daily      Betapace 80 MG tablet  Generic drug: sotalol   80 mg, 2 Times Daily      budesonide-formoterol 160-4.5 MCG/ACT inhaler  Commonly known as: Symbicort   2 puffs, Inhalation, 2 Times Daily - RT      dilTIAZem  MG 24 hr capsule  Commonly known as: CARDIZEM CD   180 mg, Oral, Every 24 Hours Scheduled      furosemide 40 MG tablet  Commonly known as: LASIX   40 mg, Daily      hydrALAZINE 50 MG tablet  Commonly known as: APRESOLINE   25 mg, Oral, 2 Times Daily PRN      levothyroxine 50 MCG tablet  Commonly known as: SYNTHROID, LEVOTHROID   TAKE ONE TABLET BY MOUTH DAILY (INCREASE)      losartan 50 MG tablet  Commonly known as: COZAAR   25 mg, Oral, Daily      multivitamin with minerals tablet tablet   1 tablet, Daily      pantoprazole 40 MG EC tablet  Commonly known as: PROTONIX   40 mg, Oral, Daily      sennosides-docusate 8.6-50 MG per tablet  Commonly known as: PERICOLACE   2 tablets, Oral, 2 Times Daily PRN      vitamin b complex capsule capsule   1 capsule, Daily               Allergies   Allergen Reactions    Maxzide [Hydrochlorothiazide W-Triamterene] Arrhythmia         Discharge Disposition:       Diet:  Hospital:  Diet Order    Procedures    Diet: Gastrointestinal; Fiber-Restricted; Texture: Soft to Chew (NDD 3); Soft to Chew: Whole Meat; Fluid Consistency: Thin (IDDSI 0)         Discharge Activity:         CODE STATUS:  Code Status and Medical Interventions: CPR (Attempt to Resuscitate); Full Support   Ordered at: 03/06/25 1317     Code Status (Patient has no pulse and is not breathing):    CPR (Attempt to Resuscitate)     Medical Interventions (Patient has pulse or is breathing):    Full Support         Future Appointments   Date Time Provider Department Center   7/29/2025 10:45 AM Brinda Tony APRN [facility]   9/23/2025  1:00 PM Kevin Angel MD [facility]   9/23/2025  1:00 PM [facility] [place] DEVICE CHECK [facility]   2/3/2026 10:00 AM Brinda Tony APRN [facility]           Time spent on Discharge including face to face service:  35 minutes    Signature: Electronically signed by Hussein Manzanares MD, 03/08/25, 12:22 EST.  [facility] Hospitalist Team

## 2025-03-08 NOTE — PLAN OF CARE
Goal Outcome Evaluation:  Plan of Care Reviewed With: patient           Outcome Evaluation: Pt is a 78 YO F admitted with norovirus, persistent diarrhea. Reports living home alone, but has a caregiver 5x/week who can assist with ADLs. Pt ambulates short distance with RWx, utilizes w/c and sleeps in recliner. Pt this date requiring assitance to exit bed, but in standing ambulated safely with RWx for approx 65 feet. Pt appears plenty safe to ambulate short distances and transfer self to w/c. PT recommendation is return home at d/c, will benefit from HHPT and may need transport upon d/c, as typical transport is sick. Pt on RA this date, but O2 ranged from 88%-92% may require 6MWT.    Anticipated Discharge Disposition (PT): home with assist

## 2025-03-08 NOTE — NURSING NOTE
Discharge order received from MD.  Discharge instructions reviewed with patient, and patient verbalized understanding.  IV's discontinued without complication.  Patient discharged home via wheelchair van.

## 2025-03-08 NOTE — OUTREACH NOTE
Prep Survey      Flowsheet Row Responses   [facility], [place], operated by [facility] patient discharged from? [facility]   Is LACE score < 7 ? Yes   Eligibility [facility]   Date of Admission 03/06/25   Date of Discharge 03/08/25   Discharge Disposition Home or Self Care   Discharge diagnosis *Norovirus (   Does the patient have one of the following disease processes/diagnoses(primary or secondary)? Other   Does the patient have Home health ordered? No   Is there a DME ordered? No   Prep survey completed? Yes            LEAH SAMPSON - Registered Nurse

## 2025-03-08 NOTE — THERAPY EVALUATION
Patient Name: Camelia Gray  : 1945    MRN: [id number]                              Today's Date: 3/8/2025       Admit Date: 3/6/2025    Visit Dx:     ICD-10-CM ICD-9-CM   1. Gastroenteritis  K52.9 558.9   2. Norovirus  A08.11 008.63   3. Acute respiratory failure with hypoxemia  J96.01 518.81     Patient Active Problem List   Diagnosis    Class 3 severe obesity due to excess calories with serious comorbidity and body mass index (BMI) of 45.0 to 49.9 in adult    Arthritis of left hip    Deep vein thrombosis (DVT)    Edema of lower extremity    Hypertension    Pulmonary embolism    Stasis dermatitis    Immobility syndrome    Lymphedema    Skin ulcer of toe of right foot with fat layer exposed    Primary osteoarthritis of left hip    Hypothyroidism    Continuous leakage of urine    Breast cancer screening by mammogram    Status post total replacement of hip    Thrombophilia    Primary osteoarthritis involving multiple joints    Gastroesophageal reflux disease without esophagitis    Obstructive uropathy    Wound discharge    Need for hepatitis C screening test    Hyperglycemia    Dyspnea    Hiatal hernia    Chronic atrial fibrillation with rapid ventricular response    Oral phase dysphagia    Hospital discharge follow-up    Shortness of breath    Persistent atrial fibrillation    Atelectasis    Symptomatic bradycardia    Sick sinus syndrome    Pacemaker    Norovirus     Past Medical History:   Diagnosis Date    A-fib 2023    Arthritis     Asthma 2024    Pneumonia treated at hospital    Cataract 2016    Removal both eyes Oct.2016    Cellulitis of both feet 10/13/2019    Cholelithiasis 2020    Removed     Decubitus ulcer of buttock, stage 2 10/14/2019    HEALED    Dermatitis associated with moisture 10/14/2019    Disease of thyroid gland     HYPOTHYROID    Edema of lower extremity 2017    GERD (gastroesophageal reflux disease) 2020    Treatments , ,     Hypertension      Hypothyroidism     Immobility syndrome 10/13/2019    Lymphedema 10/14/2019    LEGS    Lymphedema     Muscle weakness (generalized)     Non-pressure chronic ulcer of other part of left foot with fat layer exposed 10/14/2019    Non-pressure chronic ulcer of other part of right foot with fat layer exposed 10/14/2019    Obesity     Pulmonary embolism     Stasis dermatitis 07/08/2013    Ureteral calculi     Urinary tract infection 07/2020    Hosp. 7/2020    Venous stasis 10/14/2019    Yeast infection of the skin      Past Surgical History:   Procedure Laterality Date    BRONCHOSCOPY N/A 12/27/2023    Procedure: BRONCHOSCOPY with right lung washing;  Surgeon: Mayra Ramachandran MD;  Location: [facility] ENDOSCOPY;  Service: Pulmonary;  Laterality: N/A;  Post- Pneumonia    BRONCHOSCOPY N/A 11/17/2024    Procedure: BRONCHOSCOPY WITH BILATERAL LUNG LAVAGE;  Surgeon: Reji Burch MD;  Location: [facility] ENDOSCOPY;  Service: Pulmonary;  Laterality: N/A;    CARDIAC ELECTROPHYSIOLOGY PROCEDURE Left 11/20/2024    Procedure: Pacemaker DC new - [employer]onic 3830;  Surgeon: Bertin Stone MD;  Location: [facility] CATH INVASIVE LOCATION;  Service: Cardiovascular;  Laterality: Left;    CARDIAC ELECTROPHYSIOLOGY PROCEDURE N/A 11/20/2024    Procedure: Cardioversion/Defibrillation;  Surgeon: Bertin Stone MD;  Location: [facility] CATH INVASIVE LOCATION;  Service: Cardiovascular;  Laterality: N/A;    CARDIAC SURGERY  Nov.2024    Pacemaker implant    CATARACT EXTRACTION Bilateral     CHOLECYSTECTOMY N/A 11/11/2020    Procedure: CHOLECYSTECTOMY LAPAROSCOPIC;  Surgeon: George Crocker DO;  Location: [facility] MAIN OR;  Service: General;  Laterality: N/A;    CORRECTION HAMMER TOE Bilateral     CYSTOSCOPY, URETEROSCOPY, RETROGRADE PYELOGRAM, STENT INSERTION Bilateral 08/18/2020    Procedure: CYSTOSCOPY BILATERAL RETROGRADE PYELOGRAM,;  Surgeon: Shawn Hernandez MD;  Location: [facility] MAIN OR;  Service: Urology;  Laterality: Bilateral;    DENTAL  EXAMINATION UNDER ANESTHESIA      ENDOSCOPY N/A 09/13/2020    Procedure: ESOPHAGOGASTRODUODENOSCOPY;  Surgeon: Torsten Johnson MD;  Location: [facility] ENDOSCOPY;  Service: Gastroenterology;  Laterality: N/A;  post: esophageal stricture, reflux esophagitis, hiatal hernia    ENDOSCOPY N/A 02/01/2024    Procedure: ESOPHAGOGASTRODUODENOSCOPY, esophageal dilation (40-46 Fr non-guided Bougie);  Surgeon: Torsten Johnson MD;  Location: [facility] ENDOSCOPY;  Service: Gastroenterology;  Laterality: N/A;  post: hiatal hernia, esophageal stricture    INSERT / REPLACE / REMOVE PACEMAKER  11/20/2024    JOINT REPLACEMENT Bilateral     Bilateral knees    TOE SURGERY      TONSILLECTOMY  1952    TOTAL HIP ARTHROPLASTY Left 06/12/2020    Procedure: TOTAL HIP ARTHROPLASTY;  Surgeon: Baljit Hutchins II, MD;  Location: [facility] MAIN OR;  Service: Orthopedics;  Laterality: Left;      General Information       Row Name 03/08/25 1227          Physical Therapy Time and Intention    Document Type evaluation  -EL     Mode of Treatment individual therapy;physical therapy  -EL       Row Name 03/08/25 1227          General Information    Prior Level of Function independent:;all household mobility;ADL's  -EL       Row Name 03/08/25 1227          Living Environment    Current Living Arrangements home  -EL     People in Home other (see comments);alone  has caregiver 5x/week  -EL       Row Name 03/08/25 1227          Cognition    Orientation Status (Cognition) oriented x 4  -EL       Row Name 03/08/25 1227          Safety Issues/Impairments Affecting Functional Mobility    Impairments Affecting Function (Mobility) strength  -EL               User Key  (r) = Recorded By, (t) = Taken By, (c) = Cosigned By      Initials Name Provider Type    EL Shailesh Murrieta PT Physical Therapist                   Mobility       Row Name 03/08/25 1229          Bed Mobility    Bed Mobility supine-sit  -EL     Supine-Sit [place] (Bed Mobility) moderate  assist (50% patient effort)  -EL     Assistive Device (Bed Mobility) bed rails  -EL     Comment, (Bed Mobility) reports generally sleeping in recliner at baseline.  -EL       Row Name 03/08/25 1229          Sit-Stand Transfer    Sit-Stand [place] (Transfers) contact guard  -EL     Assistive Device (Sit-Stand Transfers) walker, front-wheeled  -EL       Row Name 03/08/25 1229          Gait/Stairs (Locomotion)    [place] Level (Gait) contact guard  -EL     Assistive Device (Gait) walker, front-wheeled  -EL     Patient was able to Ambulate yes  -EL     Distance in Feet (Gait) 65  -EL     Deviations/Abnormal Patterns (Gait) gait speed decreased  -EL     Bilateral Gait Deviations forward flexed posture  -EL     Comment, (Gait/Stairs) No significant LOB  -EL               User Key  (r) = Recorded By, (t) = Taken By, (c) = Cosigned By      Initials Name Provider Type    Shailesh Vizcarra, PT Physical Therapist                   Obj/Interventions       Row Name 03/08/25 1230          Range of Motion Comprehensive    General Range of Motion lower extremity range of motion deficits identified  -EL     Comment, General Range of Motion Limited by body habitus, but grossly functional  -EL       Row Name 03/08/25 1230          Strength Comprehensive (MMT)    General Manual Muscle Testing (MMT) Assessment lower extremity strength deficits identified  -EL     Comment, General Manual Muscle Testing (MMT) Assessment BLE 3+/5 gross  -EL       Row Name 03/08/25 1230          Sensory Assessment (Somatosensory)    Sensory Assessment (Somatosensory) sensation intact  -EL               User Key  (r) = Recorded By, (t) = Taken By, (c) = Cosigned By      Initials Name Provider Type    Shailesh Vizcarra, PT Physical Therapist                   Goals/Plan       Row Name 03/08/25 1250          Bed Mobility Goal 1 (PT)    Activity/Assistive Device (Bed Mobility Goal 1, PT) bed mobility activities, all  -EL     [place] Level/Cues Needed  (Bed Mobility Goal 1, PT) minimum assist (75% or more patient effort)  -EL     Time Frame (Bed Mobility Goal 1, PT) long term goal (LTG);2 weeks  -EL       Row Name 03/08/25 1250          Transfer Goal 1 (PT)    Activity/Assistive Device (Transfer Goal 1, PT) transfers, all;walker, rolling  -EL     [place] Level/Cues Needed (Transfer Goal 1, PT) modified independence  -EL     Time Frame (Transfer Goal 1, PT) long term goal (LTG);2 weeks  -EL       Row Name 03/08/25 1250          Gait Training Goal 1 (PT)    Activity/Assistive Device (Gait Training Goal 1, PT) gait (walking locomotion)  -EL     [place] Level (Gait Training Goal 1, PT) modified independence  -EL     Distance (Gait Training Goal 1, PT) 115  -EL     Time Frame (Gait Training Goal 1, PT) long term goal (LTG);2 weeks  -EL       Row Name 03/08/25 1250          Therapy Assessment/Plan (PT)    Planned Therapy Interventions (PT) neuromuscular re-education;balance training;bed mobility training;transfer training;gait training;patient/family education;strengthening  -EL               User Key  (r) = Recorded By, (t) = Taken By, (c) = Cosigned By      Initials Name Provider Type    EL Shailesh Murrieta, PT Physical Therapist                   Clinical Impression       Row Name 03/08/25 1237          Pain    Pretreatment Pain Rating 0/10 - no pain  -EL     Posttreatment Pain Rating 0/10 - no pain  -EL       Row Name 03/08/25 1237          Plan of Care Review    Plan of Care Reviewed With patient  -EL     Outcome Evaluation Pt is a 78 YO F admitted with norovirus, persistent diarrhea. Reports living home alone, but has a caregiver 5x/week who can assist with ADLs. Pt ambulates short distance with RWx, utilizes w/c and sleeps in recliner. Pt this date requiring assitance to exit bed, but in standing ambulated safely with RWx for approx 65 feet. Pt appears plenty safe to ambulate short distances and transfer self to w/c. PT recommendation is return home at d/c,  will benefit from HHPT and may need transport upon d/c, as typical transport is sick. Pt on RA this date, but O2 ranged from 88%-92% may require 6MWT.  -EL       Row Name 03/08/25 1237          Therapy Assessment/Plan (PT)    Rehab Potential (PT) good  -EL     Criteria for Skilled Interventions Met (PT) yes  -EL     Therapy Frequency (PT) 3 times/wk  -EL     Predicted Duration of Therapy Intervention (PT) Until d/c  -EL       Row Name 03/08/25 1237          Vital Signs    O2 Delivery Pre Treatment room air  -EL     O2 Delivery Intra Treatment room air  -EL     O2 Delivery Post Treatment room air  -EL     Pre Patient Position Supine  -EL     Intra Patient Position Standing  -EL     Post Patient Position Supine  -EL       Row Name 03/08/25 1237          Positioning and Restraints    Pre-Treatment Position in bed  -EL     Post Treatment Position bed  -EL     In Bed notified nsg;supine;call light within reach;encouraged to call for assist;exit alarm on  -EL               User Key  (r) = Recorded By, (t) = Taken By, (c) = Cosigned By      Initials Name Provider Type    Shailesh Vizcarra, PT Physical Therapist                   Outcome Measures       Row Name 03/08/25 1254 03/08/25 0800       How much help from another person do you currently need...    Turning from your back to your side while in flat bed without using bedrails? 3  -EL 4  -DR    Moving from lying on back to sitting on the side of a flat bed without bedrails? 2  -EL 4  -DR    Moving to and from a bed to a chair (including a wheelchair)? 3  -EL 4  -DR    Standing up from a chair using your arms (e.g., wheelchair, bedside chair)? 3  -EL 3  -DR    Climbing 3-5 steps with a railing? 2  -EL 3  -DR    To walk in hospital room? 3  -EL 3  -DR    AM-PAC 6 Clicks Score (PT) 16  -EL 21  -DR    Highest Level of Mobility Goal 5 --> Static standing  -EL 6 --> Walk 10 steps or more  -      Row Name 03/08/25 1254          Functional Assessment    Outcome Measure Options  AM-PAC 6 Clicks Basic Mobility (PT)  -EL               User Key  (r) = Recorded By, (t) = Taken By, (c) = Cosigned By      Initials Name Provider Type    Shailesh Vizcarra, PT Physical Therapist    Alex Aguilar, RN Registered Nurse                                 Physical Therapy Education       Title: PT OT SLP Therapies (In Progress)       Topic: Physical Therapy (In Progress)       Point: Mobility training (Done)       Learning Progress Summary            Patient Acceptance, E,TB, VU by EL at 3/8/2025 1255    Acceptance, E,TB, VU by TL at 3/6/2025 1035                      Point: Home exercise program (Not Started)       Learner Progress:  Not documented in this visit.              Point: Body mechanics (Not Started)       Learner Progress:  Not documented in this visit.              Point: Precautions (Done)       Learning Progress Summary            Patient Acceptance, E,TB, VU by EL at 3/8/2025 1255                                      User Key       Initials Effective Dates Name Provider Type Discipline    EL 06/23/20 -  Shailesh Murrieta, PT Physical Therapist PT    TL 12/27/23 -  Gurmeet Ashley RN Registered Nurse DIALYSIS USER                  PT Recommendation and Plan  Planned Therapy Interventions (PT): neuromuscular re-education, balance training, bed mobility training, transfer training, gait training, patient/family education, strengthening  Outcome Evaluation: Pt is a 78 YO F admitted with norovirus, persistent diarrhea. Reports living home alone, but has a caregiver 5x/week who can assist with ADLs. Pt ambulates short distance with RWx, utilizes w/c and sleeps in recliner. Pt this date requiring assitance to exit bed, but in standing ambulated safely with RWx for approx 65 feet. Pt appears plenty safe to ambulate short distances and transfer self to w/c. PT recommendation is return home at d/c, will benefit from HHPT and may need transport upon d/c, as typical transport is sick. Pt on RA this date,  but O2 ranged from 88%-92% may require 6MWT.     Time Calculation:   PT Evaluation Complexity  History, PT Evaluation Complexity: 1-2 personal factors and/or comorbidities  Examination of Body Systems (PT Eval Complexity): total of 3 or more elements  Clinical Presentation (PT Evaluation Complexity): evolving  Clinical Decision Making (PT Evaluation Complexity): moderate complexity  Overall Complexity (PT Evaluation Complexity): moderate complexity     PT Charges       Row Name 03/08/25 1255             Time Calculation    Start Time 0940  -EL      Stop Time 1010  -EL      Time Calculation (min) 30 min  -EL      PT Received On 03/08/25  -EL      PT - Next Appointment 03/10/25  -EL      PT Goal Re-Cert Due Date 03/22/25  -EL                User Key  (r) = Recorded By, (t) = Taken By, (c) = Cosigned By      Initials Name Provider Type    Shailesh Vizcarra PT Physical Therapist                  Therapy Charges for Today       Code Description Service Date Service Provider Modifiers Qty    [id number] HC PT EVAL MOD COMPLEXITY 4 3/8/2025 Shailesh Murrieta, PT GP 1            PT G-Codes  Outcome Measure Options: AM-PAC 6 Clicks Basic Mobility (PT)  AM-PAC 6 Clicks Score (PT): 16  PT Discharge Summary  Anticipated Discharge Disposition (PT): home with assist    Shailesh Murrieta PT  3/8/2025

## 2025-03-10 ENCOUNTER — TRANSITIONAL CARE MANAGEMENT TELEPHONE ENCOUNTER (OUTPATIENT)
Dept: CALL CENTER | Facility: HOSPITAL | Age: 80
End: 2025-03-10
Payer: MEDICARE

## 2025-03-10 PROBLEM — I48.0 PAF (PAROXYSMAL ATRIAL FIBRILLATION): Status: ACTIVE | Noted: 2023-12-28

## 2025-03-10 NOTE — OUTREACH NOTE
Call Center TCM Note      Flowsheet Row Responses   [facility] facility patient discharged from? [facility]   Does the patient have one of the following disease processes/diagnoses(primary or secondary)? Other   TCM attempt successful? No   Unsuccessful attempts Attempt 1  [Attempted patient, Lilliana and Leo (on verbal release)]            Tosha Mcgovern RN    3/10/2025, 15:31 EDT

## 2025-03-10 NOTE — OUTREACH NOTE
Call Center TCM Note      Flowsheet Row Responses   [facility] patient discharged from? [facility]   Does the patient have one of the following disease processes/diagnoses(primary or secondary)? Other   TCM attempt successful? Yes   Call start time 1632   Call end time 1634   Discharge diagnosis *Norovirus (   Comments Hospital follow up scheduled 3/21.   Does the patient have an appointment with their PCP within 7-14 days of discharge? Yes   What is the patient's perception of their health status since discharge? Improving   Is the patient/caregiver able to teach back signs and symptoms related to disease process for when to call PCP? Yes   Is the patient/caregiver able to teach back signs and symptoms related to disease process for when to call 911? Yes   Is the patient/caregiver able to teach back the hierarchy of who to call/visit for symptoms/problems? PCP, Specialist, Home health nurse, Urgent Care, ED, 911 Yes   TCM call completed? Yes   Wrap up additional comments Patient reports doing well. No questions or concerns at this time. Patient has follow up appt with PCP 3/21.   Call end time 1634   Would this patient benefit from a Referral to Amb Social Work? No   Is the patient interested in additional calls from an ambulatory ? No            Tosha Mcgovern, MARY    3/10/2025, 16:34 EDT

## 2025-03-12 LAB — T4 FREE SERPL DIALY-MCNC: 1.1 NG/DL

## 2025-03-17 ENCOUNTER — TELEPHONE (OUTPATIENT)
Dept: CARDIOLOGY | Facility: CLINIC | Age: 80
End: 2025-03-17
Payer: MEDICARE

## 2025-03-17 NOTE — TELEPHONE ENCOUNTER
Patient called to advise us that her I-pad with her heart monitor angelique is not working anymore.  She has ordered another I-Pad and will call [employer] once it arrives to get it setup with the angelique.

## 2025-03-31 ENCOUNTER — OFFICE VISIT (OUTPATIENT)
Dept: FAMILY MEDICINE CLINIC | Facility: CLINIC | Age: 80
End: 2025-03-31
Payer: MEDICARE

## 2025-03-31 VITALS
BODY MASS INDEX: 46.05 KG/M2 | DIASTOLIC BLOOD PRESSURE: 78 MMHG | HEART RATE: 64 BPM | RESPIRATION RATE: 16 BRPM | OXYGEN SATURATION: 92 % | TEMPERATURE: 98.1 F | HEIGHT: 71 IN | SYSTOLIC BLOOD PRESSURE: 171 MMHG

## 2025-03-31 DIAGNOSIS — Z95.0 PACEMAKER: Chronic | ICD-10-CM

## 2025-03-31 DIAGNOSIS — A08.11 NOROVIRUS: Primary | ICD-10-CM

## 2025-03-31 DIAGNOSIS — I10 PRIMARY HYPERTENSION: Chronic | ICD-10-CM

## 2025-03-31 DIAGNOSIS — I48.19 PERSISTENT ATRIAL FIBRILLATION: Chronic | ICD-10-CM

## 2025-03-31 RX ORDER — LEVOTHYROXINE SODIUM 50 UG/1
TABLET ORAL
Qty: 90 TABLET | Refills: 0 | Status: SHIPPED | OUTPATIENT
Start: 2025-03-31

## 2025-03-31 RX ORDER — PANTOPRAZOLE SODIUM 40 MG/1
40 TABLET, DELAYED RELEASE ORAL DAILY
Qty: 90 TABLET | Refills: 0 | Status: SHIPPED | OUTPATIENT
Start: 2025-03-31

## 2025-03-31 NOTE — PROGRESS NOTES
Subjective        Camelia Gray is a 79 y.o. female.     Chief Complaint   Patient presents with    Hospital Follow Up Visit       History of Present Illness  She arrived in wheelchair. Evaluation in wheelchair.   Patient is here for management of her norovirus that she had to be admitted to the hospital for. Notes reviewed. 3/6-3/7/2025 she is not having symptoms of diarrhea or fever.   Presented with nausea and vomiting and diarrhea.   Leukocytosis 0f 13 D dimer elevated IV fluid hydration CT angio chest with no pulmonary embolism.   Mild hypoxia Asthma oxygen supplement as needed.   Afib home medication.   Hypertension she said they stopped her hydralazine but she really was not taking that.    Hypertension: she is taking losartan 50mg only taking 1/2 daily. She monitors at home.  diltiazemCD  180 mg daily.     Afib sotalol 80 mg bid. Has pace maker. No new concerns.             The following portions of the patient's history were reviewed and updated as appropriate: allergies, current medications, past family history, past medical history, past social history, past surgical history and problem list.      Current Outpatient Medications:     albuterol sulfate HFA (Ventolin HFA) 108 (90 Base) MCG/ACT inhaler, Inhale 2 puffs 4 (Four) Times a Day As Needed for Shortness of Air., Disp: 18 g, Rfl: 11    apixaban (ELIQUIS) 5 MG tablet tablet, Take 1 tablet by mouth 2 (Two) Times a Day., Disp: 60 tablet, Rfl: 11    B Complex Vitamins (vitamin b complex) capsule capsule, Take 1 capsule by mouth Daily. Indications: Vitamin Deficiency, Disp: , Rfl:     budesonide-formoterol (Symbicort) 160-4.5 MCG/ACT inhaler, Inhale 2 puffs 2 (Two) Times a Day. Indications: Chronic Obstructive Lung Disease, Disp: 10.2 g, Rfl: 1    dilTIAZem CD (CARDIZEM CD) 180 MG 24 hr capsule, TAKE 1 CAPSULE BY MOUTH DAILY, Disp: 90 capsule, Rfl: 1    levothyroxine (SYNTHROID, LEVOTHROID) 50 MCG tablet, TAKE ONE TABLET BY MOUTH DAILY (INCREASE), Disp: 90  tablet, Rfl: 0    losartan (COZAAR) 50 MG tablet, TAKE 1/2 TABLET BY MOUTH DAILY, Disp: 90 tablet, Rfl: 3    Multiple Vitamins-Minerals (multivitamin with minerals) tablet tablet, Take 1 tablet by mouth Daily. Indications: Vitamin Deficiency, Disp: , Rfl:     pantoprazole (PROTONIX) 40 MG EC tablet, TAKE ONE TABLET BY MOUTH DAILY, Disp: 90 tablet, Rfl: 0    sennosides-docusate (PERICOLACE) 8.6-50 MG per tablet, Take 2 tablets by mouth 2 (Two) Times a Day As Needed for Constipation., Disp: 30 tablet, Rfl: 0    sotalol (Betapace) 80 MG tablet, Take 1 tablet by mouth 2 (Two) Times a Day. Indications: Irregularity of Ventricular Heart Rhythm, Disp: , Rfl:     Recent Results (from the past 24 weeks)   ECG 12 Lead Dyspnea    Collection Time: 11/12/24 12:18 PM   Result Value Ref Range    QT Interval 353 ms    QTC Interval 467 ms   Blood Culture - Blood, Arm, Right    Collection Time: 11/12/24 12:36 PM    Specimen: Arm, Right; Blood   Result Value Ref Range    Blood Culture No growth at 5 days    Comprehensive Metabolic Panel    Collection Time: 11/12/24  1:21 PM    Specimen: Hand, Left; Blood   Result Value Ref Range    Glucose 113 (H) 65 - 99 mg/dL    BUN 22 8 - 23 mg/dL    Creatinine 0.73 0.57 - 1.00 mg/dL    Sodium 142 136 - 145 mmol/L    Potassium 3.8 3.5 - 5.2 mmol/L    Chloride 104 98 - 107 mmol/L    CO2 28.2 22.0 - 29.0 mmol/L    Calcium 9.8 8.6 - 10.5 mg/dL    Total Protein 7.3 6.0 - 8.5 g/dL    Albumin 3.8 3.5 - 5.2 g/dL    ALT (SGPT) 23 1 - 33 U/L    AST (SGOT) 28 1 - 32 U/L    Alkaline Phosphatase 128 (H) 39 - 117 U/L    Total Bilirubin 0.4 0.0 - 1.2 mg/dL    Globulin 3.5 gm/dL    A/G Ratio 1.1 g/dL    BUN/Creatinine Ratio 30.1 (H) 7.0 - 25.0    Anion Gap 9.8 5.0 - 15.0 mmol/L    eGFR 84.3 >60.0 mL/min/1.73   Blood Culture - Blood, Hand, Left    Collection Time: 11/12/24  1:21 PM    Specimen: Hand, Left; Blood   Result Value Ref Range    Blood Culture No growth at 5 days    High Sensitivity Troponin T     Collection Time: 11/12/24  1:21 PM    Specimen: Hand, Left; Blood   Result Value Ref Range    HS Troponin T 15 (H) <14 ng/L   CBC Auto Differential    Collection Time: 11/12/24  1:21 PM    Specimen: Hand, Left; Blood   Result Value Ref Range    WBC 6.89 3.40 - 10.80 10*3/mm3    RBC 5.00 3.77 - 5.28 10*6/mm3    Hemoglobin 14.2 12.0 - 15.9 g/dL    Hematocrit 45.3 34.0 - 46.6 %    MCV 90.6 79.0 - 97.0 fL    MCH 28.4 26.6 - 33.0 pg    MCHC 31.3 (L) 31.5 - 35.7 g/dL    RDW 15.2 12.3 - 15.4 %    RDW-SD 50.2 37.0 - 54.0 fl    MPV 9.0 6.0 - 12.0 fL    Platelets 212 140 - 450 10*3/mm3    Neutrophil % 71.9 42.7 - 76.0 %    Lymphocyte % 15.8 (L) 19.6 - 45.3 %    Monocyte % 8.1 5.0 - 12.0 %    Eosinophil % 3.5 0.3 - 6.2 %    Basophil % 0.3 0.0 - 1.5 %    Immature Grans % 0.4 0.0 - 0.5 %    Neutrophils, Absolute 4.95 1.70 - 7.00 10*3/mm3    Lymphocytes, Absolute 1.09 0.70 - 3.10 10*3/mm3    Monocytes, Absolute 0.56 0.10 - 0.90 10*3/mm3    Eosinophils, Absolute 0.24 0.00 - 0.40 10*3/mm3    Basophils, Absolute 0.02 0.00 - 0.20 10*3/mm3    Immature Grans, Absolute 0.03 0.00 - 0.05 10*3/mm3    nRBC 0.0 0.0 - 0.2 /100 WBC   Green Top (Gel)    Collection Time: 11/12/24  1:21 PM   Result Value Ref Range    Extra Tube Hold for add-ons.    Lavender Top    Collection Time: 11/12/24  1:21 PM   Result Value Ref Range    Extra Tube hold for add-on    BNP    Collection Time: 11/12/24  1:21 PM    Specimen: Hand, Left; Blood   Result Value Ref Range    proBNP 1,757.0 0.0 - 1,800.0 pg/mL   POC Lactate    Collection Time: 11/12/24  1:27 PM    Specimen: Blood   Result Value Ref Range    Lactate 0.9 0.3 - 2.0 mmol/L   Respiratory Panel PCR w/COVID-19(SARS-CoV-2) PRATIMA/BRIANNA/VERONICA/PAD/COR/CARLOS In-House, NP Swab in UTM/VTM, 2 HR TAT - Swab, Nasopharynx    Collection Time: 11/12/24  2:06 PM    Specimen: Nasopharynx; Swab   Result Value Ref Range    ADENOVIRUS, PCR Not Detected Not Detected    Coronavirus 229E Not Detected Not Detected    Coronavirus HKU1 Not  Detected Not Detected    Coronavirus NL63 Not Detected Not Detected    Coronavirus OC43 Not Detected Not Detected    COVID19 Not Detected Not Detected - Ref. Range    Human Metapneumovirus Not Detected Not Detected    Human Rhinovirus/Enterovirus Not Detected Not Detected    Influenza A PCR Not Detected Not Detected    Influenza B PCR Not Detected Not Detected    Parainfluenza Virus 1 Not Detected Not Detected    Parainfluenza Virus 2 Not Detected Not Detected    Parainfluenza Virus 3 Not Detected Not Detected    Parainfluenza Virus 4 Not Detected Not Detected    RSV, PCR Not Detected Not Detected    Bordetella pertussis pcr Not Detected Not Detected    Bordetella parapertussis PCR Not Detected Not Detected    Chlamydophila pneumoniae PCR Not Detected Not Detected    Mycoplasma pneumo by PCR Not Detected Not Detected   Gold Top - SST    Collection Time: 11/12/24  2:27 PM   Result Value Ref Range    Extra Tube Hold for add-ons.    Light Blue Top    Collection Time: 11/12/24  2:27 PM   Result Value Ref Range    Extra Tube Hold for add-ons.    High Sensitivity Troponin T 2Hr    Collection Time: 11/12/24  2:27 PM    Specimen: Arm, Left; Blood   Result Value Ref Range    HS Troponin T 15 (H) <14 ng/L    Troponin T Numeric Delta 0 >=-4 - <+4 ng/L   Lavender Top    Collection Time: 11/12/24  2:27 PM   Result Value Ref Range    Extra Tube hold for add-on    Basic Metabolic Panel    Collection Time: 11/13/24  5:22 AM    Specimen: Arm, Right; Blood   Result Value Ref Range    Glucose 165 (H) 65 - 99 mg/dL    BUN 18 8 - 23 mg/dL    Creatinine 0.75 0.57 - 1.00 mg/dL    Sodium 142 136 - 145 mmol/L    Potassium 4.2 3.5 - 5.2 mmol/L    Chloride 105 98 - 107 mmol/L    CO2 26.1 22.0 - 29.0 mmol/L    Calcium 9.4 8.6 - 10.5 mg/dL    BUN/Creatinine Ratio 24.0 7.0 - 25.0    Anion Gap 10.9 5.0 - 15.0 mmol/L    eGFR 81.6 >60.0 mL/min/1.73   CBC Auto Differential    Collection Time: 11/13/24  5:22 AM    Specimen: Arm, Right; Blood   Result  Value Ref Range    WBC 7.43 3.40 - 10.80 10*3/mm3    RBC 5.23 3.77 - 5.28 10*6/mm3    Hemoglobin 14.8 12.0 - 15.9 g/dL    Hematocrit 46.8 (H) 34.0 - 46.6 %    MCV 89.5 79.0 - 97.0 fL    MCH 28.3 26.6 - 33.0 pg    MCHC 31.6 31.5 - 35.7 g/dL    RDW 15.3 12.3 - 15.4 %    RDW-SD 50.4 37.0 - 54.0 fl    MPV 9.4 6.0 - 12.0 fL    Platelets 224 140 - 450 10*3/mm3    Neutrophil % 88.5 (H) 42.7 - 76.0 %    Lymphocyte % 9.4 (L) 19.6 - 45.3 %    Monocyte % 0.7 (L) 5.0 - 12.0 %    Eosinophil % 0.1 (L) 0.3 - 6.2 %    Basophil % 0.1 0.0 - 1.5 %    Immature Grans % 1.2 (H) 0.0 - 0.5 %    Neutrophils, Absolute 6.57 1.70 - 7.00 10*3/mm3    Lymphocytes, Absolute 0.70 0.70 - 3.10 10*3/mm3    Monocytes, Absolute 0.05 (L) 0.10 - 0.90 10*3/mm3    Eosinophils, Absolute 0.01 0.00 - 0.40 10*3/mm3    Basophils, Absolute 0.01 0.00 - 0.20 10*3/mm3    Immature Grans, Absolute 0.09 (H) 0.00 - 0.05 10*3/mm3    nRBC 0.0 0.0 - 0.2 /100 WBC   Adult Transthoracic Echo Complete W/ Cont if Necessary Per Protocol    Collection Time: 11/13/24 12:44 PM   Result Value Ref Range    LVIDd 4.5 cm    LVIDs 3.0 cm    IVSd 1.10 cm    LVPWd 1.00 cm    FS 33.3 %    IVS/LVPW 1.10 cm    ESV(cubed) 27.0 ml    LV Sys Vol (BSA corrected) 11.3 cm2    EDV(cubed) 91.1 ml    LV [place] Vol (BSA corrected) 36.5 cm2    LV mass(C)d 164.0 grams    LVOT area 2.8 cm2    LVOT diam 1.90 cm    EDV(MOD-sp4) 97.3 ml    ESV(MOD-sp4) 30.0 ml    SV(MOD-sp4) 67.3 ml    SVi(MOD-SP4) 25.3 ml/m2    SVi (LVOT) 21.6 ml/m2    EF(MOD-sp4) 69.2 %    MV E max christiano 96.7 cm/sec    LA ESV Index (BP) 19.2 ml/m2    Lat Peak E' Christiano 10.4 cm/sec    TR max christiano 163.0 cm/sec    SV(LVOT) 57.6 ml    RVIDd 3.3 cm    RV S' 9.9 cm/sec    LA dimension (2D)  5.0 cm    LV V1 max 113.0 cm/sec    LV V1 max PG 5.1 mmHg    LV V1 mean PG 3.0 mmHg    LV V1 VTI 20.3 cm    Ao pk christiano 163.0 cm/sec    Ao max PG 10.6 mmHg    Ao mean PG 7.0 mmHg    Ao V2 VTI 32.1 cm    FEDERICO(I,D) 1.79 cm2    MV max PG 8.2 mmHg    MV mean PG 3.0 mmHg     MV V2 VTI 26.0 cm    MV P1/2t 66.6 msec    MVA(P1/2t) 3.3 cm2    MVA(VTI) 2.21 cm2    MV dec slope 616.0 cm/sec2    MR max tyler 390.0 cm/sec    MR max PG 60.8 mmHg    TR max PG 10.6 mmHg    RVSP(TR) 25.6 mmHg    RAP systole 15.0 mmHg    RV V1 max PG 2.9 mmHg    RV V1 max 85.4 cm/sec    RV V1 VTI 13.8 cm    PA V2 max 96.3 cm/sec    PA acc time 0.08 sec    ACS 1.80 cm    Sinus 2.7 cm    EF(MOD-bp) 69.0 %    Dimensionless Index 0.69 (DI)   Lactic Acid, Plasma    Collection Time: 11/13/24  3:34 PM    Specimen: Arm, Left; Blood   Result Value Ref Range    Lactate 2.8 (C) 0.5 - 2.0 mmol/L   STAT Lactic Acid, Reflex    Collection Time: 11/13/24 10:06 PM    Specimen: Blood   Result Value Ref Range    Lactate 1.9 0.5 - 2.0 mmol/L   ECG 12 Lead Drug Monitoring    Collection Time: 11/14/24  2:58 AM   Result Value Ref Range    QT Interval 402 ms    QTC Interval 484 ms   Basic Metabolic Panel    Collection Time: 11/14/24  4:45 AM    Specimen: Blood   Result Value Ref Range    Glucose 161 (H) 65 - 99 mg/dL    BUN 30 (H) 8 - 23 mg/dL    Creatinine 0.82 0.57 - 1.00 mg/dL    Sodium 142 136 - 145 mmol/L    Potassium 4.4 3.5 - 5.2 mmol/L    Chloride 103 98 - 107 mmol/L    CO2 29.4 (H) 22.0 - 29.0 mmol/L    Calcium 9.1 8.6 - 10.5 mg/dL    BUN/Creatinine Ratio 36.6 (H) 7.0 - 25.0    Anion Gap 9.6 5.0 - 15.0 mmol/L    eGFR 73.3 >60.0 mL/min/1.73   CBC Auto Differential    Collection Time: 11/14/24  4:45 AM    Specimen: Blood   Result Value Ref Range    WBC 14.09 (H) 3.40 - 10.80 10*3/mm3    RBC 5.06 3.77 - 5.28 10*6/mm3    Hemoglobin 14.0 12.0 - 15.9 g/dL    Hematocrit 45.7 34.0 - 46.6 %    MCV 90.3 79.0 - 97.0 fL    MCH 27.7 26.6 - 33.0 pg    MCHC 30.6 (L) 31.5 - 35.7 g/dL    RDW 15.6 (H) 12.3 - 15.4 %    RDW-SD 50.9 37.0 - 54.0 fl    MPV 9.3 6.0 - 12.0 fL    Platelets 235 140 - 450 10*3/mm3    Neutrophil % 91.9 (H) 42.7 - 76.0 %    Lymphocyte % 5.9 (L) 19.6 - 45.3 %    Monocyte % 1.5 (L) 5.0 - 12.0 %    Eosinophil % 0.0 (L) 0.3 - 6.2 %     Basophil % 0.1 0.0 - 1.5 %    Immature Grans % 0.6 (H) 0.0 - 0.5 %    Neutrophils, Absolute 12.96 (H) 1.70 - 7.00 10*3/mm3    Lymphocytes, Absolute 0.83 0.70 - 3.10 10*3/mm3    Monocytes, Absolute 0.21 0.10 - 0.90 10*3/mm3    Eosinophils, Absolute 0.00 0.00 - 0.40 10*3/mm3    Basophils, Absolute 0.01 0.00 - 0.20 10*3/mm3    Immature Grans, Absolute 0.08 (H) 0.00 - 0.05 10*3/mm3    nRBC 0.0 0.0 - 0.2 /100 WBC   Basic Metabolic Panel    Collection Time: 11/15/24  5:05 AM    Specimen: Blood   Result Value Ref Range    Glucose 130 (H) 65 - 99 mg/dL    BUN 34 (H) 8 - 23 mg/dL    Creatinine 0.80 0.57 - 1.00 mg/dL    Sodium 143 136 - 145 mmol/L    Potassium 4.4 3.5 - 5.2 mmol/L    Chloride 105 98 - 107 mmol/L    CO2 30.1 (H) 22.0 - 29.0 mmol/L    Calcium 8.7 8.6 - 10.5 mg/dL    BUN/Creatinine Ratio 42.5 (H) 7.0 - 25.0    Anion Gap 7.9 5.0 - 15.0 mmol/L    eGFR 75.5 >60.0 mL/min/1.73   CBC Auto Differential    Collection Time: 11/15/24  5:05 AM    Specimen: Blood   Result Value Ref Range    WBC 11.65 (H) 3.40 - 10.80 10*3/mm3    RBC 4.79 3.77 - 5.28 10*6/mm3    Hemoglobin 13.5 12.0 - 15.9 g/dL    Hematocrit 43.4 34.0 - 46.6 %    MCV 90.6 79.0 - 97.0 fL    MCH 28.2 26.6 - 33.0 pg    MCHC 31.1 (L) 31.5 - 35.7 g/dL    RDW 15.5 (H) 12.3 - 15.4 %    RDW-SD 52.1 37.0 - 54.0 fl    MPV 9.3 6.0 - 12.0 fL    Platelets 205 140 - 450 10*3/mm3    Neutrophil % 87.9 (H) 42.7 - 76.0 %    Lymphocyte % 6.3 (L) 19.6 - 45.3 %    Monocyte % 5.3 5.0 - 12.0 %    Eosinophil % 0.0 (L) 0.3 - 6.2 %    Basophil % 0.1 0.0 - 1.5 %    Immature Grans % 0.4 0.0 - 0.5 %    Neutrophils, Absolute 10.24 (H) 1.70 - 7.00 10*3/mm3    Lymphocytes, Absolute 0.73 0.70 - 3.10 10*3/mm3    Monocytes, Absolute 0.62 0.10 - 0.90 10*3/mm3    Eosinophils, Absolute 0.00 0.00 - 0.40 10*3/mm3    Basophils, Absolute 0.01 0.00 - 0.20 10*3/mm3    Immature Grans, Absolute 0.05 0.00 - 0.05 10*3/mm3    nRBC 0.0 0.0 - 0.2 /100 WBC   ECG 12 Lead Other; cardioversion    Collection  Time: 11/15/24 11:21 AM   Result Value Ref Range    QT Interval 500 ms    QTC Interval 383 ms   ECG 12 Lead QT Measurement    Collection Time: 11/16/24  8:05 AM   Result Value Ref Range    QT Interval 456 ms    QTC Interval 414 ms   Basic Metabolic Panel    Collection Time: 11/17/24  2:58 AM    Specimen: Arm, Left; Blood   Result Value Ref Range    Glucose 107 (H) 65 - 99 mg/dL    BUN 30 (H) 8 - 23 mg/dL    Creatinine 0.69 0.57 - 1.00 mg/dL    Sodium 142 136 - 145 mmol/L    Potassium 4.6 3.5 - 5.2 mmol/L    Chloride 104 98 - 107 mmol/L    CO2 30.3 (H) 22.0 - 29.0 mmol/L    Calcium 8.6 8.6 - 10.5 mg/dL    BUN/Creatinine Ratio 43.5 (H) 7.0 - 25.0    Anion Gap 7.7 5.0 - 15.0 mmol/L    eGFR 89.0 >60.0 mL/min/1.73   CBC Auto Differential    Collection Time: 11/17/24  2:58 AM    Specimen: Arm, Left; Blood   Result Value Ref Range    WBC 11.45 (H) 3.40 - 10.80 10*3/mm3    RBC 4.95 3.77 - 5.28 10*6/mm3    Hemoglobin 14.0 12.0 - 15.9 g/dL    Hematocrit 44.8 34.0 - 46.6 %    MCV 90.5 79.0 - 97.0 fL    MCH 28.3 26.6 - 33.0 pg    MCHC 31.3 (L) 31.5 - 35.7 g/dL    RDW 15.8 (H) 12.3 - 15.4 %    RDW-SD 51.9 37.0 - 54.0 fl    MPV 9.4 6.0 - 12.0 fL    Platelets 202 140 - 450 10*3/mm3    Neutrophil % 84.2 (H) 42.7 - 76.0 %    Lymphocyte % 7.2 (L) 19.6 - 45.3 %    Monocyte % 7.2 5.0 - 12.0 %    Eosinophil % 0.1 (L) 0.3 - 6.2 %    Basophil % 0.2 0.0 - 1.5 %    Immature Grans % 1.1 (H) 0.0 - 0.5 %    Neutrophils, Absolute 9.64 (H) 1.70 - 7.00 10*3/mm3    Lymphocytes, Absolute 0.82 0.70 - 3.10 10*3/mm3    Monocytes, Absolute 0.83 0.10 - 0.90 10*3/mm3    Eosinophils, Absolute 0.01 0.00 - 0.40 10*3/mm3    Basophils, Absolute 0.02 0.00 - 0.20 10*3/mm3    Immature Grans, Absolute 0.13 (H) 0.00 - 0.05 10*3/mm3    nRBC 0.0 0.0 - 0.2 /100 WBC   Non-gynecologic Cytology    Collection Time: 11/17/24  9:18 AM    Specimen: Lung, L; Lavage   Result Value Ref Range    Case Report       Medical Cytology Report                           Case:  "[id number]                                  Authorizing Provider:  Reji Burch MD         Collected:           11/17/2024 09:18 AM          Ordering Location:     [facility]  Received:            11/17/2024 10:22 AM                                 SUITES                                                                       Pathologist:           Kishan Grider MD                                                            Specimen:    Lung, L                                                                                    Final Diagnosis       Left lung, bronchoalveolar lavage with smears and cytospin:  Acute inflammation, reactive but mature squamous cells, pulmonary macrophages, bronchial epithelial cells and Candida  No malignancy identified    FRANK      Gross Description       1. Lung, L.  Received in [place] and designated \"Left lung BAL\" are 45 mL of clear green fluid. Particulate matter is present. This specimen is processed as per protocol.         Fungus Culture - Lavage, Lung, L    Collection Time: 11/17/24  9:18 AM    Specimen: Lung, L; Lavage   Result Value Ref Range    Fungus Culture Candida species (A)    AFB Culture - Lavage, Lung, L    Collection Time: 11/17/24  9:18 AM    Specimen: Lung, L; Lavage   Result Value Ref Range    AFB Culture No AFB isolated at 6 weeks     AFB Stain No acid fast bacilli seen on concentrated smear    BAL Culture, Quantitative - Lavage, Lung, L    Collection Time: 11/17/24  9:18 AM    Specimen: Lung, L; Lavage   Result Value Ref Range    BAL Culture 50,000 CFU/mL Candida albicans (A)     BAL Culture 50,000 CFU/mL Normal Respiratory Shanae     Gram Stain Moderate (3+) WBCs per low power field     Gram Stain Moderate (3+) Epithelial cells per low power field     Gram Stain Few (2+) Budding yeast with pseudohyphae    Pneumocystis PCR - Lavage, Lung, L    Collection Time: 11/17/24  9:18 AM    Specimen: Lung, L; Lavage   Result Value Ref Range    Reference Lab " Report See Attached Report     Pneumocystis Negative Negative - Ref. Range   Basic Metabolic Panel    Collection Time: 11/18/24  3:04 AM    Specimen: Arm, Left; Blood   Result Value Ref Range    Glucose 135 (H) 65 - 99 mg/dL    BUN 24 (H) 8 - 23 mg/dL    Creatinine 0.67 0.57 - 1.00 mg/dL    Sodium 141 136 - 145 mmol/L    Potassium 4.6 3.5 - 5.2 mmol/L    Chloride 103 98 - 107 mmol/L    CO2 33.2 (H) 22.0 - 29.0 mmol/L    Calcium 8.5 (L) 8.6 - 10.5 mg/dL    BUN/Creatinine Ratio 35.8 (H) 7.0 - 25.0    Anion Gap 4.8 (L) 5.0 - 15.0 mmol/L    eGFR 89.6 >60.0 mL/min/1.73   CBC Auto Differential    Collection Time: 11/18/24  3:04 AM    Specimen: Arm, Left; Blood   Result Value Ref Range    WBC 11.95 (H) 3.40 - 10.80 10*3/mm3    RBC 5.02 3.77 - 5.28 10*6/mm3    Hemoglobin 14.2 12.0 - 15.9 g/dL    Hematocrit 45.6 34.0 - 46.6 %    MCV 90.8 79.0 - 97.0 fL    MCH 28.3 26.6 - 33.0 pg    MCHC 31.1 (L) 31.5 - 35.7 g/dL    RDW 15.4 12.3 - 15.4 %    RDW-SD 51.0 37.0 - 54.0 fl    MPV 9.3 6.0 - 12.0 fL    Platelets 188 140 - 450 10*3/mm3    Neutrophil % 91.1 (H) 42.7 - 76.0 %    Lymphocyte % 4.3 (L) 19.6 - 45.3 %    Monocyte % 3.7 (L) 5.0 - 12.0 %    Eosinophil % 0.0 (L) 0.3 - 6.2 %    Basophil % 0.2 0.0 - 1.5 %    Immature Grans % 0.7 (H) 0.0 - 0.5 %    Neutrophils, Absolute 10.90 (H) 1.70 - 7.00 10*3/mm3    Lymphocytes, Absolute 0.51 (L) 0.70 - 3.10 10*3/mm3    Monocytes, Absolute 0.44 0.10 - 0.90 10*3/mm3    Eosinophils, Absolute 0.00 0.00 - 0.40 10*3/mm3    Basophils, Absolute 0.02 0.00 - 0.20 10*3/mm3    Immature Grans, Absolute 0.08 (H) 0.00 - 0.05 10*3/mm3    nRBC 0.0 0.0 - 0.2 /100 WBC   Basic Metabolic Panel    Collection Time: 11/19/24  3:30 AM    Specimen: Arm, Left; Blood   Result Value Ref Range    Glucose 124 (H) 65 - 99 mg/dL    BUN 27 (H) 8 - 23 mg/dL    Creatinine 0.70 0.57 - 1.00 mg/dL    Sodium 140 136 - 145 mmol/L    Potassium 4.5 3.5 - 5.2 mmol/L    Chloride 100 98 - 107 mmol/L    CO2 33.8 (H) 22.0 - 29.0 mmol/L     Calcium 8.7 8.6 - 10.5 mg/dL    BUN/Creatinine Ratio 38.6 (H) 7.0 - 25.0    Anion Gap 6.2 5.0 - 15.0 mmol/L    eGFR 88.7 >60.0 mL/min/1.73   CBC Auto Differential    Collection Time: 11/19/24  3:30 AM    Specimen: Arm, Left; Blood   Result Value Ref Range    WBC 14.65 (H) 3.40 - 10.80 10*3/mm3    RBC 5.15 3.77 - 5.28 10*6/mm3    Hemoglobin 14.3 12.0 - 15.9 g/dL    Hematocrit 45.9 34.0 - 46.6 %    MCV 89.1 79.0 - 97.0 fL    MCH 27.8 26.6 - 33.0 pg    MCHC 31.2 (L) 31.5 - 35.7 g/dL    RDW 15.4 12.3 - 15.4 %    RDW-SD 50.0 37.0 - 54.0 fl    MPV 9.5 6.0 - 12.0 fL    Platelets 203 140 - 450 10*3/mm3    Neutrophil % 87.4 (H) 42.7 - 76.0 %    Lymphocyte % 5.7 (L) 19.6 - 45.3 %    Monocyte % 5.7 5.0 - 12.0 %    Eosinophil % 0.3 0.3 - 6.2 %    Basophil % 0.1 0.0 - 1.5 %    Immature Grans % 0.8 (H) 0.0 - 0.5 %    Neutrophils, Absolute 12.83 (H) 1.70 - 7.00 10*3/mm3    Lymphocytes, Absolute 0.83 0.70 - 3.10 10*3/mm3    Monocytes, Absolute 0.83 0.10 - 0.90 10*3/mm3    Eosinophils, Absolute 0.04 0.00 - 0.40 10*3/mm3    Basophils, Absolute 0.01 0.00 - 0.20 10*3/mm3    Immature Grans, Absolute 0.11 (H) 0.00 - 0.05 10*3/mm3    nRBC 0.0 0.0 - 0.2 /100 WBC   ECG 12 Lead Drug Monitoring; sotalol    Collection Time: 11/19/24 12:10 PM   Result Value Ref Range    QT Interval 345 ms    QTC Interval 508 ms   ECG 12 Lead QT Measurement    Collection Time: 11/19/24  8:30 PM   Result Value Ref Range    QT Interval 389 ms    QTC Interval 458 ms   ECG 12 Lead QT Measurement    Collection Time: 11/19/24 11:06 PM   Result Value Ref Range    QT Interval 383 ms    QTC Interval 460 ms   Basic Metabolic Panel    Collection Time: 11/20/24  4:12 AM    Specimen: Arm, Left; Blood   Result Value Ref Range    Glucose 118 (H) 65 - 99 mg/dL    BUN 28 (H) 8 - 23 mg/dL    Creatinine 0.75 0.57 - 1.00 mg/dL    Sodium 139 136 - 145 mmol/L    Potassium 4.2 3.5 - 5.2 mmol/L    Chloride 96 (L) 98 - 107 mmol/L    CO2 35.5 (H) 22.0 - 29.0 mmol/L    Calcium 8.9 8.6 -  10.5 mg/dL    BUN/Creatinine Ratio 37.3 (H) 7.0 - 25.0    Anion Gap 7.5 5.0 - 15.0 mmol/L    eGFR 81.6 >60.0 mL/min/1.73   CBC Auto Differential    Collection Time: 11/20/24  4:12 AM    Specimen: Arm, Left; Blood   Result Value Ref Range    WBC 14.19 (H) 3.40 - 10.80 10*3/mm3    RBC 5.57 (H) 3.77 - 5.28 10*6/mm3    Hemoglobin 15.7 12.0 - 15.9 g/dL    Hematocrit 49.1 (H) 34.0 - 46.6 %    MCV 88.2 79.0 - 97.0 fL    MCH 28.2 26.6 - 33.0 pg    MCHC 32.0 31.5 - 35.7 g/dL    RDW 15.5 (H) 12.3 - 15.4 %    RDW-SD 49.7 37.0 - 54.0 fl    MPV 9.4 6.0 - 12.0 fL    Platelets 259 140 - 450 10*3/mm3    Neutrophil % 85.0 (H) 42.7 - 76.0 %    Lymphocyte % 7.1 (L) 19.6 - 45.3 %    Monocyte % 6.6 5.0 - 12.0 %    Eosinophil % 0.4 0.3 - 6.2 %    Basophil % 0.2 0.0 - 1.5 %    Immature Grans % 0.7 (H) 0.0 - 0.5 %    Neutrophils, Absolute 12.07 (H) 1.70 - 7.00 10*3/mm3    Lymphocytes, Absolute 1.01 0.70 - 3.10 10*3/mm3    Monocytes, Absolute 0.93 (H) 0.10 - 0.90 10*3/mm3    Eosinophils, Absolute 0.05 0.00 - 0.40 10*3/mm3    Basophils, Absolute 0.03 0.00 - 0.20 10*3/mm3    Immature Grans, Absolute 0.10 (H) 0.00 - 0.05 10*3/mm3    nRBC 0.0 0.0 - 0.2 /100 WBC   ECG 12 Lead QT Measurement    Collection Time: 11/20/24 10:26 PM   Result Value Ref Range    QT Interval 413 ms    QTC Interval 413 ms   Basic Metabolic Panel    Collection Time: 11/21/24  4:16 AM    Specimen: Arm, Right; Blood   Result Value Ref Range    Glucose 104 (H) 65 - 99 mg/dL    BUN 41 (H) 8 - 23 mg/dL    Creatinine 0.96 0.57 - 1.00 mg/dL    Sodium 139 136 - 145 mmol/L    Potassium 4.6 3.5 - 5.2 mmol/L    Chloride 99 98 - 107 mmol/L    CO2 32.7 (H) 22.0 - 29.0 mmol/L    Calcium 9.0 8.6 - 10.5 mg/dL    BUN/Creatinine Ratio 42.7 (H) 7.0 - 25.0    Anion Gap 7.3 5.0 - 15.0 mmol/L    eGFR 60.7 >60.0 mL/min/1.73   CBC Auto Differential    Collection Time: 11/21/24  4:16 AM    Specimen: Arm, Right; Blood   Result Value Ref Range    WBC 14.62 (H) 3.40 - 10.80 10*3/mm3    RBC 5.39 (H)  3.77 - 5.28 10*6/mm3    Hemoglobin 15.1 12.0 - 15.9 g/dL    Hematocrit 48.2 (H) 34.0 - 46.6 %    MCV 89.4 79.0 - 97.0 fL    MCH 28.0 26.6 - 33.0 pg    MCHC 31.3 (L) 31.5 - 35.7 g/dL    RDW 15.7 (H) 12.3 - 15.4 %    RDW-SD 50.6 37.0 - 54.0 fl    MPV 9.2 6.0 - 12.0 fL    Platelets 248 140 - 450 10*3/mm3    Neutrophil % 80.7 (H) 42.7 - 76.0 %    Lymphocyte % 7.9 (L) 19.6 - 45.3 %    Monocyte % 8.3 5.0 - 12.0 %    Eosinophil % 2.1 0.3 - 6.2 %    Basophil % 0.2 0.0 - 1.5 %    Immature Grans % 0.8 (H) 0.0 - 0.5 %    Neutrophils, Absolute 11.80 (H) 1.70 - 7.00 10*3/mm3    Lymphocytes, Absolute 1.16 0.70 - 3.10 10*3/mm3    Monocytes, Absolute 1.21 (H) 0.10 - 0.90 10*3/mm3    Eosinophils, Absolute 0.31 0.00 - 0.40 10*3/mm3    Basophils, Absolute 0.03 0.00 - 0.20 10*3/mm3    Immature Grans, Absolute 0.11 (H) 0.00 - 0.05 10*3/mm3    nRBC 0.0 0.0 - 0.2 /100 WBC   ECG 12 Lead QT Measurement    Collection Time: 11/21/24 11:10 PM   Result Value Ref Range    QT Interval 410 ms    QTC Interval 410 ms   Basic Metabolic Panel    Collection Time: 11/22/24  1:10 AM    Specimen: Arm, Left; Blood   Result Value Ref Range    Glucose 101 (H) 65 - 99 mg/dL    BUN 45 (H) 8 - 23 mg/dL    Creatinine 0.91 0.57 - 1.00 mg/dL    Sodium 140 136 - 145 mmol/L    Potassium 4.4 3.5 - 5.2 mmol/L    Chloride 101 98 - 107 mmol/L    CO2 30.0 (H) 22.0 - 29.0 mmol/L    Calcium 8.8 8.6 - 10.5 mg/dL    BUN/Creatinine Ratio 49.5 (H) 7.0 - 25.0    Anion Gap 9.0 5.0 - 15.0 mmol/L    eGFR 64.7 >60.0 mL/min/1.73   CBC Auto Differential    Collection Time: 11/22/24  1:10 AM    Specimen: Arm, Left; Blood   Result Value Ref Range    WBC 10.49 3.40 - 10.80 10*3/mm3    RBC 5.14 3.77 - 5.28 10*6/mm3    Hemoglobin 14.3 12.0 - 15.9 g/dL    Hematocrit 46.0 34.0 - 46.6 %    MCV 89.5 79.0 - 97.0 fL    MCH 27.8 26.6 - 33.0 pg    MCHC 31.1 (L) 31.5 - 35.7 g/dL    RDW 15.8 (H) 12.3 - 15.4 %    RDW-SD 50.7 37.0 - 54.0 fl    MPV 9.2 6.0 - 12.0 fL    Platelets 240 140 - 450 10*3/mm3     Neutrophil % 72.8 42.7 - 76.0 %    Lymphocyte % 12.8 (L) 19.6 - 45.3 %    Monocyte % 10.3 5.0 - 12.0 %    Eosinophil % 3.1 0.3 - 6.2 %    Basophil % 0.1 0.0 - 1.5 %    Immature Grans % 0.9 (H) 0.0 - 0.5 %    Neutrophils, Absolute 7.65 (H) 1.70 - 7.00 10*3/mm3    Lymphocytes, Absolute 1.34 0.70 - 3.10 10*3/mm3    Monocytes, Absolute 1.08 (H) 0.10 - 0.90 10*3/mm3    Eosinophils, Absolute 0.32 0.00 - 0.40 10*3/mm3    Basophils, Absolute 0.01 0.00 - 0.20 10*3/mm3    Immature Grans, Absolute 0.09 (H) 0.00 - 0.05 10*3/mm3    nRBC 0.0 0.0 - 0.2 /100 WBC   Comprehensive Metabolic Panel    Collection Time: 03/04/25 11:32 AM    Specimen: Blood   Result Value Ref Range    Glucose 118 (H) 65 - 99 mg/dL    BUN 25 (H) 8 - 23 mg/dL    Creatinine 0.79 0.57 - 1.00 mg/dL    Sodium 140 136 - 145 mmol/L    Potassium 4.4 3.5 - 5.2 mmol/L    Chloride 103 98 - 107 mmol/L    CO2 28.0 22.0 - 29.0 mmol/L    Calcium 9.6 8.6 - 10.5 mg/dL    Total Protein 6.9 6.0 - 8.5 g/dL    Albumin 4.0 3.5 - 5.2 g/dL    ALT (SGPT) 15 1 - 33 U/L    AST (SGOT) 19 1 - 32 U/L    Alkaline Phosphatase 128 (H) 39 - 117 U/L    Total Bilirubin 0.4 0.0 - 1.2 mg/dL    Globulin 2.9 gm/dL    A/G Ratio 1.4 g/dL    BUN/Creatinine Ratio 31.6 (H) 7.0 - 25.0    Anion Gap 9.0 5.0 - 15.0 mmol/L    eGFR 76.7 >60.0 mL/min/1.73   Lipid Panel    Collection Time: 03/04/25 11:32 AM    Specimen: Blood   Result Value Ref Range    Total Cholesterol 153 0 - 200 mg/dL    Triglycerides 72 0 - 150 mg/dL    HDL Cholesterol 73 (H) 40 - 60 mg/dL    LDL Cholesterol  66 0 - 100 mg/dL    VLDL Cholesterol 14 5 - 40 mg/dL    LDL/HDL Ratio 0.90    TSH Rfx On Abnormal To Free T4    Collection Time: 03/04/25 11:32 AM    Specimen: Blood   Result Value Ref Range    TSH 6.870 (H) 0.270 - 4.200 uIU/mL   T4, Free    Collection Time: 03/04/25 11:32 AM    Specimen: Blood   Result Value Ref Range    Free T4 1.14 0.93 - 1.70 ng/dL   Basic Metabolic Panel    Collection Time: 03/06/25  1:19 AM    Specimen:  Blood   Result Value Ref Range    Glucose 181 (H) 65 - 99 mg/dL    BUN 27 (H) 8 - 23 mg/dL    Creatinine 0.87 0.57 - 1.00 mg/dL    Sodium 141 136 - 145 mmol/L    Potassium 4.8 3.5 - 5.2 mmol/L    Chloride 104 98 - 107 mmol/L    CO2 26.2 22.0 - 29.0 mmol/L    Calcium 9.4 8.6 - 10.5 mg/dL    BUN/Creatinine Ratio 31.0 (H) 7.0 - 25.0    Anion Gap 10.8 5.0 - 15.0 mmol/L    eGFR 68.3 >60.0 mL/min/1.73   COVID-19, FLU A/B, RSV PCR 1 HR TAT - Swab, Nasopharynx    Collection Time: 03/06/25  1:19 AM    Specimen: Nasopharynx; Swab   Result Value Ref Range    COVID19 Not Detected Not Detected - Ref. Range    Influenza A PCR Not Detected Not Detected    Influenza B PCR Not Detected Not Detected    RSV, PCR Not Detected Not Detected   CBC Auto Differential    Collection Time: 03/06/25  1:19 AM    Specimen: Blood   Result Value Ref Range    WBC 13.09 (H) 3.40 - 10.80 10*3/mm3    RBC 5.30 (H) 3.77 - 5.28 10*6/mm3    Hemoglobin 15.4 12.0 - 15.9 g/dL    Hematocrit 49.4 (H) 34.0 - 46.6 %    MCV 93.2 79.0 - 97.0 fL    MCH 29.1 26.6 - 33.0 pg    MCHC 31.2 (L) 31.5 - 35.7 g/dL    RDW 14.1 12.3 - 15.4 %    RDW-SD 47.9 37.0 - 54.0 fl    MPV 9.2 6.0 - 12.0 fL    Platelets 177 140 - 450 10*3/mm3    Neutrophil % 92.0 (H) 42.7 - 76.0 %    Lymphocyte % 1.6 (L) 19.6 - 45.3 %    Monocyte % 5.4 5.0 - 12.0 %    Eosinophil % 0.5 0.3 - 6.2 %    Basophil % 0.2 0.0 - 1.5 %    Immature Grans % 0.3 0.0 - 0.5 %    Neutrophils, Absolute 12.03 (H) 1.70 - 7.00 10*3/mm3    Lymphocytes, Absolute 0.21 (L) 0.70 - 3.10 10*3/mm3    Monocytes, Absolute 0.71 0.10 - 0.90 10*3/mm3    Eosinophils, Absolute 0.07 0.00 - 0.40 10*3/mm3    Basophils, Absolute 0.03 0.00 - 0.20 10*3/mm3    Immature Grans, Absolute 0.04 0.00 - 0.05 10*3/mm3    nRBC 0.0 0.0 - 0.2 /100 WBC   Gold Top - SST    Collection Time: 03/06/25  1:19 AM   Result Value Ref Range    Extra Tube Hold for add-ons.    Light Blue Top    Collection Time: 03/06/25  1:19 AM   Result Value Ref Range    Extra Tube  Hold for add-ons.    D-dimer, Quantitative    Collection Time: 03/06/25  1:19 AM    Specimen: Blood   Result Value Ref Range    D-Dimer, Quantitative 0.80 (H) 0.00 - 0.78 MCGFEU/mL   BNP    Collection Time: 03/06/25  1:19 AM    Specimen: Blood   Result Value Ref Range    proBNP 218.0 0.0 - 1,800.0 pg/mL   High Sensitivity Troponin T    Collection Time: 03/06/25  1:19 AM    Specimen: Blood   Result Value Ref Range    HS Troponin T 14 (H) <14 ng/L   ECG 12 Lead Other; dizziness    Collection Time: 03/06/25  1:55 AM   Result Value Ref Range    QT Interval 433 ms    QTC Interval 437 ms   Gastrointestinal Panel, PCR - Stool, Per Rectum    Collection Time: 03/06/25  3:30 AM    Specimen: Per Rectum; Stool   Result Value Ref Range    Campylobacter Not Detected Not Detected    Plesiomonas shigelloides Not Detected Not Detected    Salmonella Not Detected Not Detected    Vibrio Not Detected Not Detected    Vibrio cholerae Not Detected Not Detected    Yersinia enterocolitica Not Detected Not Detected    Enteroaggregative E. coli (EAEC) Not Detected Not Detected    Enteropathogenic E. coli (EPEC) Not Detected Not Detected    Enterotoxigenic E. coli (ETEC) lt/st Not Detected Not Detected    Shiga-like toxin-producing E. coli (STEC) stx1/stx2 Not Detected Not Detected    Shigella/Enteroinvasive E. coli (EIEC) Not Detected Not Detected    Cryptosporidium Not Detected Not Detected    Cyclospora cayetanensis Not Detected Not Detected    Entamoeba histolytica Not Detected Not Detected    Giardia lamblia Not Detected Not Detected    Adenovirus F40/41 Not Detected Not Detected    Astrovirus Not Detected Not Detected    Norovirus GI/GII Detected (A) Not Detected    Rotavirus A Not Detected Not Detected    Sapovirus (I, II, IV or V) Not Detected Not Detected   Blood Gas, Arterial -    Collection Time: 03/06/25  3:59 AM    Specimen: Arterial Blood   Result Value Ref Range    Site Right Radial     Baltazar's Test Positive     pH, Arterial  7.338 (L) 7.350 - 7.450 pH units    pCO2, Arterial 44.4 35.0 - 48.0 mm Hg    pO2, Arterial 53.3 (L) 83.0 - 108.0 mm Hg    HCO3, Arterial 23.9 21.0 - 28.0 mmol/L    Base Excess, Arterial -2.2 (L) 0.0 - 3.0 mmol/L    O2 Saturation, Arterial 84.9 (L) 94.0 - 98.0 %    CO2 Content 25.2 22 - 29 mmol/L    Barometric Pressure for Blood Gas      Modality Room Air     FIO2 21 %    Hemodilution No     PO2/FIO2 254 0 - 500   High Sensitivity Troponin T 1Hr    Collection Time: 03/06/25  4:58 AM    Specimen: Blood   Result Value Ref Range    HS Troponin T 12 <14 ng/L    Troponin T Numeric Delta -2 ng/L    Troponin T % Delta -14 Abnormal if >/= 20%   Basic Metabolic Panel    Collection Time: 03/07/25  5:06 AM    Specimen: Blood   Result Value Ref Range    Glucose 101 (H) 65 - 99 mg/dL    BUN 25 (H) 8 - 23 mg/dL    Creatinine 0.73 0.57 - 1.00 mg/dL    Sodium 140 136 - 145 mmol/L    Potassium 3.5 3.5 - 5.2 mmol/L    Chloride 108 (H) 98 - 107 mmol/L    CO2 23.1 22.0 - 29.0 mmol/L    Calcium 8.5 (L) 8.6 - 10.5 mg/dL    BUN/Creatinine Ratio 34.2 (H) 7.0 - 25.0    Anion Gap 8.9 5.0 - 15.0 mmol/L    eGFR 84.3 >60.0 mL/min/1.73   Magnesium    Collection Time: 03/07/25  5:06 AM    Specimen: Blood   Result Value Ref Range    Magnesium 1.9 1.6 - 2.4 mg/dL   Phosphorus    Collection Time: 03/07/25  5:06 AM    Specimen: Blood   Result Value Ref Range    Phosphorus 2.9 2.5 - 4.5 mg/dL   CBC Auto Differential    Collection Time: 03/07/25  5:06 AM    Specimen: Blood   Result Value Ref Range    WBC 8.52 3.40 - 10.80 10*3/mm3    RBC 4.58 3.77 - 5.28 10*6/mm3    Hemoglobin 13.4 12.0 - 15.9 g/dL    Hematocrit 44.3 34.0 - 46.6 %    MCV 96.7 79.0 - 97.0 fL    MCH 29.3 26.6 - 33.0 pg    MCHC 30.2 (L) 31.5 - 35.7 g/dL    RDW 14.3 12.3 - 15.4 %    RDW-SD 51.3 37.0 - 54.0 fl    MPV 9.5 6.0 - 12.0 fL    Platelets 160 140 - 450 10*3/mm3    Neutrophil % 80.0 (H) 42.7 - 76.0 %    Lymphocyte % 10.3 (L) 19.6 - 45.3 %    Monocyte % 8.1 5.0 - 12.0 %    Eosinophil  % 1.1 0.3 - 6.2 %    Basophil % 0.1 0.0 - 1.5 %    Immature Grans % 0.4 0.0 - 0.5 %    Neutrophils, Absolute 6.82 1.70 - 7.00 10*3/mm3    Lymphocytes, Absolute 0.88 0.70 - 3.10 10*3/mm3    Monocytes, Absolute 0.69 0.10 - 0.90 10*3/mm3    Eosinophils, Absolute 0.09 0.00 - 0.40 10*3/mm3    Basophils, Absolute 0.01 0.00 - 0.20 10*3/mm3    Immature Grans, Absolute 0.03 0.00 - 0.05 10*3/mm3    nRBC 0.0 0.0 - 0.2 /100 WBC   Free T4 By Dialysis / Mass Spec    Collection Time: 03/07/25 10:13 AM    Specimen: Arm, Left; Blood   Result Value Ref Range    Free T4 1.1 ng/dL   ECG 12 Lead QT Measurement    Collection Time: 03/07/25  1:35 PM   Result Value Ref Range    QT Interval 397 ms    QTC Interval 397 ms   Basic Metabolic Panel    Collection Time: 03/08/25  4:26 AM    Specimen: Arm, Right; Blood   Result Value Ref Range    Glucose 104 (H) 65 - 99 mg/dL    BUN 16 8 - 23 mg/dL    Creatinine 0.67 0.57 - 1.00 mg/dL    Sodium 140 136 - 145 mmol/L    Potassium 4.3 3.5 - 5.2 mmol/L    Chloride 108 (H) 98 - 107 mmol/L    CO2 25.5 22.0 - 29.0 mmol/L    Calcium 8.6 8.6 - 10.5 mg/dL    BUN/Creatinine Ratio 23.9 7.0 - 25.0    Anion Gap 6.5 5.0 - 15.0 mmol/L    eGFR 89.6 >60.0 mL/min/1.73   Magnesium    Collection Time: 03/08/25  4:26 AM    Specimen: Arm, Right; Blood   Result Value Ref Range    Magnesium 1.9 1.6 - 2.4 mg/dL   Phosphorus    Collection Time: 03/08/25  4:26 AM    Specimen: Arm, Right; Blood   Result Value Ref Range    Phosphorus 2.5 2.5 - 4.5 mg/dL   CBC Auto Differential    Collection Time: 03/08/25  4:26 AM    Specimen: Arm, Right; Blood   Result Value Ref Range    WBC 6.71 3.40 - 10.80 10*3/mm3    RBC 4.54 3.77 - 5.28 10*6/mm3    Hemoglobin 13.3 12.0 - 15.9 g/dL    Hematocrit 43.1 34.0 - 46.6 %    MCV 94.9 79.0 - 97.0 fL    MCH 29.3 26.6 - 33.0 pg    MCHC 30.9 (L) 31.5 - 35.7 g/dL    RDW 13.8 12.3 - 15.4 %    RDW-SD 49.0 37.0 - 54.0 fl    MPV 9.2 6.0 - 12.0 fL    Platelets 148 140 - 450 10*3/mm3    Neutrophil % 69.1  "42.7 - 76.0 %    Lymphocyte % 18.3 (L) 19.6 - 45.3 %    Monocyte % 10.6 5.0 - 12.0 %    Eosinophil % 1.5 0.3 - 6.2 %    Basophil % 0.1 0.0 - 1.5 %    Immature Grans % 0.4 0.0 - 0.5 %    Neutrophils, Absolute 4.63 1.70 - 7.00 10*3/mm3    Lymphocytes, Absolute 1.23 0.70 - 3.10 10*3/mm3    Monocytes, Absolute 0.71 0.10 - 0.90 10*3/mm3    Eosinophils, Absolute 0.10 0.00 - 0.40 10*3/mm3    Basophils, Absolute 0.01 0.00 - 0.20 10*3/mm3    Immature Grans, Absolute 0.03 0.00 - 0.05 10*3/mm3    nRBC 0.0 0.0 - 0.2 /100 WBC         Review of Systems    Objective     /78 (BP Location: Left arm, Patient Position: Sitting, Cuff Size: Adult)   Pulse 64   Temp 98.1 °F (36.7 °C) (Oral)   Resp 16   Ht 180.3 cm (70.98\")   SpO2 92%   BMI 46.05 kg/m²     Physical Exam  Vitals and nursing note reviewed.   Constitutional:       Appearance: She is obese.   HENT:      Head: Normocephalic.      Mouth/Throat:      Mouth: Mucous membranes are moist.   Eyes:      Pupils: Pupils are equal, round, and reactive to light.   Cardiovascular:      Rate and Rhythm: Normal rate and regular rhythm.      Pulses: Normal pulses.      Heart sounds: Normal heart sounds.   Pulmonary:      Effort: Pulmonary effort is normal.      Breath sounds: Normal breath sounds.   Skin:     General: Skin is warm.   Neurological:      General: No focal deficit present.      Mental Status: She is alert and oriented to person, place, and time.   Psychiatric:         Mood and Affect: Mood normal.         Behavior: Behavior normal.         Thought Content: Thought content normal.         Result Review :                Assessment & Plan    Diagnoses and all orders for this visit:    1. Norovirus (Primary)  Comments:  resolved    2. Primary hypertension  Comments:  stable    3. Pacemaker  Comments:  stable    4. Persistent atrial fibrillation  Comments:  stable      Patient Instructions   Continue current medications  Eat healthy and exercise daily.     Follow Up "   Return for Next scheduled follow up.    Patient was given instructions and counseling regarding her condition or for health maintenance advice. Please see specific information pulled into the AVS if appropriate.     Brinda Tony, APRN    03/31/25

## 2025-04-07 RX ORDER — SOTALOL HYDROCHLORIDE 80 MG/1
80 TABLET ORAL EVERY 12 HOURS SCHEDULED
Qty: 180 TABLET | Refills: 1 | Status: SHIPPED | OUTPATIENT
Start: 2025-04-07

## 2025-04-18 ENCOUNTER — TELEPHONE (OUTPATIENT)
Dept: CARDIOLOGY | Facility: CLINIC | Age: 80
End: 2025-04-18

## 2025-04-18 NOTE — TELEPHONE ENCOUNTER
Patient having trouble with her [employer] account.  She cannot get logged on setup [employer] angelique.  I was able to give her a customer service number for [employer] to try to resolve this.  She has an in person check scheduled in September, which is a 6 month check.  We may be able to get her set up with a home monitor system if she is unable to get it working.

## 2025-04-18 NOTE — TELEPHONE ENCOUNTER
Caller: Camelia Gray    Relationship to patient: Self    Best call back number: [phone]     Patient is needing: PT HAS BEEN HAVING ISSUES WITH HER IPAD AND SHE STATED HER PACEMAKER WAS GETTING CHECKED THIS WEEKEND AND PT WANTED TO ALERT OFFICE HER IPAD WAS NOT WORKING. PT IS REQUESTING ZANE TO CALL HER.

## 2025-05-30 RX ORDER — DILTIAZEM HYDROCHLORIDE 180 MG/1
180 CAPSULE, COATED, EXTENDED RELEASE ORAL
Qty: 90 CAPSULE | Refills: 3 | Status: SHIPPED | OUTPATIENT
Start: 2025-05-30

## 2025-06-06 ENCOUNTER — TELEPHONE (OUTPATIENT)
Dept: CARDIOLOGY | Facility: CLINIC | Age: 80
End: 2025-06-06
Payer: MEDICARE

## 2025-06-06 NOTE — TELEPHONE ENCOUNTER
Called patient to discuss home monitoring.  She has replaced her IPAD and says she has the monitoring angelique setup up.  However she is currently waiting on [employer] to fix her internet.  She will send a transmission once that is done.

## 2025-06-19 ENCOUNTER — OFFICE VISIT (OUTPATIENT)
Dept: WOUND CARE | Facility: HOSPITAL | Age: 80
End: 2025-06-19
Payer: MEDICARE

## 2025-06-19 PROCEDURE — G0463 HOSPITAL OUTPT CLINIC VISIT: HCPCS

## 2025-06-24 ENCOUNTER — OFFICE VISIT (OUTPATIENT)
Dept: WOUND CARE | Facility: HOSPITAL | Age: 80
End: 2025-06-24
Payer: MEDICARE

## 2025-06-26 ENCOUNTER — DOCUMENTATION (OUTPATIENT)
Dept: WOUND CARE | Facility: HOSPITAL | Age: 80
End: 2025-06-26
Payer: MEDICARE

## 2025-06-26 RX ORDER — LEVOTHYROXINE SODIUM 50 UG/1
TABLET ORAL
Qty: 90 TABLET | Refills: 0 | Status: SHIPPED | OUTPATIENT
Start: 2025-06-26

## 2025-06-26 RX ORDER — PANTOPRAZOLE SODIUM 40 MG/1
40 TABLET, DELAYED RELEASE ORAL DAILY
Qty: 90 TABLET | Refills: 0 | Status: SHIPPED | OUTPATIENT
Start: 2025-06-26

## 2025-07-01 ENCOUNTER — OFFICE VISIT (OUTPATIENT)
Dept: WOUND CARE | Facility: HOSPITAL | Age: 80
End: 2025-07-01
Payer: MEDICARE

## 2025-07-08 ENCOUNTER — OFFICE VISIT (OUTPATIENT)
Dept: WOUND CARE | Facility: HOSPITAL | Age: 80
End: 2025-07-08
Payer: MEDICARE

## 2025-07-15 ENCOUNTER — OFFICE VISIT (OUTPATIENT)
Dept: WOUND CARE | Facility: HOSPITAL | Age: 80
End: 2025-07-15
Payer: MEDICARE

## 2025-07-22 ENCOUNTER — OFFICE VISIT (OUTPATIENT)
Dept: WOUND CARE | Facility: HOSPITAL | Age: 80
End: 2025-07-22
Payer: MEDICARE

## 2025-07-29 ENCOUNTER — OFFICE VISIT (OUTPATIENT)
Dept: WOUND CARE | Facility: HOSPITAL | Age: 80
End: 2025-07-29
Payer: MEDICARE

## 2025-08-05 ENCOUNTER — OFFICE VISIT (OUTPATIENT)
Dept: FAMILY MEDICINE CLINIC | Facility: CLINIC | Age: 80
End: 2025-08-05
Payer: MEDICARE

## 2025-08-05 ENCOUNTER — OFFICE VISIT (OUTPATIENT)
Dept: WOUND CARE | Facility: HOSPITAL | Age: 80
End: 2025-08-05
Payer: MEDICARE

## 2025-08-05 VITALS
HEIGHT: 71 IN | OXYGEN SATURATION: 94 % | HEART RATE: 61 BPM | BODY MASS INDEX: 41.02 KG/M2 | SYSTOLIC BLOOD PRESSURE: 153 MMHG | WEIGHT: 293 LBS | RESPIRATION RATE: 16 BRPM | TEMPERATURE: 98.4 F | DIASTOLIC BLOOD PRESSURE: 90 MMHG

## 2025-08-05 DIAGNOSIS — I89.0 LYMPHEDEMA: Chronic | ICD-10-CM

## 2025-08-05 DIAGNOSIS — M62.3 IMMOBILITY SYNDROME: Chronic | ICD-10-CM

## 2025-08-05 DIAGNOSIS — I10 PRIMARY HYPERTENSION: Chronic | ICD-10-CM

## 2025-08-05 DIAGNOSIS — I26.99 PULMONARY EMBOLISM, UNSPECIFIED CHRONICITY, UNSPECIFIED PULMONARY EMBOLISM TYPE, UNSPECIFIED WHETHER ACUTE COR PULMONALE PRESENT: Chronic | ICD-10-CM

## 2025-08-05 DIAGNOSIS — Z12.31 BREAST CANCER SCREENING BY MAMMOGRAM: Primary | ICD-10-CM

## 2025-08-05 DIAGNOSIS — I49.5 SICK SINUS SYNDROME: Chronic | ICD-10-CM

## 2025-08-05 DIAGNOSIS — E03.9 ACQUIRED HYPOTHYROIDISM: Chronic | ICD-10-CM

## 2025-08-05 DIAGNOSIS — Z95.0 PACEMAKER: Chronic | ICD-10-CM

## 2025-08-05 DIAGNOSIS — I48.19 PERSISTENT ATRIAL FIBRILLATION: Chronic | ICD-10-CM

## 2025-08-05 DIAGNOSIS — K21.9 GASTROESOPHAGEAL REFLUX DISEASE WITHOUT ESOPHAGITIS: Chronic | ICD-10-CM

## 2025-08-05 PROCEDURE — 3080F DIAST BP >= 90 MM HG: CPT | Performed by: NURSE PRACTITIONER

## 2025-08-05 PROCEDURE — 1159F MED LIST DOCD IN RCRD: CPT | Performed by: NURSE PRACTITIONER

## 2025-08-05 PROCEDURE — 99214 OFFICE O/P EST MOD 30 MIN: CPT | Performed by: NURSE PRACTITIONER

## 2025-08-05 PROCEDURE — 1126F AMNT PAIN NOTED NONE PRSNT: CPT | Performed by: NURSE PRACTITIONER

## 2025-08-05 PROCEDURE — 1160F RVW MEDS BY RX/DR IN RCRD: CPT | Performed by: NURSE PRACTITIONER

## 2025-08-05 PROCEDURE — 3077F SYST BP >= 140 MM HG: CPT | Performed by: NURSE PRACTITIONER

## 2025-08-05 PROCEDURE — G2211 COMPLEX E/M VISIT ADD ON: HCPCS | Performed by: NURSE PRACTITIONER

## 2025-08-05 RX ORDER — HYDROCORTISONE 25 MG/G
1 CREAM TOPICAL DAILY
COMMUNITY

## 2025-08-12 ENCOUNTER — OFFICE VISIT (OUTPATIENT)
Dept: WOUND CARE | Facility: HOSPITAL | Age: 80
End: 2025-08-12
Payer: MEDICARE

## 2025-08-18 ENCOUNTER — OFFICE VISIT (OUTPATIENT)
Dept: WOUND CARE | Facility: HOSPITAL | Age: 80
End: 2025-08-18
Payer: MEDICARE

## 2025-08-26 ENCOUNTER — OFFICE VISIT (OUTPATIENT)
Dept: WOUND CARE | Facility: HOSPITAL | Age: 80
End: 2025-08-26
Payer: MEDICARE

## (undated) DEVICE — SUT ETHIB 0/0 MO6 I8IN CX45D

## (undated) DEVICE — SOL IRRIG H2O 1000ML STRL

## (undated) DEVICE — GLV SURG SENSICARE PI LF PF 8 GRN STRL

## (undated) DEVICE — ADHS LIQ MASTISOL 2/3ML

## (undated) DEVICE — PICO 7 10CM X 30CM: Brand: PICO™ 7

## (undated) DEVICE — SOL IRRG H2O BG 3000ML STRL

## (undated) DEVICE — SYR LUERLOK 20CC BX/50

## (undated) DEVICE — ZIP 24 SURGICAL SKIN CLOSURE DEVICE, PSA: Brand: ZIP 24 SURGICAL SKIN CLOSURE DEVICE

## (undated) DEVICE — SOL IRR NACL 0.9PCT BT 1000ML

## (undated) DEVICE — VIOLET BRAIDED (POLYGLACTIN 910), SYNTHETIC ABSORBABLE SUTURE: Brand: COATED VICRYL

## (undated) DEVICE — FLTR ULPA W/FLD TRAP

## (undated) DEVICE — ST ACC MICROPUNCTURE STFF/CANN PLAT/TP 4F 21G 40CM

## (undated) DEVICE — CATH 2L URETRL HC 6F 50CM

## (undated) DEVICE — SYS IRR PUMP SGL ACTN VAC SYR 10CC

## (undated) DEVICE — UNDYED BRAIDED (POLYGLACTIN 910), SYNTHETIC ABSORBABLE SUTURE: Brand: COATED VICRYL

## (undated) DEVICE — SUT VIC 1 CT1 36IN J947H

## (undated) DEVICE — CUFF SCD HEMOFORCE SEQ CALF STD MD

## (undated) DEVICE — PENCL EVAC ULTRAVAC SMOKE W/BLD

## (undated) DEVICE — SUT VIC 3/0 SH 27IN J416H

## (undated) DEVICE — ENDOPATH XCEL WITH OPTIVIEW TECHNOLOGY UNIVERSAL TROCAR STABILITY SLEEVES: Brand: ENDOPATH XCEL OPTIVIEW

## (undated) DEVICE — SYR LL TP 10ML STRL

## (undated) DEVICE — ENDOPATH XCEL DILATING TIP TROCARS WITH STABILITY SLEEVES: Brand: ENDOPATH XCEL

## (undated) DEVICE — LAPAROSCOPIC GAS CONDITIONING DEVICE.: Brand: INSUFLOW

## (undated) DEVICE — GENERAL LAPAROSCOPY CDS: Brand: MEDLINE INDUSTRIES, INC.

## (undated) DEVICE — KT SURG TURNOVER 050

## (undated) DEVICE — TOTAL TRAY, DB, 100% SILI FOLEY, 16FR 10: Brand: MEDLINE

## (undated) DEVICE — PACEMAKER CDS: Brand: MEDLINE INDUSTRIES, INC.

## (undated) DEVICE — INTRO SHEATH PRELUDE SNAP .038 7F 13CM W/SDPRT

## (undated) DEVICE — ANTIBACTERIAL UNDYED BRAIDED (POLYGLACTIN 910), SYNTHETIC ABSORBABLE SUTURE: Brand: COATED VICRYL

## (undated) DEVICE — GLV SURG SENSICARE PI ORTHO PF SZ7 LF STRL

## (undated) DEVICE — BAPTIST FLOYD BRONCHOSCOPY: Brand: MEDLINE INDUSTRIES, INC.

## (undated) DEVICE — PK CYSTO 50

## (undated) DEVICE — NEEDLE, QUINCKE, 20GX3.5": Brand: MEDLINE

## (undated) DEVICE — 3M™ IOBAN™ 2 ANTIMICROBIAL INCISE DRAPE 6650EZ: Brand: IOBAN™ 2

## (undated) DEVICE — 3M™ PATIENT PLATE, CORDED, SPLIT, LARGE, 40 PER CASE, 1179: Brand: 3M™

## (undated) DEVICE — ZIPPERED TOGA, 2X LARGE: Brand: FLYTE

## (undated) DEVICE — CATH GUIDE RIGHTSITE C315HIS-02

## (undated) DEVICE — BITEBLOCK ENDO W/STRAP 60F A/ LF DISP

## (undated) DEVICE — SOL IRR H2O BTL 1000ML STRL

## (undated) DEVICE — GLV SURG BIOGEL SENSR LTX PF SZ7.5

## (undated) DEVICE — RETRV BG SPECI GENISTRONG MD 240ML

## (undated) DEVICE — RADIFOCUS GLIDEWIRE: Brand: GLIDEWIRE

## (undated) DEVICE — SOL LACTATED RINGER 1000ML

## (undated) DEVICE — PK HIP TOTL 40

## (undated) DEVICE — NEEDLE, QUINCKE, 18GX3.5": Brand: MEDLINE

## (undated) DEVICE — GW FIX CORE JB FLX PTFE .035 15X145CM

## (undated) DEVICE — GLV SURG SENSICARE PI ORTHO SZ8.5 LF STRL

## (undated) DEVICE — SUT ETHIB 2 CV V37 MS/4 30IN MX69G

## (undated) DEVICE — REFLECTION FLEXIBLE DRILL 25MM: Brand: REFLECTION

## (undated) DEVICE — PK ENDO GI 50

## (undated) DEVICE — IMMOB SHLDR CUT/AWAY UNIV

## (undated) DEVICE — ENDOPATH 5MM CURVED SCISSORS WITH MONOPOLAR CAUTERY: Brand: ENDOPATH

## (undated) DEVICE — GLV SURG SENSICARE PI PF LF 7 GRN STRL

## (undated) DEVICE — DRAPE,INSTRUMENT,MAGNETIC,10X16: Brand: MEDLINE

## (undated) DEVICE — PENCL SMOKE/EVAC MEGADYNE TELESCP 10FT

## (undated) DEVICE — 400ML COMPACT EVACUATOR KIT, 1/8" PVC WITH TROCAR: Brand: HEMOVAC® WOUND DRAINAGE SYSTEM

## (undated) DEVICE — SUT VIC 0 SUTUPAK TIES 18IN J906G

## (undated) DEVICE — GLV SURG SIGNATURE ESSENTIAL PF LTX SZ7

## (undated) DEVICE — ANTIMICROBIAL BARRIER DRESSING WITH SLOT: Brand: ACTICOAT EXFIX DRESSING US CTN OF 10

## (undated) DEVICE — PAPR PRNT PK SONY W RIBN UPC55

## (undated) DEVICE — NITINOL WIRE WITH HYDROPHILIC TIP: Brand: SENSOR

## (undated) DEVICE — ENDOPATH PNEUMONEEDLE INSUFFLATION NEEDLES WITH LUER LOCK CONNECTORS 120MM: Brand: ENDOPATH

## (undated) DEVICE — APPL DURAPREP IODOPHOR APL 26ML

## (undated) DEVICE — ELECTRD DEFIB M/FUNC PROPADZ RADIOL 2PK

## (undated) DEVICE — PK PROC TURNOVER

## (undated) DEVICE — BANDAGE,GAUZE,BULKEE II,4.5"X4.1YD,STRL: Brand: MEDLINE

## (undated) DEVICE — DRSNG SURESITE WNDW 2.38X2.75

## (undated) DEVICE — CABL BIPOL W/ALLGTR CLIP/SM 12FT